# Patient Record
Sex: FEMALE | Race: WHITE | NOT HISPANIC OR LATINO | URBAN - METROPOLITAN AREA
[De-identification: names, ages, dates, MRNs, and addresses within clinical notes are randomized per-mention and may not be internally consistent; named-entity substitution may affect disease eponyms.]

---

## 2018-03-20 ENCOUNTER — OUTPATIENT (OUTPATIENT)
Dept: OUTPATIENT SERVICES | Facility: HOSPITAL | Age: 78
LOS: 1 days | End: 2018-03-20
Payer: MEDICARE

## 2018-03-20 VITALS
RESPIRATION RATE: 18 BRPM | OXYGEN SATURATION: 100 % | HEART RATE: 90 BPM | DIASTOLIC BLOOD PRESSURE: 81 MMHG | SYSTOLIC BLOOD PRESSURE: 143 MMHG | HEIGHT: 63 IN | WEIGHT: 160.94 LBS | TEMPERATURE: 98 F

## 2018-03-20 VITALS — HEIGHT: 63 IN | WEIGHT: 164.91 LBS

## 2018-03-20 DIAGNOSIS — Z01.810 ENCOUNTER FOR PREPROCEDURAL CARDIOVASCULAR EXAMINATION: ICD-10-CM

## 2018-03-20 DIAGNOSIS — R93.1 ABNORMAL FINDINGS ON DIAGNOSTIC IMAGING OF HEART AND CORONARY CIRCULATION: ICD-10-CM

## 2018-03-20 DIAGNOSIS — R06.02 SHORTNESS OF BREATH: ICD-10-CM

## 2018-03-20 DIAGNOSIS — I10 ESSENTIAL (PRIMARY) HYPERTENSION: ICD-10-CM

## 2018-03-20 DIAGNOSIS — Z90.89 ACQUIRED ABSENCE OF OTHER ORGANS: Chronic | ICD-10-CM

## 2018-03-20 LAB
ANION GAP SERPL CALC-SCNC: 13 MMOL/L — SIGNIFICANT CHANGE UP (ref 5–17)
APTT BLD: 33.2 SEC — SIGNIFICANT CHANGE UP (ref 27.5–37.4)
BASOPHILS # BLD AUTO: 0.1 K/UL — SIGNIFICANT CHANGE UP (ref 0–0.2)
BASOPHILS NFR BLD AUTO: 1.1 % — SIGNIFICANT CHANGE UP (ref 0–2)
BUN SERPL-MCNC: 16 MG/DL — SIGNIFICANT CHANGE UP (ref 8–20)
CALCIUM SERPL-MCNC: 9.1 MG/DL — SIGNIFICANT CHANGE UP (ref 8.6–10.2)
CHLORIDE SERPL-SCNC: 103 MMOL/L — SIGNIFICANT CHANGE UP (ref 98–107)
CHOLEST SERPL-MCNC: 196 MG/DL — SIGNIFICANT CHANGE UP (ref 110–199)
CO2 SERPL-SCNC: 25 MMOL/L — SIGNIFICANT CHANGE UP (ref 22–29)
CREAT SERPL-MCNC: 0.84 MG/DL — SIGNIFICANT CHANGE UP (ref 0.5–1.3)
EOSINOPHIL # BLD AUTO: 0.2 K/UL — SIGNIFICANT CHANGE UP (ref 0–0.5)
EOSINOPHIL NFR BLD AUTO: 2.3 % — SIGNIFICANT CHANGE UP (ref 0–6)
GLUCOSE SERPL-MCNC: 97 MG/DL — SIGNIFICANT CHANGE UP (ref 70–115)
HCT VFR BLD CALC: 41.1 % — SIGNIFICANT CHANGE UP (ref 37–47)
HDLC SERPL-MCNC: 62 MG/DL — LOW
HGB BLD-MCNC: 13.6 G/DL — SIGNIFICANT CHANGE UP (ref 12–16)
INR BLD: 1.09 RATIO — SIGNIFICANT CHANGE UP (ref 0.88–1.16)
LIPID PNL WITH DIRECT LDL SERPL: 116 MG/DL — SIGNIFICANT CHANGE UP
LYMPHOCYTES # BLD AUTO: 1.4 K/UL — SIGNIFICANT CHANGE UP (ref 1–4.8)
LYMPHOCYTES # BLD AUTO: 21.3 % — SIGNIFICANT CHANGE UP (ref 20–55)
MCHC RBC-ENTMCNC: 29.2 PG — SIGNIFICANT CHANGE UP (ref 27–31)
MCHC RBC-ENTMCNC: 33.1 G/DL — SIGNIFICANT CHANGE UP (ref 32–36)
MCV RBC AUTO: 88.2 FL — SIGNIFICANT CHANGE UP (ref 81–99)
MONOCYTES # BLD AUTO: 0.6 K/UL — SIGNIFICANT CHANGE UP (ref 0–0.8)
MONOCYTES NFR BLD AUTO: 9.7 % — SIGNIFICANT CHANGE UP (ref 3–10)
NEUTROPHILS # BLD AUTO: 4.3 K/UL — SIGNIFICANT CHANGE UP (ref 1.8–8)
NEUTROPHILS NFR BLD AUTO: 65.4 % — SIGNIFICANT CHANGE UP (ref 37–73)
PLATELET # BLD AUTO: 188 K/UL — SIGNIFICANT CHANGE UP (ref 150–400)
POTASSIUM SERPL-MCNC: 4.2 MMOL/L — SIGNIFICANT CHANGE UP (ref 3.5–5.3)
POTASSIUM SERPL-SCNC: 4.2 MMOL/L — SIGNIFICANT CHANGE UP (ref 3.5–5.3)
PROTHROM AB SERPL-ACNC: 12 SEC — SIGNIFICANT CHANGE UP (ref 9.8–12.7)
RBC # BLD: 4.66 M/UL — SIGNIFICANT CHANGE UP (ref 4.4–5.2)
RBC # FLD: 13.1 % — SIGNIFICANT CHANGE UP (ref 11–15.6)
SODIUM SERPL-SCNC: 141 MMOL/L — SIGNIFICANT CHANGE UP (ref 135–145)
TOTAL CHOLESTEROL/HDL RATIO MEASUREMENT: 3 RATIO — LOW (ref 3.3–7.1)
TRIGL SERPL-MCNC: 92 MG/DL — SIGNIFICANT CHANGE UP (ref 10–200)
WBC # BLD: 6.6 K/UL — SIGNIFICANT CHANGE UP (ref 4.8–10.8)
WBC # FLD AUTO: 6.6 K/UL — SIGNIFICANT CHANGE UP (ref 4.8–10.8)

## 2018-03-20 PROCEDURE — 80061 LIPID PANEL: CPT

## 2018-03-20 PROCEDURE — G0463: CPT

## 2018-03-20 PROCEDURE — 93005 ELECTROCARDIOGRAM TRACING: CPT

## 2018-03-20 PROCEDURE — 85730 THROMBOPLASTIN TIME PARTIAL: CPT

## 2018-03-20 PROCEDURE — 93010 ELECTROCARDIOGRAM REPORT: CPT

## 2018-03-20 PROCEDURE — 85027 COMPLETE CBC AUTOMATED: CPT

## 2018-03-20 PROCEDURE — 36415 COLL VENOUS BLD VENIPUNCTURE: CPT

## 2018-03-20 PROCEDURE — 85610 PROTHROMBIN TIME: CPT

## 2018-03-20 PROCEDURE — 80048 BASIC METABOLIC PNL TOTAL CA: CPT

## 2018-03-20 NOTE — H&P PST ADULT - HISTORY OF PRESENT ILLNESS
78 yo female with pmhx HTN, rheumatic fever who has c/o dyspnea on exertion and presents for PST for LHC. 76 yo female with pmhx HTN, rheumatic fever who has c/o dyspnea on extreme exertion and presents for PST for LHC. She states she has been feeling well, and does not have any chest pain or palpitations. She was referred for LHC because she had an abnormal echocardiogram.    Echo: EF 40-45%: Inferoapical, inferolateral, apical septal and apical lateral walls appear to be hypokinetic. No thrombus seen. EF by simpsons is 42%. Mild to moderate MR. Mild TR. Mild to moderate aortic regurgitation. Mild pulmonic valvular regurgitation.

## 2018-03-23 ENCOUNTER — INPATIENT (INPATIENT)
Facility: HOSPITAL | Age: 78
LOS: 0 days | Discharge: HOME | DRG: 247 | End: 2018-03-24
Attending: INTERNAL MEDICINE | Admitting: INTERNAL MEDICINE
Payer: MEDICARE

## 2018-03-23 ENCOUNTER — TRANSCRIPTION ENCOUNTER (OUTPATIENT)
Age: 78
End: 2018-03-23

## 2018-03-23 VITALS
HEART RATE: 102 BPM | DIASTOLIC BLOOD PRESSURE: 74 MMHG | TEMPERATURE: 98 F | RESPIRATION RATE: 17 BRPM | SYSTOLIC BLOOD PRESSURE: 175 MMHG | OXYGEN SATURATION: 97 %

## 2018-03-23 DIAGNOSIS — Z90.89 ACQUIRED ABSENCE OF OTHER ORGANS: Chronic | ICD-10-CM

## 2018-03-23 DIAGNOSIS — R06.02 SHORTNESS OF BREATH: ICD-10-CM

## 2018-03-23 DIAGNOSIS — Z01.810 ENCOUNTER FOR PREPROCEDURAL CARDIOVASCULAR EXAMINATION: ICD-10-CM

## 2018-03-23 DIAGNOSIS — R93.1 ABNORMAL FINDINGS ON DIAGNOSTIC IMAGING OF HEART AND CORONARY CIRCULATION: ICD-10-CM

## 2018-03-23 DIAGNOSIS — I10 ESSENTIAL (PRIMARY) HYPERTENSION: ICD-10-CM

## 2018-03-23 LAB
MAGNESIUM SERPL-MCNC: 1.9 MG/DL — SIGNIFICANT CHANGE UP (ref 1.6–2.6)
POTASSIUM SERPL-MCNC: 3.8 MMOL/L — SIGNIFICANT CHANGE UP (ref 3.5–5.3)
POTASSIUM SERPL-SCNC: 3.8 MMOL/L — SIGNIFICANT CHANGE UP (ref 3.5–5.3)

## 2018-03-23 PROCEDURE — 93010 ELECTROCARDIOGRAM REPORT: CPT

## 2018-03-23 RX ORDER — METOPROLOL TARTRATE 50 MG
25 TABLET ORAL
Qty: 0 | Refills: 0 | Status: DISCONTINUED | OUTPATIENT
Start: 2018-03-23 | End: 2018-03-24

## 2018-03-23 RX ORDER — LISINOPRIL 2.5 MG/1
20 TABLET ORAL DAILY
Qty: 0 | Refills: 0 | Status: DISCONTINUED | OUTPATIENT
Start: 2018-03-23 | End: 2018-03-24

## 2018-03-23 RX ORDER — ASPIRIN/CALCIUM CARB/MAGNESIUM 324 MG
81 TABLET ORAL DAILY
Qty: 0 | Refills: 0 | Status: DISCONTINUED | OUTPATIENT
Start: 2018-03-23 | End: 2018-03-24

## 2018-03-23 RX ORDER — MAGNESIUM SULFATE 500 MG/ML
1 VIAL (ML) INJECTION ONCE
Qty: 0 | Refills: 0 | Status: COMPLETED | OUTPATIENT
Start: 2018-03-23 | End: 2018-03-24

## 2018-03-23 RX ORDER — CLOPIDOGREL BISULFATE 75 MG/1
75 TABLET, FILM COATED ORAL DAILY
Qty: 0 | Refills: 0 | Status: DISCONTINUED | OUTPATIENT
Start: 2018-03-23 | End: 2018-03-24

## 2018-03-23 RX ORDER — ATORVASTATIN CALCIUM 80 MG/1
20 TABLET, FILM COATED ORAL AT BEDTIME
Qty: 0 | Refills: 0 | Status: DISCONTINUED | OUTPATIENT
Start: 2018-03-23 | End: 2018-03-24

## 2018-03-23 RX ORDER — POTASSIUM CHLORIDE 20 MEQ
40 PACKET (EA) ORAL ONCE
Qty: 0 | Refills: 0 | Status: COMPLETED | OUTPATIENT
Start: 2018-03-23 | End: 2018-03-24

## 2018-03-23 RX ADMIN — ATORVASTATIN CALCIUM 20 MILLIGRAM(S): 80 TABLET, FILM COATED ORAL at 23:01

## 2018-03-23 RX ADMIN — Medication 25 MILLIGRAM(S): at 23:14

## 2018-03-23 NOTE — DISCHARGE NOTE ADULT - HOSPITAL COURSE
76 yo female with pmhx HTN, rheumatic fever who has c/o dyspnea on extreme exertion and presents for PST for LHC. She states she has been feeling well, and does not have any chest pain or palpitations. She was referred for LHC because she had an abnormal echocardiogram.    Echo: EF 40-45%: Inferoapical, inferolateral, apical septal and apical lateral walls appear to be hypokinetic. No thrombus seen. EF by simpsons is 42%. Mild to moderate MR. Mild TR. Mild to moderate aortic regurgitation. Mild pulmonic valvular regurgitation.    ow s/p LHC with succesful PCI and MELY x2 to the proximal and mid LAD.

## 2018-03-23 NOTE — DISCHARGE NOTE ADULT - CARE PLAN
Principal Discharge DX:	CAD (coronary artery disease)  Goal:	maintain optimal cardiac health and quality of life  Assessment and plan of treatment:	Follow up with your Cardiologist as instructed.  Take all medications as instructed.  Speak to your doctor before stopping any medications.  Follow the specified diet.  Secondary Diagnosis:	S/P coronary angioplasty  Goal:	maintain optimal cardiac health and quality of life  Assessment and plan of treatment:	No heavy lifting, driving, sex, tub baths, swimming, or any activity that submerges the lower half of the body in water for 48 hours.  Limited walking and stairs for 48 hours.    Change the bandaid after 24 hours and every 24 hours after that.  Keep the puncture site dry and covered with a bandaid until a scab forms.    Observe the site frequently.  If bleeding or a large lump (the size of a golf ball or bigger) occurs lie flat, apply continuous direct pressure just above the puncture site for at least 10 minutes, and notify your physician immediately.  If the bleeding cannot be controlled, call 911 immediately for assistance.  Notify your physician of pain, swelling or any drainage.    Notify your physician immediately if coldness, numbness, discoloration or pain in your foot occurs.

## 2018-03-23 NOTE — DISCHARGE NOTE ADULT - PLAN OF CARE
maintain optimal cardiac health and quality of life Follow up with your Cardiologist as instructed.  Take all medications as instructed.  Speak to your doctor before stopping any medications.  Follow the specified diet. No heavy lifting, driving, sex, tub baths, swimming, or any activity that submerges the lower half of the body in water for 48 hours.  Limited walking and stairs for 48 hours.    Change the bandaid after 24 hours and every 24 hours after that.  Keep the puncture site dry and covered with a bandaid until a scab forms.    Observe the site frequently.  If bleeding or a large lump (the size of a golf ball or bigger) occurs lie flat, apply continuous direct pressure just above the puncture site for at least 10 minutes, and notify your physician immediately.  If the bleeding cannot be controlled, call 911 immediately for assistance.  Notify your physician of pain, swelling or any drainage.    Notify your physician immediately if coldness, numbness, discoloration or pain in your foot occurs.

## 2018-03-23 NOTE — DISCHARGE NOTE ADULT - MEDICATION SUMMARY - MEDICATIONS TO TAKE
I will START or STAY ON the medications listed below when I get home from the hospital:    aspirin 81 mg oral tablet  -- 1 tab(s) by mouth once a day  -- Indication: For CAD (coronary artery disease)    lisinopril 20 mg oral tablet  -- 1 tab(s) by mouth once a day  -- Indication: For CAD (coronary artery disease)    Crestor 5 mg oral tablet  -- 1 tab(s) by mouth once a day (at bedtime)  -- Indication: For Cholestero,    clopidogrel 75 mg oral tablet  -- 1 tab(s) by mouth once a day  -- Indication: For CAD (coronary artery disease)    metoprolol succinate 25 mg oral tablet, extended release  -- 1 tab(s) by mouth once a day (at bedtime)  -- Indication: For CAD (coronary artery disease)

## 2018-03-23 NOTE — DISCHARGE NOTE ADULT - NS AS ACTIVITY OBS
No Heavy lifting/straining/Do not make important decisions/Do not drive or operate machinery/Showering allowed/Walking-Indoors allowed

## 2018-03-23 NOTE — PROGRESS NOTE ADULT - SUBJECTIVE AND OBJECTIVE BOX
Nurse Practitioner Progress note:   s/p LHC with successful PCI and MELY x 2 to the proximal and mid LAD  Patient feels well.  Denies chest pain, shortness of breath, dizziness or palpitations at this time  All other ROS unremarkable    Pt A&O x 3 no neurological deficits  Lungs CTA  S1S2 no MRG  Right radial hemoband in place.  Procedure site CDI, no bleeding, no hematoma, able to move all digits with capillary refill < 3 seconds, fingers warm      T(C): 36.8 (03-23-18 @ 17:45), Max: 36.8 (03-23-18 @ 17:45)  HR: 101 (03-23-18 @ 18:15) (101 - 105)  BP: 155/72 (03-23-18 @ 18:15) (155/72 - 175/74)  RR: 16 (03-23-18 @ 18:15) (16 - 17)  SpO2: 99% (03-23-18 @ 18:15) (97% - 99%)    Post procedure EKG: Sinus Tachycardia with PVC    MEDICATIONS  (STANDING):  aspirin  chewable 81 milliGRAM(s) Oral daily  atorvastatin 20 milliGRAM(s) Oral at bedtime  clopidogrel Tablet 75 milliGRAM(s) Oral daily  lisinopril 20 milliGRAM(s) Oral daily      HPI:76 yo female with pmhx HTN, rheumatic fever who has c/o dyspnea on extreme exertion and presents for PST for C. She states she has been feeling well, and does not have any chest pain or palpitations. She was referred for C because she had an abnormal echocardiogram.    Echo: EF 40-45%: Inferoapical, inferolateral, apical septal and apical lateral walls appear to be hypokinetic. No thrombus seen. EF by simpsons is 42%. Mild to moderate MR. Mild TR. Mild to moderate aortic regurgitation. Mild pulmonic valvular regurgitation.        now s/p [facility] with succesful PCI and MELY x2 to the proximal and mid LAD    ASSESSMENT/PLAN:    Coronary artery disease  -Admit to telemetry                                                         Criteria for Hospital Admission Post PCI      -Age >75  -Proximal LAD lesion    -VS, labs, diet, activity as per PCI orders  -Encourage PO fluids  -Aspirin 81 mg PO daily  -Plavix 75mg PO daily  -Lisinopril  20 mg PO daily  -Crestor  5 mg PO QHS   -Plan of care discussed with patient, family and MD  -likely d/c in AM if patient remains stable overnight  -NP to see in AM to evaluate labs, EKG and procedure site check  -Follow-up with attending Dr Klarissa NGUYEN   within 1 week  -Discussed therapeutic lifestyle changes to reduce risk factors such as following a cardiac diet, weight loss, maintaining a healthy weight, exercise, smoking cessation, medication compliance, and regular follow-up  with MD to know your numbers (BP, cholesterol, weight, and glucose)
Cardiology NP note:     -Beta blocker added to med regimen secondary to tachycardia, Hypertensive, ectopy on tele,  and CAD.  -Tele with frequent bigeminy and trigeminy: will send serum potassium and magnesium levels to the lab  -Supplement electrolytes as needed  -Report to tele PA to follow up and check levels
I have seen and examined this patient. There is no change since the last H& P. Consent obtained. Agree with cardiac cath. Risks and benefits fully explained. All questions answered.    Antonino Gamboa MD
Patient is a 77y old  Female who presents with a chief complaint of     HPI: 78 yo female with pmhx HTN, rheumatic fever who has c/o dyspnea on extreme exertion and presents for PST for LHC. She states she has been feeling well, and does not have any chest pain or palpitations. She was referred for LHC because she had an abnormal echocardiogram.    Echo: EF 40-45%: Inferoapical, inferolateral, apical septal and apical lateral walls appear to be hypokinetic. No thrombus seen. EF by simpsons is 42%. Mild to moderate MR. Mild TR. Mild to moderate aortic regurgitation. Mild pulmonic valvular regurgitation.        PAST MEDICAL & SURGICAL HISTORY:  Rheumatic fever  HTN (hypertension)  Status post tonsillectomy      Allergies    Biaxin (Muscle Pain; Joint Pain)  codeine (Faint)    	      PHYSICAL EXAM:  Appearance: Normal, well nourished	  HEENT:   Normal oral mucosa, 	  Cardiovascular: Normal S1 S2, No JVD, No cardiac murmurs, No peripheral edema  Respiratory: Lungs clear to auscultation	  Psychiatry: A & O x 3, Mood & affect appropriate  Skin: No rashes, No ecchymoses, No cyanosis  Neurologic: Grossly non-focal with full strength in all four extremities  Extremities: Normal range of motion, No clubbing, cyanosis or edema  Vascular: Peripheral pulses palpable 2+ bilaterally      INTERPRETATION OF TELEMETRY:  Sinus Rhythm      Assessment and Plan    Abnormal Echo with reduced EF    Plan C

## 2018-03-24 VITALS — HEART RATE: 81 BPM | DIASTOLIC BLOOD PRESSURE: 66 MMHG | SYSTOLIC BLOOD PRESSURE: 119 MMHG | RESPIRATION RATE: 20 BRPM

## 2018-03-24 LAB
ANION GAP SERPL CALC-SCNC: 12 MMOL/L — SIGNIFICANT CHANGE UP (ref 5–17)
BASOPHILS # BLD AUTO: 0.1 K/UL — SIGNIFICANT CHANGE UP (ref 0–0.2)
BASOPHILS NFR BLD AUTO: 1 % — SIGNIFICANT CHANGE UP (ref 0–2)
BUN SERPL-MCNC: 18 MG/DL — SIGNIFICANT CHANGE UP (ref 8–20)
CALCIUM SERPL-MCNC: 8.8 MG/DL — SIGNIFICANT CHANGE UP (ref 8.6–10.2)
CHLORIDE SERPL-SCNC: 103 MMOL/L — SIGNIFICANT CHANGE UP (ref 98–107)
CO2 SERPL-SCNC: 24 MMOL/L — SIGNIFICANT CHANGE UP (ref 22–29)
CREAT SERPL-MCNC: 0.79 MG/DL — SIGNIFICANT CHANGE UP (ref 0.5–1.3)
EOSINOPHIL # BLD AUTO: 0.2 K/UL — SIGNIFICANT CHANGE UP (ref 0–0.5)
EOSINOPHIL NFR BLD AUTO: 2.4 % — SIGNIFICANT CHANGE UP (ref 0–6)
GLUCOSE SERPL-MCNC: 89 MG/DL — SIGNIFICANT CHANGE UP (ref 70–115)
HCT VFR BLD CALC: 42.4 % — SIGNIFICANT CHANGE UP (ref 37–47)
HGB BLD-MCNC: 14.3 G/DL — SIGNIFICANT CHANGE UP (ref 12–16)
LYMPHOCYTES # BLD AUTO: 1.9 K/UL — SIGNIFICANT CHANGE UP (ref 1–4.8)
LYMPHOCYTES # BLD AUTO: 20.7 % — SIGNIFICANT CHANGE UP (ref 20–55)
MAGNESIUM SERPL-MCNC: 2.3 MG/DL — SIGNIFICANT CHANGE UP (ref 1.6–2.6)
MCHC RBC-ENTMCNC: 29.5 PG — SIGNIFICANT CHANGE UP (ref 27–31)
MCHC RBC-ENTMCNC: 33.7 G/DL — SIGNIFICANT CHANGE UP (ref 32–36)
MCV RBC AUTO: 87.4 FL — SIGNIFICANT CHANGE UP (ref 81–99)
MONOCYTES # BLD AUTO: 0.8 K/UL — SIGNIFICANT CHANGE UP (ref 0–0.8)
MONOCYTES NFR BLD AUTO: 9.3 % — SIGNIFICANT CHANGE UP (ref 3–10)
NEUTROPHILS # BLD AUTO: 6.1 K/UL — SIGNIFICANT CHANGE UP (ref 1.8–8)
NEUTROPHILS NFR BLD AUTO: 66.4 % — SIGNIFICANT CHANGE UP (ref 37–73)
PLATELET # BLD AUTO: 216 K/UL — SIGNIFICANT CHANGE UP (ref 150–400)
POTASSIUM SERPL-MCNC: 4.5 MMOL/L — SIGNIFICANT CHANGE UP (ref 3.5–5.3)
POTASSIUM SERPL-SCNC: 4.5 MMOL/L — SIGNIFICANT CHANGE UP (ref 3.5–5.3)
RBC # BLD: 4.85 M/UL — SIGNIFICANT CHANGE UP (ref 4.4–5.2)
RBC # FLD: 13.1 % — SIGNIFICANT CHANGE UP (ref 11–15.6)
SODIUM SERPL-SCNC: 139 MMOL/L — SIGNIFICANT CHANGE UP (ref 135–145)
WBC # BLD: 9.1 K/UL — SIGNIFICANT CHANGE UP (ref 4.8–10.8)
WBC # FLD AUTO: 9.1 K/UL — SIGNIFICANT CHANGE UP (ref 4.8–10.8)

## 2018-03-24 PROCEDURE — C9600: CPT | Mod: LD

## 2018-03-24 PROCEDURE — C1887: CPT

## 2018-03-24 PROCEDURE — C1894: CPT

## 2018-03-24 PROCEDURE — C1769: CPT

## 2018-03-24 PROCEDURE — 36415 COLL VENOUS BLD VENIPUNCTURE: CPT

## 2018-03-24 PROCEDURE — 93571 IV DOP VEL&/PRESS C FLO 1ST: CPT | Mod: LD

## 2018-03-24 PROCEDURE — 93005 ELECTROCARDIOGRAM TRACING: CPT

## 2018-03-24 PROCEDURE — 99152 MOD SED SAME PHYS/QHP 5/>YRS: CPT

## 2018-03-24 PROCEDURE — 84132 ASSAY OF SERUM POTASSIUM: CPT

## 2018-03-24 PROCEDURE — 93458 L HRT ARTERY/VENTRICLE ANGIO: CPT | Mod: XU

## 2018-03-24 PROCEDURE — C1753: CPT

## 2018-03-24 PROCEDURE — 85027 COMPLETE CBC AUTOMATED: CPT

## 2018-03-24 PROCEDURE — 93010 ELECTROCARDIOGRAM REPORT: CPT

## 2018-03-24 PROCEDURE — C1725: CPT

## 2018-03-24 PROCEDURE — 92978 ENDOLUMINL IVUS OCT C 1ST: CPT | Mod: LD

## 2018-03-24 PROCEDURE — 80048 BASIC METABOLIC PNL TOTAL CA: CPT

## 2018-03-24 PROCEDURE — 99153 MOD SED SAME PHYS/QHP EA: CPT

## 2018-03-24 PROCEDURE — C1874: CPT

## 2018-03-24 PROCEDURE — 83735 ASSAY OF MAGNESIUM: CPT

## 2018-03-24 RX ORDER — ROSUVASTATIN CALCIUM 5 MG/1
1 TABLET ORAL
Qty: 30 | Refills: 0
Start: 2018-03-24 | End: 2018-04-22

## 2018-03-24 RX ORDER — METOPROLOL TARTRATE 50 MG
25 TABLET ORAL AT BEDTIME
Qty: 0 | Refills: 0 | Status: DISCONTINUED | OUTPATIENT
Start: 2018-03-24 | End: 2018-03-24

## 2018-03-24 RX ORDER — METOPROLOL TARTRATE 50 MG
1 TABLET ORAL
Qty: 30 | Refills: 0
Start: 2018-03-24 | End: 2018-04-22

## 2018-03-24 RX ORDER — CLOPIDOGREL BISULFATE 75 MG/1
1 TABLET, FILM COATED ORAL
Qty: 30 | Refills: 0
Start: 2018-03-24 | End: 2018-04-22

## 2018-03-24 RX ADMIN — Medication 81 MILLIGRAM(S): at 09:26

## 2018-03-24 RX ADMIN — Medication 40 MILLIEQUIVALENT(S): at 00:13

## 2018-03-24 RX ADMIN — Medication 25 MILLIGRAM(S): at 06:38

## 2018-03-24 RX ADMIN — CLOPIDOGREL BISULFATE 75 MILLIGRAM(S): 75 TABLET, FILM COATED ORAL at 09:26

## 2018-03-24 RX ADMIN — Medication 100 GRAM(S): at 00:13

## 2018-03-24 RX ADMIN — LISINOPRIL 20 MILLIGRAM(S): 2.5 TABLET ORAL at 09:26

## 2018-03-27 ENCOUNTER — APPOINTMENT (OUTPATIENT)
Dept: GENERAL RADIOLOGY | Facility: CLINIC | Age: 78
End: 2018-03-27
Attending: EMERGENCY MEDICINE
Payer: COMMERCIAL

## 2018-03-27 ENCOUNTER — HOSPITAL ENCOUNTER (EMERGENCY)
Facility: CLINIC | Age: 78
Discharge: HOME | End: 2018-03-27
Attending: EMERGENCY MEDICINE | Admitting: EMERGENCY MEDICINE
Payer: COMMERCIAL

## 2018-03-27 VITALS
DIASTOLIC BLOOD PRESSURE: 84 MMHG | OXYGEN SATURATION: 93 % | TEMPERATURE: 98.3 F | SYSTOLIC BLOOD PRESSURE: 108 MMHG | RESPIRATION RATE: 10 BRPM

## 2018-03-27 DIAGNOSIS — R00.2 PALPITATIONS: ICD-10-CM

## 2018-03-27 LAB
ANION GAP SERPL CALCULATED.3IONS-SCNC: 6 MMOL/L (ref 3–14)
BASOPHILS # BLD AUTO: 0 10E9/L (ref 0–0.2)
BASOPHILS NFR BLD AUTO: 0.3 %
BUN SERPL-MCNC: 20 MG/DL (ref 7–30)
CALCIUM SERPL-MCNC: 9.2 MG/DL (ref 8.5–10.1)
CHLORIDE SERPL-SCNC: 106 MMOL/L (ref 94–109)
CO2 SERPL-SCNC: 29 MMOL/L (ref 20–32)
CREAT SERPL-MCNC: 0.75 MG/DL (ref 0.52–1.04)
DIFFERENTIAL METHOD BLD: NORMAL
EOSINOPHIL # BLD AUTO: 0.2 10E9/L (ref 0–0.7)
EOSINOPHIL NFR BLD AUTO: 3 %
ERYTHROCYTE [DISTWIDTH] IN BLOOD BY AUTOMATED COUNT: 13.3 % (ref 10–15)
GFR SERPL CREATININE-BSD FRML MDRD: 75 ML/MIN/1.7M2
GLUCOSE SERPL-MCNC: 129 MG/DL (ref 70–99)
HCT VFR BLD AUTO: 43.1 % (ref 35–47)
HGB BLD-MCNC: 14.6 G/DL (ref 11.7–15.7)
IMM GRANULOCYTES # BLD: 0 10E9/L (ref 0–0.4)
IMM GRANULOCYTES NFR BLD: 0.3 %
INTERPRETATION ECG - MUSE: NORMAL
LYMPHOCYTES # BLD AUTO: 1.8 10E9/L (ref 0.8–5.3)
LYMPHOCYTES NFR BLD AUTO: 23.9 %
MCH RBC QN AUTO: 30.7 PG (ref 26.5–33)
MCHC RBC AUTO-ENTMCNC: 33.9 G/DL (ref 31.5–36.5)
MCV RBC AUTO: 91 FL (ref 78–100)
MONOCYTES # BLD AUTO: 0.7 10E9/L (ref 0–1.3)
MONOCYTES NFR BLD AUTO: 9.3 %
NEUTROPHILS # BLD AUTO: 4.9 10E9/L (ref 1.6–8.3)
NEUTROPHILS NFR BLD AUTO: 63.2 %
NRBC # BLD AUTO: 0 10*3/UL
NRBC BLD AUTO-RTO: 0 /100
PLATELET # BLD AUTO: 239 10E9/L (ref 150–450)
POTASSIUM SERPL-SCNC: 3.7 MMOL/L (ref 3.4–5.3)
RBC # BLD AUTO: 4.76 10E12/L (ref 3.8–5.2)
SODIUM SERPL-SCNC: 141 MMOL/L (ref 133–144)
TROPONIN I SERPL-MCNC: <0.015 UG/L (ref 0–0.04)
TSH SERPL DL<=0.005 MIU/L-ACNC: 1.79 MU/L (ref 0.4–4)
WBC # BLD AUTO: 7.7 10E9/L (ref 4–11)

## 2018-03-27 PROCEDURE — 84443 ASSAY THYROID STIM HORMONE: CPT | Performed by: EMERGENCY MEDICINE

## 2018-03-27 PROCEDURE — 80048 BASIC METABOLIC PNL TOTAL CA: CPT | Performed by: EMERGENCY MEDICINE

## 2018-03-27 PROCEDURE — 85025 COMPLETE CBC W/AUTO DIFF WBC: CPT | Performed by: EMERGENCY MEDICINE

## 2018-03-27 PROCEDURE — 99285 EMERGENCY DEPT VISIT HI MDM: CPT | Mod: 25

## 2018-03-27 PROCEDURE — 71046 X-RAY EXAM CHEST 2 VIEWS: CPT

## 2018-03-27 PROCEDURE — 84484 ASSAY OF TROPONIN QUANT: CPT | Performed by: EMERGENCY MEDICINE

## 2018-03-27 PROCEDURE — 93005 ELECTROCARDIOGRAM TRACING: CPT

## 2018-03-27 ASSESSMENT — ENCOUNTER SYMPTOMS
SHORTNESS OF BREATH: 1
PALPITATIONS: 1
COUGH: 0
APPETITE CHANGE: 0

## 2018-03-27 NOTE — ED AVS SNAPSHOT
"  Emergency Department    [address] [facility] [address]    Phone:  [phone]    Fax:  [phone]                                       Annie Archer   MRN: [id number]    Department:   Emergency Department   Date of Visit:  3/27/2018           Patient Information     Date Of Birth          1940        Your diagnoses for this visit were:     Palpitations        You were seen by Mercedes Mondragon MD.      Follow-up Information     Please follow up.    Why:  Follow up your cardiologist        Discharge Instructions         * Heart Palpitations    Palpitations refers to the feeling that your heart is beating hard, fast or irregular. Some people describe it as \"pounding\" or \"skipped beats\". Palpitations may occur in persons with heart disease, but can also occur in healthy persons. Your doctor does not believe that anything dangerous is causing your symptoms at this time.  Heart-Related Causes:    Arrhythmia (a change from the heart's normal rhythm)    Disease of the heart valves  Non-Heart-Related Causes:    Certain medicines (such as asthma inhalers and decongestants)    Some herbal supplements, energy drinks and pills, and weight loss pills    Illegal stimulant drugs (such as cocaine, crank, methamphetamine, PCP)    Caffeine, alcohol and tobacco    Medical conditions such as thyroid disease, anemia, anxiety and panic disorder  Sometimes the cause cannot be found.  Home Care:  1. Avoid excess caffeine, alcohol, tobacco and any stimulant drugs.  2. Tell your doctor about any prescription or over-the-counter or herbal medicines you take.  Follow Up  with your doctor or as advised by our staff.  Get Prompt Medical Attention  if any of the following occur together with palpitations:    Weakness, dizziness, light-headed or fainting    Chest pain or shortness of breath    Rapid heart rate (over 120 beats per minute, at rest)    Palpitations that lasts over 20 minutes    Weakness of an arm " or leg or one side of the face    Difficulty with speech or vision    6067-5083 [employer]. [address] [place] [address], [place] [address]. All rights reserved. This information is not intended as a substitute for professional medical care. Always follow your healthcare professional's instructions.  This information has been modified by your health care provider with permission from the publisher.          24 Hour Appointment Hotline       To make an appointment at any [facility], call [phone] ([phone]). If you don't have a family doctor or clinic, we will help you find one. [facility] are conveniently located to serve the needs of you and your family.             Review of your medicines      Notice     You have not been prescribed any medications.            Procedures and tests performed during your visit     Basic metabolic panel    CBC with platelets differential    Cardiac Continuous Monitoring    EKG 12 lead    EKG 12-lead, tracing only    Pulse oximetry nursing    TSH    Troponin I    Vital signs    XR Chest 2 Views      Orders Needing Specimen Collection     None      Pending Results     No orders found from 3/25/2018 to 3/28/2018.            Pending Culture Results     No orders found from 3/25/2018 to 3/28/2018.            Pending Results Instructions     If you had any lab results that were not finalized at the time of your Discharge, you can call the ED Lab Result RN at [phone]. You will be contacted by this team for any positive Lab results or changes in treatment. The nurses are available 7 days a week from 10A to 6:30P.  You can leave a message 24 hours per day and they will return your call.        Test Results From Your Hospital Stay        3/27/2018  8:14 PM      Component Results     Component Value Ref Range & Units Status    WBC 7.7 4.0 - 11.0 10e9/L Final    RBC Count 4.76 3.8 - 5.2 10e12/L Final    Hemoglobin 14.6 11.7 - 15.7 g/dL Final    Hematocrit  43.1 35.0 - 47.0 % Final    MCV 91 78 - 100 fl Final    MCH 30.7 26.5 - 33.0 pg Final    MCHC 33.9 31.5 - 36.5 g/dL Final    RDW 13.3 10.0 - 15.0 % Final    Platelet Count 239 150 - 450 10e9/L Final    Diff Method Automated Method  Final    % Neutrophils 63.2 % Final    % Lymphocytes 23.9 % Final    % Monocytes 9.3 % Final    % Eosinophils 3.0 % Final    % Basophils 0.3 % Final    % Immature Granulocytes 0.3 % Final    Nucleated RBCs 0 0 /100 Final    Absolute Neutrophil 4.9 1.6 - 8.3 10e9/L Final    Absolute Lymphocytes 1.8 0.8 - 5.3 10e9/L Final    Absolute Monocytes 0.7 0.0 - 1.3 10e9/L Final    Absolute Eosinophils 0.2 0.0 - 0.7 10e9/L Final    Absolute Basophils 0.0 0.0 - 0.2 10e9/L Final    Abs Immature Granulocytes 0.0 0 - 0.4 10e9/L Final    Absolute Nucleated RBC 0.0  Final         3/27/2018  8:28 PM      Component Results     Component Value Ref Range & Units Status    Sodium 141 133 - 144 mmol/L Final    Potassium 3.7 3.4 - 5.3 mmol/L Final    Chloride 106 94 - 109 mmol/L Final    Carbon Dioxide 29 20 - 32 mmol/L Final    Anion Gap 6 3 - 14 mmol/L Final    Glucose 129 (H) 70 - 99 mg/dL Final    Urea Nitrogen 20 7 - 30 mg/dL Final    Creatinine 0.75 0.52 - 1.04 mg/dL Final    GFR Estimate 75 >60 mL/min/1.7m2 Final    Non  GFR Calc    GFR Estimate If Black >90 >60 mL/min/1.7m2 Final    African American GFR Calc    Calcium 9.2 8.5 - 10.1 mg/dL Final         3/27/2018  8:33 PM      Component Results     Component Value Ref Range & Units Status    Troponin I ES <0.015 0.000 - 0.045 ug/L Final    The 99th percentile for upper reference range is 0.045 ug/L.  Troponin values   in the range of 0.045 - 0.120 ug/L may be associated with risks of adverse   clinical events.           3/27/2018  8:38 PM      Component Results     Component Value Ref Range & Units Status    TSH 1.79 0.40 - 4.00 mU/L Final         3/27/2018  8:42 PM      Narrative     XR CHEST 2 VW 3/27/2018 8:29 PM    HISTORY:  Palpitations.    COMPARISON: None.        Impression     IMPRESSION: The lungs are clear. No focal pulmonary opacities. Heart  and mediastinum are unremarkable. No acute cardiopulmonary  abnormalities.    TONI LUNA MD                Clinical Quality Measure: Blood Pressure Screening     Your blood pressure was checked while you were in the emergency department today. The last reading we obtained was  BP: 108/84 . Please read the guidelines below about what these numbers mean and what you should do about them.  If your systolic blood pressure (the top number) is less than 120 and your diastolic blood pressure (the bottom number) is less than 80, then your blood pressure is normal. There is nothing more that you need to do about it.  If your systolic blood pressure (the top number) is 120-139 or your diastolic blood pressure (the bottom number) is 80-89, your blood pressure may be higher than it should be. You should have your blood pressure rechecked within a year by a primary care provider.  If your systolic blood pressure (the top number) is 140 or greater or your diastolic blood pressure (the bottom number) is 90 or greater, you may have high blood pressure. High blood pressure is treatable, but if left untreated over time it can put you at risk for heart attack, stroke, or kidney failure. You should have your blood pressure rechecked by a primary care provider within the next 4 weeks.  If your provider in the emergency department today gave you specific instructions to follow-up with your doctor or provider even sooner than that, you should follow that instruction and not wait for up to 4 weeks for your follow-up visit.        Thank you for choosing [facility]       Thank you for choosing [facility] for your care. Our goal is always to provide you with excellent care. Hearing back from our patients is one way we can continue to improve our services. Please take a few minutes to complete the written survey that  "you may receive in the mail after you visit with us. Thank you!        [employer] Information     [employer] lets you send messages to your doctor, view your test results, renew your prescriptions, schedule appointments and more. To sign up, go to [email or web address] . Click on \"Log in\" on the left side of the screen, which will take you to the Welcome page. Then click on \"Sign up Now\" on the right side of the page.     You will be asked to enter the access code listed below, as well as some personal information. Please follow the directions to create your username and password.     Your access code is: [email or web address]  Expires: 2018  9:34 PM     Your access code will  in 90 days. If you need help or a new code, please call your [place] clinic or [phone].        Care EveryWhere ID     This is your Care EveryWhere ID. This could be used by other organizations to access your [place] medical records  [id number]        Equal Access to Services     SUNIL CARRASQUILLO : Hadnasima gonzaleso Sohernandez, waaxda luqadaha, qaybta kaalmasamir padilla, monse ballard . So [facility] [phone].    ATENCIÓN: Si habla español, tiene a valles disposición servicios gratuitos de asistencia lingüística. Llame al [phone].    We comply with applicable federal civil rights laws and [place] laws. We do not discriminate on the basis of race, color, national origin, age, disability, sex, sexual orientation, or gender identity.            After Visit Summary       This is your record. Keep this with you and show to your community pharmacist(s) and doctor(s) at your next visit.                  "

## 2018-03-27 NOTE — ED AVS SNAPSHOT
Emergency Department    [address] [place] [address]    Phone:  [phone]    Fax:  [phone]                                       Annie Archer   MRN: [id number]    Department:   Emergency Department   Date of Visit:  3/27/2018           After Visit Summary Signature Page     I have received my discharge instructions, and my questions have been answered. I have discussed any challenges I see with this plan with the nurse or doctor.    ..........................................................................................................................................  Patient/Patient Representative Signature      ..........................................................................................................................................  Patient Representative Print Name and Relationship to Patient    ..................................................               ................................................  Date                                            Time    ..........................................................................................................................................  Reviewed by Signature/Title    ...................................................              ..............................................  Date                                                            Time

## 2018-03-28 NOTE — ED PROVIDER NOTES
"  History     Chief Complaint:  Tachycardia     The history is provided by the patient.      Annie Archer is a 77 year old female with history of atrial fibrillation with RVR, on Xarelto and metoprolol, who presents with tachycardia. The patient started feeling palpitations as well as minor chest pain and shortness of breath less than 1 hour prior to her arrival in the ED. She was not doing anything exertional at the time, and symptoms resolved by the time she arrived to the ED. She states that her symptoms came on suddenly and it \"felt like her chest was vibrating.\" Of note, the patient had a colonoscopy yesterday and was told to stop her Xarelto prior to the procedure. She restarted her Xarelto last night and her last dose was this evening at 1800. She denies any cough, recent travel, change in appetite, or other medical concerns. Of note, she has been cardioverted in the past and sees cardiology at [facility].    Allergies:  No known drug allergies      Medications:    Xarelto  Metoprolol    Past Medical History:    Atrial fibrillation with RVR    Past Surgical History:    Colonoscopy     Family History:    History reviewed. No pertinent family history.      Social History:  Presents with son   Tobacco use: Not on file  Alcohol use: Not on file  PCP: [facility]    Marital Status:        Review of Systems   Constitutional: Negative for appetite change.   Respiratory: Positive for shortness of breath. Negative for cough.    Cardiovascular: Positive for chest pain and palpitations.   All other systems reviewed and are negative.    Physical Exam     Patient Vitals for the past 24 hrs:   BP Temp Temp src Heart Rate Resp SpO2   03/27/18 2131 - - - 59 10 -   03/27/18 2130 108/84 - - 56 - 93 %   03/27/18 2115 - - - 56 - 91 %   03/27/18 2100 124/57 - - 58 - 92 %   03/27/18 2034 131/60 - - 65 - -   03/27/18 2013 - - - 62 14 93 %   03/27/18 2001 138/64 - - 69 16 96 %   03/27/18 1937 " 145/78 98.3  F (36.8  C) Oral 80 - 94 %      Physical Exam    Physical Exam   Constitutional:  Patient is oriented to person, place, and time. They appear well-developed and well-nourished. Mild distress secondary to tachycardia   HENT:   Mouth/Throat:   Oropharynx is clear and moist.   Eyes:    Conjunctivae normal and EOM are normal. Pupils are equal, round, and reactive to light.   Neck:    Normal range of motion.   Cardiovascular: Normal rate, regular rhythm and normal heart sounds.  Exam reveals no gallop and no friction rub.  No murmur heard.  Pulmonary/Chest:  Effort normal and breath sounds normal. Patient has no wheezes. Patient has no rales.   Abdominal:   Soft. Bowel sounds are normal. Patient exhibits no mass. There is no tenderness. There is no rebound and no guarding.   Musculoskeletal:  Normal range of motion. Patient exhibits no edema.   Neurological:   Patient is alert and oriented to person, place, and time. Patient has normal strength. No cranial nerve deficit or sensory deficit.  Skin:   Skin is warm and dry. No rash noted. No erythema.   Psychiatric:   Patient has a normal mood and affect. Patient's behavior is normal. Judgment and thought content normal.        Emergency Department Course   ECG (19:54:22):  Rate 63 bpm. DC interval 168. QRS duration 80. QT/QTc 376/384. P-R-T axes 98 -19 17. Normal sinus rhythm. Minimal voltage criteria for LVH, may be normal variant. Inferior infarct, age undetermined. Anterior infarct, age undetermined. Abnormal ECG. Agree with computer interpretation. Interpreted at 1955 by Mercedes Mondragon MD.     Imaging:  Radiographic findings were communicated with the patient and family who voiced understanding of the findings.  XR Chest 2 Views  IMPRESSION: The lungs are clear. No focal pulmonary opacities. Heart and mediastinum are unremarkable. No acute cardiopulmonary abnormalities.     TONI LUNA MD    Imaging independently reviewed and agree with  radiologist interpretation.     Laboratory:  CBC: WNL (WBC 7.7, HGB 14.6, )    BMP: Glucose 129 (H), o/w WNL (Creatinine 0.75)   1959: Troponin: <0.015   TSH: 1.79    Emergency Department Course:  Past medical records, nursing notes, and vitals reviewed.  1956: I performed an exam of the patient and obtained history, as documented above.      2119: I rechecked the patient. Findings and plan explained to the Patient. Patient discharged home with instructions regarding supportive care, medications, and reasons to return. The importance of close follow-up was reviewed.      I personally reviewed the laboratory results with the patient and answered all related questions prior to discharge.   Impression & Plan    Medical Decision Making:  Annie Archer is a 77 year old female presenting with current concern for palpitations and being in a fib. She felt palpitations prior to arrival. On her pulse ox she was 140 BPM. However on arrival she had normal vital signs. EKG was done immediately which shows sinus rhythm.    She was on cardiac monitor throughout her stay here and has remained in a sinus rhythm. Diagnostic testing was performed. Cardiac marker is normal. She has no acute metabolic derangement. She is not anemic. She has no evidence of leukocytosis. Chest xray shows no acute findings such as CHF, pneumonia, masses, free air, mediastinum. She is on Xarelto making PE unlikely and at this time I see no evidence of arrhythmia.    Based on what she is telling me however, I do suspect she had an episode of atrial fibrillation. At this point however I do not see any acute medical need for hospitalization, she is in a sinus rhythm and is chronically anticoagulated. She has a cardiologist through the [facility] system and since this is the second time this happened I recommended she follow up with them. She is on a very lose dose metoprolol as she has a very slow resting heart rate at baseline, however I did  speak with her about that if this happens again she may try a pill in the pocket approach. If her rate does not come down after taking a 12.5 mg Metoprolol she should return to the ED. At this time the patient feels safe with this plan.    Diagnosis:    ICD-10-CM    1. Palpitations R00.2        Disposition:  Discharged to home with plan as outlined.      Yordy Medrano  3/27/2018    EMERGENCY DEPARTMENT  I, Yordy Medrano, am serving as a scribe at 7:56 PM on 3/27/2018 to document services personally performed by Mercedes Mondragon MD based on my observations and the provider's statements to me.       Mercedes Mondragon MD  03/28/18 2051

## 2018-03-28 NOTE — DISCHARGE INSTRUCTIONS
"  * Heart Palpitations    Palpitations refers to the feeling that your heart is beating hard, fast or irregular. Some people describe it as \"pounding\" or \"skipped beats\". Palpitations may occur in persons with heart disease, but can also occur in healthy persons. Your doctor does not believe that anything dangerous is causing your symptoms at this time.  Heart-Related Causes:    Arrhythmia (a change from the heart's normal rhythm)    Disease of the heart valves  Non-Heart-Related Causes:    Certain medicines (such as asthma inhalers and decongestants)    Some herbal supplements, energy drinks and pills, and weight loss pills    Illegal stimulant drugs (such as cocaine, crank, methamphetamine, PCP)    Caffeine, alcohol and tobacco    Medical conditions such as thyroid disease, anemia, anxiety and panic disorder  Sometimes the cause cannot be found.  Home Care:  1. Avoid excess caffeine, alcohol, tobacco and any stimulant drugs.  2. Tell your doctor about any prescription or over-the-counter or herbal medicines you take.  Follow Up  with your doctor or as advised by our staff.  Get Prompt Medical Attention  if any of the following occur together with palpitations:    Weakness, dizziness, light-headed or fainting    Chest pain or shortness of breath    Rapid heart rate (over 120 beats per minute, at rest)    Palpitations that lasts over 20 minutes    Weakness of an arm or leg or one side of the face    Difficulty with speech or vision    9058-0350 [employer]. [address] [place] [address]. All rights reserved. This information is not intended as a substitute for professional medical care. Always follow your healthcare professional's instructions.  This information has been modified by your health care provider with permission from the publisher.        "

## 2018-04-19 ENCOUNTER — EMERGENCY (EMERGENCY)
Facility: HOSPITAL | Age: 78
LOS: 1 days | Discharge: HOME | End: 2018-04-19
Attending: EMERGENCY MEDICINE
Payer: MEDICARE

## 2018-04-19 VITALS
HEIGHT: 63 IN | HEART RATE: 94 BPM | WEIGHT: 162.04 LBS | DIASTOLIC BLOOD PRESSURE: 77 MMHG | SYSTOLIC BLOOD PRESSURE: 143 MMHG | RESPIRATION RATE: 18 BRPM | TEMPERATURE: 98 F | OXYGEN SATURATION: 100 %

## 2018-04-19 DIAGNOSIS — Z90.89 ACQUIRED ABSENCE OF OTHER ORGANS: Chronic | ICD-10-CM

## 2018-04-19 LAB
ALBUMIN SERPL ELPH-MCNC: 3.9 G/DL — SIGNIFICANT CHANGE UP (ref 3.3–5.2)
ALP SERPL-CCNC: 57 U/L — SIGNIFICANT CHANGE UP (ref 40–120)
ALT FLD-CCNC: 10 U/L — SIGNIFICANT CHANGE UP
ANION GAP SERPL CALC-SCNC: 14 MMOL/L — SIGNIFICANT CHANGE UP (ref 5–17)
APPEARANCE UR: CLEAR — SIGNIFICANT CHANGE UP
AST SERPL-CCNC: 20 U/L — SIGNIFICANT CHANGE UP
BACTERIA # UR AUTO: ABNORMAL
BASOPHILS # BLD AUTO: 0.1 K/UL — SIGNIFICANT CHANGE UP (ref 0–0.2)
BASOPHILS NFR BLD AUTO: 0.8 % — SIGNIFICANT CHANGE UP (ref 0–2)
BILIRUB SERPL-MCNC: 1.1 MG/DL — SIGNIFICANT CHANGE UP (ref 0.4–2)
BILIRUB UR-MCNC: NEGATIVE — SIGNIFICANT CHANGE UP
BUN SERPL-MCNC: 16 MG/DL — SIGNIFICANT CHANGE UP (ref 8–20)
CALCIUM SERPL-MCNC: 9.4 MG/DL — SIGNIFICANT CHANGE UP (ref 8.6–10.2)
CHLORIDE SERPL-SCNC: 104 MMOL/L — SIGNIFICANT CHANGE UP (ref 98–107)
CO2 SERPL-SCNC: 23 MMOL/L — SIGNIFICANT CHANGE UP (ref 22–29)
COLOR SPEC: YELLOW — SIGNIFICANT CHANGE UP
CREAT SERPL-MCNC: 0.69 MG/DL — SIGNIFICANT CHANGE UP (ref 0.5–1.3)
DIFF PNL FLD: ABNORMAL
EOSINOPHIL # BLD AUTO: 0.1 K/UL — SIGNIFICANT CHANGE UP (ref 0–0.5)
EOSINOPHIL NFR BLD AUTO: 1.1 % — SIGNIFICANT CHANGE UP (ref 0–6)
EPI CELLS # UR: SIGNIFICANT CHANGE UP
GLUCOSE SERPL-MCNC: 104 MG/DL — SIGNIFICANT CHANGE UP (ref 70–115)
GLUCOSE UR QL: NEGATIVE MG/DL — SIGNIFICANT CHANGE UP
HCT VFR BLD CALC: 42.3 % — SIGNIFICANT CHANGE UP (ref 37–47)
HGB BLD-MCNC: 14 G/DL — SIGNIFICANT CHANGE UP (ref 12–16)
KETONES UR-MCNC: ABNORMAL
LEUKOCYTE ESTERASE UR-ACNC: NEGATIVE — SIGNIFICANT CHANGE UP
LYMPHOCYTES # BLD AUTO: 1 K/UL — SIGNIFICANT CHANGE UP (ref 1–4.8)
LYMPHOCYTES # BLD AUTO: 13 % — LOW (ref 20–55)
MAGNESIUM SERPL-MCNC: 2 MG/DL — SIGNIFICANT CHANGE UP (ref 1.6–2.6)
MCHC RBC-ENTMCNC: 29.1 PG — SIGNIFICANT CHANGE UP (ref 27–31)
MCHC RBC-ENTMCNC: 33.1 G/DL — SIGNIFICANT CHANGE UP (ref 32–36)
MCV RBC AUTO: 87.9 FL — SIGNIFICANT CHANGE UP (ref 81–99)
MONOCYTES # BLD AUTO: 0.5 K/UL — SIGNIFICANT CHANGE UP (ref 0–0.8)
MONOCYTES NFR BLD AUTO: 6.3 % — SIGNIFICANT CHANGE UP (ref 3–10)
NEUTROPHILS # BLD AUTO: 6.3 K/UL — SIGNIFICANT CHANGE UP (ref 1.8–8)
NEUTROPHILS NFR BLD AUTO: 78.5 % — HIGH (ref 37–73)
NITRITE UR-MCNC: NEGATIVE — SIGNIFICANT CHANGE UP
PH UR: 7 — SIGNIFICANT CHANGE UP (ref 5–8)
PLATELET # BLD AUTO: 200 K/UL — SIGNIFICANT CHANGE UP (ref 150–400)
POTASSIUM SERPL-MCNC: 4.8 MMOL/L — SIGNIFICANT CHANGE UP (ref 3.5–5.3)
POTASSIUM SERPL-SCNC: 4.8 MMOL/L — SIGNIFICANT CHANGE UP (ref 3.5–5.3)
PROT SERPL-MCNC: 6.8 G/DL — SIGNIFICANT CHANGE UP (ref 6.6–8.7)
PROT UR-MCNC: NEGATIVE MG/DL — SIGNIFICANT CHANGE UP
RBC # BLD: 4.81 M/UL — SIGNIFICANT CHANGE UP (ref 4.4–5.2)
RBC # FLD: 13.1 % — SIGNIFICANT CHANGE UP (ref 11–15.6)
RBC CASTS # UR COMP ASSIST: ABNORMAL /HPF (ref 0–4)
SODIUM SERPL-SCNC: 141 MMOL/L — SIGNIFICANT CHANGE UP (ref 135–145)
SP GR SPEC: 1.02 — SIGNIFICANT CHANGE UP (ref 1.01–1.02)
TROPONIN T SERPL-MCNC: <0.01 NG/ML — SIGNIFICANT CHANGE UP (ref 0–0.06)
UROBILINOGEN FLD QL: NEGATIVE MG/DL — SIGNIFICANT CHANGE UP
WBC # BLD: 8 K/UL — SIGNIFICANT CHANGE UP (ref 4.8–10.8)
WBC # FLD AUTO: 8 K/UL — SIGNIFICANT CHANGE UP (ref 4.8–10.8)
WBC UR QL: SIGNIFICANT CHANGE UP

## 2018-04-19 PROCEDURE — 70450 CT HEAD/BRAIN W/O DYE: CPT | Mod: 26

## 2018-04-19 PROCEDURE — 99218: CPT

## 2018-04-19 PROCEDURE — 93010 ELECTROCARDIOGRAM REPORT: CPT

## 2018-04-19 PROCEDURE — 71045 X-RAY EXAM CHEST 1 VIEW: CPT | Mod: 26

## 2018-04-19 RX ORDER — MECLIZINE HCL 12.5 MG
25 TABLET ORAL ONCE
Qty: 0 | Refills: 0 | Status: COMPLETED | OUTPATIENT
Start: 2018-04-19 | End: 2018-04-19

## 2018-04-19 RX ORDER — SODIUM CHLORIDE 9 MG/ML
1000 INJECTION INTRAMUSCULAR; INTRAVENOUS; SUBCUTANEOUS ONCE
Qty: 0 | Refills: 0 | Status: COMPLETED | OUTPATIENT
Start: 2018-04-19 | End: 2018-04-19

## 2018-04-19 RX ORDER — METOCLOPRAMIDE HCL 10 MG
10 TABLET ORAL ONCE
Qty: 0 | Refills: 0 | Status: COMPLETED | OUTPATIENT
Start: 2018-04-19 | End: 2018-04-19

## 2018-04-19 RX ADMIN — Medication 25 MILLIGRAM(S): at 15:54

## 2018-04-19 RX ADMIN — Medication 10 MILLIGRAM(S): at 17:47

## 2018-04-19 RX ADMIN — SODIUM CHLORIDE 2000 MILLILITER(S): 9 INJECTION INTRAMUSCULAR; INTRAVENOUS; SUBCUTANEOUS at 15:55

## 2018-04-19 RX ADMIN — Medication 25 MILLIGRAM(S): at 17:46

## 2018-04-19 NOTE — ED CDU PROVIDER INITIAL DAY NOTE - CHPI ED SYMPTOMS NEG
no fever/no numbness/no confusion/no weakness/no blurred vision/no loss of consciousness/no change in level of consciousness

## 2018-04-19 NOTE — ED PROVIDER NOTE - PHYSICAL EXAMINATION
Const: Awake, alert and oriented. In no acute distress. Well appearing.  HEENT: NC/AT. Moist mucous membranes.  Eyes: No scleral icterus. EOMI. PERRL.  Neck:. Soft and supple. Full ROM without pain.  Cardiac: Regular rate and rhythm. +S1/S2. No murmurs. Peripheral pulses 2+ and symmetric. No LE edema.  Resp: Speaking in full sentences. No evidence of respiratory distress. No wheezes, rales or rhonchi.  Abd: Soft, non-tender, non-distended. Normal bowel sounds in all 4 quadrants. No guarding or rebound.  Back: Spine midline and non-tender. No CVAT.  Skin: No rashes, abrasions or lacerations.  Lymph: No cervical lymphadenopathy.  Neuro: CN II-XII grossly in tact. Symmetrical smile. PERRL. EOMI. Bilateral and symmetric sensation of face. Tongue midline. Abnormal finger to nose on the right 2/2 tremor. Normal heel to shin. Normal rapid alternating movements. No pronator drift. Patient unable to ambulate due to vertigo. Sensation symmetrically intact bilateral upper and lower extremities. Reflexes 2+ bilaterally. Babinski negative bilaterally.

## 2018-04-19 NOTE — ED CDU PROVIDER INITIAL DAY NOTE - OBJECTIVE STATEMENT
Patient with PMH HTN, ARF presesnts complaining of sudden onset of vertigo in which she describes the entire world spinning around her which started upon trying to get out of bed this morning. She states her symptoms are worse when moving quickly and improved at rest and with her eyes closed. She notes associated nausea and vomiting when moving quickly, such as when she was being transported out of her house. She also notes new tremor of the right upper extremity with intention worse than the left. She denies any history of tremor. She has otherwise been in her usual state of good health, denies fevers, chills, HA, blurry vision, sore throat, CP, SOB, abdominal pain, diarrhea, constipation, urinary symptoms, rashes or LE edema. She likewise denies recent trauma. She is about 3 weeks s/p [facility] with 2 stents placed by Dr. Antnoino Gamboa (Cardiology) and since then has been feeling well. She denies smoking, EtOH or illicit drugs.    PMD: Dr. Redmond

## 2018-04-19 NOTE — ED PROVIDER NOTE - PROGRESS NOTE DETAILS
Patient reassessed and results shared. Patient states her nausea has improved, but still feels very vertiginous with movement. I discussed plan for MRI and MRA with neurology to see to rule out posterior  CVA.

## 2018-04-19 NOTE — ED ADULT TRIAGE NOTE - CHIEF COMPLAINT QUOTE
Patient arrived via EMS, awake ,alert, and oriented times 3, breathing unlabored.  Patient complaining of dizziness which started this morning.  patient states dizziness increases on movement.  No visual disturbances.  No recent falls or trauma.  No complaints of pain.  Dizziness increases on movement of head back and forth.

## 2018-04-19 NOTE — ED CDU PROVIDER INITIAL DAY NOTE - PROGRESS NOTE DETAILS
Per radiology, the patient had cardiac stent placed less than a month ago and therefore is not a candidate for MRI at this time. Per radiology, the patient had cardiac stent placed less than a month ago and therefore is not a candidate for MRI at this time. I discussed with Dr. Barnes who recommends CT angio of the head and neck and CT head with contrast and he will see the patient in the morning. Patient signed out to Dr. Higuera pending CT angio results and neurology to see patient in the morning.

## 2018-04-19 NOTE — ED PROVIDER NOTE - CHPI ED SYMPTOMS NEG
no blurred vision/no change in level of consciousness/no fever/no loss of consciousness/no confusion/no weakness/no numbness

## 2018-04-19 NOTE — ED PROVIDER NOTE - NS ED ROS FT
Const: Denies fever, chills  HEENT: Denies blurry vision, sore throat  Neck: Denies neck pain/stiffness  Resp: Denies coughing, SOB  Cardiovascular: Denies CP, palpitations, LE edema  GI: + nausea, + vomiting, Denies abdominal pain, diarrhea, constipation, blood in stool  : Denies urinary frequency/urgency/dysuria, hematuria  MSK: Denies back pain  Neuro: + Dizziness. Denies HA, numbness, weakness  Skin: Denies rashes.

## 2018-04-19 NOTE — ED PROVIDER NOTE - OBJECTIVE STATEMENT
Patient with PMH HTN, ARF presesnts complaining of sudden onset of vertigo in which she describes the entire world spinning around her which started upon trying to get out of bed this morning. She states her symptoms are worse when moving quickly and improved at rest and with her eyes closed. She notes associated nausea and vomiting when moving quickly, such as when she was being transported out of her house. She also notes new tremor of the right upper extremity with intention worse than the left. She denies any history of tremor. She has otherwise been in her usual state of good health, denies fevers, chills, HA, blurry vision, sore throat, CP, SOB, abdominal pain, diarrhea, constipation, urinary symptoms, rashes or LE edema. She likewise denies recent trauma. She is about 3 weeks s/p [facility] with 2 stents placed by Dr. Antonino Gamboa (Cardiology) and since then has been feeling well. She denies smoking, EtOH or illicit drugs.    PMD: Dr. Redmond

## 2018-04-19 NOTE — ED PROVIDER NOTE - MEDICAL DECISION MAKING DETAILS
Patient with recent cath presents with sudden onset vertigo since this morning, worse with sudden movements associated with nausea, vomiting. Abnormal finger to nose on the right, patient unable to ambulate. Will check EKG, cardiac labs, rule out infection, CT head and give meclizine and reassess.

## 2018-04-19 NOTE — ED ADULT NURSE NOTE - OBJECTIVE STATEMENT
received pt AOx3 c/o dizziness which started today, states worse with mvomeent and headturning, received pt AOx3 c/o dizziness which started today, states worse with movement and head turning. pt denies chest pain or sob. Respirations are even and unlabored, lungs cta, +bowel x4 quads, abdomen soft, nontender/nondistended, no guarding, rebound or rigidity noted, skin w/d/i. neuro intact, will cont to monitor.

## 2018-04-20 VITALS
TEMPERATURE: 98 F | SYSTOLIC BLOOD PRESSURE: 121 MMHG | RESPIRATION RATE: 18 BRPM | OXYGEN SATURATION: 97 % | HEART RATE: 92 BPM | DIASTOLIC BLOOD PRESSURE: 79 MMHG

## 2018-04-20 DIAGNOSIS — R42 DIZZINESS AND GIDDINESS: ICD-10-CM

## 2018-04-20 PROCEDURE — 70496 CT ANGIOGRAPHY HEAD: CPT | Mod: 26

## 2018-04-20 PROCEDURE — G0378: CPT

## 2018-04-20 PROCEDURE — 96374 THER/PROPH/DIAG INJ IV PUSH: CPT | Mod: XU

## 2018-04-20 PROCEDURE — 99217: CPT

## 2018-04-20 PROCEDURE — 99284 EMERGENCY DEPT VISIT MOD MDM: CPT | Mod: 25

## 2018-04-20 PROCEDURE — 84484 ASSAY OF TROPONIN QUANT: CPT

## 2018-04-20 PROCEDURE — 70498 CT ANGIOGRAPHY NECK: CPT

## 2018-04-20 PROCEDURE — 36415 COLL VENOUS BLD VENIPUNCTURE: CPT

## 2018-04-20 PROCEDURE — 70450 CT HEAD/BRAIN W/O DYE: CPT

## 2018-04-20 PROCEDURE — 85027 COMPLETE CBC AUTOMATED: CPT

## 2018-04-20 PROCEDURE — 70496 CT ANGIOGRAPHY HEAD: CPT

## 2018-04-20 PROCEDURE — 80053 COMPREHEN METABOLIC PANEL: CPT

## 2018-04-20 PROCEDURE — 70498 CT ANGIOGRAPHY NECK: CPT | Mod: 26

## 2018-04-20 PROCEDURE — 81001 URINALYSIS AUTO W/SCOPE: CPT

## 2018-04-20 PROCEDURE — 83735 ASSAY OF MAGNESIUM: CPT

## 2018-04-20 PROCEDURE — 93005 ELECTROCARDIOGRAM TRACING: CPT

## 2018-04-20 PROCEDURE — 71045 X-RAY EXAM CHEST 1 VIEW: CPT

## 2018-04-20 NOTE — ED CDU PROVIDER SUBSEQUENT DAY NOTE - MEDICAL DECISION MAKING DETAILS
patient with vertigo recent stent, r/o post stroke, cta, neuro consult
PT WITH IDZZY. TWO NEG CTS. IMPROVED CLINICALLY . OUTPT F/U APPROPRIATE

## 2018-04-20 NOTE — ED ADULT NURSE REASSESSMENT NOTE - NS ED NURSE REASSESS COMMENT FT1
Pt return to ed from mri states "they didn't do the mri b/c my stents havent been in long enough", denies any complaints @ this time, denies cp denies sob, resting comfortably on stretcher, vss per fs, safety maintained, in no apparent distress @ this time
Report received from offgoing RN, chart as noted, pt @ mri @ this time, pending return to ed
pt resting comfortably, updated regarding plan of care. pt pending MRI, in no distress, denies all complaints, resp even unlabored, neuro intact. will cont to monitor
Assumed pt. care from Leighann RAMIREZ. Pt. is a/o x3 resting in bed connected to cardiac monitor. Pt. denies any N/V at this time. Pt. verbalizes feeling mild dizziness although markedly improved since earlier. Pt. has negative nystagmus and CN 3 is unable to accommodate. Pt. denies any loss os sensation and is able to move all extremities with ease and normal proprioception. Will continue to monitor pt.

## 2018-04-20 NOTE — CONSULT NOTE ADULT - SUBJECTIVE AND OBJECTIVE BOX
HPI: 78yo RH female with PMH HTN, ARF presesnts complaining of sudden onset of vertigo in which she describes the entire world spinning around her which started upon trying to get out of bed this morning. She states her symptoms are worse when moving quickly and improved at rest and with her eyes closed. She notes associated nausea and vomiting when moving quickly, such as when she was being transported out of her house. She also notes new tremor of the right upper extremity with intention worse than the left. She denies any history of tremor. She has otherwise been in her usual state of good health, denies fevers, chills, HA, blurry vision, sore throat, CP, SOB, abdominal pain, diarrhea, constipation, urinary symptoms, rashes or LE edema. She likewise denies recent trauma. She is about 3 weeks s/p [facility] with 2 stents placed by Dr. Antonino Gamboa (Cardiology) and since then has been feeling well. She denies smoking, EtOH or illicit drugs.  Denies any similar in the past.  Denies any hearing loss/tinnitus/ear infections.  Reports symptoms have now improved drastically.      PAST MEDICAL & SURGICAL HISTORY:  Rheumatic fever  HTN (hypertension)  Status post tonsillectomy    MEDICATIONS  (STANDING):    MEDICATIONS  (PRN):    Allergies    Biaxin (Muscle Pain; Joint Pain)  codeine (Faint)    Intolerances        FAMILY HISTORY:          SOCIAL HISTORY:  Denies toxic habits;     REVIEW OF SYSTEMS:    As noted in the HPI.    VITAL SIGNS:  Vital Signs Last 24 Hrs  T(C): 36.8 (20 Apr 2018 08:02), Max: 36.8 (20 Apr 2018 08:02)  T(F): 98.2 (20 Apr 2018 08:02), Max: 98.2 (20 Apr 2018 08:02)  HR: 73 (20 Apr 2018 08:02) (73 - 96)  BP: 134/76 (20 Apr 2018 08:02) (112/68 - 143/77)  BP(mean): --  RR: 18 (20 Apr 2018 08:02) (16 - 18)  SpO2: 99% (20 Apr 2018 08:02) (96% - 100%)    PHYSICAL EXAMINATION:  General: Well-developed, well nourished, in no acute distress.  Eyes: Conjunctiva and sclera clear. Fundoscopic examination was deferred.  Neck: Supple.  Cardiac: +S1 & S2; Regular.  Chest: CTA b/l.    Musculoskeletal: No tenderness on palpation of spine.  No Brudzinski/Kernig's sign.    Neurologic:  - Mental Status:  Alert, awake, oriented to person, place, and time; Speech is fluent with intact naming, repetition, and comprehension  Cranial Nerves II-XII:    II:  Visual acuity is normal for age ; Visual fields are full to confrontation; Pupils are equal, round, and reactive to light.  III, IV, VI:  Extraocular movements are intact without nystagmus.  V:  Facial sensation is intact in the V1-V3 distribution bilaterally.  VII:  Face is symmetric with normal eye closure and smile  VIII:  Hearing is intact and symmetric for age  IX, X:  Uvula is midline and soft palate rises symmetrically  XI:  Head turning and shoulder shrug are intact.  XII:  Tongue protrudes in the midline.  - Motor:  Strength is 5/5 throughout.  There is no pronator drift.  Normal muscle bulk and tone throughout.  - Reflexes:  2+ and symmetric throughout.  - Sensory:  Intact and symmetric to light touch, and joint-position sense.  - Coordination:  No dysmetria/dysdiadochokinesis.   - Gait: Deferred.      LABS:                          14.0   8.0   )-----------( 200      ( 19 Apr 2018 16:18 )             42.3     19 Apr 2018 16:18    141    |  104    |  16.0   ----------------------------<  104    4.8     |  23.0   |  0.69     Ca    9.4        19 Apr 2018 16:18  Mg     2.0       19 Apr 2018 16:18    TPro  6.8    /  Alb  3.9    /  TBili  1.1    /  DBili  x      /  AST  20     /  ALT  10     /  AlkPhos  57     19 Apr 2018 16:18    LIVER FUNCTIONS - ( 19 Apr 2018 16:18 )  Alb: 3.9 g/dL / Pro: 6.8 g/dL / ALK PHOS: 57 U/L / ALT: 10 U/L / AST: 20 U/L / GGT: x                 RADIOLOGY & ADDITIONAL STUDIES:      CT Angio Neck w/ IV Cont (04.20.18 @ 04:58) >  Noncontrast head CT:   Stable exam. No acute intracranial hemorrhage, mass effect or evidence of   acute territorial infarct.    CTA head and neck:   No major vessel occlusion, hemodynamically significant stenosis,   dissection or aneurysm.    CT Head No Cont (04.19.18 @ 16:22) >  Mild chronic microvascular changes without evidence of an   acute transcortical infarction or hemorrhage. MR is a more sensitive   imaging modality for the evaluation of an acute infarction.     IMPRESSION:  Dizziness now improved. HPI: 78yo RH female with PMH HTN, ARF presesnts complaining of sudden onset of vertigo in which she describes the entire world spinning around her which started upon trying to get out of bed this morning. She states her symptoms are worse when moving quickly and improved at rest and with her eyes closed. She notes associated nausea and vomiting when moving quickly, such as when she was being transported out of her house. She also notes new tremor of the right upper extremity with intention worse than the left. She denies any history of tremor. She has otherwise been in her usual state of good health, denies fevers, chills, HA, blurry vision, sore throat, CP, SOB, abdominal pain, diarrhea, constipation, urinary symptoms, rashes or LE edema. She likewise denies recent trauma. She is about 3 weeks s/p [facility] with 2 stents placed by Dr. Antonino Gamboa (Cardiology) and since then has been feeling well. She denies smoking, EtOH or illicit drugs.  Denies any similar in the past.  Denies any hearing loss/tinnitus/ear infections.  Reports symptoms have now improved drastically and reports of being normal in and when OOB.      PAST MEDICAL & SURGICAL HISTORY:  Rheumatic fever  HTN (hypertension)  Status post tonsillectomy    MEDICATIONS  (STANDING):    MEDICATIONS  (PRN):    Allergies    Biaxin (Muscle Pain; Joint Pain)  codeine (Faint)    Intolerances        FAMILY HISTORY:          SOCIAL HISTORY:  Denies toxic habits;     REVIEW OF SYSTEMS:    As noted in the HPI.    VITAL SIGNS:  Vital Signs Last 24 Hrs  T(C): 36.8 (20 Apr 2018 08:02), Max: 36.8 (20 Apr 2018 08:02)  T(F): 98.2 (20 Apr 2018 08:02), Max: 98.2 (20 Apr 2018 08:02)  HR: 73 (20 Apr 2018 08:02) (73 - 96)  BP: 134/76 (20 Apr 2018 08:02) (112/68 - 143/77)  BP(mean): --  RR: 18 (20 Apr 2018 08:02) (16 - 18)  SpO2: 99% (20 Apr 2018 08:02) (96% - 100%)    PHYSICAL EXAMINATION:  General: Well-developed, well nourished, in no acute distress.  Eyes: Conjunctiva and sclera clear. Fundoscopic examination was deferred.  Neck: Supple.  Cardiac: +S1 & S2; Regular.  Chest: CTA b/l.    Musculoskeletal: No tenderness on palpation of spine.  No Brudzinski/Kernig's sign.    Neurologic:  - Mental Status:  Alert, awake, oriented to person, place, and time; Speech is fluent with intact naming, repetition, and comprehension  Cranial Nerves II-XII:    II:  Visual acuity is normal for age ; Visual fields are full to confrontation; Pupils are equal, round, and reactive to light.  III, IV, VI:  Extraocular movements are intact without nystagmus.  V:  Facial sensation is intact in the V1-V3 distribution bilaterally.  VII:  Face is symmetric with normal eye closure and smile  VIII:  Hearing is intact and symmetric for age  IX, X:  Uvula is midline and soft palate rises symmetrically  XI:  Head turning and shoulder shrug are intact.  XII:  Tongue protrudes in the midline.  - Motor:  Strength is 5/5 throughout.  There is no pronator drift.  Normal muscle bulk and tone throughout.  - Reflexes:  2+ and symmetric throughout.  - Sensory:  Intact and symmetric to light touch, and joint-position sense.  - Coordination:  No dysmetria/dysdiadochokinesis.   - Gait: Steady.      LABS:                          14.0   8.0   )-----------( 200      ( 19 Apr 2018 16:18 )             42.3     19 Apr 2018 16:18    141    |  104    |  16.0   ----------------------------<  104    4.8     |  23.0   |  0.69     Ca    9.4        19 Apr 2018 16:18  Mg     2.0       19 Apr 2018 16:18    TPro  6.8    /  Alb  3.9    /  TBili  1.1    /  DBili  x      /  AST  20     /  ALT  10     /  AlkPhos  57     19 Apr 2018 16:18    LIVER FUNCTIONS - ( 19 Apr 2018 16:18 )  Alb: 3.9 g/dL / Pro: 6.8 g/dL / ALK PHOS: 57 U/L / ALT: 10 U/L / AST: 20 U/L / GGT: x                 RADIOLOGY & ADDITIONAL STUDIES:      CT Angio Neck w/ IV Cont (04.20.18 @ 04:58) >  Noncontrast head CT:   Stable exam. No acute intracranial hemorrhage, mass effect or evidence of   acute territorial infarct.    CTA head and neck:   No major vessel occlusion, hemodynamically significant stenosis,   dissection or aneurysm.    CT Head No Cont (04.19.18 @ 16:22) >  Mild chronic microvascular changes without evidence of an   acute transcortical infarction or hemorrhage. MR is a more sensitive   imaging modality for the evaluation of an acute infarction.     IMPRESSION:  Dizziness now improved.

## 2018-04-20 NOTE — CONSULT NOTE ADULT - PROBLEM SELECTOR RECOMMENDATION 9
MRI Brain without gerry r/o cva is recommended if no medical contraindications.  Continue with current antiplatelet regimen as per cardiology.  Outpatient follow up at [facility] at [phone] or [phone] for further diagnostic and treatment regimen.  Will d/w Dr Owen in ER.  DVT prophylaxis recommended.  Will follow. S/p Cardiac stent 3 weeks ago.  CT head x 2 and CTA head and neck unremarkable and with normal neuro exam.  History suggestive of vertigo.  Continue with current antiplatelet regimen as per cardiology.  Outpatient follow up at [facility] at [phone] or [phone] for further diagnostic and treatment regimen including MRI's and vestibular testing and treatment.  Will d/w pt and Dr Owen in ER.  DVT prophylaxis recommended.  Reconsult PRN.

## 2018-04-20 NOTE — ED CDU PROVIDER SUBSEQUENT DAY NOTE - HISTORY
patient with possibe vertigo, treate admitted for observation, cta done, reviewed patient is comfortable, no complaints feels better, slept comfortable, await neuro consult in am, sign out to am ed attending

## 2018-11-24 NOTE — H&P PST ADULT - TEACHING/LEARNING LEARNING PREFERENCES
DATE:      REPORT TITLE:  Same-day Surgery History and Physical.      DATE OF SURGERY:  05/02/2017.      ADMISSION DIAGNOSIS:  Tympanic membrane, right, central.      HISTORY:  The patient is a 5-1/2-year-old with long-standing history of ear   disease.  He underwent tympanotomy and tubes in May 2012.  He did well with the   tubes in place.  His left tube extruded, the tympanic membrane healed, and he   has had no further problems.  The right tympanic membrane is left with a   residual central perforation for which she was brought to the operating room now   for right myringoplasty.      ALLERGIES:  None.      PAST MEDICAL HISTORY:  Bilateral tympanotomy and tubes in May 2012.      PHYSICAL EXAMINATION:  LUNGS   Clear.      HEART   Regular.      HEENT   The ear on the right is remarkable for clean dry central perforation   approximately 20%.      IMPRESSION:  Right tympanic membrane perforation, central.      PLAN:  Right myringoplasty.         _____________________               ____________________________________   DATE AND TIME                       Beronica Brennan M.D.         JENNIFER/EDIE-#[id number]   DD:  04/27/2017 10:48:08      DT:  04/27/2017 14:12:33            [facility]      HISTORY AND PHYSICAL        [facility] EUCEDAN#: [id number]   ROOM:   [facility]#: [id number] and Physical      written material/verbal instruction/skill demonstration

## 2019-10-16 ENCOUNTER — APPOINTMENT (OUTPATIENT)
Dept: GENERAL RADIOLOGY | Facility: CLINIC | Age: 79
End: 2019-10-16
Attending: EMERGENCY MEDICINE
Payer: COMMERCIAL

## 2019-10-16 ENCOUNTER — HOSPITAL ENCOUNTER (EMERGENCY)
Facility: CLINIC | Age: 79
Discharge: HOME | End: 2019-10-16
Attending: EMERGENCY MEDICINE | Admitting: EMERGENCY MEDICINE
Payer: COMMERCIAL

## 2019-10-16 VITALS
SYSTOLIC BLOOD PRESSURE: 124 MMHG | RESPIRATION RATE: 20 BRPM | HEIGHT: 66 IN | OXYGEN SATURATION: 95 % | DIASTOLIC BLOOD PRESSURE: 72 MMHG | HEART RATE: 79 BPM | WEIGHT: 250 LBS | BODY MASS INDEX: 40.18 KG/M2 | TEMPERATURE: 97.9 F

## 2019-10-16 DIAGNOSIS — R11.0 NAUSEA: ICD-10-CM

## 2019-10-16 DIAGNOSIS — T50.905A ADVERSE EFFECT OF DRUG, INITIAL ENCOUNTER: ICD-10-CM

## 2019-10-16 DIAGNOSIS — R05.9 COUGH: ICD-10-CM

## 2019-10-16 LAB
ALBUMIN SERPL-MCNC: 3.2 G/DL (ref 3.4–5)
ALP SERPL-CCNC: 86 U/L (ref 40–150)
ALT SERPL W P-5'-P-CCNC: 15 U/L (ref 0–50)
ANION GAP SERPL CALCULATED.3IONS-SCNC: 7 MMOL/L (ref 3–14)
AST SERPL W P-5'-P-CCNC: 12 U/L (ref 0–45)
BASOPHILS # BLD AUTO: 0 10E9/L (ref 0–0.2)
BASOPHILS NFR BLD AUTO: 0.2 %
BILIRUB SERPL-MCNC: 0.7 MG/DL (ref 0.2–1.3)
BUN SERPL-MCNC: 21 MG/DL (ref 7–30)
CALCIUM SERPL-MCNC: 9.1 MG/DL (ref 8.5–10.1)
CHLORIDE SERPL-SCNC: 103 MMOL/L (ref 94–109)
CO2 SERPL-SCNC: 28 MMOL/L (ref 20–32)
CREAT SERPL-MCNC: 0.68 MG/DL (ref 0.52–1.04)
D DIMER PPP FEU-MCNC: 0.5 UG/ML FEU (ref 0–0.5)
DIFFERENTIAL METHOD BLD: ABNORMAL
EOSINOPHIL # BLD AUTO: 0.1 10E9/L (ref 0–0.7)
EOSINOPHIL NFR BLD AUTO: 0.6 %
ERYTHROCYTE [DISTWIDTH] IN BLOOD BY AUTOMATED COUNT: 13.4 % (ref 10–15)
GFR SERPL CREATININE-BSD FRML MDRD: 83 ML/MIN/{1.73_M2}
GLUCOSE SERPL-MCNC: 143 MG/DL (ref 70–99)
HCT VFR BLD AUTO: 42.7 % (ref 35–47)
HGB BLD-MCNC: 14.3 G/DL (ref 11.7–15.7)
IMM GRANULOCYTES # BLD: 0 10E9/L (ref 0–0.4)
IMM GRANULOCYTES NFR BLD: 0.2 %
LIPASE SERPL-CCNC: 95 U/L (ref 73–393)
LYMPHOCYTES # BLD AUTO: 0.7 10E9/L (ref 0.8–5.3)
LYMPHOCYTES NFR BLD AUTO: 5.4 %
MCH RBC QN AUTO: 30.4 PG (ref 26.5–33)
MCHC RBC AUTO-ENTMCNC: 33.5 G/DL (ref 31.5–36.5)
MCV RBC AUTO: 91 FL (ref 78–100)
MONOCYTES # BLD AUTO: 0.7 10E9/L (ref 0–1.3)
MONOCYTES NFR BLD AUTO: 5.3 %
NEUTROPHILS # BLD AUTO: 11 10E9/L (ref 1.6–8.3)
NEUTROPHILS NFR BLD AUTO: 88.3 %
NRBC # BLD AUTO: 0 10*3/UL
NRBC BLD AUTO-RTO: 0 /100
NT-PROBNP SERPL-MCNC: 1283 PG/ML (ref 0–1800)
PLATELET # BLD AUTO: 271 10E9/L (ref 150–450)
POTASSIUM SERPL-SCNC: 3.6 MMOL/L (ref 3.4–5.3)
PROT SERPL-MCNC: 7.2 G/DL (ref 6.8–8.8)
RBC # BLD AUTO: 4.7 10E12/L (ref 3.8–5.2)
SODIUM SERPL-SCNC: 138 MMOL/L (ref 133–144)
TROPONIN I SERPL-MCNC: <0.015 UG/L (ref 0–0.04)
WBC # BLD AUTO: 12.5 10E9/L (ref 4–11)

## 2019-10-16 PROCEDURE — 83690 ASSAY OF LIPASE: CPT | Performed by: EMERGENCY MEDICINE

## 2019-10-16 PROCEDURE — 85025 COMPLETE CBC W/AUTO DIFF WBC: CPT | Performed by: EMERGENCY MEDICINE

## 2019-10-16 PROCEDURE — 83880 ASSAY OF NATRIURETIC PEPTIDE: CPT | Performed by: EMERGENCY MEDICINE

## 2019-10-16 PROCEDURE — 71046 X-RAY EXAM CHEST 2 VIEWS: CPT

## 2019-10-16 PROCEDURE — 94640 AIRWAY INHALATION TREATMENT: CPT

## 2019-10-16 PROCEDURE — 25000128 H RX IP 250 OP 636: Performed by: EMERGENCY MEDICINE

## 2019-10-16 PROCEDURE — 84484 ASSAY OF TROPONIN QUANT: CPT | Performed by: EMERGENCY MEDICINE

## 2019-10-16 PROCEDURE — 80053 COMPREHEN METABOLIC PANEL: CPT | Performed by: EMERGENCY MEDICINE

## 2019-10-16 PROCEDURE — 96374 THER/PROPH/DIAG INJ IV PUSH: CPT

## 2019-10-16 PROCEDURE — 25000125 ZZHC RX 250: Performed by: EMERGENCY MEDICINE

## 2019-10-16 PROCEDURE — 99284 EMERGENCY DEPT VISIT MOD MDM: CPT | Mod: 25

## 2019-10-16 PROCEDURE — 25000132 ZZH RX MED GY IP 250 OP 250 PS 637: Performed by: EMERGENCY MEDICINE

## 2019-10-16 PROCEDURE — 96361 HYDRATE IV INFUSION ADD-ON: CPT

## 2019-10-16 PROCEDURE — 85379 FIBRIN DEGRADATION QUANT: CPT | Performed by: EMERGENCY MEDICINE

## 2019-10-16 RX ORDER — ONDANSETRON 2 MG/ML
4 INJECTION INTRAMUSCULAR; INTRAVENOUS ONCE
Status: COMPLETED | OUTPATIENT
Start: 2019-10-16 | End: 2019-10-16

## 2019-10-16 RX ORDER — ALBUTEROL SULFATE 90 UG/1
2 AEROSOL, METERED RESPIRATORY (INHALATION) EVERY 4 HOURS PRN
Qty: 1 INHALER | Refills: 1 | Status: SHIPPED | OUTPATIENT
Start: 2019-10-16 | End: 2019-11-15

## 2019-10-16 RX ORDER — ALBUTEROL SULFATE 90 UG/1
2 AEROSOL, METERED RESPIRATORY (INHALATION) ONCE
Status: COMPLETED | OUTPATIENT
Start: 2019-10-16 | End: 2019-10-16

## 2019-10-16 RX ORDER — INHALER, ASSIST DEVICES
1 SPACER (EA) MISCELLANEOUS ONCE
Status: COMPLETED | OUTPATIENT
Start: 2019-10-16 | End: 2019-10-16

## 2019-10-16 RX ORDER — ONDANSETRON 4 MG/1
4 TABLET, ORALLY DISINTEGRATING ORAL EVERY 6 HOURS PRN
Qty: 20 TABLET | Refills: 0 | Status: SHIPPED | OUTPATIENT
Start: 2019-10-16 | End: 2023-04-14

## 2019-10-16 RX ORDER — IPRATROPIUM BROMIDE AND ALBUTEROL SULFATE 2.5; .5 MG/3ML; MG/3ML
3 SOLUTION RESPIRATORY (INHALATION) ONCE
Status: COMPLETED | OUTPATIENT
Start: 2019-10-16 | End: 2019-10-16

## 2019-10-16 RX ORDER — ONDANSETRON 2 MG/ML
4 INJECTION INTRAMUSCULAR; INTRAVENOUS EVERY 30 MIN PRN
Status: DISCONTINUED | OUTPATIENT
Start: 2019-10-16 | End: 2019-10-16 | Stop reason: HOSPADM

## 2019-10-16 RX ADMIN — Medication 1 EACH: at 14:33

## 2019-10-16 RX ADMIN — IPRATROPIUM BROMIDE AND ALBUTEROL SULFATE 3 ML: .5; 3 SOLUTION RESPIRATORY (INHALATION) at 12:29

## 2019-10-16 RX ADMIN — SODIUM CHLORIDE 1000 ML: 9 INJECTION, SOLUTION INTRAVENOUS at 12:12

## 2019-10-16 RX ADMIN — ALBUTEROL SULFATE 2 PUFF: 90 AEROSOL, METERED RESPIRATORY (INHALATION) at 14:33

## 2019-10-16 RX ADMIN — ONDANSETRON 4 MG: 2 INJECTION INTRAMUSCULAR; INTRAVENOUS at 12:12

## 2019-10-16 ASSESSMENT — ENCOUNTER SYMPTOMS
DYSURIA: 0
NAUSEA: 1
DIARRHEA: 0
HEMATURIA: 0
VOMITING: 0
COUGH: 1
ABDOMINAL PAIN: 1
CONSTIPATION: 0

## 2019-10-16 ASSESSMENT — MIFFLIN-ST. JEOR: SCORE: 1625.74

## 2019-10-16 NOTE — DISCHARGE INSTRUCTIONS
Discharge Instructions  Bronchitis, Pneumonia, Bronchospasm    You were seen today for a chest infection or inflammation. If your provider decided this was due to a bacterial infection, you may need an antibiotic. Sometimes these are caused by a virus, and then an antibiotic will not help.     Generally, every Emergency Department visit should have a follow-up clinic visit with either a primary or a specialty clinic/provider. Please follow-up as instructed by your emergency provider today.    Return to the Emergency Department if:  Your breathing gets much worse.  You are very weak, or feel much more ill.  You develop new symptoms, such as chest pain.  You cough up blood.  You are vomiting (throwing up) enough that you cannot keep fluids or your medicine down.    What can I do to help myself?  Fill any prescriptions the provider gave you and take them right away--especially antibiotics. Be sure to finish the whole antibiotic prescription.  You may be given a prescription for an inhaler, which can help loosen tight air passages.  Use this as needed, but not more often than directed. Inhalers work much better when used with a spacer.   You may be given a prescription for a steroid to reduce inflammation. Used long-term, these can have side effects, but for short-term use they are safe. You may notice restlessness or increased appetite.      You may use non-prescription cough or cold medicines. Cough medicines may help, but don t make the cough go away completely.   Avoid smoke, because this can make your symptoms worse. If you smoke, this may be a good time to quit! Consider using nicotine lozenges, gum, or patches to reduce cravings.   If you have a fever, Tylenol  (acetaminophen), Motrin  (ibuprofen), or Advil  (ibuprofen) may help bring fever down and may help you feel more comfortable. Be sure to read and follow the package directions, and ask your provider if you have questions.  Be sure to get your flu  shot each year.  For certain ages, the pneumonia shot can help prevent pneumonia.  If you were given a prescription for medicine here today, be sure to read all of the information (including the package insert) that comes with your prescription.  This will include important information about the medicine, its side effects, and any warnings that you need to know about.  The pharmacist who fills the prescription can provide more information and answer questions you may have about the medicine.  If you have questions or concerns that the pharmacist cannot address, please call or return to the Emergency Department.     Remember that you can always come back to the Emergency Department if you are not able to see your regular provider in the amount of time listed above, if you get any new symptoms, or if there is anything that worries you.      Discharge Instructions  Vomiting    You have been seen today for vomiting (throwing up). This is usually caused by a virus, but some bacteria, parasites, medicines or other medical conditions can cause similar symptoms. At this time your provider does not find that your vomiting is a sign of anything dangerous or life-threatening. However, sometimes the signs of serious illness do not show up right away. If you have new or worse symptoms, you may need to be seen again in the Emergency Department or by your primary provider. Remember that serious problems like appendicitis can start as vomiting.    Generally, every Emergency Department visit should have a follow-up clinic visit with either a primary or a specialty clinic/provider. Please follow-up as instructed by your emergency provider today.    Return to the Emergency Department if:  You keep vomiting and you are not able to keep liquids down.   You feel you are getting dehydrated, such as being very thirsty, not urinating (peeing) at least every 8-12 hours, or feeling faint or lightheaded.   You develop a new fever, or your  fever continues for more than 2 days.   You have abdominal (belly pain) that seems worse than cramps, is in one spot, or is getting worse over time. Appendicitis usually causes pain in the right lower abdomen (to the right and below your belly button) so watch for pain in this location.  You have blood in your vomit or stools.   You feel very weak.  You are not starting to improve within 24 hours of your visit here.     What can I do to help myself?  The most important thing to do is to drink clear liquids. If you have been vomiting a lot, it is best to have only small, frequent sips of liquids. Drinking too much at once may cause more vomiting. If you are vomiting often, you must replace minerals, sodium and potassium lost with your illness. Pedialyte  is the best available rehydration liquid but some find that it doesn t taste good so sports drinks are an alterative. You can also drink clear liquids such as water, weak tea, apple juice, and 7-Up . Avoid acid liquids (orange), caffeine (coffee) or alcohol. Do not drink milk until you no longer have diarrhea (loose stools).   After liquids are staying down, you may start eating mild foods. Soda crackers, toast, plain noodles, gelatin, applesauce and bananas are good first choices. Avoid foods that have acid, are spicy, fatty or have a lot of fiber (such as meats, coarse grains, vegetables). You may start eating these foods again in about 3 days when you are better.   Sometimes treatment includes prescription medicine to prevent nausea (sick to your stomach) and vomiting. If your provider prescribes these for you, take them as directed.   Do not take ibuprofen, naproxen, or other nonsteroidal anti-inflammatory (NSAID) medicines without checking with your healthcare provider.     If you were given a prescription for medicine here today, be sure to read all of the information (including the package insert) that comes with your prescription.  This will include important  information about the medicine, its side effects, and any warnings that you need to know about.  The pharmacist who fills the prescription can provide more information and answer questions you may have about the medicine.  If you have questions or concerns that the pharmacist cannot address, please call or return to the Emergency Department.     Remember that you can always come back to the Emergency Department if you are not able to see your regular provider in the amount of time listed above, if you get any new symptoms, or if there is anything that worries you.,

## 2019-10-16 NOTE — ED PROVIDER NOTES
History     Chief Complaint:  Nausea & Vomiting    HPI   Annie Archer is a 79 year old female with a history of atrial fibrillation on Xarelto who presents with nausea and vomiting. The patient states that she is having waves of nausea that started last night. The patient states she tried to vomit several times but was unable to do so as she was only able to spit up sputum. She notes associated abdominal pain when she tried to vomit. The patient also reports a sinus infection with associated cough for the past 10 to 11 days. She denies shortness of breath when laying flat.  She states that she was started on Augmentin yesterday and the last dose she took was around 1730. She reports she had pulmonary function test a number of years ago but she reports it was inconclusive. She denies use of steroid inhaler in the past at home though it did help when she was in clinic. The patient also reports that she has an eye infection and is taking antibiotic drops for this. The patient denies dysuria, hematuria, constipation, or diarrhea. The patient denies a history of heart attack or pulmonary embolism. The patient denies smoking or illegal drug use and has not had alcohol in a long time.     Allergies:  Amoxicillin-Pot Clavulanate   Ergotamine  Sulfa drugs  Cephalexin      Medications:    Augmentin  Metoprolol Succinate  Hydrochlorothiazide  Xarelto  Amlodipine  Lisinopril  Tobramycin Dexamethazone Eye drops     Past Medical History:    Dyslipidemia  Cyst ovary  Hyperlipidemia   Migraine  Thyroid disease  Varicella   Rheumatic fever   Atrial fibrillation     Past Surgical History:    Breast surgery   Tonsillectomy  Dilation and curettage   Operative hysteroscopy  Salpingo oophorectomy    Family History:    Father: cancer, hypertension  Mother: alzheimer's, eclampsia, hypertension     Social History:  The patient was accompanied to the ED by son.  Smoking Status: former smoker  Smokeless Tobacco: Never Used  Alcohol Use:  "Not currently   PCP: Clinic, [facility]  Marital Status:        Review of Systems   HENT: Positive for congestion.    Respiratory: Positive for cough.    Gastrointestinal: Positive for abdominal pain and nausea. Negative for constipation, diarrhea and vomiting.   Genitourinary: Negative for dysuria and hematuria.   All other systems reviewed and are negative.    Physical Exam     Patient Vitals for the past 24 hrs:   BP Temp Temp src Pulse Resp SpO2 Height Weight   10/16/19 1345 -- -- -- -- -- 97 % -- --   10/16/19 1330 130/68 -- -- 75 -- 95 % -- --   10/16/19 1303 -- -- -- -- -- 95 % -- --   10/16/19 1302 121/63 -- -- 75 -- 96 % -- --   10/16/19 1143 135/64 97.9  F (36.6  C) Oral 83 20 97 % 1.676 m (5' 6\") 113.4 kg (250 lb)     Physical Exam  Constitutional: Well developed, nontox appearance  Head: Atraumatic.   Mouth/Throat: Oropharynx is clear and moist.   Neck:  no stridor  Eyes: no scleral icterus  Cardiovascular: RRR, 2+ bilat radial pulses  Pulmonary/Chest: nml resp effort, Clear BS bilat  Abdominal: ND, +BS, soft, NT, no rebound or guarding   Ext: Warm, well perfused, no edema  Neurological: A&O, symmetric facies, moves ext x4  Skin: Skin is warm and dry.   Psychiatric: Behavior is normal. Thought content normal.   Nursing note and vitals reviewed.      Emergency Department Course   Imaging:  Radiology findings were communicated with the patient who voiced understanding of the findings.    XR Chest  PA and lateral views of the chest.   Lungs are clear. Heart is normal in size.   No effusions are evident. No pneumothorax.   Degenerative thoracic spine changes are noted.   LUNA CARBAJAL MD  Reading per radiology     Laboratory:  Laboratory findings were communicated with the patient who voiced understanding of the findings.    CBC: WBC 12.5(H), HGB 14.3,   CMP: glucose 143(H), albumin 3.2(L) o/w WNL (Creatinine 0.68)  Lipase: 95  Troponin (Collected 1202): <0.015  DDimer :0.5  NT " probnp inpatient: 1283    Interventions:  1212 NS 1000 mL IV  1212 Zofran 4 mg IV  1229 Duoneb 3 mL [facility]    Emergency Department Course:  Nursing notes and vitals reviewed.    1206 I performed an exam of the patient as documented above.     IV was inserted and blood was drawn for laboratory testing, results above.    The patient was sent for a XR Chest while in the emergency department, results above.     1400 I returned to update the patient.     Findings and plan explained to the Patient. Patient discharged home with instructions regarding supportive care, medications, and reasons to return. The importance of close follow-up was reviewed. The patient was prescribed Albuterol and Zofran.     Impression & Plan    Medical Decision Making:  Annie Archer is a 79 year old female who presents to the emergency department today for evaluation of nausea, cough    DDx includes medication adverse reaction, bronchitis, pneumonia, sinusitis, CHF exacerbation.  EKG interp as noted above.  CXR for acute cardiopul dx.  Labs unremarkable or at the pt's baseline.  Presentation seems consistent with bronchitis and nausea 2ndary to augmentin.  Upon chart review, pt's PCP office has already called in new prescription for Zpak.  Pt advised to discontinue augmentin.  Rx given for zofran.  Pt reports feeling significantly improved in ED and was able to tolerate PO w/o difficulty.  At this time I feel the patient is safe for discharge.  Recommendations given regarding follow up with PCP and return to the emergency department as needed for new or worsening symptoms.  Pt counseled on all results, diagnosis and disposition.  They are understanding and agreeable to plan. Patient discharged in stable condition.        Diagnosis:    ICD-10-CM    1. Cough R05    2. Adverse effect of drug, initial encounter T50.905A    3. Nausea R11.0      Disposition:   The patient is discharged to home.    Discharge Medications:  New Prescriptions    ALBUTEROL  (PROAIR HFA/PROVENTIL HFA/VENTOLIN HFA) 108 (90 BASE) MCG/ACT INHALER    Inhale 2 puffs into the lungs every 4 hours as needed for shortness of breath / dyspnea or wheezing (cough)    ONDANSETRON (ZOFRAN ODT) 4 MG ODT TAB    Take 1 tablet (4 mg) by mouth every 6 hours as needed       Scribe Disclosure:  I, Li Mcpherson, am serving as a scribe at 12:00 PM on 10/16/2019 to document services personally performed by Stiven Atwood MD based on my observations and the provider's statements to me.    EMERGENCY DEPARTMENT       Stiven Atwood MD  10/16/19 9826

## 2019-10-16 NOTE — ED AVS SNAPSHOT
Emergency Department  [address] [place] [address]  Phone:  [phone]  Fax:  [phone]                                    Annie Archer   MRN: [id number]    Department:   Emergency Department   Date of Visit:  10/16/2019           After Visit Summary Signature Page    I have received my discharge instructions, and my questions have been answered. I have discussed any challenges I see with this plan with the nurse or doctor.    ..........................................................................................................................................  Patient/Patient Representative Signature      ..........................................................................................................................................  Patient Representative Print Name and Relationship to Patient    ..................................................               ................................................  Date                                   Time    ..........................................................................................................................................  Reviewed by Signature/Title    ...................................................              ..............................................  Date                                               Time          22EPIC Rev 08/18

## 2019-10-27 ENCOUNTER — APPOINTMENT (OUTPATIENT)
Dept: GENERAL RADIOLOGY | Facility: CLINIC | Age: 79
End: 2019-10-27
Attending: EMERGENCY MEDICINE
Payer: COMMERCIAL

## 2019-10-27 ENCOUNTER — HOSPITAL ENCOUNTER (EMERGENCY)
Facility: CLINIC | Age: 79
Discharge: HOME | End: 2019-10-27
Attending: EMERGENCY MEDICINE | Admitting: EMERGENCY MEDICINE
Payer: COMMERCIAL

## 2019-10-27 ENCOUNTER — APPOINTMENT (OUTPATIENT)
Dept: CT IMAGING | Facility: CLINIC | Age: 79
End: 2019-10-27
Attending: EMERGENCY MEDICINE
Payer: COMMERCIAL

## 2019-10-27 VITALS
RESPIRATION RATE: 16 BRPM | OXYGEN SATURATION: 98 % | SYSTOLIC BLOOD PRESSURE: 148 MMHG | TEMPERATURE: 98.2 F | DIASTOLIC BLOOD PRESSURE: 106 MMHG
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
## 2019-10-27 DIAGNOSIS — S20.212A CHEST WALL CONTUSION, LEFT, INITIAL ENCOUNTER: ICD-10-CM

## 2019-10-27 DIAGNOSIS — S00.03XA CONTUSION OF SCALP, INITIAL ENCOUNTER: ICD-10-CM

## 2019-10-27 PROCEDURE — 99284 EMERGENCY DEPT VISIT MOD MDM: CPT | Mod: 25

## 2019-10-27 PROCEDURE — 71046 X-RAY EXAM CHEST 2 VIEWS: CPT

## 2019-10-27 PROCEDURE — 70450 CT HEAD/BRAIN W/O DYE: CPT

## 2019-10-27 ASSESSMENT — ENCOUNTER SYMPTOMS
HEADACHES: 0
NECK PAIN: 0
ABDOMINAL PAIN: 0
NAUSEA: 0
FEVER: 0

## 2019-10-27 NOTE — ED PROVIDER NOTES
History     Chief Complaint:  Fall    The history is provided by the patient and the EMS personnel.      Annie Archer is a 79 year old female on xarelto for atrial fibrillation who presents via EMS for a fall. The patient was leaving [employer] when she tripped over her shoes and fell, hitting her head on the ground. She has some swelling to the left side of her head and she has some left chest wall tenderness. She denies any neck pain, other chest pain, shortness of breath, abdominal pain, headache, nausea, and other injury.     Allergies:  Amoxicillin  Ergotamine  Cephalexin  Sulfa     Medications:    Albuterol  Zofran  Citrucel  Lisinopril  Norvasc  Xarelto  Oretic  Toprol    Past Medical History:    Dyslipidemia  Cyst ovary  Hyperlipidemia  Migraine  Thyroid disease  Varicella  Rheumatic fever  Atrial fibrillation  Atypical nevus    Past Surgical History:    Breast surgery  Tonsillectomy  Hysteroscopy  Salpingo oophorectomy  Ovary removal  Eye surgery    Family History:    Cancer  Hypertension  Alzheimer's  Osteoporosis    Social History:  Patient is   Tobacco Use: Former smoker  Alcohol Use: No  PCP: [facility]     Review of Systems   Constitutional: Negative for fever.   HENT:        Contusion left forehead   Cardiovascular: Negative for chest pain.   Gastrointestinal: Negative for abdominal pain and nausea.   Musculoskeletal: Negative for neck pain.        Left chest wall tenderness   Neurological: Negative for headaches.   All other systems reviewed and are negative.    Physical Exam   First Vitals:  Patient Vitals for the past 24 hrs:   BP Temp Temp src Heart Rate Resp SpO2   10/27/19 1308 (!) 148/106 98.2  F (36.8  C) Oral 77 16 98 %     Physical Exam  Nursing note and vitals reviewed.  Constitutional:  Oriented to person, place, and time. Cooperative.   HENT:    Contusion to left forehead region.  Nose:    Nose normal.   Mouth/Throat:   Mucous membranes are normal.   Eyes:     Conjunctivae normal and EOM are normal.      Pupils are equal, round, and reactive to light.   Neck:    Trachea normal. No cervical spine tenderness.   Cardiovascular:  Normal rate, irregularly irregular rhythm, normal heart sounds and normal pulses. No murmur heard.  Pulmonary/Chest:  Effort normal and breath sounds normal.   Abdominal:   Soft. Normal appearance and bowel sounds are normal.      There is no tenderness.      There is no rebound and no CVA tenderness.   Musculoskeletal:  Extremities atraumatic x 4. Reproducible tenderness to palpation over left breast region but no bony step off, crepitus, or subcutaneous emphysema.   Lymphadenopathy:  No cervical adenopathy.   Neurological:   Alert and oriented to person, place, and time. Normal strength.      No cranial nerve deficit or sensory deficit. GCS eye subscore is 4. GCS verbal subscore is 5. GCS motor subscore is 6.   Skin:    Skin is intact. No rash noted.   Psychiatric:   Normal mood and affect.    Emergency Department Course     Imaging:  Radiographic findings were communicated with the patient who voiced understanding of the findings.  XR chest 2 views:  No radiographic evidence of acute chest abnormality per radiology read.  CT head w/o contrast:  1. No evidence of acute intracranial hemorrhage, mass, or herniation.  2. Left frontal scalp soft tissue injury without underlying fracture.  3. Mild nonspecific white matter changes likely due to chronic  microvascular ischemic disease. Per radiology read.    Emergency Department Course:  1:07 PM Nursing notes and vitals reviewed.  I performed an exam of the patient as documented above.     1:57 PM Findings and plan explained to the patient. Patient discharged home with instructions regarding supportive care, medications, and reasons to return. The importance of close follow-up was reviewed.     Impression & Plan      Medical Decision Making:  This is a 79-year-old female came in by ambulance after she  tripped and fell while at [employer].  She has a small contusion to the left forehead region as well as some reproducible tenderness to the left chest wall region.  She otherwise has a normal exam.  Given the fact that she is on Xarelto though, I proceeded with a CT scan of her head as well as a chest x-ray to look for any intracranial hemorrhage, skull fracture, rib fracture, pneumothorax, or pulmonary contusion.  Her imaging studies are negative, and therefore I feel that she is safe for discharge and outpatient management.  She should return with any concerns or worsening symptoms though and follow-up with her physician as needed.    Diagnosis:    ICD-10-CM    1. Fall, initial encounter W19.XXXA    2. Contusion of scalp, initial encounter S00.03XA    3. Chest wall contusion, left, initial encounter S20.212A        Disposition:  discharged to home    I, Bradley Aasen, am serving as a scribe on 10/27/2019 at 1:06 PM to personally document services performed by Marcos Gustafson MD based on my observations and the provider's statements to me.        Marcos Gustafson MD  10/27/19 6928

## 2019-10-27 NOTE — ED AVS SNAPSHOT
Emergency Department  [address] [place] [address]  Phone:  [phone]  Fax:  [phone]                                    Annie Archer   MRN: [id number]    Department:   Emergency Department   Date of Visit:  10/27/2019           After Visit Summary Signature Page    I have received my discharge instructions, and my questions have been answered. I have discussed any challenges I see with this plan with the nurse or doctor.    ..........................................................................................................................................  Patient/Patient Representative Signature      ..........................................................................................................................................  Patient Representative Print Name and Relationship to Patient    ..................................................               ................................................  Date                                   Time    ..........................................................................................................................................  Reviewed by Signature/Title    ...................................................              ..............................................  Date                                               Time          22EPIC Rev 08/18

## 2019-10-27 NOTE — ED NOTES
Bed: ED12  Expected date:   Expected time:   Means of arrival:   Comments:  Nafisa 1 Fall hit head on blood thinners 79 f

## 2019-12-15 NOTE — ASU PATIENT PROFILE, ADULT - SPIRITUAL CULTURAL, CURRENT SITUATION, PROFILE
Breast anomaly  Biopsy of both breast benign lesions  Cataract  B/L cataracts  Cervix abnormality  Ablation of cervix  right eye torn retina    S/P bariatric surgery  lap band surgery two times due to erosion  S/P D&C (status post dilation and curettage)  several D&C's  S/P laminectomy    S/P TAVR (transcatheter aortic valve replacement)    S/P tonsillectomy and adenoidectomy NONE

## 2020-05-02 ENCOUNTER — INPATIENT (INPATIENT)
Facility: HOSPITAL | Age: 80
LOS: 1 days | Discharge: HOME | DRG: 62 | End: 2020-05-04
Attending: INTERNAL MEDICINE | Admitting: INTERNAL MEDICINE
Payer: MEDICARE

## 2020-05-02 VITALS
HEART RATE: 72 BPM | HEIGHT: 62 IN | DIASTOLIC BLOOD PRESSURE: 110 MMHG | TEMPERATURE: 98 F | WEIGHT: 148.59 LBS | RESPIRATION RATE: 18 BRPM | SYSTOLIC BLOOD PRESSURE: 183 MMHG | OXYGEN SATURATION: 96 %

## 2020-05-02 DIAGNOSIS — I63.9 CEREBRAL INFARCTION, UNSPECIFIED: ICD-10-CM

## 2020-05-02 DIAGNOSIS — Z90.89 ACQUIRED ABSENCE OF OTHER ORGANS: Chronic | ICD-10-CM

## 2020-05-02 PROBLEM — I10 ESSENTIAL (PRIMARY) HYPERTENSION: Chronic | Status: ACTIVE | Noted: 2018-03-20

## 2020-05-02 PROBLEM — I00 RHEUMATIC FEVER WITHOUT HEART INVOLVEMENT: Chronic | Status: ACTIVE | Noted: 2018-03-20

## 2020-05-02 LAB
ALBUMIN SERPL ELPH-MCNC: 4.1 G/DL — SIGNIFICANT CHANGE UP (ref 3.3–5.2)
ALP SERPL-CCNC: 60 U/L — SIGNIFICANT CHANGE UP (ref 40–120)
ALT FLD-CCNC: 11 U/L — SIGNIFICANT CHANGE UP
ANION GAP SERPL CALC-SCNC: 14 MMOL/L — SIGNIFICANT CHANGE UP (ref 5–17)
APTT BLD: 31.8 SEC — SIGNIFICANT CHANGE UP (ref 27.5–36.3)
AST SERPL-CCNC: 21 U/L — SIGNIFICANT CHANGE UP
BASOPHILS # BLD AUTO: 0.09 K/UL — SIGNIFICANT CHANGE UP (ref 0–0.2)
BASOPHILS NFR BLD AUTO: 1.5 % — SIGNIFICANT CHANGE UP (ref 0–2)
BILIRUB SERPL-MCNC: 1 MG/DL — SIGNIFICANT CHANGE UP (ref 0.4–2)
BUN SERPL-MCNC: 17 MG/DL — SIGNIFICANT CHANGE UP (ref 8–20)
CALCIUM SERPL-MCNC: 9.3 MG/DL — SIGNIFICANT CHANGE UP (ref 8.6–10.2)
CHLORIDE SERPL-SCNC: 92 MMOL/L — LOW (ref 98–107)
CK SERPL-CCNC: 50 U/L — SIGNIFICANT CHANGE UP (ref 25–170)
CO2 SERPL-SCNC: 23 MMOL/L — SIGNIFICANT CHANGE UP (ref 22–29)
CREAT SERPL-MCNC: 0.67 MG/DL — SIGNIFICANT CHANGE UP (ref 0.5–1.3)
EOSINOPHIL # BLD AUTO: 0.16 K/UL — SIGNIFICANT CHANGE UP (ref 0–0.5)
EOSINOPHIL NFR BLD AUTO: 2.6 % — SIGNIFICANT CHANGE UP (ref 0–6)
GLUCOSE SERPL-MCNC: 95 MG/DL — SIGNIFICANT CHANGE UP (ref 70–99)
HCT VFR BLD CALC: 39.1 % — SIGNIFICANT CHANGE UP (ref 34.5–45)
HGB BLD-MCNC: 13.2 G/DL — SIGNIFICANT CHANGE UP (ref 11.5–15.5)
IMM GRANULOCYTES NFR BLD AUTO: 0.2 % — SIGNIFICANT CHANGE UP (ref 0–1.5)
INR BLD: 1.09 RATIO — SIGNIFICANT CHANGE UP (ref 0.88–1.16)
LYMPHOCYTES # BLD AUTO: 1.57 K/UL — SIGNIFICANT CHANGE UP (ref 1–3.3)
LYMPHOCYTES # BLD AUTO: 25.8 % — SIGNIFICANT CHANGE UP (ref 13–44)
MCHC RBC-ENTMCNC: 29.9 PG — SIGNIFICANT CHANGE UP (ref 27–34)
MCHC RBC-ENTMCNC: 33.8 GM/DL — SIGNIFICANT CHANGE UP (ref 32–36)
MCV RBC AUTO: 88.5 FL — SIGNIFICANT CHANGE UP (ref 80–100)
MONOCYTES # BLD AUTO: 0.56 K/UL — SIGNIFICANT CHANGE UP (ref 0–0.9)
MONOCYTES NFR BLD AUTO: 9.2 % — SIGNIFICANT CHANGE UP (ref 2–14)
NEUTROPHILS # BLD AUTO: 3.69 K/UL — SIGNIFICANT CHANGE UP (ref 1.8–7.4)
NEUTROPHILS NFR BLD AUTO: 60.7 % — SIGNIFICANT CHANGE UP (ref 43–77)
PLATELET # BLD AUTO: 208 K/UL — SIGNIFICANT CHANGE UP (ref 150–400)
POTASSIUM SERPL-MCNC: 4.5 MMOL/L — SIGNIFICANT CHANGE UP (ref 3.5–5.3)
POTASSIUM SERPL-SCNC: 4.5 MMOL/L — SIGNIFICANT CHANGE UP (ref 3.5–5.3)
PROT SERPL-MCNC: 6.5 G/DL — LOW (ref 6.6–8.7)
PROTHROM AB SERPL-ACNC: 12.3 SEC — SIGNIFICANT CHANGE UP (ref 10–12.9)
RBC # BLD: 4.42 M/UL — SIGNIFICANT CHANGE UP (ref 3.8–5.2)
RBC # FLD: 11.9 % — SIGNIFICANT CHANGE UP (ref 10.3–14.5)
SARS-COV-2 RNA SPEC QL NAA+PROBE: SIGNIFICANT CHANGE UP
SODIUM SERPL-SCNC: 129 MMOL/L — LOW (ref 135–145)
TROPONIN T SERPL-MCNC: <0.01 NG/ML — SIGNIFICANT CHANGE UP (ref 0–0.06)
WBC # BLD: 6.08 K/UL — SIGNIFICANT CHANGE UP (ref 3.8–10.5)
WBC # FLD AUTO: 6.08 K/UL — SIGNIFICANT CHANGE UP (ref 3.8–10.5)

## 2020-05-02 PROCEDURE — 70496 CT ANGIOGRAPHY HEAD: CPT | Mod: 26

## 2020-05-02 PROCEDURE — 0042T: CPT

## 2020-05-02 PROCEDURE — 93010 ELECTROCARDIOGRAM REPORT: CPT

## 2020-05-02 PROCEDURE — 99291 CRITICAL CARE FIRST HOUR: CPT

## 2020-05-02 PROCEDURE — 70498 CT ANGIOGRAPHY NECK: CPT | Mod: 26

## 2020-05-02 RX ORDER — NICARDIPINE HYDROCHLORIDE 30 MG/1
5 CAPSULE, EXTENDED RELEASE ORAL
Qty: 40 | Refills: 0 | Status: DISCONTINUED | OUTPATIENT
Start: 2020-05-02 | End: 2020-05-02

## 2020-05-02 RX ORDER — ALTEPLASE 100 MG
6 KIT INTRAVENOUS ONCE
Refills: 0 | Status: COMPLETED | OUTPATIENT
Start: 2020-05-02 | End: 2020-05-02

## 2020-05-02 RX ORDER — METOPROLOL TARTRATE 50 MG
25 TABLET ORAL AT BEDTIME
Refills: 0 | Status: DISCONTINUED | OUTPATIENT
Start: 2020-05-02 | End: 2020-05-04

## 2020-05-02 RX ORDER — ALTEPLASE 100 MG
54 KIT INTRAVENOUS ONCE
Refills: 0 | Status: COMPLETED | OUTPATIENT
Start: 2020-05-02 | End: 2020-05-02

## 2020-05-02 RX ORDER — ATORVASTATIN CALCIUM 80 MG/1
80 TABLET, FILM COATED ORAL AT BEDTIME
Refills: 0 | Status: DISCONTINUED | OUTPATIENT
Start: 2020-05-02 | End: 2020-05-04

## 2020-05-02 RX ORDER — LISINOPRIL 2.5 MG/1
20 TABLET ORAL DAILY
Refills: 0 | Status: DISCONTINUED | OUTPATIENT
Start: 2020-05-02 | End: 2020-05-04

## 2020-05-02 RX ORDER — LABETALOL HCL 100 MG
20 TABLET ORAL ONCE
Refills: 0 | Status: COMPLETED | OUTPATIENT
Start: 2020-05-02 | End: 2020-05-02

## 2020-05-02 RX ADMIN — ALTEPLASE 54 MILLIGRAM(S): KIT at 12:19

## 2020-05-02 RX ADMIN — ALTEPLASE 360 MILLIGRAM(S): KIT at 11:15

## 2020-05-02 RX ADMIN — ALTEPLASE 6 MILLIGRAM(S): KIT at 11:16

## 2020-05-02 RX ADMIN — ALTEPLASE 54 MILLIGRAM(S): KIT at 11:19

## 2020-05-02 RX ADMIN — SODIUM CHLORIDE 75 MILLILITER(S): 9 INJECTION INTRAMUSCULAR; INTRAVENOUS; SUBCUTANEOUS at 22:00

## 2020-05-02 RX ADMIN — Medication 20 MILLIGRAM(S): at 11:10

## 2020-05-02 NOTE — ED ADULT TRIAGE NOTE - CHIEF COMPLAINT QUOTE
pt BIBA c/o dizziness, left sided weakness, unsteady gait, difficulty finding words and intermittent left sided facial tingling that started this AM. Pt last known well 0900. Pt reports that she was gardening this morning and found that she could not find her words when talking to friend and when she was walking became "wobbly." On exam, LUE  noted to be weaker than right side. Pt A&Ox 4 at this time. Dr Pruitt called to [facility]. code stroke activated.

## 2020-05-02 NOTE — ED PROVIDER NOTE - OBJECTIVE STATEMENT
This patient is a 79 year old woman with hx of HTN who presents to the ER c/o tingling in the arm, weakness and word finding difficulty.  Last known welll at 9 am just before going outside to do gardening.  Patient states that she stood up and while walking became short of breath.  She then noticed tingling in her left arm and face and weakness of the left leg.  She denies headache and visual changes.

## 2020-05-02 NOTE — H&P ADULT - ATTENDING COMMENTS
79F w/ PMHx HTN, CAD s/p PCI presented w/ acute onset L sided weakness and aphsia which started around 9AM today. CTH w/ no bleed, acute infarct and no LVO on CTA. s/p tPA @ 11:30AM. COVID neg. Will monitor in ICU for 2hrs and repeat CTH as per protocol

## 2020-05-02 NOTE — CONSULT NOTE ADULT - ASSESSMENT
Impression: Cerebral thrombosis with infarction probable microangiopathy can not rule out embolic source.  She was deemed candidate for tPA, decision was made at 11:10am. No endovascular treatment indicated.    Recommendations:  - Continue with  24 hours post IV tPA precautions including BP goal<180/105 mmHg for first 24 hours followed by gradual normotension as per protocol  - MRI brain without contrast  - Aspirin and pharmacological DVT prophylaxis to be considered at 24 hours after the IV tPA and after repeating brain imaging, SCDs for DVT prophylaxis in the interim  - Atorvastatin 80 mg at bedtime after establishing enteral access or passing swallow evaluation  - TTE with bubble study telemetry to screen for possible cardiac source of embolism  - Prolonged cardiac monitoring with 30 days events monitor / ICM to screen for occult cardiac arrythmia being the cardiac source of embolism, to be considered based on results of above mentioned tests   - HbA1C and LDL, continue with aggressive vascular risk factors modifications   - PT/OT/Speech and swallow evaluation   -Follow up Kidney lesion??

## 2020-05-02 NOTE — CONSULT NOTE ADULT - SUBJECTIVE AND OBJECTIVE BOX
THE PATIENT WAS SEEN AND EXAMINED BY ME WITH THE HOUSESTAFF AND STROKE TEAM DURING MORNING ROUNDS.   HPI:      SUBJECTIVE: No events overnight.  No new neurologic complaints.      MEDICATIONS:   Antibiotics:     Neuro:     CV:   niCARdipine Infusion 5 mG/Hr IV Continuous <Continuous>    Pulm:     GI/:    Heme:     Other:       PHYSICAL EXAM:   Vital Signs Last 24 Hrs  T(C): 36.9 (02 May 2020 10:32), Max: 36.9 (02 May 2020 10:32)  T(F): 98.5 (02 May 2020 10:32), Max: 98.5 (02 May 2020 10:32)  HR: 69 (02 May 2020 11:35) (69 - 77)  BP: 146/72 (02 May 2020 11:35) (145/75 - 190/80)  BP(mean): --  RR: 18 (02 May 2020 11:35) (18 - 18)  SpO2: 97% (02 May 2020 11:35) (96% - 97%)  General: No acute distress  HEENT: EOM intact, visual fields full  Abdomen: Soft, nontender, nondistended   Extremities: No edema    NEUROLOGICAL EXAM:  Mental status: Awake, alert, oriented x3, no aphasia, no neglect, normal memory   Cranial Nerves: No facial asymmetry, no nystagmus, no dysarthria, no dysphagia, tongue midline, shoulder shrug intact bilaterally.  Motor exam: Normal tone, no drift, normal fine finger movements.         [] Upper extremity                Delt       Bicep    Tricep                                                  R         5/5        5/5        5/5       5/5                                               L          5/5        5/5        5/5       5/5         [] Lower extremity               HF          KE          KF        DF         PF                                               R        5/5        5/5        5/5       5/5       5/5                                               L         5/5        5/5       5/5       5/5        5/5    Sensation: Intact to light touch   Coordination/ Gait: No dysmetria, JEREMIAH intact and symmetric bilaterally, on bedrest     LABS:                        13.2   6.08  )-----------( 208      ( 02 May 2020 10:50 )             39.1    05-02    129<L>  |  92<L>  |  17.0  ----------------------------<  95  4.5   |  23.0  |  0.67    Ca    9.3      02 May 2020 10:50    TPro  6.5<L>  /  Alb  4.1  /  TBili  1.0  /  DBili  x   /  AST  21  /  ALT  11  /  AlkPhos  60  05-02  PT/INR - ( 02 May 2020 10:50 )   PT: 12.3 sec;   INR: 1.09 ratio         PTT - ( 02 May 2020 10:50 )  PTT:31.8 sec      IMAGING: Reviewed by me. THE PATIENT WAS SEEN AND EXAMINED BY ME       HPI:  This is a 78y/o lady with past medical history of HTN, HLD who was gardening at 9:00am and a neighbor noticed she was off. She presented to ER with slurred speech, difficulty in thought process as being slow and left side weakness. Upon my evaluation her BP was systolic 183 then 190's 20mg of labetalol where given to 145 systolic. Head CT with no evidence of large hypodensity or hemorrhage. CT angiography with no evidence of large vessel occlusion. Perfusion with no evidence of large deficit or core infarct. NIHSS=3     SUBJECTIVE: No events overnight.  No new neurologic complaints.      MEDICATIONS:   Antibiotics:     Neuro:     CV:   niCARdipine Infusion 5 mG/Hr IV Continuous <Continuous>    Pulm:     GI/:    Heme:     Other:       PHYSICAL EXAM:   Vital Signs Last 24 Hrs  T(C): 36.9 (02 May 2020 10:32), Max: 36.9 (02 May 2020 10:32)  T(F): 98.5 (02 May 2020 10:32), Max: 98.5 (02 May 2020 10:32)  HR: 69 (02 May 2020 11:35) (69 - 77)  BP: 146/72 (02 May 2020 11:35) (145/75 - 190/80)  BP(mean): --  RR: 18 (02 May 2020 11:35) (18 - 18)  SpO2: 97% (02 May 2020 11:35) (96% - 97%)  General: No acute distress  HEENT: EOM intact, visual fields full  Abdomen: Soft, nontender, nondistended   Extremities: No edema    NEUROLOGICAL EXAM:  RESULT SUMMARY:  3 points  NIH Stroke Scale      INPUTS:  1A: Level of consciousness —> 0 = Alert; keenly responsive  1B: Ask month and age —> 0 = Both questions right  1C: 'Blink eyes' & 'squeeze hands' —> 0 = Performs both tasks  2: Horizontal extraocular movements —> 0 = Normal  3: Visual fields —> 0 = No visual loss  4: Facial palsy —> 0 = Normal symmetry  5A: Left arm motor drift —> 0 = No drift for 10 seconds  5B: Right arm motor drift —> 0 = No drift for 10 seconds  6A: Left leg motor drift —> 1 = Drift, but doesn't hit bed  6B: Right leg motor drift —> 0 = No drift for 5 seconds  7: Limb Ataxia —> 0 = No ataxia  8: Sensation —> 0 = Normal; no sensory loss  9: Language/aphasia —> 1 = Mild-moderate aphasia: some obvious changes, without significant limitation  10: Dysa< from: CT Angio Neck w/ IV Cont (05.02.20 @ 11:05) >  rthria —> 1 = Mild-moderate dysarthria: slurring but can be understood  11: Extinction/inattention —> 0 = No abnormality        LABS:                        13.2   6.08  )-----------( 208      ( 02 May 2020 10:50 )             39.1    05-02    129<L>  |  92<L>  |  17.0  ----------------------------<  95  4.5   |  23.0  |  0.67    Ca    9.3      02 May 2020 10:50    TPro  6.5<L>  /  Alb  4.1  /  TBili  1.0  /  DBili  x   /  AST  21  /  ALT  11  /  AlkPhos  60  05-02  PT/INR - ( 02 May 2020 10:50 )   PT: 12.3 sec;   INR: 1.09 ratio         PTT - ( 02 May 2020 10:50 )  PTT:31.8 sec      IMAGING: Reviewed by me.      CT Angio Neck w/ IV Cont (05.02.20 @ 11:05)   CT PERFUSION  no abnormality noted on perfusion maps.    No core infarct or evidence of delayed mean transit time is identified.     CBF<30% volume: 0  ml   Tmax>6.0 s volume: 0 ml   Mismatch volume: 0 ml   Mismatch ratio: none     IMPRESSION    1. No significant carotid or vertebral stenosis or dissection. Minimal plaque in both carotid bifurcations.  2. Widely patent vasculature noted intracranially.  3. Underlying tortuosity of the head and neck vessels.  4. The diffusion study fails to suggest core infarct or evidence of acute abnormality.        GERONIMO TRUJILLO M.D., ATTENDING RADIOLOGIST  This document has been electronically signed. May  2 2020 11:21AM

## 2020-05-02 NOTE — H&P ADULT - HISTORY OF PRESENT ILLNESS
80 y/o female with pmhx of HTN, HLD, CAD s/p PCI 3 years ago (on aspirin, plavix) presents to ER after patient developed weakness and altered gait. Symptoms began at about 9 am. She also had slurred speech with expressive aphasia and left sided weakness. CT head negative in ED. HTN in ED, given labetalol and started on cardene drip. Tpa given at 11:30. CTA without LVO. Perfusion scan without large infarct    Upon my evaluation tpa had just been completed. Patient states she feels much better. Denies headache, vision changes, nausea/vomiting. Symptoms have almost completely resolved, though mild left upper and lower extremity weakness persists.

## 2020-05-02 NOTE — ED PROVIDER NOTE - PROGRESS NOTE DETAILS
Attempts made to call ICU PA unable to take call at this time.  Will call back. ICU PA returned call informed of patient post tPA.  Waiting on covid results.

## 2020-05-02 NOTE — H&P ADULT - ASSESSMENT
80 y/o female with pmhx of HTN, HLD, CAD s/p PCI 3 years ago (on aspirin, plavix) now with acute CVA s/p tpa.    -q 1 hour neurochecks with tight BP control goal 150-180 with permissive HTN.  cardene now off and bp stable. restart home oral HTN.  -high dose statin. restart aspirin and plavix after repeat head scan tomorrow and after 24 hours post tpa.   -check hgb a1c, lipid profile, TTE with bubble study. tele monitoring.  -PT/OT, speech and swallow eval    admit to ICU, COVID-19 pending.

## 2020-05-02 NOTE — ED ADULT NURSE REASSESSMENT NOTE - NS ED NURSE REASSESS COMMENT FT1
Pt care assumed from off going RN, charting as noted. Pt in no apparent distress at this time. Airway patent, breathing spontaneous and nonlabored. Pt denies any complaints at this time. No signs of facial droop, weakness, slurred speech or dizziness. Pt admitted, awaiting bed.

## 2020-05-02 NOTE — H&P ADULT - NSHPPHYSICALEXAM_GEN_ALL_CORE
General: Normal appearing in no acute distress resting comfortably in bed. Well nourished and well groomed.  Head: Normocephalic, atraumatic.   EENT: Sclera white. PERRL, EOM intact. Secretions normal. no cervical lymphadenopathy  PULM: Breath sounds clear to auscultation symmetrically throughout all lung fields. No wheezing, rhonchi, or rales. No use of accessory muscles.  CVS: Regular rate and rhythm. No murmurs or rubs. Pulses 2+ on upper and lower extremities bilaterally.   GI: nondistended with bowel sounds normoactive in all four quadrants. No tenderness to light or deep palpation.   Neuro: Strength 5/5 in right upper and lower extremities. 4+/5 in left upper/lower extremities. A&O x 3.   Skin: No lesions, rashes, ulcers. Extremities equally warm bilaterally.

## 2020-05-03 LAB
ALBUMIN SERPL ELPH-MCNC: 3.6 G/DL — SIGNIFICANT CHANGE UP (ref 3.3–5.2)
ALP SERPL-CCNC: 48 U/L — SIGNIFICANT CHANGE UP (ref 40–120)
ALT FLD-CCNC: 9 U/L — SIGNIFICANT CHANGE UP
ANION GAP SERPL CALC-SCNC: 15 MMOL/L — SIGNIFICANT CHANGE UP (ref 5–17)
AST SERPL-CCNC: 15 U/L — SIGNIFICANT CHANGE UP
BASOPHILS # BLD AUTO: 0.08 K/UL — SIGNIFICANT CHANGE UP (ref 0–0.2)
BASOPHILS NFR BLD AUTO: 1 % — SIGNIFICANT CHANGE UP (ref 0–2)
BILIRUB SERPL-MCNC: 1 MG/DL — SIGNIFICANT CHANGE UP (ref 0.4–2)
BUN SERPL-MCNC: 15 MG/DL — SIGNIFICANT CHANGE UP (ref 8–20)
CALCIUM SERPL-MCNC: 8.4 MG/DL — LOW (ref 8.6–10.2)
CHLORIDE SERPL-SCNC: 103 MMOL/L — SIGNIFICANT CHANGE UP (ref 98–107)
CHOLEST SERPL-MCNC: 128 MG/DL — SIGNIFICANT CHANGE UP (ref 110–199)
CO2 SERPL-SCNC: 21 MMOL/L — LOW (ref 22–29)
CREAT SERPL-MCNC: 0.68 MG/DL — SIGNIFICANT CHANGE UP (ref 0.5–1.3)
EOSINOPHIL # BLD AUTO: 0.04 K/UL — SIGNIFICANT CHANGE UP (ref 0–0.5)
EOSINOPHIL NFR BLD AUTO: 0.5 % — SIGNIFICANT CHANGE UP (ref 0–6)
GLUCOSE BLDC GLUCOMTR-MCNC: 78 MG/DL — SIGNIFICANT CHANGE UP (ref 70–99)
GLUCOSE BLDC GLUCOMTR-MCNC: 94 MG/DL — SIGNIFICANT CHANGE UP (ref 70–99)
GLUCOSE SERPL-MCNC: 97 MG/DL — SIGNIFICANT CHANGE UP (ref 70–99)
HCT VFR BLD CALC: 40 % — SIGNIFICANT CHANGE UP (ref 34.5–45)
HDLC SERPL-MCNC: 58 MG/DL — SIGNIFICANT CHANGE UP
HGB BLD-MCNC: 13.4 G/DL — SIGNIFICANT CHANGE UP (ref 11.5–15.5)
IMM GRANULOCYTES NFR BLD AUTO: 0.3 % — SIGNIFICANT CHANGE UP (ref 0–1.5)
LIPID PNL WITH DIRECT LDL SERPL: 57 MG/DL — SIGNIFICANT CHANGE UP
LYMPHOCYTES # BLD AUTO: 0.9 K/UL — LOW (ref 1–3.3)
LYMPHOCYTES # BLD AUTO: 11.6 % — LOW (ref 13–44)
MAGNESIUM SERPL-MCNC: 1.9 MG/DL — SIGNIFICANT CHANGE UP (ref 1.6–2.6)
MCHC RBC-ENTMCNC: 30 PG — SIGNIFICANT CHANGE UP (ref 27–34)
MCHC RBC-ENTMCNC: 33.5 GM/DL — SIGNIFICANT CHANGE UP (ref 32–36)
MCV RBC AUTO: 89.7 FL — SIGNIFICANT CHANGE UP (ref 80–100)
MONOCYTES # BLD AUTO: 0.38 K/UL — SIGNIFICANT CHANGE UP (ref 0–0.9)
MONOCYTES NFR BLD AUTO: 4.9 % — SIGNIFICANT CHANGE UP (ref 2–14)
NEUTROPHILS # BLD AUTO: 6.35 K/UL — SIGNIFICANT CHANGE UP (ref 1.8–7.4)
NEUTROPHILS NFR BLD AUTO: 81.7 % — HIGH (ref 43–77)
PHOSPHATE SERPL-MCNC: 4.2 MG/DL — SIGNIFICANT CHANGE UP (ref 2.4–4.7)
PLATELET # BLD AUTO: 201 K/UL — SIGNIFICANT CHANGE UP (ref 150–400)
POTASSIUM SERPL-MCNC: 4.4 MMOL/L — SIGNIFICANT CHANGE UP (ref 3.5–5.3)
POTASSIUM SERPL-SCNC: 4.4 MMOL/L — SIGNIFICANT CHANGE UP (ref 3.5–5.3)
PROT SERPL-MCNC: 5.7 G/DL — LOW (ref 6.6–8.7)
RBC # BLD: 4.46 M/UL — SIGNIFICANT CHANGE UP (ref 3.8–5.2)
RBC # FLD: 12.1 % — SIGNIFICANT CHANGE UP (ref 10.3–14.5)
SODIUM SERPL-SCNC: 139 MMOL/L — SIGNIFICANT CHANGE UP (ref 135–145)
TOTAL CHOLESTEROL/HDL RATIO MEASUREMENT: 2 RATIO — LOW (ref 3.3–7.1)
TRIGL SERPL-MCNC: 65 MG/DL — SIGNIFICANT CHANGE UP (ref 10–200)
WBC # BLD: 7.77 K/UL — SIGNIFICANT CHANGE UP (ref 3.8–10.5)
WBC # FLD AUTO: 7.77 K/UL — SIGNIFICANT CHANGE UP (ref 3.8–10.5)

## 2020-05-03 PROCEDURE — 70551 MRI BRAIN STEM W/O DYE: CPT | Mod: 26

## 2020-05-03 PROCEDURE — 99233 SBSQ HOSP IP/OBS HIGH 50: CPT

## 2020-05-03 RX ORDER — SODIUM CHLORIDE 9 MG/ML
1000 INJECTION INTRAMUSCULAR; INTRAVENOUS; SUBCUTANEOUS
Refills: 0 | Status: DISCONTINUED | OUTPATIENT
Start: 2020-05-03 | End: 2020-05-03

## 2020-05-03 RX ORDER — ASPIRIN/CALCIUM CARB/MAGNESIUM 324 MG
81 TABLET ORAL DAILY
Refills: 0 | Status: DISCONTINUED | OUTPATIENT
Start: 2020-05-04 | End: 2020-05-04

## 2020-05-03 RX ORDER — ENOXAPARIN SODIUM 100 MG/ML
40 INJECTION SUBCUTANEOUS DAILY
Refills: 0 | Status: DISCONTINUED | OUTPATIENT
Start: 2020-05-03 | End: 2020-05-04

## 2020-05-03 RX ADMIN — LISINOPRIL 20 MILLIGRAM(S): 2.5 TABLET ORAL at 04:06

## 2020-05-03 RX ADMIN — ENOXAPARIN SODIUM 40 MILLIGRAM(S): 100 INJECTION SUBCUTANEOUS at 16:25

## 2020-05-03 RX ADMIN — SODIUM CHLORIDE 75 MILLILITER(S): 9 INJECTION INTRAMUSCULAR; INTRAVENOUS; SUBCUTANEOUS at 13:32

## 2020-05-03 RX ADMIN — ATORVASTATIN CALCIUM 80 MILLIGRAM(S): 80 TABLET, FILM COATED ORAL at 04:06

## 2020-05-03 RX ADMIN — Medication 25 MILLIGRAM(S): at 22:20

## 2020-05-03 RX ADMIN — ATORVASTATIN CALCIUM 80 MILLIGRAM(S): 80 TABLET, FILM COATED ORAL at 22:20

## 2020-05-03 NOTE — PROGRESS NOTE ADULT - SUBJECTIVE AND OBJECTIVE BOX
THE PATIENT WAS SEEN AND EXAMINED BY ME WITH THE HOUSESTAFF  DURING MORNING ROUNDS.   HPI:  This is a 78y/o lady with past medical history of HTN, HLD who was gardening at 9:00am and a neighbor noticed she was off. She presented to ER with slurred speech, difficulty in thought process as being slow and left side weakness. Upon my evaluation her BP was systolic 183 then 190's 20mg of labetalol where given to 145 systolic. Head CT with no evidence of large hypodensity or hemorrhage. CT angiography with no evidence of large vessel occlusion. Perfusion with no evidence of large deficit or core infarct. NIHSS=3     SUBJECTIVE: No events overnight.  No new neurologic complaints.      MEDICATIONS:   Antibiotics:     Neuro:     CV:   lisinopril 20 milliGRAM(s) Oral daily  metoprolol succinate ER 25 milliGRAM(s) Oral at bedtime    Pulm:     GI/:    Heme:     Other:   atorvastatin 80 milliGRAM(s) Oral at bedtime  sodium chloride 0.9%. 1000 milliLiter(s) IV Continuous <Continuous>      PHYSICAL EXAM:   Vital Signs Last 24 Hrs  T(C): 37.3 (03 May 2020 12:00), Max: 37.3 (03 May 2020 12:00)  T(F): 99.2 (03 May 2020 12:00), Max: 99.2 (03 May 2020 12:00)  HR: 92 (03 May 2020 12:00) (62 - 95)  BP: 116/59 (03 May 2020 12:00) (90/59 - 182/82)  BP(mean): 72 (03 May 2020 12:00) (64 - 85)  RR: 14 (03 May 2020 12:00) (13 - 30)  SpO2: 98% (03 May 2020 12:00) (95% - 100%)  General: No acute distress  HEENT: EOM intact, visual fields full      NEUROLOGICAL EXAM:  Mental status: Awake, alert, oriented x3, no aphasia, no apraxia  Cranial Nerves: PERRL, EOMI, V1-V3 intact sensation, no facial asymmetry, no dysarthria, no dysphagia, tongue midline, shoulder shrug intact bilaterally.  Motor exam: Normal tone, left lower extremity subtle drift          [] Upper extremity                Delt       Bicep    Tricep                                                  R         5/5        5/5        5/5       5/5                                               L          5/5        5/5        5/5       5/5         [] Lower extremity               HF          KE          KF        DF         PF                                               R        5/5        5/5        5/5       5/5       5/5                                               L         4/5 4/5 4/5 4/5 4/5    Sensation: Intact to light touch   Coordination/ Gait: No dysmetria    LABS:                        13.4   7.77  )-----------( 201      ( 03 May 2020 03:46 )             40.0    05-03    139  |  103  |  15.0  ----------------------------<  97  4.4   |  21.0<L>  |  0.68    Ca    8.4<L>      03 May 2020 03:46  Phos  4.2     05-03  Mg     1.9     05-03    TPro  5.7<L>  /  Alb  3.6  /  TBili  1.0  /  DBili  x   /  AST  15  /  ALT  9   /  AlkPhos  48  05-03  PT/INR - ( 02 May 2020 10:50 )   PT: 12.3 sec;   INR: 1.09 ratio         PTT - ( 02 May 2020 10:50 )  PTT:31.8 sec      IMAGING: Reviewed by me.   No new imaging

## 2020-05-03 NOTE — PHYSICAL THERAPY INITIAL EVALUATION ADULT - PERTINENT HX OF CURRENT PROBLEM, REHAB EVAL
80 y/o female with pmhx of HTN, HLD, CAD s/p PCI 3 years ago (on aspirin, plavix) presents to ER after patient developed weakness and altered gait. Symptoms began at about 9 am. She also had slurred speech with expressive aphasia and left sided weakness. CT head negative, no acute infact seen, TPA given

## 2020-05-03 NOTE — CHART NOTE - NSCHARTNOTEFT_GEN_A_CORE
CC: Slurred speech L sided weakness       Overnight/ AM events:  Patient seen and examined at bedside. No acute events overnight. Patient states she is feeling well, has no complaints. Reports improvement in all her sx, states she has no issues with swallowing, speech and ambulated with PT and with the RW overall did well. Wants to go home as soon as she can. Updated her in regards to the plan of care and that she needs to be monitored for additional 24 hours then can consider dispo planning. Patient denies chest pain, SOB, abd pain, N/V, fever, chills, dysuria or any other complaints. All remainder ROS negative.           Vital Signs Last 24 Hrs  T(C): 37.3 (03 May 2020 12:00), Max: 37.3 (03 May 2020 12:00)  T(F): 99.2 (03 May 2020 12:00), Max: 99.2 (03 May 2020 12:00)  HR: 93 (03 May 2020 14:00) (65 - 95)  BP: 127/97 (03 May 2020 14:00) (90/59 - 169/77)  BP(mean): 72 (03 May 2020 12:00) (64 - 85)  RR: 20 (03 May 2020 14:00) (13 - 30)  SpO2: 97% (03 May 2020 14:00) (95% - 100%)          CONSTITUTIONAL: Well appearing, well nourished, awake, alert and in no apparent distress  CARDIAC: Normal rate, regular rhythm.  Heart sounds S1, S2.  No murmurs, rubs or gallops   RESPIRATORY: Breath sounds clear and equal bilaterally. No wheezes, rhales or rhonchi  GASTROENTEROLOGY: Soft nt nd bs + normoactive   EXTREMITIES: No edema, cyanosis or deformity   NEUROLOGICAL: Alert and oriented, no focal deficits, no motor or sensory deficits.  SKIN: No rash, skin turgor wnl       A/p: 80y/o lady with past medical history of HTN, HLD who was gardening at 9:00am and a neighbor noticed she was off. She presented to ER with slurred speech, difficulty in thought process as being slow and left side weakness. Upon my evaluation her BP was systolic 183 then 190's 20mg of labetalol where given to 145 systolic. Head CT with no evidence of large hypodensity or hemorrhage. CT angiography with no evidence of large vessel occlusion. Perfusion with no evidence of large deficit or core infarct. NIHSS=3. Evaluated by neuro intervention and likely cerebral thrombosis with infarction probable microangiopathy can not rule out embolic source. She was deemed candidate for tPA, decision was made at 11:10am. No endovascular treatment indicated. Post TPA pt had MRI which was negative for any bleeding or any other findings. Pt fully back to normal with no residual deficits. CTA neck/head negative. Pending TTE, no noted arrythmias on cardiac tele monitoring. LDL: 57, HBA1C pending. Pt to c/w asa, atorvastatin and dvt ppx initiated. Advanced diet to dysphagia with thin liquids. HTN with stable bp, pt to c/w metoprolol and lisinopril. PT consulted and recommended home with RW. If pt remains stable and tte wnl will likely anticipate discharge planning to home in 24 hours. [place] and has ramp to go into home through the garage. CC: Slurred speech L sided weakness       Overnight/ AM events:  Patient seen and examined at bedside. No acute events overnight. Patient states she is feeling well, has no complaints. Reports improvement in all her sx, states she has no issues with swallowing, speech and ambulated with PT and with the RW overall did well. Wants to go home as soon as she can. Updated her in regards to the plan of care and that she needs to be monitored for additional 24 hours then can consider dispo planning. Patient denies chest pain, SOB, abd pain, N/V, fever, chills, dysuria or any other complaints. All remainder ROS negative.           Vital Signs Last 24 Hrs  T(C): 37.3 (03 May 2020 12:00), Max: 37.3 (03 May 2020 12:00)  T(F): 99.2 (03 May 2020 12:00), Max: 99.2 (03 May 2020 12:00)  HR: 93 (03 May 2020 14:00) (65 - 95)  BP: 127/97 (03 May 2020 14:00) (90/59 - 169/77)  BP(mean): 72 (03 May 2020 12:00) (64 - 85)  RR: 20 (03 May 2020 14:00) (13 - 30)  SpO2: 97% (03 May 2020 14:00) (95% - 100%)          CONSTITUTIONAL: Well appearing, well nourished, awake, alert and in no apparent distress  CARDIAC: Normal rate, regular rhythm.  Heart sounds S1, S2.  No murmurs, rubs or gallops   RESPIRATORY: Breath sounds clear and equal bilaterally. No wheezes, rhales or rhonchi  GASTROENTEROLOGY: Soft nt nd bs + normoactive   EXTREMITIES: No edema, cyanosis or deformity   NEUROLOGICAL: Alert and oriented, no focal deficits, no motor or sensory deficits.  SKIN: No rash, skin turgor wnl       A/p: 78y/o lady with past medical history of HTN, HLD, CAD who was gardening at 9:00am and a neighbor noticed she was off. She presented to ER with slurred speech, difficulty in thought process as being slow and left side weakness. Upon my evaluation her BP was systolic 183 then 190's 20mg of labetalol where given to 145 systolic. Head CT with no evidence of large hypodensity or hemorrhage. CT angiography with no evidence of large vessel occlusion. Perfusion with no evidence of large deficit or core infarct. NIHSS=3. Evaluated by neuro intervention and likely cerebral thrombosis with infarction probable microangiopathy can not rule out embolic source. She was deemed candidate for tPA, decision was made at 11:10am. No endovascular treatment indicated. Post TPA pt had MRI which was negative for any bleeding or any other findings. Pt fully back to normal with no residual deficits. CTA neck/head negative. Pending TTE, no noted arrythmias on cardiac tele monitoring. LDL: 57, HBA1C pending. Pt to c/w asa, atorvastatin and dvt ppx initiated. Advanced diet to dysphagia with thin liquids. CAD c/w asa, statin and metoprolol. HTN with stable bp, pt to c/w metoprolol and lisinopril. PT consulted and recommended home with RW. If pt remains stable and tte wnl will likely anticipate discharge planning to home in 24 hours. Pt [place] ranch and has ramp to go into home through the garage.

## 2020-05-03 NOTE — PROGRESS NOTE ADULT - ASSESSMENT
Impression: Cerebral thrombosis with infarction probable microangiopathy can not rule out embolic source.  She was deemed candidate for tPA, decision was made at 11:10am. No endovascular treatment indicated.    Recommendations:  - Continue with  24 hours post IV tPA precautions including BP goal<180/105 mmHg for first 24 hours followed by gradual normotension as per protocol  - MRI brain without contrast  - Aspirin and pharmacological DVT prophylaxis to be considered at 24 hours after the IV tPA and after repeating brain imaging, SCDs for DVT prophylaxis in the interim  - Atorvastatin 80 mg at bedtime after establishing enteral access or passing swallow evaluation  - TTE with bubble study telemetry to screen for possible cardiac source of embolism  - Prolonged cardiac monitoring with 30 days events monitor / ICM to screen for occult cardiac arrythmia being the cardiac source of embolism, to be considered based on results of above mentioned tests   - HbA1C   - LDL 57 continue home dose statin if any if not low dose statin given LDL  - PT/OT eval  -Dysphagia passed -start trial of mashed or puree diet and advance accordingly   -Follow up Kidney lesion??

## 2020-05-03 NOTE — PROGRESS NOTE ADULT - SUBJECTIVE AND OBJECTIVE BOX
Admit Date: 05-02-20  Length of Stay: 1d    79yFemale    Reason for admission to ICU:  patient is in icu after stroke symptoms and given tpa. pt is here for neurochecks. Admitted to MICU for further observation and management.    INTERVAL HPI/OVERNIGHT EVENTS:      MEDICATIONS  (STANDING):  atorvastatin 80 milliGRAM(s) Oral at bedtime  lisinopril 20 milliGRAM(s) Oral daily  metoprolol succinate ER 25 milliGRAM(s) Oral at bedtime  sodium chloride 0.9%. 1000 milliLiter(s) (75 mL/Hr) IV Continuous <Continuous>    MEDICATIONS  (PRN):      Vitals:  Vital Signs Last 24 Hrs  T(C): 37.1 (03 May 2020 08:01), Max: 37.1 (03 May 2020 08:01)  T(F): 98.8 (03 May 2020 08:01), Max: 98.8 (03 May 2020 08:01)  HR: 95 (03 May 2020 11:00) (62 - 95)  BP: 110/58 (03 May 2020 11:00) (90/59 - 182/82)  BP(mean): 71 (03 May 2020 11:00) (64 - 85)  RR: 20 (03 May 2020 11:00) (13 - 30)  SpO2: 100% (03 May 2020 11:00) (95% - 100%)    Vitals (Invasive):  ABP: --  CVP(mm Hg): --  CVP(cm H2O): --  CO: --  CI: --  PA: --  PA(mean): --  PCWP: --  LA: --  RA: --  SVR: --  SVRI: --  PVR: --  PVRI: --    I&O's Summary    02 May 2020 07:01  -  03 May 2020 07:00  --------------------------------------------------------  IN: 750 mL / OUT: 0 mL / NET: 750 mL        Physical Exam:  HEENT:     + NCAT  + EOMI  - Conjunctival edema   - Icterus   - Thrush   - ETT  - NGT/OGT  Neck:         + FROM    + JVD     - Nodes     - Masses    + Mid-line trachea   - Tracheostomy  Chest:         - Sternal click  - Sternal drainage  - Chest tubes  - SubQ emphysema  Lungs:          + CTA   - Rhonchi    - Rales    - Wheezing     - Decreased BS   - Dullness R L  Cardiac:       + S1 + S2    + RRR   - Irregular   - S3  - S4    - Murmurs   - Rub   - Hamman’s sign   Abdomen:    + BS     + Soft    + Non-tender     - Distended    - Organomegaly  - PEG  Extremities:   - Cyanosis U/L   - Clubbing  U/L  - LE/UE Edema   + Capillary refill    + Pulses   Neuro:        - Awake   -  Alert   - Confused   - Lethargic   - Sedated   - Paralyzed  - Generalized weakness  left sided weakness +    Skin:        - Rashes    - Erythema   + Normal incisions   + IV sites intact  - Sacral decubitus    Labs:  COVID:  COVID-19 PCR: NotDetec (05-02-20 @ 11:55)                          13.4   7.77  )-----------( 201      ( 03 May 2020 03:46 )             40.0     05-03    139  |  103  |  15.0  ----------------------------<  97  4.4   |  21.0<L>  |  0.68    Ca    8.4<L>      03 May 2020 03:46  Phos  4.2     05-03  Mg     1.9     05-03    TPro  5.7<L>  /  Alb  3.6  /  TBili  1.0  /  DBili  x   /  AST  15  /  ALT  9   /  AlkPhos  48  05-03    PT/INR - ( 02 May 2020 10:50 )   PT: 12.3 sec;   INR: 1.09 ratio         PTT - ( 02 May 2020 10:50 )  PTT:31.8 sec      COVID related labs:  02 May 2020 10:50  D-dimer:  x  Calcitonin:  x  CRP:  x  LDH:  x  Lactate,Blood:  x  CK:  50  Troponin I:  x  Troponin T:  <0.01  Troponin HS:  x  Ferritin, Serum: x  BNP:  x      Blood Gases:  (ARTERIAL):    (VENOUS):      Radiology:    Pulmonary:  pt is on room air no pulmonary issues    Continue to closely monitor patient    Cardiovascular:  pt has known cad sp pci in past  pt for tte to rule out embolic source for stroke.  Goal MAP >65    Neurology:  pt is gcs 15  with mild left lower extremity weakness  for repeat ct    Gastrointestinal:  Prophylaxis  start diet    Genitourinary:  cr 0.68 no issues  Follow Up: Urinalysis, Creatinine    ID:afebrile wbc 7.7    heme: hgb 13.4  scd for dvt proph  start lovenox in am if repeat ct ok    endo   bg 89-94  no issues  Diet: restarted  Electrolytes: Replete K<4, Mg<2    Code: FULL CODE    Disposition: Patient requires continued monitoring in MICU    will downgrade today if ct is unchanged.

## 2020-05-03 NOTE — OCCUPATIONAL THERAPY INITIAL EVALUATION ADULT - ADDITIONAL COMMENTS
pt reports lives alone, has walk in shower and chair, ramp to get inside and owns rw, cane and wc from when her  needed it,

## 2020-05-03 NOTE — OCCUPATIONAL THERAPY INITIAL EVALUATION ADULT - PERTINENT HX OF CURRENT PROBLEM, REHAB EVAL
78 y/o female with pmhx of HTN, HLD, CAD s/p PCI 3 years ago (on aspirin, plavix) presents to ER after patient developed weakness and altered gait. Symptoms began at about 9 am. She also had slurred speech with expressive aphasia and left sided weakness. CT head negative, no acute infact seen, TPA given

## 2020-05-03 NOTE — PHYSICAL THERAPY INITIAL EVALUATION ADULT - ADDITIONAL COMMENTS
Pt lives alone in a private 1-story home with 0 [address], with laundry in the basement one flight down. Pt denies DME use, although reports using a SAC on occasion with high cholesterol. Pt has wheelchair, SAC, RW from . Both sons live close by and are able to assist with all needs, but pt prefers to live home alone.

## 2020-05-04 ENCOUNTER — TRANSCRIPTION ENCOUNTER (OUTPATIENT)
Age: 80
End: 2020-05-04

## 2020-05-04 VITALS
SYSTOLIC BLOOD PRESSURE: 120 MMHG | TEMPERATURE: 98 F | DIASTOLIC BLOOD PRESSURE: 58 MMHG | HEART RATE: 85 BPM | OXYGEN SATURATION: 99 % | RESPIRATION RATE: 17 BRPM

## 2020-05-04 LAB
A1C WITH ESTIMATED AVERAGE GLUCOSE RESULT: 5 % — SIGNIFICANT CHANGE UP (ref 4–5.6)
ESTIMATED AVERAGE GLUCOSE: 97 MG/DL — SIGNIFICANT CHANGE UP (ref 68–114)

## 2020-05-04 PROCEDURE — 70450 CT HEAD/BRAIN W/O DYE: CPT

## 2020-05-04 PROCEDURE — 84484 ASSAY OF TROPONIN QUANT: CPT

## 2020-05-04 PROCEDURE — 99239 HOSP IP/OBS DSCHRG MGMT >30: CPT

## 2020-05-04 PROCEDURE — 93005 ELECTROCARDIOGRAM TRACING: CPT

## 2020-05-04 PROCEDURE — 70551 MRI BRAIN STEM W/O DYE: CPT

## 2020-05-04 PROCEDURE — 80053 COMPREHEN METABOLIC PANEL: CPT

## 2020-05-04 PROCEDURE — 93307 TTE W/O DOPPLER COMPLETE: CPT

## 2020-05-04 PROCEDURE — 0042T: CPT

## 2020-05-04 PROCEDURE — 93306 TTE W/DOPPLER COMPLETE: CPT | Mod: 26

## 2020-05-04 PROCEDURE — 85027 COMPLETE CBC AUTOMATED: CPT

## 2020-05-04 PROCEDURE — 70498 CT ANGIOGRAPHY NECK: CPT

## 2020-05-04 PROCEDURE — 84100 ASSAY OF PHOSPHORUS: CPT

## 2020-05-04 PROCEDURE — 99291 CRITICAL CARE FIRST HOUR: CPT | Mod: 25

## 2020-05-04 PROCEDURE — 96374 THER/PROPH/DIAG INJ IV PUSH: CPT | Mod: XU

## 2020-05-04 PROCEDURE — 97166 OT EVAL MOD COMPLEX 45 MIN: CPT

## 2020-05-04 PROCEDURE — 80061 LIPID PANEL: CPT

## 2020-05-04 PROCEDURE — 36415 COLL VENOUS BLD VENIPUNCTURE: CPT

## 2020-05-04 PROCEDURE — 82550 ASSAY OF CK (CPK): CPT

## 2020-05-04 PROCEDURE — 83036 HEMOGLOBIN GLYCOSYLATED A1C: CPT

## 2020-05-04 PROCEDURE — 97163 PT EVAL HIGH COMPLEX 45 MIN: CPT

## 2020-05-04 PROCEDURE — 87635 SARS-COV-2 COVID-19 AMP PRB: CPT

## 2020-05-04 PROCEDURE — 85730 THROMBOPLASTIN TIME PARTIAL: CPT

## 2020-05-04 PROCEDURE — 82962 GLUCOSE BLOOD TEST: CPT

## 2020-05-04 PROCEDURE — 83735 ASSAY OF MAGNESIUM: CPT

## 2020-05-04 PROCEDURE — 96375 TX/PRO/DX INJ NEW DRUG ADDON: CPT | Mod: XU

## 2020-05-04 PROCEDURE — 85610 PROTHROMBIN TIME: CPT

## 2020-05-04 PROCEDURE — 70496 CT ANGIOGRAPHY HEAD: CPT

## 2020-05-04 RX ORDER — ASPIRIN/CALCIUM CARB/MAGNESIUM 324 MG
1 TABLET ORAL
Qty: 0 | Refills: 0 | DISCHARGE
Start: 2020-05-04

## 2020-05-04 RX ORDER — CARVEDILOL PHOSPHATE 80 MG/1
1 CAPSULE, EXTENDED RELEASE ORAL
Qty: 60 | Refills: 0
Start: 2020-05-04 | End: 2020-06-02

## 2020-05-04 RX ORDER — ATORVASTATIN CALCIUM 80 MG/1
1 TABLET, FILM COATED ORAL
Qty: 30 | Refills: 0
Start: 2020-05-04 | End: 2020-06-02

## 2020-05-04 RX ORDER — LISINOPRIL 2.5 MG/1
1 TABLET ORAL
Qty: 0 | Refills: 0 | DISCHARGE
Start: 2020-05-04

## 2020-05-04 RX ORDER — ASPIRIN/CALCIUM CARB/MAGNESIUM 324 MG
1 TABLET ORAL
Qty: 0 | Refills: 0 | DISCHARGE

## 2020-05-04 RX ORDER — LISINOPRIL 2.5 MG/1
1 TABLET ORAL
Qty: 0 | Refills: 0 | DISCHARGE

## 2020-05-04 RX ADMIN — ENOXAPARIN SODIUM 40 MILLIGRAM(S): 100 INJECTION SUBCUTANEOUS at 10:55

## 2020-05-04 RX ADMIN — Medication 81 MILLIGRAM(S): at 10:55

## 2020-05-04 RX ADMIN — LISINOPRIL 20 MILLIGRAM(S): 2.5 TABLET ORAL at 05:04

## 2020-05-04 NOTE — DISCHARGE NOTE PROVIDER - NSDCCPCAREPLAN_GEN_ALL_CORE_FT
PRINCIPAL DISCHARGE DIAGNOSIS  Diagnosis: Stroke  Assessment and Plan of Treatment: Cerebral stroke, continue with aspirin, atorvastatin as ordered, Please continue with home physical therapy. Please follow up with your cardiologist in 1-2 weeks. Continue with tight blood pressure control.      SECONDARY DISCHARGE DIAGNOSES  Diagnosis: CAD (coronary artery disease)  Assessment and Plan of Treatment: Continue with home medications, coreg was increased.    Diagnosis: Essential hypertension  Assessment and Plan of Treatment: Continue with lisinopril and increased dose of coreg. Please follow up with your cardiologist to continue with management.    Diagnosis: Hyperlipidemia  Assessment and Plan of Treatment: Discontinue crestor and continue with atorvastatin.

## 2020-05-04 NOTE — DISCHARGE NOTE PROVIDER - NSDCFUADDAPPT_GEN_ALL_CORE_FT
Please follow up with your primary care physician in 3-5 days. Please follow up with your cardiologist Dr. Cyr in 1-2 weeks.

## 2020-05-04 NOTE — DISCHARGE NOTE NURSING/CASE MANAGEMENT/SOCIAL WORK - NSDCPEPTSTRK_GEN_ALL_CORE
Stroke warning signs and symptoms/Call 911 for stroke/Prescribed medications/Risk factors for stroke/Need for follow up after discharge/Stroke education booklet/Stroke support groups for patients, families, and friends/Signs and symptoms of stroke

## 2020-05-04 NOTE — DISCHARGE NOTE PROVIDER - HOSPITAL COURSE
78y/o lady with past medical history of HTN, HLD, CAD who was gardening at 9:00am and a neighbor noticed she was off. She presented to ER with slurred speech, difficulty in thought process as being slow and left side weakness. Upon my evaluation her BP was systolic 183 then 190's 20mg of labetalol where given to 145 systolic. Head CT with no evidence of large hypodensity or hemorrhage. CT angiography with no evidence of large vessel occlusion. Perfusion with no evidence of large deficit or core infarct. NIHSS=3. Evaluated by neuro intervention and likely cerebral thrombosis with infarction probable microangiopathy can not rule out embolic source. She was deemed a candidate for tPA, decision was made at 11:10am on admission day. No endovascular treatment indicated. Post TPA pt had MRI which was negative for any bleeding or any other findings. Pt fully back to normal with no residual deficits. CTA neck/head negative. TTE with preserved EF, no other findings noted.  No noted arrythmias on cardiac tele monitoring. LDL: 57, HBA1C:6. Pt to c/w asa, atorvastatin. Advanced diet to dysphagia with thin liquids without any complications. CAD c/w asa, statin and metoprolol. HTN with stable bp, pt to c/w metoprolol and lisinopril. PT consulted and recommended home PT. Pt medically stable to be discharged home.                 Vital Signs Last 24 Hrs    T(C): 36.7 (04 May 2020 05:09), Max: 37.3 (03 May 2020 12:00)    T(F): 98 (04 May 2020 05:09), Max: 99.2 (03 May 2020 12:00)    HR: 93 (04 May 2020 09:56) (90 - 104)    BP: 119/76 (04 May 2020 09:56) (113/58 - 147/71)    BP(mean): 72 (03 May 2020 12:00) (72 - 72)    RR: 16 (04 May 2020 09:56) (14 - 20)    SpO2: 99% (04 May 2020 09:56) (97% - 100%)                CONSTITUTIONAL: Well appearing, well nourished, awake, alert and in no apparent distress    CARDIAC: Normal rate, regular rhythm.  Heart sounds S1, S2.  No murmurs, rubs or gallops     RESPIRATORY: Breath sounds clear and equal bilaterally. No wheezes, rhales or rhonchi    GASTROENTEROLOGY: Soft nt nd bs + normoactive     EXTREMITIES: No edema, cyanosis or deformity     NEUROLOGICAL: Alert and oriented, no focal deficits, no motor or sensory deficits.    SKIN: No rash, skin turgor wnl             Discharge planning took 46 minutes

## 2020-05-04 NOTE — DISCHARGE NOTE PROVIDER - NSDCMRMEDTOKEN_GEN_ALL_CORE_FT
aspirin 81 mg oral tablet, chewable: 1 tab(s) orally once a day  atorvastatin 80 mg oral tablet: 1 tab(s) orally once a day (at bedtime)  Coreg 6.25 mg oral tablet: 1 tab(s) orally 2 times a day   lisinopril 20 mg oral tablet: 1 tab(s) orally once a day

## 2020-05-04 NOTE — DISCHARGE NOTE PROVIDER - CARE PROVIDER_API CALL
Diamond Garces)  Neurology; Vascular Neurology  [address] [place] [address]  Phone: [phone]  Fax: [phone]  Follow Up Time:     Mayco Cyr (DO)  Internal Medicine  [address] [place] [address]  Phone: [phone]  Fax: [phone]  Follow Up Time:

## 2020-05-04 NOTE — DISCHARGE NOTE NURSING/CASE MANAGEMENT/SOCIAL WORK - PATIENT PORTAL LINK FT
You can access the FollowMyHealth Patient Portal offered by [facility] by registering at the following website: [email or web address]. By joining [facility]’s FollowMyHealth portal, you will also be able to view your health information using other applications (apps) compatible with our system.

## 2020-05-06 PROBLEM — Z00.00 ENCOUNTER FOR PREVENTIVE HEALTH EXAMINATION: Status: ACTIVE | Noted: 2020-05-06

## 2020-05-12 ENCOUNTER — APPOINTMENT (OUTPATIENT)
Dept: NEUROSURGERY | Facility: CLINIC | Age: 80
End: 2020-05-12
Payer: MEDICARE

## 2020-05-12 PROCEDURE — 99442: CPT

## 2020-06-01 ENCOUNTER — INPATIENT (INPATIENT)
Facility: HOSPITAL | Age: 80
LOS: 1 days | Discharge: HOME | DRG: 41 | End: 2020-06-03
Attending: HOSPITALIST | Admitting: GENERAL ACUTE CARE HOSPITAL
Payer: MEDICARE

## 2020-06-01 VITALS
RESPIRATION RATE: 28 BRPM | HEIGHT: 62 IN | OXYGEN SATURATION: 98 % | DIASTOLIC BLOOD PRESSURE: 83 MMHG | WEIGHT: 143.96 LBS | TEMPERATURE: 98 F | HEART RATE: 76 BPM | SYSTOLIC BLOOD PRESSURE: 148 MMHG

## 2020-06-01 DIAGNOSIS — E78.5 HYPERLIPIDEMIA, UNSPECIFIED: ICD-10-CM

## 2020-06-01 DIAGNOSIS — I00 RHEUMATIC FEVER WITHOUT HEART INVOLVEMENT: ICD-10-CM

## 2020-06-01 DIAGNOSIS — Z90.89 ACQUIRED ABSENCE OF OTHER ORGANS: Chronic | ICD-10-CM

## 2020-06-01 DIAGNOSIS — I10 ESSENTIAL (PRIMARY) HYPERTENSION: ICD-10-CM

## 2020-06-01 DIAGNOSIS — I63.9 CEREBRAL INFARCTION, UNSPECIFIED: ICD-10-CM

## 2020-06-01 DIAGNOSIS — I48.0 PAROXYSMAL ATRIAL FIBRILLATION: ICD-10-CM

## 2020-06-01 LAB
ALBUMIN SERPL ELPH-MCNC: 3.6 G/DL — SIGNIFICANT CHANGE UP (ref 3.3–5.2)
ALP SERPL-CCNC: 62 U/L — SIGNIFICANT CHANGE UP (ref 40–120)
ALT FLD-CCNC: 11 U/L — SIGNIFICANT CHANGE UP
ANION GAP SERPL CALC-SCNC: 12 MMOL/L — SIGNIFICANT CHANGE UP (ref 5–17)
ANION GAP SERPL CALC-SCNC: 9 MMOL/L — SIGNIFICANT CHANGE UP (ref 5–17)
APPEARANCE UR: CLEAR — SIGNIFICANT CHANGE UP
APTT BLD: 33 SEC — SIGNIFICANT CHANGE UP (ref 27.5–36.3)
AST SERPL-CCNC: 16 U/L — SIGNIFICANT CHANGE UP
BASOPHILS # BLD AUTO: 0.08 K/UL — SIGNIFICANT CHANGE UP (ref 0–0.2)
BASOPHILS NFR BLD AUTO: 1.4 % — SIGNIFICANT CHANGE UP (ref 0–2)
BILIRUB SERPL-MCNC: 1 MG/DL — SIGNIFICANT CHANGE UP (ref 0.4–2)
BILIRUB UR-MCNC: NEGATIVE — SIGNIFICANT CHANGE UP
BUN SERPL-MCNC: 17 MG/DL — SIGNIFICANT CHANGE UP (ref 8–20)
BUN SERPL-MCNC: 17 MG/DL — SIGNIFICANT CHANGE UP (ref 8–20)
CALCIUM SERPL-MCNC: 8.4 MG/DL — LOW (ref 8.6–10.2)
CALCIUM SERPL-MCNC: 8.6 MG/DL — SIGNIFICANT CHANGE UP (ref 8.6–10.2)
CHLORIDE SERPL-SCNC: 100 MMOL/L — SIGNIFICANT CHANGE UP (ref 98–107)
CHLORIDE SERPL-SCNC: 94 MMOL/L — LOW (ref 98–107)
CO2 SERPL-SCNC: 23 MMOL/L — SIGNIFICANT CHANGE UP (ref 22–29)
CO2 SERPL-SCNC: 24 MMOL/L — SIGNIFICANT CHANGE UP (ref 22–29)
COLOR SPEC: YELLOW — SIGNIFICANT CHANGE UP
CREAT SERPL-MCNC: 0.68 MG/DL — SIGNIFICANT CHANGE UP (ref 0.5–1.3)
CREAT SERPL-MCNC: 0.9 MG/DL — SIGNIFICANT CHANGE UP (ref 0.5–1.3)
DIFF PNL FLD: NEGATIVE — SIGNIFICANT CHANGE UP
EOSINOPHIL # BLD AUTO: 0.18 K/UL — SIGNIFICANT CHANGE UP (ref 0–0.5)
EOSINOPHIL NFR BLD AUTO: 3.1 % — SIGNIFICANT CHANGE UP (ref 0–6)
EPI CELLS # UR: SIGNIFICANT CHANGE UP
GLUCOSE SERPL-MCNC: 135 MG/DL — HIGH (ref 70–99)
GLUCOSE SERPL-MCNC: 85 MG/DL — SIGNIFICANT CHANGE UP (ref 70–99)
GLUCOSE UR QL: NEGATIVE MG/DL — SIGNIFICANT CHANGE UP
HCT VFR BLD CALC: 39.4 % — SIGNIFICANT CHANGE UP (ref 34.5–45)
HGB BLD-MCNC: 13.2 G/DL — SIGNIFICANT CHANGE UP (ref 11.5–15.5)
IMM GRANULOCYTES NFR BLD AUTO: 0.3 % — SIGNIFICANT CHANGE UP (ref 0–1.5)
INR BLD: 1.13 RATIO — SIGNIFICANT CHANGE UP (ref 0.88–1.16)
KETONES UR-MCNC: NEGATIVE — SIGNIFICANT CHANGE UP
LEUKOCYTE ESTERASE UR-ACNC: NEGATIVE — SIGNIFICANT CHANGE UP
LYMPHOCYTES # BLD AUTO: 1.42 K/UL — SIGNIFICANT CHANGE UP (ref 1–3.3)
LYMPHOCYTES # BLD AUTO: 24.1 % — SIGNIFICANT CHANGE UP (ref 13–44)
MCHC RBC-ENTMCNC: 30.1 PG — SIGNIFICANT CHANGE UP (ref 27–34)
MCHC RBC-ENTMCNC: 33.5 GM/DL — SIGNIFICANT CHANGE UP (ref 32–36)
MCV RBC AUTO: 89.7 FL — SIGNIFICANT CHANGE UP (ref 80–100)
MONOCYTES # BLD AUTO: 0.61 K/UL — SIGNIFICANT CHANGE UP (ref 0–0.9)
MONOCYTES NFR BLD AUTO: 10.3 % — SIGNIFICANT CHANGE UP (ref 2–14)
NEUTROPHILS # BLD AUTO: 3.59 K/UL — SIGNIFICANT CHANGE UP (ref 1.8–7.4)
NEUTROPHILS NFR BLD AUTO: 60.8 % — SIGNIFICANT CHANGE UP (ref 43–77)
NITRITE UR-MCNC: NEGATIVE — SIGNIFICANT CHANGE UP
OSMOLALITY SERPL: 300 MOSMOL/KG — SIGNIFICANT CHANGE UP (ref 280–301)
OSMOLALITY UR: 290 MOSM/KG — LOW (ref 300–1000)
PH UR: 7 — SIGNIFICANT CHANGE UP (ref 5–8)
PLATELET # BLD AUTO: 170 K/UL — SIGNIFICANT CHANGE UP (ref 150–400)
POTASSIUM SERPL-MCNC: 4.2 MMOL/L — SIGNIFICANT CHANGE UP (ref 3.5–5.3)
POTASSIUM SERPL-MCNC: 4.6 MMOL/L — SIGNIFICANT CHANGE UP (ref 3.5–5.3)
POTASSIUM SERPL-SCNC: 4.2 MMOL/L — SIGNIFICANT CHANGE UP (ref 3.5–5.3)
POTASSIUM SERPL-SCNC: 4.6 MMOL/L — SIGNIFICANT CHANGE UP (ref 3.5–5.3)
POTASSIUM UR-SCNC: 14 MMOL/L — SIGNIFICANT CHANGE UP
PROT SERPL-MCNC: 6 G/DL — LOW (ref 6.6–8.7)
PROT UR-MCNC: NEGATIVE MG/DL — SIGNIFICANT CHANGE UP
PROTHROM AB SERPL-ACNC: 12.8 SEC — SIGNIFICANT CHANGE UP (ref 10–12.9)
RBC # BLD: 4.39 M/UL — SIGNIFICANT CHANGE UP (ref 3.8–5.2)
RBC # FLD: 12.1 % — SIGNIFICANT CHANGE UP (ref 10.3–14.5)
RBC CASTS # UR COMP ASSIST: SIGNIFICANT CHANGE UP /HPF (ref 0–4)
SARS-COV-2 RNA SPEC QL NAA+PROBE: SIGNIFICANT CHANGE UP
SODIUM SERPL-SCNC: 126 MMOL/L — LOW (ref 135–145)
SODIUM SERPL-SCNC: 136 MMOL/L — SIGNIFICANT CHANGE UP (ref 135–145)
SODIUM UR-SCNC: 38 MMOL/L — SIGNIFICANT CHANGE UP
SP GR SPEC: 1 — LOW (ref 1.01–1.02)
TROPONIN T SERPL-MCNC: <0.01 NG/ML — SIGNIFICANT CHANGE UP (ref 0–0.06)
TSH SERPL-MCNC: 1.84 UIU/ML — SIGNIFICANT CHANGE UP (ref 0.27–4.2)
UROBILINOGEN FLD QL: NEGATIVE MG/DL — SIGNIFICANT CHANGE UP
WBC # BLD: 5.9 K/UL — SIGNIFICANT CHANGE UP (ref 3.8–10.5)
WBC # FLD AUTO: 5.9 K/UL — SIGNIFICANT CHANGE UP (ref 3.8–10.5)
WBC UR QL: SIGNIFICANT CHANGE UP

## 2020-06-01 PROCEDURE — 70498 CT ANGIOGRAPHY NECK: CPT | Mod: 26

## 2020-06-01 PROCEDURE — 70496 CT ANGIOGRAPHY HEAD: CPT | Mod: 26

## 2020-06-01 PROCEDURE — 70450 CT HEAD/BRAIN W/O DYE: CPT | Mod: 26,59

## 2020-06-01 PROCEDURE — 99291 CRITICAL CARE FIRST HOUR: CPT

## 2020-06-01 PROCEDURE — 93010 ELECTROCARDIOGRAM REPORT: CPT

## 2020-06-01 PROCEDURE — 0042T: CPT

## 2020-06-01 PROCEDURE — 99223 1ST HOSP IP/OBS HIGH 75: CPT

## 2020-06-01 PROCEDURE — 71045 X-RAY EXAM CHEST 1 VIEW: CPT | Mod: 26

## 2020-06-01 RX ORDER — SODIUM CHLORIDE 9 MG/ML
1000 INJECTION INTRAMUSCULAR; INTRAVENOUS; SUBCUTANEOUS
Refills: 0 | Status: DISCONTINUED | OUTPATIENT
Start: 2020-06-01 | End: 2020-06-02

## 2020-06-01 RX ORDER — ALTEPLASE 100 MG
55.4 KIT INTRAVENOUS ONCE
Refills: 0 | Status: COMPLETED | OUTPATIENT
Start: 2020-06-01 | End: 2020-06-01

## 2020-06-01 RX ORDER — ALTEPLASE 100 MG
5.9 KIT INTRAVENOUS ONCE
Refills: 0 | Status: DISCONTINUED | OUTPATIENT
Start: 2020-06-01 | End: 2020-06-01

## 2020-06-01 RX ORDER — SENNA PLUS 8.6 MG/1
2 TABLET ORAL AT BEDTIME
Refills: 0 | Status: DISCONTINUED | OUTPATIENT
Start: 2020-06-01 | End: 2020-06-03

## 2020-06-01 RX ORDER — LISINOPRIL 2.5 MG/1
20 TABLET ORAL DAILY
Refills: 0 | Status: DISCONTINUED | OUTPATIENT
Start: 2020-06-01 | End: 2020-06-03

## 2020-06-01 RX ORDER — PANTOPRAZOLE SODIUM 20 MG/1
40 TABLET, DELAYED RELEASE ORAL
Refills: 0 | Status: DISCONTINUED | OUTPATIENT
Start: 2020-06-01 | End: 2020-06-03

## 2020-06-01 RX ORDER — FOLIC ACID 0.8 MG
1 TABLET ORAL DAILY
Refills: 0 | Status: DISCONTINUED | OUTPATIENT
Start: 2020-06-01 | End: 2020-06-03

## 2020-06-01 RX ORDER — ASPIRIN/CALCIUM CARB/MAGNESIUM 324 MG
300 TABLET ORAL DAILY
Refills: 0 | Status: DISCONTINUED | OUTPATIENT
Start: 2020-06-01 | End: 2020-06-01

## 2020-06-01 RX ORDER — ONDANSETRON 8 MG/1
4 TABLET, FILM COATED ORAL EVERY 6 HOURS
Refills: 0 | Status: DISCONTINUED | OUTPATIENT
Start: 2020-06-01 | End: 2020-06-03

## 2020-06-01 RX ORDER — LISINOPRIL 2.5 MG/1
20 TABLET ORAL DAILY
Refills: 0 | Status: DISCONTINUED | OUTPATIENT
Start: 2020-06-01 | End: 2020-06-01

## 2020-06-01 RX ORDER — ALTEPLASE 100 MG
52.9 KIT INTRAVENOUS ONCE
Refills: 0 | Status: DISCONTINUED | OUTPATIENT
Start: 2020-06-01 | End: 2020-06-01

## 2020-06-01 RX ORDER — CARVEDILOL PHOSPHATE 80 MG/1
6.25 CAPSULE, EXTENDED RELEASE ORAL EVERY 12 HOURS
Refills: 0 | Status: DISCONTINUED | OUTPATIENT
Start: 2020-06-01 | End: 2020-06-03

## 2020-06-01 RX ORDER — POLYETHYLENE GLYCOL 3350 17 G/17G
17 POWDER, FOR SOLUTION ORAL DAILY
Refills: 0 | Status: DISCONTINUED | OUTPATIENT
Start: 2020-06-01 | End: 2020-06-03

## 2020-06-01 RX ORDER — ALTEPLASE 100 MG
6.2 KIT INTRAVENOUS ONCE
Refills: 0 | Status: COMPLETED | OUTPATIENT
Start: 2020-06-01 | End: 2020-06-01

## 2020-06-01 RX ORDER — ATORVASTATIN CALCIUM 80 MG/1
40 TABLET, FILM COATED ORAL AT BEDTIME
Refills: 0 | Status: DISCONTINUED | OUTPATIENT
Start: 2020-06-01 | End: 2020-06-01

## 2020-06-01 RX ORDER — ATORVASTATIN CALCIUM 80 MG/1
20 TABLET, FILM COATED ORAL AT BEDTIME
Refills: 0 | Status: DISCONTINUED | OUTPATIENT
Start: 2020-06-01 | End: 2020-06-03

## 2020-06-01 RX ADMIN — ALTEPLASE 55.4 MILLIGRAM(S): KIT at 12:14

## 2020-06-01 RX ADMIN — ALTEPLASE 372 MILLIGRAM(S): KIT at 12:13

## 2020-06-01 RX ADMIN — Medication 1 MILLIGRAM(S): at 17:47

## 2020-06-01 RX ADMIN — PANTOPRAZOLE SODIUM 40 MILLIGRAM(S): 20 TABLET, DELAYED RELEASE ORAL at 17:47

## 2020-06-01 RX ADMIN — Medication 1 TABLET(S): at 17:47

## 2020-06-01 RX ADMIN — ALTEPLASE 55.4 MILLIGRAM(S): KIT at 13:24

## 2020-06-01 RX ADMIN — SODIUM CHLORIDE 75 MILLILITER(S): 9 INJECTION INTRAMUSCULAR; INTRAVENOUS; SUBCUTANEOUS at 17:47

## 2020-06-01 RX ADMIN — ATORVASTATIN CALCIUM 20 MILLIGRAM(S): 80 TABLET, FILM COATED ORAL at 22:07

## 2020-06-01 RX ADMIN — ALTEPLASE 6.2 MILLIGRAM(S): KIT at 12:14

## 2020-06-01 NOTE — ED ADULT NURSE REASSESSMENT NOTE - NS ED NURSE REASSESS COMMENT FT1
Neuro ICU PA at bedside for initial assessment. pt currently receiving tPa, in NAD, VS per flowsheet. No s/sx of bleeding, denies pain. speech improving in clarity and speed, verbalized improvement in speaking. pending covid swab results for bed assignment

## 2020-06-01 NOTE — H&P ADULT - NSHPPHYSICALEXAM_GEN_ALL_CORE
Vital Signs Last 24 Hrs  T(C): 36.5 (01 Jun 2020 13:50), Max: 36.7 (01 Jun 2020 11:28)  T(F): 97.7 (01 Jun 2020 13:50), Max: 98 (01 Jun 2020 11:28)  HR: 81 (01 Jun 2020 13:50) (76 - 97)  BP: 132/77 (01 Jun 2020 13:50) (132/70 - 191/100)  BP(mean): --  RR: 16 (01 Jun 2020 13:50) (15 - 28)  SpO2: 99% (01 Jun 2020 13:50) (98% - 100%)      Physical Exam:  Constitutional: Awake / alert  HEENT: PERRLA, EOMI  Neck: Supple  Respiratory: Breath sounds are clear bilaterally  Cardiovascular: S1 and S2, regular rhythm  Gastrointestinal: Soft, NT/ND  Extremities:  no edema  Vascular: No carotid Bruit  Musculoskeletal: no joint swelling/tenderness, no abnormal movements  Skin: No rashes    Neurological Exam: GCS [id number]  HF: A x O x 3, appropriately interactive, normal affect, speech dysarthric, no aphasia or paraphasic errors. Naming /repetition intact.  CN: PERRL, EOMI, VFF, facial sensation normal, no NLFD, tongue midline. + Challenges with puffing of cheeks.  Shows Teeth / demonstrates smile without difficulty.  Pt c/o Left field blurry vision, No gaze preference.  No Neglect noted on exam.   Motor: No pronator drift, left Arm 4+/5,  LLE 4+/5.  Right side 5/5.  Sens: Intact to light touch  Reflexes: Symmetric and normal, downgoing toes b/l.  Pt with challenges in rapid left hand movements, F to Nose track LUE.  Right side Arm/Leg wnl.  Coord:  No FNFA, No dysmetria.  Pt with challenges in rapid left hand movements, F to Nose track LUE.  Right side Arm/Leg wnl.  Gait/Balance: Cannot test

## 2020-06-01 NOTE — ED ADULT NURSE NOTE - OBJECTIVE STATEMENT
pt BIBA from home for reports of sudden onset of slurred speech, difficulty speaking and blurry vision. minimal left side weakness but reports of numbness to left arm noted. pt awake and alert, states had an episode similar 1 month ago and was told she had a stroke and received TPA. code stroke activated at triage. pt with no chest pain, no SOB noted, afebrile, BS 84 on arrival to ED.

## 2020-06-01 NOTE — H&P ADULT - PROBLEM SELECTOR PLAN 3
CARDIAC:   - Goal SBP,  Keep sbp < 180 as TPA given in ED  - labetalol / Hydralazine prn to keep sbp < 180 after TPA dosing.  - Minor permissive HTN is acceptable.  - Echo TTE with bubble study. May review prior Echo with last admission  - Outpt and Inpatient Meds to be continued  - Continue Home Atorvastatin, review LDL / Lipid profile on 6/2 am labs CARDIAC:   - Goal SBP,  Keep sbp < 180 as TPA given in ED  - Calling Dr. Cyr - Cardio - was planning for ILR to r/o pAfib.  - labetalol / Hydralazine prn to keep sbp < 180 after TPA dosing.  - Minor permissive HTN is acceptable.  - Echo TTE with bubble study. May review prior Echo with last admission  - Outpt and Inpatient Meds to be continued  - Continue Home Atorvastatin, review LDL / Lipid profile on 6/2 am labs CARDIAC:   - Goal SBP,  Keep sbp < 180 as TPA given in ED  - Called Dr. Cyr - Cardio - was planning for ILR to r/o pAfib. Will eval 6/2  - labetalol / Hydralazine prn to keep sbp < 180 after TPA dosing.  - Minor permissive HTN is acceptable.  - Holding outpt BP meds for now, Continue with statin  - Echo TTE with bubble study May 2nd no PFO. EF 65%. No wall abnormality or AFib noted.   - Continue Home Atorvastatin, review LDL / Lipid profile on 6/2 am labs CARDIAC:   - Goal SBP,  Keep sbp < 180 as TPA given in ED  - Called Dr. Cyr - Cardio - was planning for ILR to r/o pAfib.   - 6/2 MEGHANN planned for tomm and ILR.  NPO x meds after MN.  - labetalol / Hydralazine prn to keep sbp < 180 after TPA dosing.  - Minor permissive HTN is acceptable.  - Holding outpt BP meds for now, Continue with statin  - Echo TTE with bubble study May 2nd no PFO. EF 65%. No wall abnormality or AFib noted.   - Continue Home Atorvastatin, review LDL / Lipid profile on 6/2 am labs

## 2020-06-01 NOTE — CHART NOTE - NSCHARTNOTEFT_GEN_A_CORE
[facility]                                        [facility] [place]                                 [facility]                                  [address]                                        [place], [place], [address]                                             [phone]          Responded to code stroke call.    I discussed with Dr Small.   The patient had a stroke on 5/2/2020 and received t-PA.   She is therefore excluded from t-PA (prior stroke within 3 months).   She is going for CT-A and CT-P to assess for large vessel occlusion and determine if she is a candidate for neuro-intervention.     I will see her once she has completed the studies.

## 2020-06-01 NOTE — H&P ADULT - REASON FOR ADMISSION
code stroke, Left weakness, Dysarthric speech  Dr. Vann PMD: [phone]  Dr. Mayco Cyr CARDIO: [phone]

## 2020-06-01 NOTE — CONSULT NOTE ADULT - SUBJECTIVE AND OBJECTIVE BOX
[facility]                                   [address] [place] [address]                                                      PHONE: [phone]                                                         FAX: [phone]  [email or web address]  ---------------------------------------------------------------------------------------------------------------------------------    HPI:     As you know, Alexandrea Medina is a 79-year-old female with a past medical history of hypertension, hyperlipidemia, known coronary artery disease status post PCI of the LAD, rheumatic fever as a child and vasovagal syncope who presents for preoperative cardiovascular assessment. She recently had a syncopal episode at [facility]. Based off the description of the event it sounds like a vasovagal episode. She was evaluated at [facility]. She underwent scanning and was found to have a large renal mass. She is planned to undergo nephrectomy in the near future.       She was recently at [facility] in May 2020. She was seen there for dysarthria. She was diagnosed with a CVA and given TPA with resolution of her symptoms.   She now presents again to [facility] ED with  slurred speech and blurry vision.  Pt had resolution of symptoms in the ER.  Pt denies chest pain, palps, sob.      PAST MEDICAL & SURGICAL HISTORY:  Rheumatic fever  HTN (hypertension)  Status post tonsillectomy      Biaxin (Muscle Pain; Joint Pain)  carvedilol (Unknown)  codeine (Faint)  Metoprolol Succinate ER (Unknown)      MEDICATIONS  (STANDING):  atorvastatin 40 milliGRAM(s) Oral at bedtime  folic acid 1 milliGRAM(s) Oral daily  lisinopril 20 milliGRAM(s) Oral daily  multivitamin 1 Tablet(s) Oral daily  pantoprazole    Tablet 40 milliGRAM(s) Oral before breakfast  polyethylene glycol 3350 17 Gram(s) Oral daily  sodium chloride 0.9%. 1000 milliLiter(s) (75 mL/Hr) IV Continuous <Continuous>    MEDICATIONS  (PRN):  ondansetron Injectable 4 milliGRAM(s) IV Push every 6 hours PRN Nausea and/or Vomiting  senna 2 Tablet(s) Oral at bedtime PRN Constipation      Family History: Pt denies hx of early cad, SCD, or congenital heart disease.      Social History:  Cigarettes:   former                 Alchohol:   no              Illicit Drug Abuse:  no    ROS:    Extensively Reviewed with pertinents as per HPI the remainder were negative.      Vital Signs Last 24 Hrs  T(C): 36.4 (01 Jun 2020 14:15), Max: 36.7 (01 Jun 2020 11:28)  T(F): 97.5 (01 Jun 2020 14:15), Max: 98 (01 Jun 2020 11:28)  HR: 78 (01 Jun 2020 14:15) (76 - 97)  BP: 126/79 (01 Jun 2020 14:15) (126/79 - 191/100)  BP(mean): --  RR: 16 (01 Jun 2020 14:15) (15 - 28)  SpO2: 100% (01 Jun 2020 14:15) (98% - 100%)  ICU Vital Signs Last 24 Hrs  ALEXANDREA MEDINA  I&O's Detail    I&O's Summary    Drug Dosing Weight  ALEXANDREA MEDINA      PHYSICAL EXAM:  General: Appears well developed, well nourished alert and cooperative.  HEENT: Head; normocephalic, atraumatic.  Eyes: Pupils reactive, cornea wnl.  Neck: Supple, no nodes adenopathy, no NVD or carotid bruit or thyromegaly.  CARDIOVASCULAR: Normal S1 and S2, No murmur, rub, gallop or lift.   LUNGS: No rales, rhonchi or wheeze. Normal breath sounds bilaterally.  ABDOMEN: Soft, nontender without mass or organomegaly. bowel sounds normoactive.  EXTREMITIES: No clubbing, cyanosis or edema. Distal pulses wnl.   SKIN: warm and dry with normal turgor.  NEURO: Alert/oriented x 3/normal motor exam. No pathologic reflexes.    PSYCH: normal affect.        LABS:                        13.2   5.90  )-----------( 170      ( 01 Jun 2020 12:12 )             39.4     06-01    126<L>  |  94<L>  |  17.0  ----------------------------<  85  4.6   |  23.0  |  0.68    Ca    8.6      01 Jun 2020 12:12    TPro  6.0<L>  /  Alb  3.6  /  TBili  1.0  /  DBili  x   /  AST  16  /  ALT  11  /  AlkPhos  62  06-01    ALEXANDREA MEDINA  CARDIAC MARKERS ( 01 Jun 2020 12:12 )  x     / <0.01 ng/mL / x     / x     / x          PT/INR - ( 01 Jun 2020 12:12 )   PT: 12.8 sec;   INR: 1.13 ratio         PTT - ( 01 Jun 2020 12:12 )  PTT:33.0 sec       Nuclear Stress Test 10/9/19:  Impressions:  EF 43%, a small severe mostly fixed defect involving the base to mid inferolateral and apical inferoseptum suggestive of previous MI, no clear evidence of ischemia, basal inferolateral, mid inferolateral and apical septal segments severely hypokinetic.     Echocardiogram 10/9/19:  Interpretations  The left ventricle is normal in size.  Mild anterolateral wall hypokinesis  Apical lateral akinesis.  Ejection Fraction = 50-55%.  Normal right ventricular dimensions and contractility.  Borderline left atrial enlargement.  There is mild to moderate mitral regurgitation.  There is mild tricuspid regurgitation.  RVSP is borderline elevated at 32 mmHg  Mild to moderate aortic regurgitation.  Trace to mild pulmonic valvular regurgitation.  Mild pericardial effusion is seen anteriorly in the parasternal windows.     Holter Monitor 1/14/2019:  Impressions:  NSR with sinus bradycardia (8 mins) and sinus tachycardia (2 mins)  Minimum 55 bpm (6:24am), maximum 130 bpm (11:34am), average 78 bpm  Rare APCs and couplets   Two episodes of 3 - 10 beat SVT at 113-121 bpm  Rare episodes of ventricular triplets at max rate of 141 bpm  No reports of symptoms     Echocardiogram 1/11/2019:  Interpretation:  The apical, apical septal, apical lateral, inferoapical and inferolateral walls appear to be markedly hypokinetic. No apical thrombus noted. LVEF is approx 40%. LVEF by 's is 42 mmHg. There is mild TR and RVSP is approx 40 mmHg.     Cardiac Cath Report 3/23/2018:  Impressions:  Severe LAD disease (positive IFR) and non obstructive LCX disease. The LAD was treated with PTCA/STENT (MELY-ROBBIE) with IVUS.      Carotid Duplex 3/8/2018:  Interpretation:  Bilaterally, minimal calcified plaque noted at the bifurcation - no significant flow disturbances demonstrated in the ICA; ECA is patent; vertebral artery flow is antegrade.     Last Labs (7/25/19): Chol 135, HDL 57, LDL 65, TG 58, BUN/Cr 20/0.86.       Assessment and Plan:  In summary, Alexandrea Medina is a 79-year-old female with a past medical history of hypertension, hyperlipidemia, known coronary artery disease status post PCI of the LAD, rheumatic fever as a child and vasovagal syncope who presents for preoperative cardiovascular assessment. She recently had a syncopal episode at [facility]. Based off the description of the event it sounds like a vasovagal episode. She was evaluated at [facility]. She underwent scanning and was found to have a large renal mass. She is planned to undergo nephrectomy in the near future.       She was recently at [facility] in May 2020. She was seen there for dysarthria. She was diagnosed with a CVA and given TPA with resolution of her symptoms.   She now presents again to [facility] ED with  slurred speech and blurry vision.  Pt had resolution of symptoms in the ER.  Pt denies chest pain, palps, sob.      TIA/CVA:  -tele  -anticoagulation/antiplatelet as per stroke team  -plan for MEGHANN and ILR implant tomorrow  -NPO after midnight  -continue remainder of home meds/dosages     Thank you for allowing [facility] to participate in the care of this patient.  Please feel free to call with any questions or concerns.

## 2020-06-01 NOTE — ED PROVIDER NOTE - PHYSICAL EXAMINATION
Constitutional - well-developed; well nourished. Head - NCAT. Airway patent. Eyes - PERRL. CV - RRR. no murmur. no edema. Pulm - CTAB. Abd - soft, nt. no rebound. no guarding. Neuro - A&Ox3. strength 4+/5 LLE. strength 5/5 other 3 extremities.  4. sensation intact x4. mild dysarthria. CN II-XII otherwise intact. Skin - No rash. MSK - normal ROM.

## 2020-06-01 NOTE — ED ADULT NURSE REASSESSMENT NOTE - NS ED NURSE REASSESS COMMENT FT1
tPA infusion complete. Pt in NAD, no bleeding noted, denies headache or dizziness. VS per flowsheet. pt verbalizes improved strength in left lower extremity and improvement in speech. safety maintained: stretcher locked in lowest position, side rails up, call bell in reach. awaiting ICU bed placement.

## 2020-06-01 NOTE — CONSULT NOTE ADULT - ASSESSMENT
The patient is a 79y Female with Speech difficulty and left sided weakness.     Stroke.   She had similar presentation 5/2/2020.  A reviewed the chart and MRI images.   There was no acute stroke seen on diffusion.   After review with stroke experts, she would technically be eligible for t-PA.  I spoke again with the ER attending and t-PA was ordered and is being given. (Delay in t-PA administration due to confusion regarding definition of "prior stroke within 3 months).     Admit ICU.   PT/OT  Speech eval  Hold antiplatelet and anticoagulation for 24 hrs.   Cardiology eval for MEGHANN and loop.     Hypertension  Permissive Hypertension for first 24 hrs.   Control blood pressure.     Case discussed with Dr Small (ER attending).

## 2020-06-01 NOTE — ED PROVIDER NOTE - NS ED ROS FT
No fever/chills, No photophobia/eye pain, No ear pain/sore throat/dysphagia, No chest pain/palpitations, no SOB/cough/wheeze/stridor, No abdominal pain, No N/V/D, no dysuria/frequency/discharge, No neck/back pain, no rash, +blurry vision. +slurred speech. +L leg heaviness

## 2020-06-01 NOTE — ED PROVIDER NOTE - CLINICAL SUMMARY MEDICAL DECISION MAKING FREE TEXT BOX
Pt with CVA symptoms. code stroke called. Pt not a candidate for tPA as patient meets absolute contraindication of having CVA within the past month. labs and imaging reviewed.  Pt with stroke symptoms. tPA given.  tPA delayed 2/2 initially being hypertensive on return from CT, difficulty IV access as first line for CT was not good for tPA and initially patient stated that she had a CVA 1 mo ago for which she received tPA; however, on review of visit patient received tPA but MRI was negative for CVA so case d/w Dr. Reyes who agreed with tPA as patient actually had no CVA on previous MRI.  no other contraindications to tPA at time of administration.

## 2020-06-01 NOTE — H&P ADULT - ASSESSMENT
Neuro ICU admit:    A/P: 80yo Right Handed F w/ pmHx HTN, HLD, Suspected pAfib? Suspected ([facility] - Cardio) , Cardiac Stents x 2, Recent suspected CVA vs TIA 5/2/2020 with speech difficulty and left sided weakness, MRI demonstrated no CVA.  Pt returns similar symptoms, TPA given.  Txr'd to ICU for monitoring.    GCS: E4V5M6  NIHSS: 3 points    Total Critical Care Time spent > 62 MINUTES IN EVALUATING PATIENT, MEDICAL RECORD, IMAGING STUDIES AND IMPLEMENTING CARE PLAN INITIATIVES TO PRESERVE OPTIMAL BRAIN FUNCTION WHILE LIMITING FURTHER NEUROLOGICAL INJURY AND OR NEURO-AXIS COLLAPSE

## 2020-06-01 NOTE — ED ADULT TRIAGE NOTE - CHIEF COMPLAINT QUOTE
c/o blurred vision, slurred speech , c/o weakness, left leg tingling, last known well 40 minutes (3365)

## 2020-06-01 NOTE — ED PROVIDER NOTE - PROGRESS NOTE DETAILS
Geovanny: Pt with medical necessity to receive ct with iv contrast prior to labs given emergent nature of possible CVA.

## 2020-06-01 NOTE — CONSULT NOTE ADULT - REASON FOR ADMISSION
code stroke, Left weakness, Dysarthric speech  Dr. Vann PMD: [phone]  Dr. Mayco Cyr CARDIO: [phone]

## 2020-06-01 NOTE — ED ADULT NURSE NOTE - CHIEF COMPLAINT QUOTE
c/o blurred vision, slurred speech , c/o weakness, left leg tingling, last known well 40 minutes (3775)

## 2020-06-01 NOTE — H&P ADULT - PROBLEM SELECTOR PLAN 4
RENAL:   - I/O: Strict, keep Euvolemic  - Aim for Na+ goals above 140  - Electrolyte supplement as needed  - IVF's  : NS @ 75cc/hr  GI/:  - Diet : clears ok, NPO for now  - Bowel Regiment with Senna / Miralax  HEME:  - H/H :  13/39  - Platelets 170  - continue to monitor will transfuse if H/H falls below 7 or there is evidence of active bleeding  ID:  - WBC  5.9  - Tmax  98  - Urinalysis (Pending) RENAL:   - I/O: Strict, keep Euvolemic  - Aim for Na+ goals above 140  - Electrolyte supplement as needed  - IVF's  : NS @ 75cc/hr    GI/:  - Diet : clears ok, NPO for now  - Bowel Regiment with Senna / Miralax    HEME:  - H/H :  13/39  - Platelets 170  - continue to monitor will transfuse if H/H falls below 7 or there is evidence of active bleeding    ID:  - WBC  5.9  - Tmax  98  - Urinalysis (Pending)

## 2020-06-01 NOTE — ED PROVIDER NOTE - OBJECTIVE STATEMENT
Pt is a 80 yo F co slurred speech and blurry vision.  PMHx significant for htn, cva.  Pt was admitted here 1 mo ago for CVA and was given tPA. Pt states that she went home and was doing well and her speech which was affected had returned to normal.  This morning around 9 AM she noticed that she was having blurry vision and slurred speech. Pt called her daughter who called EMS. Pt also states that her L leg feels heavy. no cp. no sob. no headache. Pt denies any other complaints. Pt is a 78 yo F co slurred speech and blurry vision.  PMHx significant for htn, cva.  Pt was admitted here 1 mo ago for CVA and was given tPA. Pt states that she went home and was doing well and her speech which was affected had returned to normal.  This morning at 910 AM she noticed that she was having blurry vision and slurred speech. Pt called her daughter who called EMS. Pt also states that her L leg feels heavy. no cp. no sob. no headache. Pt denies any other complaints.

## 2020-06-01 NOTE — H&P ADULT - HISTORY OF PRESENT ILLNESS
Neuro ICU admit:    80yo Right Handed F w/ pmHx HTN, HLD, Suspected pAfib? Suspected ([facility] - Cardio) , Cardiac Stents x 2, Recent suspected CVA vs TIA 5/2/2020 with speech difficulty and left sided weakness, at that time pt was given t-PA and her symptoms had improved.   Follow up MRI showed no infarct.   Pt returns to ED code stroke.  Pt with similar symptoms.  + Dysarthric speech / speech challenge execution.  Her last known well time was about 9:10 am.   Pt received TPA in ED after CTH, no LVO on CTA study.  Pt to be admitted to neuro ICU for post TPA management and care.        GCS: E4V5M6  NIHSS: 3 points    1A: Level of consciousness —> 0 = Alert; keenly responsive  1B: Ask month and age —> 0 = Both questions right  1C: 'Blink eyes' & 'squeeze hands' —> 0 = Performs both tasks  2: Horizontal extraocular movements —> 0 = Normal  3: Visual fields —> 0 = No visual loss  4: Facial palsy —> 0 = Normal symmetry  5A: Left arm motor drift —> 0 = No drift for 10 seconds  5B: Right arm motor drift —> 0 = No drift for 10 seconds  6A: Left leg motor drift —> 1 = Drift, but doesn't hit bed  6B: Right leg motor drift —> 0 = No drift for 5 seconds  7: Limb Ataxia —> 0 = No ataxia  8: Sensation —> 0 = Normal; no sensory loss  9: Language/aphasia —> 1 = Mild-moderate aphasia: some obvious changes, without significant limitation  10: Dysarthria —> 1 = Mild-moderate dysarthria: slurring but can be understood  11: Extinction/inattention —> 0 = No abnormality    PAST MEDICAL & SURGICAL HISTORY:  Rheumatic fever  Cardiac Stents x 2  HTN (hypertension)  Status post tonsillectomy    FAMILY HISTORY:  No pertinent family history in first degree relatives    Social Hx:  Tob Hx  -  EtOH Hx    -  Employment - Retired   Lives alone, Cared for  past 1 year.   passed July 2019.  Son / Daughter in Law are next of Kin :   Ladarius Son : [phone] (M)  |||  [phone]  Breonna D.In-Law: [phone] (M) |||    Evelina: Daughter [phone] (M)    Allergies  Biaxin (Muscle Pain; Joint Pain)  carvedilol (Unknown)  codeine (Faint / dizzy)  Metoprolol Succinate ER (Unknown)    MEDICATIONS  (Outpatient):  -Baby ASA 81mg  -Lisinopril 20mg Daily  -Atorvastatin 20mg Daily  -Carvedilol 6.25mg 2x Daily  -Co Enz Q10

## 2020-06-01 NOTE — ED ADULT NURSE REASSESSMENT NOTE - NS ED NURSE REASSESS COMMENT FT1
Patient with difficult IV access, requiring 2x Sono guided IV attempts for placement. MD Small bedside to administer tpA with MD Reyes

## 2020-06-01 NOTE — H&P ADULT - PROBLEM SELECTOR PLAN 1
Plan:  (Care plan below d/w Attending Intensivist)  NEURO:  - Neuro checks and vitals q1 hr  - HOB 30 degrees  - Immediate CTH if any change in neuro status  - MRI/A brain-carotids 6/2  - ASA / Plavix dosing p Post TPA CTH study to ensure no heme conversion  PULMONARY:  - RA  - MEENAKSHI  - Pt encouraged to cough / take deep breaths, use incentive spirometry  RENAL:   - I/O: Strict, keep Euvolemic  - Aim for Na+ goals above 140  - Electrolyte supplement as needed  - IVF's NS 75cc/hr

## 2020-06-01 NOTE — H&P ADULT - PROBLEM SELECTOR PLAN 5
ENDOCRINE:  - MEENAKSHI  - BG goals 140-180  - Electrolyte supplement as needed  - ISS if elevated BS  DVT/GI PPX:  - SCD'S bilaterally  - Serial Duplex  - Hold Lovenox / Heparin SQ dosing at this time, until Post TPA 24 hours.  - GI prophylaxis  - Caprini evaluated daily overall risk greater than 5.    Will determine daily chemical DVT prophylaxis goals ENDOCRINE:  - MEENAKSHI  - BG goals 140-180  - Electrolyte supplement as needed  - ISS if elevated BS    DVT/GI PPX:  - SCD'S bilaterally  - Serial Duplex as needed, No chemical ppx  - Hold Lovenox / Heparin SQ dosing at this time, until Post TPA 24 hours.  - GI prophylaxis w/ Protonix   - Caprini evaluated daily overall risk greater than 5.       Will determine daily chemical DVT prophylaxis goals

## 2020-06-01 NOTE — CONSULT NOTE ADULT - SUBJECTIVE AND OBJECTIVE BOX
[facility]                                        Neurology at [place]                                  Leigh Prather, & Anjum                                      [address]                                           [place], [place], [address]                                                [phone]        CC: Stroke.     HISTORY:  The patient is a 79y Female who presented on 5/2/2020 with speech difficulty and left sided weakness. She received t-PA and her symptoms had improved.   Follow up MRI showed no infarct.     She now presents with similar symptoms.   She reports that the speech had been normal until this morning when she noted marked difficulty again.   She also noted left sided weakness.   Her last known well time was about 9:10 am.     PAST MEDICAL & SURGICAL HISTORY:  Rheumatic fever  HTN (hypertension)  Status post tonsillectomy    MEDICATION PRIOR TO ADMISSION:  Coreg  Aspirin  Atorvastatin  Lisinopril.     Allergies  Biaxin (Muscle Pain; Joint Pain)  carvedilol (Unknown)  codeine (Faint)  Metoprolol Succinate ER (Unknown)    SOCIAL HISTORY:  Non smoker.     FAMILY HISTORY:  No pertinent family history in first degree relatives  Mother: No known history of stroke.   Father: No known history of stroke.   Siblings: No known history of stroke.     ROS:  Constitutional: The patient denies fevers or weight changes.  Neuro: As per HPI.  Eyes: Denies blurry vision.  Ears/nose/throat: Denies Tinnitus.   Cardiac: Denies chest pain. Denies palpitations.  Respiratory: Denies shortness of breath.  GI: Denies abdominal pain, nausea, or vomiting.  : Denies change in urinary pattern.  Integumentary: Denies rash.  Psych: Denies recent mood changes.  Heme: denies easy bleeding/bruising.    Exam:  Vital Signs Last 24 Hrs  T(C): 36.7 (01 Jun 2020 11:28), Max: 36.7 (01 Jun 2020 11:28)  T(F): 98 (01 Jun 2020 11:28), Max: 98 (01 Jun 2020 11:28)  HR: 97 (01 Jun 2020 11:40) (76 - 97)  BP: 191/100 (01 Jun 2020 11:40) (148/83 - 191/100)  RR: 18 (01 Jun 2020 11:40) (18 - 28)  SpO2: 100% (01 Jun 2020 11:40) (98% - 100%)  General: NAD.   Carotid bruits absent.     Mental status: The patient is awake, alert, and fully oriented. Speech is somewhat stuttering and dysarthric. Attention span is normal. Patient is aware of current events.     Cranial nerves: There is no papilledema (direct ophthalmoscope). Pupils react symmetrically to light. There is no visual field deficit to confrontation. Extraocular motion is full with no nystagmus. There is no ptosis. Facial sensation is intact. Facial musculature is symmetric. Palate elevates symmetrically. Tongue is midline.    Motor: There is normal bulk and tone.  Strength is 5/5 in the right arm and leg.   Strength is 5-/5 in the left arm and leg.    Sensation: Intact to light touch and pin.    Reflexes: 2+ throughout and plantar responses are flexor.    Cerebellar: There is no dysmetria on finger to nose testing.    [facility] SS:   Date: 6/1/2020  Time: 11:40 a  1a) Level of consciousness (0-3): 0  1b) Questions (0-2): 0  1c) Commands (0-2): 0  2  ) Gaze (0-2): 0  3  ) Visual field (0-3): 0  4  ) Facial palsy (0-3): 0  Motor  5a) Left arm (0-4): 1  5b) Right arm (0-4): 0  6a) Left leg (0-4): 1  6b) Right leg (0-4): 0  7  ) Ataxia (0-2): 0  Sensory  8  ) Sensory (0-2): 0  Speech  9  ) Language (0-3): 0  10) Dysarthria (0-2): 1  Extinction  11) Extinction/inattention (0-2): 0    Total score: 3    LABS:       Finger stick glucose: 84      RADIOLOGY   CT head images reviewed (and concur with report): There is no acute pathology.

## 2020-06-01 NOTE — H&P ADULT - NSHPLABSRESULTS_GEN_ALL_CORE
Labs:                        13.2   5.90  )-----------( 170      ( 01 Jun 2020 12:12 )             39.4     06-01    126<L>  |  94<L>  |  17.0  ----------------------------<  85  4.6   |  23.0  |  0.68    Ca    8.6      01 Jun 2020 12:12  TPro  6.0<L>  /  Alb  3.6  /  TBili  1.0  /  DBili  x   /  AST  16  /  ALT  11  /  AlkPhos  62  06-01      05-03 Chol 128 LDL 57 HDL 58 Trig 65  PT/INR - ( 01 Jun 2020 12:12 )   PT: 12.8 sec;   INR: 1.13 ratio    PTT - ( 01 Jun 2020 12:12 )  PTT:33.0 sec    Radiology report:  - Xray Chest  (P)  - Echo ordered for 6/2/20  - CT with contrast / CTA: IMPRESSION:   CT angiography neck: No hemodynamically significant stenosis of the bilateral cervical ICAs using NASCET criteria.  Patent vertebral arteries.  No evidence of vascular dissection.  CT angiography brain: No large vessel occlusion. No evidence of aneurysm.  CT perfusion:  No evidence of core infarct.

## 2020-06-01 NOTE — CHART NOTE - NSCHARTNOTEFT_GEN_A_CORE
Patient has an estimated Caprini Score of greater than 5.  However, the patient's unique clinical situation will be addressed in an individual manner to determine appropriate anticoagulation treatment, if any.  At this time no chemical DVT prophylaxis. Mechancial DVT prophylaxis is permitted.

## 2020-06-01 NOTE — H&P ADULT - ATTENDING COMMENTS
I spent 45 minutes of critical care time examining patient, reviewing vitals, labs, medications, imaging and discussing with the team goals of care, management plan, which is outlined above.  Patient is at high risk for deterioration or death due to cerebral bleed, brain compression, seizure, cardioresp failure. Pt is noted to be hyponatremic, unclear etiology, meds reviewed, no signs of dehydration.  Repeat BMP, + hypoNa w/up ordered; if confirmed, switch to 2% @50, with goal of correction by 8-10 for the first 24 hours.  Also, please, restart home meds, incl. BB/ACEi.  LDL <70, on Lipitor 80 at home, decreased to 20 daily.  Rest as above.    I spent 45 minutes of critical care time examining patient, reviewing vitals, labs, medications, imaging and discussing with the team goals of care, management plan, which is outlined above.  Patient is at high risk for deterioration or death due to cerebral bleed, brain compression, seizure, cardioresp failure.

## 2020-06-01 NOTE — H&P ADULT - NSHPLANGLIMITEDENGLISH_GEN_A_CORE
No Appropriations Act, this Virtual Visit was conducted with patient's (and/or legal guardian's) consent, to reduce the patient's risk of exposure to COVID-19 and provide necessary medical care. The patient (and/or legal guardian) has also been advised to contact this office for worsening conditions or problems, and seek emergency medical treatment and/or call 911 if deemed necessary. Patient identification was verified at the start of the visit: Yes    Total time spent on this encounter: Not billed by time    Services were provided through a video synchronous discussion virtually to substitute for in-person clinic visit. Patient was located at her home, provider located in office. [facility]. Yoel Ralph MD    . [place] [address] INTERNAL MEDICINE  [address]  Dept: [phone]  Dept Fax: 63 : [phone]

## 2020-06-02 LAB
ANION GAP SERPL CALC-SCNC: 12 MMOL/L — SIGNIFICANT CHANGE UP (ref 5–17)
BUN SERPL-MCNC: 17 MG/DL — SIGNIFICANT CHANGE UP (ref 8–20)
CALCIUM SERPL-MCNC: 8.6 MG/DL — SIGNIFICANT CHANGE UP (ref 8.6–10.2)
CHLORIDE SERPL-SCNC: 104 MMOL/L — SIGNIFICANT CHANGE UP (ref 98–107)
CHOLEST SERPL-MCNC: 117 MG/DL — SIGNIFICANT CHANGE UP (ref 110–199)
CO2 SERPL-SCNC: 22 MMOL/L — SIGNIFICANT CHANGE UP (ref 22–29)
CREAT SERPL-MCNC: 0.83 MG/DL — SIGNIFICANT CHANGE UP (ref 0.5–1.3)
GLUCOSE SERPL-MCNC: 101 MG/DL — HIGH (ref 70–99)
HCT VFR BLD CALC: 39.4 % — SIGNIFICANT CHANGE UP (ref 34.5–45)
HDLC SERPL-MCNC: 58 MG/DL — SIGNIFICANT CHANGE UP
HGB BLD-MCNC: 13.3 G/DL — SIGNIFICANT CHANGE UP (ref 11.5–15.5)
LIPID PNL WITH DIRECT LDL SERPL: 47 MG/DL — SIGNIFICANT CHANGE UP
MAGNESIUM SERPL-MCNC: 2 MG/DL — SIGNIFICANT CHANGE UP (ref 1.6–2.6)
MCHC RBC-ENTMCNC: 29.6 PG — SIGNIFICANT CHANGE UP (ref 27–34)
MCHC RBC-ENTMCNC: 33.8 GM/DL — SIGNIFICANT CHANGE UP (ref 32–36)
MCV RBC AUTO: 87.8 FL — SIGNIFICANT CHANGE UP (ref 80–100)
PHOSPHATE SERPL-MCNC: 4.3 MG/DL — SIGNIFICANT CHANGE UP (ref 2.4–4.7)
PLATELET # BLD AUTO: 183 K/UL — SIGNIFICANT CHANGE UP (ref 150–400)
POTASSIUM SERPL-MCNC: 4.2 MMOL/L — SIGNIFICANT CHANGE UP (ref 3.5–5.3)
POTASSIUM SERPL-SCNC: 4.2 MMOL/L — SIGNIFICANT CHANGE UP (ref 3.5–5.3)
RBC # BLD: 4.49 M/UL — SIGNIFICANT CHANGE UP (ref 3.8–5.2)
RBC # FLD: 12.2 % — SIGNIFICANT CHANGE UP (ref 10.3–14.5)
SODIUM SERPL-SCNC: 138 MMOL/L — SIGNIFICANT CHANGE UP (ref 135–145)
TOTAL CHOLESTEROL/HDL RATIO MEASUREMENT: 2 RATIO — LOW (ref 3.3–7.1)
TRIGL SERPL-MCNC: 58 MG/DL — SIGNIFICANT CHANGE UP (ref 10–200)
UUN UR-MCNC: 311 MG/DL — SIGNIFICANT CHANGE UP
WBC # BLD: 6.09 K/UL — SIGNIFICANT CHANGE UP (ref 3.8–10.5)
WBC # FLD AUTO: 6.09 K/UL — SIGNIFICANT CHANGE UP (ref 3.8–10.5)

## 2020-06-02 PROCEDURE — 99291 CRITICAL CARE FIRST HOUR: CPT

## 2020-06-02 PROCEDURE — 93010 ELECTROCARDIOGRAM REPORT: CPT

## 2020-06-02 PROCEDURE — 70450 CT HEAD/BRAIN W/O DYE: CPT | Mod: 26

## 2020-06-02 PROCEDURE — 99233 SBSQ HOSP IP/OBS HIGH 50: CPT

## 2020-06-02 PROCEDURE — 70551 MRI BRAIN STEM W/O DYE: CPT | Mod: 26

## 2020-06-02 RX ORDER — ASPIRIN/CALCIUM CARB/MAGNESIUM 324 MG
81 TABLET ORAL DAILY
Refills: 0 | Status: DISCONTINUED | OUTPATIENT
Start: 2020-06-02 | End: 2020-06-03

## 2020-06-02 RX ORDER — CEFAZOLIN SODIUM 1 G
2000 VIAL (EA) INJECTION ONCE
Refills: 0 | Status: COMPLETED | OUTPATIENT
Start: 2020-06-02 | End: 2020-06-02

## 2020-06-02 RX ADMIN — CARVEDILOL PHOSPHATE 6.25 MILLIGRAM(S): 80 CAPSULE, EXTENDED RELEASE ORAL at 06:04

## 2020-06-02 RX ADMIN — ONDANSETRON 4 MILLIGRAM(S): 8 TABLET, FILM COATED ORAL at 04:13

## 2020-06-02 RX ADMIN — Medication 1 MILLIGRAM(S): at 23:03

## 2020-06-02 RX ADMIN — Medication 81 MILLIGRAM(S): at 23:03

## 2020-06-02 RX ADMIN — SODIUM CHLORIDE 75 MILLILITER(S): 9 INJECTION INTRAMUSCULAR; INTRAVENOUS; SUBCUTANEOUS at 06:03

## 2020-06-02 RX ADMIN — ATORVASTATIN CALCIUM 20 MILLIGRAM(S): 80 TABLET, FILM COATED ORAL at 23:03

## 2020-06-02 RX ADMIN — Medication 1 TABLET(S): at 23:03

## 2020-06-02 RX ADMIN — Medication 100 MILLIGRAM(S): at 19:01

## 2020-06-02 RX ADMIN — LISINOPRIL 20 MILLIGRAM(S): 2.5 TABLET ORAL at 06:03

## 2020-06-02 RX ADMIN — CARVEDILOL PHOSPHATE 6.25 MILLIGRAM(S): 80 CAPSULE, EXTENDED RELEASE ORAL at 19:03

## 2020-06-02 RX ADMIN — PANTOPRAZOLE SODIUM 40 MILLIGRAM(S): 20 TABLET, DELAYED RELEASE ORAL at 06:03

## 2020-06-02 NOTE — PROGRESS NOTE ADULT - ASSESSMENT
79y Female with Speech difficulty and left sided weakness.     Stroke.   She had similar presentation 5/2/2020.  Now status post t-PA with no complications.   Mobilize with physical therapy.   Resume antiplatelet after 24 hrs from t-PA.  LDL: 47  Goal: < 70  Statin.   MEGHANN/Loop.     Hypertension.   Control blood pressure.     Case discussed with Neuro-ICU team. (Dr Troncoso attending).

## 2020-06-02 NOTE — PROGRESS NOTE ADULT - SUBJECTIVE AND OBJECTIVE BOX
Transfer Note/ Hospitalist acceptance Note    Brief Hospital Course:    80yo F w/ PMHx HTN, HLD, Suspected pAfib? ([facility] - Cardio) , Rheumatic Fever as a child, CAD with Cardiac Stents x 2, Recent suspected CVA vs TIA 5/2/2020 with speech difficulty and left sided weakness, at that time pt was given t-PA and her symptoms resolved with follow up MRI showing no infarct.   Patient now returning with similar symptoms this admission predominately Dysarthric speech/Aphasia. Pt was a code stroke in the ED and received TPA in ED after no acute infarct or hemorrhage was found in the CTH. Furthermore no hemodynamically significant Large vessel Occlusion on CTA study of the head and neck. Patient with resolution of speech symptoms in the ED s/p  tPA and was then transferred to the Neuro ICU for post tPA monitoring. Patient also evaluated by Cardiology ([place]) and was taken for MEGHANN with no obvious cardiac or thoracic source of emboli. MEGHANN also notable for preserved EF of 55-60% and mild to moderate aortic and tricuspid regurgitation. Repeat CT head 24 hours post t-PA without evidence of acute hemorrhage. patient no longer requiring ICU level of care and to be downgraded to medical floors. Plan if for ILR later today given concern for cardiac arrhythmia in view of recurrent CVA.      Patient seen and examined at bedside, No acute overnight events except as mentioned above. Patient states her speech ahs improved completely as well as her strength in her LUE. She still feels there is slight weakness in her LLE. Offers no other complaints   Patient not ambulating, tolerating oral diet, voiding and admits to moving her bowels without issue.    ROS: Denies fever, chest pain, SOB, abdominal pain, diarrhea, constipation, calf pain.    VS:   Vital Signs Last 24 Hrs  T(C): 36.6 (02 Jun 2020 17:00), Max: 37.2 (02 Jun 2020 12:27)  T(F): 97.8 (02 Jun 2020 17:00), Max: 98.9 (02 Jun 2020 12:27)  HR: 80 (02 Jun 2020 17:00) (67 - 100)  BP: 155/82 (02 Jun 2020 17:00) (92/50 - 168/77)  BP(mean): 81 (02 Jun 2020 12:00) (59 - 108)  RR: 20 (02 Jun 2020 17:00) (12 - 23)  SpO2: 97% (02 Jun 2020 17:00) (95% - 100%)      Physical Exam:   Gen: NAD  HEENT: NCAT, EOMI, PERRLA,   CVS: RRR, +S1/S2, no murmurs, rubs or gallops appreciated  Lungs: CTAB, no wheeze, rales, rhonchi  Abdomen: +BS, soft, ND, NT. no palpable flank tenderness, no CVA tenderness  Ext: No cyanosis, edema or calf tenderness  Neuro: AAOx3, CN gorssly intact, no notable dysarthria or aphasia able to converse in fulls sentences without issues. 5/5 strength in right side and LUE, 4/5 in LLE. Sensation grossly intact.     Labs:                        13.3   6.09  )-----------( 183      ( 02 Jun 2020 05:14 )             39.4   06-02    138  |  104  |  17.0  ----------------------------<  101<H>  4.2   |  22.0  |  0.83    Ca    8.6      02 Jun 2020 05:14  Phos  4.3     06-02  Mg     2.0     06-02    TPro  6.0<L>  /  Alb  3.6  /  TBili  1.0  /  DBili  x   /  AST  16  /  ALT  11  /  AlkPhos  62  06-01 06-01 @ 07:01  -  06-02 @ 07:00  --------------------------------------------------------  IN: 900 mL / OUT: 800 mL / NET: 100 mL    06-02 @ 07:01  -  06-02 @ 17:27  --------------------------------------------------------  IN: 375 mL / OUT: 1000 mL / NET: -625 mL      Radiology:  < from: CT Head No Cont (06.02.20 @ 13:38) >  INTERPRETATION:      CT head without IV contrast        CLINICAL INFORMATION: Status post TPA    TECHNIQUE: Contiguous axial 5 mm sections were obtained through the head. Sagittal and coronal 2-D reformatted images were also obtained.   This scan was performed using automatic exposure control (radiation dose reduction software) to obtain a diagnostic image quality scan with patient dose as low as reasonably achievable.     FINDINGS:   CT dated 06/01/2020 available for review.    The brain demonstrates mild periventricular white matter ischemia.   No acute cerebral cortical infarct is seen.  No intracranial hemorrhage is found.  No mass effect is found in the brain.      The ventricles, sulci and basal cisterns appear unremarkable.    The orbits are unremarkable.  The paranasal sinuses are clear.  The nasal cavity appears intact.  The nasopharynx is symmetric.  The central skull base, petrous temporal bones and calvarium remain intact.      IMPRESSION:   Mild periventricular white matter ischemia.      Medications:  MEDICATIONS  (STANDING):  aspirin  chewable 81 milliGRAM(s) Oral daily  atorvastatin 20 milliGRAM(s) Oral at bedtime  carvedilol 6.25 milliGRAM(s) Oral every 12 hours  folic acid 1 milliGRAM(s) Oral daily  lisinopril 20 milliGRAM(s) Oral daily  multivitamin 1 Tablet(s) Oral daily  pantoprazole    Tablet 40 milliGRAM(s) Oral before breakfast  polyethylene glycol 3350 17 Gram(s) Oral daily  sodium chloride 0.9%. 1000 milliLiter(s) (75 mL/Hr) IV Continuous <Continuous>    MEDICATIONS  (PRN):  ondansetron Injectable 4 milliGRAM(s) IV Push every 6 hours PRN Nausea and/or Vomiting  senna 2 Tablet(s) Oral at bedtime PRN Constipation

## 2020-06-02 NOTE — PROGRESS NOTE ADULT - ASSESSMENT
ASSESSMENT/PLAN:  78 yo F with recurrent TIA, s/p tPA administration 6/2/2020 @ noon. Clinically improving; no LVO on CTA, MRI with no acute findings.  H/O rheumatic fever.  CAD/stents x 2.  HTN.      NEURO:  cont neurocheck q1hr for now  stability CTh in PM  cont statin  stroke w/up done a month ago  Activity: [x] mobilize as tolerated [] Bedrest [x] PT     PULM: no issues, incentive spirometry    CV:  SBP goal 100-180  Cardiology is planning ILP, going today (concern for cardiac arrhythmia in view of recurrent TIA)    RENAL:  Fluids: IVF @75 as NPO    GI:  Diet: NPO for a cardiac procedure  Bowel regimen [] colace [] senna    ENDO:   Goal euglycemia (-180), ISS    HEME/ONC:  VTE prophylaxis: [x] SCDs [x] hold chemoprophylaxis due to: within 24hr window post- tPA    ID: afebrile    CODE STATUS:  [x] Full Code     DISPOSITION:  ICU for now, possible transfer in PM if radiographically and clinically stable.    [x] Patient is at high risk of neurologic deterioration/death due to: cerebral bleeding/swelling, brain compression, cardioresp failure.

## 2020-06-02 NOTE — PROGRESS NOTE ADULT - SUBJECTIVE AND OBJECTIVE BOX
Patient seen today in hold area. Underwent successful loop recorder insertion without complication  Loop Recorder Incision Care:     - Remove the plastic and gauze dressing after 24 hours.   - Do not touch the incision until it is completely healed.   - There is Dermabond (skin glue) on your incision, which will start to flake off on its own over the next 2-3 weeks. Do not pick at or peal off the Dermabond.   - Do not apply soaps, creams, lotions, ointments or powders to the incision until it is completely healed.  - You should call the doctor if you notice redness, drainage, swelling, increased tenderness, hot sensation around the  incision, bleeding or incision edges pulling apart.  - Follow up with Dr. Madden in one weeks time.

## 2020-06-02 NOTE — PROGRESS NOTE ADULT - SUBJECTIVE AND OBJECTIVE BOX
80yo Right Handed F w/ pmHx HTN, HLD, Suspected pAfib? Suspected ([facility] - Cardio) , Cardiac Stents x 2, Recent suspected CVA vs TIA 5/2/2020 with speech difficulty and left sided weakness, at that time pt was given t-PA and her symptoms had improved.   Follow up MRI showed no infarct.   Pt returns to ED code stroke.  Pt with similar symptoms.  + Dysarthric speech / speech challenge execution.  Her last known well time was about 9:10 am.   Pt received TPA in ED after CTH, no LVO on CTA study.    Scores on admission: NIH 3, GCS 15.    24 hr events: none reported. Clinically improved.    ROS: c/o mild LLE weakness as she "has to concentrate to keep it strong". Other systems negative.    EXAM:  NAD, cooperative, calm.  Neuro AAOx4, FC, EO, EOMI, PERRLA, visual fields intact, face symmetric, tongue midline, motor - no drift noted, NAIR 5/5 but LLE seems slightly weaker. NIH 0.  Chest CTAB.  S1S2 present.  Abd soft.  No peripheral swelling.

## 2020-06-02 NOTE — PROGRESS NOTE ADULT - ASSESSMENT
78yo F w/ PMHx HTN, HLD, Suspected pAfib? ([facility] - Cardio) , Rheumatic Fever as a child, CAD with Cardiac Stents x 2, Recent suspected CVA vs TIA 5/2/2020 with speech difficulty and left sided weakness, at that time pt was given t-PA and her symptoms resolved with follow up MRI showing no infarct.   Patient now returning with similar symptoms this admission predominately Dysarthric speech/Aphasia. Pt was a code stroke in the ED and received TPA in ED after no acute infarct or hemorrhage was found in the CTH. Furthermore no hemodynamically significant Large vessel Occlusion on CTA study of the head and neck. Patient with resolution of speech symptoms in the ED s/p  tPA and was then transferred to the Neuro ICU for post tPA monitoring. Patient also evaluated by Cardiology ([place]) and was taken for MEGHANN with no obvious cardiac or thoracic source of emboli. MEGHANN also notable for preserved EF of 55-60% and mild to moderate aortic and tricuspid regurgitation. Repeat CT head 24 hours post t-PA without evidence of acute hemorrhage. patient no longer requiring ICU level of care and to be downgraded to medical floors. Plan if for ILR later today given concern for cardiac arrhythmia in view of recurrent CVA.    Recurrent CVA/TIA  Downgrade to Telemetry   Patient's symptoms largely resolved, only remaining deficit is mild LLE weakness  LDL at goal (47). HbA1c 5 1 month ago   CTH 24 hours post-tPA  without evidence of acute hemorrhage  MEGHANN with no obvious cardiac or thoracic source of emboli. MEGHANN also notable for preserved EF of 55-60% and mild to moderate aortic and tricuspid regurgitation  Plan if for ILR later today given concern for cardiac arrhythmia in view of recurrent CVA.  patient tolerating DASH/TLC diet  c/w ASA and Atorvastatin as ordered  PPI as well for GI protection  PT/OT eval pending for dispo    CAD s/p Cardiac Stents x 2  c/w ASA stain as above  ?Allergic to Metoprolol, HR controlled 60-80s  c/w carvedilol 6.125 mg q12 hours  [facility] following as above    HTN  BP moderately controlled  most recent 155/82  c/w home Lisinopril 20mg for now  monitor BP    HLD  c/w statin as above    DVT ppx: SCD boots for, given plan for proecdure today    Dispo: Likely DC tomorrow pending PT/OT eval and patient remaining stable post-procedure 78yo F w/ PMHx HTN, HLD, Suspected pAfib? ([facility] - Cardio) , Rheumatic Fever as a child, CAD with Cardiac Stents x 2, Recent suspected CVA vs TIA 5/2/2020 with speech difficulty and left sided weakness, at that time pt was given t-PA and her symptoms resolved with follow up MRI showing no infarct.   Patient now returning with similar symptoms this admission predominately Dysarthric speech/Aphasia. Pt was a code stroke in the ED and received TPA in ED after no acute infarct or hemorrhage was found in the CTH. Furthermore no hemodynamically significant Large vessel Occlusion on CTA study of the head and neck. Patient with resolution of speech symptoms in the ED s/p  tPA and was then transferred to the Neuro ICU for post tPA monitoring. Patient also evaluated by Cardiology ([place]) and was taken for MEGHANN with no obvious cardiac or thoracic source of emboli. MEGHANN also notable for preserved EF of 55-60% and mild to moderate aortic and tricuspid regurgitation. Repeat CT head 24 hours post t-PA without evidence of acute hemorrhage. patient no longer requiring ICU level of care and to be downgraded to medical floors. Plan if for ILR later today given concern for cardiac arrhythmia in view of recurrent CVA.    Recurrent CVA/TIA  Downgrade to Telemetry   Patient's symptoms largely resolved, only remaining deficit is mild LLE weakness  LDL at goal (47). HbA1c 5 1 month ago   CTH 24 hours post-tPA  without evidence of acute hemorrhage  MEGHANN with no obvious cardiac or thoracic source of emboli. MEGHANN also notable for preserved EF of 55-60% and mild to moderate aortic and tricuspid regurgitation  Plan if for ILR later today given concern for cardiac arrhythmia in view of recurrent CVA.  patient tolerating DASH/TLC diet  neurochecks q4 hours for now  c/w ASA and Atorvastatin as ordered  PPI as well for GI protection  PT/OT eval pending for dispo    CAD s/p Cardiac Stents x 2  c/w ASA stain as above  ?Allergic to Metoprolol, HR controlled 60-80s  c/w carvedilol 6.125 mg q12 hours  [facility] following as above    HTN  BP moderately controlled  most recent 155/82  c/w home Lisinopril 20mg for now  monitor BP    HLD  c/w statin as above    DVT ppx: SCD boots for, given plan for proecdure today    Dispo: Likely DC tomorrow pending PT/OT eval and patient remaining stable post-procedure

## 2020-06-02 NOTE — PROGRESS NOTE ADULT - ATTENDING COMMENTS
I have personally seen and examined patient.  Above note reviewed and discussed with PA, modified where appropriate. Pt is s/ TPA > 24 hrs ago. MRI brain -ve. Pt complaining of tingling down the throat. s/p MEGHANN now awaiting ILR. ASA resumed. c/w statin. PT eval ordered. D/c in am I have personally seen and examined patient.  Above note reviewed and discussed with PA, modified where appropriate. Pt is s/ TPA > 24 hrs ago. MRI brain -ve. NIHSS 0 currently. Pt complaining of tingling down the throat. s/p MEGHANN now awaiting ILR. ASA resumed. c/w statin. PT eval ordered. D/c in am

## 2020-06-02 NOTE — PROGRESS NOTE ADULT - SUBJECTIVE AND OBJECTIVE BOX
Pt presents for MEGHANN   No acute distress     T(C): 36.6 (06-02-20 @ 04:00), Max: 36.9 (06-02-20 @ 00:00)  HR: 82 (06-02-20 @ 13:52) (67 - 100)  BP: 168/77 (06-02-20 @ 13:52) (92/50 - 168/77)  RR: 16 (06-02-20 @ 13:52) (12 - 23)  SpO2: 100% (06-02-20 @ 13:52) (95% - 100%)  atorvastatin 20 milliGRAM(s) Oral at bedtime  carvedilol 6.25 milliGRAM(s) Oral every 12 hours  folic acid 1 milliGRAM(s) Oral daily  lisinopril 20 milliGRAM(s) Oral daily  multivitamin 1 Tablet(s) Oral daily  ondansetron Injectable 4 milliGRAM(s) IV Push every 6 hours PRN  pantoprazole    Tablet 40 milliGRAM(s) Oral before breakfast  polyethylene glycol 3350 17 Gram(s) Oral daily  senna 2 Tablet(s) Oral at bedtime PRN  sodium chloride 0.9%. 1000 milliLiter(s) IV Continuous <Continuous>    EKG   ASA/ Mallampati accessed by anesthesia         Awake/ alert no deficits    Resp clear CV s1s2  - M/R/G     - Npo except medications  - Medications reviewed   -- Labs reviewed   - Pt verbalized understanding of procedure

## 2020-06-02 NOTE — PROGRESS NOTE ADULT - SUBJECTIVE AND OBJECTIVE BOX
[facility]                                   [address] [place] [address]                                                      PHONE: [phone]                                                         FAX: [phone]  --------------------------------------------------------------------------------------------------------    MEGHANN performed     1. Left ventricular ejection fraction, by visual estimation, is 55 to 60%.   2. Normal global left ventricular systolic function.   3. Normal right ventricular size and function.   4. Mild-moderate tricuspid regurgitation.   5. Mild to moderate aortic regurgitation.   6. No obvious cardiac or thoracic source of emboli.    Full report to follow    Rec: ILR today

## 2020-06-02 NOTE — PROGRESS NOTE ADULT - SUBJECTIVE AND OBJECTIVE BOX
[facility]                                        Neurology at [place]                                 Leigh Prather, & Anjum                                  [address]                                        [place], [place], [address]                                             [phone]        CC:     HPI:   The patient is a 79y Female who presented on 5/2/2020 with speech difficulty and left sided weakness. She received t-PA and her symptoms had improved.   Follow up MRI showed no infarct.     She now presents with similar symptoms.   She reports that the speech had been normal until the morning of arrival when she noted marked difficulty again.   She also noted left sided weakness.   Her last known well time was about 9:10 am.   She received t-PA and was admitted to the neuro-ICU.     Interim history:  Now in Neuro-ICU (currently on 4 CSD).   Feels much improved.     ROS:   Denies headache or dizziness.  Denies chest pain.  Denies shortness of breath.    MEDICATIONS  (STANDING):  atorvastatin 20 milliGRAM(s) Oral at bedtime  carvedilol 6.25 milliGRAM(s) Oral every 12 hours  folic acid 1 milliGRAM(s) Oral daily  lisinopril 20 milliGRAM(s) Oral daily  multivitamin 1 Tablet(s) Oral daily  pantoprazole    Tablet 40 milliGRAM(s) Oral before breakfast  polyethylene glycol 3350 17 Gram(s) Oral daily  sodium chloride 0.9%. 1000 milliLiter(s) (75 mL/Hr) IV Continuous <Continuous>      Vital Signs Last 24 Hrs  T(C): 36.6 (02 Jun 2020 04:00), Max: 36.9 (02 Jun 2020 00:00)  T(F): 97.9 (02 Jun 2020 04:00), Max: 98.5 (02 Jun 2020 00:00)  HR: 67 (02 Jun 2020 10:00) (67 - 98)  BP: 129/58 (02 Jun 2020 10:00) (92/50 - 167/83)  BP(mean): 76 (02 Jun 2020 10:00) (59 - 108)  RR: 20 (02 Jun 2020 10:00) (12 - 23)  SpO2: 96% (02 Jun 2020 10:00) (95% - 100%)    Detailed Neurologic Exam:    Mental status: The patient is awake and alert. There is no aphasia. There is no dysarthria.     Cranial nerves: Pupils equal and react symmetrically to light. There is no visual field deficit to threat. Extraocular motion is full with no nystagmus. There is no ptosis. Facial sensation is intact. Facial musculature is symmetric. Palate elevates symmetrically. Tongue is midline.    Motor: There is normal bulk and tone.  There is no tremor.  Strength grossly 5/5 bilaterally. There is mild downward drift of left leg after a few seconds.     Sensation: Grossly intact to light touch and pin. There is no extinction to double simultaneous stimulation.     Reflexes: 2+ throughout and plantar responses are flexor.    Cerebellar: No dysmetria on finger nose testing.    NIH SS score now: 1 (for left leg).     Labs:     06-02    138  |  104  |  17.0  ----------------------------<  101<H>  4.2   |  22.0  |  0.83    Ca    8.6      02 Jun 2020 05:14  Phos  4.3     06-02  Mg     2.0     06-02    TPro  6.0<L>  /  Alb  3.6  /  TBili  1.0  /  DBili  x   /  AST  16  /  ALT  11  /  AlkPhos  62  06-01                            13.3   6.09  )-----------( 183      ( 02 Jun 2020 05:14 )             39.4       Rad:   MRI brain images reviewed (and concur with report): There is no acute pathology.

## 2020-06-03 ENCOUNTER — TRANSCRIPTION ENCOUNTER (OUTPATIENT)
Age: 80
End: 2020-06-03

## 2020-06-03 VITALS
OXYGEN SATURATION: 96 % | TEMPERATURE: 98 F | SYSTOLIC BLOOD PRESSURE: 115 MMHG | DIASTOLIC BLOOD PRESSURE: 80 MMHG | HEART RATE: 76 BPM | RESPIRATION RATE: 18 BRPM

## 2020-06-03 PROCEDURE — 84133 ASSAY OF URINE POTASSIUM: CPT

## 2020-06-03 PROCEDURE — 84484 ASSAY OF TROPONIN QUANT: CPT

## 2020-06-03 PROCEDURE — 0042T: CPT

## 2020-06-03 PROCEDURE — 85610 PROTHROMBIN TIME: CPT

## 2020-06-03 PROCEDURE — 84300 ASSAY OF URINE SODIUM: CPT

## 2020-06-03 PROCEDURE — 93320 DOPPLER ECHO COMPLETE: CPT

## 2020-06-03 PROCEDURE — 81001 URINALYSIS AUTO W/SCOPE: CPT

## 2020-06-03 PROCEDURE — 84100 ASSAY OF PHOSPHORUS: CPT

## 2020-06-03 PROCEDURE — 92523 SPEECH SOUND LANG COMPREHEN: CPT

## 2020-06-03 PROCEDURE — 36415 COLL VENOUS BLD VENIPUNCTURE: CPT

## 2020-06-03 PROCEDURE — 70551 MRI BRAIN STEM W/O DYE: CPT

## 2020-06-03 PROCEDURE — 84443 ASSAY THYROID STIM HORMONE: CPT

## 2020-06-03 PROCEDURE — 96374 THER/PROPH/DIAG INJ IV PUSH: CPT | Mod: XU

## 2020-06-03 PROCEDURE — 70450 CT HEAD/BRAIN W/O DYE: CPT

## 2020-06-03 PROCEDURE — 83935 ASSAY OF URINE OSMOLALITY: CPT

## 2020-06-03 PROCEDURE — 83930 ASSAY OF BLOOD OSMOLALITY: CPT

## 2020-06-03 PROCEDURE — 87635 SARS-COV-2 COVID-19 AMP PRB: CPT

## 2020-06-03 PROCEDURE — 86901 BLOOD TYPING SEROLOGIC RH(D): CPT

## 2020-06-03 PROCEDURE — 93005 ELECTROCARDIOGRAM TRACING: CPT

## 2020-06-03 PROCEDURE — 83735 ASSAY OF MAGNESIUM: CPT

## 2020-06-03 PROCEDURE — 70496 CT ANGIOGRAPHY HEAD: CPT

## 2020-06-03 PROCEDURE — 80053 COMPREHEN METABOLIC PANEL: CPT

## 2020-06-03 PROCEDURE — 97163 PT EVAL HIGH COMPLEX 45 MIN: CPT

## 2020-06-03 PROCEDURE — 84540 ASSAY OF URINE/UREA-N: CPT

## 2020-06-03 PROCEDURE — 99291 CRITICAL CARE FIRST HOUR: CPT | Mod: 25

## 2020-06-03 PROCEDURE — 80061 LIPID PANEL: CPT

## 2020-06-03 PROCEDURE — C1764: CPT

## 2020-06-03 PROCEDURE — 71045 X-RAY EXAM CHEST 1 VIEW: CPT

## 2020-06-03 PROCEDURE — 97167 OT EVAL HIGH COMPLEX 60 MIN: CPT

## 2020-06-03 PROCEDURE — 33285 INSJ SUBQ CAR RHYTHM MNTR: CPT

## 2020-06-03 PROCEDURE — 85027 COMPLETE CBC AUTOMATED: CPT

## 2020-06-03 PROCEDURE — 99239 HOSP IP/OBS DSCHRG MGMT >30: CPT

## 2020-06-03 PROCEDURE — 85730 THROMBOPLASTIN TIME PARTIAL: CPT

## 2020-06-03 PROCEDURE — 80048 BASIC METABOLIC PNL TOTAL CA: CPT

## 2020-06-03 PROCEDURE — 86850 RBC ANTIBODY SCREEN: CPT

## 2020-06-03 PROCEDURE — 99232 SBSQ HOSP IP/OBS MODERATE 35: CPT

## 2020-06-03 PROCEDURE — 93325 DOPPLER ECHO COLOR FLOW MAPG: CPT

## 2020-06-03 PROCEDURE — 93312 ECHO TRANSESOPHAGEAL: CPT

## 2020-06-03 PROCEDURE — 86900 BLOOD TYPING SEROLOGIC ABO: CPT

## 2020-06-03 PROCEDURE — 70498 CT ANGIOGRAPHY NECK: CPT

## 2020-06-03 RX ORDER — ATORVASTATIN CALCIUM 80 MG/1
1 TABLET, FILM COATED ORAL
Qty: 0 | Refills: 0 | DISCHARGE

## 2020-06-03 RX ORDER — LISINOPRIL 2.5 MG/1
1 TABLET ORAL
Qty: 0 | Refills: 0 | DISCHARGE
Start: 2020-06-03

## 2020-06-03 RX ORDER — FOLIC ACID 0.8 MG
1 TABLET ORAL
Qty: 0 | Refills: 0 | DISCHARGE
Start: 2020-06-03

## 2020-06-03 RX ORDER — LISINOPRIL 2.5 MG/1
1 TABLET ORAL
Qty: 30 | Refills: 0
Start: 2020-06-03 | End: 2020-07-02

## 2020-06-03 RX ORDER — ASPIRIN/CALCIUM CARB/MAGNESIUM 324 MG
1 TABLET ORAL
Qty: 0 | Refills: 0 | DISCHARGE

## 2020-06-03 RX ORDER — ASPIRIN/CALCIUM CARB/MAGNESIUM 324 MG
1 TABLET ORAL
Qty: 0 | Refills: 0 | DISCHARGE
Start: 2020-06-03

## 2020-06-03 RX ORDER — CARVEDILOL PHOSPHATE 80 MG/1
1 CAPSULE, EXTENDED RELEASE ORAL
Qty: 60 | Refills: 0
Start: 2020-06-03 | End: 2020-07-02

## 2020-06-03 RX ORDER — ATORVASTATIN CALCIUM 80 MG/1
1 TABLET, FILM COATED ORAL
Qty: 30 | Refills: 0
Start: 2020-06-03 | End: 2020-07-02

## 2020-06-03 RX ORDER — ASPIRIN/CALCIUM CARB/MAGNESIUM 324 MG
1 TABLET ORAL
Qty: 30 | Refills: 0
Start: 2020-06-03 | End: 2020-07-02

## 2020-06-03 RX ADMIN — Medication 81 MILLIGRAM(S): at 09:26

## 2020-06-03 RX ADMIN — Medication 1 TABLET(S): at 09:25

## 2020-06-03 RX ADMIN — CARVEDILOL PHOSPHATE 6.25 MILLIGRAM(S): 80 CAPSULE, EXTENDED RELEASE ORAL at 09:26

## 2020-06-03 RX ADMIN — Medication 1 MILLIGRAM(S): at 09:26

## 2020-06-03 NOTE — SPEECH LANGUAGE PATHOLOGY EVALUATION - SLP PERTINENT HISTORY OF CURRENT PROBLEM
As per MD note, "80yo F w/ PMHx HTN, HLD, Suspected pAfib? ([facility] - Cardio) , Rheumatic Fever as a child, CAD with Cardiac Stents x 2, Recent suspected CVA vs TIA 5/2/2020 with speech difficulty and left sided weakness, at that time pt was given t-PA and her symptoms resolved with follow up MRI showing no infarct".

## 2020-06-03 NOTE — PROGRESS NOTE ADULT - SUBJECTIVE AND OBJECTIVE BOX
[facility]                                        Neurology at [place]                                 Leigh Prather & Anjum                                  [address]                                        [place], [place], [address]                                             [phone]        CC: dysarthria    HPI:   The patient is a 79y Female who presented on 5/2/2020 with speech difficulty and left sided weakness. She received t-PA and her symptoms had improved.   Follow up MRI showed no infarct.     She now presents with similar symptoms.   She reports that the speech had been normal until the morning of arrival when she noted marked difficulty again.   She also noted left sided weakness.   Her last known well time was about 9:10 am.   She received t-PA and was admitted to the neuro-ICU. (JW)    Interim history: doing well, s/p ILR    ROS neurology: Denies headache or dizziness. Denies weakness/numbness.  Denies speech/language deficits. Denies diplopia/blurred vision.  Denies confusion    MEDICATIONS  (STANDING):  aspirin  chewable 81 milliGRAM(s) Oral daily  atorvastatin 20 milliGRAM(s) Oral at bedtime  carvedilol 6.25 milliGRAM(s) Oral every 12 hours  folic acid 1 milliGRAM(s) Oral daily  lisinopril 20 milliGRAM(s) Oral daily  multivitamin 1 Tablet(s) Oral daily  pantoprazole    Tablet 40 milliGRAM(s) Oral before breakfast  polyethylene glycol 3350 17 Gram(s) Oral daily    MEDICATIONS  (PRN):  ondansetron Injectable 4 milliGRAM(s) IV Push every 6 hours PRN Nausea and/or Vomiting  senna 2 Tablet(s) Oral at bedtime PRN Constipation      Vital Signs Last 24 Hrs  T(C): 36.7 (03 Jun 2020 08:00), Max: 36.8 (02 Jun 2020 23:01)  T(F): 98 (03 Jun 2020 08:00), Max: 98.3 (02 Jun 2020 23:01)  HR: 73 (03 Jun 2020 08:00) (73 - 86)  BP: 117/71 (03 Jun 2020 08:00) (91/57 - 168/77)  BP(mean): 86 (03 Jun 2020 08:00) (86 - 86)  RR: 18 (03 Jun 2020 08:00) (16 - 20)  SpO2: 96% (03 Jun 2020 08:00) (96% - 100%)    Detailed Neurologic Exam:    Mental status: The patient is awake and alert. There is no aphasia. There is no dysarthria.     Cranial nerves: Pupils equal and react symmetrically to light. There is no visual field deficit to threat. Extraocular motion is full with no nystagmus. There is no ptosis. Facial sensation is intact. Facial musculature is symmetric. Palate elevates symmetrically. Tongue is midline.    Motor: There is normal bulk and tone.  There is no tremor.  Strength grossly 5/5 bilaterally.     Sensation: Grossly intact to light touch and pin.     Reflexes: 2+ throughout and plantar responses are flexor.    Cerebellar: No dysmetria on finger nose testing.        Labs:                           13.3   6.09  )-----------( 183      ( 02 Jun 2020 05:14 )             39.4     06-02    138  |  104  |  17.0  ----------------------------<  101<H>  4.2   |  22.0  |  0.83    Ca    8.6      02 Jun 2020 05:14  Phos  4.3     06-02  Mg     2.0     06-02    < from: MEGHANN Echo Doppler (06.02.20 @ 14:19) >  Summary:   1. Left ventricular ejection fraction, by visual estimation, is 55 to 60%.   2. Normal global left ventricular systolic function.   3. Normal right ventricular size and function.   4. Mild-moderate tricuspid regurgitation.   5. Mild to moderate aortic regurgitation.   6. No obvious cardiac or thoracic source of emboli.      Rad:   MRI brain did not show acute stroke, mass or blood

## 2020-06-03 NOTE — DISCHARGE NOTE PROVIDER - NSDCFUADDAPPT_GEN_ALL_CORE_FT
Loop Recorder Incision Care:     - Remove the plastic and gauze dressing after 24 hours.   - Do not touch the incision until it is completely healed.   - There is Dermabond (skin glue) on your incision, which will start to flake off on its own over the next 2-3 weeks. Do not pick at or peal off the Dermabond.   - Do not apply soaps, creams, lotions, ointments or powders to the incision until it is completely healed.  - You should call the doctor if you notice redness, drainage, swelling, increased tenderness, hot sensation around the  incision, bleeding or incision edges pulling apart.  - Follow up with Dr. Madden in one weeks time.

## 2020-06-03 NOTE — PROGRESS NOTE ADULT - REASON FOR ADMISSION
code stroke, Left weakness, Dysarthric speech  Dr. Vann PMD: [phone]  Dr. Mayco Cyr CARDIO: [phone]
code stroke, Left weakness, Dysarthric speech  Dr. Vann PMD: [phone]  Dr. Mayco Cyr CARDIO: [phone]
code stroke, Left weakness, Dysarthric speech  Dr. Vann PMD: [phone]  Dr. Mayco Cyr CARDIO: [phone]
code stroke, Left weakness, Dysarthric speech  Dr. Vann PMD: [phone]  Dr. Mayco Cyr CARDIO: [phone]
code stroke, Left weakness, Dysarthric speech  Dr. Vann PMD: [phone]  Dr. Mayco Cyr CARDIO: [phone]
code stroke, Left weakness, Dysarthric speech  Dr. Vann PMD: [phone]  Dr. Mayco Cyr CARDIO: [phone]
code stroke, Left weakness, Dysarthric speech  Dr. Vann PMD: [phone]  Dr. Mayco Cyr CARDIO: [phone]
stroke

## 2020-06-03 NOTE — PROGRESS NOTE ADULT - ASSESSMENT
79y Female with Speech difficulty and left sided weakness.     possible TIA/stroke.   She had similar presentation 5/2/2020.  Now status post t-PA with no complications.    physical therapy.   MEGHANN as above   s/p ILR      Lipids  LDL: 47  Goal: < 70  continue lipitor 20 mg daily      Hypertension.   Control blood pressure.     Neurologically stable for discharge when medically stable    Thank you for allowing me to participate in the care of your patient    Cem Abraham MD, PhD   [id number]

## 2020-06-03 NOTE — OCCUPATIONAL THERAPY INITIAL EVALUATION ADULT - PLANNED THERAPY INTERVENTIONS, OT EVAL
bed mobility training/fine motor coordination training/strengthening/transfer training/ADL retraining/neuromuscular re-education/balance training/motor coordination training

## 2020-06-03 NOTE — PROGRESS NOTE ADULT - SUBJECTIVE AND OBJECTIVE BOX
[place] [facility]                                   [address] [place] [address]                                                      PHONE: [phone]                                                         FAX: [phone]  [email or web address]  ---------------------------------------------------------------------------------------------------------------------------------    Overnight events/patient complaints:     No CP or SOB overnight  MEGHANN no cardiac sources of emboli  ILR placed    PAST MEDICAL & SURGICAL HISTORY:  Rheumatic fever  HTN (hypertension)  Status post tonsillectomy    Biaxin (Muscle Pain; Joint Pain)  codeine (Faint)  Isosorbide Mononitrate Extended Release (Faint)  Metoprolol Succinate ER (Unknown)    MEDICATIONS  (STANDING):  aspirin  chewable 81 milliGRAM(s) Oral daily  atorvastatin 20 milliGRAM(s) Oral at bedtime  carvedilol 6.25 milliGRAM(s) Oral every 12 hours  folic acid 1 milliGRAM(s) Oral daily  lisinopril 20 milliGRAM(s) Oral daily  multivitamin 1 Tablet(s) Oral daily  pantoprazole    Tablet 40 milliGRAM(s) Oral before breakfast  polyethylene glycol 3350 17 Gram(s) Oral daily    MEDICATIONS  (PRN):  ondansetron Injectable 4 milliGRAM(s) IV Push every 6 hours PRN Nausea and/or Vomiting  senna 2 Tablet(s) Oral at bedtime PRN Constipation      Vital Signs Last 24 Hrs  T(C): 36.7 (2020 08:00), Max: 37.2 (2020 12:27)  T(F): 98 (2020 08:00), Max: 98.9 (2020 12:27)  HR: 73 (2020 08:00) (73 - 100)  BP: 117/71 (2020 08:00) (91/57 - 168/77)  BP(mean): 86 (2020 08:00) (81 - 86)  RR: 18 (2020 08:00) (16 - 20)  SpO2: 96% (2020 08:00) (96% - 100%)  ICU Vital Signs Last 24 Hrs  BREANA HELM  I&O's Detail    2020 07:01  -  2020 07:00  --------------------------------------------------------  IN:    sodium chloride 0.9%: 375 mL  Total IN: 375 mL    OUT:    Voided: 2000 mL  Total OUT: 2000 mL    Total NET: -1625 mL        I&O's Summary    2020 07:01  -  2020 07:00  --------------------------------------------------------  IN: 375 mL / OUT: 2000 mL / NET: -1625 mL      Drug Dosing Weight  BREANA HELM    REVIEW OF SYSTEMS:    Constitutional: No fever, weight loss or fatigue  Eyes: No eye pain, visual disturbances, or discharge  ENMT:  No difficulty hearing, tinnitus, vertigo; No sinus or throat pain  Neck: No pain or stiffness  Respiratory: No cough, wheezing, chills or hemoptysis  Cardiovascular: No chest pain, palpitations, shortness of breath, dizziness or leg swelling  Gastrointestinal: No abdominal or epigastric pain. No nausea, vomiting or hematemesis; No diarrhea or constipation. No melena or hematochezia.  Genitourinary: No dysuria, frequency, hematuria or incontinence  Rectal: No pain, hemorrhoids or incontinence  Neurological: No headaches, memory loss, loss of strength, numbness or tremors  Skin: No itching, burning, rashes or lesions   Lymph Nodes: No enlarged glands  Endocrine: No heat or cold intolerance; No hair loss  Musculoskeletal: No joint pain or swelling; No muscle, back or extremity pain  Psychiatric: No depression, anxiety, mood swings or difficulty sleeping  Heme/Lymph: No easy bruising or bleeding gums  Allergy and Immunologic: No hives or eczema    PHYSICAL EXAM:  General: Appears well developed, well nourished alert and cooperative.  HEENT: Head; normocephalic, atraumatic.  Eyes: Pupils reactive, cornea wnl.  Neck: Supple, no nodes adenopathy, no NVD or carotid bruit or thyromegaly.  CARDIOVASCULAR: Normal S1 and S2, No murmur, rub, gallop or lift.   LUNGS: No rales, rhonchi or wheeze. Normal breath sounds bilaterally.  ABDOMEN: Soft, nontender without mass or organomegaly. bowel sounds normoactive.  EXTREMITIES: No clubbing, cyanosis or edema. Distal pulses wnl.   SKIN: warm and dry with normal turgor.  NEURO: Alert/oriented x 3/normal motor exam. No pathologic reflexes.    PSYCH: normal affect.        LABS:                        13.3   6.09  )-----------( 183      ( 2020 05:14 )             39.4     06-02    138  |  104  |  17.0  ----------------------------<  101<H>  4.2   |  22.0  |  0.83    Ca    8.6      2020 05:14  Phos  4.3     06-02  Mg     2.0     06-02    TPro  6.0<L>  /  Alb  3.6  /  TBili  1.0  /  DBili  x   /  AST  16  /  ALT  11  /  AlkPhos  62  06-01    BREANA HELM  CARDIAC MARKERS ( 2020 12:12 )  x     / <0.01 ng/mL / x     / x     / x          PT/INR - ( 2020 12:12 )   PT: 12.8 sec;   INR: 1.13 ratio         PTT - ( 2020 12:12 )  PTT:33.0 sec  Urinalysis Basic - ( 2020 16:22 )    Color: Yellow / Appearance: Clear / S.005 / pH: x  Gluc: x / Ketone: Negative  / Bili: Negative / Urobili: Negative mg/dL   Blood: x / Protein: Negative mg/dL / Nitrite: Negative   Leuk Esterase: Negative / RBC: 0-2 /HPF / WBC 0-2   Sq Epi: x / Non Sq Epi: Occasional / Bacteria: x        RADIOLOGY & ADDITIONAL STUDIES:    INTERPRETATION OF TELEMETRY (personally reviewed):    ECG:    ECHO:    STRESS TEST:    CARDIAC CATHETERIZATION:    ACTIVE PROBLEMS:  HEALTH ISSUES - PROBLEM Dx:  Paroxysmal A-fib: Paroxysmal A-fib  Hyperlipidemia, unspecified hyperlipidemia type: Hyperlipidemia, unspecified hyperlipidemia type  Hypertension, unspecified type: Hypertension, unspecified type  Rheumatic fever: Rheumatic fever  Cerebrovascular accident (CVA), unspecified mechanism: Cerebrovascular accident (CVA), unspecified mechanism

## 2020-06-03 NOTE — DISCHARGE NOTE PROVIDER - NSDCMRMEDTOKEN_GEN_ALL_CORE_FT
aspirin 81 mg oral tablet, chewable: 1 tab(s) orally once a day  atorvastatin 20 mg oral tablet: 1 tab(s) orally once a day  Coenzyme Q10 200 mg oral capsule: 1 cap(s) orally once a day  Coreg 6.25 mg oral tablet: 1 tab(s) orally 2 times a day   folic acid 1 mg oral tablet: 1 tab(s) orally once a day  lisinopril 20 mg oral tablet: 1 tab(s) orally once a day  Multiple Vitamins oral tablet: 1 tab(s) orally once a day

## 2020-06-03 NOTE — SPEECH LANGUAGE PATHOLOGY EVALUATION - SLP GENERAL OBSERVATIONS
Pt recd awake and upright in bed, A&A OX4, no dysarthria good ability for command following, 0/10 pain, w/breakfast tray

## 2020-06-03 NOTE — SPEECH LANGUAGE PATHOLOGY EVALUATION - SLP DIAGNOSIS
Pt presents w/functional receptive & expressive language skills. Receptively, Pt is able to respond to complex yes/no questions & execute multi-step commands w/success, no increased processing time noted.  Expressively, Pt with no word retrieval difficulty, appropriate and independent thought formulation & organization observed; no motor speech or vocal quality/volume difficulty. Mild cognitive-linguistic dysfunction marked by mild-moderately reduced immediate & short-term recall, w/benefit from min semantic cues (difficulty w/recall vs retention of stimuli)

## 2020-06-03 NOTE — DISCHARGE NOTE PROVIDER - CARE PROVIDER_API CALL
Cem Abraham  NEUROLOGY  [address] [place] [id number]  Phone: [phone]  Fax: [phone]  Follow Up Time:     Jarvis Cobian  CARDIOVASCULAR DISEASE  [address] [place] [address].  [place] [address]  Phone: [phone]  Fax: [phone]  Follow Up Time:

## 2020-06-03 NOTE — DISCHARGE NOTE PROVIDER - REASON FOR ADMISSION
code stroke, Left weakness, Dysarthric speech  Dr. Vann PMD: [phone]  Dr. Mayco Cyr CARDIO: [phone]

## 2020-06-03 NOTE — DISCHARGE NOTE PROVIDER - NSDCCPCAREPLAN_GEN_ALL_CORE_FT
PRINCIPAL DISCHARGE DIAGNOSIS  Diagnosis: Cerebrovascular accident (CVA), unspecified mechanism  Assessment and Plan of Treatment: Follow up with your primary doctor within 1 week of discharge & cardiology & neurology in 2 weeks PRINCIPAL DISCHARGE DIAGNOSIS  Diagnosis: Cerebrovascular accident (CVA), unspecified mechanism  Assessment and Plan of Treatment: Follow up with your primary doctor within 1 week of discharge & cardiology & neurology in 1 week

## 2020-06-03 NOTE — PHYSICAL THERAPY INITIAL EVALUATION ADULT - ADDITIONAL COMMENTS
pt lives alone in a house with 2-3 steps with bilat rails to enter, but enters through ramp in garage. pt owns SAC, RW, wheelchair, shower chair. pt has supportive sons who live close by and neighbours are able to assist as needed as well. pt's granddaughter comes over everyday.

## 2020-06-03 NOTE — PHYSICAL THERAPY INITIAL EVALUATION ADULT - GENERAL OBSERVATIONS, REHAB EVAL
Pt received in recliner chair + tele. pt in NAD and agreeable to mobilize with PT. ASHLEY Mendenhall cleared pt for PT.

## 2020-06-03 NOTE — DISCHARGE NOTE PROVIDER - HOSPITAL COURSE
78yo F w/ PMHx HTN, HLD, Suspected pAfib? ([facility] - Cardio) , Rheumatic Fever as a child, CAD with Cardiac Stents x 2, Recent suspected CVA vs TIA 5/2/2020 with speech difficulty and left sided weakness, at that time pt was given t-PA and her symptoms resolved with follow up MRI showing no infarct.   Patient now returning with similar symptoms this admission predominately Dysarthric speech/Aphasia. Pt was a code stroke in the ED and received TPA in ED after no acute infarct or hemorrhage was found in the CTH. Furthermore no hemodynamically significant Large vessel Occlusion on CTA study of the head and neck. Patient with resolution of speech symptoms in the ED s/p  tPA and was then transferred to the Neuro ICU for post tPA monitoring. Patient also evaluated by Cardiology (Gamaliel) and was taken for MEGHANN with no obvious cardiac or thoracic source of emboli. MEGHANN also notable for preserved EF of 55-60% and mild to moderate aortic and tricuspid regurgitation. Repeat CT head 24 hours post t-PA without evidence of acute hemorrhage. patient no longer requiring ICU level of care and to be downgraded to medical floors. Plan if for ILR later today given concern for cardiac arrhythmia in view of recurrent CVA.        Recurrent CVA/TIA    Downgrade to Telemetry     Patient's symptoms largely resolved, only remaining deficit is mild LLE weakness    LDL at goal (47). HbA1c 5 1 month ago     CTH 24 hours post-tPA  without evidence of acute hemorrhage    MEGHANN with no obvious cardiac or thoracic source of emboli. MEGHANN also notable for preserved EF of 55-60% and mild to moderate aortic and tricuspid regurgitation    Plan if for ILR later today given concern for cardiac arrhythmia in view of recurrent CVA.    patient tolerating DASH/TLC diet    neurochecks q4 hours for now    c/w ASA and Atorvastatin as ordered    PPI as well for GI protection    PT/OT eval pending for dispo    Pt is s/ TPA > 24 hrs ago.     ASA resumed. c/w statin.     NIHSS 0 currently. PT eval ordered recs home    MEGHANN Echo Doppler (06.02.20 @ 14:19) >    Summary:     1. Left ventricular ejection fraction, by visual estimation, is 55 to 60%.     2. Normal global left ventricular systolic function.     3. Normal right ventricular size and function.     4. Mild-moderate tricuspid regurgitation.     5. Mild to moderate aortic regurgitation.     6. No obvious cardiac or thoracic source of emboli.    Neurologically & cardiovascularly stable for discharge when medically stable                CAD s/p Cardiac Stents x 2    c/w ASA stain as above    ?Allergic to Metoprolol, HR controlled 60-80s    c/w carvedilol 6.125 mg q12 hours    Gamaliel Cardio following as above            HTN    BP moderately controlled    most recent 155/82    c/w home Lisinopril 20mg for now    monitor BP            HLD    c/w statin as above        PHYSICAL EXAM:    General: Appears well developed, well nourished alert and cooperative.    HEENT: Head; normocephalic, atraumatic.    Eyes: Pupils reactive, cornea wnl.    Neck: Supple, no nodes adenopathy, no NVD or carotid bruit or thyromegaly.    CARDIOVASCULAR: Normal S1 and S2, No murmur, rub, gallop or lift.     LUNGS: No rales, rhonchi or wheeze. Normal breath sounds bilaterally.    ABDOMEN: Soft, nontender without mass or organomegaly. bowel sounds normoactive.    EXTREMITIES: No clubbing, cyanosis or edema. Distal pulses wnl.     SKIN: warm and dry with normal turgor.    NEURO: Alert/oriented x 3/normal motor exam. No pathologic reflexes.      PSYCH: normal affect.

## 2020-06-03 NOTE — DISCHARGE NOTE PROVIDER - CARE PROVIDERS DIRECT ADDRESSES
[email or web address]

## 2020-06-03 NOTE — DISCHARGE NOTE NURSING/CASE MANAGEMENT/SOCIAL WORK - NSDCPEPTSTRK_GEN_ALL_CORE
Call 911 for stroke/Risk factors for stroke/Stroke education booklet/Prescribed medications/Stroke support groups for patients, families, and friends/Stroke warning signs and symptoms/Signs and symptoms of stroke/Need for follow up after discharge Prescribed medications/Risk factors for stroke/Stroke support groups for patients, families, and friends/Stroke warning signs and symptoms/Signs and symptoms of stroke/Call 911 for stroke/Need for follow up after discharge

## 2020-06-03 NOTE — DISCHARGE NOTE NURSING/CASE MANAGEMENT/SOCIAL WORK - PATIENT PORTAL LINK FT
You can access the FollowMyHealth Patient Portal offered by [facility] by registering at the following website: [email or web address]. By joining [email or web address]’s FollowMyHealth portal, you will also be able to view your health information using other applications (apps) compatible with our system.

## 2020-06-11 ENCOUNTER — APPOINTMENT (OUTPATIENT)
Dept: NEUROLOGY | Facility: CLINIC | Age: 80
End: 2020-06-11
Payer: MEDICARE

## 2020-06-11 VITALS
BODY MASS INDEX: 26.87 KG/M2 | SYSTOLIC BLOOD PRESSURE: 130 MMHG | HEIGHT: 62 IN | TEMPERATURE: 97.3 F | DIASTOLIC BLOOD PRESSURE: 70 MMHG | WEIGHT: 146 LBS

## 2020-06-11 DIAGNOSIS — Z86.79 PERSONAL HISTORY OF OTHER DISEASES OF THE CIRCULATORY SYSTEM: ICD-10-CM

## 2020-06-11 PROCEDURE — 99214 OFFICE O/P EST MOD 30 MIN: CPT

## 2020-06-11 RX ORDER — ATORVASTATIN CALCIUM 80 MG/1
TABLET, FILM COATED ORAL
Refills: 0 | Status: ACTIVE | COMMUNITY

## 2020-06-11 RX ORDER — CARVEDILOL 25 MG/1
TABLET, FILM COATED ORAL
Refills: 0 | Status: ACTIVE | COMMUNITY

## 2020-06-11 RX ORDER — ASPIRIN 81 MG
81 TABLET, DELAYED RELEASE (ENTERIC COATED) ORAL
Refills: 0 | Status: ACTIVE | COMMUNITY

## 2020-06-11 NOTE — CONSULT LETTER
[Dear  ___] : Dear  [unfilled], [Courtesy Letter:] : I had the pleasure of seeing your patient, [unfilled], in my office today. [Please see my note below.] : Please see my note below. [Consult Closing:] : Thank you very much for allowing me to participate in the care of this patient.  If you have any questions, please do not hesitate to contact me. [Sincerely,] : Sincerely, [FreeTextEntry3] : Elias Arellano MD.

## 2020-06-11 NOTE — HISTORY OF PRESENT ILLNESS
[FreeTextEntry1] : I saw this patient in the office today.\par \par She had originally presented to [facility] in May of 2020.\par She had speech difficulty and left-sided weakness.\par She received thrombolysis.\par She ultimately improved back to baseline.\par \par She then returned to the emergency department on 6/1/2020.\par She had similar symptoms.\par There was no acute infarct on her previous brain MRI and she was treated with t-PA again.\par \par She was ultimately discharged with a loop recorder in place.

## 2020-06-11 NOTE — ASSESSMENT
[FreeTextEntry1] : This is a 79-year-old woman who had episodes suspicious for stroke.\par t-PA with resolution of symptoms on both occasions.\par \par I would recommend she continue antiplatelet medication.\par \par Her blood pressure is relatively well controlled with her current regimen.\par \par I will see her back in 6 months.

## 2020-06-11 NOTE — DATA REVIEWED
[de-identified] : Brain MRI was performed on 6/2/2020.\par There was no acute infarct.\par There was moderate small vessel ischemic disease of a chronic nature.\par

## 2020-06-11 NOTE — PHYSICAL EXAM
[General Appearance - Alert] : alert [General Appearance - In No Acute Distress] : in no acute distress [General Appearance - Well-Appearing] : healthy appearing [Oriented To Time, Place, And Person] : oriented to person, place, and time [Affect] : the affect was normal [Memory Recent] : recent memory was not impaired [Memory Remote] : remote memory was not impaired [Cranial Nerves Optic (II)] : visual acuity intact bilaterally,  visual fields full to confrontation, pupils equal round and reactive to light [Cranial Nerves Oculomotor (III)] : extraocular motion intact [Cranial Nerves Trigeminal (V)] : facial sensation intact symmetrically [Cranial Nerves Facial (VII)] : face symmetrical [Cranial Nerves Vestibulocochlear (VIII)] : hearing was intact bilaterally [Cranial Nerves Glossopharyngeal (IX)] : tongue and palate midline [Cranial Nerves Accessory (XI - Cranial And Spinal)] : head turning and shoulder shrug symmetric [Cranial Nerves Hypoglossal (XII)] : there was no tongue deviation with protrusion [Motor Tone] : muscle tone was normal in all four extremities [Sensation Tactile Decrease] : light touch was intact [Sensation Vibration Decrease] : vibration was intact [Sensation Pain / Temperature Decrease] : pain and temperature was intact [Limited Balance] : the patient's balance was impaired [2+] : Patella left 2+ [Optic Disc Abnormality] : the optic disc were normal in size and color [Edema] : there was no peripheral edema [Involuntary Movements] : no involuntary movements were seen [Dysarthria] : no dysarthria [Aphasia] : no dysphasia/aphasia [Romberg's Sign] : Romberg's sign was negtive [Coordination - Dysmetria Impaired Finger-to-Nose Bilateral] : not present [Plantar Reflex Right Only] : normal on the right [Plantar Reflex Left Only] : normal on the left [FreeTextEntry6] : There is some mild give way weakness in the left leg. Strength is otherwise 5/5 throughout. [FreeTextEntry8] : The patient is ambulating with a cane for support.

## 2020-09-25 ENCOUNTER — APPOINTMENT (OUTPATIENT)
Dept: NEUROLOGY | Facility: CLINIC | Age: 80
End: 2020-09-25
Payer: MEDICARE

## 2020-09-25 VITALS
HEIGHT: 62 IN | BODY MASS INDEX: 26.5 KG/M2 | WEIGHT: 144 LBS | SYSTOLIC BLOOD PRESSURE: 122 MMHG | DIASTOLIC BLOOD PRESSURE: 72 MMHG

## 2020-09-25 DIAGNOSIS — I10 ESSENTIAL (PRIMARY) HYPERTENSION: ICD-10-CM

## 2020-09-25 PROCEDURE — 99214 OFFICE O/P EST MOD 30 MIN: CPT

## 2020-09-25 RX ORDER — BLOOD SUGAR DIAGNOSTIC
100 STRIP MISCELLANEOUS
Refills: 0 | Status: ACTIVE | COMMUNITY

## 2020-09-25 RX ORDER — LISINOPRIL 30 MG/1
TABLET ORAL
Refills: 0 | Status: DISCONTINUED | COMMUNITY
End: 2020-09-25

## 2020-09-25 RX ORDER — CLOPIDOGREL BISULFATE 75 MG/1
75 TABLET, FILM COATED ORAL
Refills: 0 | Status: ACTIVE | COMMUNITY

## 2020-09-25 NOTE — ASSESSMENT
[FreeTextEntry1] : This is a 80 year-old woman who had episodes suspicious for stroke.\par t-PA with resolution of symptoms on both occasions.\par \par I would recommend she continue antiplatelet and statin. \par \par Her blood pressure is relatively well controlled with her current regimen.\par \par She is being scheduled for a nephrectomy.\par She is therefore cleared from a neurologic standpoint. She will likely need to stop the Plavix prior to the procedure. She should be restarted as soon as possible after surgery.\par

## 2020-09-25 NOTE — PHYSICAL EXAM
[General Appearance - Alert] : alert [General Appearance - In No Acute Distress] : in no acute distress [General Appearance - Well-Appearing] : healthy appearing [Oriented To Time, Place, And Person] : oriented to person, place, and time [Affect] : the affect was normal [Memory Recent] : recent memory was not impaired [Memory Remote] : remote memory was not impaired [Cranial Nerves Optic (II)] : visual acuity intact bilaterally,  visual fields full to confrontation, pupils equal round and reactive to light [Cranial Nerves Oculomotor (III)] : extraocular motion intact [Cranial Nerves Trigeminal (V)] : facial sensation intact symmetrically [Cranial Nerves Facial (VII)] : face symmetrical [Cranial Nerves Vestibulocochlear (VIII)] : hearing was intact bilaterally [Cranial Nerves Glossopharyngeal (IX)] : tongue and palate midline [Cranial Nerves Accessory (XI - Cranial And Spinal)] : head turning and shoulder shrug symmetric [Cranial Nerves Hypoglossal (XII)] : there was no tongue deviation with protrusion [Motor Tone] : muscle tone was normal in all four extremities [Sensation Tactile Decrease] : light touch was intact [Sensation Pain / Temperature Decrease] : pain and temperature was intact [Sensation Vibration Decrease] : vibration was intact [Limited Balance] : the patient's balance was impaired [2+] : Patella left 2+ [Optic Disc Abnormality] : the optic disc were normal in size and color [Edema] : there was no peripheral edema [Involuntary Movements] : no involuntary movements were seen [Dysarthria] : no dysarthria [Aphasia] : no dysphasia/aphasia [Romberg's Sign] : Romberg's sign was negtive [Coordination - Dysmetria Impaired Finger-to-Nose Bilateral] : not present [Plantar Reflex Right Only] : normal on the right [Plantar Reflex Left Only] : normal on the left [FreeTextEntry6] : There is some mild give way weakness in the left leg. Strength is otherwise 5/5 throughout. [FreeTextEntry8] : The patient is ambulating with a cane for support.

## 2020-09-25 NOTE — HISTORY OF PRESENT ILLNESS
[FreeTextEntry1] : I saw this patient in the office today.\par \par She had originally presented to [facility] in May of 2020.\par She had speech difficulty and left-sided weakness.\par She received thrombolysis.\par She ultimately improved back to baseline.\par \par She then returned to the emergency department on 6/1/2020.\par She had similar symptoms.\par There was no acute infarct on her previous brain MRI and she was treated with t-PA again.\par \par She was ultimately discharged with a loop recorder in place.\par \par She now reports that she requires nephrectomy due to a renal mass.\par This is being scheduled at [facility].\par

## 2020-11-28 ENCOUNTER — INPATIENT (INPATIENT)
Facility: HOSPITAL | Age: 80
LOS: 1 days | Discharge: HOME | DRG: 287 | End: 2020-11-30
Attending: STUDENT IN AN ORGANIZED HEALTH CARE EDUCATION/TRAINING PROGRAM | Admitting: STUDENT IN AN ORGANIZED HEALTH CARE EDUCATION/TRAINING PROGRAM
Payer: MEDICARE

## 2020-11-28 VITALS
SYSTOLIC BLOOD PRESSURE: 173 MMHG | HEART RATE: 78 BPM | HEIGHT: 62 IN | OXYGEN SATURATION: 93 % | DIASTOLIC BLOOD PRESSURE: 102 MMHG | RESPIRATION RATE: 20 BRPM

## 2020-11-28 DIAGNOSIS — Z90.89 ACQUIRED ABSENCE OF OTHER ORGANS: Chronic | ICD-10-CM

## 2020-11-28 LAB
ALBUMIN SERPL ELPH-MCNC: 4.2 G/DL — SIGNIFICANT CHANGE UP (ref 3.3–5.2)
ALP SERPL-CCNC: 68 U/L — SIGNIFICANT CHANGE UP (ref 40–120)
ALT FLD-CCNC: 15 U/L — SIGNIFICANT CHANGE UP
ANION GAP SERPL CALC-SCNC: 13 MMOL/L — SIGNIFICANT CHANGE UP (ref 5–17)
ANISOCYTOSIS BLD QL: SLIGHT — SIGNIFICANT CHANGE UP
AST SERPL-CCNC: 33 U/L — HIGH
BASOPHILS # BLD AUTO: 0.06 K/UL — SIGNIFICANT CHANGE UP (ref 0–0.2)
BASOPHILS NFR BLD AUTO: 0.9 % — SIGNIFICANT CHANGE UP (ref 0–2)
BILIRUB SERPL-MCNC: 0.8 MG/DL — SIGNIFICANT CHANGE UP (ref 0.4–2)
BUN SERPL-MCNC: 22 MG/DL — HIGH (ref 8–20)
BURR CELLS BLD QL SMEAR: PRESENT — SIGNIFICANT CHANGE UP
CALCIUM SERPL-MCNC: 9.5 MG/DL — SIGNIFICANT CHANGE UP (ref 8.6–10.2)
CHLORIDE SERPL-SCNC: 102 MMOL/L — SIGNIFICANT CHANGE UP (ref 98–107)
CO2 SERPL-SCNC: 21 MMOL/L — LOW (ref 22–29)
CREAT SERPL-MCNC: 0.87 MG/DL — SIGNIFICANT CHANGE UP (ref 0.5–1.3)
EOSINOPHIL # BLD AUTO: 0.11 K/UL — SIGNIFICANT CHANGE UP (ref 0–0.5)
EOSINOPHIL NFR BLD AUTO: 1.7 % — SIGNIFICANT CHANGE UP (ref 0–6)
GLUCOSE SERPL-MCNC: 90 MG/DL — SIGNIFICANT CHANGE UP (ref 70–99)
HCT VFR BLD CALC: 41.6 % — SIGNIFICANT CHANGE UP (ref 34.5–45)
HGB BLD-MCNC: 13.6 G/DL — SIGNIFICANT CHANGE UP (ref 11.5–15.5)
LYMPHOCYTES # BLD AUTO: 1.64 K/UL — SIGNIFICANT CHANGE UP (ref 1–3.3)
LYMPHOCYTES # BLD AUTO: 24.4 % — SIGNIFICANT CHANGE UP (ref 13–44)
MANUAL SMEAR VERIFICATION: SIGNIFICANT CHANGE UP
MCHC RBC-ENTMCNC: 28.9 PG — SIGNIFICANT CHANGE UP (ref 27–34)
MCHC RBC-ENTMCNC: 32.7 GM/DL — SIGNIFICANT CHANGE UP (ref 32–36)
MCV RBC AUTO: 88.5 FL — SIGNIFICANT CHANGE UP (ref 80–100)
MONOCYTES # BLD AUTO: 0.52 K/UL — SIGNIFICANT CHANGE UP (ref 0–0.9)
MONOCYTES NFR BLD AUTO: 7.8 % — SIGNIFICANT CHANGE UP (ref 2–14)
NEUTROPHILS # BLD AUTO: 4.03 K/UL — SIGNIFICANT CHANGE UP (ref 1.8–7.4)
NEUTROPHILS NFR BLD AUTO: 60 % — SIGNIFICANT CHANGE UP (ref 43–77)
OVALOCYTES BLD QL SMEAR: SLIGHT — SIGNIFICANT CHANGE UP
PLAT MORPH BLD: NORMAL — SIGNIFICANT CHANGE UP
PLATELET # BLD AUTO: 217 K/UL — SIGNIFICANT CHANGE UP (ref 150–400)
POIKILOCYTOSIS BLD QL AUTO: SLIGHT — SIGNIFICANT CHANGE UP
POTASSIUM SERPL-MCNC: 4.2 MMOL/L — SIGNIFICANT CHANGE UP (ref 3.5–5.3)
POTASSIUM SERPL-MCNC: 5.5 MMOL/L — HIGH (ref 3.5–5.3)
POTASSIUM SERPL-SCNC: 4.2 MMOL/L — SIGNIFICANT CHANGE UP (ref 3.5–5.3)
POTASSIUM SERPL-SCNC: 5.5 MMOL/L — HIGH (ref 3.5–5.3)
PROT SERPL-MCNC: 7.3 G/DL — SIGNIFICANT CHANGE UP (ref 6.6–8.7)
RBC # BLD: 4.7 M/UL — SIGNIFICANT CHANGE UP (ref 3.8–5.2)
RBC # FLD: 12.1 % — SIGNIFICANT CHANGE UP (ref 10.3–14.5)
RBC BLD AUTO: SIGNIFICANT CHANGE UP
ROULEAUX BLD QL SMEAR: PRESENT
SARS-COV-2 RNA SPEC QL NAA+PROBE: SIGNIFICANT CHANGE UP
SCHISTOCYTES BLD QL AUTO: SLIGHT — SIGNIFICANT CHANGE UP
SODIUM SERPL-SCNC: 136 MMOL/L — SIGNIFICANT CHANGE UP (ref 135–145)
TROPONIN T SERPL-MCNC: <0.01 NG/ML — SIGNIFICANT CHANGE UP (ref 0–0.06)
VARIANT LYMPHS # BLD: 5.2 % — SIGNIFICANT CHANGE UP (ref 0–6)
WBC # BLD: 6.71 K/UL — SIGNIFICANT CHANGE UP (ref 3.8–10.5)
WBC # FLD AUTO: 6.71 K/UL — SIGNIFICANT CHANGE UP (ref 3.8–10.5)

## 2020-11-28 PROCEDURE — 93010 ELECTROCARDIOGRAM REPORT: CPT

## 2020-11-28 PROCEDURE — 99220: CPT

## 2020-11-28 PROCEDURE — 70498 CT ANGIOGRAPHY NECK: CPT | Mod: 26

## 2020-11-28 PROCEDURE — 0042T: CPT

## 2020-11-28 PROCEDURE — 70551 MRI BRAIN STEM W/O DYE: CPT | Mod: 26

## 2020-11-28 PROCEDURE — 70496 CT ANGIOGRAPHY HEAD: CPT | Mod: 26

## 2020-11-28 RX ORDER — ATORVASTATIN CALCIUM 80 MG/1
20 TABLET, FILM COATED ORAL AT BEDTIME
Refills: 0 | Status: DISCONTINUED | OUTPATIENT
Start: 2020-11-28 | End: 2020-11-30

## 2020-11-28 RX ORDER — ASPIRIN/CALCIUM CARB/MAGNESIUM 324 MG
81 TABLET ORAL DAILY
Refills: 0 | Status: DISCONTINUED | OUTPATIENT
Start: 2020-11-28 | End: 2020-11-30

## 2020-11-28 RX ORDER — CLOPIDOGREL BISULFATE 75 MG/1
75 TABLET, FILM COATED ORAL DAILY
Refills: 0 | Status: DISCONTINUED | OUTPATIENT
Start: 2020-11-28 | End: 2020-11-30

## 2020-11-28 RX ORDER — CARVEDILOL PHOSPHATE 80 MG/1
6.25 CAPSULE, EXTENDED RELEASE ORAL EVERY 12 HOURS
Refills: 0 | Status: DISCONTINUED | OUTPATIENT
Start: 2020-11-28 | End: 2020-11-30

## 2020-11-28 RX ADMIN — CLOPIDOGREL BISULFATE 75 MILLIGRAM(S): 75 TABLET, FILM COATED ORAL at 17:03

## 2020-11-28 RX ADMIN — CARVEDILOL PHOSPHATE 6.25 MILLIGRAM(S): 80 CAPSULE, EXTENDED RELEASE ORAL at 17:57

## 2020-11-28 RX ADMIN — ATORVASTATIN CALCIUM 20 MILLIGRAM(S): 80 TABLET, FILM COATED ORAL at 22:47

## 2020-11-28 NOTE — ED CDU PROVIDER INITIAL DAY NOTE - NS ED ROS FT
General: no fever/chills  HEENT: no difficulty swallowing  Respiratory: + SOB, no cough  Cardiac: chest pain  Abdomen: no abdominal pain or vomiting  Musculoskeletal: no neck pain or back pain  Neuro: no LOC, no seizures, no weaknesses  Skin: no lesions or rashes

## 2020-11-28 NOTE — ED PROVIDER NOTE - PROGRESS NOTE DETAILS
AJM: pt presented to telestroke who wants to elauate pt prior to tpa. cart in room waiting for pt to return from ct AJM: workup neg. symptoms resolved. seen by telestroke who reccs cdu fro mri. no tpa at this time

## 2020-11-28 NOTE — ED ADULT NURSE NOTE - OBJECTIVE STATEMENT
pt A&OX4, pt was at going to the store and started having chest heaviness, sob, left sided weakness and difficulty speaking, resp even and unlabored no distress noted, abd soft NTND, pt has hx TIA and had TPA in past,

## 2020-11-28 NOTE — ED CDU PROVIDER INITIAL DAY NOTE - ATTENDING CONTRIBUTION TO CARE
I agree with the PA's note and was available for any issues/concerns. I was directly involved in patient care. My brief overall assessment is as follows:    80 year old female PMHX TIA, HTN c/o chest discomfort with dyspnea since today; patient also noted to have dysarthria and hence code stroke called; initial work up negative; patient awaiting MRI and cardiology consult

## 2020-11-28 NOTE — ED PROVIDER NOTE - NEUROLOGICAL, MLM
CN 2-12 intact. +  tremors b/l. No fasciculations. No nystagmus. Normal finger to nose and heel to shin b/l. No dysmetria.  4/5 strength on LUE and RUE. stuttering speech

## 2020-11-28 NOTE — ED CDU PROVIDER INITIAL DAY NOTE - OBJECTIVE STATEMENT
79 y/o F with hx of TIA, 2 cardiac stents, loop recorder c/o left sided chest pressure associated with SOB which started today and lasted approx 1 hour.  Symptoms occurred after going shopping.  She states that she's had similar symptoms in the past, but this episode was longer lasting.  Denies fever, or any focal weaknesses at this time.

## 2020-11-28 NOTE — PROGRESS NOTE ADULT - SUBJECTIVE AND OBJECTIVE BOX
HISTORY OF PRESENT ILLNESS:  Ms. Medina  (MRN [id number]) 80y right handed female with stroke risk factors HTN as well as hx of TIA x2 in last 7 months presented to [facility] with difficulty with speech and LUE and LLE weakness. Last known well at 10 am.         PAST MEDICAL HISTORY:  S/P Stent - CAD  No pertinent family history in first degree relatives  Rheumatic fever  HTN (hypertension)  Status post tonsillectomy      PAST SURGICAL HISTORY:  S/P Nephrectomy- 10/20    HOME MEDICATIONS:  Home Medications:  Coenzyme Q10 200 mg oral capsule: 1 cap(s) orally once a day (01 Jun 2020 16:43)  folic acid 1 mg oral tablet: 1 tab(s) orally once a day (03 Jun 2020 14:24)  Multiple Vitamins oral tablet: 1 tab(s) orally once a day (03 Jun 2020 14:24)      FAMILY HISTORY:    SOCIAL HISTORY:    ALLERGIES:  Biaxin (Muscle Pain; Joint Pain)  codeine (Faint)  Isosorbide Mononitrate Extended Release (Faint)  Metoprolol Succinate ER (Unknown)      VITALS/DATA/ORDERS: [x] Reviewed  Vital Signs Last 24 Hrs  T(C): 36.6 (28 Nov 2020 12:45), Max: 36.6 (28 Nov 2020 12:45)  T(F): 97.8 (28 Nov 2020 12:45), Max: 97.8 (28 Nov 2020 12:45)  HR: 82 (28 Nov 2020 12:45) (78 - 82)  BP: 195/91 (28 Nov 2020 12:45) (173/102 - 195/91)  RR: 20 (28 Nov 2020 12:45) (20 - 20)  SpO2: 100% (28 Nov 2020 12:45) (93% - 100%)                        13.6   6.71  )-----------( 217      ( 28 Nov 2020 12:28 )             41.6     11-28    136  |  102  |  22.0<H>  ----------------------------<  90  5.5<H>   |  21.0<L>  |  0.87    Ca    9.5      28 Nov 2020 12:28    TPro  7.3  /  Alb  4.2  /  TBili  0.8  /  DBili  x   /  AST  33<H>  /  ALT  15  /  AlkPhos  68  11-28      CAPILLARY BLOOD GLUCOSE      POCT Blood Glucose.: 85 mg/dL (28 Nov 2020 12:39)    CT Head:      CT Angio Head w/ IV Cont (11.28.20 @ 12:37) >  INTERPRETATION:  CLINICAL INDICATIONS:Stroke Code    TECHNIQUE: CTA brain and neck. 120 cc Omnipaque 350 Intravenous contrast was administered. 2-D MIP and 3-D volume rendering images. CT perfusion: After the administration of  Omnipaque 300serial thin sections were obtained through the brain the purposes of evaluating CT perfusion. Raw data was sent to the ischemia rapid view software for postprocessing. RAPID artificial intelligence was utilized for the preliminary evaluation of intracranial hemorrhage and large vessel intracranial occlusion.      COMPARISON: MRI brain dated 6/2/2020    FINDINGS:    NONCONTRAST CT HEAD:  There is periventricular and subcortical white matter hypodensity without mass effect, nonspecific, likely representing mild to moderate chronic microvascular ischemic changes. There is no compelling evidence for an acute transcortical infarction. There is no evidence of mass, mass effect, midline shift or extra-axial fluid collection. The lateral ventricles and cortical sulci are age-appropriate in size and configuration. The orbits, mastoid air cells and visualized paranasal sinuses are normal. The calvarium is intact. MR is a more sensitive imaging modality for the evaluation of an acute infarction.      CTA BRAIN:  The [place] and vertebrobasilar system are unremarkable without evidence of stenosis, occlusion or saccular aneurysm dilation. No evidence for arterial venous malformation. The vertebral arteries are codominant.      CTA NECK: Tiny right carotid bulb calcification.  A left-sided aortic arch is demonstrated. There is normal relationship to the great vessels. The common carotid arteries, internal carotid arteries and vertebral arteries are normal in caliber. No evidence of stenosis, occlusion or saccular aneurysm dilation. The vertebral arteries are codominant.    CT PERFUSION:  Patient has only had less than 2 hours of symptoms may influence the CT perfusion.    No core infarct or evidence of delayed mean transit time is identified.    CBF<30% volume: 0 ml  Tmax>6.0 s volume: 0 ml  Mismatch volume: 0 ml  Mismatch ratio: none          IMPRESSION:    No acute infarction or hemorrhage. No large vessel occlusion. Negative CTP. Consider MRI of the brain as clinically warranted.          EXAMINATION: Assisted by ER staff  NIHSS: 1    1A: Level of consciousness       0= Alert; keenly responsive    1B: Ask month and age       0= Both questions right    1C: "Blink eyes" and "Squeeze Hands"       0= Performs both    2: Horizontal EOMs       0= Normal    3: Visual fields       0= No visual loss    4: Facial palsy (use grimace if obtunded)       0= Normal symmetry    5A: Left arm motor drift - improved       0= No drift x 10 seconds    5B: Right arm motor drift       0= No drift x 10 seconds    6A: Left leg motor drift       0= No drift x 10 seconds    6B: Right leg motor drift       0= No drift x 10 seconds    7: Limb ataxia        0= No ataxia    8: Sensation   +1= mild-moderate loss: less sharp/more dull     9: Language/aphasia- describe the scene (on paul); name the items; read the sentences (on paul)       0= Normal, no aphasia    10: Dysarthria- improved        0= Normal    11: Extinction/inattention       0= No abnormality      Patient is a pleasant 81 yo R handed female hx of TIA x2  ( last 7 mo's), HTN, CAD S/P stent, S/P nephrectomy 10/20, who presents to [facility] ER  stating LNW= at 10 am today and developed speech difficulty along with  L arm and L leg weakness.  Patient's exam improved while in ER. I reviewed CT /CTA of Brain and neck and no evidence of infract /hemorrhage noted on CT of brain and the  CTA did NOT show a large vessel occlusion.  I spoke with staff and patient and I  would NOT recommend rTPA at this time  Assessment  - TIA/  Urgent Hypertension    Plan  Obtain MRI of brain   Maintain normotension ( last recorded was >190 systolic)  Check HGBA1C and lipid profile  Neurology follow up.    Time spent 50 min            Plan discussed with

## 2020-11-28 NOTE — ED ADULT NURSE NOTE - CHIEF COMPLAINT QUOTE
pt A&OX 4 BIBA from home c/o left side weakness, aphasia, chest pressure & difficulty breathing. LKW 10am. Code stroke called upon ED arrival. Dr Dey at bedside

## 2020-11-28 NOTE — ED ADULT NURSE REASSESSMENT NOTE - NS ED NURSE REASSESS COMMENT FT1
Assumed care of the Pt at 1930. Pt is AOx4 in no acute distress. Verbal report received from ASHLEY Childs. Pt states she is having intermittent chest pain PA aware and at bedside. Pt denies dizziness and SOB at this time. Pt with an NIH of 0.No neurological deficits noted. Pt breathing is even and unlabored. Pt pending repeat Trops and cardiac consult in the Am.Wait time explained, plan of care explained, will continue to monitor,.

## 2020-11-28 NOTE — ED CDU PROVIDER INITIAL DAY NOTE - PROGRESS NOTE DETAILS
Pt received from JAMISON Woods. Pt evaluated at bedside. Pt denies any acute complaints at this time  PE: non toxic, RRR, lungs CTA, abd soft NTND no guarding, strength 5/5, sensation intact  MRI no acute infarct. Awaiting cardiology and neurology consults, rpt labs Reviewed EKG with Dr. Galeana

## 2020-11-28 NOTE — ED PROVIDER NOTE - OBJECTIVE STATEMENT
pt is an 80 year old female with history of TIA and left sided weakness presenting with left sided weakness and trouble speaking. Last known well was around 10 am this morning per EMS. Pt notes occasional chest heaviness but no chest pain ro back pain. No confusion. no trauma. No fevers or cough. No recent illness. Pt has been seen here for similar symptoms int he past with neg MRI but given tpa with improvement.

## 2020-11-29 DIAGNOSIS — R07.9 CHEST PAIN, UNSPECIFIED: ICD-10-CM

## 2020-11-29 LAB
APPEARANCE UR: CLEAR — SIGNIFICANT CHANGE UP
BACTERIA # UR AUTO: NEGATIVE — SIGNIFICANT CHANGE UP
BILIRUB UR-MCNC: NEGATIVE — SIGNIFICANT CHANGE UP
COLOR SPEC: YELLOW — SIGNIFICANT CHANGE UP
D DIMER BLD IA.RAPID-MCNC: <150 NG/ML DDU — SIGNIFICANT CHANGE UP
DIFF PNL FLD: ABNORMAL
EPI CELLS # UR: SIGNIFICANT CHANGE UP
GLUCOSE UR QL: NEGATIVE MG/DL — SIGNIFICANT CHANGE UP
KETONES UR-MCNC: NEGATIVE — SIGNIFICANT CHANGE UP
LEUKOCYTE ESTERASE UR-ACNC: ABNORMAL
NITRITE UR-MCNC: NEGATIVE — SIGNIFICANT CHANGE UP
PH UR: 5 — SIGNIFICANT CHANGE UP (ref 5–8)
PROT UR-MCNC: NEGATIVE MG/DL — SIGNIFICANT CHANGE UP
RBC CASTS # UR COMP ASSIST: SIGNIFICANT CHANGE UP /HPF (ref 0–4)
SP GR SPEC: 1.01 — SIGNIFICANT CHANGE UP (ref 1.01–1.02)
UROBILINOGEN FLD QL: NEGATIVE MG/DL — SIGNIFICANT CHANGE UP
WBC UR QL: SIGNIFICANT CHANGE UP

## 2020-11-29 PROCEDURE — 99217: CPT

## 2020-11-29 PROCEDURE — 99223 1ST HOSP IP/OBS HIGH 75: CPT

## 2020-11-29 PROCEDURE — 99233 SBSQ HOSP IP/OBS HIGH 50: CPT

## 2020-11-29 RX ADMIN — CARVEDILOL PHOSPHATE 6.25 MILLIGRAM(S): 80 CAPSULE, EXTENDED RELEASE ORAL at 17:14

## 2020-11-29 RX ADMIN — Medication 81 MILLIGRAM(S): at 12:06

## 2020-11-29 RX ADMIN — CARVEDILOL PHOSPHATE 6.25 MILLIGRAM(S): 80 CAPSULE, EXTENDED RELEASE ORAL at 06:29

## 2020-11-29 RX ADMIN — CLOPIDOGREL BISULFATE 75 MILLIGRAM(S): 75 TABLET, FILM COATED ORAL at 12:06

## 2020-11-29 RX ADMIN — ATORVASTATIN CALCIUM 20 MILLIGRAM(S): 80 TABLET, FILM COATED ORAL at 23:07

## 2020-11-29 NOTE — ED CDU PROVIDER SUBSEQUENT DAY NOTE - ATTENDING CONTRIBUTION TO CARE
seen on rounds   in observation for l arm weak and cp  hx tpa for stroke loop recorder stents  serial trops and cardio consult neuro consult  agree with plan

## 2020-11-29 NOTE — ED ADULT NURSE REASSESSMENT NOTE - NIH STROKE SCALE: 1C. LOC COMMANDS, QM
(0) Performs both tasks correctly

## 2020-11-29 NOTE — ED ADULT NURSE REASSESSMENT NOTE - NIH STROKE SCALE: 6A. MOTOR LEG, LEFT, QM
(0) No drift; leg holds 30 degree position for full 5 secs

## 2020-11-29 NOTE — H&P ADULT - NSHPPHYSICALEXAM_GEN_ALL_CORE
Vital Signs Last 24 Hrs  T(F): 98.5 (29 Nov 2020 11:39), Max: 98.5 (29 Nov 2020 11:39)  HR: 75 (29 Nov 2020 11:39) (75 - 94)  BP: 139/82 (29 Nov 2020 11:39) (96/69 - 195/91)  RR: 18 (29 Nov 2020 11:39) (16 - 20)  SpO2: 97% (29 Nov 2020 11:39) (93% - 100%)    Physical Exam:  Constitutional: alert and oriented, in no acute distress   Neck: Soft and supple, No LAD, No JVD  Respiratory: Clear to auscultation bilaterally, no wheezes or crackles  Cardiovascular: Regular rate and rhythm, no murmurs, gallops, rubs  Gastrointestinal: Soft, non-tender to palpation, +bs  Vascular: 2+ peripheral pulses  Neurological: A/O x 3, no focal neurological deficits  Musculoskeletal: 5/5 strength b/l upper and lower extremities, no lower extremity edema bilaterally  Skin: no rashes  Psych: appropriate affect

## 2020-11-29 NOTE — H&P ADULT - NSHPREVIEWOFSYSTEMS_GEN_ALL_CORE
Review of Systems:  CONSTITUTIONAL: No weakness, fevers or chills  EYES/ENT: No visual changes;  No vertigo or throat pain   NECK: No pain or stiffness  RESPIRATORY: No cough, wheezing, hemoptysis; No shortness of breath  CARDIOVASCULAR: No chest pain or palpitations  GASTROINTESTINAL: No abdominal or epigastric pain. No nausea, vomiting, or hematemesis; No diarrhea or constipation.   GENITOURINARY: No dysuria, frequency or hematuria  NEUROLOGICAL: No numbness or weakness  SKIN: No itching, burning, rashes, or lesions   All other review of systems is negative unless indicated above

## 2020-11-29 NOTE — CONSULT NOTE ADULT - SUBJECTIVE AND OBJECTIVE BOX
[facility], THE HEART CENTER                                   [address] [place] [address]                                                      PHONE: [phone]                                                         FAX: [phone]  [email or web address]  ---------------------------------------------------------------------------------------------------------------------------------    HPI:  BREANA HELM is an 80y Female PMHX HTN, HLD, CAD s/p prior PCI to LAD, Rheumatic fever as a child, hx of vasovagal syncope, hx of TIA s/p; ILR   who presents to [facility] ED with CP.  Pt had sudden onset cp - left sided 6/10, sharp, non radiating, associated with sob.      PAST MEDICAL & SURGICAL HISTORY:  TIA (transient ischemic attack)    Rheumatic fever    HTN (hypertension)    Status post tonsillectomy        Biaxin (Muscle Pain; Joint Pain)  codeine (Faint)  Isosorbide Mononitrate Extended Release (Faint)  Metoprolol Succinate ER (Unknown)      MEDICATIONS  (STANDING):  aspirin  chewable 81 milliGRAM(s) Oral daily  atorvastatin 20 milliGRAM(s) Oral at bedtime  carvedilol 6.25 milliGRAM(s) Oral every 12 hours  clopidogrel Tablet 75 milliGRAM(s) Oral daily    MEDICATIONS  (PRN):      Family History: Pt denies hx of early cad, SCD, or congenital heart disease.      Social History:  Cigarettes:   former                 Alchohol:   no              Illicit Drug Abuse:  no    ROS:    Extensively Reviewed with pertinents as per HPI the remainder were negative.      Vital Signs Last 24 Hrs  T(C): 36.7 (2020 07:34), Max: 36.8 (2020 19:02)  T(F): 98 (2020 07:34), Max: 98.2 (2020 19:02)  HR: 90 (2020 07:34) (75 - 94)  BP: 96/69 (2020 07:34) (96/69 - 195/91)  BP(mean): --  RR: 16 (2020 07:34) (16 - 20)  SpO2: 96% (2020 07:34) (93% - 100%)  ICU Vital Signs Last 24 Hrs  BREANA HELM  I&O's Detail    I&O's Summary    Drug Dosing Weight  BREANA HELM      PHYSICAL EXAM:  General: Appears well developed, well nourished alert and cooperative.  HEENT: Head; normocephalic, atraumatic.  Eyes: Pupils reactive, cornea wnl.  Neck: Supple, no nodes adenopathy, no NVD or carotid bruit or thyromegaly.  CARDIOVASCULAR: Normal S1 and S2, No murmur, rub, gallop or lift.   LUNGS: No rales, rhonchi or wheeze. Normal breath sounds bilaterally.  ABDOMEN: Soft, nontender without mass or organomegaly. bowel sounds normoactive.  EXTREMITIES: No clubbing, cyanosis or edema. Distal pulses wnl.   SKIN: warm and dry with normal turgor.  NEURO: Alert/oriented x 3/normal motor exam. No pathologic reflexes.    PSYCH: normal affect.        LABS:                        13.6   6.71  )-----------( 217      ( 2020 12:28 )             41.6     11-28    x   |  x   |  x   ----------------------------<  x   4.2   |  x   |  x     Ca    9.5      2020 12:28    TPro  7.3  /  Alb  4.2  /  TBili  0.8  /  DBili  x   /  AST  33<H>  /  ALT  15  /  AlkPhos  68      BREANA HELM  CARDIAC MARKERS ( 2020 20:11 )  x     / <0.01 ng/mL / x     / x     / x      CARDIAC MARKERS ( 2020 17:08 )  x     / <0.01 ng/mL / x     / x     / x      CARDIAC MARKERS ( 2020 12:28 )  x     / <0.01 ng/mL / x     / x     / x            Urinalysis Basic - ( 2020 05:45 )    Color: Yellow / Appearance: Clear / S.010 / pH: x  Gluc: x / Ketone: Negative  / Bili: Negative / Urobili: Negative mg/dL   Blood: x / Protein: Negative mg/dL / Nitrite: Negative   Leuk Esterase: Trace / RBC: 0-2 /HPF / WBC 0-2   Sq Epi: x / Non Sq Epi: Occasional / Bacteria: Negative        RADIOLOGY & ADDITIONAL STUDIES:    INTERPRETATION OF TELEMETRY (personally reviewed):    ECG: nsr, nml axis, abnormal r wave progression    ECHO:  2020  Nml Ef, mild-mod TR, Mild-mod AI,        Assessment and Plan:  In summary, BREANA HELM is an 80y Female with past medical history significant for     Chest pain::  -pt r/o for ACS             Thank you for allowing [facility] to participate in the care of this patient.  Please feel free to call with any questions or concerns. [facility], THE HEART CENTER                                   [address] [place] [address]                                                      PHONE: [phone]                                                         FAX: [phone]  [email or web address]  ---------------------------------------------------------------------------------------------------------------------------------    HPI:  BREANA HELM is an 80y Female PMHX HTN, HLD, CAD s/p prior PCI to LAD, Rheumatic fever as a child, hx of vasovagal syncope, hx of TIA s/p; ILR   who presents to [facility] ED with CP.  Pt had sudden onset cp - left sided 6/10, sharp, non radiating, associated with sob.      PAST MEDICAL & SURGICAL HISTORY:  TIA (transient ischemic attack)    Rheumatic fever    HTN (hypertension)    Status post tonsillectomy        Biaxin (Muscle Pain; Joint Pain)  codeine (Faint)  Isosorbide Mononitrate Extended Release (Faint)  Metoprolol Succinate ER (Unknown)      MEDICATIONS  (STANDING):  aspirin  chewable 81 milliGRAM(s) Oral daily  atorvastatin 20 milliGRAM(s) Oral at bedtime  carvedilol 6.25 milliGRAM(s) Oral every 12 hours  clopidogrel Tablet 75 milliGRAM(s) Oral daily    MEDICATIONS  (PRN):      Family History: Pt denies hx of early cad, SCD, or congenital heart disease.      Social History:  Cigarettes:   former                 Alchohol:   no              Illicit Drug Abuse:  no    ROS:    Extensively Reviewed with pertinents as per HPI the remainder were negative.      Vital Signs Last 24 Hrs  T(C): 36.7 (2020 07:34), Max: 36.8 (2020 19:02)  T(F): 98 (2020 07:34), Max: 98.2 (2020 19:02)  HR: 90 (2020 07:34) (75 - 94)  BP: 96/69 (2020 07:34) (96/69 - 195/91)  BP(mean): --  RR: 16 (2020 07:34) (16 - 20)  SpO2: 96% (2020 07:34) (93% - 100%)  ICU Vital Signs Last 24 Hrs  BREANA HELM  I&O's Detail    I&O's Summary    Drug Dosing Weight  BREANA HELM      PHYSICAL EXAM:  General: Appears well developed, well nourished alert and cooperative.  HEENT: Head; normocephalic, atraumatic.  Eyes: Pupils reactive, cornea wnl.  Neck: Supple, no nodes adenopathy, no NVD or carotid bruit or thyromegaly.  CARDIOVASCULAR: Normal S1 and S2, No murmur, rub, gallop or lift.   LUNGS: No rales, rhonchi or wheeze. Normal breath sounds bilaterally.  ABDOMEN: Soft, nontender without mass or organomegaly. bowel sounds normoactive.  EXTREMITIES: No clubbing, cyanosis or edema. Distal pulses wnl.   SKIN: warm and dry with normal turgor.  NEURO: Alert/oriented x 3/normal motor exam. No pathologic reflexes.    PSYCH: normal affect.        LABS:                        13.6   6.71  )-----------( 217      ( 2020 12:28 )             41.6     11-28    x   |  x   |  x   ----------------------------<  x   4.2   |  x   |  x     Ca    9.5      2020 12:28    TPro  7.3  /  Alb  4.2  /  TBili  0.8  /  DBili  x   /  AST  33<H>  /  ALT  15  /  AlkPhos  68      BREANA HELM  CARDIAC MARKERS ( 2020 20:11 )  x     / <0.01 ng/mL / x     / x     / x      CARDIAC MARKERS ( 2020 17:08 )  x     / <0.01 ng/mL / x     / x     / x      CARDIAC MARKERS ( 2020 12:28 )  x     / <0.01 ng/mL / x     / x     / x            Urinalysis Basic - ( 2020 05:45 )    Color: Yellow / Appearance: Clear / S.010 / pH: x  Gluc: x / Ketone: Negative  / Bili: Negative / Urobili: Negative mg/dL   Blood: x / Protein: Negative mg/dL / Nitrite: Negative   Leuk Esterase: Trace / RBC: 0-2 /HPF / WBC 0-2   Sq Epi: x / Non Sq Epi: Occasional / Bacteria: Negative        RADIOLOGY & ADDITIONAL STUDIES:    INTERPRETATION OF TELEMETRY (personally reviewed):    ECG: nsr, nml axis, abnormal r wave progression    ECHO:  2020  Nml Ef, mild-mod TR, Mild-mod AI,        Assessment and Plan:  In summary,  BREANA HELM is an 80y Female PMHX HTN, HLD, CAD s/p prior PCI to LAD, Rheumatic fever as a child, hx of vasovagal syncope, hx of TIA s/p; ILR   who presents to [facility] ED with CP.  Pt had sudden onset cp - left sided 6/10, sharp, non radiating, associated with sob.      Chest pain:  -pt r/o for ACS   -echo  -plan for cardiac cath tomorrow am given on going symptoms  -continue home cardiac meds/dosages       Thank you for allowing [facility] to participate in the care of this patient.  Please feel free to call with any questions or concerns. [facility], [facility]ART CENTER                                   [address] [place] [address]                                                      PHONE: [phone]                                                         FAX: [phone]  [email or web address]  ---------------------------------------------------------------------------------------------------------------------------------    HPI:  BREANA HELM is an 80y Female PMHX HTN, HLD, CAD s/p prior PCI to LAD, Rheumatic fever as a child, hx of vasovagal syncope, hx of TIA s/p; ILR   who presents to [facility] ED with CP.  Pt had sudden onset cp - left sided 6/10, sharp, non radiating, associated with sob.      PAST MEDICAL & SURGICAL HISTORY:  TIA (transient ischemic attack)    Rheumatic fever    HTN (hypertension)    Status post tonsillectomy        Biaxin (Muscle Pain; Joint Pain)  codeine (Faint)  Isosorbide Mononitrate Extended Release (Faint)  Metoprolol Succinate ER (Unknown)      MEDICATIONS  (STANDING):  aspirin  chewable 81 milliGRAM(s) Oral daily  atorvastatin 20 milliGRAM(s) Oral at bedtime  carvedilol 6.25 milliGRAM(s) Oral every 12 hours  clopidogrel Tablet 75 milliGRAM(s) Oral daily    MEDICATIONS  (PRN):      Family History: Pt denies hx of early cad, SCD, or congenital heart disease.      Social History:  Cigarettes:   former                 Alchohol:   no              Illicit Drug Abuse:  no    ROS:    Extensively Reviewed with pertinents as per HPI the remainder were negative.      Vital Signs Last 24 Hrs  T(C): 36.7 (2020 07:34), Max: 36.8 (2020 19:02)  T(F): 98 (2020 07:34), Max: 98.2 (2020 19:02)  HR: 90 (2020 07:34) (75 - 94)  BP: 96/69 (2020 07:34) (96/69 - 195/91)  BP(mean): --  RR: 16 (2020 07:34) (16 - 20)  SpO2: 96% (2020 07:34) (93% - 100%)  ICU Vital Signs Last 24 Hrs  BREANA HELM  I&O's Detail    I&O's Summary    Drug Dosing Weight  BREANA HELM      PHYSICAL EXAM:  General: Appears well developed, well nourished alert and cooperative.  HEENT: Head; normocephalic, atraumatic.  Eyes: Pupils reactive, cornea wnl.  Neck: Supple, no nodes adenopathy, no NVD or carotid bruit or thyromegaly.  CARDIOVASCULAR: Normal S1 and S2, No murmur, rub, gallop or lift.   LUNGS: No rales, rhonchi or wheeze. Normal breath sounds bilaterally.  ABDOMEN: Soft, nontender without mass or organomegaly. bowel sounds normoactive.  EXTREMITIES: No clubbing, cyanosis or edema. Distal pulses wnl.   SKIN: warm and dry with normal turgor.  NEURO: Alert/oriented x 3/normal motor exam. No pathologic reflexes.    PSYCH: normal affect.        LABS:                        13.6   6.71  )-----------( 217      ( 2020 12:28 )             41.6     11-28    x   |  x   |  x   ----------------------------<  x   4.2   |  x   |  x     Ca    9.5      2020 12:28    TPro  7.3  /  Alb  4.2  /  TBili  0.8  /  DBili  x   /  AST  33<H>  /  ALT  15  /  AlkPhos  68      BREANA HELM  CARDIAC MARKERS ( 2020 20:11 )  x     / <0.01 ng/mL / x     / x     / x      CARDIAC MARKERS ( 2020 17:08 )  x     / <0.01 ng/mL / x     / x     / x      CARDIAC MARKERS ( 2020 12:28 )  x     / <0.01 ng/mL / x     / x     / x            Urinalysis Basic - ( 2020 05:45 )    Color: Yellow / Appearance: Clear / S.010 / pH: x  Gluc: x / Ketone: Negative  / Bili: Negative / Urobili: Negative mg/dL   Blood: x / Protein: Negative mg/dL / Nitrite: Negative   Leuk Esterase: Trace / RBC: 0-2 /HPF / WBC 0-2   Sq Epi: x / Non Sq Epi: Occasional / Bacteria: Negative        RADIOLOGY & ADDITIONAL STUDIES:    INTERPRETATION OF TELEMETRY (personally reviewed):    ECG: nsr, nml axis, abnormal r wave progression    ECHO:  2020  Nml Ef, mild-mod TR, Mild-mod AI,        Assessment and Plan:  In summary,  BREANA HELM is an 80y Female PMHX HTN, HLD, CAD s/p prior PCI to LAD, Rheumatic fever as a child, hx of vasovagal syncope, hx of TIA s/p; ILR   who presents to [facility] ED with CP.  Pt had sudden onset cp - left sided 6/10, sharp, non radiating, associated with sob.      Chest pain:  -pt r/o for ACS   -echo  -plan for cardiac cath tomorrow am given on going symptoms  -Npo after midnight   -continue home cardiac meds/dosages       Thank you for allowing [facility] to participate in the care of this patient.  Please feel free to call with any questions or concerns.

## 2020-11-29 NOTE — ED CDU PROVIDER SUBSEQUENT DAY NOTE - MEDICAL DECISION MAKING DETAILS
79 y/o F with weakness and chest pressure. Placed in observation for MRI head, Neuro consult, cardiology consult

## 2020-11-29 NOTE — ED ADULT NURSE REASSESSMENT NOTE - NIH STROKE SCALE: 3. VISUAL, QM
128
(0) No visual loss

## 2020-11-29 NOTE — ED ADULT NURSE REASSESSMENT NOTE - NS ED NURSE REASSESS COMMENT FT1
Pt resting comfortably at this time, A&OX3, denies any pain or discomfort at this time.  CM in place, NSR.  Pt is admitted at this time.  Plan is for pt to have a cardiac catheter tomorrow morning.  Will continue to monitor.

## 2020-11-29 NOTE — ED ADULT NURSE REASSESSMENT NOTE - GENERAL PATIENT STATE
smiling/interactive/comfortable appearance/cooperative
comfortable appearance/resting/sleeping/cooperative/smiling/interactive
resting/sleeping/cooperative/comfortable appearance
resting/sleeping/improvement verbalized/cooperative/comfortable appearance

## 2020-11-29 NOTE — ED ADULT NURSE REASSESSMENT NOTE - NIH STROKE SCALE: 4. FACIAL PALSY, QM
(0) Normal symmetrical movements

## 2020-11-29 NOTE — ED ADULT NURSE REASSESSMENT NOTE - NIH STROKE SCALE: 9. BEST LANGUAGE, QM
(0) No aphasia; normal

## 2020-11-29 NOTE — CONSULT NOTE ADULT - SUBJECTIVE AND OBJECTIVE BOX
[facility]                                        Neurology at [place]                                  Leigh Prather, & Anjum                                      [address]                                           [place], [place], [address]                                                [phone]        CC: TIA.     HISTORY:  The patient is a 80y Female who presented with left sided chest pain and shortness of breath. This was followed by slurred speech and left sided numbness and weakness. The symptoms resolved and initial NIH SS score was reported as 0.  She has a prior history of TIA.    She has a loop recorder implanted.       PAST MEDICAL & SURGICAL HISTORY:  TIA (transient ischemic attack)  Rheumatic fever  HTN (hypertension)  Status post tonsillectomy    MEDICATIONS  (STANDING):  aspirin  chewable 81 milliGRAM(s) Oral daily  atorvastatin 20 milliGRAM(s) Oral at bedtime  carvedilol 6.25 milliGRAM(s) Oral every 12 hours  clopidogrel Tablet 75 milliGRAM(s) Oral daily    MEDICATIONS  (PRN):      Allergies  Biaxin (Muscle Pain; Joint Pain)  codeine (Faint)  Metoprolol Succinate ER (Unknown)  Intolerances  Isosorbide Mononitrate Extended Release (Faint)    SOCIAL HISTORY:  Non smoker.     FAMILY HISTORY:  No pertinent family history in first degree relatives    ROS:  Constitutional: The patient denies fevers or weight changes.  Neuro: As per HPI.  Eyes: Denies blurry vision.  Ears/nose/throat: Denies Tinnitus.   Cardiac: Denies chest pain. Denies palpitations.  Respiratory: Denies shortness of breath.  GI: Denies abdominal pain, nausea, or vomiting.  : Denies change in urinary pattern.  Integumentary: Denies rash.  Psych: Denies recent mood changes.  Heme: denies easy bleeding/bruising.    Exam:  Vital Signs Last 24 Hrs  T(C): 36.7 (29 Nov 2020 07:34), Max: 36.8 (28 Nov 2020 19:02)  T(F): 98 (29 Nov 2020 07:34), Max: 98.2 (28 Nov 2020 19:02)  HR: 90 (29 Nov 2020 07:34) (75 - 94)  BP: 96/69 (29 Nov 2020 07:34) (96/69 - 195/91)  RR: 16 (29 Nov 2020 07:34) (16 - 20)  SpO2: 96% (29 Nov 2020 07:34) (93% - 100%)  General: NAD.   Carotid bruits absent.     Mental status: The patient is awake, alert, and fully oriented. There is no aphasia. Attention span is normal. Patient is aware of current events.     Cranial nerves: There is no papilledema (direct ophthalmoscope). Pupils react symmetrically to light. There is no visual field deficit to confrontation. Extraocular motion is full with no nystagmus. There is no ptosis. Facial sensation is intact. Facial musculature is symmetric. Palate elevates symmetrically. Tongue is midline.    Motor: There is normal bulk and tone.  Strength is 5/5 in the right arm and leg.   Strength is 5/5 in the left arm and leg.    Sensation: Intact to light touch and pin. There is no extinction to double simultaneous stimulation.     Reflexes: 2+ throughout and plantar responses are flexor.    Cerebellar: There is no dysmetria on finger to nose testing.    LABS:                         13.6   6.71  )-----------( 217      ( 28 Nov 2020 12:28 )             41.6       11-28    x   |  x   |  x   ----------------------------<  x   4.2   |  x   |  x     Ca    9.5      28 Nov 2020 12:28    TPro  7.3  /  Alb  4.2  /  TBili  0.8  /  DBili  x   /  AST  33<H>  /  ALT  15  /  AlkPhos  68  11-28        RADIOLOGY   MRI brain images reviewed (and concur with report): There is no acute pathology.     MEGHANN in June 2020 was negative for embolic sources.

## 2020-11-29 NOTE — H&P ADULT - NSICDXPASTMEDICALHX_GEN_ALL_CORE_FT
PAST MEDICAL HISTORY:  CAD S/P percutaneous coronary angioplasty     HTN (hypertension)     Polio     Rheumatic fever     TIA (transient ischemic attack)

## 2020-11-29 NOTE — ED ADULT NURSE REASSESSMENT NOTE - COMFORT CARE
meal provided
wait time explained/ambulated to bathroom/plan of care explained/po fluids offered
plan of care explained/assisted to bathroom/meal provided/po fluids offered/wait time explained/ambulated to bathroom
plan of care explained/wait time explained
wait time explained/plan of care explained

## 2020-11-29 NOTE — H&P ADULT - HISTORY OF PRESENT ILLNESS
79 y/o F with PMH of HTN, HLD, CAD s/p PCI x 2, TIA x 2, rheumatic fever and polio as a child presents with complaints of chest pain. The patient states yesterday morning she began feeing that she "had no air" with chest pressure. Patient states the chest pressure was midsternal and left sided in location, non-radiating with associated lightheadedness. The feeling lasted for a few hours and eventually eased up as time went along. Denies any past occurrences. Patient states she pressed her medical alert and when the ambulance came she also was experiencing slurred speech and left sided weakness. Patient states she was having trouble "getting words out." These symptoms lasted for several hours and was almost completely resolved by the evening.

## 2020-11-29 NOTE — ED CDU PROVIDER DISPOSITION NOTE - CLINICAL COURSE
79 y/o F with hx of TIA, 2 cardiac stents, loop recorder c/o left sided chest pressure associated with SOB which started today and lasted approx 1 hour.  Symptoms occurred after going shopping.  She states that she's had similar symptoms in the past, initially was c.o left side of the left weakness. CTA and MR w.o any acute finding  . pending Neurology consult . pt is seen by [facility] admit for cath tomorrow will admit the pt

## 2020-11-29 NOTE — CONSULT NOTE ADULT - ASSESSMENT
The patient is a 80y Female with multiple medical issues now with recurrent TIA.     TIA.   MRI negative for any acute infarct.   She has had prior visits in May and June of 2020. In May she received t-PA.  Her work up is completed.   She has had MEGHANN which was negative and a loop is implanted.   She is on dual antiplatelet agent and statin.   Would continue current regimen.     Chest pain.  Seen by cardiology.     Case discussed with ER team (JAMISON Martinez).

## 2020-11-29 NOTE — H&P ADULT - NSHPLABSRESULTS_GEN_ALL_CORE
13.6   6.71  )-----------( 217      ( 28 Nov 2020 12:28 )             41.6   11-28    x   |  x   |  x   ----------------------------<  x   4.2   |  x   |  x     Ca    9.5      28 Nov 2020 12:28    TPro  7.3  /  Alb  4.2  /  TBili  0.8  /  DBili  x   /  AST  33<H>  /  ALT  15  /  AlkPhos  68  11-28

## 2020-11-29 NOTE — ED ADULT NURSE REASSESSMENT NOTE - NIH STROKE SCALE: 1A. LEVEL OF CONSCIOUSNESS, QM
(0) Alert; keenly responsive

## 2020-11-29 NOTE — H&P ADULT - ASSESSMENT
79 y/o F with PMH of HTN, HLD, CAD s/p PCI x 2, TIA x 2, rheumatic fever and polio admitted for chest pain and TIA.    Chest pain  - Cardiology consult appreciated  - telemetry monitoring  - echocardiogram   - c/w Coreg BID  - c/w ASA/Plavix  - NPO after midnight for cardiac cath    TIA  - CT head: No acute infarction or hemorrhage. No large vessel occlusion. Negative CTP. Consider MRI of the brain as clinically warranted.   - MRI Brain: no acute infarction  - Neurology consult appreciated  - c/w ASA 81mg daily  - c/w Lipitor 20mg nightly    HTN  - c/w Coreg 6.25mg BID  - c/w ASA 81mg daily    HLD/CAD s/p PCI  - c/w Lipitor  - c/w ASA/Plavix    DVT ppx  - SCDs

## 2020-11-29 NOTE — ED ADULT NURSE REASSESSMENT NOTE - NS ED NURSE REASSESS COMMENT FT1
Pt received @ 0730, A&OX3, denies any pain or discomfort, CM in place,NSR.  VSS.  Clear bsb, abd soft nondistended, nontender, moving all ext well. Pt received @ 0730, A&OX3, denies any pain or discomfort, CM in place,NSR.  VSS.  Pt states slurred speech has resolved.  Clear bsb, abd soft nondistended, nontender, moving all ext well.

## 2020-11-29 NOTE — ED CDU PROVIDER SUBSEQUENT DAY NOTE - PHYSICAL EXAMINATION
Vital signs noted, see flowsheet.  General: NAD, well appearing and non-toxic.  HEENT: NC/AT. MMM.  Conjunctiva and sclera clear b/l.  EOMI. PERRL.  Neck: Soft and supple, full ROM without pain.  Cardiac: RRR. +S1/S2. Peripheral pulses 2+ and symmetric b/l.  Respiratory: Speaking in full sentences, no evidence of respiratory distress. Lungs CTA b/l, no wheezes/rhonchi/rales/stridor.   Abdomen: Normoactive bowel sounds x 4 quadrants. Soft, NTND. No guarding or rebound tenderness. No suprapubic tenderness.  Back: Spine midline and non-tender. No CVAT.  MSK: No muscle or joint tenderness to palpation.  Skin: warm, dry  Neuro: Awake, alert and oriented to person/place/time/situation. Moves all extremities spontaneously and symmetrically.  No focal deficits  Psych: Normal affect

## 2020-11-29 NOTE — ED ADULT NURSE REASSESSMENT NOTE - NIH STROKE SCALE: 5B. MOTOR ARM, RIGHT, QM
(0) No drift; limb holds 90 (or 45) degrees for full 10 secs

## 2020-11-29 NOTE — ED ADULT NURSE REASSESSMENT NOTE - NIH STROKE SCALE: 1B. LOC QUESTIONS, QM
(0) Answers both questions correctly

## 2020-11-30 ENCOUNTER — TRANSCRIPTION ENCOUNTER (OUTPATIENT)
Age: 80
End: 2020-11-30

## 2020-11-30 ENCOUNTER — EMERGENCY (EMERGENCY)
Facility: HOSPITAL | Age: 80
LOS: 1 days | Discharge: HOME | End: 2020-11-30
Attending: EMERGENCY MEDICINE
Payer: MEDICARE

## 2020-11-30 VITALS
TEMPERATURE: 98 F | HEART RATE: 78 BPM | DIASTOLIC BLOOD PRESSURE: 89 MMHG | RESPIRATION RATE: 20 BRPM | WEIGHT: 169.98 LBS | SYSTOLIC BLOOD PRESSURE: 167 MMHG | HEIGHT: 62 IN | OXYGEN SATURATION: 97 %

## 2020-11-30 VITALS — DIASTOLIC BLOOD PRESSURE: 74 MMHG | SYSTOLIC BLOOD PRESSURE: 152 MMHG | HEART RATE: 76 BPM

## 2020-11-30 DIAGNOSIS — Z90.89 ACQUIRED ABSENCE OF OTHER ORGANS: Chronic | ICD-10-CM

## 2020-11-30 LAB
ALBUMIN SERPL ELPH-MCNC: 3.8 G/DL — SIGNIFICANT CHANGE UP (ref 3.3–5.2)
ALP SERPL-CCNC: 62 U/L — SIGNIFICANT CHANGE UP (ref 40–120)
ALT FLD-CCNC: 11 U/L — SIGNIFICANT CHANGE UP
ANION GAP SERPL CALC-SCNC: 11 MMOL/L — SIGNIFICANT CHANGE UP (ref 5–17)
ANION GAP SERPL CALC-SCNC: 15 MMOL/L — SIGNIFICANT CHANGE UP (ref 5–17)
APTT BLD: 29.2 SEC — SIGNIFICANT CHANGE UP (ref 27.5–35.5)
AST SERPL-CCNC: 18 U/L — SIGNIFICANT CHANGE UP
BASOPHILS # BLD AUTO: 0.09 K/UL — SIGNIFICANT CHANGE UP (ref 0–0.2)
BASOPHILS NFR BLD AUTO: 1 % — SIGNIFICANT CHANGE UP (ref 0–2)
BILIRUB SERPL-MCNC: 1 MG/DL — SIGNIFICANT CHANGE UP (ref 0.4–2)
BUN SERPL-MCNC: 22 MG/DL — HIGH (ref 8–20)
BUN SERPL-MCNC: 22 MG/DL — HIGH (ref 8–20)
CALCIUM SERPL-MCNC: 8.8 MG/DL — SIGNIFICANT CHANGE UP (ref 8.6–10.2)
CALCIUM SERPL-MCNC: 9.2 MG/DL — SIGNIFICANT CHANGE UP (ref 8.6–10.2)
CHLORIDE SERPL-SCNC: 102 MMOL/L — SIGNIFICANT CHANGE UP (ref 98–107)
CHLORIDE SERPL-SCNC: 105 MMOL/L — SIGNIFICANT CHANGE UP (ref 98–107)
CHOLEST SERPL-MCNC: 136 MG/DL — SIGNIFICANT CHANGE UP
CK SERPL-CCNC: 44 U/L — SIGNIFICANT CHANGE UP (ref 25–170)
CO2 SERPL-SCNC: 23 MMOL/L — SIGNIFICANT CHANGE UP (ref 22–29)
CO2 SERPL-SCNC: 25 MMOL/L — SIGNIFICANT CHANGE UP (ref 22–29)
CREAT SERPL-MCNC: 0.98 MG/DL — SIGNIFICANT CHANGE UP (ref 0.5–1.3)
CREAT SERPL-MCNC: 1.04 MG/DL — SIGNIFICANT CHANGE UP (ref 0.5–1.3)
CULTURE RESULTS: SIGNIFICANT CHANGE UP
EOSINOPHIL # BLD AUTO: 0.2 K/UL — SIGNIFICANT CHANGE UP (ref 0–0.5)
EOSINOPHIL NFR BLD AUTO: 2.3 % — SIGNIFICANT CHANGE UP (ref 0–6)
GLUCOSE SERPL-MCNC: 95 MG/DL — SIGNIFICANT CHANGE UP (ref 70–99)
GLUCOSE SERPL-MCNC: 98 MG/DL — SIGNIFICANT CHANGE UP (ref 70–99)
HCT VFR BLD CALC: 39.5 % — SIGNIFICANT CHANGE UP (ref 34.5–45)
HCT VFR BLD CALC: 39.6 % — SIGNIFICANT CHANGE UP (ref 34.5–45)
HDLC SERPL-MCNC: 61 MG/DL — SIGNIFICANT CHANGE UP
HGB BLD-MCNC: 13 G/DL — SIGNIFICANT CHANGE UP (ref 11.5–15.5)
HGB BLD-MCNC: 13 G/DL — SIGNIFICANT CHANGE UP (ref 11.5–15.5)
IMM GRANULOCYTES NFR BLD AUTO: 0.2 % — SIGNIFICANT CHANGE UP (ref 0–1.5)
INR BLD: 1.15 RATIO — SIGNIFICANT CHANGE UP (ref 0.88–1.16)
LIPID PNL WITH DIRECT LDL SERPL: 60 MG/DL — SIGNIFICANT CHANGE UP
LYMPHOCYTES # BLD AUTO: 1.03 K/UL — SIGNIFICANT CHANGE UP (ref 1–3.3)
LYMPHOCYTES # BLD AUTO: 11.8 % — LOW (ref 13–44)
MCHC RBC-ENTMCNC: 29.2 PG — SIGNIFICANT CHANGE UP (ref 27–34)
MCHC RBC-ENTMCNC: 29.3 PG — SIGNIFICANT CHANGE UP (ref 27–34)
MCHC RBC-ENTMCNC: 32.8 GM/DL — SIGNIFICANT CHANGE UP (ref 32–36)
MCHC RBC-ENTMCNC: 32.9 GM/DL — SIGNIFICANT CHANGE UP (ref 32–36)
MCV RBC AUTO: 89 FL — SIGNIFICANT CHANGE UP (ref 80–100)
MCV RBC AUTO: 89.2 FL — SIGNIFICANT CHANGE UP (ref 80–100)
MONOCYTES # BLD AUTO: 0.6 K/UL — SIGNIFICANT CHANGE UP (ref 0–0.9)
MONOCYTES NFR BLD AUTO: 6.9 % — SIGNIFICANT CHANGE UP (ref 2–14)
NEUTROPHILS # BLD AUTO: 6.76 K/UL — SIGNIFICANT CHANGE UP (ref 1.8–7.4)
NEUTROPHILS NFR BLD AUTO: 77.8 % — HIGH (ref 43–77)
NON HDL CHOLESTEROL: 75 MG/DL — SIGNIFICANT CHANGE UP
PLATELET # BLD AUTO: 187 K/UL — SIGNIFICANT CHANGE UP (ref 150–400)
PLATELET # BLD AUTO: 194 K/UL — SIGNIFICANT CHANGE UP (ref 150–400)
POTASSIUM SERPL-MCNC: 4.9 MMOL/L — SIGNIFICANT CHANGE UP (ref 3.5–5.3)
POTASSIUM SERPL-MCNC: 5.1 MMOL/L — SIGNIFICANT CHANGE UP (ref 3.5–5.3)
POTASSIUM SERPL-SCNC: 4.9 MMOL/L — SIGNIFICANT CHANGE UP (ref 3.5–5.3)
POTASSIUM SERPL-SCNC: 5.1 MMOL/L — SIGNIFICANT CHANGE UP (ref 3.5–5.3)
PROT SERPL-MCNC: 6.3 G/DL — LOW (ref 6.6–8.7)
PROTHROM AB SERPL-ACNC: 13.2 SEC — SIGNIFICANT CHANGE UP (ref 10.6–13.6)
RBC # BLD: 4.43 M/UL — SIGNIFICANT CHANGE UP (ref 3.8–5.2)
RBC # BLD: 4.45 M/UL — SIGNIFICANT CHANGE UP (ref 3.8–5.2)
RBC # FLD: 12.2 % — SIGNIFICANT CHANGE UP (ref 10.3–14.5)
RBC # FLD: 12.3 % — SIGNIFICANT CHANGE UP (ref 10.3–14.5)
SARS-COV-2 IGG SERPL QL IA: NEGATIVE — SIGNIFICANT CHANGE UP
SARS-COV-2 IGM SERPL IA-ACNC: 0.09 INDEX — SIGNIFICANT CHANGE UP
SODIUM SERPL-SCNC: 138 MMOL/L — SIGNIFICANT CHANGE UP (ref 135–145)
SODIUM SERPL-SCNC: 142 MMOL/L — SIGNIFICANT CHANGE UP (ref 135–145)
SPECIMEN SOURCE: SIGNIFICANT CHANGE UP
TRIGL SERPL-MCNC: 75 MG/DL — SIGNIFICANT CHANGE UP
TROPONIN T SERPL-MCNC: <0.01 NG/ML — SIGNIFICANT CHANGE UP (ref 0–0.06)
WBC # BLD: 5.05 K/UL — SIGNIFICANT CHANGE UP (ref 3.8–10.5)
WBC # BLD: 8.7 K/UL — SIGNIFICANT CHANGE UP (ref 3.8–10.5)
WBC # FLD AUTO: 5.05 K/UL — SIGNIFICANT CHANGE UP (ref 3.8–10.5)
WBC # FLD AUTO: 8.7 K/UL — SIGNIFICANT CHANGE UP (ref 3.8–10.5)

## 2020-11-30 PROCEDURE — 85025 COMPLETE CBC W/AUTO DIFF WBC: CPT

## 2020-11-30 PROCEDURE — C1769: CPT

## 2020-11-30 PROCEDURE — 93458 L HRT ARTERY/VENTRICLE ANGIO: CPT

## 2020-11-30 PROCEDURE — 86769 SARS-COV-2 COVID-19 ANTIBODY: CPT

## 2020-11-30 PROCEDURE — 80061 LIPID PANEL: CPT

## 2020-11-30 PROCEDURE — 84484 ASSAY OF TROPONIN QUANT: CPT

## 2020-11-30 PROCEDURE — C1894: CPT

## 2020-11-30 PROCEDURE — 70551 MRI BRAIN STEM W/O DYE: CPT

## 2020-11-30 PROCEDURE — 80053 COMPREHEN METABOLIC PANEL: CPT

## 2020-11-30 PROCEDURE — 93005 ELECTROCARDIOGRAM TRACING: CPT

## 2020-11-30 PROCEDURE — 70496 CT ANGIOGRAPHY HEAD: CPT

## 2020-11-30 PROCEDURE — 93010 ELECTROCARDIOGRAM REPORT: CPT | Mod: 76

## 2020-11-30 PROCEDURE — 71045 X-RAY EXAM CHEST 1 VIEW: CPT | Mod: 26

## 2020-11-30 PROCEDURE — 84132 ASSAY OF SERUM POTASSIUM: CPT

## 2020-11-30 PROCEDURE — 70450 CT HEAD/BRAIN W/O DYE: CPT

## 2020-11-30 PROCEDURE — C1887: CPT

## 2020-11-30 PROCEDURE — 82962 GLUCOSE BLOOD TEST: CPT

## 2020-11-30 PROCEDURE — 99152 MOD SED SAME PHYS/QHP 5/>YRS: CPT

## 2020-11-30 PROCEDURE — 80048 BASIC METABOLIC PNL TOTAL CA: CPT

## 2020-11-30 PROCEDURE — 81001 URINALYSIS AUTO W/SCOPE: CPT

## 2020-11-30 PROCEDURE — 85027 COMPLETE CBC AUTOMATED: CPT

## 2020-11-30 PROCEDURE — 99218: CPT

## 2020-11-30 PROCEDURE — 93010 ELECTROCARDIOGRAM REPORT: CPT

## 2020-11-30 PROCEDURE — 93306 TTE W/DOPPLER COMPLETE: CPT

## 2020-11-30 PROCEDURE — 85379 FIBRIN DEGRADATION QUANT: CPT

## 2020-11-30 PROCEDURE — 0042T: CPT

## 2020-11-30 PROCEDURE — 70498 CT ANGIOGRAPHY NECK: CPT

## 2020-11-30 PROCEDURE — 99285 EMERGENCY DEPT VISIT HI MDM: CPT

## 2020-11-30 PROCEDURE — 99239 HOSP IP/OBS DSCHRG MGMT >30: CPT

## 2020-11-30 PROCEDURE — U0003: CPT

## 2020-11-30 PROCEDURE — G0378: CPT

## 2020-11-30 PROCEDURE — 36415 COLL VENOUS BLD VENIPUNCTURE: CPT

## 2020-11-30 PROCEDURE — 87086 URINE CULTURE/COLONY COUNT: CPT

## 2020-11-30 RX ORDER — CARVEDILOL PHOSPHATE 80 MG/1
6.25 CAPSULE, EXTENDED RELEASE ORAL ONCE
Refills: 0 | Status: COMPLETED | OUTPATIENT
Start: 2020-11-30 | End: 2020-11-30

## 2020-11-30 RX ORDER — SODIUM CHLORIDE 9 MG/ML
3 INJECTION INTRAMUSCULAR; INTRAVENOUS; SUBCUTANEOUS ONCE
Refills: 0 | Status: COMPLETED | OUTPATIENT
Start: 2020-11-30 | End: 2020-11-30

## 2020-11-30 RX ORDER — ATORVASTATIN CALCIUM 80 MG/1
1 TABLET, FILM COATED ORAL
Qty: 30 | Refills: 0
Start: 2020-11-30 | End: 2020-12-29

## 2020-11-30 RX ORDER — ATORVASTATIN CALCIUM 80 MG/1
40 TABLET, FILM COATED ORAL ONCE
Refills: 0 | Status: COMPLETED | OUTPATIENT
Start: 2020-11-30 | End: 2020-11-30

## 2020-11-30 RX ORDER — ASPIRIN/CALCIUM CARB/MAGNESIUM 324 MG
1 TABLET ORAL
Qty: 0 | Refills: 0 | DISCHARGE
Start: 2020-11-30

## 2020-11-30 RX ORDER — ASPIRIN/CALCIUM CARB/MAGNESIUM 324 MG
81 TABLET ORAL DAILY
Refills: 0 | Status: DISCONTINUED | OUTPATIENT
Start: 2020-11-30 | End: 2020-12-05

## 2020-11-30 RX ORDER — CLOPIDOGREL BISULFATE 75 MG/1
75 TABLET, FILM COATED ORAL ONCE
Refills: 0 | Status: COMPLETED | OUTPATIENT
Start: 2020-11-30 | End: 2020-11-30

## 2020-11-30 RX ORDER — ATORVASTATIN CALCIUM 80 MG/1
40 TABLET, FILM COATED ORAL AT BEDTIME
Refills: 0 | Status: DISCONTINUED | OUTPATIENT
Start: 2020-11-30 | End: 2020-11-30

## 2020-11-30 RX ADMIN — Medication 81 MILLIGRAM(S): at 11:06

## 2020-11-30 RX ADMIN — SODIUM CHLORIDE 3 MILLILITER(S): 9 INJECTION INTRAMUSCULAR; INTRAVENOUS; SUBCUTANEOUS at 19:57

## 2020-11-30 RX ADMIN — CARVEDILOL PHOSPHATE 6.25 MILLIGRAM(S): 80 CAPSULE, EXTENDED RELEASE ORAL at 17:00

## 2020-11-30 RX ADMIN — CARVEDILOL PHOSPHATE 6.25 MILLIGRAM(S): 80 CAPSULE, EXTENDED RELEASE ORAL at 06:50

## 2020-11-30 RX ADMIN — CLOPIDOGREL BISULFATE 75 MILLIGRAM(S): 75 TABLET, FILM COATED ORAL at 11:06

## 2020-11-30 NOTE — DISCHARGE NOTE NURSING/CASE MANAGEMENT/SOCIAL WORK - PATIENT PORTAL LINK FT
You can access the FollowMyHealth Patient Portal offered by [facility] by registering at the following website: [email or web address]. By joining [facility]’s FollowMyHealth portal, you will also be able to view your health information using other applications (apps) compatible with our system.

## 2020-11-30 NOTE — PROGRESS NOTE ADULT - SUBJECTIVE AND OBJECTIVE BOX
Chief complaint: Chest pain    Patient seen and examined at bedside. No acute overnight events reported. At the time of my evaluation the patient stated that she was having substernal chest pain at rest, denies dizziness, nausea or vomiting. Denies headache, fever, chills, cough, shortness of breath, nausea, vomiting, abdominal pain, diarrhea or constipation.     Vital Signs Last 24 Hrs  T(F): 97.9 (30 Nov 2020 07:38), Max: 98.5 (29 Nov 2020 11:39)  HR: 80 (30 Nov 2020 07:38) (69 - 82)  BP: 123/75 (30 Nov 2020 07:38) (111/73 - 155/90)  RR: 18 (30 Nov 2020 07:38) (18 - 20)  SpO2: 96% (30 Nov 2020 07:38) (96% - 98%)    Physical Exam:  Constitutional: alert and oriented, in no acute distress   Neck: Soft and supple, No LAD, No JVD  Respiratory: Clear to auscultation bilaterally, no wheezes or crackles  Cardiovascular: Regular rate and rhythm no murmurs, gallops, rubs  Gastrointestinal: Soft, non-tender to palpation, +bs  Vascular: 2+ peripheral pulses  Neurological: A/O x 3, no focal neurological deficits  Musculoskeletal: 5/5 strength b/l upper and lower extremities, no lower extremity edema bilaterally    Labs:                        13.0   5.05  )-----------( 194      ( 30 Nov 2020 08:56 )             39.6   11-30    142  |  105  |  22.0<H>  ----------------------------<  95  4.9   |  23.0  |  1.04    Ca    9.2      30 Nov 2020 08:56    TPro  7.3  /  Alb  4.2  /  TBili  0.8  /  DBili  x   /  AST  33<H>  /  ALT  15  /  AlkPhos  68  11-28

## 2020-11-30 NOTE — DISCHARGE NOTE NURSING/CASE MANAGEMENT/SOCIAL WORK - NSDCPEPTSTRK_GEN_ALL_CORE
Prescribed medications/Stroke warning signs and symptoms/Signs and symptoms of stroke/Call 911 for stroke/Stroke support groups for patients, families, and friends/Need for follow up after discharge/Risk factors for stroke/Stroke education booklet

## 2020-11-30 NOTE — ED PROVIDER NOTE - OBJECTIVE STATEMENT
81y/o F with PMHx of CAD, HTN, TIA; 2 stents (Plavix, Loop recorder) presents to the ED s/p syncopal episode which occurred today at 6pm. Pt states that she was recently admitted to ED for cardiac cath by dr. Gamboa; stayed overnight and went home today after eating yogurt and drinking isha. Pt reports that she arrived home, was in the kitchen standing at the stove cooking eggs while on the phone with her neighbor when she felt sudden sx of lightheadedness dizziness, went to sit on a chair at the kitchen table when she fell to the ground, taking the chair with her, landing on the floor, hitting the side of her head and back. Denies tongue biting, urinary incontinence, CP or abdominal pain.

## 2020-11-30 NOTE — PROGRESS NOTE ADULT - SUBJECTIVE AND OBJECTIVE BOX
s/p LHC revealing non obstructive CAD patent stents done VIa RFA toelrated well     Patient feels well.  Denies chest pain, shortness of breath, dizziness or palpitations at this time    Right groin procedure site CDI.  no bleeding, no hematoma, site soft, non tender, positive pedal pulses bilaterally  Medication during cardiac cath:   Versed 1mg   Fentanyl 50mcg       HPI:  81 y/o F with PMH of HTN, HLD, CAD s/p PCI x 2, TIA x 2, rheumatic fever and polio as a child presents with complaints of chest pain. The patient states yesterday morning she began feeing that she "had no air" with chest pressure. Patient states the chest pressure was midsternal and left sided in location, non-radiating with associated lightheadedness. The feeling lasted for a few hours and eventually eased up as time went along. Denies any past occurrences. Patient states she pressed her medical alert and when the ambulance came she also was experiencing slurred speech and left sided weakness. Patient states she was having trouble "getting words out." These symptoms lasted for several hours and was almost completely resolved by the evening.  (29 Nov 2020 11:39)    now s/p C revealing non obstructive CAD  11/30/20   VENTRICLES: LVEF 55%  CORONARY VESSELS: The coronary circulation is right dominant.  LM:   --  LM: Normal.  LAD:   --  Proximal LAD: There was a 20 % stenosis.  --  Mid LAD: There was a 0 % stenosis at the site of a prior stent.  CX:   --  Circumflex: Angiography showed minor luminal irregularities with  no flow limiting lesions.  RCA:   --  RCA: Angiography showed minor luminal irregularities with no  flow limiting lesions.  --  Ostial RCA: There was a 20 % stenosis.    DIAGNOSTIC IMPRESSIONS: Prior stent in LAD is patent with non obstructive CAD      -continue tele  -continue hospitalist service  -vital signs, labs, diet and activity per post procedure orders  ambulate ad vinnie post bedrest  -iv hydration  -encourage PO fluids  -plan of care discussed with patient, family  -post procedure and d/c instructions reviewed  -follow up with MD in 1-2 weeks  -Discussed therapeutic lifestyle changes to reduce risk factors such as following a cardiac diet, weight loss, maintaining a healthy weight, exercise, smoking cessation, medication compliance, and regular follow-up  with MD to know your numbers (BP, cholesterol, weight, and glucose)

## 2020-11-30 NOTE — DISCHARGE NOTE PROVIDER - CARE PROVIDER_API CALL
Elias Arellano  NEUROLOGY  [address], [address]  [place] [address]  Phone: [phone]  Fax: [phone]  Follow Up Time: 2 weeks    Mayco Cyr  INTERNAL MEDICINE  [address] [place] [address]  Phone: [phone]  Fax: [phone]  Follow Up Time: 1 week

## 2020-11-30 NOTE — DISCHARGE NOTE PROVIDER - PROVIDER TOKENS
PROVIDER:[TOKEN:[[id number]],FOLLOWUP:[2 weeks]],PROVIDER:[TOKEN:[[id number]],FOLLOWUP:[1 week]]

## 2020-11-30 NOTE — DISCHARGE NOTE PROVIDER - NSDCMRMEDTOKEN_GEN_ALL_CORE_FT
aspirin 81 mg oral tablet, chewable: 1 tab(s) orally once a day  atorvastatin 20 mg oral tablet: 1 tab(s) orally once a day  clopidogrel 75 mg oral tablet: 1 tab(s) orally once a day  Coenzyme Q10 200 mg oral capsule: 1 cap(s) orally once a day  Coreg 6.25 mg oral tablet: 1 tab(s) orally 2 times a day   Multiple Vitamins oral tablet: 1 tab(s) orally once a day   aspirin 81 mg oral tablet, chewable: 1 tab(s) orally once a day  clopidogrel 75 mg oral tablet: 1 tab(s) orally once a day  Coenzyme Q10 200 mg oral capsule: 1 cap(s) orally once a day  Coreg 6.25 mg oral tablet: 1 tab(s) orally 2 times a day   Lipitor 40 mg oral tablet: 1 tab(s) orally once a day   Multiple Vitamins oral tablet: 1 tab(s) orally once a day

## 2020-11-30 NOTE — PROGRESS NOTE ADULT - ASSESSMENT
81 y/o F with PMH of HTN, HLD, CAD s/p PCI x 2, TIA x 2, rheumatic fever and polio admitted for chest pain and TIA.    Chest pain  - Cardiology consult appreciated  - telemetry monitoring  - Echo: EF 55 to 60%  - c/w Coreg BID  - c/w ASA/Plavix  - cardiac cath today    TIA  - CT head: No acute infarction or hemorrhage. No large vessel occlusion. Negative CTP. Consider MRI of the brain as clinically warranted.   - MRI Brain: no acute infarction  - Neurology consult appreciated  - c/w ASA 81mg daily  - c/w Lipitor 20mg nightly    HTN  - c/w Coreg 6.25mg BID  - c/w ASA 81mg daily    HLD/CAD s/p PCI  - c/w Lipitor  - c/w ASA/Plavix    DVT ppx  - SCDs    Dispo: pending cardiac cath results

## 2020-11-30 NOTE — ED PROVIDER NOTE - CLINICAL SUMMARY MEDICAL DECISION MAKING FREE TEXT BOX
Will check labs, cardiac enzymes, head CT, discuss with cardiology. Possible obs for serial enzymes and cardio evaul.

## 2020-11-30 NOTE — PROGRESS NOTE ADULT - SUBJECTIVE AND OBJECTIVE BOX
[facility]                                   [address] [address]                                                      PHONE: [phone]                                                         FAX: [phone]  [email or web address]  ---------------------------------------------------------------------------------------------------------------------------------    Please see cath report.    Prior LAD stent is patent with no other CAD.  Med tx.  Will sign off, follow as out pt      Antonino Gamboa MD    Office [phone]  Cell     [phone]

## 2020-11-30 NOTE — ED CDU PROVIDER INITIAL DAY NOTE - OBJECTIVE STATEMENT
79y/o F with PMHx of CAD, HTN, TIA; 2 stents (Plavix, Loop recorder) presents to the ED s/p syncopal episode which occurred today at 6pm. Pt states that she was recently admitted to ED for cardiac cath by dr. Gamboa; stayed overnight and went home today after eating yogurt and drinking isha. Pt reports that she arrived home, was in the kitchen standing at the stove cooking eggs while on the phone with her neighbor when she felt sudden sx of lightheadedness dizziness, went to sit on a chair at the kitchen table when she fell to the ground, taking the chair with her, landing on the floor, hitting the side of her head and back. Denies tongue biting, urinary incontinence, CP or abdominal pain.

## 2020-11-30 NOTE — DISCHARGE NOTE PROVIDER - NSDCCPCAREPLAN_GEN_ALL_CORE_FT
PRINCIPAL DISCHARGE DIAGNOSIS  Diagnosis: Chest pain, unspecified type  Assessment and Plan of Treatment: - Please take all medications as prescribed  - Follow up with Cardiology in 1 week      SECONDARY DISCHARGE DIAGNOSES  Diagnosis: TIA (transient ischemic attack)  Assessment and Plan of Treatment: - Please take all medications as prescribed  - Follow up with Neurology

## 2020-11-30 NOTE — DISCHARGE NOTE PROVIDER - CARE PROVIDERS DIRECT ADDRESSES
,[email or web address]

## 2020-11-30 NOTE — ED CDU PROVIDER INITIAL DAY NOTE - PRO INTERPRETER NEED 2
1930  Bedside and Verbal shift change report given to Andres Leung RN (oncoming nurse) by Roc Navarro (offgoing nurse). Report included the following information SBAR, Kardex, Procedure Summary, Intake/Output, MAR, Accordion and Recent Results. Patient on bed with family on the bedside. Requested to clean the room and change all the linens and tubes. 0100  Patient complaining of chest tightness. PRN nitroglycerin (1 tab) was given. Visit Vitals    /82    Pulse 70    Temp 98.4 °F (36.9 °C)    Resp 18    Ht 5' 10\" (1.778 m)    Wt 141.1 kg (311 lb 1.6 oz)    SpO2 97%    BMI 44.64 kg/m2     0715  Bedside and Verbal shift change report given to Cathy EVANS (oncoming nurse) by Andres Leung RN (offgoing nurse). Report included the following information SBAR, Kardex, Procedure Summary, Intake/Output, MAR, Accordion and Recent Results. English

## 2020-11-30 NOTE — PROGRESS NOTE ADULT - SUBJECTIVE AND OBJECTIVE BOX
Interventional Cardiology NP Precath Note    Pt presents for a Cardiac cath due Angina     Mallampati 2  ASA 2  BRA 2.2  GFR 51    ALL: NKDA, NKFA. Denies shellfish/Contrast dye allergy.  PAST MEDICAL & SURGICAL HISTORY:  Polio    CAD S/P percutaneous coronary angioplasty    TIA (transient ischemic attack)    Rheumatic fever    HTN (hypertension)    Status post tonsillectomy      SocHX: Denies EtoH/TOB/IVDU  FHx:     MEDS:   aspirin  chewable 81 milliGRAM(s) Oral daily  atorvastatin 20 milliGRAM(s) Oral at bedtime  carvedilol 6.25 milliGRAM(s) Oral every 12 hours  clopidogrel Tablet 75 milliGRAM(s) Oral daily    Vitals  T(C): 37 (11-30-20 @ 11:10), Max: 37 (11-30-20 @ 11:10)  HR: 77 (11-30-20 @ 11:10) (69 - 82)  BP: 148/85 (11-30-20 @ 11:10) (111/73 - 155/90)  RR: 20 (11-30-20 @ 11:10) (18 - 20)  SpO2: 98% (11-30-20 @ 11:10) (96% - 98%)    NEURO: A & O x 3, no focal neurologic deficits pt reports left leg weaker  when walking   HEENT: NCAT, EOMI, PERRLA  NECK: No JVD, No carotid bruits B/L, +2 Carotid pulses B/L  PULM:  CTA B/L No W/R/R  CARD: RRR, +S1, +S2,  2/6 FRANCOISE at LSB   ABD: ND, +BS, NT, no masses  EXT: Warm, +pp no edema     Summary: 11/29/20    1. Left ventricular ejection fraction, by visual estimation, is 55 to 60%.   2. Technically difficult study.   3. Normal global left ventricular systolic function.   4. Basal inferolateral segment and basal inferior segment are abnormal as described above.   5. Spectral Doppler shows impaired relaxation pattern of left ventricular myocardial filling (Grade I diastolic dysfunction).   6. Mildly enlarged left atrium.   7. Normal right atrial size.   8. Mild mitral annular calcification.   9. Mild thickening of the anterior and posterior mitral valve leaflets.  10. Mild to moderate mitral valve regurgitation.  11. Mild tricuspid regurgitation.  12. Mild to moderate aortic regurgitation.  13. Sclerotic aortic valve with normal opening.    CORONARY VESSELS: The coronary circulation is right dominant.  LM:   --  LM: Normal.  LAD:   --  LAD: iFR index 0.79  --  Proximal LAD: There was a 70 % stenosis.  --  Mid LAD: There was a 70 % stenosis.  CX:   --  Ostial circumflex: There was a 50 % stenosis.  RCA:   --  RCA: Angiography showed minor luminal irregularities with noflow limiting lesions.  COMPLICATIONS: There were no complications. No complications occurred  during the cath lab visit.  DIAGNOSTIC IMPRESSIONS: Severe LAD disease (positive iFR) and non  obstructive LCX disease. The LAD was treated with PTCA/STENT (MELY-ROBBIE)  with IVUS.  DIAGNOSTIC RECOMMENDATIONS: ASA and Plavix  INTERVENTIONAL IMPRESSIONS: Severe LAD disease (positive iFR) and non  obstructive LCX disease. The LADwas treated with PTCA/STENT (MELY-ROBBIE)  with IVUS.  INTERVENTIONAL RECOMMENDATIONS: ASA and Plavix                            13.0   5.05  )-----------( 194      ( 30 Nov 2020 08:56 )             39.6     11-30    142  |  105  |  22.0<H>  ----------------------------<  95  4.9   |  23.0  |  1.04    Ca    9.2      30 Nov 2020 08:56    TPro  7.3  /  Alb  4.2  /  TBili  0.8  /  DBili  x   /  AST  33<H>  /  ALT  15  /  AlkPhos  68  11-28    CARDIAC MARKERS ( 28 Nov 2020 20:11 )  x     / <0.01 ng/mL / x     / x     / x      CARDIAC MARKERS ( 28 Nov 2020 17:08 )  x     / <0.01 ng/mL / x     / x     / x      CARDIAC MARKERS ( 28 Nov 2020 12:28 )  x     / <0.01 ng/mL / x     / x     / x

## 2020-11-30 NOTE — PROGRESS NOTE ADULT - ASSESSMENT
80y Female PMH  HTN, HLD, CAD s/p prior PCI to LAD, Rheumatic fever as a child, hx of vasovagal syncope, hx of TIA s/p; ILR   who presents to [facility] ED with CP.  Pt had sudden onset cp - left sided 6/10, sharp, non radiating, associated with sob with some slurred speech . She was evaluated by neurology with a negative CT of head and resolution of symptoms . Her troponin were negative x three but had a episode of chest pain 8/10 left chest pain this am  resolved with out treatment  and no EKG changes .  She presents for Cardiac cath   currently feels well with no chest pain or SOB   PRE-PROCEDURE ASSESSMENT  C -Patient seen and examined  -Labs and EKG reviewed   -Pre-procedure teaching completed with patient and family  -  Informed consent obtained   -Questions answered  - Pt received Asprin/ Plavix prior to cardiac cath   Risks & benefits of procedure and sedation and risks and benefits for the alternative therapy have been explained to the patient in layman’s terms including but not limited to: allergic reaction, bleeding, infection, arrhythmia, respiratory compromise, renal and vascular compromise, limb damage, MI, CVA, emergent CABG/Vascular Surgery and death. Informed consent obtained and in chart.       80y Female PMH  HTN, HLD, CAD s/p prior PCI to LAD, Rheumatic fever as a child, hx of vasovagal syncope, hx of TIA s/p; ILR   who presents to [facility] ED with CP.  Pt had sudden onset cp - left sided 6/10, sharp, non radiating, associated with sob with some slurred speech . She was evaluated by neurology with a negative CT of head and resolution of symptoms . Her troponin were negative x three but had a episode of chest pain 8/10 left chest pain this am  resolved with out treatment  and no EKG changes .  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 Informed consent obtained and in chart.

## 2020-11-30 NOTE — ED PROVIDER NOTE - PROGRESS NOTE DETAILS
Will place on OBS for cardiac monitoring, serial Trop, interrogation of loop recorder and eval by Cardio

## 2020-11-30 NOTE — ED PROVIDER NOTE - PMH
CAD S/P percutaneous coronary angioplasty    HTN (hypertension)    Polio    Rheumatic fever    TIA (transient ischemic attack)

## 2020-11-30 NOTE — DISCHARGE NOTE PROVIDER - HOSPITAL COURSE
81 y/o F with PMH of HTN, HLD, CAD s/p PCI x 2, TIA x 2, rheumatic fever and polio as a child was admitted for chest pain and TIA. The patient was placed on telemetry monitoring. CT head was performed showing no acute infarction or hemorrhage, no large vessel occlusion. MRI brain was performed showing no acute infarction. Neurology was consulted and patient continued on Aspirin and Lipitor. Cardiology was consulted for chest pain. Echocardiogram was performed and EF 55 to 60%. Patient was continued on Aspirin and Plavix. Cardiac catheterization was performed today and showed prior patent LAD stent with no CAD. The patient is hemodynamically stable and medically optimized for discharge with appropriate follow up.     Discharge Physical Examination:  Vital Signs Last 24 Hrs  T(F): 98.6 (30 Nov 2020 11:10), Max: 98.6 (30 Nov 2020 11:10)  HR: 70 (30 Nov 2020 13:15) (69 - 82)  BP: 135/78 (30 Nov 2020 13:15) (111/73 - 155/90)  RR: 18 (30 Nov 2020 13:15) (18 - 20)  SpO2: 97% (30 Nov 2020 13:15) (96% - 98%)    Physical Exam:  Constitutional: alert and oriented, in no acute distress   Neck: Soft and supple, No LAD, No JVD  Respiratory: Clear to auscultation bilaterally, no wheezes or crackles  Cardiovascular: Regular rate and rhythm, no murmurs, gallops, rubs  Gastrointestinal: Soft, non-tender to palpation, +bs  Vascular: 2+ peripheral pulses  Neurological: A/O x 3, no focal neurological deficits  Musculoskeletal: 5/5 strength b/l upper and lower extremities, no lower extremity edema bilaterally    Time spent on discharge: 32 minutes

## 2020-11-30 NOTE — ED ADULT TRIAGE NOTE - CHIEF COMPLAINT QUOTE
pt c/o syncopal episode, denies hitting head reports catching self onto couch. pt denies chest pain,sob,dizziness.  states she was DC here today after cardiac cath. zofran4 mg given by EMS

## 2020-12-01 VITALS
RESPIRATION RATE: 16 BRPM | HEART RATE: 98 BPM | TEMPERATURE: 98 F | SYSTOLIC BLOOD PRESSURE: 130 MMHG | DIASTOLIC BLOOD PRESSURE: 82 MMHG | OXYGEN SATURATION: 97 %

## 2020-12-01 PROBLEM — G45.9 TRANSIENT CEREBRAL ISCHEMIC ATTACK, UNSPECIFIED: Chronic | Status: ACTIVE | Noted: 2020-11-28

## 2020-12-01 PROBLEM — A80.9 ACUTE POLIOMYELITIS, UNSPECIFIED: Chronic | Status: ACTIVE | Noted: 2020-11-29

## 2020-12-01 PROBLEM — I25.10 ATHEROSCLEROTIC HEART DISEASE OF NATIVE CORONARY ARTERY WITHOUT ANGINA PECTORIS: Chronic | Status: ACTIVE | Noted: 2020-11-29

## 2020-12-01 LAB
TROPONIN T SERPL-MCNC: <0.01 NG/ML — SIGNIFICANT CHANGE UP (ref 0–0.06)
TROPONIN T SERPL-MCNC: <0.01 NG/ML — SIGNIFICANT CHANGE UP (ref 0–0.06)

## 2020-12-01 PROCEDURE — 99217: CPT

## 2020-12-01 PROCEDURE — 80053 COMPREHEN METABOLIC PANEL: CPT

## 2020-12-01 PROCEDURE — 36415 COLL VENOUS BLD VENIPUNCTURE: CPT

## 2020-12-01 PROCEDURE — 82550 ASSAY OF CK (CPK): CPT

## 2020-12-01 PROCEDURE — 84484 ASSAY OF TROPONIN QUANT: CPT

## 2020-12-01 PROCEDURE — 85730 THROMBOPLASTIN TIME PARTIAL: CPT

## 2020-12-01 PROCEDURE — 72125 CT NECK SPINE W/O DYE: CPT | Mod: 26

## 2020-12-01 PROCEDURE — 70450 CT HEAD/BRAIN W/O DYE: CPT | Mod: 26

## 2020-12-01 PROCEDURE — 72125 CT NECK SPINE W/O DYE: CPT

## 2020-12-01 PROCEDURE — 93005 ELECTROCARDIOGRAM TRACING: CPT

## 2020-12-01 PROCEDURE — 99284 EMERGENCY DEPT VISIT MOD MDM: CPT | Mod: 25

## 2020-12-01 PROCEDURE — 85025 COMPLETE CBC W/AUTO DIFF WBC: CPT

## 2020-12-01 PROCEDURE — 71045 X-RAY EXAM CHEST 1 VIEW: CPT

## 2020-12-01 PROCEDURE — 85610 PROTHROMBIN TIME: CPT

## 2020-12-01 PROCEDURE — 70450 CT HEAD/BRAIN W/O DYE: CPT

## 2020-12-01 PROCEDURE — 93010 ELECTROCARDIOGRAM REPORT: CPT

## 2020-12-01 PROCEDURE — G0378: CPT

## 2020-12-01 RX ORDER — SODIUM CHLORIDE 9 MG/ML
500 INJECTION, SOLUTION INTRAVENOUS ONCE
Refills: 0 | Status: COMPLETED | OUTPATIENT
Start: 2020-12-01 | End: 2020-12-01

## 2020-12-01 RX ADMIN — Medication 81 MILLIGRAM(S): at 12:16

## 2020-12-01 RX ADMIN — SODIUM CHLORIDE 500 MILLILITER(S): 9 INJECTION, SOLUTION INTRAVENOUS at 12:16

## 2020-12-01 RX ADMIN — ATORVASTATIN CALCIUM 40 MILLIGRAM(S): 80 TABLET, FILM COATED ORAL at 00:20

## 2020-12-01 NOTE — ED ADULT NURSE NOTE - NSIMPLEMENTINTERV_GEN_ALL_ED
Implemented All Fall with Harm Risk Interventions:  [place] to call system. Call bell, personal items and telephone within reach. Instruct patient to call for assistance. Room bathroom lighting operational. Non-slip footwear when patient is off stretcher. Physically safe environment: no spills, clutter or unnecessary equipment. Stretcher in lowest position, wheels locked, appropriate side rails in place. Provide visual cue, wrist band, yellow gown, etc. Monitor gait and stability. Monitor for mental status changes and reorient to person, place, and time. Review medications for side effects contributing to fall risk. Reinforce activity limits and safety measures with patient and family. Provide visual clues: red socks.

## 2020-12-01 NOTE — ED CDU PROVIDER DISPOSITION NOTE - PATIENT PORTAL LINK FT
You can access the FollowMyHealth Patient Portal offered by [facility] by registering at the following website: [email or web address]. By joining [facility]’s FollowMyHealth portal, you will also be able to view your health information using other applications (apps) compatible with our system.

## 2020-12-01 NOTE — ED ADULT NURSE REASSESSMENT NOTE - COMFORT CARE
ambulated to bathroom
side rails up/wait time explained/repositioned/plan of care explained/po fluids offered

## 2020-12-01 NOTE — ED ADULT NURSE NOTE - OBJECTIVE STATEMENT
Pt presents to ED s/p fall at home, +head trauma, -LOC. Pt reports taking plavix daily. Pt c/o dizziness upon standing. Pt AAOx4, calm and in NAD. MD at the bedside for evaluation.

## 2020-12-01 NOTE — CONSULT NOTE ADULT - SUBJECTIVE AND OBJECTIVE BOX
[facility], THE HEART CENTER                [address] [place] [address]                                     PHONE: [phone]                                       FAX: [phone]  [email or web address]    Reason for Consult: syncope     HPI:  Ms Medina is a 79 y/o woman with HTN, HLD, CAD s/p prior PCI to LAD, vasovagal syncope, rheumatic fever, TIA s/p ILR who had recent evaluation for chest pain and shortness of breath s/p C 11/30/20 which revealed patent LAD stent without interval progression of CAD who presents with dizziness while standing with subsequent fall resulting in hitting her head and back.     PAST MEDICAL & SURGICAL HISTORY:  Polio  CAD S/P percutaneous coronary angioplasty  TIA (transient ischemic attack)  Rheumatic fever  HTN (hypertension)  Status post tonsillectomy    PREVIOUS DIAGNOSTIC TESTING:      EKG: < from: 12 Lead ECG (11.30.20 @ 19:10) >  Normal sinus rhythm. Normal ECG    ECHO FINDINGS:  < from: TTE Echo Complete w/o Contrast w/ Doppler (11.29.20 @ 09:54) >   1. Left ventricular ejection fraction, by visual estimation, is 55 to 60%.   2. Technically difficult study.   3. Normal global left ventricular systolic function.   4. Basal inferolateral segment and basal inferior segment are abnormal as described above.   5. Spectral Doppler shows impaired relaxation pattern of left ventricular myocardial filling (Grade I diastolic dysfunction).   6. Mildly enlarged left atrium.   7. Normal right atrial size.   8. Mild mitral annular calcification.   9. Mild thickening of the anterior and posterior mitral valve leaflets.  10. Mild to moderate mitral valve regurgitation.  11. Mild tricuspid regurgitation.  12. Mild to moderate aortic regurgitation.  13. Sclerotic aortic valve with normal opening.    CATHETERIZATION FINDINGS:  < from: Cardiac Cath Lab - Adult (11.30.20 @ 12:19) >  VENTRICLES: LVEF 55%. CORONARY VESSELS: The coronary circulation is right dominant.  LM:   --  LM: Normal.  LAD:   --  Proximal LAD: There was a 20 % stenosis. Mid LAD: There was a 0 % stenosis at the site of a prior stent.  CX:   --  Circumflex: Angiography showed minor luminal irregularities with no flow limiting lesions.  RCA:   --  RCA: Angiography showed minor luminal irregularities with no flow limiting lesions. Ostial RCA: There was a 20 % stenosis.    MEDICATIONS  (STANDING):  aspirin enteric coated 81 milliGRAM(s) Oral daily    MEDICATIONS  (PRN):    FAMILY HISTORY:  No pertinent family history in first degree relatives    SOCIAL HISTORY:  CIGARETTES: denies   ALCOHOL: denies     ROS: Negative other than as mentioned in HPI.    Vital Signs Last 24 Hrs  T(C): 36.8 (30 Nov 2020 23:26), Max: 37 (30 Nov 2020 11:10)  T(F): 98.2 (30 Nov 2020 23:26), Max: 98.6 (30 Nov 2020 11:10)  HR: 98 (01 Dec 2020 05:27) (62 - 100)  BP: 113/70 (01 Dec 2020 05:27) (113/70 - 167/89)  BP(mean): --  RR: 18 (01 Dec 2020 05:27) (17 - 20)  SpO2: 97% (01 Dec 2020 05:27) (93% - 98%)    PHYSICAL EXAM:  General: Appears well developed, well nourished alert and cooperative.  HEENT: Head; normocephalic, atraumatic. sclera anicteric, MMM, no JVD, neck is supple   CARDIOVASCULAR; 2/6 FRANCOISE, no rub, gallop or lift. Normal S1 and S2.  LUNGS; No rales, rhonchi or wheeze. Normal breath sounds bilaterally.  ABDOMEN ; Soft, nontender without mass or organomegaly. bowel sounds normoactive.  EXTREMITIES; No clubbing, cyanosis or edema. Distal pulses wnl. ROM normal.  SKIN; warm and dry with normal turgor.  NEURO; Alert/oriented x 3/normal motor exam.     PSYCH; normal affect.     LABS:                        13.0   8.70  )-----------( 187      ( 30 Nov 2020 19:58 )             39.5     11-30    138  |  102  |  22.0<H>  ----------------------------<  98  5.1   |  25.0  |  0.98    Ca    8.8      30 Nov 2020 19:58    TPro  6.3<L>  /  Alb  3.8  /  TBili  1.0  /  DBili  x   /  AST  18  /  ALT  11  /  AlkPhos  62  11-30    CARDIAC MARKERS ( 01 Dec 2020 02:44 )  x     / <0.01 ng/mL / x     / x     / x      CARDIAC MARKERS ( 30 Nov 2020 23:46 )  x     / <0.01 ng/mL / x     / x     / x      CARDIAC MARKERS ( 30 Nov 2020 19:58 )  x     / <0.01 ng/mL / 44 U/L / x     / x          PT/INR - ( 30 Nov 2020 19:58 )   PT: 13.2 sec;   INR: 1.15 ratio     PTT - ( 30 Nov 2020 19:58 )  PTT:29.2 sec   [facility], THE HEART CENTER                [address] [place] [address]                                     PHONE: [phone]                                       FAX: [phone]  [email or web address]    Reason for Consult: syncope     HPI:  Ms Medina is a 79 y/o woman with HTN, HLD, CAD s/p prior PCI to LAD, vasovagal syncope, rheumatic fever, TIA s/p ILR who had recent evaluation for chest pain and shortness of breath s/p C 11/30/20 which revealed patent LAD stent without interval progression of CAD who presents with dizziness with antecedent flushing and heat sensation while standing with subsequent fall resulting in hitting her head and back. She admits to brief loss of consciousness. No seizure activity reported.      PAST MEDICAL & SURGICAL HISTORY:  Polio  CAD S/P percutaneous coronary angioplasty  TIA (transient ischemic attack)  Rheumatic fever  HTN (hypertension)  Status post tonsillectomy    PREVIOUS DIAGNOSTIC TESTING:      EKG: < from: 12 Lead ECG (11.30.20 @ 19:10) >  Normal sinus rhythm. Normal ECG    ECHO FINDINGS:  < from: TTE Echo Complete w/o Contrast w/ Doppler (11.29.20 @ 09:54) >   1. Left ventricular ejection fraction, by visual estimation, is 55 to 60%.   2. Technically difficult study.   3. Normal global left ventricular systolic function.   4. Basal inferolateral segment and basal inferior segment are abnormal as described above.   5. Spectral Doppler shows impaired relaxation pattern of left ventricular myocardial filling (Grade I diastolic dysfunction).   6. Mildly enlarged left atrium.   7. Normal right atrial size.   8. Mild mitral annular calcification.   9. Mild thickening of the anterior and posterior mitral valve leaflets.  10. Mild to moderate mitral valve regurgitation.  11. Mild tricuspid regurgitation.  12. Mild to moderate aortic regurgitation.  13. Sclerotic aortic valve with normal opening.    CATHETERIZATION FINDINGS:  < from: Cardiac Cath Lab - Adult (11.30.20 @ 12:19) >  VENTRICLES: LVEF 55%. CORONARY VESSELS: The coronary circulation is right dominant.  LM:   --  LM: Normal.  LAD:   --  Proximal LAD: There was a 20 % stenosis. Mid LAD: There was a 0 % stenosis at the site of a prior stent.  CX:   --  Circumflex: Angiography showed minor luminal irregularities with no flow limiting lesions.  RCA:   --  RCA: Angiography showed minor luminal irregularities with no flow limiting lesions. Ostial RCA: There was a 20 % stenosis.    MEDICATIONS  (STANDING):  aspirin enteric coated 81 milliGRAM(s) Oral daily    MEDICATIONS  (PRN):    FAMILY HISTORY:  No pertinent family history in first degree relatives    SOCIAL HISTORY:  CIGARETTES: denies   ALCOHOL: denies     ROS: Negative other than as mentioned in HPI.    Vital Signs Last 24 Hrs  T(C): 36.8 (30 Nov 2020 23:26), Max: 37 (30 Nov 2020 11:10)  T(F): 98.2 (30 Nov 2020 23:26), Max: 98.6 (30 Nov 2020 11:10)  HR: 98 (01 Dec 2020 05:27) (62 - 100)  BP: 113/70 (01 Dec 2020 05:27) (113/70 - 167/89)  BP(mean): --  RR: 18 (01 Dec 2020 05:27) (17 - 20)  SpO2: 97% (01 Dec 2020 05:27) (93% - 98%)    PHYSICAL EXAM:  General: Appears well developed, well nourished alert and cooperative.  HEENT: Head; normocephalic, atraumatic. sclera anicteric, MMM, no JVD, neck is supple   CARDIOVASCULAR; 2/6 FRANCOISE, no rub, gallop or lift. Normal S1 and S2.  LUNGS; No rales, rhonchi or wheeze. Normal breath sounds bilaterally.  ABDOMEN ; Soft, nontender without mass or organomegaly. bowel sounds normoactive.  EXTREMITIES; No clubbing, cyanosis or edema. Distal pulses wnl. ROM normal.  SKIN; warm and dry with normal turgor.  NEURO; Alert/oriented x 3/normal motor exam.     PSYCH; normal affect.     LABS:                        13.0   8.70  )-----------( 187      ( 30 Nov 2020 19:58 )             39.5     11-30    138  |  102  |  22.0<H>  ----------------------------<  98  5.1   |  25.0  |  0.98    Ca    8.8      30 Nov 2020 19:58    TPro  6.3<L>  /  Alb  3.8  /  TBili  1.0  /  DBili  x   /  AST  18  /  ALT  11  /  AlkPhos  62  11-30    CARDIAC MARKERS ( 01 Dec 2020 02:44 )  x     / <0.01 ng/mL / x     / x     / x      CARDIAC MARKERS ( 30 Nov 2020 23:46 )  x     / <0.01 ng/mL / x     / x     / x      CARDIAC MARKERS ( 30 Nov 2020 19:58 )  x     / <0.01 ng/mL / 44 U/L / x     / x          PT/INR - ( 30 Nov 2020 19:58 )   PT: 13.2 sec;   INR: 1.15 ratio     PTT - ( 30 Nov 2020 19:58 )  PTT:29.2 sec

## 2020-12-01 NOTE — ED CDU PROVIDER DISPOSITION NOTE - CLINICAL COURSE
This is a 79 y/o woman with HTN, HLD, CAD s/p prior PCI to LAD, vasovagal syncope, rheumatic fever, TIA s/p ILR who had recent evaluation for chest pain and shortness of breath s/p [facility] 11/30/20 which revealed patent LAD stent without interval progression of CAD who presents with dizziness with antecedent flushing and heat sensation while standing with subsequent fall resulting in hitting her head and back. She admits to brief loss of consciousness. No seizure activity reported.  Seen by cardiology, likely vasovagal secondary to no oral intake x 2 days.  Orthostatics positive, received 500cc of LR, eating, feeling much better.  ILR investigated, spoke to rep from [employer], negative for any events, cleared to be discharged home.  Given copies of all results, understands and agrees to proceed.

## 2020-12-01 NOTE — ED CDU PROVIDER SUBSEQUENT DAY NOTE - CROS ED NEURO POS
H&P Pre-Procedure Local Anesthetic Only     Procedure date: 10/21/2020    Indication for procedure/Pre-diagnosis/chief complaint:Preoperative diagnosis: Lumbar spondylosis [M47.816]     Planned Procedure:  Procedure: Radiofrequency Ablation Medial Branch/Dorsal Ramus, Left, Lumbar L3, L4,  L5     PAST MEDICAL/SURGICAL/SOCIAL HISTORY AND ACTIVE PROBLEM LIST:  Has been reviewed    MEDICATION AND ALLERGIES: Has been reviewed    REVIEW OF SYSTEMS:  Constitutional: Denies fever or chills  Respiratory: Denies cough or new onset shortness of breath  Cardiovascular: Denies Chest pain or Palpitations  Gastrointestinal: Denies vomiting or diarrhea  Skin: Denies wounds or rashes around the procedure site  MSK: Low back pain    Patient taking any blood thinning medications: No   History of any bleeding disorders: No     PHYSICAL EXAM  Visit Vitals  /66   Pulse 81   Temp 98.8 °F (37.1 °C) (Temporal)   Resp 20   Ht 5' 7\" (1.702 m)   Wt 91.6 kg   SpO2 97%   BMI 31.64 kg/m²     Constitutional: No acute distress   Respiratory: Normal respiratory excursion  Cardiovascular: Palpable peripheral pulses  ?Neuro/Psych: Alert, orientated, appropriate thought content, conversation.    OTHER FINDINGS  Reviewed pertinent lab/diagnostic tests:     Lab Results   Component Value Date    WBC 5.7 06/16/2020    HCT 36.2 06/16/2020    HGB 11.9 (L) 06/16/2020     06/16/2020   ,   Lab Results   Component Value Date    SODIUM 138 06/16/2020    POTASSIUM 3.7 06/16/2020    CHLORIDE 106 06/16/2020    CO2 23 06/16/2020    BUN 20 06/16/2020    CREATININE 0.61 06/16/2020    GLUCOSE 96 06/16/2020   , No results found for: PT, INR     PLAN FOR SEDATION/ANALGESIA: Local Analgesia    EKG Monitoring: NO    Informed Consent was obtained, patient or responsible party denies additional questions regarding procedure.    Assessing Provider: KELTON Sandhu                         Time: 12:17 PM      
dizziness, syncope

## 2020-12-01 NOTE — ED ADULT NURSE REASSESSMENT NOTE - NS ED NURSE REASSESS COMMENT FT1
Assumed pt. care at 0100 from off going RN. Pt. A&Ox3. Pt. on cardiac monitor.  Pt. in NSR in lead II. Pt. respirations even and unlabored. Pt. in no apparent distress. Pt. denies any pain at this time. Pt. has bruising to her neck form falling. Pt. denies any LOC during fall. Pt. denies chest pain or dizziness at this time.  Pt. informed of plan of care that troponin and EKG will be repeated and cardiology to see her in the morning. Pt. provided pillow.
Pt. sleeping comfortably in stretcher. Pt. respirations even and unlabored. Pt. in no apparent distress.
Pt A&O x4, VSS afebrile. IV fluids completed. Pt discharged to home. SL removed. Discharge instructions reviewed with patient. Questions answered. Pt with no further concerns or questions.
Assumed care of the patient @[id number]. Pt A&Ox4. No respiratory distress. Pt on cardiac monitor sinus rhythm 96 denies chest pain. Patient in understanding of plan of care. Patient with no further questions for the RN. Resting in comfort. Call bell within reach and encouraged to use when assistance needed. Will continue to monitor.

## 2020-12-01 NOTE — ED CDU PROVIDER SUBSEQUENT DAY NOTE - ATTENDING CONTRIBUTION TO CARE
Seda NGUYEN- 79 Y/O F with h/o cardiac  cath yesterday  by Dr. Gamboa and went home and was fixing her dinner and had syncopal episode and hit her neck and head to table. Pt placed in cdu fro serial trop, loop recorder interrogation and cardio eval. Pt has bruising on neck and no pain and had neg ct had and neck. Pt had no food whole day    pt is alert, well appearing female, s1s2 normal reg, b/l clear breath sounds, abd soft, nt, nd, neuro exam aox3, cn 2-12 intact, neck soft , supple, ant bruising black and ble noted, normal bite and swallowing, skin warm, dry, good turgor    plan to f/u loop recorder and cardio work up

## 2020-12-01 NOTE — CONSULT NOTE ADULT - ASSESSMENT
Ms Medina is a 81 y/o woman with HTN, HLD, CAD s/p prior PCI to LAD, vasovagal syncope, rheumatic fever, TIA s/p ILR who had recent evaluation for chest pain and shortness of breath s/p [facility] 11/30/20 which revealed patent LAD stent without interval progression of CAD who presents with dizziness with antecedent flushing and heat sensation while standing with subsequent fall resulting in hitting her head and back. She admits to brief loss of consciousness    Loss of consciousness  - presentation consistent with vasovagal event, suspect 2/2 hypovolemia from prior pre-procedure NPO status   - will check ILR for malignant arrhythmia   - encourage oral hydration   - recent LHC without progression of CAD  - check orthostatic vitals and if abnormal plan for IV hydration     CAD, Hx PCI  - continue antiplatelet and statin therapy  - continue aggressive risk factor management

## 2020-12-01 NOTE — ED CDU PROVIDER DISPOSITION NOTE - ATTENDING CONTRIBUTION TO CARE
Seda NGUYEN- 81 Y/O F with h/o cardiac  cath yesterday  by Dr. Gamboa and went home and was fixing her dinner and had syncopal episode and hit her neck and head to table. Pt placed in cdu fro serial trop, loop recorder interrogation and cardio eval. Pt has bruising on neck and no pain and had neg ct had and neck. Pt had no food whole day    pt is alert, well appearing female, s1s2 normal reg, b/l clear breath sounds, abd soft, nt, nd, neuro exam aox3, cn 2-12 intact, neck soft , supple, ant bruising black and blue noted, normal bite and swallowing, skin warm, dry, good turgor

## 2020-12-14 ENCOUNTER — APPOINTMENT (OUTPATIENT)
Dept: NEUROLOGY | Facility: CLINIC | Age: 80
End: 2020-12-14

## 2020-12-31 ENCOUNTER — APPOINTMENT (OUTPATIENT)
Dept: NEUROLOGY | Facility: CLINIC | Age: 80
End: 2020-12-31
Payer: MEDICARE

## 2020-12-31 VITALS
BODY MASS INDEX: 25.4 KG/M2 | TEMPERATURE: 97.6 F | WEIGHT: 138 LBS | SYSTOLIC BLOOD PRESSURE: 120 MMHG | HEIGHT: 62 IN | DIASTOLIC BLOOD PRESSURE: 70 MMHG

## 2020-12-31 PROCEDURE — 99072 ADDL SUPL MATRL&STAF TM PHE: CPT

## 2020-12-31 PROCEDURE — 99213 OFFICE O/P EST LOW 20 MIN: CPT

## 2020-12-31 NOTE — ASSESSMENT
[FreeTextEntry1] : This is a 80 year-old woman who had episodes suspicious for stroke.\par t-PA with resolution of symptoms on both occasions.\par \par I would recommend she continue antiplatelet and statin. \par \par I will see the patient back in 4 months. \par \par \par

## 2020-12-31 NOTE — DATA REVIEWED
[de-identified] : Brain MRI was performed on 6/2/2020.\par There was no acute infarct.\par There was moderate small vessel ischemic disease of a chronic nature.\par

## 2021-03-27 ENCOUNTER — INPATIENT (INPATIENT)
Facility: HOSPITAL | Age: 81
LOS: 2 days | Discharge: HOME | DRG: 62 | End: 2021-03-30
Attending: HOSPITALIST | Admitting: GENERAL ACUTE CARE HOSPITAL
Payer: MEDICARE

## 2021-03-27 VITALS — HEIGHT: 62 IN

## 2021-03-27 DIAGNOSIS — Z90.5 ACQUIRED ABSENCE OF KIDNEY: Chronic | ICD-10-CM

## 2021-03-27 DIAGNOSIS — Z90.89 ACQUIRED ABSENCE OF OTHER ORGANS: Chronic | ICD-10-CM

## 2021-03-27 DIAGNOSIS — C64.9 MALIGNANT NEOPLASM OF UNSPECIFIED KIDNEY, EXCEPT RENAL PELVIS: Chronic | ICD-10-CM

## 2021-03-27 DIAGNOSIS — I63.9 CEREBRAL INFARCTION, UNSPECIFIED: ICD-10-CM

## 2021-03-27 LAB
ALBUMIN SERPL ELPH-MCNC: 4.1 G/DL — SIGNIFICANT CHANGE UP (ref 3.3–5.2)
ALP SERPL-CCNC: 77 U/L — SIGNIFICANT CHANGE UP (ref 40–120)
ALT FLD-CCNC: 15 U/L — SIGNIFICANT CHANGE UP
ANION GAP SERPL CALC-SCNC: 12 MMOL/L — SIGNIFICANT CHANGE UP (ref 5–17)
APTT BLD: 27.1 SEC — LOW (ref 27.5–35.5)
AST SERPL-CCNC: 23 U/L — SIGNIFICANT CHANGE UP
BASOPHILS # BLD AUTO: 0.1 K/UL — SIGNIFICANT CHANGE UP (ref 0–0.2)
BASOPHILS NFR BLD AUTO: 1.1 % — SIGNIFICANT CHANGE UP (ref 0–2)
BILIRUB SERPL-MCNC: 1 MG/DL — SIGNIFICANT CHANGE UP (ref 0.4–2)
BUN SERPL-MCNC: 22 MG/DL — HIGH (ref 8–20)
CALCIUM SERPL-MCNC: 9.1 MG/DL — SIGNIFICANT CHANGE UP (ref 8.6–10.2)
CHLORIDE SERPL-SCNC: 98 MMOL/L — SIGNIFICANT CHANGE UP (ref 98–107)
CO2 SERPL-SCNC: 25 MMOL/L — SIGNIFICANT CHANGE UP (ref 22–29)
CREAT SERPL-MCNC: 0.85 MG/DL — SIGNIFICANT CHANGE UP (ref 0.5–1.3)
EOSINOPHIL # BLD AUTO: 0.16 K/UL — SIGNIFICANT CHANGE UP (ref 0–0.5)
EOSINOPHIL NFR BLD AUTO: 1.8 % — SIGNIFICANT CHANGE UP (ref 0–6)
GLUCOSE SERPL-MCNC: 99 MG/DL — SIGNIFICANT CHANGE UP (ref 70–99)
HCT VFR BLD CALC: 40.3 % — SIGNIFICANT CHANGE UP (ref 34.5–45)
HGB BLD-MCNC: 13.5 G/DL — SIGNIFICANT CHANGE UP (ref 11.5–15.5)
IMM GRANULOCYTES NFR BLD AUTO: 0.2 % — SIGNIFICANT CHANGE UP (ref 0–1.5)
INR BLD: 1.07 RATIO — SIGNIFICANT CHANGE UP (ref 0.88–1.16)
LYMPHOCYTES # BLD AUTO: 1.99 K/UL — SIGNIFICANT CHANGE UP (ref 1–3.3)
LYMPHOCYTES # BLD AUTO: 21.8 % — SIGNIFICANT CHANGE UP (ref 13–44)
MCHC RBC-ENTMCNC: 29.6 PG — SIGNIFICANT CHANGE UP (ref 27–34)
MCHC RBC-ENTMCNC: 33.5 GM/DL — SIGNIFICANT CHANGE UP (ref 32–36)
MCV RBC AUTO: 88.4 FL — SIGNIFICANT CHANGE UP (ref 80–100)
MONOCYTES # BLD AUTO: 1.04 K/UL — HIGH (ref 0–0.9)
MONOCYTES NFR BLD AUTO: 11.4 % — SIGNIFICANT CHANGE UP (ref 2–14)
NEUTROPHILS # BLD AUTO: 5.81 K/UL — SIGNIFICANT CHANGE UP (ref 1.8–7.4)
NEUTROPHILS NFR BLD AUTO: 63.7 % — SIGNIFICANT CHANGE UP (ref 43–77)
PLATELET # BLD AUTO: 208 K/UL — SIGNIFICANT CHANGE UP (ref 150–400)
POTASSIUM SERPL-MCNC: 4 MMOL/L — SIGNIFICANT CHANGE UP (ref 3.5–5.3)
POTASSIUM SERPL-SCNC: 4 MMOL/L — SIGNIFICANT CHANGE UP (ref 3.5–5.3)
PROT SERPL-MCNC: 6.8 G/DL — SIGNIFICANT CHANGE UP (ref 6.6–8.7)
PROTHROM AB SERPL-ACNC: 12.4 SEC — SIGNIFICANT CHANGE UP (ref 10.6–13.6)
RBC # BLD: 4.56 M/UL — SIGNIFICANT CHANGE UP (ref 3.8–5.2)
RBC # FLD: 12.1 % — SIGNIFICANT CHANGE UP (ref 10.3–14.5)
SARS-COV-2 RNA SPEC QL NAA+PROBE: SIGNIFICANT CHANGE UP
SODIUM SERPL-SCNC: 135 MMOL/L — SIGNIFICANT CHANGE UP (ref 135–145)
TROPONIN T SERPL-MCNC: <0.01 NG/ML — SIGNIFICANT CHANGE UP (ref 0–0.06)
WBC # BLD: 9.12 K/UL — SIGNIFICANT CHANGE UP (ref 3.8–10.5)
WBC # FLD AUTO: 9.12 K/UL — SIGNIFICANT CHANGE UP (ref 3.8–10.5)

## 2021-03-27 PROCEDURE — 99291 CRITICAL CARE FIRST HOUR: CPT

## 2021-03-27 PROCEDURE — 71045 X-RAY EXAM CHEST 1 VIEW: CPT | Mod: 26

## 2021-03-27 PROCEDURE — 93010 ELECTROCARDIOGRAM REPORT: CPT | Mod: 76

## 2021-03-27 PROCEDURE — 70496 CT ANGIOGRAPHY HEAD: CPT | Mod: 26,MA

## 2021-03-27 PROCEDURE — 0042T: CPT

## 2021-03-27 PROCEDURE — 70450 CT HEAD/BRAIN W/O DYE: CPT | Mod: 26,59,MA

## 2021-03-27 PROCEDURE — 70498 CT ANGIOGRAPHY NECK: CPT | Mod: 26,MA

## 2021-03-27 RX ORDER — ACETAMINOPHEN 500 MG
1000 TABLET ORAL ONCE
Refills: 0 | Status: COMPLETED | OUTPATIENT
Start: 2021-03-27 | End: 2021-03-27

## 2021-03-27 RX ORDER — LABETALOL HCL 100 MG
10 TABLET ORAL
Refills: 0 | Status: DISCONTINUED | OUTPATIENT
Start: 2021-03-27 | End: 2021-03-30

## 2021-03-27 RX ORDER — SODIUM CHLORIDE 9 MG/ML
1000 INJECTION INTRAMUSCULAR; INTRAVENOUS; SUBCUTANEOUS
Refills: 0 | Status: DISCONTINUED | OUTPATIENT
Start: 2021-03-27 | End: 2021-03-28

## 2021-03-27 RX ORDER — ALTEPLASE 100 MG
56.1 KIT INTRAVENOUS ONCE
Refills: 0 | Status: COMPLETED | OUTPATIENT
Start: 2021-03-27 | End: 2021-03-27

## 2021-03-27 RX ORDER — ATORVASTATIN CALCIUM 80 MG/1
40 TABLET, FILM COATED ORAL AT BEDTIME
Refills: 0 | Status: DISCONTINUED | OUTPATIENT
Start: 2021-03-27 | End: 2021-03-28

## 2021-03-27 RX ORDER — HYDRALAZINE HCL 50 MG
10 TABLET ORAL
Refills: 0 | Status: DISCONTINUED | OUTPATIENT
Start: 2021-03-27 | End: 2021-03-30

## 2021-03-27 RX ORDER — ALTEPLASE 100 MG
6.2 KIT INTRAVENOUS ONCE
Refills: 0 | Status: COMPLETED | OUTPATIENT
Start: 2021-03-27 | End: 2021-03-27

## 2021-03-27 RX ADMIN — ALTEPLASE 56.1 MILLIGRAM(S): KIT at 18:08

## 2021-03-27 RX ADMIN — ALTEPLASE 6.2 MILLIGRAM(S): KIT at 17:07

## 2021-03-27 RX ADMIN — SODIUM CHLORIDE 60 MILLILITER(S): 9 INJECTION INTRAMUSCULAR; INTRAVENOUS; SUBCUTANEOUS at 19:11

## 2021-03-27 RX ADMIN — ALTEPLASE 372 MILLIGRAM(S): KIT at 17:07

## 2021-03-27 RX ADMIN — ATORVASTATIN CALCIUM 40 MILLIGRAM(S): 80 TABLET, FILM COATED ORAL at 23:15

## 2021-03-27 RX ADMIN — Medication 10 MILLIGRAM(S): at 17:45

## 2021-03-27 RX ADMIN — Medication 400 MILLIGRAM(S): at 23:48

## 2021-03-27 RX ADMIN — ALTEPLASE 56.1 MILLIGRAM(S): KIT at 17:08

## 2021-03-27 NOTE — H&P ADULT - NSHPPHYSICALEXAM_GEN_ALL_CORE
GEN: NAD  HEAD: Atraumatic, normocephalic  MENTAL STATUS: Awake, alert, oriented to self, [facility], March 2021. Some paucity of speech at beginning of assessment which notably improved by the end of the examination. No aphasia. Following commands  CRANIAL NERVES: Visual fields intact. PERRL. EOMI. Facial sensation intact. Mild L eyelid ptosis, otherwise face grossly symmetric with normal eye closure and smile. Hearing intact. Speech clear  MOTOR: RUE/RLE 5/5 no drift. LUE mild drift without hitting bed, 4+/5 (per patient, this is baseline); LLE HF 3/5, KE 2/5, PF/DF 4-/5  SENSATION: Grossly intact to light touch throughout  C/V: Regular rate  PULM: Nonlabored breathing, no respiratory distress  ABDOMEN: Soft, nontender, nondistended  EXTREMITIES: nontender b/l

## 2021-03-27 NOTE — ED ADULT TRIAGE NOTE - HEIGHT IN CM
[facility]  [place] Intensive Care Unit Progress Note     Angélica Odom MRN# [id number]    YOB: 2019     YOB: 10/11.  Better since transfusion. Transfused 10/6   - THAI ELIAS  Address: [address] [place] [address], [place] [address] [place] [address] [place]  Home Phone: [phone]  Relation: Mother    PCPs:   Infant PCP: No primary care provider on file.  Maternal OB PCP: Dr. Ish Ludwig  Delivering Provider:   Dr. Ish Ludwig  Admission note  routed    Health Care Team:  Patient discussed with the care team.      A/P, imaging studies, laboratory data, medications and family situation   Dewayne Quinones MD       157.48

## 2021-03-27 NOTE — H&P ADULT - HISTORY OF PRESENT ILLNESS
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 EMS called and brought to [facility]. CTH/CTA/CTP with small chronic lacunar infarct anterior limb left internal capsule without acute hemorrhage or midline shift, poorly visualized small distal RMCA branches, patent CTA neck without significant stenosis, no core infarct or ischemia. S/p IV Alteplase 17:07. Patient reports improved LHP and speech since arrival to ED, and speech notably improved from beginning to end of my assessment. Reports some dyspnea but states she waxes and wanes at baseline. Denies headache, dizziness, nausea, vomiting, paresthesias, visual disturbance/change, chest pain, palpitations, abdominal pain.    NIH on admission: 9  NIH on my assessment: 5  Admission mRS: 0

## 2021-03-27 NOTE — H&P ADULT - ASSESSMENT
80F PMH CAD s/p PCI x 2 on ASA/Plavix, CVA x 3 (11/28/20, June 2020, May 2020), loop recorder, HTN (not on medications), last known well 15:00, presented to ED with acute onset aphasia, dysarthria, worsening left hemiparesis, s/p IV Alteplase 17:07  - NIH on arrival: 9  - NIH on my assessment: 5  - Admission mRS: 0  - Speech actively improved on my assessment    PLAN:  - D/w Dr. Troncoso    NEURO:  - Admit to NSICU  - Post IV Alteplase neuro checks/vital signs- Q15 min x 2 hours, Q30 min x 6 hours, Q1 hour x 16 hours  - 24 hour CT head vs MR head 17:00 3/28/21  - Eventual neurology consult  - Activity: [] mobilize as tolerated [x] Bedrest x 12 hours [x] PT after 12 hours [x] OT after 12 hours [] PMNR    PULM:  - Incentive spirometry  - CXR  - SpO2 goal > 92%    CV:  - SBP goal 100-180  - Labetalol/Hydralazine PRN for SBP > 180  - TTE  - EKG  - Lipid profile in AM  - Consult cardiology to review loop recorder    RENAL:  - Fluids: NS @ 60    GI:  - Diet: NPO x 12 hours post IV Alteplase, then dysphagia screen. DASH if passes  - GI prophylaxis [x] not indicated [] PPI [] other:  - Bowel regimen [] colace [x] senna once passes dysphagia [] other:    ENDO:   - Goal euglycemia (-180)  - Q6 FS/ISS while NPO  - A1C in AM  - TSH in AM    HEME/ONC:  - VTE prophylaxis: [x] SCDs [] chemoprophylaxis [x] hold chemoprophylaxis due to: IV Alteplase administration [] high risk of DVT/PE on admission due to:    ID:  - Monitor for leukocytosis/fever    SOCIAL/FAMILY:  [] awaiting [] updated at bedside [] family meeting [x] will update via telephone    CODE STATUS:  [x] Full Code [] DNR [] DNI [] Palliative/Comfort Care    DISPOSITION:  [x] ICU [] Stroke Unit [] Floor [] EMU [] RCU [] PCU    [x] Patient is at high risk of neurologic deterioration/death due to: hemorrhage, stroke    Time spent: 30 critical care minutes

## 2021-03-27 NOTE — ED PROVIDER NOTE - PHYSICAL EXAMINATION
Constitutional - well-developed; well nourished. Head - NCAT. Airway patent. Eyes - PERRL. CV - RRR. no murmur. no edema. Pulm - CTAB. Abd - soft, nt. no rebound. no guarding. Neuro - A&Ox3 strength 5/5 RUE and RLE.  Strength 3/5 LLE and 4/5 LUE. sensation intact x4. CN II-XII intact. non fluid broken speech. no dysarthria . Skin - No rash. MSK - normal ROM.

## 2021-03-27 NOTE — H&P ADULT - ATTENDING COMMENTS
80F with acute CVA - aphasia + L-sided weakness. NIH 9 on arrival. S/p tPA. Improving clinically.  PMH of CAD s/p PCI x 2 on ASA/Plavix, CVA x 3 (11/28/20, June 2020, May 2020), loop recorder, HTN (not on medications).  Admit to NCCU, post tPA care.  Stroke w/up.  Maintain BP < 180/105 as got tPA.  Dysphagia screen - if passes, restart home PO meds except ASA/Plavix  rest as above    Pt is critically ill and at high risk of death due to brain bleed, swelling, cardioresp failure.

## 2021-03-27 NOTE — H&P ADULT - NSHPLABSRESULTS_GEN_ALL_CORE
13.5   9.12  )-----------( 208      ( 27 Mar 2021 16:53 )             40.3       03-27    135  |  98  |  22.0<H>  ----------------------------<  99  4.0   |  25.0  |  0.85    Ca    9.1      27 Mar 2021 16:53    TPro  6.8  /  Alb  4.1  /  TBili  1.0  /  DBili  x   /  AST  23  /  ALT  15  /  AlkPhos  77  03-27      COVID-19 PCR . (03.27.21 @ 17:27)    COVID-19 PCR: NotDetec: You can help in the fight against COVID-19. [email or web address] [facility] may contact  you to see if you are interested in voluntarily participating in one of  our clinical trials.  Testing is performed using polymerase chain reaction (PCR) or  transcription mediated amplification (TMA). This COVID-19 (SARS-CoV-2)  nucleic acid amplification test was validated by [email or web address] [facility] and is  in use under the FDA Emergency Use Authorization (EUA) for clinical labs  CLIA-certified to perform high complexity testing. Test results should be  correlated with clinical presentation, patient history, and epidemiology.    Prothrombin Time and INR, Plasma (03.27.21 @ 16:53)    Prothrombin Time, Plasma: 12.4 sec    INR: 1.07: Recommended ranges for therapeutic INR:    2.0-3.0 for most medical and surgical thromboembolic states    2.0-3.0 for atrial fibrillation    2.0-3.0 for bileaflet mechanical valve in aortic position    2.5-3.5 for mechanical heart valves    Chest 2004;[id number]  The presence of direct thrombin inhibitors (argatroban, refludan)  may falsely increase results. ratio    Activated Partial Thromboplastin Time (03.27.21 @ 16:53)    Activated Partial Thromboplastin Time: 27.1 sec    Troponin T, Serum (03.27.21 @ 16:53)    Troponin T, Serum: <0.01 ng/mL    POCT  Blood Glucose (03.27.21 @ 16:46)    POCT Blood Glucose.: 111 mg/dL

## 2021-03-27 NOTE — PROGRESS NOTE ADULT - ASSESSMENT
Pt with current NIHSS of 9. CT brain negative for hemorrhage. CTA shows no LVO. Tpa bolus given at 5pm. Last normal 3;20pm. Will be admitted to NeuroICU. I contacted neuroICU attending Dr. Troncoso. Plan discussed with both ED attending and patient.

## 2021-03-27 NOTE — H&P ADULT - NSHPSOCIALHISTORY_GEN_ALL_CORE
Denies history of tobacco use  Occasionally drinks a small glass of felicitas "when she's cold"  Denies drug use

## 2021-03-27 NOTE — ED PROVIDER NOTE - NSSTROKETPAINCL_DEFICIT
Ischemic Stroke with a measurable deficit on NIH Stroke Scale or a non-measurable deficit that is deemed disabling by the Attending Stroke Neurologist

## 2021-03-27 NOTE — ED ADULT NURSE NOTE - PMH
CAD S/P percutaneous coronary angioplasty    Cerebrovascular accident (CVA)  November 2020, June 2020, May 2020  HTN (hypertension)    Polio    Rheumatic fever    TIA (transient ischemic attack)

## 2021-03-27 NOTE — ED ADULT NURSE NOTE - NSIMPLEMENTINTERV_GEN_ALL_ED
Implemented All Universal Safety Interventions:  [place] to call system. Call bell, personal items and telephone within reach. Instruct patient to call for assistance. Room bathroom lighting operational. Non-slip footwear when patient is off stretcher. Physically safe environment: no spills, clutter or unnecessary equipment. Stretcher in lowest position, wheels locked, appropriate side rails in place.

## 2021-03-27 NOTE — ED ADULT TRIAGE NOTE - CHIEF COMPLAINT QUOTE
Patient BIBA to ED today with c/o difficulty speaking which started about an hour before arrival to ED.

## 2021-03-27 NOTE — ED PROVIDER NOTE - CLINICAL SUMMARY MEDICAL DECISION MAKING FREE TEXT BOX
labs and imaging reviewed. Pt with stroke symptoms, no contraindications in the window for tPA as symptoms onset at 1520 according to patient and witness.  Case d/w Dr. Hayes who agreed with plan for tPA.  Case d/w neuroicu who will admit patient for further monitoring.  Pt's son and daughter-in-law updated regarding patient's treatment plan.

## 2021-03-27 NOTE — ED PROVIDER NOTE - OBJECTIVE STATEMENT
Pt is an 81 yo F BIBEMS for speech difficulty and L-sided weakness.  PMHx significant for htn, cva. Pt was with friend when suddenly at 320 PM at onset of speech difficulty and L-sided weakness.  Prior to this she was feeling normal and had her second dose of her covid vaccination this morning. Pt states that she has had a CVA in the past with similar symptoms and has some residual L-sided weakness but this weakness is much worse and her speech is normally fluid.  Pt denies any other symptoms at this time. on plavix. no AC usage. no recent surgery. no hx of ICH.

## 2021-03-27 NOTE — H&P ADULT - NSICDXPASTMEDICALHX_GEN_ALL_CORE_FT
PAST MEDICAL HISTORY:  CAD S/P percutaneous coronary angioplasty     Cerebrovascular accident (CVA) November 2020, June 2020, May 2020    HTN (hypertension)     Polio     Rheumatic fever     TIA (transient ischemic attack)

## 2021-03-27 NOTE — PROGRESS NOTE ADULT - SUBJECTIVE AND OBJECTIVE BOX
HISTORY OF PRESENT ILLNESS:   (MRN [id number]) 80y right/left-handed Female with stroke risk factors of  HTN and prior stroke in 2020 presented to [facility] with difficulty speaking and left sided weakness on 3/27.. Last known normal time was 320pm.     OUTSIDE HOSPITAL COURSE:    PAST MEDICAL HISTORY:  No pertinent family history in first degree relatives    MEWS Score    Polio    CAD S/P percutaneous coronary angioplasty    TIA (transient ischemic attack)    Rheumatic fever    HTN (hypertension)    Status post tonsillectomy    EVAL    90+    SysAdmin_VisitLink        PAST SURGICAL HISTORY:    HOME MEDICATIONS:  Home Medications:  aspirin 81 mg oral tablet, chewable: 1 tab(s) orally once a day (30 Nov 2020 13:29)  clopidogrel 75 mg oral tablet: 1 tab(s) orally once a day (30 Nov 2020 10:56)  Coenzyme Q10 200 mg oral capsule: 1 cap(s) orally once a day (30 Nov 2020 10:56)  Multiple Vitamins oral tablet: 1 tab(s) orally once a day (30 Nov 2020 10:56)      FAMILY HISTORY:    SOCIAL HISTORY:    ALLERGIES:  Biaxin (Muscle Pain; Joint Pain)  codeine (Faint)  Isosorbide Mononitrate Extended Release (Faint)  Metoprolol Succinate ER (Unknown)  monoctanoin (Unknown)      VITALS/DATA/ORDERS: [x] Reviewed  Vital Signs Last 24 Hrs  T(C): 36.9 (27 Mar 2021 17:05), Max: 36.9 (27 Mar 2021 16:55)  T(F): 98.5 (27 Mar 2021 17:05), Max: 98.5 (27 Mar 2021 16:55)  HR: 96 (27 Mar 2021 17:05) (93 - 96)  BP: 166/100 (27 Mar 2021 17:05) (166/100 - 177/100)  BP(mean): --  RR: 16 (27 Mar 2021 17:05) (16 - 16)  SpO2: 94% (27 Mar 2021 17:05) (94% - 96%)                        13.5   9.12  )-----------( 208      ( 27 Mar 2021 16:53 )             40.3     03-27    135  |  98  |  22.0<H>  ----------------------------<  99  4.0   |  25.0  |  0.85    Ca    9.1      27 Mar 2021 16:53    TPro  6.8  /  Alb  4.1  /  TBili  1.0  /  DBili  x   /  AST  23  /  ALT  15  /  AlkPhos  77  03-27    PT/INR - ( 27 Mar 2021 16:53 )   PT: 12.4 sec;   INR: 1.07 ratio         PTT - ( 27 Mar 2021 16:53 )  PTT:27.1 sec  CAPILLARY BLOOD GLUCOSE      POCT Blood Glucose.: 111 mg/dL (27 Mar 2021 16:46)    CT Head:     EXAMINATION: Assisted by (OSH staff)  NIHSS: 9    1A: Level of consciousness       0= Alert; keenly responsive       +1= Arouses to minor stimulation       +2= Requires repeated stimulation to arouse       +2= Movements to pain       +3= Postures or unresponsive  1B: Ask month and age       0= Both questions right       +1= 1 question right       +2= 0 questions right       +1= Dysarthric/intubated/trauma/language barrier       +2= Aphasic  1C: "Blink eyes" and "Squeeze Hands"       0= Performs both       +1= Performs 1 task       +2= Performs 0 tasks    2: Horizontal EOMs       0= Normal       +1= Partial gaze palsy: can be overcome       +1= Partial gaze palsy: corrects w/ oculocephalic reflex        +2= Forzed gaze palsy: cannot be overcome    3: Visual fields       0= No visual loss       +1= Partial hemianopia       +2= Complete hemianopia       +3= Patient is b/l blind       +3= B/l hemianopia    4: Facial palsy (use grimace if obtunded)       0= Normal symmetry       +1= Minor paralysis (flat NLF, smile asymmetry)       +2= Partial paralysis ( lower face)       +3= Unilateral complete paralysis (upper/lower face)       +3= B/l complete paralysis (upper/lower face)    5A: Left arm motor drift (count out loud and use fingers to show count)       0= No drift x 10 seconds       +1= Drift but doesn't hit bed       +2= Drift, hits bed       +2= Some effort against gravity       +3= No effort against gravity       +4= No movement       0= Amputation/joint fusion  5B: Right arm motor drift       0= No drift x 10 seconds       +1= Drift but doesn't hit bed       +2= Drift, hits bed       +2= Some effort against gravity       +3= No effort against gravity       +4= No movement       0= Amputation/joint fusion    6A: Left leg motor drift       0= No drift x 10 seconds       +1= Drift but doesn't hit bed       +2= Drift, hits bed       +2= Some effort against gravity       +3= No effort against gravity       +4= No movement       0= Amputation/joint fusion    6B: Right leg motor drift       0= No drift x 10 seconds       +1= Drift but doesn't hit bed       +2= Drift, hits bed       +2= Some effort against gravity       +3= No effort against gravity       +4= No movement       0= Amputation/joint fusion    7: Limb ataxia (FNF/heel-shin)       0= No ataxia       +1= Ataxia in 1 limb       +2= Ataxia in 2 limbs       0= Does not understand       0= Paralyzed       0= Amputation/joint fusion    8: Sensation       0= Normal, no sensory loss       +1= mild-moderate loss: less sharp/more dull       +1= mild-moderate loss: can sense being touched       +2= Complete loss: cannot sense being touched at all       +2= No response and quadriplegic       +2= Coma/unresponsive    9: Language/aphasia- describe the scene (on paul); name the items; read the sentences (on paul)       0= Normal, no aphasia       +1= mild-moderate aphaisa: some obvious changes without significant limitation       +2= Severe aphasia: fragmentary expression, inference needed, cannot identify materials       +3= Mute/global aphasia: no usable speech/auditory comprehension       +3= coma/unresponsive    10: Dysarthria- read the words       0= Normal       +1= mild-moderate dysarthria: slurring but can be understood       +2= Severe dysarthria: unintelligible slurring or out of proportion to dysphasia       +2= Mute/anarthric        0= Intubated/unable to test    11: Extinction/inattention       0= No abnormality       +1= visual/tactile/auditory/spatial/personal inattention       +1= Extinction to b/l simultaneous stimulation       +2= Profound neha-inattention (e.g. does not recognize own hand)       +2= extinction to > 1 modality    IV rTPA INCLUSION CRITERIA  [x] Symptoms suggestive of ischemic stroke that are deemed to be disabling, regardless of improvement   [x] Able to initiate treatment within [x] 3 hours [] 4.5 hours of time last known well    IV rTPA CONTRAINDICATIONS  [x None    EXCLUSION CRITERIA:  Absolute Contraindications  [] Uncontrolled hypertension at the start of treatment with tPA (SBP>185 mm Hg and/or DBP>110 mm Hg)  [] Evidence of intracranial hemorrhage on pretreatment evaluation  [] Suspicion of subarachnoid hemorrhage  [] Recent intracranial surgery or serious head trauma within 3 months  [] Major surgeries and/or major trauma within 15 days  [] Active internal bleeding within 21 days  [] Intracranial neoplasm, AVM or aneurysm  [] Known bleeding diathesis (including INR>1.7, heparin within last 12 hours and a PTT > 1.5 x normal, platelet count < 100,000)  [] Acute pericarditis or infective endocarditis  [] Patient or family declines and understands risks and benefits of treatment.  Additional exclusion criteria between 3 and 4.5 hours:  [] Age >80 years  [] Severe stroke (NIHSS > 25)  [] History of diabetes and prior stroke  [] Taking an oral anticoagulant regardless of INR  Relative Contraindications/ Warnings:  [] Glucose < 50 or > 400 mg/dL at time of bolus  [] Arterial puncture in non-compressible site  [] Minor or rapidly improving stroke within previous 7 days  [] Recent MI within 3 months  [] Previous stroke within the past 3 months  [] History of intracranial hemorrhage  [] Seizure at onset of stroke  [] Life expectancy < 1 year or severe co-morbid illness  [] Pregnancy    RISKS/BENEFITS DISCUSSION:  The risks and benefits of IV rTPA administration was discussed with patient/family in presence of OSH staff.  Current treatment recommendations for eligible patients with acute ischemic stroke have proven highly beneficial with acceptable risk. Complications related to intravenous r-tPA include symptomatic intracranial hemorrhage, major systemic hemorrhage and angioedema in approximately 6%, 2%, and 5% of patients, respectively. There is a 2.8% risk of intracerebral bleeding in patients treated in the 3-4.5 hour window (compared to 0.2% not treated with r-tPA) according to the ECASS III Study with no increase in mortality compared to placebo groups. Early Alteplase IV r-tPA is the standard of care for acute stroke patients. Rapid intervention is critical to successful treatment.    RECOMMENDATIONS:  [x] Administer IV rTPA; bolus: ; infusion:  [] Transfer to [facility] for further stroke care  [] Transfer to [facility] for evaluation for mechanical thrombectomy     Plan discussed with __ED attending and patient_.

## 2021-03-28 LAB
A1C WITH ESTIMATED AVERAGE GLUCOSE RESULT: 5.1 % — SIGNIFICANT CHANGE UP (ref 4–5.6)
ANION GAP SERPL CALC-SCNC: 10 MMOL/L — SIGNIFICANT CHANGE UP (ref 5–17)
BUN SERPL-MCNC: 20 MG/DL — SIGNIFICANT CHANGE UP (ref 8–20)
CALCIUM SERPL-MCNC: 8.7 MG/DL — SIGNIFICANT CHANGE UP (ref 8.6–10.2)
CHLORIDE SERPL-SCNC: 103 MMOL/L — SIGNIFICANT CHANGE UP (ref 98–107)
CHOLEST SERPL-MCNC: 115 MG/DL — SIGNIFICANT CHANGE UP
CO2 SERPL-SCNC: 28 MMOL/L — SIGNIFICANT CHANGE UP (ref 22–29)
COVID-19 SPIKE DOMAIN AB INTERP: POSITIVE
COVID-19 SPIKE DOMAIN ANTIBODY RESULT: 168 U/ML — HIGH
CREAT SERPL-MCNC: 0.96 MG/DL — SIGNIFICANT CHANGE UP (ref 0.5–1.3)
ESTIMATED AVERAGE GLUCOSE: 100 MG/DL — SIGNIFICANT CHANGE UP (ref 68–114)
GLUCOSE SERPL-MCNC: 107 MG/DL — HIGH (ref 70–99)
HCT VFR BLD CALC: 38.1 % — SIGNIFICANT CHANGE UP (ref 34.5–45)
HDLC SERPL-MCNC: 60 MG/DL — SIGNIFICANT CHANGE UP
HGB BLD-MCNC: 12.7 G/DL — SIGNIFICANT CHANGE UP (ref 11.5–15.5)
LIPID PNL WITH DIRECT LDL SERPL: 49 MG/DL — SIGNIFICANT CHANGE UP
MAGNESIUM SERPL-MCNC: 2 MG/DL — SIGNIFICANT CHANGE UP (ref 1.8–2.6)
MCHC RBC-ENTMCNC: 29.5 PG — SIGNIFICANT CHANGE UP (ref 27–34)
MCHC RBC-ENTMCNC: 33.3 GM/DL — SIGNIFICANT CHANGE UP (ref 32–36)
MCV RBC AUTO: 88.6 FL — SIGNIFICANT CHANGE UP (ref 80–100)
NON HDL CHOLESTEROL: 55 MG/DL — SIGNIFICANT CHANGE UP
PA ADP PRP-ACNC: 80 PRU — LOW (ref 180–376)
PHOSPHATE SERPL-MCNC: 4 MG/DL — SIGNIFICANT CHANGE UP (ref 2.4–4.7)
PLATELET # BLD AUTO: 172 K/UL — SIGNIFICANT CHANGE UP (ref 150–400)
POTASSIUM SERPL-MCNC: 4.3 MMOL/L — SIGNIFICANT CHANGE UP (ref 3.5–5.3)
POTASSIUM SERPL-SCNC: 4.3 MMOL/L — SIGNIFICANT CHANGE UP (ref 3.5–5.3)
RBC # BLD: 4.3 M/UL — SIGNIFICANT CHANGE UP (ref 3.8–5.2)
RBC # FLD: 12.4 % — SIGNIFICANT CHANGE UP (ref 10.3–14.5)
SARS-COV-2 IGG+IGM SERPL QL IA: 168 U/ML — HIGH
SARS-COV-2 IGG+IGM SERPL QL IA: POSITIVE
SODIUM SERPL-SCNC: 141 MMOL/L — SIGNIFICANT CHANGE UP (ref 135–145)
TRIGL SERPL-MCNC: 32 MG/DL — SIGNIFICANT CHANGE UP
TSH SERPL-MCNC: 2.58 UIU/ML — SIGNIFICANT CHANGE UP (ref 0.27–4.2)
WBC # BLD: 7.06 K/UL — SIGNIFICANT CHANGE UP (ref 3.8–10.5)
WBC # FLD AUTO: 7.06 K/UL — SIGNIFICANT CHANGE UP (ref 3.8–10.5)

## 2021-03-28 PROCEDURE — 99233 SBSQ HOSP IP/OBS HIGH 50: CPT

## 2021-03-28 PROCEDURE — 70450 CT HEAD/BRAIN W/O DYE: CPT | Mod: 26

## 2021-03-28 PROCEDURE — 93970 EXTREMITY STUDY: CPT | Mod: 26

## 2021-03-28 PROCEDURE — 99291 CRITICAL CARE FIRST HOUR: CPT

## 2021-03-28 RX ORDER — ASPIRIN/CALCIUM CARB/MAGNESIUM 324 MG
81 TABLET ORAL DAILY
Refills: 0 | Status: DISCONTINUED | OUTPATIENT
Start: 2021-03-28 | End: 2021-03-30

## 2021-03-28 RX ORDER — CLOPIDOGREL BISULFATE 75 MG/1
75 TABLET, FILM COATED ORAL DAILY
Refills: 0 | Status: DISCONTINUED | OUTPATIENT
Start: 2021-03-28 | End: 2021-03-30

## 2021-03-28 RX ORDER — CARVEDILOL PHOSPHATE 80 MG/1
3.12 CAPSULE, EXTENDED RELEASE ORAL EVERY 12 HOURS
Refills: 0 | Status: DISCONTINUED | OUTPATIENT
Start: 2021-03-28 | End: 2021-03-30

## 2021-03-28 RX ORDER — ATORVASTATIN CALCIUM 80 MG/1
80 TABLET, FILM COATED ORAL AT BEDTIME
Refills: 0 | Status: DISCONTINUED | OUTPATIENT
Start: 2021-03-28 | End: 2021-03-30

## 2021-03-28 RX ORDER — ENOXAPARIN SODIUM 100 MG/ML
40 INJECTION SUBCUTANEOUS AT BEDTIME
Refills: 0 | Status: DISCONTINUED | OUTPATIENT
Start: 2021-03-28 | End: 2021-03-30

## 2021-03-28 RX ADMIN — ATORVASTATIN CALCIUM 80 MILLIGRAM(S): 80 TABLET, FILM COATED ORAL at 21:56

## 2021-03-28 RX ADMIN — CARVEDILOL PHOSPHATE 3.12 MILLIGRAM(S): 80 CAPSULE, EXTENDED RELEASE ORAL at 18:18

## 2021-03-28 RX ADMIN — CLOPIDOGREL BISULFATE 75 MILLIGRAM(S): 75 TABLET, FILM COATED ORAL at 18:18

## 2021-03-28 RX ADMIN — Medication 81 MILLIGRAM(S): at 18:18

## 2021-03-28 RX ADMIN — ENOXAPARIN SODIUM 40 MILLIGRAM(S): 100 INJECTION SUBCUTANEOUS at 21:55

## 2021-03-28 RX ADMIN — Medication 1000 MILLIGRAM(S): at 00:00

## 2021-03-28 NOTE — PHYSICAL THERAPY INITIAL EVALUATION ADULT - GAIT DEVIATIONS NOTED, PT EVAL
decreased tomasa/increased time in double stance/decreased step length/decreased stride length/decreased weight-shifting ability

## 2021-03-28 NOTE — PHYSICAL THERAPY INITIAL EVALUATION ADULT - ADDITIONAL COMMENTS
Pt. reports that she is able to get some help at home from family if needed. Pt. has RW, cane and W/C at home.

## 2021-03-28 NOTE — PROGRESS NOTE ADULT - NUTRITIONAL ASSESSMENT
80F with acute CVA - aphasia + L-sided weakness. NIH 9 on arrival. S/p tPA. Improving clinically.  PMH of CAD s/p PCI x 2 on ASA/Plavix, CVA x 3 (11/28/20, June 2020, May 2020), loop recorder, HTN (not on medications).      Plan:  cont post tPA care.  Stroke w/up to be completed.  Pending MRI  Was on DAPT, P2y12 therapeutic; will need to restart per CArdiology   Maintain BP < 180/105 as got tPA.  Diet as tolerated  SQL, hold chemoppx for now  Neurology f/up appreciated   Possible downgrade after  CTH    Pt is critically ill and at high risk of death due to brain bleed, swelling, cardioresp failure.

## 2021-03-28 NOTE — PROGRESS NOTE ADULT - ASSESSMENT
80F PMH CAD s/p PCI x 2 on ASA/Plavix, CVA x 3 (11/28/20, June 2020, May 2020), loop recorder, HTN (not on medications) presents with stroke like symptoms on 3/27 8 hours after second COVID vaccination. NIH on admission =9, however improved. CTH/CTA/CTP with small chronic lacunar infarct anterior limb left internal capsule without acute hemorrhage or midline shift, poorly visualized small distal RMCA branches, patent CTA neck without significant stenosis, no core infarct or ischemia. S/p IV Alteplase 17:07 3/27. Patient with improvement of stroke like symptoms, feels like speech back to baseline and with some mild left UE weakness and LLE weakness.  Neurology consult called- Dr. Mayers. [facility] cardiology group called, echo performed however not results, isha pending for AM. Loop recorder interrogated. 24 hour CT performed with no heme, patient pending MRI.  DAPT resumed. Of note per patient with multiple past cva/tia (this is number four). patient now stable for downgrade from NSICU to medical service (Tele).    #acute cva  - cardio consult appreciated- for isha in am   - hx of multiple neuro episodes in past (tia and cva)  - aspirin   - plavix  - s/p neuroicu  - neuro consult for am  - mr pending  - echo results pending   - statin  - s/p tpa    #HTN- essential  - coreg  - monitor blood pressure    #HLD  - statin    #CAD s/p PCI  - aspirin, plavix  - coreg  - cardio consult appreciated    #DVT prophylaxis  - lovenox S  80F PMH CAD s/p PCI x 2 on ASA/Plavix, CVA x 3 (11/28/20, June 2020, May 2020), loop recorder, HTN (not on medications) presents with stroke like symptoms on 3/27 8 hours after second COVID vaccination. NIH on admission =9, however improved. CTH/CTA/CTP with small chronic lacunar infarct anterior limb left internal capsule without acute hemorrhage or midline shift, poorly visualized small distal RMCA branches, patent CTA neck without significant stenosis, no core infarct or ischemia. S/p IV Alteplase 17:07 3/27. Patient with improvement of stroke like symptoms, feels like speech back to baseline and with some mild left UE weakness and LLE weakness.  Neurology consult called- Dr. Mayers. [facility] cardiology group called, echo performed however not results, isha pending for AM. Loop recorder interrogated. 24 hour CT performed with no heme, patient pending MRI.  DAPT resumed. Of note per patient with multiple past cva/tia (this is number four). patient now stable for downgrade from NSICU to medical service (Tele).    #acute cva  - cardio consult appreciated- for isha in am   - hx of multiple neuro episodes in past (tia and cva)  - aspirin   - plavix  - s/p neuroicu  - neuro consult for am  - mr pending  - echo results pending   - statin  - s/p tpa    #HTN- essential  - coreg  - monitor blood pressure    #HLD  - statin    #CAD s/p PCI  - aspirin, plavix  - coreg  - cardio consult appreciated    #DVT prophylaxis  - lovenox SC    given 4th event will check ct a/p chest and head to r/o malignancy source of neuro events. isha in am w/ cardio d/w Dr Cervantes- npo after mn. 80F PMH CAD s/p PCI x 2 on ASA/Plavix, CVA x 3 (11/28/20, June 2020, May 2020), loop recorder, HTN (not on medications) presents with stroke like symptoms on 3/27 8 hours after second COVID vaccination. NIH on admission =9, however improved. CTH/CTA/CTP with small chronic lacunar infarct anterior limb left internal capsule without acute hemorrhage or midline shift, poorly visualized small distal RMCA branches, patent CTA neck without significant stenosis, no core infarct or ischemia. S/p IV Alteplase 17:07 3/27. Patient with improvement of stroke like symptoms, feels like speech back to baseline and with some mild left UE weakness and LLE weakness.  Neurology consult called- Dr. Mayers. [facility] cardiology group called, echo performed however not results, isha pending for AM. Loop recorder interrogated.  patient pending MRI.  DAPT resumed. Of note per patient with multiple past cva/tia (this is number four). patient now stable for downgrade from NSICU to medical service (Tele).    #acute cva  - cardio consult appreciated- for isha in am   - hx of multiple neuro episodes in past (tia and cva)  - aspirin   - plavix  - s/p neuroicu  - neuro consult for am  - mr pending  - echo results pending   - statin  - s/p tpa    #HTN- essential  - coreg  - monitor blood pressure    #HLD  - statin    #CAD s/p PCI  - aspirin, plavix  - coreg  - cardio consult appreciated    #DVT prophylaxis  - lovenox SC    given 4th event will check ct a/p chest and head to r/o malignancy source of neuro events. isha in am w/ cardio d/w Dr Cervantes- npo after mn.

## 2021-03-28 NOTE — PROGRESS NOTE ADULT - SUBJECTIVE AND OBJECTIVE BOX
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 EMS called and brought to [facility]. CTH/CTA/CTP with small chronic lacunar infarct anterior limb left internal capsule without acute hemorrhage or midline shift, poorly visualized small distal RMCA branches, patent CTA neck without significant stenosis, no core infarct or ischemia. S/p IV Alteplase 17:07. Patient reports improved LHP and speech since arrival to ED, and speech notably improved from beginning to end of my assessment. Reports some dyspnea but states she waxes and wanes at baseline. Denies headache, dizziness, nausea, vomiting, paresthesias, visual disturbance/change, chest pain, palpitations, abdominal pain.    NIH on admission: 9  NIH on my assessment: 5  Admission mRS: 0 (27 Mar 2021 18:00)    Observed in NCCU, almost back to the baseline.  VS, imaging, labs reviewed.  Exam:  AAOX3, face symmetric, EOMI, PERRLA, R side 5/5; L UE 4/5, L LLE 4-/5.  Chest CTAB  S1S2 present  Abd soft

## 2021-03-28 NOTE — PROGRESS NOTE ADULT - SUBJECTIVE AND OBJECTIVE BOX
Patient is a 80y old  Female who presents with a chief complaint of Acute onset speech difficulty, worsening left hemiparesis (28 Mar 2021 16:43)    Patient seen and examined at bedside.     ALLERGIES:  Biaxin (Muscle Pain; Joint Pain)  codeine (Faint)  Isosorbide Mononitrate Extended Release (Faint)  Metoprolol Succinate ER (Unknown)  monoctanoin (Unknown)    MEDICATIONS  (STANDING):  aspirin  chewable 81 milliGRAM(s) Oral daily  atorvastatin 40 milliGRAM(s) Oral at bedtime  carvedilol 3.125 milliGRAM(s) Oral every 12 hours  clopidogrel Tablet 75 milliGRAM(s) Oral daily  enoxaparin Injectable 40 milliGRAM(s) SubCutaneous at bedtime    MEDICATIONS  (PRN):  hydrALAZINE Injectable 10 milliGRAM(s) IV Push every 2 hours PRN SBP >180  labetalol Injectable 10 milliGRAM(s) IV Push every 2 hours PRN SBP >180    Vital Signs Last 24 Hrs  T(F): 97.3 (28 Mar 2021 12:00), Max: 98.8 (28 Mar 2021 00:15)  HR: 79 (28 Mar 2021 18:07) (58 - 91)  BP: 121/81 (28 Mar 2021 18:07) (100/61 - 163/72)  RR: 18 (28 Mar 2021 18:07) (11 - 78)  SpO2: 99% (28 Mar 2021 09:45) (96% - 100%)  I&O's Summary    27 Mar 2021 07:01  -  28 Mar 2021 07:00  --------------------------------------------------------  IN: 720 mL / OUT: 1050 mL / NET: -330 mL    28 Mar 2021 07:01  -  28 Mar 2021 19:00  --------------------------------------------------------  IN: 0 mL / OUT: 650 mL / NET: -650 mL      PHYSICAL EXAM:  General: NAD, A/O x 3  ENT: MMM, no thrush  Neck: Supple, No JVD  Lungs:  good air entry, non-labored breathing  Cardio: +s1/s2  Abdomen: Soft, Nontender, Nondistended; Bowel sounds present  Extremities: No calf tenderness  Neuro +left ue and le weakness 3-4/5 strength however able to move all extremities    LABS:                        12.7   7.06  )-----------( 172      ( 28 Mar 2021 05:02 )             38.1     03-28    141  |  103  |  20.0  ----------------------------<  107  4.3   |  28.0  |  0.96    Ca    8.7      28 Mar 2021 05:02  Phos  4.0     03-28  Mg     2.0     03-28    TPro  6.8  /  Alb  4.1  /  TBili  1.0  /  DBili  x   /  AST  23  /  ALT  15  /  AlkPhos  77  03-27    eGFR if Non African American: 56 mL/min/1.73M2 (03-28-21 @ 05:02)  eGFR if African American: 65 mL/min/1.73M2 (03-28-21 @ 05:02)    PT/INR - ( 27 Mar 2021 16:53 )   PT: 12.4 sec;   INR: 1.07 ratio    PTT - ( 27 Mar 2021 16:53 )  PTT:27.1 sec    CARDIAC MARKERS ( 27 Mar 2021 16:53 )  x     / <0.01 ng/mL / x     / x     / x        03-28 Chol 115 mg/dL LDL -- HDL 60 mg/dL Trig 32 mg/dL  TSH 2.58   TSH with FT4 reflex --  Total T3 --    RADIOLOGY & ADDITIONAL TESTS:  < from: CT Head No Cont (03.28.21 @ 16:54) >  IMPRESSION:  Mild chronic microvascular changes without evidence of an acute transcortical infarction or hemorrhage.  < end of copied text >    < from: Xray Chest 1 View- PORTABLE-Urgent (03.27.21 @ 17:57) >  IMPRESSION: Prominent interstitial markings bilaterally may indicate interstitial edema. Follow-up recommended  < end of copied text >    < from: CT Angio Head w/ IV Cont (03.27.21 @ 17:01) >  IMPRESSION:  HEAD CT AND RAPID PERFUSION:  HEAD CT: Mild volume loss, microvascular disease, no acute hemorrhage or midline shift.  CT PERFUSION demonstrated: No core infarct. No active ischemia.  If symptoms persist consider follow up head CT or MRI, MRA  if no contraindication.  CTA COW: Small distal right MCA branches not well visualized.  CTA NECK: Patent, ECAs, ICAs, no  hemodynamically significant stenosis at  ICA origins by NASCET criteria.  Bilateral vertebral arteries are patent without flow limiting stenosis.  < end of copied text >    < from: CT Brain Stroke Protocol (03.27.21 @ 16:54) >  IMPRESSION:  Tiny old lacunar infarct anterior limb of the left internal capsule.  < end of copied text >    Care Discussed with Consultants/Other Providers:   NeuroICU  Cardiology Patient is a 80y old  Female who presents with a chief complaint of Acute onset speech difficulty, worsening left hemiparesis (28 Mar 2021 16:43)    Patient seen and examined at bedside.     ALLERGIES:  Biaxin (Muscle Pain; Joint Pain)  codeine (Faint)  Isosorbide Mononitrate Extended Release (Faint)  Metoprolol Succinate ER (Unknown)  monoctanoin (Unknown)    MEDICATIONS  (STANDING):  aspirin  chewable 81 milliGRAM(s) Oral daily  atorvastatin 80 milliGRAM(s) Oral at bedtime  carvedilol 3.125 milliGRAM(s) Oral every 12 hours  clopidogrel Tablet 75 milliGRAM(s) Oral daily  enoxaparin Injectable 40 milliGRAM(s) SubCutaneous at bedtime    MEDICATIONS  (PRN):  hydrALAZINE Injectable 10 milliGRAM(s) IV Push every 2 hours PRN SBP >180  labetalol Injectable 10 milliGRAM(s) IV Push every 2 hours PRN SBP >180    Vital Signs Last 24 Hrs  T(F): 97.3 (28 Mar 2021 12:00), Max: 98.8 (28 Mar 2021 00:15)  HR: 79 (28 Mar 2021 18:07) (58 - 91)  BP: 121/81 (28 Mar 2021 18:07) (100/61 - 163/72)  RR: 18 (28 Mar 2021 18:07) (11 - 78)  SpO2: 99% (28 Mar 2021 09:45) (96% - 100%)  I&O's Summary    27 Mar 2021 07:01  -  28 Mar 2021 07:00  --------------------------------------------------------  IN: 720 mL / OUT: 1050 mL / NET: -330 mL    28 Mar 2021 07:01  -  28 Mar 2021 19:00  --------------------------------------------------------  IN: 0 mL / OUT: 650 mL / NET: -650 mL      PHYSICAL EXAM:  General: NAD, A/O x 3  ENT: MMM, no thrush  Neck: Supple, No JVD  Lungs:  good air entry, non-labored breathing  Cardio: +s1/s2  Abdomen: Soft, Nontender, Nondistended; Bowel sounds present  Extremities: No calf tenderness  Neuro +left ue and le weakness 3-4/5 strength however able to move all extremities    LABS:                        12.7   7.06  )-----------( 172      ( 28 Mar 2021 05:02 )             38.1     03-28    141  |  103  |  20.0  ----------------------------<  107  4.3   |  28.0  |  0.96    Ca    8.7      28 Mar 2021 05:02  Phos  4.0     03-28  Mg     2.0     03-28    TPro  6.8  /  Alb  4.1  /  TBili  1.0  /  DBili  x   /  AST  23  /  ALT  15  /  AlkPhos  77  03-27    eGFR if Non African American: 56 mL/min/1.73M2 (03-28-21 @ 05:02)  eGFR if African American: 65 mL/min/1.73M2 (03-28-21 @ 05:02)    PT/INR - ( 27 Mar 2021 16:53 )   PT: 12.4 sec;   INR: 1.07 ratio    PTT - ( 27 Mar 2021 16:53 )  PTT:27.1 sec    CARDIAC MARKERS ( 27 Mar 2021 16:53 )  x     / <0.01 ng/mL / x     / x     / x        03-28 Chol 115 mg/dL LDL -- HDL 60 mg/dL Trig 32 mg/dL  TSH 2.58   TSH with FT4 reflex --  Total T3 --    RADIOLOGY & ADDITIONAL TESTS:  < from: CT Head No Cont (03.28.21 @ 16:54) >  IMPRESSION:  Mild chronic microvascular changes without evidence of an acute transcortical infarction or hemorrhage.  < end of copied text >    < from: Xray Chest 1 View- PORTABLE-Urgent (03.27.21 @ 17:57) >  IMPRESSION: Prominent interstitial markings bilaterally may indicate interstitial edema. Follow-up recommended  < end of copied text >    < from: CT Angio Head w/ IV Cont (03.27.21 @ 17:01) >  IMPRESSION:  HEAD CT AND RAPID PERFUSION:  HEAD CT: Mild volume loss, microvascular disease, no acute hemorrhage or midline shift.  CT PERFUSION demonstrated: No core infarct. No active ischemia.  If symptoms persist consider follow up head CT or MRI, MRA  if no contraindication.  CTA COW: Small distal right MCA branches not well visualized.  CTA NECK: Patent, ECAs, ICAs, no  hemodynamically significant stenosis at  ICA origins by NASCET criteria.  Bilateral vertebral arteries are patent without flow limiting stenosis.  < end of copied text >    < from: CT Brain Stroke Protocol (03.27.21 @ 16:54) >  IMPRESSION:  Tiny old lacunar infarct anterior limb of the left internal capsule.  < end of copied text >    Care Discussed with Consultants/Other Providers:   NeuroICU  Cardiology

## 2021-03-28 NOTE — PHYSICAL THERAPY INITIAL EVALUATION ADULT - MANUAL MUSCLE TESTING RESULTS, REHAB EVAL
in the right UE and LE; left UE grossly 3+/5, and left LE grossly 3/5/no strength deficits were identified

## 2021-03-29 ENCOUNTER — TRANSCRIPTION ENCOUNTER (OUTPATIENT)
Age: 81
End: 2021-03-29

## 2021-03-29 LAB
ANION GAP SERPL CALC-SCNC: 11 MMOL/L — SIGNIFICANT CHANGE UP (ref 5–17)
BUN SERPL-MCNC: 20 MG/DL — SIGNIFICANT CHANGE UP (ref 8–20)
CALCIUM SERPL-MCNC: 8.6 MG/DL — SIGNIFICANT CHANGE UP (ref 8.6–10.2)
CHLORIDE SERPL-SCNC: 104 MMOL/L — SIGNIFICANT CHANGE UP (ref 98–107)
CO2 SERPL-SCNC: 25 MMOL/L — SIGNIFICANT CHANGE UP (ref 22–29)
CREAT SERPL-MCNC: 0.93 MG/DL — SIGNIFICANT CHANGE UP (ref 0.5–1.3)
GLUCOSE SERPL-MCNC: 89 MG/DL — SIGNIFICANT CHANGE UP (ref 70–99)
HCT VFR BLD CALC: 38.5 % — SIGNIFICANT CHANGE UP (ref 34.5–45)
HGB BLD-MCNC: 12.6 G/DL — SIGNIFICANT CHANGE UP (ref 11.5–15.5)
MAGNESIUM SERPL-MCNC: 2 MG/DL — SIGNIFICANT CHANGE UP (ref 1.6–2.6)
MCHC RBC-ENTMCNC: 29.3 PG — SIGNIFICANT CHANGE UP (ref 27–34)
MCHC RBC-ENTMCNC: 32.7 GM/DL — SIGNIFICANT CHANGE UP (ref 32–36)
MCV RBC AUTO: 89.5 FL — SIGNIFICANT CHANGE UP (ref 80–100)
PHOSPHATE SERPL-MCNC: 3.7 MG/DL — SIGNIFICANT CHANGE UP (ref 2.4–4.7)
PLATELET # BLD AUTO: 158 K/UL — SIGNIFICANT CHANGE UP (ref 150–400)
POTASSIUM SERPL-MCNC: 4.2 MMOL/L — SIGNIFICANT CHANGE UP (ref 3.5–5.3)
POTASSIUM SERPL-SCNC: 4.2 MMOL/L — SIGNIFICANT CHANGE UP (ref 3.5–5.3)
RBC # BLD: 4.3 M/UL — SIGNIFICANT CHANGE UP (ref 3.8–5.2)
RBC # FLD: 12.4 % — SIGNIFICANT CHANGE UP (ref 10.3–14.5)
SODIUM SERPL-SCNC: 140 MMOL/L — SIGNIFICANT CHANGE UP (ref 135–145)
WBC # BLD: 5.75 K/UL — SIGNIFICANT CHANGE UP (ref 3.8–10.5)
WBC # FLD AUTO: 5.75 K/UL — SIGNIFICANT CHANGE UP (ref 3.8–10.5)

## 2021-03-29 PROCEDURE — 76705 ECHO EXAM OF ABDOMEN: CPT | Mod: 26,RT

## 2021-03-29 PROCEDURE — 74176 CT ABD & PELVIS W/O CONTRAST: CPT | Mod: 26

## 2021-03-29 PROCEDURE — 99233 SBSQ HOSP IP/OBS HIGH 50: CPT

## 2021-03-29 PROCEDURE — 99223 1ST HOSP IP/OBS HIGH 75: CPT

## 2021-03-29 PROCEDURE — 71250 CT THORAX DX C-: CPT | Mod: 26

## 2021-03-29 PROCEDURE — 70450 CT HEAD/BRAIN W/O DYE: CPT | Mod: 26

## 2021-03-29 RX ORDER — SODIUM CHLORIDE 9 MG/ML
1000 INJECTION INTRAMUSCULAR; INTRAVENOUS; SUBCUTANEOUS
Refills: 0 | Status: DISCONTINUED | OUTPATIENT
Start: 2021-03-29 | End: 2021-03-30

## 2021-03-29 RX ADMIN — ATORVASTATIN CALCIUM 80 MILLIGRAM(S): 80 TABLET, FILM COATED ORAL at 21:24

## 2021-03-29 RX ADMIN — CLOPIDOGREL BISULFATE 75 MILLIGRAM(S): 75 TABLET, FILM COATED ORAL at 18:24

## 2021-03-29 RX ADMIN — CARVEDILOL PHOSPHATE 3.12 MILLIGRAM(S): 80 CAPSULE, EXTENDED RELEASE ORAL at 05:45

## 2021-03-29 RX ADMIN — ENOXAPARIN SODIUM 40 MILLIGRAM(S): 100 INJECTION SUBCUTANEOUS at 21:24

## 2021-03-29 RX ADMIN — Medication 81 MILLIGRAM(S): at 18:24

## 2021-03-29 RX ADMIN — CARVEDILOL PHOSPHATE 3.12 MILLIGRAM(S): 80 CAPSULE, EXTENDED RELEASE ORAL at 18:24

## 2021-03-29 RX ADMIN — SODIUM CHLORIDE 100 MILLILITER(S): 9 INJECTION INTRAMUSCULAR; INTRAVENOUS; SUBCUTANEOUS at 18:28

## 2021-03-29 NOTE — PROGRESS NOTE ADULT - SUBJECTIVE AND OBJECTIVE BOX
Cardiology NP post procedure note:    -s/p MEGHANN: no cardioembolic source for CVA    TELE: NSR 70s    MEDICATIONS  (STANDING):  aspirin  chewable 81 milliGRAM(s) Oral daily  atorvastatin 80 milliGRAM(s) Oral at bedtime  carvedilol 3.125 milliGRAM(s) Oral every 12 hours  clopidogrel Tablet 75 milliGRAM(s) Oral daily  enoxaparin Injectable 40 milliGRAM(s) SubCutaneous at bedtime    MEDICATIONS  (PRN):  hydrALAZINE Injectable 10 milliGRAM(s) IV Push every 2 hours PRN SBP >180  labetalol Injectable 10 milliGRAM(s) IV Push every 2 hours PRN SBP >180      Allergies:  Biaxin (Muscle Pain; Joint Pain)  codeine (Faint)  Metoprolol Succinate ER (Unknown)  monoctanoin (Unknown)    Intolerances:  Isosorbide Mononitrate Extended Release (Faint)    PAST MEDICAL & SURGICAL HISTORY:  Cerebrovascular accident (CVA)  November 2020, June 2020, May 2020    Polio    CAD S/P percutaneous coronary angioplasty    TIA (transient ischemic attack)    Rheumatic fever    HTN (hypertension)    Renal cancer    H/O left nephrectomy    Status post tonsillectomy        Vital Signs Last 24 Hrs  T(C): 36.7 (29 Mar 2021 08:17), Max: 36.8 (28 Mar 2021 20:00)  T(F): 98 (29 Mar 2021 08:17), Max: 98.3 (28 Mar 2021 20:00)  HR: 71 (29 Mar 2021 13:17) (61 - 85)  BP: 174/80 (29 Mar 2021 13:17) (110/59 - 174/80)  BP(mean): 90 (28 Mar 2021 18:07) (73 - 97)  RR: 18 (29 Mar 2021 13:17) (18 - 24)  SpO2: 100% (29 Mar 2021 13:17) (96% - 100%)    Physical Exam:  Constitutional: NAD, AAOx3  Cardiovascular: +S1S2 RRR  Pulmonary: CTA b/l, unlabored  GI: soft NTND +BS  Extremities: no pedal edema, +distal pulses b/l  Neuro: non focal, NAIR x4    LABS:                        12.6   5.75  )-----------( 158      ( 29 Mar 2021 06:46 )             38.5     03-29    140  |  104  |  20.0  ----------------------------<  89  4.2   |  25.0  |  0.93    Ca    8.6      29 Mar 2021 06:46  Phos  3.7     03-29  Mg     2.0     03-29    TPro  6.8  /  Alb  4.1  /  TBili  1.0  /  DBili  x   /  AST  23  /  ALT  15  /  AlkPhos  77  03-27    PT/INR - ( 27 Mar 2021 16:53 )   PT: 12.4 sec;   INR: 1.07 ratio         PTT - ( 27 Mar 2021 16:53 )  PTT:27.1 sec      RADIOLOGY & ADDITIONAL TESTS:  < from: MEGHANN Echo Doppler (03.29.21 @ 13:44) >  EXAM:  ECHO TRANSESOPHAGEAL    EXAM:  DOPPLER ECHO COMP W SPECTRAL    EXAM:  DOPPLER ECHOCARD COLOR FLOW      PROCEDURE DATE:  Mar 29 2021   .      INTERPRETATION:  REPORT:  TRANSESOPHAGEAL ECHOCARDIOGRAM REPORT        Patient Name:   BREANA HELM Patient Location:  [facility] Rec #:  [id number]       Accession #:      [id number]  Account #:                     Height:           62.0 in 157.5 cm  YOB: 1940      Weight:           152.0 lb 68.95 kg  Patient Age:    80 years       BSA:            1.70 m²  Patient Gender: F              BP:               170/90 mmHg      Date of Exam:        3/29/2021 1:44:05 PM  Sonographer:         Darin Cornejo  Referring Physician: GERALDINE    Procedure:   Transesophageal Echocardiogram and 3-D Rendering with image post               Processing, same workstation.  Indications: Cerebral infarction due to thrombosis of other cerebral artery -               I63.39  Diagnosis:   Nonrheumatic mitral (valve) insufficiency - I34.0    SPECTRAL DOPPLER ANALYSIS (where applicable):      PROCEDURE: After discussion of the risks and benefits of the MEGHANN, an informed consent was obtained by the cardiologist. Intravenous sedation was performed by anesthesia. The MEGHANN probe was passed by the cardiologistwithout difficulty. Images were obtained with the patient in a left lateral decubitus position. The patient's vital signs; including heart rate, blood pressure, and oxygen saturation; remained stable throughout the procedure. The patient tolerated the procedure well and without complications.    PHYSICIAN INTERPRETATION:  Left Ventricle: Left ventricular wall thickness is increased. Left ventricular ejection fraction, by visual estimation, is 60 to 65%. Normal segmental left ventricular systolic function.  Right Ventricle: The right ventricular size is small. RV wall thickness is normal. RV systolic function is normal.  Left Atrium: No left atrial appendage thrombus is seen and normal left atrial appendage velocities. Intact intra-atrial septum without shunt. Color flow doppler and intravenous injection of agitated saline demonstrates the presence of an intact intra atrial septum.  Right Atrium: Small right atrium. Likely prominent charlie terminalis.    Mitral Valve: There is mild mitral annular calcification. Moderate mitral valve regurgitation is seen. There is mild prolapse of P2 segment of the posterior mitral valve without obvious flail.  Tricuspid Valve: Mild tricuspid regurgitation is visualized. The tricuspid valve was not well visualized.  Aortic Valve: Sclerotic aortic valve with normal opening. Mild aortic valve regurgitation is seen.  Pulmonic Valve: The pulmonic valve was not well visualized.  Aorta: The aortic root is normal in size and structure. Simple atheroma seen in the descending aorta. There is mild aortic root calcification.  Pulmonary Artery: The pulmonary artery is not well seen.  Venous: The inferior vena cava is normal.  Shunts: Agitated saline contrast was given intravenously to evaluate for intracardiac shunting.  Pericardium: A small pericardial effusion is present. The pericardial effusion is anterior to the right ventricle.      Summary:   1. Left ventricular ejection fraction, by visual estimation, is 60 to 65%. Increased LV wall thickness.   2. Small right ventricle. Small right atrium.   3. There is mild prolapse of P2 segment of the posterior mitral valve without obvious flail segment. Moderate mitral valve regurgitation.   4. Mild tricuspid regurgitation.   5. Mild aortic regurgitation.   6. Color flow doppler and intravenous injection of agitated saline demonstrates the presence of an intact intra atrial septum.   7. No obvious cardioembolism source noted.    MD Chris Electronically signed on 3/29/2021 at 2:51:31 PM    < end of copied text >

## 2021-03-29 NOTE — PROGRESS NOTE ADULT - ASSESSMENT
A/P:  BREANA HELM is an 80y Female with a hx of CAD s/p PCI to LAD 03/2018 on Dapt, CVA 05/2020, 06/2020 and 11/2020, ILR, HTN presents with aphasia.  Now s/p MEGHANN for further evaluation which revealed no cardioembolic source for CVA.  -post procedure as per post MEGHANN orders  -Additional plan as per Attending MD

## 2021-03-29 NOTE — CONSULT NOTE ADULT - SUBJECTIVE AND OBJECTIVE BOX
[facility], THE HEART [facility]ENTER                                   [address] [place] [address]                                                      PHONE: [phone]                                                         FAX: [phone]  [email or web address]  ---------------------------------------------------------------------------------------------------------------------------------    HPI:  BREANA HELM is an 80y Female with a hx of CAD s/p PCI to LAD 03/2018 on Dapt, CVA 05/2020, 06/2020 and 11/2020, ILR, HTN presents with aphasia.  She received alteplase at 17:07 with improvement in speech.  Patient has been followed in our office and ILR check monthly which has not revealed any significant event events and no afib.     she denies, fevers, chills, nausea, vomiting, diarrhea    PAST MEDICAL & SURGICAL HISTORY:  Cerebrovascular accident (CVA)  November 2020, June 2020, May 2020    Polio    CAD S/P percutaneous coronary angioplasty    TIA (transient ischemic attack)    Rheumatic fever    HTN (hypertension)    Renal cancer    H/O left nephrectomy    Status post tonsillectomy        Biaxin (Muscle Pain; Joint Pain)  codeine (Faint)  Isosorbide Mononitrate Extended Release (Faint)  Metoprolol Succinate ER (Unknown)  monoctanoin (Unknown)      MEDICATIONS  (STANDING):  atorvastatin 40 milliGRAM(s) Oral at bedtime  carvedilol 3.125 milliGRAM(s) Oral every 12 hours    MEDICATIONS  (PRN):  hydrALAZINE Injectable 10 milliGRAM(s) IV Push every 2 hours PRN SBP >180  labetalol Injectable 10 milliGRAM(s) IV Push every 2 hours PRN SBP >180      Family History: Pt denies hx of early cad, SCD, or congenital heart disease.      Social History:  Cigarettes:   former                 Alchohol:   no              Illicit Drug Abuse:  no    ROS:    Extensively Reviewed with pertinents as per HPI the remainder were negative.      Vital Signs Last 24 Hrs  T(C): 36.4 (28 Mar 2021 07:45), Max: 37.1 (28 Mar 2021 00:15)  T(F): 97.6 (28 Mar 2021 07:45), Max: 98.8 (28 Mar 2021 00:15)  HR: 82 (28 Mar 2021 10:45) (58 - 96)  BP: 133/76 (28 Mar 2021 09:45) (100/61 - 177/100)  BP(mean): 94 (28 Mar 2021 09:45) (74 - 123)  RR: 25 (28 Mar 2021 10:45) (11 - 78)  SpO2: 99% (28 Mar 2021 09:45) (92% - 100%)  ICU Vital Signs Last 24 Hrs  BREANA HELM  I&O's Detail    27 Mar 2021 07:01  -  28 Mar 2021 07:00  --------------------------------------------------------  IN:    sodium chloride 0.9%: 720 mL  Total IN: 720 mL    OUT:    Voided (mL): 1050 mL  Total OUT: 1050 mL    Total NET: -330 mL        I&O's Summary    27 Mar 2021 07:01  -  28 Mar 2021 07:00  --------------------------------------------------------  IN: 720 mL / OUT: 1050 mL / NET: -330 mL      Drug Dosing Weight  BREANA HELM      PHYSICAL EXAM:  General: Appears well developed, well nourished alert and cooperative.  HEENT: Head; normocephalic, atraumatic.  Eyes: Pupils reactive, cornea wnl.  Neck: Supple, no nodes adenopathy, no NVD or carotid bruit or thyromegaly.  CARDIOVASCULAR: Normal S1 and S2, No murmur, rub, gallop or lift.   LUNGS: No rales, rhonchi or wheeze. Normal breath sounds bilaterally.  ABDOMEN: Soft, nontender without mass or organomegaly. bowel sounds normoactive.  EXTREMITIES: No clubbing, cyanosis or edema. Distal pulses wnl.   SKIN: warm and dry with normal turgor.  NEURO: Alert/oriented x 3/normal motor exam. No pathologic reflexes.    PSYCH: normal affect.        LABS:                        12.7   7.06  )-----------( 172      ( 28 Mar 2021 05:02 )             38.1     03-28    141  |  103  |  20.0  ----------------------------<  107<H>  4.3   |  28.0  |  0.96    Ca    8.7      28 Mar 2021 05:02  Phos  4.0     03-28  Mg     2.0     03-28    TPro  6.8  /  Alb  4.1  /  TBili  1.0  /  DBili  x   /  AST  23  /  ALT  15  /  AlkPhos  77  03-27    BREANA HELM  CARDIAC MARKERS ( 27 Mar 2021 16:53 )  x     / <0.01 ng/mL / x     / x     / x          PT/INR - ( 27 Mar 2021 16:53 )   PT: 12.4 sec;   INR: 1.07 ratio         PTT - ( 27 Mar 2021 16:53 )  PTT:27.1 sec      RADIOLOGY & ADDITIONAL STUDIES: CXR- loop recorder, no pulmonary edema    INTERPRETATION OF TELEMETRY (personally reviewed): no events    ECG: NSR, Normal axis, no st or t wave changes, normal ekg    ECHO:    STRESS TEST:    CARDIAC CATHETERIZATION:    Assessment and Plan:  In summary, BREANA HELM is an 80y Female with a hx of CAD s/p PCI to LAD 03/2018 on Dapt, CVA 05/2020, 06/2020 and 11/2020, ILR, HTN presents with aphasia.     Stroke- will have loop recorder checked for arrhythmia.  Echo pending    CAD- would restart asa 81mg daily and plavix when okay per neurology    HLD- continue atorvastatin      Thank you for allowing [facility] to participate in the care of this patient.  Please feel free to call with any questions or concerns. 
                                 [facility]                                        Neu[facility] [place]                                  [facility] [address] [facility]                                      [address]                                           [place], [place], [address]                                                [phone]        CC: Stroke    HISTORY:  The patient is a 80y Female known from multiple prior admissions.   Originally presented in May of 2020 and was seen by Dr Kendall.   I had seen her initially on her next admission in June of 2020 and again in November of 2020   All 3 occasions were marked by speech difficulty and left sided difficulty. She received t-PA in the June admission. MRI was negative for acute infarct on all 3 occasions.   She now reports that she received her second covid-19 vaccination on 3/27.   Shortly afterward she was noted to have increased slurred speech and left sided difficulty.   She was brought to the ER and the stroke code was activated.   She received t-PA and was admitted to neuro-ICU.   She is now downgraded and neurology was called to be on board.     PAST MEDICAL & SURGICAL HISTORY:  Cerebrovascular accident (CVA)  November 2020, June 2020, May 2020  Polio  CAD S/P percutaneous coronary angioplasty  TIA (transient ischemic attack)  Rheumatic fever  HTN (hypertension)  Renal cancer  H/O left nephrectomy  Status post tonsillectomy    MEDICATIONS  (STANDING):  aspirin  chewable 81 milliGRAM(s) Oral daily  atorvastatin 80 milliGRAM(s) Oral at bedtime  carvedilol 3.125 milliGRAM(s) Oral every 12 hours  clopidogrel Tablet 75 milliGRAM(s) Oral daily  enoxaparin Injectable 40 milliGRAM(s) SubCutaneous at bedtime    MEDICATIONS  (PRN):  hydrALAZINE Injectable 10 milliGRAM(s) IV Push every 2 hours PRN SBP >180  labetalol Injectable 10 milliGRAM(s) IV Push every 2 hours PRN SBP >180      Allergies  Biaxin (Muscle Pain; Joint Pain)  codeine (Faint)  Metoprolol Succinate ER (Unknown)  monoctanoin (Unknown)    Intolerances  Isosorbide Mononitrate Extended Release (Faint)      SOCIAL HISTORY:  Non smoker.     FAMILY HISTORY:  No pertinent family history in first degree relatives    ROS:  Constitutional: The patient denies fevers or weight changes.  Neuro: As per HPI.  Eyes: Denies blurry vision.  Ears/nose/throat: Denies Tinnitus.   Cardiac: Denies chest pain. Denies palpitations.  Respiratory: Denies shortness of breath.  GI: Denies abdominal pain, nausea, or vomiting.  : Denies change in urinary pattern.  Integumentary: Denies rash.  Psych: Denies recent mood changes.  Heme: denies easy bleeding/bruising.    Exam:  Vital Signs Last 24 Hrs  T(C): 36.7 (29 Mar 2021 08:17), Max: 36.8 (28 Mar 2021 20:00)  T(F): 98 (29 Mar 2021 08:17), Max: 98.3 (28 Mar 2021 20:00)  HR: 71 (29 Mar 2021 13:17) (61 - 85)  BP: 174/80 (29 Mar 2021 13:17) (107/81 - 174/80)  BP(mean): 90 (28 Mar 2021 18:07) (73 - 97)  RR: 18 (29 Mar 2021 13:17) (12 - 24)  SpO2: 100% (29 Mar 2021 13:17) (96% - 100%)  General: NAD.   Carotid bruits absent.     Mental status: The patient is awake, alert, and fully oriented. There is no aphasia. Attention span is normal. Patient is aware of current events.     Cranial nerves: There is no papilledema. Pupils react symmetrically to light. There is no visual field deficit to confrontation. Extraocular motion is full with no nystagmus.  Facial sensation is intact. Facial musculature is symmetric. Palate elevates symmetrically. Tongue is midline.    Motor: There is normal bulk and tone.  Strength is 5/5 in the right arm and leg.   Strength is 5/5 in the left arm and leg.    Sensation: Intact to light touch and pin. There is no extinction to double simultaneous stimulation.    Reflexes: 2+ throughout and plantar responses are flexor.    Cerebellar: There is no dysmetria on finger to nose testing.    [facility] SS:   Date: 3/29/21  Time:   1a) Level of consciousness (0-3): 0  1b) Questions (0-2): 0  1c) Commands (0-2): 0  2  ) Gaze (0-2): 0  3  ) Visual field (0-3): 0  4  ) Facial palsy (0-3): 0  Motor  5a) Left arm (0-4): 0  5b) Right arm (0-4): 0  6a) Left leg (0-4): 0  6b) Right leg (0-4): 0  7  ) Ataxia (0-2): 0  Sensory  8  ) Sensory (0-2): 0  Speech  9  ) Language (0-3): 0  10) Dysarthria (0-2): 0  Extinction  11) Extinction/inattention (0-2): 0    Total score: 0    Prestroke Modified Alva: 0    (0: No symptoms and no disability.  1: No significant disability despite symptoms; able to carry out all usual duties and activities.  2: Slight disability; unable to carry out all previous activity but able to look after own affairs without assistance.  3: Moderate disability; requiring some help but able to walk without assistance.   4: Moderately severe disability; unable to walk without assistance and unable to attend to own bodily needs without assistance.  5: Severe disability; bedridden, incontinent and requiring constant nursing care and attention.   6: Dead. )         LABS:                         12.6   5.75  )-----------( 158      ( 29 Mar 2021 06:46 )             38.5       03-29    140  |  104  |  20.0  ----------------------------<  89  4.2   |  25.0  |  0.93    Ca    8.6      29 Mar 2021 06:46  Phos  3.7     03-29  Mg     2.0     03-29    TPro  6.8  /  Alb  4.1  /  TBili  1.0  /  DBili  x   /  AST  23  /  ALT  15  /  AlkPhos  77  03-27      PT/INR - ( 27 Mar 2021 16:53 )   PT: 12.4 sec;   INR: 1.07 ratio         PTT - ( 27 Mar 2021 16:53 )  PTT:27.1 sec    RADIOLOGY   CT head images reviewed (and concur with report): There is no acute pathology.         
80yF was admitted on 03-27 with dysarthria and left leg weakness. Received tPA with improved symptoms.     Imaging performed:  HEAD CT 3/27 - Tiny old lacunar infarct anterior limb of the left internal capsule. No acute hemorrhage or midline shift. If symptoms persist consider follow up head CT or MRI if no contraindications.    HEAD CT 3/27 - Mild volume loss, microvascular disease, no acute hemorrhage or midline shift.    CT PERFUSION 3/27 - No core infarct. No active ischemia. If symptoms persist consider follow up head CT or MRI, MRA  if no contraindication.    CTA COW 3/27 - Small distal right MCA branches not well visualized.    CTA NECK 3/27 - Patent, ECAs, ICAs, no  hemodynamically significant stenosis at  ICA origins by NASCET criteria.  Bilateral vertebral arteries are patent without flow limiting stenosis.    HEAD CT 3/28 - Mild chronic microvascular changes without evidence of an acute transcortical infarction or hemorrhage.    CT CAP 3/29 - Gallstones within a distended gallbladder, correlate clinically for acute cholecystitis and ultrasound as indicated. Prior left nephrectomy. Indeterminate calcifications and hypodensities in the medial right hepatic dome. Indeterminate left retropectoral/axillary lymph node.    HEAD CT 3/29 - Mild chronic microvascular changes without evidence of an acute transcortical infarction or hemorrhage. Postcontrast images are recommended to evaluate for malignancy.    Patient reports she feels well.  Denies pain.  Notes weakness of the left leg.  Ambulated a long distance with therapy.    REVIEW OF SYSTEMS  Constitutional - No fever, No weight loss, +fatigue  HEENT - No eye pain, No visual disturbances, No difficulty hearing, No tinnitus, No vertigo, No neck pain  Respiratory - No cough, No wheezing, No shortness of breath  Cardiovascular - No chest pain, No palpitations  Gastrointestinal - No abdominal pain, No nausea, No vomiting, No diarrhea, No constipation  Genitourinary - No dysuria, No frequency, No hematuria, No incontinence  Neurological - No headaches, No memory loss, +loss of strength, No numbness, No tremors  Skin - No itching, No rashes, No lesions   Endocrine - No temperature intolerance  Musculoskeletal - No joint pain, No joint swelling, No muscle pain  Psychiatric - No depression, +anxiety    VITALS  T(C): 36.7 (03-29-21 @ 08:17), Max: 36.8 (03-28-21 @ 20:00)  HR: 61 (03-29-21 @ 08:17) (61 - 85)  BP: 141/79 (03-29-21 @ 08:17) (107/81 - 141/79)  RR: 18 (03-29-21 @ 08:17) (12 - 24)  SpO2: 98% (03-29-21 @ 08:17) (96% - 100%)  Wt(kg): --    PAST MEDICAL & SURGICAL HISTORY  Cerebrovascular accident (CVA)    Polio    CAD S/P percutaneous coronary angioplasty    TIA (transient ischemic attack)    Rheumatic fever    HTN (hypertension)    Renal cancer    H/O left nephrectomy    Status post tonsillectomy        SOCIAL HISTORY - as per documentation/history  Smoking - Quit  EtOH - None  Drugs - None    FUNCTIONAL HISTORY  Lives alone, 0STE/Ramp  Independent with SAC mainly for outdoors    CURRENT FUNCTIONAL STATUS  3/29  Bed Mobility  Bed Mobility Training Supine-to-Sit: independent    Sit-Stand Transfer Training  Transfer Training Sit-to-Stand Transfer: supervision;  full weight-bearing   rolling walker  Transfer Training Stand-to-Sit Transfer: full weight-bearing   rolling walker  Sit-to-Stand Transfer Training Transfer Safety Analysis: decreased balance    Gait Training  Gait Training: supervision;  full weight-bearing   rolling walker;  250 feet  Gait Analysis: impaired balance    Therapeutic Exercise  Therapeutic Exercise Detail: Patient educated on bilateral lower extremity therex to perform after rest period (ankle pumps, LAQ, and seated marching 3x10). Patient provided return demonstration.     Bathing Training:     · Level of [place]	supervision; sponge in sitting  · Physical Assist/Nonphysical Assist	verbal cues; nonverbal cues (demo/gestures); set-up required    Upper Body Dressing Training:     · Level of [place]	supervision; gowns in sitting  · Physical Assist/Nonphysical Assist	verbal cues; nonverbal cues (demo/gestures)    Lower Body Dressing Training:     · Level of [place]	supervision; socks in sitting via LE crossing  · Physical Assist/Nonphysical Assist	verbal cues; nonverbal cues (demo/gestures)    Toilet Hygiene Training:     · Level of [place]	supervision  · Physical Assist/Nonphysical Assist	verbal cues; nonverbal cues (demo/gestures)    Grooming Training:     · Level of [place]	supervision  · Physical Assist/Nonphysical Assist	verbal cues; nonverbal cues (demo/gestures); set-up required    Eating/Self-Feeding Training:     · Level of [place]	not observed, pt NPO for testing      FAMILY HISTORY   No pertinent family history in first degree relatives        RECENT LABS - Reviewed  CBC Full  -  ( 29 Mar 2021 06:46 )  WBC Count : 5.75 K/uL  RBC Count : 4.30 M/uL  Hemoglobin : 12.6 g/dL  Hematocrit : 38.5 %  Platelet Count - Automated : 158 K/uL  Mean Cell Volume : 89.5 fl  Mean Cell Hemoglobin : 29.3 pg  Mean Cell Hemoglobin Concentration : 32.7 gm/dL  Auto Neutrophil # : x  Auto Lymphocyte # : x  Auto Monocyte # : x  Auto Eosinophil # : x  Auto Basophil # : x  Auto Neutrophil % : x  Auto Lymphocyte % : x  Auto Monocyte % : x  Auto Eosinophil % : x  Auto Basophil % : x    03-29    140  |  104  |  20.0  ----------------------------<  89  4.2   |  25.0  |  0.93    Ca    8.6      29 Mar 2021 06:46  Phos  3.7     03-29  Mg     2.0     03-29    TPro  6.8  /  Alb  4.1  /  TBili  1.0  /  DBili  x   /  AST  23  /  ALT  15  /  AlkPhos  77  03-27        ALLERGIES  Biaxin (Muscle Pain; Joint Pain)  codeine (Faint)  Isosorbide Mononitrate Extended Release (Faint)  Metoprolol Succinate ER (Unknown)  monoctanoin (Unknown)      MEDICATIONS   aspirin  chewable 81 milliGRAM(s) Oral daily  atorvastatin 80 milliGRAM(s) Oral at bedtime  carvedilol 3.125 milliGRAM(s) Oral every 12 hours  clopidogrel Tablet 75 milliGRAM(s) Oral daily  enoxaparin Injectable 40 milliGRAM(s) SubCutaneous at bedtime  hydrALAZINE Injectable 10 milliGRAM(s) IV Push every 2 hours PRN  labetalol Injectable 10 milliGRAM(s) IV Push every 2 hours PRN      ----------------------------------------------------------------------------------------  PHYSICAL EXAM  Constitutional - NAD, Comfortable  HEENT - NCAT, EOMI  Neck - Supple, No limited ROM  Chest - Breathing comfortably, No wheezing  Cardiovascular - S1S2   Abdomen - Soft   Extremities - No C/C/E, No calf tenderness   Neurologic Exam -                    Cognitive - AAO to self, place, date, year, situation     Communication - Fluent, No dysarthria     Cranial Nerves - Mild left facial lip drop     Motor -                      LEFT    UE - ShAB 5/5, EF 5/5, EE 5/5, WE 5/5,  5/5                    RIGHT UE - ShAB 5/5, EF 5/5, EE 5/5, WE 5/5,  5/5                    LEFT    LE - HF 4/5, KE 4/5, DF 5/5, PF 5/5                    RIGHT LE - HF 5/5, KE 5/5, DF 5/5, PF 5/5        Sensory - Intact to LT     Coordination - FTN intact  Psychiatric - Mood anxious, Affect WNL  ----------------------------------------------------------------------------------------  ASSESSMENT/PLAN  80yFemale with functional deficits after a clinical CVA  Clinical CVA s/p tPA - ASA, Plavix, Lipitor, MRI & MEGHANN pending   HTN - Coreg, Hydralazine, Labetalol  Pain - Tylenol  DVT PPX - SCDs  Rehab - Patient at supervision level. Recommend  DC HOME with HOME CARE.    Will continue to follow. Functional progress will determine ongoing rehab dispo recommendations, which may change.    Continue bedside therapy as well as OOB throughout the day with mobilization by staff to maintain cardiopulmonary function and prevention of secondary complications related to debility.     Discussed with rehab team.

## 2021-03-29 NOTE — OCCUPATIONAL THERAPY INITIAL EVALUATION ADULT - DIAGNOSIS, OT EVAL
Pt presents to ED with c/o speech difficulty and left sided weakness. Pt received second dose of her COVID vaccination that same morning. Head CT with mild volume loss, microvascular disease, no acute hemorrhage or midline shift. CT perfusion demonstrated no core infarct; no active ischemia. CTA COW with small distal right MCA branches not well visualized. CTA neck patent, ECAs, ICAs, no  hemodynamically significant stenosis at  ICA origins by NASCET criteria; bilateral vertebral arteries are patent without flow limiting stenosis.

## 2021-03-29 NOTE — CONSULT NOTE ADULT - ASSESSMENT
The patient is a 80y Female with multiple presentations for stroke/TIA.     TIA/Stroke.   Received t-PA on this admission.   Several admissions since May of 2020.  Continue antiplatelet and statin.   Currently on dual antiplatelet agent.   For MEGHANN today.   If embolic source seen, then would need anticoagulation.    Seen by rehab. Recommending home with home care.     Case discussed with Dr BINH Vann.

## 2021-03-29 NOTE — DISCHARGE NOTE NURSING/CASE MANAGEMENT/SOCIAL WORK - PATIENT PORTAL LINK FT
You can access the FollowMyHealth Patient Portal offered by [facility] by registering at the following website: [email or web address]. By joining [employer]’s FollowMyHealth portal, you will also be able to view your health information using other applications (apps) compatible with our system.

## 2021-03-29 NOTE — OCCUPATIONAL THERAPY INITIAL EVALUATION ADULT - PRECAUTIONS/LIMITATIONS, REHAB EVAL
head of bed 30 degrees; supervision with meals/aspiration precautions/fall precautions/seizure precautions

## 2021-03-29 NOTE — CONSULT NOTE ADULT - REASON FOR ADMISSION
Acute onset speech difficulty, worsening left hemiparesis

## 2021-03-29 NOTE — PROGRESS NOTE ADULT - ASSESSMENT
A/P:  BREANA HELM is an 80y Female with a hx of CAD s/p PCI to LAD 03/2018 on Dapt, CVA 05/2020, 06/2020 and 11/2020, ILR, HTN presents with aphasia.  For MEGHANN today to r/o cardioembolic source of CVA  -NPO for procedure  -Consent obtained by MD

## 2021-03-29 NOTE — OCCUPATIONAL THERAPY INITIAL EVALUATION ADULT - ADDITIONAL COMMENTS
Pt lives in house with ramp to enter and no steps inside; bedroom and bathroom are on main level. Bathroom has shower stall with doors and grab bars. Pt owns shower chair, RW, cane, wheelchair. Pt is right handed. Pt drives. Pt reports sons lives local and are supportive. Pt reports neighbors are also very supportive.

## 2021-03-29 NOTE — PROGRESS NOTE ADULT - ASSESSMENT
80F PMH CAD s/p PCI x 2 on ASA/Plavix, CVA x 3 (11/28/20, June 2020, May 2020), loop recorder, HTN (not on medications) presents with stroke like symptoms on 3/27 8 hours after second COVID vaccination. NIH on admission =9, however improved. CTH/CTA/CTP with small chronic lacunar infarct anterior limb left internal capsule without acute hemorrhage or midline shift, poorly visualized small distal RMCA branches, patent CTA neck without significant stenosis, no core infarct or ischemia. S/p IV Alteplase 17:07 3/27. Patient with improvement of stroke like symptoms, feels like speech back to baseline and with some mild left UE weakness and LLE weakness.  Neurology consult called- Dr. Mayers. [facility] cardiology group called, echo performed however not results, isha pending for AM. Loop recorder interrogated. 24 hour CT performed with no heme, patient pending MRI.  DAPT resumed. Of note per patient with multiple past cva/tia (this is number four). patient now stable for downgrade from NSICU to medical service (Tele).    #acute cva  - cardio consult appreciated- pending isha  - hx of multiple neuro episodes in past (tia and cva)  - aspirin, plavix  - s/p neuroicu  - neuro consult pending  - mr pending  - statin  - s/p tpa  - pmr eval pending- pt recommending acute rehab    #HTN- essential  - coreg  - monitor blood pressure    #HLD  - statin    #CAD s/p PCI  - aspirin, plavix  - coreg  - cardio consult appreciated    #DVT prophylaxis  - lovenox SC    pending ct to r/o malignancy; pending mr brain and isha 80F PMH CAD s/p PCI x 2 on ASA/Plavix, CVA x 3 (11/28/20, June 2020, May 2020), loop recorder, HTN (not on medications) presents with stroke like symptoms on 3/27 8 hours after second COVID vaccination. NIH on admission =9, however improved. CTH/CTA/CTP with small chronic lacunar infarct anterior limb left internal capsule without acute hemorrhage or midline shift, poorly visualized small distal RMCA branches, patent CTA neck without significant stenosis, no core infarct or ischemia. S/p IV Alteplase 17:07 3/27. Patient with improvement of stroke like symptoms, feels like speech back to baseline and with some mild left UE weakness and LLE weakness.  Neurology consult called- Dr. Mayers. [facility] cardiology group called, echo performed however not results, isha pending for AM. Loop recorder interrogated.  patient pending MRI.  DAPT resumed. Of note per patient with multiple past cva/tia (this is number four). patient now stable for downgrade from NSICU to medical service (Tele).    #acute cva  - cardio consult appreciated- pending isha  - hx of multiple neuro episodes in past (tia and cva)  - aspirin, plavix  - s/p neuroicu  - neuro consult pending  - mr pending  - statin  - s/p tpa  - pmr eval pending- pt recommending acute rehab    #HTN- essential  - coreg  - monitor blood pressure    #HLD  - statin    #CAD s/p PCI  - aspirin, plavix  - coreg  - cardio consult appreciated    #DVT prophylaxis  - lovenox SC    pending ct to r/o malignancy; pending mr brain and isha

## 2021-03-29 NOTE — OCCUPATIONAL THERAPY INITIAL EVALUATION ADULT - SPECIAL TRAINING, OT EVAL
Functional mobility with supervision and RW due to decreased strength and decreased endurance; occasional cues for positioning with relation to RW and RW management with environment/obstacle negotiation. Pt without any losses of balance and with overall good safety awareness.

## 2021-03-29 NOTE — CHART NOTE - NSCHARTNOTEFT_GEN_A_CORE
TRANSFER NOTE:  80F PMH CAD s/p PCI x 2 on ASA/Plavix, CVA x 3 (11/28/20, June 2020, May 2020), loop recorder, HTN (not on medications) presents with stroke like symptoms yesterday on 3/27 8 hours after second COVID vaccination. NIH on admission =9, now 2 (mild LUE/ LLE weakness). CTH/CTA/CTP with small chronic lacunar infarct anterior limb left internal capsule without acute hemorrhage or midline shift, poorly visualized small distal RMCA branches, patent CTA neck without significant stenosis, no core infarct or ischemia. S/p IV Alteplase 17:07. Patient with improvement of stroke like symptoms, feels like speech back to baseline and with some mild left UE weakness and LLE weakness.  Neurology consult called- Dr. Mayers. [facility] cardiology group called, echo performed. As per note, patient will have loop interrogated. 24 hour CT performed with no heme, patient pending MRI.  DAPT resumed. Pt with stable neurologic exam today and remains hemodynamically stable pending rest of stroke work up.
patient d/w Dr Husain Cardio    MEGHANN grossly normal however liver lesion noted- will check ruq sono  patient ivc also flat will start ivf x 12 hours

## 2021-03-29 NOTE — PROGRESS NOTE ADULT - SUBJECTIVE AND OBJECTIVE BOX
Patient is a 80y old  Female who presents with a chief complaint of Acute onset speech difficulty, worsening left hemiparesis (28 Mar 2021 18:52)      Patient seen and examined at bedside.     ALLERGIES:  Biaxin (Muscle Pain; Joint Pain)  codeine (Faint)  Isosorbide Mononitrate Extended Release (Faint)  Metoprolol Succinate ER (Unknown)  monoctanoin (Unknown)    MEDICATIONS  (STANDING):  aspirin  chewable 81 milliGRAM(s) Oral daily  atorvastatin 80 milliGRAM(s) Oral at bedtime  carvedilol 3.125 milliGRAM(s) Oral every 12 hours  clopidogrel Tablet 75 milliGRAM(s) Oral daily  enoxaparin Injectable 40 milliGRAM(s) SubCutaneous at bedtime    MEDICATIONS  (PRN):  hydrALAZINE Injectable 10 milliGRAM(s) IV Push every 2 hours PRN SBP >180  labetalol Injectable 10 milliGRAM(s) IV Push every 2 hours PRN SBP >180    Vital Signs Last 24 Hrs  T(F): 98 (29 Mar 2021 08:17), Max: 98.3 (28 Mar 2021 20:00)  HR: 61 (29 Mar 2021 08:17) (61 - 88)  BP: 141/79 (29 Mar 2021 08:17) (107/81 - 141/79)  RR: 18 (29 Mar 2021 08:17) (12 - 26)  SpO2: 98% (29 Mar 2021 08:17) (96% - 100%)  I&O's Summary    28 Mar 2021 07:01  -  29 Mar 2021 07:00  --------------------------------------------------------  IN: 0 mL / OUT: 650 mL / NET: -650 mL      PHYSICAL EXAM:  General: NAD, A/O x 3  ENT: MMM, no thrush  Neck: Supple, No JVD  Lungs:  good air entry, non-labored breathing  Cardio: +s1/s2  Abdomen: Soft, Nontender, Nondistended; Bowel sounds present  Extremities: No calf tenderness  Neuro +left ue and le weakness 3-4/5 strength however able to move all extremities    LABS:                        12.6   5.75  )-----------( 158      ( 29 Mar 2021 06:46 )             38.5     03-29    140  |  104  |  20.0  ----------------------------<  89  4.2   |  25.0  |  0.93    Ca    8.6      29 Mar 2021 06:46  Phos  3.7     03-29  Mg     2.0     03-29    TPro  6.8  /  Alb  4.1  /  TBili  1.0  /  DBili  x   /  AST  23  /  ALT  15  /  AlkPhos  77  03-27    eGFR if Non African American: 58 mL/min/1.73M2 (03-29-21 @ 06:46)  eGFR if : 67 mL/min/1.73M2 (03-29-21 @ 06:46)    PT/INR - ( 27 Mar 2021 16:53 )   PT: 12.4 sec;   INR: 1.07 ratio    PTT - ( 27 Mar 2021 16:53 )  PTT:27.1 sec    CARDIAC MARKERS ( 27 Mar 2021 16:53 )  x     / <0.01 ng/mL / x     / x     / x        03-28 Chol 115 mg/dL LDL -- HDL 60 mg/dL Trig 32 mg/dL  TSH 2.58   TSH with FT4 reflex --  Total T3 --    RADIOLOGY & ADDITIONAL TESTS:  < from: US Duplex Venous Lower Ext Complete, Bilateral (03.28.21 @ 20:09) >  IMPRESSION:  No deep vein thrombosis in either lower extremity.  < end of copied text >    < from: CT Head No Cont (03.28.21 @ 16:54) >  IMPRESSION:  Mild chronic microvascular changes without evidence of an acute transcortical infarction or hemorrhage.  < end of copied text >    < from: Xray Chest 1 View- PORTABLE-Urgent (03.27.21 @ 17:57) >  IMPRESSION: Prominent interstitial markings bilaterally may indicate interstitial edema. Follow-up recommended  < end of copied text >    < from: CT Angio Head w/ IV Cont (03.27.21 @ 17:01) >  IMPRESSION:  HEAD CT AND RAPID PERFUSION:  HEAD CT: Mild volume loss, microvascular disease, no acute hemorrhage or midline shift.  CT PERFUSION demonstrated: No core infarct. No active ischemia.  If symptoms persist consider follow up head CT or MRI, MRA  if no contraindication.  CTA COW: Small distal right MCA branches not well visualized.  CTA NECK: Patent, ECAs, ICAs, no  hemodynamically significant stenosis at  ICA origins by NASCET criteria.  Bilateral vertebral arteries are patent without flow limiting stenosis.  < end of copied text >    < from: CT Brain Stroke Protocol (03.27.21 @ 16:54) >  IMPRESSION:  Tiny old lacunar infarct anterior limb of the left internal capsule.  < end of copied text >    < from: TTE Echo Complete w/ Contrast w/ Doppler (03.28.21 @ 11:49) >  Summary:   1. Left ventricular ejection fraction, by visual estimation, is 60 to 65%.   2. Basal inferolateral segment and basal inferior segment are abnormal as described above.   3. Spectral Doppler shows impaired relaxation pattern of left ventricular myocardial filling (Grade I diastolic dysfunction).   4. Mild mitral valve regurgitation.   5. Mild tricuspid regurgitation.   6. Moderate aortic regurgitation.   7. Sclerotic aortic valve with normal opening.   8. Estimated pulmonary artery systolic pressure is 35.5 mmHg assuming a right atrial pressure of 3 mmHg, which is consistent with borderline pulmonary hypertension.   9. Endocardial visualization was enhanced with intravenous echo contrast.  10. Peak transaortic gradient equals 7.5 mmHg, mean transaortic gradient equals 3.8 mmHg, the calculated aortic valve area equals 1.91 cm² by the continuity equation consistent with mild aortic stenosis.  11. There is mild aortic root calcification.  < end of copied text >

## 2021-03-29 NOTE — PROGRESS NOTE ADULT - SUBJECTIVE AND OBJECTIVE BOX
Cardiology NP preprocedure note:    -For MEGHANN     TELE: NSR 80s PVCs    MEDICATIONS  (STANDING):  aspirin  chewable 81 milliGRAM(s) Oral daily  atorvastatin 80 milliGRAM(s) Oral at bedtime  carvedilol 3.125 milliGRAM(s) Oral every 12 hours  clopidogrel Tablet 75 milliGRAM(s) Oral daily  enoxaparin Injectable 40 milliGRAM(s) SubCutaneous at bedtime    MEDICATIONS  (PRN):  hydrALAZINE Injectable 10 milliGRAM(s) IV Push every 2 hours PRN SBP >180  labetalol Injectable 10 milliGRAM(s) IV Push every 2 hours PRN SBP >180      Allergies:  Biaxin (Muscle Pain; Joint Pain)  codeine (Faint)  Metoprolol Succinate ER (Unknown)  monoctanoin (Unknown)    Intolerances:  Isosorbide Mononitrate Extended Release (Faint)    PAST MEDICAL & SURGICAL HISTORY:  Cerebrovascular accident (CVA)  November 2020, June 2020, May 2020    Polio    CAD S/P percutaneous coronary angioplasty    TIA (transient ischemic attack)    Rheumatic fever    HTN (hypertension)    Renal cancer    H/O left nephrectomy    Status post tonsillectomy        Vital Signs Last 24 Hrs  T(C): 36.7 (29 Mar 2021 08:17), Max: 36.8 (28 Mar 2021 20:00)  T(F): 98 (29 Mar 2021 08:17), Max: 98.3 (28 Mar 2021 20:00)  HR: 71 (29 Mar 2021 13:17) (61 - 85)  BP: 174/80 (29 Mar 2021 13:17) (107/81 - 174/80)  BP(mean): 90 (28 Mar 2021 18:07) (73 - 97)  RR: 18 (29 Mar 2021 13:17) (12 - 24)  SpO2: 100% (29 Mar 2021 13:17) (96% - 100%)    Physical Exam:  Constitutional: NAD, AAOx3  Cardiovascular: +S1S2 RRR  Pulmonary: CTA b/l, unlabored  GI: soft NTND +BS  Extremities: no pedal edema, +distal pulses b/l  Neuro: non focal, NAIR x4  ASA and Mallampati as per anesthesiologist    LABS:                        12.6   5.75  )-----------( 158      ( 29 Mar 2021 06:46 )             38.5     03-29    140  |  104  |  20.0  ----------------------------<  89  4.2   |  25.0  |  0.93    Ca    8.6      29 Mar 2021 06:46  Phos  3.7     03-29  Mg     2.0     03-29    TPro  6.8  /  Alb  4.1  /  TBili  1.0  /  DBili  x   /  AST  23  /  ALT  15  /  AlkPhos  77  03-27    PT/INR - ( 27 Mar 2021 16:53 )   PT: 12.4 sec;   INR: 1.07 ratio         PTT - ( 27 Mar 2021 16:53 )  PTT:27.1 sec      RADIOLOGY & ADDITIONAL TESTS:

## 2021-03-30 ENCOUNTER — TRANSCRIPTION ENCOUNTER (OUTPATIENT)
Age: 81
End: 2021-03-30

## 2021-03-30 VITALS
HEART RATE: 85 BPM | DIASTOLIC BLOOD PRESSURE: 96 MMHG | RESPIRATION RATE: 18 BRPM | SYSTOLIC BLOOD PRESSURE: 150 MMHG | OXYGEN SATURATION: 97 % | TEMPERATURE: 98 F

## 2021-03-30 PROCEDURE — 85027 COMPLETE CBC AUTOMATED: CPT

## 2021-03-30 PROCEDURE — 70450 CT HEAD/BRAIN W/O DYE: CPT

## 2021-03-30 PROCEDURE — U0005: CPT

## 2021-03-30 PROCEDURE — 93312 ECHO TRANSESOPHAGEAL: CPT

## 2021-03-30 PROCEDURE — 93325 DOPPLER ECHO COLOR FLOW MAPG: CPT

## 2021-03-30 PROCEDURE — 80053 COMPREHEN METABOLIC PANEL: CPT

## 2021-03-30 PROCEDURE — 70496 CT ANGIOGRAPHY HEAD: CPT

## 2021-03-30 PROCEDURE — 97116 GAIT TRAINING THERAPY: CPT

## 2021-03-30 PROCEDURE — 86900 BLOOD TYPING SEROLOGIC ABO: CPT

## 2021-03-30 PROCEDURE — C8929: CPT

## 2021-03-30 PROCEDURE — 86850 RBC ANTIBODY SCREEN: CPT

## 2021-03-30 PROCEDURE — 86901 BLOOD TYPING SEROLOGIC RH(D): CPT

## 2021-03-30 PROCEDURE — 71045 X-RAY EXAM CHEST 1 VIEW: CPT

## 2021-03-30 PROCEDURE — 93320 DOPPLER ECHO COMPLETE: CPT

## 2021-03-30 PROCEDURE — 37195 THROMBOLYTIC THERAPY STROKE: CPT

## 2021-03-30 PROCEDURE — 85610 PROTHROMBIN TIME: CPT

## 2021-03-30 PROCEDURE — 97167 OT EVAL HIGH COMPLEX 60 MIN: CPT

## 2021-03-30 PROCEDURE — 99233 SBSQ HOSP IP/OBS HIGH 50: CPT

## 2021-03-30 PROCEDURE — 93970 EXTREMITY STUDY: CPT

## 2021-03-30 PROCEDURE — 71250 CT THORAX DX C-: CPT

## 2021-03-30 PROCEDURE — 0042T: CPT

## 2021-03-30 PROCEDURE — 85576 BLOOD PLATELET AGGREGATION: CPT

## 2021-03-30 PROCEDURE — 36415 COLL VENOUS BLD VENIPUNCTURE: CPT

## 2021-03-30 PROCEDURE — 76705 ECHO EXAM OF ABDOMEN: CPT

## 2021-03-30 PROCEDURE — 80048 BASIC METABOLIC PNL TOTAL CA: CPT

## 2021-03-30 PROCEDURE — 97163 PT EVAL HIGH COMPLEX 45 MIN: CPT

## 2021-03-30 PROCEDURE — 83036 HEMOGLOBIN GLYCOSYLATED A1C: CPT

## 2021-03-30 PROCEDURE — 74176 CT ABD & PELVIS W/O CONTRAST: CPT

## 2021-03-30 PROCEDURE — 70551 MRI BRAIN STEM W/O DYE: CPT

## 2021-03-30 PROCEDURE — 85730 THROMBOPLASTIN TIME PARTIAL: CPT

## 2021-03-30 PROCEDURE — 99291 CRITICAL CARE FIRST HOUR: CPT | Mod: 25

## 2021-03-30 PROCEDURE — U0003: CPT

## 2021-03-30 PROCEDURE — 93005 ELECTROCARDIOGRAM TRACING: CPT

## 2021-03-30 PROCEDURE — 85025 COMPLETE CBC W/AUTO DIFF WBC: CPT

## 2021-03-30 PROCEDURE — 82962 GLUCOSE BLOOD TEST: CPT

## 2021-03-30 PROCEDURE — 97530 THERAPEUTIC ACTIVITIES: CPT

## 2021-03-30 PROCEDURE — 84443 ASSAY THYROID STIM HORMONE: CPT

## 2021-03-30 PROCEDURE — 70551 MRI BRAIN STEM W/O DYE: CPT | Mod: 26

## 2021-03-30 PROCEDURE — 83735 ASSAY OF MAGNESIUM: CPT

## 2021-03-30 PROCEDURE — 86769 SARS-COV-2 COVID-19 ANTIBODY: CPT

## 2021-03-30 PROCEDURE — 84484 ASSAY OF TROPONIN QUANT: CPT

## 2021-03-30 PROCEDURE — 99239 HOSP IP/OBS DSCHRG MGMT >30: CPT

## 2021-03-30 PROCEDURE — 70498 CT ANGIOGRAPHY NECK: CPT

## 2021-03-30 PROCEDURE — 84100 ASSAY OF PHOSPHORUS: CPT

## 2021-03-30 PROCEDURE — 97110 THERAPEUTIC EXERCISES: CPT

## 2021-03-30 PROCEDURE — 80061 LIPID PANEL: CPT

## 2021-03-30 RX ORDER — CARVEDILOL PHOSPHATE 80 MG/1
1 CAPSULE, EXTENDED RELEASE ORAL
Qty: 60 | Refills: 0
Start: 2021-03-30 | End: 2021-04-28

## 2021-03-30 RX ORDER — UBIDECARENONE 100 MG
1 CAPSULE ORAL
Qty: 0 | Refills: 0 | DISCHARGE

## 2021-03-30 RX ORDER — UBIDECARENONE 100 MG
1 CAPSULE ORAL
Qty: 30 | Refills: 0
Start: 2021-03-30 | End: 2021-04-28

## 2021-03-30 RX ORDER — ATORVASTATIN CALCIUM 80 MG/1
1 TABLET, FILM COATED ORAL
Qty: 30 | Refills: 0
Start: 2021-03-30 | End: 2021-04-28

## 2021-03-30 RX ADMIN — CARVEDILOL PHOSPHATE 3.12 MILLIGRAM(S): 80 CAPSULE, EXTENDED RELEASE ORAL at 17:27

## 2021-03-30 RX ADMIN — CARVEDILOL PHOSPHATE 3.12 MILLIGRAM(S): 80 CAPSULE, EXTENDED RELEASE ORAL at 05:14

## 2021-03-30 RX ADMIN — Medication 81 MILLIGRAM(S): at 08:28

## 2021-03-30 RX ADMIN — CLOPIDOGREL BISULFATE 75 MILLIGRAM(S): 75 TABLET, FILM COATED ORAL at 08:28

## 2021-03-30 NOTE — DISCHARGE NOTE PROVIDER - NSDCFUSCHEDAPPT_GEN_ALL_CORE_FT
BREANA HELM ; 04/15/2021 ; NPP Neurology [address] [facility]

## 2021-03-30 NOTE — DISCHARGE NOTE PROVIDER - CARE PROVIDERS DIRECT ADDRESSES
,DirectAddress_Unknown,[email or web address],DirectAddress_Unknown,DirectAddress_Unknown,DirectAddress_Unknown

## 2021-03-30 NOTE — DISCHARGE NOTE PROVIDER - HOSPITAL COURSE
80F PMH CAD s/p PCI x 2 on ASA/Plavix, CVA x 3 (11/28/20, June 2020, May 2020), loop recorder, HTN (not on medications) presents with stroke like symptoms on 3/27 8 hours after second COVID vaccination. NIH on admission =9, however improved. CTH/CTA/CTP with small chronic lacunar infarct anterior limb left internal capsule without acute hemorrhage or midline shift, poorly visualized small distal RMCA branches, patent CTA neck without significant stenosis, no core infarct or ischemia. S/p IV Alteplase 17:07 3/27. Patient with improvement of stroke like symptoms, feels like speech back to baseline and with some mild left UE weakness and LLE weakness.  Patient seen by  neurology and cardiology while admitted. s/p isha with no cardio embolic source noted. MR Brain positive for stroke Loop recorder interrogated. DAPT resumed. Of note per patient with multiple past cva/tia (this is number four). patient now stable for discharged home.     `Time spent on patients discharge 32 minutes

## 2021-03-30 NOTE — PROGRESS NOTE ADULT - SUBJECTIVE AND OBJECTIVE BOX
Patient feels well.  Wants to go home. Has been walking to the bathroom on her own without difficulty in the left leg.     REVIEW OF SYSTEMS  Constitutional - No fever,  No fatigue  HEENT - No vertigo, No neck pain  Neurological - No headaches, No memory loss, +loss of strength, No numbness, No tremors  Musculoskeletal - No joint pain, No joint swelling, No muscle pain  Psychiatric - No depression, +anxiety    FUNCTIONAL PROGRESS  3/29  Bed Mobility  Bed Mobility Training Supine-to-Sit: independent    Sit-Stand Transfer Training  Transfer Training Sit-to-Stand Transfer: supervision;  full weight-bearing   rolling walker  Transfer Training Stand-to-Sit Transfer: full weight-bearing   rolling walker  Sit-to-Stand Transfer Training Transfer Safety Analysis: decreased balance    Gait Training  Gait Training: supervision;  full weight-bearing   rolling walker;  250 feet  Gait Analysis: impaired balance    Therapeutic Exercise  Therapeutic Exercise Detail: Patient educated on bilateral lower extremity therex to perform after rest period (ankle pumps, LAQ, and seated marching 3x10). Patient provided return demonstration.       VITALS  T(C): 36.7 (03-30-21 @ 05:08), Max: 36.8 (03-29-21 @ 15:45)  HR: 89 (03-30-21 @ 05:08) (71 - 89)  BP: 127/78 (03-30-21 @ 05:08) (124/84 - 174/99)  RR: 16 (03-30-21 @ 05:08) (16 - 18)  SpO2: 97% (03-30-21 @ 05:08) (97% - 100%)  Wt(kg): --    MEDICATIONS   aspirin  chewable 81 milliGRAM(s) daily  atorvastatin 80 milliGRAM(s) at bedtime  carvedilol 3.125 milliGRAM(s) every 12 hours  clopidogrel Tablet 75 milliGRAM(s) daily  enoxaparin Injectable 40 milliGRAM(s) at bedtime  hydrALAZINE Injectable 10 milliGRAM(s) every 2 hours PRN  labetalol Injectable 10 milliGRAM(s) every 2 hours PRN  sodium chloride 0.9%. 1000 milliLiter(s) <Continuous>      RECENT LABS/IMAGING                          12.6   5.75  )-----------( 158      ( 29 Mar 2021 06:46 )             38.5     03-29    140  |  104  |  20.0  ----------------------------<  89  4.2   |  25.0  |  0.93    Ca    8.6      29 Mar 2021 06:46  Phos  3.7     03-29  Mg     2.0     03-29        HEAD CT 3/27 - Tiny old lacunar infarct anterior limb of the left internal capsule. No acute hemorrhage or midline shift. If symptoms persist consider follow up head CT or MRI if no contraindications.    HEAD CT 3/27 - Mild volume loss, microvascular disease, no acute hemorrhage or midline shift.    CT PERFUSION 3/27 - No core infarct. No active ischemia. If symptoms persist consider follow up head CT or MRI, MRA  if no contraindication.    CTA COW 3/27 - Small distal right MCA branches not well visualized.    CTA NECK 3/27 - Patent, ECAs, ICAs, no  hemodynamically significant stenosis at  ICA origins by NASCET criteria.  Bilateral vertebral arteries are patent without flow limiting stenosis.    HEAD CT 3/28 - Mild chronic microvascular changes without evidence of an acute transcortical infarction or hemorrhage.    CT CAP 3/29 - Gallstones within a distended gallbladder, correlate clinically for acute cholecystitis and ultrasound as indicated. Prior left nephrectomy. Indeterminate calcifications and hypodensities in the medial right hepatic dome. Indeterminate left retropectoral/axillary lymph node.    HEAD CT 3/29 - Mild chronic microvascular changes without evidence of an acute transcortical infarction or hemorrhage. Postcontrast images are recommended to evaluate for malignancy.    RIGHT UQ US 3/29 - Distended gallbladder containing gallstones. No gallbladder wall thickening or pericholecystic fluid. Sonographic Yung's sign is negative. Correlate with HIDA scan if clinically warranted.    ----------------------------------------------------------------------------------------  PHYSICAL EXAM  Constitutional - NAD, Comfortable  Extremities - No C/C/E, No calf tenderness   Neurologic Exam -                    Cognitive - AAO to self, place, date, year, situation     Cranial Nerves - Mild left facial lip drop     Motor -                      LEFT    LE - HF 4/5, KE 4/5, DF 5/5, PF 5/5                    RIGHT LE - HF 5/5, KE 5/5, DF 5/5, PF 5/5        Sensory - Intact to LT     Coordination - FTN intact  Psychiatric - Mood anxious, Affect WNL  ----------------------------------------------------------------------------------------  ASSESSMENT/PLAN  80yFemale with functional deficits after a clinical CVA  Clinical CVA s/p tPA - ASA, Plavix, Lipitor, MRI & MEGHANN pending   HTN - Coreg, Hydralazine, Labetalol  Pain - Tylenol  DVT PPX - SCDs  Rehab - Patient at independent/supervision level. Continue to recommend  DC HOME with HOME CARE. Continue bedside therapy as well as OOB throughout the day with mobilization by staff to maintain cardiopulmonary function and prevention of secondary complications related to debility.     Discussed with rehab team.

## 2021-03-30 NOTE — PROGRESS NOTE ADULT - ASSESSMENT
80y Female with multiple presentations for stroke/TIA.     TIA.  Received t-PA on this admission.   Several admissions since May of 2020.  Continue antiplatelet and statin.   Currently on dual antiplatelet agent.   For MEGHANN today.   If embolic source seen, then would need anticoagulation.    Discharge planning.   Seen by rehab. Recommending home with home care.   Can be discharged if official MRI report concurs that no acute infarct.     Case discussed with Dr BINH Vann.

## 2021-03-30 NOTE — DISCHARGE NOTE PROVIDER - PROVIDER TOKENS
PROVIDER:[TOKEN:[[id number]]],PROVIDER:[TOKEN:[[id number]]],PROVIDER:[TOKEN:[[id number]]],FREE:[LAST:[Primary Care Doctor],PHONE:[(   )    -],FAX:[(   )    -]],PROVIDER:[TOKEN:[[id number]]]

## 2021-03-30 NOTE — PROGRESS NOTE ADULT - REASON FOR ADMISSION
Acute onset speech difficulty, worsening left hemiparesis

## 2021-03-30 NOTE — PROGRESS NOTE ADULT - SUBJECTIVE AND OBJECTIVE BOX
[facility]                                        Neurology at [place]                                 Leigh Prather, & Anjum                                  [address]                                        [place], [place], [address]                                             [phone]        CC: TIA    HPI:   The patient is a 80y Female known from multiple prior admissions.   Originally presented in May of 2020 and was seen by Dr Kendall.   I had seen her initially on her next admission in June of 2020 and again in November of 2020   All 3 occasions were marked by speech difficulty and left sided difficulty. She received t-PA in the June admission. MRI was negative for acute infarct on all 3 occasions.   She now reports that she received her second covid-19 vaccination on 3/27.   Shortly afterward she was noted to have increased slurred speech and left sided difficulty.   She was brought to the ER and the stroke code was activated.   She received t-PA and was admitted to neuro-ICU.   She is now downgraded and neurology was called to be on board.     Interim history:  On 4 Tower.     ROS:   Denies headache or dizziness.  Denies chest pain.  Denies shortness of breath.    MEDICATIONS  (STANDING):  aspirin  chewable 81 milliGRAM(s) Oral daily  atorvastatin 80 milliGRAM(s) Oral at bedtime  carvedilol 3.125 milliGRAM(s) Oral every 12 hours  clopidogrel Tablet 75 milliGRAM(s) Oral daily  enoxaparin Injectable 40 milliGRAM(s) SubCutaneous at bedtime  sodium chloride 0.9%. 1000 milliLiter(s) (100 mL/Hr) IV Continuous <Continuous>      Vital Signs Last 24 Hrs  T(C): 36.7 (30 Mar 2021 05:08), Max: 36.8 (29 Mar 2021 15:45)  T(F): 98 (30 Mar 2021 05:08), Max: 98.3 (29 Mar 2021 15:45)  HR: 89 (30 Mar 2021 05:08) (85 - 89)  BP: 127/78 (30 Mar 2021 05:08) (124/84 - 174/99)  RR: 16 (30 Mar 2021 05:08) (16 - 16)  SpO2: 97% (30 Mar 2021 05:08) (97% - 98%)    Detailed Neurologic Exam:    Mental status: The patient is awake and alert. There is no aphasia. There is no dysarthria.     Cranial nerves: Pupils equal and react symmetrically to light. There is no visual field deficit to threat. Extraocular motion is full with no nystagmus. Facial sensation is intact. Facial musculature is symmetric. Palate elevates symmetrically. Tongue is midline.    Motor: There is normal bulk and tone.  There is no tremor.  Strength grossly 5/5 bilaterally.    Sensation: Grossly intact to light touch and pin.    Reflexes: 2+ throughout and plantar responses are flexor.    Cerebellar: No dysmetria on finger nose testing.    Labs:     03-29    140  |  104  |  20.0  ----------------------------<  89  4.2   |  25.0  |  0.93    Ca    8.6      29 Mar 2021 06:46  Phos  3.7     03-29  Mg     2.0     03-29                              12.6   5.75  )-----------( 158      ( 29 Mar 2021 06:46 )             38.5       Rad:   MRI brain images reviewed: There is no acute infarct.    Awaiting official read.

## 2021-03-30 NOTE — DISCHARGE NOTE PROVIDER - NSDCMRMEDTOKEN_GEN_ALL_CORE_FT
aspirin 81 mg oral tablet, chewable: 1 tab(s) orally once a day  atorvastatin 80 mg oral tablet: 1 tab(s) orally once a day (at bedtime)  carvedilol 3.125 mg oral tablet: 1 tab(s) orally every 12 hours  clopidogrel 75 mg oral tablet: 1 tab(s) orally once a day  Coenzyme Q10 200 mg oral capsule: 1 cap(s) orally once a day  Multiple Vitamins oral tablet: 1 tab(s) orally once a day

## 2021-03-30 NOTE — DISCHARGE NOTE PROVIDER - NSDCCPCAREPLAN_GEN_ALL_CORE_FT
PRINCIPAL DISCHARGE DIAGNOSIS  Diagnosis: Cerebrovascular accident (CVA), unspecified mechanism  Assessment and Plan of Treatment: - take medications as rx  - follow up with neurology and cardiology      SECONDARY DISCHARGE DIAGNOSES  Diagnosis: Gallstone  Assessment and Plan of Treatment: - follow up with surgery

## 2021-03-30 NOTE — PROGRESS NOTE ADULT - ASSESSMENT
80F PMH CAD s/p PCI x 2 on ASA/Plavix, CVA x 3 (11/28/20, June 2020, May 2020), loop recorder, HTN (not on medications) presents with stroke like symptoms on 3/27 8 hours after second COVID vaccination. NIH on admission =9, however improved. CTH/CTA/CTP with small chronic lacunar infarct anterior limb left internal capsule without acute hemorrhage or midline shift, poorly visualized small distal RMCA branches, patent CTA neck without significant stenosis, no core infarct or ischemia. S/p IV Alteplase 17:07 3/27. Patient with improvement of stroke like symptoms, feels like speech back to baseline and with some mild left UE weakness and LLE weakness.  Neurology consult called- Dr. Mayers. [facility] cardiology group called, echo performed however not results, isha wnl.  Loop recorder interrogated. 24 hour CT performed with no heme, patient pending MRI.  DAPT resumed. Of note per patient with multiple past cva/tia (this is number four). patient now stable for downgrade from NSICU to medical service (Tele).    #acute cva  - cardio consult appreciated  - hx of multiple neuro episodes in past (tia and cva)  - aspirin, plavix  - s/p neuroicu  - neuro consult appreciated   - mr pending  - statin  - s/p tpa  - pmr eval appreciated     #HTN- essential  - coreg  - monitor blood pressure    #HLD  - statin    #CAD s/p PCI  - aspirin, plavix  - coreg  - cardio consult appreciated    #DVT prophylaxis  - lovenox SC    pending mr brain 80F PMH CAD s/p PCI x 2 on ASA/Plavix, CVA x 3 (11/28/20, June 2020, May 2020), loop recorder, HTN (not on medications) presents with stroke like symptoms on 3/27 8 hours after second COVID vaccination. NIH on admission =9, however improved. CTH/CTA/CTP with small chronic lacunar infarct anterior limb left internal capsule without acute hemorrhage or midline shift, poorly visualized small distal RMCA branches, patent CTA neck without significant stenosis, no core infarct or ischemia. S/p IV Alteplase 17:07 3/27. Patient with improvement of stroke like symptoms, feels like speech back to baseline and with some mild left UE weakness and LLE weakness.  Neurology consult called- Dr. Mayers. [facility] cardiology group called, echo performed however not results, isha pending for AM. Loop recorder interrogated.  patient pending MRI.  DAPT resumed. Of note per patient with multiple past cva/tia (this is number four). patient now stable for downgrade from NSICU to medical service (Tele).    #acute cva  - cardio consult appreciated  - hx of multiple neuro episodes in past (tia and cva)  - aspirin, plavix  - s/p neuroicu  - neuro consult appreciated   - mr pending  - statin  - s/p tpa  - pmr eval appreciated     #HTN- essential  - coreg  - monitor blood pressure    #HLD  - statin    #CAD s/p PCI  - aspirin, plavix  - coreg  - cardio consult appreciated    #DVT prophylaxis  - lovenox SC    pending mr brain

## 2021-03-30 NOTE — PROGRESS NOTE ADULT - SUBJECTIVE AND OBJECTIVE BOX
Patient is a 80y old  Female who presents with a chief complaint of Acute onset speech difficulty, worsening left hemiparesis (29 Mar 2021 14:54)    Patient seen and examined at bedside.      ALLERGIES:  Biaxin (Muscle Pain; Joint Pain)  codeine (Faint)  Isosorbide Mononitrate Extended Release (Faint)  Metoprolol Succinate ER (Unknown)  monoctanoin (Unknown)    MEDICATIONS  (STANDING):  aspirin  chewable 81 milliGRAM(s) Oral daily  atorvastatin 80 milliGRAM(s) Oral at bedtime  carvedilol 3.125 milliGRAM(s) Oral every 12 hours  clopidogrel Tablet 75 milliGRAM(s) Oral daily  enoxaparin Injectable 40 milliGRAM(s) SubCutaneous at bedtime  sodium chloride 0.9%. 1000 milliLiter(s) (100 mL/Hr) IV Continuous <Continuous>    MEDICATIONS  (PRN):  hydrALAZINE Injectable 10 milliGRAM(s) IV Push every 2 hours PRN SBP >180  labetalol Injectable 10 milliGRAM(s) IV Push every 2 hours PRN SBP >180    Vital Signs Last 24 Hrs  T(F): 98 (30 Mar 2021 05:08), Max: 98.3 (29 Mar 2021 15:45)  HR: 89 (30 Mar 2021 05:08) (71 - 89)  BP: 127/78 (30 Mar 2021 05:08) (124/84 - 174/99)  RR: 16 (30 Mar 2021 05:08) (16 - 18)  SpO2: 97% (30 Mar 2021 05:08) (97% - 100%)  I&O's Summary    29 Mar 2021 07:01  -  30 Mar 2021 07:00  --------------------------------------------------------  IN: 820 mL / OUT: 0 mL / NET: 820 mL    PHYSICAL EXAM:  General: NAD, A/O x 3  ENT: MMM, no thrush  Neck: Supple, No JVD  Lungs:  good air entry, non-labored breathing  Cardio: +s1/s2  Abdomen: Soft, Nontender, Nondistended; Bowel sounds present  Extremities: No calf tenderness  Neuro +left ue and le weakness 3-4/5 strength however able to move all extremities    LABS:                        12.6   5.75  )-----------( 158      ( 29 Mar 2021 06:46 )             38.5     03-29    140  |  104  |  20.0  ----------------------------<  89  4.2   |  25.0  |  0.93    Ca    8.6      29 Mar 2021 06:46  Phos  3.7     03-29  Mg     2.0     03-29    TPro  6.8  /  Alb  4.1  /  TBili  1.0  /  DBili  x   /  AST  23  /  ALT  15  /  AlkPhos  77  03-27    eGFR if Non African American: 58 mL/min/1.73M2 (03-29-21 @ 06:46)  eGFR if : 67 mL/min/1.73M2 (03-29-21 @ 06:46)    PT/INR - ( 27 Mar 2021 16:53 )   PT: 12.4 sec;   INR: 1.07 ratio    PTT - ( 27 Mar 2021 16:53 )  PTT:27.1 sec    CARDIAC MARKERS ( 27 Mar 2021 16:53 )  x     / <0.01 ng/mL / x     / x     / x        03-28 Chol 115 mg/dL LDL -- HDL 60 mg/dL Trig 32 mg/dL  TSH 2.58   TSH with FT4 reflex --  Total T3 --    RADIOLOGY & ADDITIONAL TESTS:  < from: US Abdomen Upper Quadrant Right (03.29.21 @ 17:37) >  IMPRESSION:  Distended gallbladder containing gallstones. No gallbladder wall thickening or pericholecystic fluid. Sonographic Yung's sign is negative. Correlate with HIDA scan if clinically warranted.  < end of copied text >    < from: CT A/P Chest No Cont (03.29.21 @ 11:26) >  IMPRESSION:  Gallstones within a distended gallbladder, correlate clinically for acute cholecystitis and ultrasound as indicated.  Prior left nephrectomy.  Indeterminate calcifications and hypodensities in the medial right hepatic dome.  Indeterminate left retropectoral/axillary lymph node.  < end of copied text >    < from: CT Head No Cont (03.29.21 @ 11:26) >  IMPRESSION:  Mild chronic microvascular changes without evidence of an acute transcortical infarction or hemorrhage.  < end of copied text >    < from: US Duplex Venous Lower Ext Complete, Bilateral (03.28.21 @ 20:09) >  IMPRESSION:  No deep vein thrombosis in either lower extremity.  < end of copied text >    < from: CT Head No Cont (03.28.21 @ 16:54) >  IMPRESSION:  Mild chronic microvascular changes without evidence of an acute transcortical infarction or hemorrhage.  < end of copied text >    < from: Xray Chest 1 View- PORTABLE-Urgent (03.27.21 @ 17:57) >  IMPRESSION: Prominent interstitial markings bilaterally may indicate interstitial edema. Follow-up recommended  < end of copied text >    < from: CT Angio Head w/ IV Cont (03.27.21 @ 17:01) >  IMPRESSION:  HEAD CT AND RAPID PERFUSION:  HEAD CT: Mild volume loss, microvascular disease, no acute hemorrhage or midline shift.  CT PERFUSION demonstrated: No core infarct. No active ischemia.  If symptoms persist consider follow up head CT or MRI, MRA  if no contraindication.  CTA COW: Small distal right MCA branches not well visualized.  CTA NECK: Patent, ECAs, ICAs, no  hemodynamically significant stenosis at  ICA origins by NASCET criteria.  Bilateral vertebral arteries are patent without flow limiting stenosis.  < end of copied text >    < from: CT Brain Stroke Protocol (03.27.21 @ 16:54) >  IMPRESSION:  Tiny old lacunar infarct anterior limb of the left internal capsule.  < end of copied text >    < from: TTE Echo Complete w/ Contrast w/ Doppler (03.28.21 @ 11:49) >  Summary:   1. Left ventricular ejection fraction, by visual estimation, is 60 to 65%.   2. Basal inferolateral segment and basal inferior segment are abnormal as described above.   3. Spectral Doppler shows impaired relaxation pattern of left ventricular myocardial filling (Grade I diastolic dysfunction).   4. Mild mitral valve regurgitation.   5. Mild tricuspid regurgitation.   6. Moderate aortic regurgitation.   7. Sclerotic aortic valve with normal opening.   8. Estimated pulmonary artery systolic pressure is 35.5 mmHg assuming a right atrial pressure of 3 mmHg, which is consistent with borderline pulmonary hypertension.   9. Endocardial visualization was enhanced with intravenous echo contrast.  10. Peak transaortic gradient equals 7.5 mmHg, mean transaortic gradient equals 3.8 mmHg, the calculated aortic valve area equals 1.91 cm² by the continuity equation consistent with mild aortic stenosis.  11. There is mild aortic root calcification.  < end of copied text >    < from: MEGHANN Echo Doppler (03.29.21 @ 13:44) >  Summary:   1. Left ventricular ejection fraction, by visual estimation, is 60 to 65%. Increased LV wall thickness.   2. Small right ventricle. Small right atrium.   3. There is mild prolapse of P2 segment of the posterior mitral valve without obvious flail segment. Moderate mitral valve regurgitation.   4. Mild tricuspid regurgitation.   5. Mild aortic regurgitation.   6. Color flow doppler and intravenous injection of agitated saline demonstrates the presence of an intact intra atrial septum.   7. No obvious cardioembolism source noted.  < end of copied text >

## 2021-03-31 PROBLEM — I63.9 CEREBRAL INFARCTION, UNSPECIFIED: Chronic | Status: ACTIVE | Noted: 2021-03-27

## 2021-04-08 ENCOUNTER — HOSPITAL ENCOUNTER (EMERGENCY)
Facility: CLINIC | Age: 81
Discharge: HOME | End: 2021-04-08
Attending: NURSE PRACTITIONER | Admitting: NURSE PRACTITIONER
Payer: COMMERCIAL

## 2021-04-08 ENCOUNTER — APPOINTMENT (OUTPATIENT)
Dept: GENERAL RADIOLOGY | Facility: CLINIC | Age: 81
End: 2021-04-08
Attending: EMERGENCY MEDICINE
Payer: COMMERCIAL

## 2021-04-08 VITALS
HEART RATE: 65 BPM | OXYGEN SATURATION: 97 % | SYSTOLIC BLOOD PRESSURE: 140 MMHG | DIASTOLIC BLOOD PRESSURE: 70 MMHG | RESPIRATION RATE: 12 BRPM | TEMPERATURE: 97.2 F

## 2021-04-08 DIAGNOSIS — M79.89 RIGHT LEG SWELLING: ICD-10-CM

## 2021-04-08 DIAGNOSIS — S93.509A SPRAIN OF TOE, INITIAL ENCOUNTER: ICD-10-CM

## 2021-04-08 LAB
ALBUMIN SERPL-MCNC: 3.3 G/DL (ref 3.4–5)
ALP SERPL-CCNC: 63 U/L (ref 40–150)
ALT SERPL W P-5'-P-CCNC: 19 U/L (ref 0–50)
ANION GAP SERPL CALCULATED.3IONS-SCNC: 4 MMOL/L (ref 3–14)
AST SERPL W P-5'-P-CCNC: 20 U/L (ref 0–45)
BASOPHILS # BLD AUTO: 0 10E9/L (ref 0–0.2)
BASOPHILS NFR BLD AUTO: 0.7 %
BILIRUB SERPL-MCNC: 0.5 MG/DL (ref 0.2–1.3)
BUN SERPL-MCNC: 18 MG/DL (ref 7–30)
CALCIUM SERPL-MCNC: 9.2 MG/DL (ref 8.5–10.1)
CHLORIDE SERPL-SCNC: 106 MMOL/L (ref 94–109)
CO2 SERPL-SCNC: 29 MMOL/L (ref 20–32)
CREAT SERPL-MCNC: 0.67 MG/DL (ref 0.52–1.04)
DIFFERENTIAL METHOD BLD: NORMAL
EOSINOPHIL # BLD AUTO: 0.2 10E9/L (ref 0–0.7)
EOSINOPHIL NFR BLD AUTO: 4.1 %
ERYTHROCYTE [DISTWIDTH] IN BLOOD BY AUTOMATED COUNT: 13 % (ref 10–15)
GFR SERPL CREATININE-BSD FRML MDRD: 83 ML/MIN/{1.73_M2}
GLUCOSE SERPL-MCNC: 84 MG/DL (ref 70–99)
HCT VFR BLD AUTO: 43.4 % (ref 35–47)
HGB BLD-MCNC: 14.2 G/DL (ref 11.7–15.7)
IMM GRANULOCYTES # BLD: 0 10E9/L (ref 0–0.4)
IMM GRANULOCYTES NFR BLD: 0.2 %
LYMPHOCYTES # BLD AUTO: 1.3 10E9/L (ref 0.8–5.3)
LYMPHOCYTES NFR BLD AUTO: 23.7 %
MCH RBC QN AUTO: 30.2 PG (ref 26.5–33)
MCHC RBC AUTO-ENTMCNC: 32.7 G/DL (ref 31.5–36.5)
MCV RBC AUTO: 92 FL (ref 78–100)
MONOCYTES # BLD AUTO: 0.6 10E9/L (ref 0–1.3)
MONOCYTES NFR BLD AUTO: 11.2 %
NEUTROPHILS # BLD AUTO: 3.3 10E9/L (ref 1.6–8.3)
NEUTROPHILS NFR BLD AUTO: 60.1 %
NRBC # BLD AUTO: 0 10*3/UL
NRBC BLD AUTO-RTO: 0 /100
PLATELET # BLD AUTO: 228 10E9/L (ref 150–450)
POTASSIUM SERPL-SCNC: 4.3 MMOL/L (ref 3.4–5.3)
PROT SERPL-MCNC: 6.9 G/DL (ref 6.8–8.8)
RBC # BLD AUTO: 4.7 10E12/L (ref 3.8–5.2)
SODIUM SERPL-SCNC: 139 MMOL/L (ref 133–144)
WBC # BLD AUTO: 5.6 10E9/L (ref 4–11)

## 2021-04-08 PROCEDURE — 73630 X-RAY EXAM OF FOOT: CPT | Mod: RT

## 2021-04-08 PROCEDURE — 80053 COMPREHEN METABOLIC PANEL: CPT | Performed by: EMERGENCY MEDICINE

## 2021-04-08 PROCEDURE — 99284 EMERGENCY DEPT VISIT MOD MDM: CPT

## 2021-04-08 PROCEDURE — 85025 COMPLETE CBC W/AUTO DIFF WBC: CPT | Performed by: EMERGENCY MEDICINE

## 2021-04-08 ASSESSMENT — ENCOUNTER SYMPTOMS
BRUISES/BLEEDS EASILY: 1
FEVER: 0
ARTHRALGIAS: 1

## 2021-04-08 NOTE — ED PROVIDER NOTES
"  History   Chief Complaint:  Foot Pain     HPI   Annie Archer is a 80 year old female on Xarelto for atrial fibrillation with history of hypertension, osteoarthritis, and cellulitis of right leg who presents with foot pain. Patient fell three days ago on her right side and has visible bruising on her right arm and hurt her right knee. She went to  the following day for her right shoulder pain from her fall but did not acklowledge the knee pain. She was discharged with Cefdinir but has not filled it yet. She has been feeling similar symptoms on her right foot and feels a \"pressured\" pain when standing along with bleeding on her right great toe. Her foot feels improved when elevating. She denies calf pain or tightness. She denies fevers. She continues to take her Xarelto and began taking Tylenol two days ago. She denies joint replacements to her right leg. She has a history of cellulitis of her right foot last year and completed antibiotics which resolved her symptoms.    Review of Systems   Constitutional: Negative for fever.   Musculoskeletal: Positive for arthralgias (right foot).   Hematological: Bruises/bleeds easily (right arm).   All other systems reviewed and are negative.        Allergies:  Amoxicillin-Pot Clavulanate  Ergotamine  Sulfa Drugs  Cephalexin    Medications:  Albuterol  Zofran   Xarelto   Amlodipine   Lisinopril  Metoprolol    Past Medical History:    Atrial Fibrillation     Sensorineural Hearing Loss - Bilateral  ERIC on CPAP  Thyroid Nodules  Obesity   Hypertension   Osteoarthritis   Cellulitis of Right Leg     Past Surgical History:    Breast Surgery   Tonsillectomy   D&C  Hysterectomy     Social History:  Arrives unaccompanied.    Physical Exam     Patient Vitals for the past 24 hrs:   BP Temp Temp src Pulse Resp SpO2   04/08/21 1200 (!) 140/70 97.2  F (36.2  C) Temporal 65 12 97 %       Physical Exam  Physical Exam   Constitutional:  Sitting up in bed comfortably.   Head: Head moves " freely with normal range of motion.   ENT: Oropharynx is clear and moist.   Eyes: Conjunctivae pink. EOMs intact.   Neck: Normal range of motion.   Cardiovascular: Regular rate and rhythm. Normal heart sounds. No concerning murmur. Intact distal pulses: pedal pulses 2+ on the right, 2+ on the left.   Pulmonary/Chest: No respiratory distress. No decreased breath sounds. No wheezes. No rhonchi. No rales. Lungs clear throughout.   Abdominal: Soft. Non-tender. No rebound, no guarding.   Musculoskeletal: Large area of bruising to right upper arm. Soft, minimally tender. Right lower leg is more swollen than left, she also has enlarged right knee and bruising and swellin to the left great toe with swelling extending to the midfoot. No well demarcated erythema or heat to touch. Distal capillary refill and sensation intact.   Neurological: Oriented to person, place, and time. No focal deficits.   Skin: see msk. .          Emergency Department Course     Imaging:  Foot  XR, G/E 3 views, Right  No fracture identified.  Reading per radiology    Laboratory:  CBC: WBC 5.6, HGB 14.2,   CMP: Albumin 3.3 (L), o/w WNL (Creatinine: 0.67)    Emergency Department Course:    Reviewed:  I reviewed nursing notes, vitals and past medical history    Assessments:  1408 I obtained history and examined the patient as noted above.   1520 I rechecked the patient and explained findings.     Disposition:  The patient was discharged to home.     Impression & Plan     Medical Decision Making:    Annie Archer is a 80 year old female who presents for evaluation of leg swelling. A broad differential was considered including Baker's cyst rupture, Baker's cyst, hematoma, rupture, compartment syndrome, muscle rupture, DVT, superficial thrombophlebitis, compression of the venous structures higher up in the abdomen and or leg cellulitis/ abscess, etc. There are no signs of compartment syndrome or other worrisome etiologies at this point. She had a  recent mechanical fall with injury to her right arm, right knee and right great toe and I suspect her increased edema is soft tissue swelling and accumulation of fluid from these injuries. She has no fever, normal WBC and she notes the erythema resolves when leg is elevated. We discussed that these are all reassuring and would not suggest cellulitis.We discussed reasons to return here and need for follow up. I did offer U/S of the leg, she is on Xarelto and does not feel U/S is needed. I think this is a reasonable approach given her recent injuries. She is amenable to plan.        Diagnosis:    ICD-10-CM    1. Right leg swelling  M79.89      Scribe Disclosure:  I, Mateus Rogers, am serving as a scribe at 1:53 PM on 4/8/2021 to document services personally performed by Lynda Mclaughlin APRN, ASHOK based on my observations and the provider's statements to me.              Lynda Mcluaghlin APRN CNP  04/08/21 [id number]

## 2021-04-08 NOTE — ED TRIAGE NOTES
"Patient had cellulitis in the right foot in the last year. Patient reports she fell Monday and the same foot now feels painful and has \"pressure.\"  "

## 2021-04-13 ENCOUNTER — APPOINTMENT (OUTPATIENT)
Dept: TRAUMA SURGERY | Facility: CLINIC | Age: 81
End: 2021-04-13

## 2021-04-13 VITALS
WEIGHT: 141 LBS | OXYGEN SATURATION: 97 % | HEIGHT: 62 IN | RESPIRATION RATE: 16 BRPM | TEMPERATURE: 97.3 F | BODY MASS INDEX: 25.95 KG/M2 | SYSTOLIC BLOOD PRESSURE: 160 MMHG | HEART RATE: 59 BPM | DIASTOLIC BLOOD PRESSURE: 83 MMHG

## 2021-05-05 ENCOUNTER — APPOINTMENT (OUTPATIENT)
Dept: NEUROLOGY | Facility: CLINIC | Age: 81
End: 2021-05-05
Payer: MEDICARE

## 2021-05-05 VITALS
WEIGHT: 140 LBS | TEMPERATURE: 98 F | DIASTOLIC BLOOD PRESSURE: 78 MMHG | SYSTOLIC BLOOD PRESSURE: 130 MMHG | HEIGHT: 62 IN | BODY MASS INDEX: 25.76 KG/M2

## 2021-05-05 PROCEDURE — 99072 ADDL SUPL MATRL&STAF TM PHE: CPT

## 2021-05-05 PROCEDURE — 99213 OFFICE O/P EST LOW 20 MIN: CPT

## 2021-05-05 NOTE — HISTORY OF PRESENT ILLNESS
[FreeTextEntry1] : I saw this patient in the office today.\par \par As you recall, she had originally presented to [facility] in May of 2020.\par She had speech difficulty and left-sided weakness.\par She received thrombolysis.\par She ultimately improved back to baseline.\par She then returned to the emergency department on 6/1/2020.\par She had similar symptoms.\par There was no acute infarct on her previous brain MRI and she was treated with t-PA again.\par She was ultimately discharged with a loop recorder in place.\par I had seen her again in [facility] ER on 11/29/2020.\par She had presented with left sided chest pain and dyspnea followed by slurred speech. \par MRI was again negative for acute infarct. \par \par She had another episode in March of 2021.

## 2021-05-05 NOTE — ASSESSMENT
[FreeTextEntry1] : This is an 80 year-old woman who had episodes suspicious for stroke.\par She received t-PA with resolution of symptoms on both occasions.\par \par I would recommend she continue antiplatelet and statin. \par \par I will see the patient back in 4 months. \par \par \par

## 2021-05-05 NOTE — DATA REVIEWED
[de-identified] : Brain MRI was performed on 6/2/2020.\par There was no acute infarct.\par There was moderate small vessel ischemic disease of a chronic nature.\par

## 2021-07-25 ENCOUNTER — INPATIENT (INPATIENT)
Facility: HOSPITAL | Age: 81
LOS: 5 days | Discharge: HOME | DRG: 69 | End: 2021-07-31
Attending: HOSPITALIST | Admitting: HOSPITALIST
Payer: MEDICARE

## 2021-07-25 ENCOUNTER — TRANSCRIPTION ENCOUNTER (OUTPATIENT)
Age: 81
End: 2021-07-25

## 2021-07-25 VITALS
OXYGEN SATURATION: 100 % | HEIGHT: 62 IN | SYSTOLIC BLOOD PRESSURE: 187 MMHG | DIASTOLIC BLOOD PRESSURE: 114 MMHG | HEART RATE: 68 BPM

## 2021-07-25 DIAGNOSIS — Z90.5 ACQUIRED ABSENCE OF KIDNEY: Chronic | ICD-10-CM

## 2021-07-25 DIAGNOSIS — Z90.89 ACQUIRED ABSENCE OF OTHER ORGANS: Chronic | ICD-10-CM

## 2021-07-25 DIAGNOSIS — C64.9 MALIGNANT NEOPLASM OF UNSPECIFIED KIDNEY, EXCEPT RENAL PELVIS: Chronic | ICD-10-CM

## 2021-07-25 DIAGNOSIS — R53.1 WEAKNESS: ICD-10-CM

## 2021-07-25 LAB
A1C WITH ESTIMATED AVERAGE GLUCOSE RESULT: 5.2 % — SIGNIFICANT CHANGE UP (ref 4–5.6)
ALBUMIN SERPL ELPH-MCNC: 3.4 G/DL — SIGNIFICANT CHANGE UP (ref 3.3–5.2)
ALP SERPL-CCNC: 71 U/L — SIGNIFICANT CHANGE UP (ref 40–120)
ALT FLD-CCNC: 18 U/L — SIGNIFICANT CHANGE UP
ANION GAP SERPL CALC-SCNC: 9 MMOL/L — SIGNIFICANT CHANGE UP (ref 5–17)
APTT BLD: 30.9 SEC — SIGNIFICANT CHANGE UP (ref 27.5–35.5)
AST SERPL-CCNC: 31 U/L — SIGNIFICANT CHANGE UP
BASE EXCESS BLDV CALC-SCNC: -1 MMOL/L — SIGNIFICANT CHANGE UP (ref -2–3)
BASOPHILS # BLD AUTO: 0.07 K/UL — SIGNIFICANT CHANGE UP (ref 0–0.2)
BASOPHILS NFR BLD AUTO: 1.1 % — SIGNIFICANT CHANGE UP (ref 0–2)
BILIRUB SERPL-MCNC: 1.1 MG/DL — SIGNIFICANT CHANGE UP (ref 0.4–2)
BLD GP AB SCN SERPL QL: SIGNIFICANT CHANGE UP
BUN SERPL-MCNC: 21.7 MG/DL — HIGH (ref 8–20)
CA-I SERPL-SCNC: 1.06 MMOL/L — LOW (ref 1.15–1.33)
CALCIUM SERPL-MCNC: 8.5 MG/DL — LOW (ref 8.6–10.2)
CHLORIDE BLDV-SCNC: 105 MMOL/L — SIGNIFICANT CHANGE UP (ref 98–107)
CHLORIDE SERPL-SCNC: 104 MMOL/L — SIGNIFICANT CHANGE UP (ref 98–107)
CO2 SERPL-SCNC: 21 MMOL/L — LOW (ref 22–29)
CREAT SERPL-MCNC: 0.85 MG/DL — SIGNIFICANT CHANGE UP (ref 0.5–1.3)
EOSINOPHIL # BLD AUTO: 0.2 K/UL — SIGNIFICANT CHANGE UP (ref 0–0.5)
EOSINOPHIL NFR BLD AUTO: 3.2 % — SIGNIFICANT CHANGE UP (ref 0–6)
ESTIMATED AVERAGE GLUCOSE: 103 MG/DL — SIGNIFICANT CHANGE UP (ref 68–114)
GAS PNL BLDV: 132 MMOL/L — LOW (ref 136–145)
GAS PNL BLDV: SIGNIFICANT CHANGE UP
GLUCOSE BLDV-MCNC: 72 MG/DL — SIGNIFICANT CHANGE UP (ref 70–99)
GLUCOSE SERPL-MCNC: 79 MG/DL — SIGNIFICANT CHANGE UP (ref 70–99)
HCO3 BLDV-SCNC: 23 MMOL/L — SIGNIFICANT CHANGE UP (ref 22–29)
HCT VFR BLD CALC: 37.1 % — SIGNIFICANT CHANGE UP (ref 34.5–45)
HGB BLD-MCNC: 12 G/DL — SIGNIFICANT CHANGE UP (ref 11.5–15.5)
IMM GRANULOCYTES NFR BLD AUTO: 0.2 % — SIGNIFICANT CHANGE UP (ref 0–1.5)
INR BLD: 1.11 RATIO — SIGNIFICANT CHANGE UP (ref 0.88–1.16)
LACTATE BLDV-MCNC: 1.5 MMOL/L — SIGNIFICANT CHANGE UP (ref 0.5–2)
LYMPHOCYTES # BLD AUTO: 0.88 K/UL — LOW (ref 1–3.3)
LYMPHOCYTES # BLD AUTO: 14.1 % — SIGNIFICANT CHANGE UP (ref 13–44)
MCHC RBC-ENTMCNC: 29 PG — SIGNIFICANT CHANGE UP (ref 27–34)
MCHC RBC-ENTMCNC: 32.3 GM/DL — SIGNIFICANT CHANGE UP (ref 32–36)
MCV RBC AUTO: 89.6 FL — SIGNIFICANT CHANGE UP (ref 80–100)
MONOCYTES # BLD AUTO: 0.71 K/UL — SIGNIFICANT CHANGE UP (ref 0–0.9)
MONOCYTES NFR BLD AUTO: 11.4 % — SIGNIFICANT CHANGE UP (ref 2–14)
NEUTROPHILS # BLD AUTO: 4.37 K/UL — SIGNIFICANT CHANGE UP (ref 1.8–7.4)
NEUTROPHILS NFR BLD AUTO: 70 % — SIGNIFICANT CHANGE UP (ref 43–77)
PCO2 BLDV: 32 MMHG — LOW (ref 39–42)
PH BLDV: 7.46 — HIGH (ref 7.32–7.43)
PLATELET # BLD AUTO: 159 K/UL — SIGNIFICANT CHANGE UP (ref 150–400)
PO2 BLDV: 140 MMHG — HIGH (ref 25–45)
POTASSIUM BLDV-SCNC: 4.3 MMOL/L — SIGNIFICANT CHANGE UP (ref 3.5–5.1)
POTASSIUM SERPL-MCNC: 5.4 MMOL/L — HIGH (ref 3.5–5.3)
POTASSIUM SERPL-SCNC: 5.4 MMOL/L — HIGH (ref 3.5–5.3)
PROT SERPL-MCNC: 5.7 G/DL — LOW (ref 6.6–8.7)
PROTHROM AB SERPL-ACNC: 12.8 SEC — SIGNIFICANT CHANGE UP (ref 10.6–13.6)
RAPID RVP RESULT: SIGNIFICANT CHANGE UP
RBC # BLD: 4.14 M/UL — SIGNIFICANT CHANGE UP (ref 3.8–5.2)
RBC # FLD: 12.8 % — SIGNIFICANT CHANGE UP (ref 10.3–14.5)
SAO2 % BLDV: 100 % — SIGNIFICANT CHANGE UP
SARS-COV-2 RNA SPEC QL NAA+PROBE: SIGNIFICANT CHANGE UP
SODIUM SERPL-SCNC: 134 MMOL/L — LOW (ref 135–145)
TROPONIN T SERPL-MCNC: <0.01 NG/ML — SIGNIFICANT CHANGE UP (ref 0–0.06)
WBC # BLD: 6.24 K/UL — SIGNIFICANT CHANGE UP (ref 3.8–10.5)
WBC # FLD AUTO: 6.24 K/UL — SIGNIFICANT CHANGE UP (ref 3.8–10.5)

## 2021-07-25 PROCEDURE — 99291 CRITICAL CARE FIRST HOUR: CPT

## 2021-07-25 PROCEDURE — 99223 1ST HOSP IP/OBS HIGH 75: CPT

## 2021-07-25 PROCEDURE — 70450 CT HEAD/BRAIN W/O DYE: CPT | Mod: 26,59,MA

## 2021-07-25 PROCEDURE — 70498 CT ANGIOGRAPHY NECK: CPT | Mod: 26,MA

## 2021-07-25 PROCEDURE — 99222 1ST HOSP IP/OBS MODERATE 55: CPT

## 2021-07-25 PROCEDURE — 93010 ELECTROCARDIOGRAM REPORT: CPT

## 2021-07-25 PROCEDURE — 0042T: CPT

## 2021-07-25 PROCEDURE — 70496 CT ANGIOGRAPHY HEAD: CPT | Mod: 26,MA

## 2021-07-25 RX ORDER — ENOXAPARIN SODIUM 100 MG/ML
40 INJECTION SUBCUTANEOUS DAILY
Refills: 0 | Status: DISCONTINUED | OUTPATIENT
Start: 2021-07-25 | End: 2021-07-31

## 2021-07-25 RX ORDER — CLOPIDOGREL BISULFATE 75 MG/1
75 TABLET, FILM COATED ORAL DAILY
Refills: 0 | Status: DISCONTINUED | OUTPATIENT
Start: 2021-07-25 | End: 2021-07-31

## 2021-07-25 RX ORDER — ASPIRIN/CALCIUM CARB/MAGNESIUM 324 MG
81 TABLET ORAL DAILY
Refills: 0 | Status: DISCONTINUED | OUTPATIENT
Start: 2021-07-25 | End: 2021-07-26

## 2021-07-25 RX ORDER — ATORVASTATIN CALCIUM 80 MG/1
80 TABLET, FILM COATED ORAL AT BEDTIME
Refills: 0 | Status: DISCONTINUED | OUTPATIENT
Start: 2021-07-25 | End: 2021-07-31

## 2021-07-25 RX ADMIN — ENOXAPARIN SODIUM 40 MILLIGRAM(S): 100 INJECTION SUBCUTANEOUS at 17:48

## 2021-07-25 RX ADMIN — ATORVASTATIN CALCIUM 80 MILLIGRAM(S): 80 TABLET, FILM COATED ORAL at 21:07

## 2021-07-25 NOTE — ED ADULT TRIAGE NOTE - CHIEF COMPLAINT QUOTE
pt awake and alert, BIBA c/o worsening AMS and left sided weakness since 0930. code stroke activated.

## 2021-07-25 NOTE — CONSULT NOTE ADULT - SUBJECTIVE AND OBJECTIVE BOX
Neurology Consult Note  [facility] [place] [address] [place] [address]  [phone]    HPI:  81 yo woman with medical conditions as outlined below presents with left sided weakness and dysarthria. She states this morning, she was standing and felt lightheaded. She sat down and felt some shortness of breath. She got ready and went to [employer], but states she was too short or breath to stay there more than 5 minutes. She then drove home and noticed she could not lift her left leg. She states she then had slurred speech. She feels it is improving now, but she still does not feel back to baseline. She denies facial droop or change in vision. This is her 5th episode since May 2020. She has had negative MRI brain in the past. She denies altered awareness during episode. She denies a personal or family history of seizures. She states she had polio and rheumatic fever as a child. She denies learning difficulties growing up or prior head trauma.    PMHx:  CAD s/p percutaneous coronary angioplasty  CVA  HTN  Polio  rheumatic fever  TIA    PSHx:  hx of left nephrectomy  renal cancer  s/p tonsillectomy    Meds:  MEDICATIONS  (STANDING):  aspirin  chewable 81 milliGRAM(s) Oral daily  atorvastatin 80 milliGRAM(s) Oral at bedtime  clopidogrel Tablet 75 milliGRAM(s) Oral daily  enoxaparin Injectable 40 milliGRAM(s) SubCutaneous daily    MEDICATIONS  (PRN):    Allergies:  biaxin, codeine, metoprolol, monoctanoin    FamHx:  noncontribuotry    SocHx: denies toxic habits    ROS: A 12 system review of system was negative aside from pertinent negatives and positives outlined in HPI      Physical Exam  Vital Signs Last 24 Hrs  T(C): 36.8 (25 Jul 2021 11:29), Max: 36.8 (25 Jul 2021 11:29)  T(F): 98.2 (25 Jul 2021 11:29), Max: 98.2 (25 Jul 2021 11:29)  HR: 71 (25 Jul 2021 14:00) (68 - 84)  BP: 154/57 (25 Jul 2021 14:00) (154/57 - 187/114)  BP(mean): --  RR: 17 (25 Jul 2021 14:00) (16 - 17)  SpO2: 100% (25 Jul 2021 14:00) (100% - 100%)  General: no acute distress  HEENT:NC/AT  Lungs: no respiratory distress  Skin: no rash or ecchymoses  Lower extremities: no edema    Mental Status: AAO x 3, no dysarthria, no aphasia  CN: PERRL, EOMI, VFF, V1-V3 sensation intact, no facial asymmetry  Motor:  LLE 4+/5  RLE 5/5  RUE 5/5  LUE 5/5  Sensory: intact to light touch throughout  Coordination: no dysmetria on FTN  Gait: deferred      LABS:  cret                        12.0   6.24  )-----------( 159      ( 25 Jul 2021 11:55 )             37.1     07-25    134<L>  |  104  |  21.7<H>  ----------------------------<  79  5.4<H>   |  21.0<L>  |  0.85    Ca    8.5<L>      25 Jul 2021 11:55    TPro  5.7<L>  /  Alb  3.4  /  TBili  1.1  /  DBili  x   /  AST  31  /  ALT  18  /  AlkPhos  71  07-25    PT/INR - ( 25 Jul 2021 11:55 )   PT: 12.8 sec;   INR: 1.11 ratio         PTT - ( 25 Jul 2021 11:55 )  PTT:30.9 sec    CT head, CTA H/N - unremarkable

## 2021-07-25 NOTE — ED ADULT NURSE NOTE - NSSEPSISSUSPECTED_ED_A_ED
AMBULATORY ENCOUNTER  OBSTETRIC PRENATAL VISIT  Group visit    CHIEF COMPLAINT:   Chief Complaint   Patient presents with   • Prenatal Care       SUBJECTIVE:  Amarilys Wisdom is a 27 year old  woman who presents for her OB prenatal visit at 17w3d by FTUS (5wks 3days) with an estimated date of delivery of 3/31/2020.     She reports no concerns today. She denies cramping, vaginal bleeding, or leakage of fluids.    Complete review of systems done and is negative except as stated above.    PHYSICAL EXAM:  Visit Vitals  /64 (BP Location: RUE - Right upper extremity, Patient Position: Sitting, Cuff Size: Large Adult)   Wt 87.5 kg (192 lb 14.4 oz)   LMP  (LMP Unknown)   BMI 33.11 kg/m²     General: Alert, normal affect, in no acute distress.  Skin: Normal color, texture, turgor. No rashes, bruises or active lesions.  Cardiovascular: Regular rate and rhythm. No murmurs, rubs or gallops. Peripheral pulses intact. No edema bilaterally.  Respiratory: Lungs clear to auscultation bilaterally. No rales, rhonchi or wheezes. Normal effort.  Abdomen: gravid. Soft, nontender. Normal active bowel sounds.       Fetal Heart Tones: 150  Psychiatric: Mood stable. Normal affect.    LABORATORY DATA:  Urine dip reviewed  Pap performed    ASSESSMENT AND PLAN:  Amarilys Wisdom is a 27 year old  woman at 17w3d by FTUS with an estimated date of delivery of 3/31/2020 who presents today for her OB prenatal visit.    Prenatal care:  PNL reviewed  Counseled  Offered QUADS-pending results  Ordered anatomic US  Refused influenza    Disposition: Follow up in 4 weeks    Instructions provided as documented in the After Visit Summary.    The patient indicated understanding of the diagnosis and agreed with the plan of care.    Aiyana Davenport MD   No

## 2021-07-25 NOTE — ED ADULT NURSE NOTE - INTERVENTIONS DEFINITIONS
[place] to call system/Call bell, personal items and telephone within reach/Instruct patient to call for assistance/Monitor gait and stability

## 2021-07-25 NOTE — ED PROVIDER NOTE - OBJECTIVE STATEMENT
81 y/o F hx of CAD s/p PCI x2 on ASA/brillinta, CVAx3, HTN, loop recorder presents for stroke like symptoms. last known well 7:00 AM today per pt and EMS. Patient endorsing feeling dizziness at 7AM and then developed L sided weakness (LUE/LLE) and difficulty getting words out aprox 2 hours later. Denies falls/loc. Denies trauma. Denies chest pain/sob. Denies abdominal pain.

## 2021-07-25 NOTE — H&P ADULT - HISTORY OF PRESENT ILLNESS
80F presented with dysarthria and left sided weakness. The patient reported that she had felt dizziness and dyspnea in the morning and proceeded to go to [employer]. She had worsening symptoms and returned home. The patient had no improvement in her symptoms and called EMS. In the emergency department, the patient was noted to have dysarthria and left sided weakness. She reported similar episodes in the past due to stroke and TIA. The symptoms are always on the left side. She is followed by Neurology and Cardiology on an outpatient basis but is unable to identify a source for her repeat symptoms. The patient denied any chest pain or palpitations. She reports that she has been compliant with her medications and has had no significant change in her care. The weakness has improved while the patient had been in the hospital but she continued to have noticeable weakness. Her speech had also improved but she still felt that the words were forced when speaking.

## 2021-07-25 NOTE — H&P ADULT - ASSESSMENT
80F with a history of coronary artery disease, stroke/TIA, and hypertension who presented with left sided weakness and dysarthria similar to her prior episodes of stroke/TIA.    Stroke/TIA - CT and CT angiogram was without acute findings. On aspirin and clopidogrel. For resumption of high intensity statin (the patient noted being on atorvastatin 20mg at home). The patient had persistent left sided weakness. For MRI of the brain. Continue with serial neurological examinations. The patient reported that she has followed up with her cardiologist and loop recorder interrogation has been without significant findings. Neurology consultation requested by the emergency department.    Coronary artery disease - On aspirin, clopidogrel, and atorvastatin.    Hypertension - Close blood pressure monitoring. To allow for permissive hypertension in the setting of acute stroke.

## 2021-07-25 NOTE — PATIENT PROFILE ADULT - DO YOU LACK THE NECESSARY SUPPORT TO HELP YOU COPE WITH LIFE CHALLENGES?
Creatinine likely at baseline   Pt no longer on Lisinopril.  Monitor BMP.  Avoid nephrotoxic agents. no

## 2021-07-25 NOTE — DISCHARGE NOTE NURSING/CASE MANAGEMENT/SOCIAL WORK - PATIENT PORTAL LINK FT
You can access the FollowMyHealth Patient Portal offered by [facility] by registering at the following website: [email or web address]. By joining [facility] [employer]’s FollowMyHealth portal, you will also be able to view your health information using other applications (apps) compatible with our system.

## 2021-07-25 NOTE — H&P ADULT - NSHPPHYSICALEXAM_GEN_ALL_CORE
Vital Signs Last 24 Hrs  T(C): 36.8 (25 Jul 2021 11:29), Max: 36.8 (25 Jul 2021 11:29)  T(F): 98.2 (25 Jul 2021 11:29), Max: 98.2 (25 Jul 2021 11:29)  HR: 71 (25 Jul 2021 14:00) (68 - 84)  BP: 154/57 (25 Jul 2021 14:00) (154/57 - 187/114)  BP(mean): --  RR: 17 (25 Jul 2021 14:00) (16 - 17)  SpO2: 100% (25 Jul 2021 14:00) (100% - 100%)    General appearance: No acute distress, Awake, Alert  HEENT: Normocephalic, Atraumatic, Conjunctiva clear, EOMI  Neck: Supple, No JVD, No tenderness  Lungs: Breath sound equal bilaterally, No wheezes, No rales  Cardiovascular: S1S2, Regular rhythm  Abdomen: Soft, Nontender, Nondistended, No guarding/rebound, Positive bowel sounds  Extremities: No clubbing, No cyanosis, No edema, No calf tenderness  Neuro: No tremors, Left sided weakness  Psychiatric: Appropriate mood, Normal affect

## 2021-07-25 NOTE — CONSULT NOTE ADULT - ASSESSMENT
79 yo woman with episode of L sided weakness and dysarthria. L sided weakness improving, but still with some LLE weakness. Symptoms 2/2 TIA vs seizure? This is patient's fifth episode since May 2020    Episodes of dysarthria and L sided weakness  MRI brain w/o contrast  cEEG to evaluate for possible seizure?  Neurochecks q 4 h  Telemetry  C/w aspirin, plavix, statin  Permissive HTN x 24 hours, can control BP to normotensive tomorrow  SS/PT/OT    Will continue to follow

## 2021-07-25 NOTE — ED PROVIDER NOTE - CLINICAL SUMMARY MEDICAL DECISION MAKING FREE TEXT BOX
81 y/o F hx of CAD s/p PCI x2 on ASA/brillinta, CVAx3, HTN, loop recorder presents for stroke like symptoms. NIHHS 5. unable to lift LLE off bed, moderate dysarthria. last known well 7AM. will call telestroke, code stroke called.   ct/cta/perfusion  labs   fsg on arrival- not hypoglycemic

## 2021-07-25 NOTE — ED PROVIDER NOTE - PROGRESS NOTE DETAILS
Sadi: spoke to dr. woods (telestroke), who is aware of patient. no tPA or thrombectomy for patient per recommendations based on imaging performed. Sadi: spoke to Dr. Sheriff (neurology) who recommended EEG / MRI for further evaluation, she will come see the patient. improved left lower leg movement and improved dysarthria but patient endorsing not back to baseline.

## 2021-07-25 NOTE — ED PROVIDER NOTE - PHYSICAL EXAMINATION
General: Well appearing female in no acute distress, dysarthric   HEENT: Normocephalic, atraumatic. Moist mucous membranes. Oropharynx clear. No lymphadenopathy.   Eyes: No scleral icterus. EOMI. DIDIER.  Neck:. Soft and supple. Full ROM without pain. No midline tenderness  Cardiac: Regular rate and regular rhythm. No murmurs, rubs, gallops. Peripheral pulses 2+ and symmetric. No LE edema.  Resp: Lungs CTAB. Speaking in full sentences. No wheezes, rales or rhonchi.   Abd: Soft, non-tender, non-distended. No guarding or rebound. No scars, masses, or lesions.  Back: Spine midline and non-tender. No CVA tenderness.    Skin: No rashes, abrasions, or lacerations.  Neuro: AO x 3.  Motor strength and sensation grossly intact. LLE unable to lift off bed.

## 2021-07-25 NOTE — ED ADULT NURSE NOTE - OBJECTIVE STATEMENT
pt awake, alert and oriented x3 states she woke up at 0600 this morning, began having dizziness at 0730 with shortness of breath.  pt notes that at 0930 she noticed generalized weakness which evolved into left leg/left arm weakness with diminished sensation to left side of face with difficulty speaking.  denies n/v.  denies change of vision.  resp even and unlabored.

## 2021-07-25 NOTE — ED PROVIDER NOTE - NS ED ROS FT
General: Denies fever, chills  HEENT: Denies sensory changes, sore throat  Neck: Denies neck pain, neck stiffness  Resp: Denies coughing, SOB  Cardiovascular: Denies CP, palpitations, LE edema  GI: Denies nausea, vomiting, abdominal pain, diarrhea, constipation, blood in stool  : Denies dysuria, hematuria, frequency, incontinence  MSK: Denies back pain  Neuro: Denies HA, dizziness, numbness, +weakness  Skin: Denies rashes.

## 2021-07-26 LAB
ANION GAP SERPL CALC-SCNC: 12 MMOL/L — SIGNIFICANT CHANGE UP (ref 5–17)
BUN SERPL-MCNC: 18.2 MG/DL — SIGNIFICANT CHANGE UP (ref 8–20)
CALCIUM SERPL-MCNC: 9.1 MG/DL — SIGNIFICANT CHANGE UP (ref 8.6–10.2)
CHLORIDE SERPL-SCNC: 105 MMOL/L — SIGNIFICANT CHANGE UP (ref 98–107)
CHOLEST SERPL-MCNC: 107 MG/DL — SIGNIFICANT CHANGE UP
CO2 SERPL-SCNC: 22 MMOL/L — SIGNIFICANT CHANGE UP (ref 22–29)
COVID-19 SPIKE DOMAIN AB INTERP: POSITIVE
COVID-19 SPIKE DOMAIN ANTIBODY RESULT: >250 U/ML — HIGH
CREAT SERPL-MCNC: 0.88 MG/DL — SIGNIFICANT CHANGE UP (ref 0.5–1.3)
GLUCOSE SERPL-MCNC: 82 MG/DL — SIGNIFICANT CHANGE UP (ref 70–99)
HCT VFR BLD CALC: 40.1 % — SIGNIFICANT CHANGE UP (ref 34.5–45)
HDLC SERPL-MCNC: 56 MG/DL — SIGNIFICANT CHANGE UP
HGB BLD-MCNC: 13.7 G/DL — SIGNIFICANT CHANGE UP (ref 11.5–15.5)
LIPID PNL WITH DIRECT LDL SERPL: 39 MG/DL — SIGNIFICANT CHANGE UP
MCHC RBC-ENTMCNC: 30 PG — SIGNIFICANT CHANGE UP (ref 27–34)
MCHC RBC-ENTMCNC: 34.2 GM/DL — SIGNIFICANT CHANGE UP (ref 32–36)
MCV RBC AUTO: 87.7 FL — SIGNIFICANT CHANGE UP (ref 80–100)
NON HDL CHOLESTEROL: 51 MG/DL — SIGNIFICANT CHANGE UP
PLATELET # BLD AUTO: 181 K/UL — SIGNIFICANT CHANGE UP (ref 150–400)
POTASSIUM SERPL-MCNC: 4.1 MMOL/L — SIGNIFICANT CHANGE UP (ref 3.5–5.3)
POTASSIUM SERPL-SCNC: 4.1 MMOL/L — SIGNIFICANT CHANGE UP (ref 3.5–5.3)
RBC # BLD: 4.57 M/UL — SIGNIFICANT CHANGE UP (ref 3.8–5.2)
RBC # FLD: 13 % — SIGNIFICANT CHANGE UP (ref 10.3–14.5)
SARS-COV-2 IGG+IGM SERPL QL IA: >250 U/ML — HIGH
SARS-COV-2 IGG+IGM SERPL QL IA: POSITIVE
SODIUM SERPL-SCNC: 139 MMOL/L — SIGNIFICANT CHANGE UP (ref 135–145)
TRIGL SERPL-MCNC: 61 MG/DL — SIGNIFICANT CHANGE UP
WBC # BLD: 6.99 K/UL — SIGNIFICANT CHANGE UP (ref 3.8–10.5)
WBC # FLD AUTO: 6.99 K/UL — SIGNIFICANT CHANGE UP (ref 3.8–10.5)

## 2021-07-26 PROCEDURE — 99232 SBSQ HOSP IP/OBS MODERATE 35: CPT

## 2021-07-26 PROCEDURE — 99233 SBSQ HOSP IP/OBS HIGH 50: CPT

## 2021-07-26 PROCEDURE — 99223 1ST HOSP IP/OBS HIGH 75: CPT

## 2021-07-26 RX ORDER — ASPIRIN/CALCIUM CARB/MAGNESIUM 324 MG
81 TABLET ORAL DAILY
Refills: 0 | Status: DISCONTINUED | OUTPATIENT
Start: 2021-07-26 | End: 2021-07-31

## 2021-07-26 RX ADMIN — CLOPIDOGREL BISULFATE 75 MILLIGRAM(S): 75 TABLET, FILM COATED ORAL at 10:55

## 2021-07-26 RX ADMIN — Medication 81 MILLIGRAM(S): at 11:09

## 2021-07-26 RX ADMIN — ATORVASTATIN CALCIUM 80 MILLIGRAM(S): 80 TABLET, FILM COATED ORAL at 21:31

## 2021-07-26 RX ADMIN — ENOXAPARIN SODIUM 40 MILLIGRAM(S): 100 INJECTION SUBCUTANEOUS at 10:55

## 2021-07-26 NOTE — PROGRESS NOTE ADULT - ASSESSMENT
79 yo woman with episode of L sided weakness and dysarthria. L sided weakness improving, but still with some LLE weakness. Symptoms 2/2 TIA vs seizure? This is patient's fifth episode since May 2020    Episodes of dysarthria and L sided weakness  MRI brain w/o contrast  c-EEG to evaluate for possible seizure?  Neuro-checks q 4 h  Telemetry  Continue antiplatelet and statin.

## 2021-07-26 NOTE — PROGRESS NOTE ADULT - SUBJECTIVE AND OBJECTIVE BOX
[facility] Division of Hospital Medicine    Chief Complaint:    Left Sided Weakness / Abnormal Speech    SUBJECTIVE / OVERNIGHT EVENTS:  Patient was seen and evaluated this morning. Endorses improvement in her ability to speak and communicate but still with some residual left sided LE and UE weakness.     Patient denies chest pain, SOB, abd pain, N/V, fever, chills, dysuria or any other complaints. All remainder ROS negative.     MEDICATIONS  (STANDING):  aspirin enteric coated 81 milliGRAM(s) Oral daily  atorvastatin 80 milliGRAM(s) Oral at bedtime  clopidogrel Tablet 75 milliGRAM(s) Oral daily  enoxaparin Injectable 40 milliGRAM(s) SubCutaneous daily    MEDICATIONS  (PRN):        I&O's Summary    25 Jul 2021 07:01  -  26 Jul 2021 07:00  --------------------------------------------------------  IN: 0 mL / OUT: 700 mL / NET: -700 mL    26 Jul 2021 07:01  -  26 Jul 2021 15:31  --------------------------------------------------------  IN: 600 mL / OUT: 0 mL / NET: 600 mL        PHYSICAL EXAM:  Vital Signs Last 24 Hrs  T(C): 36.6 (26 Jul 2021 07:45), Max: 37.2 (25 Jul 2021 18:45)  T(F): 97.8 (26 Jul 2021 07:45), Max: 98.9 (25 Jul 2021 18:45)  HR: 83 (26 Jul 2021 12:00) (64 - 86)  BP: 107/66 (26 Jul 2021 12:00) (105/59 - 149/82)  BP(mean): 75 (26 Jul 2021 07:45) (75 - 105)  RR: 15 (26 Jul 2021 12:00) (15 - 18)  SpO2: 98% (26 Jul 2021 12:00) (96% - 98%)        CONSTITUTIONAL: NAD, well-developed, well-groomed  ENMT: Moist oral mucosa, no pharyngeal injection or exudates; normal dentition  RESPIRATORY: Normal respiratory effort; lungs are clear to auscultation bilaterally  CARDIOVASCULAR: Regular rate and rhythm, normal S1 and S2, no murmur/rub/gallop; No lower extremity edema; Peripheral pulses are 2+ bilaterally  ABDOMEN: Nontender to palpation, normoactive bowel sounds, no rebound/guarding; No hepatosplenomegaly  MUSCLOSKELETAL:  Normal gait; no clubbing or cyanosis of digits; no joint swelling or tenderness to palpation  PSYCH: A+O to person, place, and time; affect appropriate  NEUROLOGY: CN 2-12 are intact and symmetric; Strength: Weakness of LLE/UE.    SKIN: No rashes; no palpable lesions    LABS:                        13.7   6.99  )-----------( 181      ( 26 Jul 2021 05:29 )             40.1     07-26    139  |  105  |  18.2  ----------------------------<  82  4.1   |  22.0  |  0.88    Ca    9.1      26 Jul 2021 05:29    TPro  5.7<L>  /  Alb  3.4  /  TBili  1.1  /  DBili  x   /  AST  31  /  ALT  18  /  AlkPhos  71  07-25    PT/INR - ( 25 Jul 2021 11:55 )   PT: 12.8 sec;   INR: 1.11 ratio         PTT - ( 25 Jul 2021 11:55 )  PTT:30.9 sec  CARDIAC MARKERS ( 25 Jul 2021 11:55 )  x     / <0.01 ng/mL / x     / x     / x              CAPILLARY BLOOD GLUCOSE            RADIOLOGY & ADDITIONAL TESTS:  Results Reviewed:   Imaging Personally Reviewed:  Electrocardiogram Personally Reviewed:

## 2021-07-26 NOTE — OCCUPATIONAL THERAPY INITIAL EVALUATION ADULT - ADDITIONAL COMMENTS
Pt has shower stall with door and grab bars  Pt owns an ejector seat, cane, walker, wheelchair, and shower chair  Pt is right handed

## 2021-07-26 NOTE — CONSULT NOTE ADULT - SUBJECTIVE AND OBJECTIVE BOX
80yF was admitted on 07-25 with left leg weakness, shaking of the left arm and difficulty speaking.       Imaging performed:  CT PERFUSION/CTA HEAD/NECK & CT BRAIN 7/25 - No acute infarction or hemorrhage. Negative CTP. No large vessel occlusion. Consider MRI as clinically warranted.     Patient feels her weakness has imp[roved, as her shakes.   Reports that her speaking difficulty is resolved.  Was able to walk to the bathroom with assist this AM.    REVIEW OF SYSTEMS  Constitutional - No fever, No weight loss, No fatigue  HEENT - No eye pain, No visual disturbances, No difficulty hearing, No tinnitus, No vertigo, No neck pain  Respiratory - No cough, No wheezing, No shortness of breath  Cardiovascular - No chest pain, No palpitations  Gastrointestinal - No abdominal pain, No nausea, No vomiting, No diarrhea, No constipation  Genitourinary - No dysuria, No frequency, No hematuria, No incontinence  Neurological - No headaches, No memory loss, +loss of strength, No numbness, No tremors  Skin - No itching, No rashes, No lesions   Endocrine - No temperature intolerance  Musculoskeletal - No joint pain, No joint swelling, No muscle pain  Psychiatric - No depression, No anxiety    VITALS  T(C): 36.6 (07-26-21 @ 07:45), Max: 37.2 (07-25-21 @ 18:45)  HR: 84 (07-26-21 @ 07:45) (64 - 86)  BP: 105/59 (07-26-21 @ 07:45) (105/59 - 187/114)  RR: 16 (07-26-21 @ 07:45) (15 - 18)  SpO2: 96% (07-26-21 @ 07:45) (96% - 100%)  Wt(kg): --    PAST MEDICAL & SURGICAL HISTORY  HTN (hypertension)    Rheumatic fever    TIA (transient ischemic attack)    CAD S/P percutaneous coronary angioplasty    [place]    Cerebrovascular accident (CVA)    Status post tonsillectomy    H/O left nephrectomy    Renal cancer        SOCIAL HISTORY - as per documentation/history  Smoking - None  EtOH - None  Drugs - None    FUNCTIONAL HISTORY  Lives alone, Ramp to enter from back  Independent    CURRENT FUNCTIONAL STATUS  Pending eval    FAMILY HISTORY   No pertinent family history in first degree relatives        RECENT LABS - Reviewed  CBC Full  -  ( 26 Jul 2021 05:29 )  WBC Count : 6.99 K/uL  RBC Count : 4.57 M/uL  Hemoglobin : 13.7 g/dL  Hematocrit : 40.1 %  Platelet Count - Automated : 181 K/uL  Mean Cell Volume : 87.7 fl  Mean Cell Hemoglobin : 30.0 pg  Mean Cell Hemoglobin Concentration : 34.2 gm/dL  Auto Neutrophil # : x  Auto Lymphocyte # : x  Auto Monocyte # : x  Auto Eosinophil # : x  Auto Basophil # : x  Auto Neutrophil % : x  Auto Lymphocyte % : x  Auto Monocyte % : x  Auto Eosinophil % : x  Auto Basophil % : x    07-26    139  |  105  |  18.2  ----------------------------<  82  4.1   |  22.0  |  0.88    Ca    9.1      26 Jul 2021 05:29    TPro  5.7<L>  /  Alb  3.4  /  TBili  1.1  /  DBili  x   /  AST  31  /  ALT  18  /  AlkPhos  71  07-25        ALLERGIES  Biaxin (Muscle Pain; Joint Pain)  codeine (Faint)  Isosorbide Mononitrate Extended Release (Faint)  Metoprolol Succinate ER (Unknown)  monoctanoin (Unknown)      MEDICATIONS   aspirin enteric coated 81 milliGRAM(s) Oral daily  atorvastatin 80 milliGRAM(s) Oral at bedtime  clopidogrel Tablet 75 milliGRAM(s) Oral daily  enoxaparin Injectable 40 milliGRAM(s) SubCutaneous daily      ----------------------------------------------------------------------------------------  PHYSICAL EXAM  Constitutional - NAD, Comfortable  HEENT - NCAT, EOMI  Neck - Supple, No limited ROM  Chest - Breathing comfortably, No wheezing  Cardiovascular - S1S2   Abdomen - Soft   Extremities - No C/C/E, No calf tenderness   Neurologic Exam -                    Cognitive - AAO to self, place, date, year, situation     Communication - Fluent, No dysarthria     Cranial Nerves - CN 2-12 intact     Motor - No focal deficits                    LEFT    UE - ShAB 4/5, EF 5/5, EE 5/5, WE 5/5,  5/5                    RIGHT UE - ShAB 5/5, EF 5/5, EE 5/5, WE 5/5,  5/5                    LEFT    LE - HF 5/5, KE 5/5, DF 5/5, PF 5/5                    RIGHT LE - HF 5/5, KE 5/5, DF 5/5, PF 5/5        Sensory - Intact to LT     Coordination - FTN intact  Psychiatric - Mood stable, Affect WNL  ----------------------------------------------------------------------------------------  ASSESSMENT/PLAN  80yFemale with functional deficits after having weakness of the left side  R/O CVA - ASA, Plavix, Lipitor  DVT PPX - SCDs, Lovenox  Rehab - Workup pending - EEG and MRI. Therapy evals pending. Given exam, expect patient to achieve functional goals for DC HOME. Will continue to follow and current recommendations may change if functional progress changes.    Recommend ongoing mobilization by staff to maintain cardiopulmonary function and prevention of secondary complications related to debility. Discussed with rehab team.

## 2021-07-26 NOTE — PROGRESS NOTE ADULT - ASSESSMENT
80F with a history of CAD, HTN, HLD, Stroke/TIA who presented with left sided weakness and dysarthria similar to her prior episodes of stroke/TIA.    Stroke/TIA - CT/CTA without ischemic findings.  On aspirin and clopidogrel.   Neuro Following  Pending MRI/EEG to r/o Stroke  Neuro Checks   C/w DAPT/Lipitor    CAD  DAPT  Lipitor    DVT ppx with Lovenox  Dispo - Pending status remains acute and requires close monitoring, neuro checks and further investigation with EEG and MRI

## 2021-07-26 NOTE — PROGRESS NOTE ADULT - SUBJECTIVE AND OBJECTIVE BOX
[facility]                                        Neurology at [place]                                 Leigh Prather & Anjum                                  [address]                                        [place], [place], [address]                                             [phone]        CC: TIA.    HPI:   79 yo woman with medical conditions as outlined below presents with left sided weakness and dysarthria. She states this morning, she was standing and felt lightheaded. She sat down and felt some shortness of breath. She got ready and went to [employer], but states she was too short or breath to stay there more than 5 minutes. She then drove home and noticed she could not lift her left leg. She states she then had slurred speech.    Interim history:  Now on 4 [place].   Feels back to normal.     ROS:   Denies headache or dizziness.  Denies chest pain.  Denies shortness of breath.    MEDICATIONS  (STANDING):  aspirin enteric coated 81 milliGRAM(s) Oral daily  atorvastatin 80 milliGRAM(s) Oral at bedtime  clopidogrel Tablet 75 milliGRAM(s) Oral daily  enoxaparin Injectable 40 milliGRAM(s) SubCutaneous daily      Vital Signs Last 24 Hrs  T(C): 36.6 (26 Jul 2021 07:45), Max: 37.2 (25 Jul 2021 18:45)  T(F): 97.8 (26 Jul 2021 07:45), Max: 98.9 (25 Jul 2021 18:45)  HR: 84 (26 Jul 2021 07:45) (64 - 86)  BP: 105/59 (26 Jul 2021 07:45) (105/59 - 154/57)  BP(mean): 75 (26 Jul 2021 07:45) (75 - 105)  RR: 16 (26 Jul 2021 07:45) (15 - 18)  SpO2: 96% (26 Jul 2021 07:45) (96% - 100%)    Detailed Neurologic Exam:    Mental status: The patient is awake and alert. There is no aphasia. There is no dysarthria.     Cranial nerves: Pupils equal and react symmetrically to light. There is no visual field deficit to threat. Extraocular motion is full with no nystagmus. Facial sensation is intact. Facial musculature is symmetric. Palate elevates symmetrically. Tongue is midline.    Motor: There is normal bulk and tone.  There is no tremor.  Strength grossly 5/5 bilaterally.    Sensation: Grossly intact to light touch and pin.    Reflexes: 2+ throughout and plantar responses are flexor.    Cerebellar: No dysmetria on finger nose testing.    Labs:     07-26    139  |  105  |  18.2  ----------------------------<  82  4.1   |  22.0  |  0.88    Ca    9.1      26 Jul 2021 05:29    TPro  5.7<L>  /  Alb  3.4  /  TBili  1.1  /  DBili  x   /  AST  31  /  ALT  18  /  AlkPhos  71  07-25                            13.7   6.99  )-----------( 181      ( 26 Jul 2021 05:29 )             40.1       Rad:   ***

## 2021-07-26 NOTE — OCCUPATIONAL THERAPY INITIAL EVALUATION ADULT - LIVES WITH, PROFILE
[place] private [place] a ramp to enter; pt reports that her sons and daughter in law as well as neighbors are able to assist upon discharge/alone

## 2021-07-27 LAB
ANION GAP SERPL CALC-SCNC: 9 MMOL/L — SIGNIFICANT CHANGE UP (ref 5–17)
BASOPHILS # BLD AUTO: 0.08 K/UL — SIGNIFICANT CHANGE UP (ref 0–0.2)
BASOPHILS NFR BLD AUTO: 1 % — SIGNIFICANT CHANGE UP (ref 0–2)
BUN SERPL-MCNC: 21.3 MG/DL — HIGH (ref 8–20)
CALCIUM SERPL-MCNC: 8.8 MG/DL — SIGNIFICANT CHANGE UP (ref 8.6–10.2)
CHLORIDE SERPL-SCNC: 104 MMOL/L — SIGNIFICANT CHANGE UP (ref 98–107)
CO2 SERPL-SCNC: 26 MMOL/L — SIGNIFICANT CHANGE UP (ref 22–29)
CREAT SERPL-MCNC: 1.01 MG/DL — SIGNIFICANT CHANGE UP (ref 0.5–1.3)
EOSINOPHIL # BLD AUTO: 0.32 K/UL — SIGNIFICANT CHANGE UP (ref 0–0.5)
EOSINOPHIL NFR BLD AUTO: 4.2 % — SIGNIFICANT CHANGE UP (ref 0–6)
GLUCOSE SERPL-MCNC: 94 MG/DL — SIGNIFICANT CHANGE UP (ref 70–99)
HCT VFR BLD CALC: 38.4 % — SIGNIFICANT CHANGE UP (ref 34.5–45)
HGB BLD-MCNC: 13.1 G/DL — SIGNIFICANT CHANGE UP (ref 11.5–15.5)
IMM GRANULOCYTES NFR BLD AUTO: 0.3 % — SIGNIFICANT CHANGE UP (ref 0–1.5)
LYMPHOCYTES # BLD AUTO: 1.37 K/UL — SIGNIFICANT CHANGE UP (ref 1–3.3)
LYMPHOCYTES # BLD AUTO: 17.8 % — SIGNIFICANT CHANGE UP (ref 13–44)
MAGNESIUM SERPL-MCNC: 2 MG/DL — SIGNIFICANT CHANGE UP (ref 1.6–2.6)
MCHC RBC-ENTMCNC: 29.9 PG — SIGNIFICANT CHANGE UP (ref 27–34)
MCHC RBC-ENTMCNC: 34.1 GM/DL — SIGNIFICANT CHANGE UP (ref 32–36)
MCV RBC AUTO: 87.7 FL — SIGNIFICANT CHANGE UP (ref 80–100)
MONOCYTES # BLD AUTO: 0.66 K/UL — SIGNIFICANT CHANGE UP (ref 0–0.9)
MONOCYTES NFR BLD AUTO: 8.6 % — SIGNIFICANT CHANGE UP (ref 2–14)
NEUTROPHILS # BLD AUTO: 5.24 K/UL — SIGNIFICANT CHANGE UP (ref 1.8–7.4)
NEUTROPHILS NFR BLD AUTO: 68.1 % — SIGNIFICANT CHANGE UP (ref 43–77)
PHOSPHATE SERPL-MCNC: 3.7 MG/DL — SIGNIFICANT CHANGE UP (ref 2.4–4.7)
PLATELET # BLD AUTO: 175 K/UL — SIGNIFICANT CHANGE UP (ref 150–400)
POTASSIUM SERPL-MCNC: 4.5 MMOL/L — SIGNIFICANT CHANGE UP (ref 3.5–5.3)
POTASSIUM SERPL-SCNC: 4.5 MMOL/L — SIGNIFICANT CHANGE UP (ref 3.5–5.3)
RBC # BLD: 4.38 M/UL — SIGNIFICANT CHANGE UP (ref 3.8–5.2)
RBC # FLD: 13 % — SIGNIFICANT CHANGE UP (ref 10.3–14.5)
SODIUM SERPL-SCNC: 139 MMOL/L — SIGNIFICANT CHANGE UP (ref 135–145)
WBC # BLD: 7.69 K/UL — SIGNIFICANT CHANGE UP (ref 3.8–10.5)
WBC # FLD AUTO: 7.69 K/UL — SIGNIFICANT CHANGE UP (ref 3.8–10.5)

## 2021-07-27 PROCEDURE — 99232 SBSQ HOSP IP/OBS MODERATE 35: CPT

## 2021-07-27 PROCEDURE — 99233 SBSQ HOSP IP/OBS HIGH 50: CPT

## 2021-07-27 PROCEDURE — 70551 MRI BRAIN STEM W/O DYE: CPT | Mod: 26

## 2021-07-27 RX ADMIN — Medication 81 MILLIGRAM(S): at 08:54

## 2021-07-27 RX ADMIN — ATORVASTATIN CALCIUM 80 MILLIGRAM(S): 80 TABLET, FILM COATED ORAL at 21:33

## 2021-07-27 RX ADMIN — CLOPIDOGREL BISULFATE 75 MILLIGRAM(S): 75 TABLET, FILM COATED ORAL at 08:54

## 2021-07-27 RX ADMIN — ENOXAPARIN SODIUM 40 MILLIGRAM(S): 100 INJECTION SUBCUTANEOUS at 08:54

## 2021-07-27 NOTE — PROGRESS NOTE ADULT - SUBJECTIVE AND OBJECTIVE BOX
[facility] Division of Hospital Medicine    Chief Complaint:    Left sided weakness, abnormal speach  SUBJECTIVE / OVERNIGHT EVENTS:  Patient endorses improvement in speech however still feels weak on left side. No HA or changes in vision.     Patient denies chest pain, SOB, abd pain, N/V, fever, chills, dysuria or any other complaints. All remainder ROS negative.     MEDICATIONS  (STANDING):  aspirin enteric coated 81 milliGRAM(s) Oral daily  atorvastatin 80 milliGRAM(s) Oral at bedtime  clopidogrel Tablet 75 milliGRAM(s) Oral daily  enoxaparin Injectable 40 milliGRAM(s) SubCutaneous daily    MEDICATIONS  (PRN):        I&O's Summary    26 Jul 2021 07:01  -  27 Jul 2021 07:00  --------------------------------------------------------  IN: 600 mL / OUT: 0 mL / NET: 600 mL        PHYSICAL EXAM:  Vital Signs Last 24 Hrs  T(C): 36.8 (27 Jul 2021 10:43), Max: 36.9 (26 Jul 2021 20:07)  T(F): 98.3 (27 Jul 2021 10:43), Max: 98.5 (26 Jul 2021 20:07)  HR: 79 (27 Jul 2021 10:43) (76 - 79)  BP: 126/67 (27 Jul 2021 10:43) (108/76 - 137/84)  BP(mean): --  RR: 18 (27 Jul 2021 10:43) (17 - 18)  SpO2: 97% (27 Jul 2021 10:43) (96% - 100%)        CONSTITUTIONAL: NAD, well-developed, well-groomed  ENMT: Moist oral mucosa, no pharyngeal injection or exudates; normal dentition  RESPIRATORY: Normal respiratory effort; lungs are clear to auscultation bilaterally  CARDIOVASCULAR: Regular rate and rhythm, normal S1 and S2, no murmur/rub/gallop; No lower extremity edema; Peripheral pulses are 2+ bilaterally  ABDOMEN: Nontender to palpation, normoactive bowel sounds, no rebound/guarding; No hepatosplenomegaly  MUSCLOSKELETAL:  Normal gait; no clubbing or cyanosis of digits; no joint swelling or tenderness to palpation  PSYCH: A+O to person, place, and time; affect appropriate  NEUROLOGY: CN 2-12 are intact and symmetric; Raises LLE to gravity but unable against resistance   SKIN: No rashes; no palpable lesions    LABS:                        13.1   7.69  )-----------( 175      ( 27 Jul 2021 07:42 )             38.4     07-27    139  |  104  |  21.3<H>  ----------------------------<  94  4.5   |  26.0  |  1.01    Ca    8.8      27 Jul 2021 07:42  Phos  3.7     07-27  Mg     2.0     07-27                CAPILLARY BLOOD GLUCOSE            RADIOLOGY & ADDITIONAL TESTS:  Results Reviewed:   Imaging Personally Reviewed:  Electrocardiogram Personally Reviewed:

## 2021-07-27 NOTE — PROVIDER CONTACT NOTE (OTHER) - ASSESSMENT
pt reports she has a loop recorder and is followed by outpatient cardiologist. states she has had episodes of Vtach in past. /76. HR 70s on monitor currently. denies palpitations/chest pain.

## 2021-07-27 NOTE — PROGRESS NOTE ADULT - ASSESSMENT
79 yo woman with episode of L sided weakness and dysarthria. L sided weakness improving, but still with some LLE weakness. Symptoms 2/2 TIA vs seizure? This is patient's fifth episode since May 2020    Episodes of dysarthria and L sided weakness  MRI brain w/o contrast  c-EEG to evaluate for possible seizure. To be hooked up once MRI done.   Neuro-checks q 4 h  Telemetry  Continue antiplatelet and statin.

## 2021-07-27 NOTE — PROGRESS NOTE ADULT - ASSESSMENT
80F with a history of CAD, HTN, HLD, Stroke/TIA who presented with left sided weakness and dysarthria similar to her prior episodes of stroke/TIA.    #Concern for Stroke/TIA - CT/CTA without ischemic findings.  Neuro Following  Pending MRI/EEG to r/o Stroke/Seizure  Still with persitent LLE Weakness. Dysarthria improved  Neuro Checks   C/w DAPT/Lipitor    CAD  DAPT  Lipitor    DVT ppx with Lovenox  Dispo - Pending status remains acute and requires close monitoring, neuro checks and further investigation with EEG and MRI

## 2021-07-27 NOTE — PROGRESS NOTE ADULT - SUBJECTIVE AND OBJECTIVE BOX
Patient feels well.  Wants to go home.  She is pending an MRI.   Feels she is back to her baseline.     REVIEW OF SYSTEMS  Constitutional - No fever,  +fatigue  HEENT - No vertigo, No neck pain  Neurological - No headaches, No memory loss, No loss of strength, No numbness, No tremors  Musculoskeletal - No joint pain, No joint swelling, No muscle pain  Psychiatric - No depression, No anxiety    FUNCTIONAL PROGRESS  7/27  Bed Mobility  Bed Mobility Training Rehab Potential: good, to achieve stated therapy goals  Bed Mobility Training Sit-to-Supine: independent  Bed Mobility Training Supine-to-Sit: independent  Bed Mobility Training Limitations: impaired balance;  decreased strength    Sit-Stand Transfer Training  Sit-to-Stand Transfer Training Rehab Potential: good, to achieve stated therapy goals  Transfer Training Sit-to-Stand Transfer: independent;  rolling walker  Transfer Training Stand-to-Sit Transfer: independent;  rolling walker  Sit-to-Stand Transfer Training Transfer Safety Analysis: decreased weight-shifting ability;  impaired balance;  decreased strength    Gait Training  Gait Training Rehab Potential: good, to achieve stated therapy goals  Gait Training: independent;  rolling walker;  75 feet  Gait Analysis: decreased step length;  decreased stride length;  impaired balance;  decreased strength    Stair Training  Physical Assist/Nonphysical Assist: supervision, step to, single hand rail  Number of Stairs: 6     7/26  Bathing Training:     · Level of [place]	independent; seated    Upper Body Dressing Training:     · Level of [place]	independent; to don gown; incidental minimal assist, overall independent    Lower Body Dressing Training:     · Level of [place]	independent; to don socks seated at the edge of the bed    Toilet Hygiene Training:     · Level of [place]	not observed    Grooming Training:     · Level of [place]	independent; standing at sink    Eating/Self-Feeding Training:     · Level of [place]	independent    VITALS  T(C): 36.8 (07-27-21 @ 10:43), Max: 36.9 (07-26-21 @ 20:07)  HR: 79 (07-27-21 @ 10:43) (76 - 83)  BP: 126/67 (07-27-21 @ 10:43) (107/66 - 137/84)  RR: 18 (07-27-21 @ 10:43) (15 - 18)  SpO2: 97% (07-27-21 @ 10:43) (96% - 100%)  Wt(kg): --    MEDICATIONS   aspirin enteric coated 81 milliGRAM(s) daily  atorvastatin 80 milliGRAM(s) at bedtime  clopidogrel Tablet 75 milliGRAM(s) daily  enoxaparin Injectable 40 milliGRAM(s) daily      RECENT LABS/IMAGING                          13.1   7.69  )-----------( 175      ( 27 Jul 2021 07:42 )             38.4     07-27    139  |  104  |  21.3<H>  ----------------------------<  94  4.5   |  26.0  |  1.01    Ca    8.8      27 Jul 2021 07:42  Phos  3.7     07-27  Mg     2.0     07-27    TPro  5.7<L>  /  Alb  3.4  /  TBili  1.1  /  DBili  x   /  AST  31  /  ALT  18  /  AlkPhos  71  07-25    PT/INR - ( 25 Jul 2021 11:55 )   PT: 12.8 sec;   INR: 1.11 ratio         PTT - ( 25 Jul 2021 11:55 )  PTT:30.9 sec            CT PERFUSION/CTA HEAD/NECK & CT BRAIN 7/25 - No acute infarction or hemorrhage. Negative CTP. No large vessel occlusion. Consider MRI as clinically warranted.     ----------------------------------------------------------------------------------------  PHYSICAL EXAM  Constitutional - NAD, Comfortable  Extremities - No C/C/E, No calf tenderness   Neurologic Exam -                    Cognitive - AAO to self, place, date, year, situation     Communication - Fluent, No dysarthria     Cranial Nerves - CN 2-12 intact     Motor - No focal deficits     Sensory - Intact to LT     Coordination - FTN intact  Psychiatric - Mood stable, Affect WNL  ----------------------------------------------------------------------------------------  ASSESSMENT/PLAN  80yFemale with functional deficits after having weakness of the left side  R/O CVA - ASA, Plavix, Lipitor  DVT PPX - SCDs, Lovenox  Rehab - Workup pending - EEG and MRI. PT and OT evals complete and demonstrate independent/supervision. Continue to recommend DC HOME. Recommend ongoing mobilization by staff to maintain cardiopulmonary function and prevention of secondary complications related to debility. Discussed with rehab team.

## 2021-07-27 NOTE — PROGRESS NOTE ADULT - SUBJECTIVE AND OBJECTIVE BOX
[facility]                                        Neurology at [place]                                 Leigh Prather & Anjum                                  [address]                                        [place], [place], [address]                                             [phone]        CC: TIA.    HPI:   79 yo woman with medical conditions as outlined below presents with left sided weakness and dysarthria. She states this morning, she was standing and felt lightheaded. She sat down and felt some shortness of breath. She got ready and went to [employer], but states she was too short or breath to stay there more than 5 minutes. She then drove home and noticed she could not lift her left leg. She states she then had slurred speech.    Interim history:  Now on [address] [place].     ROS:   Denies headache or dizziness.  Denies chest pain.  Denies shortness of breath.    MEDICATIONS  (STANDING):  aspirin enteric coated 81 milliGRAM(s) Oral daily  atorvastatin 80 milliGRAM(s) Oral at bedtime  clopidogrel Tablet 75 milliGRAM(s) Oral daily  enoxaparin Injectable 40 milliGRAM(s) SubCutaneous daily      Vital Signs Last 24 Hrs  T(C): 36.8 (27 Jul 2021 10:43), Max: 36.9 (26 Jul 2021 20:07)  T(F): 98.3 (27 Jul 2021 10:43), Max: 98.5 (26 Jul 2021 20:07)  HR: 79 (27 Jul 2021 10:43) (76 - 83)  BP: 126/67 (27 Jul 2021 10:43) (107/66 - 137/84)  RR: 18 (27 Jul 2021 10:43) (15 - 18)  SpO2: 97% (27 Jul 2021 10:43) (96% - 100%)    Detailed Neurologic Exam:    Mental status: The patient is awake and alert. There is no aphasia. There is no dysarthria.     Cranial nerves: Pupils equal and react symmetrically to light. There is no visual field deficit to threat. Extraocular motion is full with no nystagmus. Facial sensation is intact. Facial musculature is symmetric. Palate elevates symmetrically. Tongue is midline.    Motor: There is normal bulk and tone.  There is no tremor.  Strength grossly 5/5 bilaterally.    Sensation: Grossly intact to light touch and pin.    Reflexes: 2+ throughout and plantar responses are flexor.    Cerebellar: No dysmetria on finger nose testing.    Labs:     07-27    139  |  104  |  21.3<H>  ----------------------------<  94  4.5   |  26.0  |  1.01    Ca    8.8      27 Jul 2021 07:42  Phos  3.7     07-27  Mg     2.0     07-27    TPro  5.7<L>  /  Alb  3.4  /  TBili  1.1  /  DBili  x   /  AST  31  /  ALT  18  /  AlkPhos  71  07-25                            13.1   7.69  )-----------( 175      ( 27 Jul 2021 07:42 )             38.4

## 2021-07-28 PROCEDURE — 99233 SBSQ HOSP IP/OBS HIGH 50: CPT

## 2021-07-28 PROCEDURE — 99232 SBSQ HOSP IP/OBS MODERATE 35: CPT

## 2021-07-28 PROCEDURE — 95720 EEG PHY/QHP EA INCR W/VEEG: CPT

## 2021-07-28 PROCEDURE — 93306 TTE W/DOPPLER COMPLETE: CPT | Mod: 26

## 2021-07-28 RX ADMIN — CLOPIDOGREL BISULFATE 75 MILLIGRAM(S): 75 TABLET, FILM COATED ORAL at 12:23

## 2021-07-28 RX ADMIN — ATORVASTATIN CALCIUM 80 MILLIGRAM(S): 80 TABLET, FILM COATED ORAL at 20:58

## 2021-07-28 RX ADMIN — Medication 81 MILLIGRAM(S): at 12:23

## 2021-07-28 RX ADMIN — ENOXAPARIN SODIUM 40 MILLIGRAM(S): 100 INJECTION SUBCUTANEOUS at 12:23

## 2021-07-28 NOTE — PROGRESS NOTE ADULT - SUBJECTIVE AND OBJECTIVE BOX
Patient feels well.  Discussed MRI results.  Hoping she cn leave today.     REVIEW OF SYSTEMS  Constitutional - No fever,  No fatigue  HEENT - No vertigo, No neck pain  Neurological - No headaches, No memory loss, No loss of strength, No numbness, No tremors  Musculoskeletal - No joint pain, No joint swelling, No muscle pain  Psychiatric - No depression, No anxiety    FUNCTIONAL PROGRESS  7/28  Bed Mobility  Bed Mobility Training Rehab Potential: good, to achieve stated therapy goals  Bed Mobility Training Sit-to-Supine: independent  Bed Mobility Training Supine-to-Sit: independent    Sit-Stand Transfer Training  Sit-to-Stand Transfer Training Rehab Potential: good, to achieve stated therapy goals  Transfer Training Sit-to-Stand Transfer: independent;  no AD  Transfer Training Stand-to-Sit Transfer: independent;  no AD  Sit-to-Stand Transfer Training Transfer Safety Analysis: decreased weight-shifting ability;  decreased strength;  impaired balance    Gait Training  Gait Training Rehab Potential: good, to achieve stated therapy goals  Gait Training: independent;  rolling walker;  60ft  Gait Analysis: decreased step length;  decreased stride length    Stair Training  Physical Assist/Nonphysical Assist: modified steps, step to, Jose HR  Number of Stairs: 4       VITALS  T(C): 36.9 (07-28-21 @ 04:29), Max: 36.9 (07-28-21 @ 04:29)  HR: 72 (07-28-21 @ 07:23) (69 - 72)  BP: 140/77 (07-28-21 @ 04:29) (117/75 - 140/77)  RR: 17 (07-28-21 @ 04:29) (17 - 18)  SpO2: 98% (07-28-21 @ 07:23) (96% - 100%)  Wt(kg): --    MEDICATIONS   aspirin enteric coated 81 milliGRAM(s) daily  atorvastatin 80 milliGRAM(s) at bedtime  clopidogrel Tablet 75 milliGRAM(s) daily  enoxaparin Injectable 40 milliGRAM(s) daily      RECENT LABS/IMAGING                          13.1   7.69  )-----------( 175      ( 27 Jul 2021 07:42 )             38.4     07-27    139  |  104  |  21.3<H>  ----------------------------<  94  4.5   |  26.0  |  1.01    Ca    8.8      27 Jul 2021 07:42  Phos  3.7     07-27  Mg     2.0     07-27                    CT PERFUSION/CTA HEAD/NECK & CT BRAIN 7/25 - No acute infarction or hemorrhage. Negative CTP. No large vessel occlusion. Consider MRI as clinically warranted.     MRI HEAD 7/27 - No acute infarction.  ----------------------------------------------------------------------------------------  PHYSICAL EXAM  Constitutional - NAD, Comfortable  Extremities - No C/C/E, No calf tenderness   Neurologic Exam -                    Cognitive - AAO to self, place, date, year, situation     Communication - Fluent, No dysarthria     Cranial Nerves - CN 2-12 intact     Motor - No focal deficits     Sensory - Intact to LT     Coordination - FTN intact  Psychiatric - Mood stable, Affect WNL  ----------------------------------------------------------------------------------------  ASSESSMENT/PLAN  80yFemale with functional deficits after having weakness of the left side found to have no stroke  CAD - ASA, Plavix, Lipitor  DVT PPX - SCDs, Lovenox  Rehab - Workup pending - EEG. PT signed off as she is [place] with mobility. Will sign off at this time. Thank you for allowing me to be part of your patient's care. Please reconsult PMR for additional rehab recommendations or dispo needs if functional status changes.     Discussed with rehab clinical care team.

## 2021-07-28 NOTE — PROGRESS NOTE ADULT - ASSESSMENT
80F with a history of CAD, HTN, HLD, Stroke/TIA who presented with left sided weakness and dysarthria similar to her prior episodes of stroke/TIA.    #Concern for Stroke/TIA - CT/CTA without ischemic findings.  Neuro Following  MRI Brain completed  without acute findings.   Pending EEG to eval for seizure   Neuro Checks   C/w DAPT/Lipitor    CAD  DAPT  Lipitor    DVT ppx with Lovenox  Dispo - Patient is pending EEG.

## 2021-07-28 NOTE — PROGRESS NOTE ADULT - ASSESSMENT
79 yo woman with episode of L sided weakness and dysarthria. L sided weakness improving, but still with some LLE weakness. Symptoms 2/2 TIA vs seizure? This is patient's fifth episode since May 2020    Episodes of dysarthria and L sided weakness  MRI brain w/o contrast did not show stroke- likely TIA  (+) peripheral vertigo  c-EEG is underway to evaluate for possible seizure.   Continue Aspirin, plavix and lipitor   no anti-epileptics at this time    will follow with you    Cem Abraham MD PhD   [id number] 79 yo woman with episode of L sided weakness and dysarthria. L sided weakness improving, but still with some LLE weakness. Symptoms 2/2 TIA vs seizure? This is patient's fifth episode since May 2020    Episodes of dysarthria and L sided weakness  MRI brain w/o contrast did not show stroke- likely TIA  c-EEG is underway to evaluate for possible seizure.   Continue Aspirin, plavix and lipitor   no anti-epileptics at this time    will follow with you    Cem Abraham MD PhD   [id number]

## 2021-07-28 NOTE — PROGRESS NOTE ADULT - SUBJECTIVE AND OBJECTIVE BOX
[facility]                                        Neurology at [place]                                 Le[facility] Anjum                                  [address]                                        [place], [place], [address]                                             [phone]        CC: TIA.    HPI:   81 yo woman with medical conditions as outlined below presents with left sided weakness and dysarthria. She states this morning, she was standing and felt lightheaded. She sat down and felt some shortness of breath. She got ready and went to [employer], but states she was too short or breath to stay there more than 5 minutes. She then drove home and noticed she could not lift her left leg. She states she then had slurred speech.    Interim history:  Dizziness improving, no nausea. no weakness or dysarthria    ROS: mild dizziness    MEDICATIONS  (STANDING):  aspirin enteric coated 81 milliGRAM(s) Oral daily  atorvastatin 80 milliGRAM(s) Oral at bedtime  clopidogrel Tablet 75 milliGRAM(s) Oral daily  enoxaparin Injectable 40 milliGRAM(s) SubCutaneous daily      Vital Signs Last 24 Hrs  T(C): 36.8 (27 Jul 2021 10:43), Max: 36.9 (26 Jul 2021 20:07)  T(F): 98.3 (27 Jul 2021 10:43), Max: 98.5 (26 Jul 2021 20:07)  HR: 79 (27 Jul 2021 10:43) (76 - 83)  BP: 126/67 (27 Jul 2021 10:43) (107/66 - 137/84)  RR: 18 (27 Jul 2021 10:43) (15 - 18)  SpO2: 97% (27 Jul 2021 10:43) (96% - 100%)    Detailed Neurologic Exam:    Mental status: The patient is awake and alert. There is no aphasia. There is no dysarthria.     Cranial nerves: Pupils equal and react symmetrically to light. There is no visual field deficit to threat. Extraocular motion is full with a few beats of nystagmus on left lateral gaze. Facial sensation is intact. Facial musculature is symmetric. Palate elevates symmetrically. Tongue is midline.    Motor: There is normal bulk and tone.  There is no tremor.  Strength grossly 5/5 bilaterally.    Sensation: Grossly intact to light touch and pin.    Reflexes: 2+ throughout and plantar responses are flexor.    Cerebellar: No dysmetria on finger nose testing.    Labs:     07-27    139  |  104  |  21.3<H>  ----------------------------<  94  4.5   |  26.0  |  1.01    Ca    8.8      27 Jul 2021 07:42  Phos  3.7     07-27  Mg     2.0     07-27    TPro  5.7<L>  /  Alb  3.4  /  TBili  1.1  /  DBili  x   /  AST  31  /  ALT  18  /  AlkPhos  71  07-25                            13.1   7.69  )-----------( 175      ( 27 Jul 2021 07:42 )             38.4     Lipid Profile (07.26.21 @ 05:29)    Cholesterol, Serum: 107 mg/dL    Triglycerides, Serum: 61 mg/dL    HDL Cholesterol, Serum: 56 mg/dL    Non HDL Cholesterol: 51: Patients Atherosclerotic Cardiovascular Disease (ASCVD) Risk  Optimal Level (mg/dL)  LDL Cholesterol (LDL-C)  All Patients                                < 100  ASCVD at Very High Risk1    < 70  Non-HDL Cholesterol (Non-HDL-C)  All Patients                       < 130  ASCVD at Very High Risk1   < 100  Non-HDL-Cholesterol (Non-HDL-C) is also a key target for cardiovascular  risk reduction.  Consider Familial Hypercholesterolemia when: LDL-C > 190 mg/dL or  Non-HDL-C > 220 mg/dL.  LDL-C calculation using the Friedewald equation is not provided when  triglycerides > 400 mg/dL, in which case we recommend repeating the test  after fasting, if it was not done before.  When triglycerides >150 mg/dL, calculated LDL-C is provided but maystill  be inaccurate (particularly when LDL-C < 70 mg/dL). It can be  recalculated off-line using other equations (e.g. Joaquin SS, Mahnaz MJ,  Garrett MB, et al.TION 2013;310:2061 - 8).  1 Olivier Trotter,et al. "2019 AHA/ACC. . . guideline on the  management of blood cholesterol: a report of the American College of  Cardiology/American Heart Association Task Force on Clinical Practice  Guidelines." Circulation;139:e1082 - e1143.  These values apply only to persons 20 years and older.  Lipid Panel updated with new test, reference ranges and interpretive  comments effective 10-. mg/dL    LDL Cholesterol Calculated: 39 mg/dL        Recent neurological studies (images reviewed independently):  MRI brain did not show acute CVA, mass or blood, (+) SVID                                 [facility]                                        Neurology at [place]                                 [facility] Anj[facility]                                  [address]                                        [place], [address]                                             [phone]        CC: TIA.    HPI:   81 yo woman with medical conditions as outlined below presents with left sided weakness and dysarthria. She states this morning, she was standing and felt lightheaded. She sat down and felt some shortness of breath. She got ready and went to [employer], but states she was too short or breath to stay there more than 5 minutes. She then drove home and noticed she could not lift her left leg. She states she then had slurred speech.    Interim history:   no weakness or dysarthria    ROS: no new complaints    MEDICATIONS  (STANDING):  aspirin enteric coated 81 milliGRAM(s) Oral daily  atorvastatin 80 milliGRAM(s) Oral at bedtime  clopidogrel Tablet 75 milliGRAM(s) Oral daily  enoxaparin Injectable 40 milliGRAM(s) SubCutaneous daily      Vital Signs Last 24 Hrs  T(C): 36.8 (27 Jul 2021 10:43), Max: 36.9 (26 Jul 2021 20:07)  T(F): 98.3 (27 Jul 2021 10:43), Max: 98.5 (26 Jul 2021 20:07)  HR: 79 (27 Jul 2021 10:43) (76 - 83)  BP: 126/67 (27 Jul 2021 10:43) (107/66 - 137/84)  RR: 18 (27 Jul 2021 10:43) (15 - 18)  SpO2: 97% (27 Jul 2021 10:43) (96% - 100%)    Detailed Neurologic Exam:    Mental status: The patient is awake and alert. There is no aphasia. There is no dysarthria.     Cranial nerves: Pupils equal and react symmetrically to light. There is no visual field deficit to threat. Extraocular motion is full with no nystagmus. Facial sensation is intact. Facial musculature is symmetric. Palate elevates symmetrically. Tongue is midline.    Motor: There is normal bulk and tone.  There is no tremor.  Strength grossly 5/5 bilaterally.    Sensation: Grossly intact to light touch and pin.    Reflexes: 2+ throughout and plantar responses are flexor.    Cerebellar: No dysmetria on finger nose testing.    Labs:     07-27    139  |  104  |  21.3<H>  ----------------------------<  94  4.5   |  26.0  |  1.01    Ca    8.8      27 Jul 2021 07:42  Phos  3.7     07-27  Mg     2.0     07-27    TPro  5.7<L>  /  Alb  3.4  /  TBili  1.1  /  DBili  x   /  AST  31  /  ALT  18  /  AlkPhos  71  07-25                            13.1   7.69  )-----------( 175      ( 27 Jul 2021 07:42 )             38.4     Lipid Profile (07.26.21 @ 05:29)    Cholesterol, Serum: 107 mg/dL    Triglycerides, Serum: 61 mg/dL    HDL Cholesterol, Serum: 56 mg/dL    Non HDL Cholesterol: 51: Patients Atherosclerotic Cardiovascular Disease (ASCVD) Risk  Optimal Level (mg/dL)  LDL Cholesterol (LDL-C)  All Patients                                < 100  ASCVD at Very High Risk1    < 70  Non-HDL Cholesterol (Non-HDL-C)  All Patients                       < 130  ASCVD at Very High Risk1   < 100  Non-HDL-Cholesterol (Non-HDL-C) is also a key target for cardiovascular  risk reduction.  Consider Familial Hypercholesterolemia when: LDL-C > 190 mg/dL or  Non-HDL-C > 220 mg/dL.  LDL-C calculation using the Friedewald equation is not provided when  triglycerides > 400 mg/dL, in which case we recommend repeating the test  after fasting, if it was not done before.  When triglycerides >150 mg/dL, calculated LDL-C is provided but maystill  be inaccurate (particularly when LDL-C < 70 mg/dL). It can be  recalculated off-line using other equations (e.g. Joaquin SS, Mahnaz LEMON,  Garrett LITTLE, et al.TINO 2013;310:2061 - 8).  1 Rose MarieOlivier.,et al. "2019 AHA/ACC. . . guideline on the  management of blood cholesterol: a report of the American College of  Cardiology/American Heart Association Task Force on Clinical Practice  Guidelines." Circulation;139:e1082 - e1143.  These values apply only to persons 20 years and older.  Lipid Panel updated with new test, reference ranges and interpretive  comments effective 10-. mg/dL    LDL Cholesterol Calculated: 39 mg/dL        Recent neurological studies (images reviewed independently):  MRI brain did not show acute CVA, mass or blood, (+) SVID

## 2021-07-28 NOTE — PROGRESS NOTE ADULT - SUBJECTIVE AND OBJECTIVE BOX
[facility] Division of Hospital Medicine    Chief Complaint:    LE weakness    SUBJECTIVE / OVERNIGHT EVENTS:  Patient was seen and evaluated this morning. No acute overnight events. Feeling well. LLE weakness improving but not near baseline. PT evaluated and rec home pt. Awaiting EEG    Patient denies chest pain, SOB, abd pain, N/V, fever, chills, dysuria or any other complaints. All remainder ROS negative.     MEDICATIONS  (STANDING):  aspirin enteric coated 81 milliGRAM(s) Oral daily  atorvastatin 80 milliGRAM(s) Oral at bedtime  clopidogrel Tablet 75 milliGRAM(s) Oral daily  enoxaparin Injectable 40 milliGRAM(s) SubCutaneous daily    MEDICATIONS  (PRN):        I&O's Summary      PHYSICAL EXAM:  Vital Signs Last 24 Hrs  T(C): 36.9 (28 Jul 2021 04:29), Max: 36.9 (28 Jul 2021 04:29)  T(F): 98.4 (28 Jul 2021 04:29), Max: 98.4 (28 Jul 2021 04:29)  HR: 72 (28 Jul 2021 07:23) (69 - 72)  BP: 140/77 (28 Jul 2021 04:29) (117/75 - 140/77)  BP(mean): --  RR: 17 (28 Jul 2021 04:29) (17 - 18)  SpO2: 98% (28 Jul 2021 07:23) (96% - 100%)        CONSTITUTIONAL: NAD, well-developed, well-groomed  ENMT: Moist oral mucosa, no pharyngeal injection or exudates; normal dentition  RESPIRATORY: Normal respiratory effort; lungs are clear to auscultation bilaterally  CARDIOVASCULAR: Regular rate and rhythm, normal S1 and S2, no murmur/rub/gallop; No lower extremity edema; Peripheral pulses are 2+ bilaterally  ABDOMEN: Nontender to palpation, normoactive bowel sounds, no rebound/guarding; No hepatosplenomegaly  MUSCLOSKELETAL:  Normal gait; no clubbing or cyanosis of digits; no joint swelling or tenderness to palpation  PSYCH: A+O to person, place, and time; affect appropriate  NEUROLOGY: CN 2-12 are intact and symmetric; LLE Weakness. Improved strength against resistance.   SKIN: No rashes; no palpable lesions    LABS:                        13.1   7.69  )-----------( 175      ( 27 Jul 2021 07:42 )             38.4     07-27    139  |  104  |  21.3<H>  ----------------------------<  94  4.5   |  26.0  |  1.01    Ca    8.8      27 Jul 2021 07:42  Phos  3.7     07-27  Mg     2.0     07-27                CAPILLARY BLOOD GLUCOSE            RADIOLOGY & ADDITIONAL TESTS:  Results Reviewed:   Imaging Personally Reviewed:  Electrocardiogram Personally Reviewed:

## 2021-07-29 ENCOUNTER — TRANSCRIPTION ENCOUNTER (OUTPATIENT)
Age: 81
End: 2021-07-29

## 2021-07-29 LAB
ANION GAP SERPL CALC-SCNC: 10 MMOL/L — SIGNIFICANT CHANGE UP (ref 5–17)
BUN SERPL-MCNC: 21 MG/DL — HIGH (ref 8–20)
CALCIUM SERPL-MCNC: 8.9 MG/DL — SIGNIFICANT CHANGE UP (ref 8.6–10.2)
CHLORIDE SERPL-SCNC: 105 MMOL/L — SIGNIFICANT CHANGE UP (ref 98–107)
CO2 SERPL-SCNC: 25 MMOL/L — SIGNIFICANT CHANGE UP (ref 22–29)
CREAT SERPL-MCNC: 0.86 MG/DL — SIGNIFICANT CHANGE UP (ref 0.5–1.3)
GLUCOSE SERPL-MCNC: 93 MG/DL — SIGNIFICANT CHANGE UP (ref 70–99)
HCT VFR BLD CALC: 40.9 % — SIGNIFICANT CHANGE UP (ref 34.5–45)
HGB BLD-MCNC: 13.6 G/DL — SIGNIFICANT CHANGE UP (ref 11.5–15.5)
MCHC RBC-ENTMCNC: 29.5 PG — SIGNIFICANT CHANGE UP (ref 27–34)
MCHC RBC-ENTMCNC: 33.3 GM/DL — SIGNIFICANT CHANGE UP (ref 32–36)
MCV RBC AUTO: 88.7 FL — SIGNIFICANT CHANGE UP (ref 80–100)
PLATELET # BLD AUTO: 172 K/UL — SIGNIFICANT CHANGE UP (ref 150–400)
POTASSIUM SERPL-MCNC: 4.5 MMOL/L — SIGNIFICANT CHANGE UP (ref 3.5–5.3)
POTASSIUM SERPL-SCNC: 4.5 MMOL/L — SIGNIFICANT CHANGE UP (ref 3.5–5.3)
RBC # BLD: 4.61 M/UL — SIGNIFICANT CHANGE UP (ref 3.8–5.2)
RBC # FLD: 13 % — SIGNIFICANT CHANGE UP (ref 10.3–14.5)
SODIUM SERPL-SCNC: 140 MMOL/L — SIGNIFICANT CHANGE UP (ref 135–145)
WBC # BLD: 6.92 K/UL — SIGNIFICANT CHANGE UP (ref 3.8–10.5)
WBC # FLD AUTO: 6.92 K/UL — SIGNIFICANT CHANGE UP (ref 3.8–10.5)

## 2021-07-29 PROCEDURE — 99233 SBSQ HOSP IP/OBS HIGH 50: CPT

## 2021-07-29 PROCEDURE — 99232 SBSQ HOSP IP/OBS MODERATE 35: CPT

## 2021-07-29 RX ORDER — METOPROLOL TARTRATE 50 MG
12.5 TABLET ORAL DAILY
Refills: 0 | Status: DISCONTINUED | OUTPATIENT
Start: 2021-07-29 | End: 2021-07-29

## 2021-07-29 RX ORDER — DILTIAZEM HCL 120 MG
30 CAPSULE, EXT RELEASE 24 HR ORAL THREE TIMES A DAY
Refills: 0 | Status: DISCONTINUED | OUTPATIENT
Start: 2021-07-29 | End: 2021-07-31

## 2021-07-29 RX ADMIN — Medication 30 MILLIGRAM(S): at 21:09

## 2021-07-29 RX ADMIN — Medication 30 MILLIGRAM(S): at 17:50

## 2021-07-29 RX ADMIN — Medication 81 MILLIGRAM(S): at 11:25

## 2021-07-29 RX ADMIN — CLOPIDOGREL BISULFATE 75 MILLIGRAM(S): 75 TABLET, FILM COATED ORAL at 11:25

## 2021-07-29 RX ADMIN — ATORVASTATIN CALCIUM 80 MILLIGRAM(S): 80 TABLET, FILM COATED ORAL at 21:09

## 2021-07-29 RX ADMIN — ENOXAPARIN SODIUM 40 MILLIGRAM(S): 100 INJECTION SUBCUTANEOUS at 11:25

## 2021-07-29 NOTE — EEG REPORT - NS EEG TEXT BOX
[facility]   COMPREHENSIVE EPILEPSY CENTER   REPORT OF LONG-TERM VIDEO EEG     [facility]: [address], [place] [address], Ph#: [phone]  [facility]: [address], [place] [address], Ph#: [phone]  [facility]: [address] [place] [address], Ph#: [phone]    Patient Name: BREANA HELM  Age and : 80y (40)  MRN #: [id number]  Location: [facility] 5TWR [id number]  Referring Physician: Yandy Hinson    Start Time/Date: 12:44 on 21  End Time/Date: 08:00 on 21  Duration: 19hr 14m    _____________________________________________________________  STUDY INFORMATION    EEG Recording Technique:  The patient underwent continuous Video-EEG monitoring, using Telemetry System hardware on the XLTek Digital System. EEG and video data were stored on a computer hard drive with important events saved in digital archive files. The material was reviewed by a physician (electroencephalographer / epileptologist) on a daily basis. Manjit and seizure detection algorithms were utilized and reviewed. An EEG Technician attended to the patient, and was available throughout daytime work hours.  The epilepsy center neurologist was available in person or on call 24-hours per day.    EEG Placement and Labeling of Electrodes:  The EEG was performed utilizing 20 channel referential EEG connections (coronal over temporal over parasagittal montage) using all standard 10-20 electrode placements with EKG, with additional electrodes placed in the inferior temporal region using the modified 10-10 montage electrode placements for elective admissions, or if deemed necessary. Recording was at a sampling rate of 256 samples per second per channel. Time synchronized digital video recording was done simultaneously with EEG recording. A low light infrared camera was used for low light recording.     _____________________________________________________________  HISTORY    Patient is a 80y old  Female who presents with a chief complaint of Left sided weakness (2021 12:10)      PERTINENT MEDICATION:  none    _____________________________________________________________  STUDY INTERPRETATION    Findings: The background was continuous and reactive. During wakefulness, the posterior dominant rhythm consisted of symmetric, well-modulated 9 Hz activity, with amplitude to 30 uV, that attenuated to eye opening.      Background Slowing:  No generalized background slowing was present.    Focal Slowing:   None were present.    Sleep Background:  Drowsiness was characterized by fragmentation, attenuation, and slowing of the background activity.    Sleep was characterized by the presence of vertex waves, symmetric sleep spindles and K-complexes.    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    Interictal Epileptiform Activity:   None were present.    Events:  Multiple push-button events for unclear reason. No obvious behavioral change seen on video. No abnormal EEG correlate. When asked the next day, patient said she was not aware she had an event-button.    Activation Procedures:   Hyperventilation was not performed.    Photic stimulation was not performed.     Artifacts:  Intermittent myogenic and movement artifacts were noted.    ECG:  The heart rate on single channel ECG was predominantly between 70-90 BPM.    _____________________________________________________________  EEG SUMMARY/CLASSIFICATION    Normal EEG in the awake, drowsy and asleep states.  - Multiple push-button events for unclear reason. No obvious behavioral change seen on video. No abnormal EEG correlate. When asked the next day, patient said she was not aware she had an event-button.  - Presence of subclinical rhythmic electrographic discharge of adults (SREDA), which is a benign variant.    _____________________________________________________________  EEG IMPRESSION/CLINICAL CORRELATE    Normal EEG study.  1. Multiple push-button events for unclear reason were without ictal correlate on EEG.  2. No epileptiform pattern or seizure seen.    _____________________________________________________________    Jon Clinton MD  Director, Epilepsy/EMU - [facility]

## 2021-07-29 NOTE — DISCHARGE NOTE PROVIDER - PROVIDER TOKENS
PROVIDER:[TOKEN:[[id number]],FOLLOWUP:[2 weeks]],FREE:[LAST:[F/U w/your PCP OP],PHONE:[(   )    -],FAX:[(   )    -],FOLLOWUP:[2 weeks]] PROVIDER:[TOKEN:[[id number]],FOLLOWUP:[2 weeks]],PROVIDER:[TOKEN:[[id number]]]

## 2021-07-29 NOTE — DISCHARGE NOTE PROVIDER - CARE PROVIDERS DIRECT ADDRESSES
,DirectAddress_Unknown,DirectAddress_Unknown ,DirectAddress_Unknown,[email or web address]

## 2021-07-29 NOTE — PROGRESS NOTE ADULT - ASSESSMENT
79 yo woman with episode of L sided weakness and dysarthria. L sided weakness improving, but still with some LLE weakness. Symptoms 2/2 TIA vs seizure? This is patient's fifth episode since May 2020    Episodes of dysarthria and L sided weakness  MRI brain w/o contrast did not show stroke- likely TIA  c-EEG did not show seizur activity   Continue Aspirin, plavix and lipitor   no anti-epileptics at this time      no further inpatient work up at this time    Thank you for allowing me to participate in the care of your patient    Cem Abraham MD, PhD   [id number]

## 2021-07-29 NOTE — DISCHARGE NOTE PROVIDER - HOSPITAL COURSE
79yo female w/PMHx of CAD, HTN, HLD, Stroke/TIA who presented with Lt sided weakness and dysarthria. Pt admitted to [facility] for Lt side weakness and to r/o stroke/TIA. Pt had CT head and CTA brain performed both w/o ischemic findings and no acute hemorrhage noted. MRI brain performed also w/o acute finding. Pt's Sx's improving, likely 2/2 TIA.    Neuro consulted, Pt then underwent EEG which demonstrated no epileptic activity, no neuro interventions at this time, Pt to c/w ASA, Plavix and Lipitor. Will need to F/U w/Neuro OP for further management. Pt also w/Hx of CAD and is on DAPT and Lipitor. Pt placed on telemetry and after review, it was noted Pt had several episodes of ASx polymorphic NSVT. Cardiology consulted, Pt started on Cardizem 30mg TID, and will undergo ILR interrogation. Pt also              81yo female w/PMHx of CAD, HTN, HLD, Stroke/TIA who presented with Lt sided weakness and dysarthria. Pt admitted to [facility] for Lt side weakness and to r/o stroke/TIA. Pt had CT head and CTA brain performed both w/o ischemic findings and no acute hemorrhage noted. MRI brain performed also w/o acute finding. Pt's Sx's improving, likely 2/2 TIA.    Neuro consulted, Pt then underwent EEG which demonstrated no epileptic activity, no neuro interventions at this time, Pt to c/w ASA, Plavix and Lipitor. Will need to F/U w/Neuro OP for further management. Pt also w/Hx of CAD and is on DAPT and Lipitor. Pt placed on telemetry and after review, it was noted Pt had several episodes of ASx polymorphic NSVT. Cardiology consulted, Pt started on Cardizem 30mg TID. The patient had no further symptoms and telemetry monitoring was without events. The patient was discharged home with   instructions to follow up with her primary care physician, cardiologist, and Neurology for further management.        36 minutes total time

## 2021-07-29 NOTE — CONSULT NOTE ADULT - SUBJECTIVE AND OBJECTIVE BOX
Formerly [facility], [facility]                                   [address] [place] [address]                                                      PHONE: [phone]                                                         FAX: [phone]  [email or web address]  ---------------------------------------------------------------------------------------------------------------------------------    HPI:  BREANA HELM is an 80y Female PMHX HTN, HLD, CAD, s/p pci to LAD, TIA's, polio, who presents to ED with Left sided weakness and slurred speech.  She was admitted for possible TIA.  pt with no reported cp, palps, sob.     PAST MEDICAL & SURGICAL HISTORY:  HTN (hypertension)    Rheumatic fever    TIA (transient ischemic attack)    CAD S/P percutaneous coronary angioplasty    Polio    Cerebrovascular accident (CVA)  November 2020, June 2020, May 2020    Status post tonsillectomy    H/O left nephrectomy    Renal cancer        Biaxin (Muscle Pain; Joint Pain)  codeine (Faint)  Isosorbide Mononitrate Extended Release (Faint)  Metoprolol Succinate ER (Unknown)  monoctanoin (Unknown)      MEDICATIONS  (STANDING):  aspirin enteric coated 81 milliGRAM(s) Oral daily  atorvastatin 80 milliGRAM(s) Oral at bedtime  clopidogrel Tablet 75 milliGRAM(s) Oral daily  enoxaparin Injectable 40 milliGRAM(s) SubCutaneous daily    MEDICATIONS  (PRN):      Family History: Pt denies hx of early cad, SCD, or congenital heart disease.      Social History:  Cigarettes:   former                 Alchohol:   no              Illicit Drug Abuse:  no    ROS:    Extensively Reviewed with pertinents as per HPI the remainder were negative.      Vital Signs Last 24 Hrs  T(C): 36.7 (29 Jul 2021 04:01), Max: 36.8 (28 Jul 2021 20:43)  T(F): 98.1 (29 Jul 2021 04:01), Max: 98.3 (28 Jul 2021 20:43)  HR: 76 (29 Jul 2021 07:55) (76 - 91)  BP: 111/72 (29 Jul 2021 04:01) (111/72 - 130/76)  BP(mean): --  RR: 18 (29 Jul 2021 04:01) (16 - 18)  SpO2: 99% (29 Jul 2021 07:55) (97% - 100%)  ICU Vital Signs Last 24 Hrs  BREANA HELM  I&O's Detail    I&O's Summary    Drug Dosing Weight  BREANA HELM      PHYSICAL EXAM:  General: Appears well developed, well nourished alert and cooperative.  HEENT: Head; normocephalic, atraumatic.  Eyes: Pupils reactive, cornea wnl.  Neck: Supple, no nodes adenopathy, no NVD or carotid bruit or thyromegaly.  CARDIOVASCULAR: Normal S1 and S2, No murmur, rub, gallop or lift.   LUNGS: No rales, rhonchi or wheeze. Normal breath sounds bilaterally.  ABDOMEN: Soft, nontender without mass or organomegaly. bowel sounds normoactive.  EXTREMITIES: No clubbing, cyanosis or edema. Distal pulses wnl.   SKIN: warm and dry with normal turgor.  NEURO: Alert/oriented x 3/normal motor exam. No pathologic reflexes.    PSYCH: normal affect.        LABS:                        13.6   6.92  )-----------( 172      ( 29 Jul 2021 07:08 )             40.9     07-29    140  |  105  |  21.0<H>  ----------------------------<  93  4.5   |  25.0  |  0.86    Ca    8.9      29 Jul 2021 07:08      BREANA HELM            RADIOLOGY & ADDITIONAL STUDIES:    INTERPRETATION OF TELEMETRY (personally reviewed):    ECG: nsr, nml axis no st elevations/depressions      isha  3/2021- nml ef, no valvulopathy  tte 11/202 - nml ef, mild AI,     tele- nsvt episdoes     Assessment and Plan:  In summary,BREANA HELM is an 80y Female PMHX HTN, HLD, CAD, s/p pci to LAD, TIA's, polio, who presents to ED with Left sided weakness and slurred speech.  She was admitted for possible TIA.  pt with no reported cp, palps, sob.     Recurrent TIA   -pt been on asa, asa/plavix, and asa//brilinta all with susbequesnt tia symptoms.  -will stop brilinta and start eliuis   -repeat echo   -will start toprol 12.5 mg daily       Thank you for allowing [facility] to participate in the care of this patient.  Please feel free to call with any questions or concerns.                  Formerly [facility], [facility]                                   [address] [place] [address]                                                      PHONE: [phone]                                                         FAX: [phone]  [email or web address]  ---------------------------------------------------------------------------------------------------------------------------------    HPI:  BREANA HELM is an 80y Female PMHX HTN, HLD, CAD, s/p pci to LAD, TIA's, polio, who presents to ED with Left sided weakness and slurred speech.  She was admitted for possible TIA.  pt with no reported cp, palps, sob.     PAST MEDICAL & SURGICAL HISTORY:  HTN (hypertension)    Rheumatic fever    TIA (transient ischemic attack)    CAD S/P percutaneous coronary angioplasty    Polio    Cerebrovascular accident (CVA)  November 2020, June 2020, May 2020    Status post tonsillectomy    H/O left nephrectomy    Renal cancer        Biaxin (Muscle Pain; Joint Pain)  codeine (Faint)  Isosorbide Mononitrate Extended Release (Faint)  Metoprolol Succinate ER (Unknown)  monoctanoin (Unknown)      MEDICATIONS  (STANDING):  aspirin enteric coated 81 milliGRAM(s) Oral daily  atorvastatin 80 milliGRAM(s) Oral at bedtime  clopidogrel Tablet 75 milliGRAM(s) Oral daily  enoxaparin Injectable 40 milliGRAM(s) SubCutaneous daily    MEDICATIONS  (PRN):      Family History: Pt denies hx of early cad, SCD, or congenital heart disease.      Social History:  Cigarettes:   former                 Alchohol:   no              Illicit Drug Abuse:  no    ROS:    Extensively Reviewed with pertinents as per HPI the remainder were negative.      Vital Signs Last 24 Hrs  T(C): 36.7 (29 Jul 2021 04:01), Max: 36.8 (28 Jul 2021 20:43)  T(F): 98.1 (29 Jul 2021 04:01), Max: 98.3 (28 Jul 2021 20:43)  HR: 76 (29 Jul 2021 07:55) (76 - 91)  BP: 111/72 (29 Jul 2021 04:01) (111/72 - 130/76)  BP(mean): --  RR: 18 (29 Jul 2021 04:01) (16 - 18)  SpO2: 99% (29 Jul 2021 07:55) (97% - 100%)  ICU Vital Signs Last 24 Hrs  BREANA HELM  I&O's Detail    I&O's Summary    Drug Dosing Weight  BREANA HELM      PHYSICAL EXAM:  General: Appears well developed, well nourished alert and cooperative.  HEENT: Head; normocephalic, atraumatic.  Eyes: Pupils reactive, cornea wnl.  Neck: Supple, no nodes adenopathy, no NVD or carotid bruit or thyromegaly.  CARDIOVASCULAR: Normal S1 and S2, No murmur, rub, gallop or lift.   LUNGS: No rales, rhonchi or wheeze. Normal breath sounds bilaterally.  ABDOMEN: Soft, nontender without mass or organomegaly. bowel sounds normoactive.  EXTREMITIES: No clubbing, cyanosis or edema. Distal pulses wnl.   SKIN: warm and dry with normal turgor.  NEURO: Alert/oriented x 3/normal motor exam. No pathologic reflexes.    PSYCH: normal affect.        LABS:                        13.6   6.92  )-----------( 172      ( 29 Jul 2021 07:08 )             40.9     07-29    140  |  105  |  21.0<H>  ----------------------------<  93  4.5   |  25.0  |  0.86    Ca    8.9      29 Jul 2021 07:08      BREANA HELM            RADIOLOGY & ADDITIONAL STUDIES:    INTERPRETATION OF TELEMETRY (personally reviewed):    ECG: nsr, nml axis no st elevations/depressions      isha  3/2021- nml ef, no valvulopathy  tte 11/202 - nml ef, mild AI,     tele- nsvt episdoes     Assessment and Plan:  In summary,BREANA HELM is an 80y Female PMHX HTN, HLD, CAD, s/p pci to LAD, TIA's, polio, who presents to ED with Left sided weakness and slurred speech.  She was admitted for possible TIA.  pt with no reported cp, palps, sob.     Recurrent TIA   -  asa//brilinta    -repeat echo   -will startcardizem 30 mg tid for nsvt- -pt wiht metoprolol allergy   ilr interrogation  hopeful d/c home tomorrow      Thank you for allowing [facility] to participate in the care of this patient.  Please feel free to call with any questions or concerns.

## 2021-07-29 NOTE — PROGRESS NOTE ADULT - ASSESSMENT
80F with a history of CAD, HTN, HLD, Stroke/TIA who presented with left sided weakness and dysarthria similar to her prior episodes of stroke/TIA.    #TIA with left sided weakness and dysarthria. Symptoms improved. CT/CTA without ischemic findings.  Neuro Following  MRI Brain completed without acute findings.   EEG without epileptic activity   Neuro Checks  C/w DAPT/Lipitor    #Hx of CAD on DAPT and Lipitor  Upon tele review, noted to have several episodes of polymorphic NSVT typically ~3 beats.  Patient is asymptomatic and hemodynamically stable.  Will request formal cardiology consult for further recommendations regarding BB or Loop recorder or ICD.    #DVT ppx with Lovenox  Dispo - Patient's status remains acute given findings of arrhythmia on tele and is pending cardiology consult   80F with a history of CAD, HTN, HLD, Stroke/TIA who presented with left sided weakness and dysarthria similar to her prior episodes of stroke/TIA.    #TIA with left sided weakness and dysarthria. Symptoms improved. CT/CTA without ischemic findings.  Neuro Following  MRI Brain completed without acute findings.   EEG without epileptic activity   Neuro Checks  C/w DAPT/Lipitor    #Hx of CAD on DAPT and Lipitor  Upon tele review, noted to have several episodes of polymorphic NSVT typically ~3 beats.  Patient is asymptomatic and hemodynamically stable.  Will request formal cardiology consult for further recommendations regarding BB or Loop recorder or ICD.  mag>2  k>4    #DVT ppx with Lovenox  Dispo - Patient's status remains acute given findings of arrhythmia on tele and is pending cardiology consult

## 2021-07-29 NOTE — DISCHARGE NOTE PROVIDER - CARE PROVIDER_API CALL
Mayco Cyr (DO)  Internal Medicine  [address] [place] [address]  Phone: [phone]  Fax: [phone]  Follow Up Time: 2 weeks    F/U w/your PCP OP,   Phone: (   )    -  Fax: (   )    -  Follow Up Time: 2 weeks   Mayco Cyr (DO)  Internal Medicine  [address] [place] [address]  Phone: [phone]  Fax: [phone]  Follow Up Time: 2 weeks    Cem Abraham; PhD)  Neurology; Vascular Neurology  [address] [place] [address], [address]  [place] [address]  Phone: [phone]  Fax: [phone]  Follow Up Time:

## 2021-07-29 NOTE — PROGRESS NOTE ADULT - SUBJECTIVE AND OBJECTIVE BOX
[facility] Division of Hospital Medicine    Chief Complaint:    Left sided weakness    SUBJECTIVE / OVERNIGHT EVENTS:  patient was seen and evaluated this morning. Feeling well. Improvement in weakness. Noted to have multiple episodes of polymorphic NSVT on tele monitor overnight. no correlating symptoms or hemodynamic instability.     Patient denies chest pain, SOB, abd pain, N/V, fever, chills, dysuria or any other complaints. All remainder ROS negative.     MEDICATIONS  (STANDING):  aspirin enteric coated 81 milliGRAM(s) Oral daily  atorvastatin 80 milliGRAM(s) Oral at bedtime  clopidogrel Tablet 75 milliGRAM(s) Oral daily  enoxaparin Injectable 40 milliGRAM(s) SubCutaneous daily    MEDICATIONS  (PRN):        I&O's Summary      PHYSICAL EXAM:  Vital Signs Last 24 Hrs  T(C): 36.7 (29 Jul 2021 04:01), Max: 36.8 (28 Jul 2021 20:43)  T(F): 98.1 (29 Jul 2021 04:01), Max: 98.3 (28 Jul 2021 20:43)  HR: 76 (29 Jul 2021 07:55) (76 - 91)  BP: 111/72 (29 Jul 2021 04:01) (111/72 - 130/76)  BP(mean): --  RR: 18 (29 Jul 2021 04:01) (16 - 18)  SpO2: 99% (29 Jul 2021 07:55) (97% - 100%)        CONSTITUTIONAL: NAD, well-developed, well-groomed  ENMT: Moist oral mucosa, no pharyngeal injection or exudates; normal dentition  RESPIRATORY: Normal respiratory effort; lungs are clear to auscultation bilaterally  CARDIOVASCULAR: Regular rate and rhythm, normal S1 and S2, no murmur/rub/gallop; No lower extremity edema; Peripheral pulses are 2+ bilaterally  ABDOMEN: Nontender to palpation, normoactive bowel sounds, no rebound/guarding; No hepatosplenomegaly  MUSCLOSKELETAL:  Normal gait; no clubbing or cyanosis of digits; no joint swelling or tenderness to palpation  PSYCH: A+O to person, place, and time; affect appropriate  NEUROLOGY: CN 2-12 are intact and symmetric; LE Strength R>L  SKIN: No rashes; no palpable lesions    LABS:                        13.6   6.92  )-----------( 172      ( 29 Jul 2021 07:08 )             40.9     07-29    140  |  105  |  21.0<H>  ----------------------------<  93  4.5   |  25.0  |  0.86    Ca    8.9      29 Jul 2021 07:08                CAPILLARY BLOOD GLUCOSE            RADIOLOGY & ADDITIONAL TESTS:  Results Reviewed:   Imaging Personally Reviewed:  Electrocardiogram Personally Reviewed:

## 2021-07-29 NOTE — DISCHARGE NOTE PROVIDER - NSDCMRMEDTOKEN_GEN_ALL_CORE_FT
aspirin 81 mg oral tablet, chewable: 1 tab(s) orally once a day  atorvastatin 20 mg oral tablet: 1 tab(s) orally once a day  carvedilol 6.25 mg oral tablet: 1 tab(s) orally 2 times a day  clopidogrel 75 mg oral tablet: 1 tab(s) orally once a day   aspirin 81 mg oral tablet, chewable: 1 tab(s) orally once a day  atorvastatin 80 mg oral tablet: 1 tab(s) orally once a day (at bedtime)  clopidogrel 75 mg oral tablet: 1 tab(s) orally once a day  dilTIAZem 30 mg oral tablet: 1 tab(s) orally 3 times a day

## 2021-07-29 NOTE — PROGRESS NOTE ADULT - SUBJECTIVE AND OBJECTIVE BOX
[facility]                                        Neu[facility] [place]                                 [facility]                                  [address]                                        [place], [place], [address]                                             [phone]        CC: TIA.    HPI:   81 yo woman with medical conditions as outlined below presents with left sided weakness and dysarthria. She states this morning, she was standing and felt lightheaded. She sat down and felt some shortness of breath. She got ready and went to [facility], but states she was too short or breath to stay there more than 5 minutes. She then drove home and noticed she could not lift her left leg. She states she then had slurred speech. (JW)    Interim history:   no weakness or dysarthria    ROS: no new complaints    MEDICATIONS  (STANDING):  aspirin enteric coated 81 milliGRAM(s) Oral daily  atorvastatin 80 milliGRAM(s) Oral at bedtime  clopidogrel Tablet 75 milliGRAM(s) Oral daily  enoxaparin Injectable 40 milliGRAM(s) SubCutaneous daily    MEDICATIONS  (PRN):      Vital Signs Last 24 Hrs  T(C): 36.7 (29 Jul 2021 04:01), Max: 36.8 (28 Jul 2021 20:43)  T(F): 98.1 (29 Jul 2021 04:01), Max: 98.3 (28 Jul 2021 20:43)  HR: 76 (29 Jul 2021 07:55) (76 - 91)  BP: 111/72 (29 Jul 2021 04:01) (111/72 - 130/76)  BP(mean): --  RR: 18 (29 Jul 2021 04:01) (16 - 18)  SpO2: 99% (29 Jul 2021 07:55) (97% - 100%)    Detailed Neurologic Exam:    Mental status: The patient is awake and alert. There is no aphasia. There is no dysarthria.     Cranial nerves: Pupils equal and react symmetrically to light. There is no visual field deficit to threat. Extraocular motion is full with no nystagmus. Facial sensation is intact. Facial musculature is symmetric. Palate elevates symmetrically. Tongue is midline.    Motor: There is normal bulk and tone.  There is no tremor.  Strength 5/5 bilaterally.    Sensation: Grossly intact to light touch and pin.    Reflexes: 2+ throughout and plantar responses are flexor.    Cerebellar: No dysmetria on finger nose testing.    Labs:                           13.6   6.92  )-----------( 172      ( 29 Jul 2021 07:08 )             40.9     07-29    140  |  105  |  21.0<H>  ----------------------------<  93  4.5   |  25.0  |  0.86    Ca    8.9      29 Jul 2021 07:08    Lipid Profile (07.26.21 @ 05:29)    Cholesterol, Serum: 107 mg/dL    Triglycerides, Serum: 61 mg/dL    HDL Cholesterol, Serum: 56 mg/dL    Non HDL Cholesterol: 51:     LDL Cholesterol Calculated: 39 mg/dL      _____________________________________________________________  EEG SUMMARY/CLASSIFICATION    Normal EEG in the awake, drowsy and asleep states.  - Multiple push-button events for unclear reason. No obvious behavioral change seen on video. No abnormal EEG correlate. When asked the next day, patient said she was not aware she had an event-button.  - Presence of subclinical rhythmic electrographic discharge of adults (SREDA), which is a benign variant.    _____________________________________________________________  EEG IMPRESSION/CLINICAL CORRELATE    Normal EEG study.  1. Multiple push-button events for unclear reason were without ictal correlate on EEG.  2. No epileptiform pattern or seizure seen.    _____________________________________________________________    Recent neurological studies (images reviewed independently):  MRI brain did not show acute CVA, mass or blood, (+) SVID

## 2021-07-29 NOTE — DISCHARGE NOTE PROVIDER - NSDCCPCAREPLAN_GEN_ALL_CORE_FT
PRINCIPAL DISCHARGE DIAGNOSIS  Diagnosis: Weakness of left side of body  Assessment and Plan of Treatment:       SECONDARY DISCHARGE DIAGNOSES  Diagnosis: Dysarthria  Assessment and Plan of Treatment:      PRINCIPAL DISCHARGE DIAGNOSIS  Diagnosis: Weakness of left side of body  Assessment and Plan of Treatment: Continue on aspirin and atorvastatin. Follow up with Neurology.      SECONDARY DISCHARGE DIAGNOSES  Diagnosis: Hypertension  Assessment and Plan of Treatment: Continue on diltiazem. Follow up with your cardiologist.

## 2021-07-30 LAB
ANION GAP SERPL CALC-SCNC: 10 MMOL/L — SIGNIFICANT CHANGE UP (ref 5–17)
BUN SERPL-MCNC: 16.2 MG/DL — SIGNIFICANT CHANGE UP (ref 8–20)
CALCIUM SERPL-MCNC: 8.6 MG/DL — SIGNIFICANT CHANGE UP (ref 8.6–10.2)
CHLORIDE SERPL-SCNC: 103 MMOL/L — SIGNIFICANT CHANGE UP (ref 98–107)
CO2 SERPL-SCNC: 23 MMOL/L — SIGNIFICANT CHANGE UP (ref 22–29)
CREAT SERPL-MCNC: 0.95 MG/DL — SIGNIFICANT CHANGE UP (ref 0.5–1.3)
GLUCOSE BLDC GLUCOMTR-MCNC: 157 MG/DL — HIGH (ref 70–99)
GLUCOSE SERPL-MCNC: 95 MG/DL — SIGNIFICANT CHANGE UP (ref 70–99)
HCT VFR BLD CALC: 38.5 % — SIGNIFICANT CHANGE UP (ref 34.5–45)
HGB BLD-MCNC: 12.7 G/DL — SIGNIFICANT CHANGE UP (ref 11.5–15.5)
MAGNESIUM SERPL-MCNC: 2 MG/DL — SIGNIFICANT CHANGE UP (ref 1.6–2.6)
MCHC RBC-ENTMCNC: 29.3 PG — SIGNIFICANT CHANGE UP (ref 27–34)
MCHC RBC-ENTMCNC: 33 GM/DL — SIGNIFICANT CHANGE UP (ref 32–36)
MCV RBC AUTO: 88.9 FL — SIGNIFICANT CHANGE UP (ref 80–100)
PLATELET # BLD AUTO: 160 K/UL — SIGNIFICANT CHANGE UP (ref 150–400)
POTASSIUM SERPL-MCNC: 4.4 MMOL/L — SIGNIFICANT CHANGE UP (ref 3.5–5.3)
POTASSIUM SERPL-SCNC: 4.4 MMOL/L — SIGNIFICANT CHANGE UP (ref 3.5–5.3)
RBC # BLD: 4.33 M/UL — SIGNIFICANT CHANGE UP (ref 3.8–5.2)
RBC # FLD: 12.9 % — SIGNIFICANT CHANGE UP (ref 10.3–14.5)
SODIUM SERPL-SCNC: 136 MMOL/L — SIGNIFICANT CHANGE UP (ref 135–145)
WBC # BLD: 6.77 K/UL — SIGNIFICANT CHANGE UP (ref 3.8–10.5)
WBC # FLD AUTO: 6.77 K/UL — SIGNIFICANT CHANGE UP (ref 3.8–10.5)

## 2021-07-30 PROCEDURE — 70496 CT ANGIOGRAPHY HEAD: CPT | Mod: 26

## 2021-07-30 PROCEDURE — 70498 CT ANGIOGRAPHY NECK: CPT | Mod: 26

## 2021-07-30 PROCEDURE — 99233 SBSQ HOSP IP/OBS HIGH 50: CPT

## 2021-07-30 PROCEDURE — 70450 CT HEAD/BRAIN W/O DYE: CPT | Mod: 26,59

## 2021-07-30 RX ADMIN — Medication 81 MILLIGRAM(S): at 14:51

## 2021-07-30 RX ADMIN — Medication 30 MILLIGRAM(S): at 22:41

## 2021-07-30 RX ADMIN — ATORVASTATIN CALCIUM 80 MILLIGRAM(S): 80 TABLET, FILM COATED ORAL at 22:41

## 2021-07-30 RX ADMIN — CLOPIDOGREL BISULFATE 75 MILLIGRAM(S): 75 TABLET, FILM COATED ORAL at 14:51

## 2021-07-30 RX ADMIN — Medication 30 MILLIGRAM(S): at 14:51

## 2021-07-30 RX ADMIN — ENOXAPARIN SODIUM 40 MILLIGRAM(S): 100 INJECTION SUBCUTANEOUS at 14:52

## 2021-07-30 RX ADMIN — Medication 30 MILLIGRAM(S): at 05:17

## 2021-07-30 NOTE — PROGRESS NOTE ADULT - SUBJECTIVE AND OBJECTIVE BOX
[facility] Division of Hospital Medicine    Chief Complaint:    Left sided weakness    SUBJECTIVE / OVERNIGHT EVENTS:  Patient with new episode of stuttering and difficulty   Patient denies chest pain, SOB, abd pain, N/V, fever, chills, dysuria or any other complaints. All remainder ROS negative.     MEDICATIONS  (STANDING):  aspirin enteric coated 81 milliGRAM(s) Oral daily  atorvastatin 80 milliGRAM(s) Oral at bedtime  clopidogrel Tablet 75 milliGRAM(s) Oral daily  diltiazem    Tablet 30 milliGRAM(s) Oral three times a day  enoxaparin Injectable 40 milliGRAM(s) SubCutaneous daily    MEDICATIONS  (PRN):        I&O's Summary      PHYSICAL EXAM:  Vital Signs Last 24 Hrs  T(C): 36.9 (30 Jul 2021 16:03), Max: 37.1 (30 Jul 2021 09:45)  T(F): 98.4 (30 Jul 2021 16:03), Max: 98.7 (30 Jul 2021 09:45)  HR: 76 (30 Jul 2021 16:03) (70 - 78)  BP: 117/78 (30 Jul 2021 16:03) (115/78 - 129/86)  BP(mean): --  RR: 18 (30 Jul 2021 16:03) (18 - 21)  SpO2: 99% (30 Jul 2021 16:03) (96% - 99%)        CONSTITUTIONAL: NAD, well-developed, well-groomed  ENMT: Moist oral mucosa, no pharyngeal injection or exudates; normal dentition  RESPIRATORY: Normal respiratory effort; lungs are clear to auscultation bilaterally  CARDIOVASCULAR: Regular rate and rhythm, normal S1 and S2, no murmur/rub/gallop; No lower extremity edema; Peripheral pulses are 2+ bilaterally  ABDOMEN: Nontender to palpation, normoactive bowel sounds, no rebound/guarding; No hepatosplenomegaly  MUSCLOSKELETAL:  Normal gait; no clubbing or cyanosis of digits; no joint swelling or tenderness to palpation  PSYCH: A+O to person, place, and time; affect appropriate  NEUROLOGY: CN 2-12 are intact and symmetric; no gross sensory deficits;   SKIN: No rashes; no palpable lesions    LABS:                        12.7   6.77  )-----------( 160      ( 30 Jul 2021 06:29 )             38.5     07-30    136  |  103  |  16.2  ----------------------------<  95  4.4   |  23.0  |  0.95    Ca    8.6      30 Jul 2021 06:29  Mg     2.0     07-30                CAPILLARY BLOOD GLUCOSE  157 (30 Jul 2021 10:35)      POCT Blood Glucose.: 157 mg/dL (30 Jul 2021 09:53)        RADIOLOGY & ADDITIONAL TESTS:  Results Reviewed:   Imaging Personally Reviewed:  Electrocardiogram Personally Reviewed:                                           [facility] Division of Hospital Medicine    Chief Complaint:    Left sided weakness    SUBJECTIVE / OVERNIGHT EVENTS:  Patient with new episode of dysarthria/ stuttering as well as generalized weakness suddenly this morning. Stroke code called, Urgently taken to CT without new evidence of ischemia.     Patient denies chest pain, SOB, abd pain, N/V, fever, chills, dysuria or any other complaints. All remainder ROS negative.     MEDICATIONS  (STANDING):  aspirin enteric coated 81 milliGRAM(s) Oral daily  atorvastatin 80 milliGRAM(s) Oral at bedtime  clopidogrel Tablet 75 milliGRAM(s) Oral daily  diltiazem    Tablet 30 milliGRAM(s) Oral three times a day  enoxaparin Injectable 40 milliGRAM(s) SubCutaneous daily    MEDICATIONS  (PRN):        I&O's Summary      PHYSICAL EXAM:  Vital Signs Last 24 Hrs  T(C): 36.9 (30 Jul 2021 16:03), Max: 37.1 (30 Jul 2021 09:45)  T(F): 98.4 (30 Jul 2021 16:03), Max: 98.7 (30 Jul 2021 09:45)  HR: 76 (30 Jul 2021 16:03) (70 - 78)  BP: 117/78 (30 Jul 2021 16:03) (115/78 - 129/86)  BP(mean): --  RR: 18 (30 Jul 2021 16:03) (18 - 21)  SpO2: 99% (30 Jul 2021 16:03) (96% - 99%)        CONSTITUTIONAL: NAD, well-developed, well-groomed  ENMT: Moist oral mucosa, no pharyngeal injection or exudates; normal dentition  RESPIRATORY: Normal respiratory effort; lungs are clear to auscultation bilaterally  CARDIOVASCULAR: Regular rate and rhythm, normal S1 and S2, no murmur/rub/gallop; No lower extremity edema; Peripheral pulses are 2+ bilaterally  ABDOMEN: Nontender to palpation, normoactive bowel sounds, no rebound/guarding; No hepatosplenomegaly  MUSCLOSKELETAL:  Normal gait; no clubbing or cyanosis of digits; no joint swelling or tenderness to palpation  PSYCH: A+O to person, place, and time; affect appropriate  NEUROLOGY: CN 2-12 are intact and symmetric; no gross sensory deficits; LLE Weakness at baseline. Dys  SKIN: No rashes; no palpable lesions    LABS:                        12.7   6.77  )-----------( 160      ( 30 Jul 2021 06:29 )             38.5     07-30    136  |  103  |  16.2  ----------------------------<  95  4.4   |  23.0  |  0.95    Ca    8.6      30 Jul 2021 06:29  Mg     2.0     07-30                CAPILLARY BLOOD GLUCOSE  157 (30 Jul 2021 10:35)      POCT Blood Glucose.: 157 mg/dL (30 Jul 2021 09:53)        RADIOLOGY & ADDITIONAL TESTS:  Results Reviewed:   Imaging Personally Reviewed:  Electrocardiogram Personally Reviewed:                                           [facility] Division of Hospital Medicine    Chief Complaint:    Left sided weakness    SUBJECTIVE / OVERNIGHT EVENTS:  Patient with new episode of dysarthria/ stuttering as well as generalized weakness suddenly this morning. Stroke code called, Urgently taken to CT without new evidence of ischemia.     Patient denies chest pain, SOB, abd pain, N/V, fever, chills, dysuria or any other complaints. All remainder ROS negative.     MEDICATIONS  (STANDING):  aspirin enteric coated 81 milliGRAM(s) Oral daily  atorvastatin 80 milliGRAM(s) Oral at bedtime  clopidogrel Tablet 75 milliGRAM(s) Oral daily  diltiazem    Tablet 30 milliGRAM(s) Oral three times a day  enoxaparin Injectable 40 milliGRAM(s) SubCutaneous daily    MEDICATIONS  (PRN):        I&O's Summary      PHYSICAL EXAM:  Vital Signs Last 24 Hrs  T(C): 36.9 (30 Jul 2021 16:03), Max: 37.1 (30 Jul 2021 09:45)  T(F): 98.4 (30 Jul 2021 16:03), Max: 98.7 (30 Jul 2021 09:45)  HR: 76 (30 Jul 2021 16:03) (70 - 78)  BP: 117/78 (30 Jul 2021 16:03) (115/78 - 129/86)  BP(mean): --  RR: 18 (30 Jul 2021 16:03) (18 - 21)  SpO2: 99% (30 Jul 2021 16:03) (96% - 99%)        CONSTITUTIONAL: NAD, well-developed, well-groomed  ENMT: Moist oral mucosa, no pharyngeal injection or exudates; normal dentition  RESPIRATORY: Normal respiratory effort; lungs are clear to auscultation bilaterally  CARDIOVASCULAR: Regular rate and rhythm, normal S1 and S2, no murmur/rub/gallop; No lower extremity edema; Peripheral pulses are 2+ bilaterally  ABDOMEN: Nontender to palpation, normoactive bowel sounds, no rebound/guarding; No hepatosplenomegaly  MUSCLOSKELETAL:  Normal gait; no clubbing or cyanosis of digits; no joint swelling or tenderness to palpation  PSYCH: A+O to person, place, and time; affect appropriate  NEUROLOGY: CN 2-12 are intact and symmetric; no gross sensory deficits; LLE Weakness at baseline. Dysarthria with difficulty finding speech.  SKIN: No rashes; no palpable lesions    LABS:                        12.7   6.77  )-----------( 160      ( 30 Jul 2021 06:29 )             38.5     07-30    136  |  103  |  16.2  ----------------------------<  95  4.4   |  23.0  |  0.95    Ca    8.6      30 Jul 2021 06:29  Mg     2.0     07-30                CAPILLARY BLOOD GLUCOSE  157 (30 Jul 2021 10:35)      POCT Blood Glucose.: 157 mg/dL (30 Jul 2021 09:53)        RADIOLOGY & ADDITIONAL TESTS:  Results Reviewed:   Imaging Personally Reviewed:  Electrocardiogram Personally Reviewed:

## 2021-07-30 NOTE — CHART NOTE - NSCHARTNOTEFT_GEN_A_CORE
Called by RN bc Pt having acute onset aphasia w/increased LUE weakness. Pt assessed at bedside by PA w/RN.  VS: /86, HR 78, RR 22, Temp 98.7, Spo2 99%, . Code stroke called at 09:46. Stroke team at bedside along w/Dr. Hinson.  CTA head performed STAT, Dr. Young read final reuslts via phone to JAMISON Rabago. Pt w/prior Hx of TIA/Stroke, admitted to [facility] to r/o new onset TIA/Stroke. On this admission Pt underwent EEG and had CTA & MR head performed, no acute findings. Pt had episodes of VT on monitor and Cardiology consulted. Pt started on Cardizem. Neuro also following, awaiting further recs. Called by RN bc Pt having acute onset aphasia w/increased LUE weakness. Pt assessed by JAMISON levi/RN.  VS: /86, HR 78, RR 22, Temp 98.7, Spo2 99%, . NIHSS 8. Code stroke called at 09:46. Stroke team at bedside along w/Dr. Hinson.  CTA head performed STAT, Dr. Young read final reuslts via phone to JAMISON Rabago. Pt w/prior Hx of TIA/Stroke, admitted to [facility] to r/o new onset TIA/Stroke. On this admission Pt underwent EEG and had CTA & MR head performed, no acute findings. Pt had episodes of VT on monitor and Cardiology consulted. Pt started on Cardizem. Neuro also following, awaiting further recs.    Vital Signs Last 24 Hrs  T(C): 36.4 (30 Jul 2021 10:52), Max: 37.1 (30 Jul 2021 09:45)  T(F): 97.6 (30 Jul 2021 10:52), Max: 98.7 (30 Jul 2021 09:45)  HR: 76 (30 Jul 2021 10:52) (70 - 78)  BP: 123/70 (30 Jul 2021 10:52) (115/78 - 133/75)  BP(mean): --  ABP: --  ABP(mean): --  RR: 20 (30 Jul 2021 10:52) (18 - 21)  SpO2: 96% (30 Jul 2021 10:52) (96% - 99%)    PHYSICAL EXAM:  GENERAL: Pt sitting upright in chair, w/new onset aphasia  HEAD:  Atraumatic, Normocephalic  EYES: EOMI, PERRL, conjunctiva and sclera clear  ENT: Moist mucous membranes  NECK: Supple, No JVD  CHEST/LUNG: Clear to auscultation bilaterally; No rales, rhonchi, wheezing, or rubs. Unlabored respirations  HEART: Regular rate and rhythm; No murmurs, rubs, or gallops  ABDOMEN: Bowel sounds present; Soft, Nontender, Nondistended   EXTREMITIES:  2+ Peripheral Pulses, brisk capillary refill. No clubbing, cyanosis, or edema  NERVOUS SYSTEM:  Alert & Oriented X3, speech clear. No facial droop, tongue protrusion midline. Answers questions appropriately. Full and equal 5/5 strength B/L upper and lower extremities. +reflexes B/L LE. Sensation intact. No deficits   MSK: FROM x 4 extremities   SKIN: No rashes or lesions Called by RN bc Pt having acute onset aphasia w/increased LUE weakness. Pt assessed by PA w/RN.  VS: /86, HR 78, RR 22, Temp 98.7, Spo2 99%, . NIHSS 8. Code stroke called at 09:46. Stroke team at bedside along w/Dr. Hinson.  CTA head performed STAT, Dr. Young read final reuslts via phone to JAMISON Rabago. Pt w/prior Hx of TIA/Stroke, admitted to [facility] to r/o new onset TIA/Stroke. On this admission Pt underwent EEG and had CTA & MR head performed, no acute findings. Pt had episodes of VT on monitor and Cardiology consulted. Pt started on Cardizem. Neuro also following, awaiting further recs.    Vital Signs Last 24 Hrs  T(C): 36.4 (30 Jul 2021 10:52), Max: 37.1 (30 Jul 2021 09:45)  T(F): 97.6 (30 Jul 2021 10:52), Max: 98.7 (30 Jul 2021 09:45)  HR: 76 (30 Jul 2021 10:52) (70 - 78)  BP: 123/70 (30 Jul 2021 10:52) (115/78 - 133/75)  BP(mean): --  ABP: --  ABP(mean): --  RR: 20 (30 Jul 2021 10:52) (18 - 21)  SpO2: 96% (30 Jul 2021 10:52) (96% - 99%)    PHYSICAL EXAM:  GENERAL: Pt sitting upright in chair, w/new onset aphasia, appears anxious  HEAD:  Atraumatic, Normocephalic  EENT: conjunctiva and sclera clear, MMM, trachea midline, nares patent  NECK: Supple, No JVD  CHEST/LUNG: CTABL; No rales, rhonchi, wheezing, or rubs. Unlabored respirations  HEART: RRR; No murmurs, rubs, or gallops  ABDOMEN: Bowel sounds present; Soft, NTTP, ND  EXTREMITIES:  2+ Peripheral Pulses, brisk capillary refill. No clubbing, cyanosis, or edema. LUE weakness  NERVOUS SYSTEM:  A&OX3, suttered speech w/aphasia. No facial droop, tongue protrusion midline. Answers questions appropriately. Decreased strength LUE & LLE. +reflexes B/L LE. Sensation intact.    SKIN: No rashes or lesions

## 2021-07-30 NOTE — PROGRESS NOTE ADULT - SUBJECTIVE AND OBJECTIVE BOX
[facility], [facility]                              [address] [address]                                                 PHONE: [phone]                                                 FAX: [phone]  -----------------------------------------------------------------------------------------------  Pt seen and examined. FU for  shortness of breath     Overnight events/Complaints: Pt reports feeling well.    Vital Signs Last 24 Hrs  T(C): 36.7 (30 Jul 2021 08:00), Max: 36.9 (30 Jul 2021 04:22)  T(F): 98 (30 Jul 2021 08:00), Max: 98.4 (30 Jul 2021 04:22)  HR: 75 (30 Jul 2021 08:00) (70 - 76)  BP: 120/73 (30 Jul 2021 08:00) (115/78 - 133/75)  BP(mean): --  RR: 18 (30 Jul 2021 08:00) (18 - 18)  SpO2: 96% (30 Jul 2021 08:00) (96% - 99%)  I&O's Summary      PHYSICAL EXAM:  Constitutional: Appears well; alert and co-operative  HEENT:     Head: Normocephalic and atraumatic  Neck: supple. No JVD  Cardiovascular: regular S1 S2  Respiratory: Lungs clear to auscultation; no crepitations, no wheeze  Gastrointestinal:  Soft, Non-tender, + BS	  Musculoskeletal: Normal range of motion. No edema  Skin: Warm and dry. No cyanosis . No diaphoresis.  Neurologic: Alert oriented to time place and person. Normal strength and no tremor.        LABS:                        12.7   6.77  )-----------( 160      ( 30 Jul 2021 06:29 )             38.5     07-30    136  |  103  |  16.2  ----------------------------<  95  4.4   |  23.0  |  0.95    Ca    8.6      30 Jul 2021 06:29  Mg     2.0     07-30    CARDIOLOGY TESTING:(reviewed)     12 lead EKG independently visualized/reviewed  and demonstrated NSR    ECHOCARDIOGRAM independently visualized/reviewed and demonstrated :   Summary:   1. Normal left atrial size.   2. Segmental wall motion abnormalities in RCA territory,.   3. Left ventricular ejection fraction, by visual estimation, is 50%. Grade I diastolic dysfunction.   4. Normal right atrial size.   5. Normal right ventricular size and function.   6. Mild to moderate mitral valve regurgitation.   7. Mild to moderate aortic regurgitation.   8. Mild tricuspid regurgitation.   9. Trivial pericardial effusion.    TELEMETRY independently visualized/reviewed and demonstrated : NSR    ILR without any significant events    MEDICATIONS:(reviewed)  MEDICATIONS  (STANDING):  aspirin enteric coated 81 milliGRAM(s) Oral daily  atorvastatin 80 milliGRAM(s) Oral at bedtime  clopidogrel Tablet 75 milliGRAM(s) Oral daily  diltiazem    Tablet 30 milliGRAM(s) Oral three times a day  enoxaparin Injectable 40 milliGRAM(s) SubCutaneous daily    ASSESSMENT AND PLAN:    80y Female PMHX HTN, HLD, CAD, s/p pci to LAD, TIA's, polio, who presenteds to ED with Left sided weakness and slurred speech.  She was admitted for possible TIA.  pt with no reported cp, palps, sob.     Recurrent TIA   -  asa//brilinta   - Repeat Echo with wall motion abnormalities documented on prior SPECT  - Continue low dose cardizem  - ILR without any significant events    No further inpt cardiac work-up needed. Pls recall if any qns/concerns. Will arrange outpt FU post discharge.

## 2021-07-30 NOTE — PROVIDER CONTACT NOTE (OTHER) - SITUATION
Pt was walking to the bathroom and as per aide pt began to feel dizzy and was sat down in the chair. VSS at this time. Pt having difficulty finding words.
Pt with 10 beats of vtach on monitor

## 2021-07-30 NOTE — PROGRESS NOTE ADULT - ASSESSMENT
81 yo woman with episode of L sided weakness and dysarthria. L sided weakness improving, but still with some LLE weakness. Symptoms 2/2 TIA vs seizure? This is patient's fifth episode since May 2020.    Episodes of dysarthria and L sided weakness.  MRI brain w/o contrast did not show stroke- likely TIA.  c-EEG did not show seizure activity   Continue Aspirin, Plavix and Lipitor.   No anti-epileptics at this time.    Episodes may be related to anxiety as work up has  been negative on multiple occasions.   Would not suggest t-PA at this time.     Will need PT reevaluation and possible rehab.     Discussed with Dr Hinson.

## 2021-07-30 NOTE — CHART NOTE - NSCHARTNOTEFT_GEN_A_CORE
STROKE CODE PA FOLLOW UP NOTE    Pt is a 2hr s/p Code Stroke for acute onset aphasia and LUE weakness. Pt seen and examined at bedside around 12:00pm. Pt states she feels much better. Denies fever, chills, visual changes, HA, dizziness, CP, difficulty breathing, abdominal pain, NVD, easy bleeding or any other complaints.     Vital Signs Last 24 Hrs  T(C): 36.4 (30 Jul 2021 10:52), Max: 37.1 (30 Jul 2021 09:45)  T(F): 97.6 (30 Jul 2021 10:52), Max: 98.7 (30 Jul 2021 09:45)  HR: 76 (30 Jul 2021 10:52) (70 - 78)  BP: 123/70 (30 Jul 2021 10:52) (115/78 - 133/75)  BP(mean): --  RR: 20 (30 Jul 2021 10:52) (18 - 21)  SpO2: 96% (30 Jul 2021 10:52) (96% - 99%)    PHYSICAL EXAM:  GENERAL: Pt laying comfortably in bed, in NAD  ENT: No carotid bruits, supple, moist mucus membranes  CARDIAC: S1, S1, RRR, no murmurs or gallops  RESPIRATORY: CTABL, no wheezes, rhonchi or rales. Respirations unlabored  ABDOMEN: + bowel sounds, soft, NTTP, ND, no guarding or rigidity  SKIN: Dry and intact. No clubbing, cyanosis or edema  NEURO: Awake, A&Ox3 to person, place, and time. Speech is fluent, however, occasionally speech is broken and appears to have a forced intentional stutter. Intact naming, repetition, and comprehension. Recent and remote memory intact. Attention/concentration intact. No dysarthria, no aphasia. Answers questions appropriately.    -Cranial nerves:  II: Visual fields full to confrontation. Pupils PERRL  III, IV, VI: extraocular movements are intact without nystagmus  V: Facial sensation intact V1 through V3 intact bilaterally  VII: Face is symmetric with normal eye closure and smile, no facial droop.  VIII: hearing is intact to finger rub  IX, X: uvula is midline and soft palate rises symmetrically  XI: Head turning and shoulder shrug intact  XII: Tongue protrudes in the midline    -Motor:  Normal bulk and tone, strength 5/5 in BL UE & LE.   strength 5/5.  Rapid alternating movements intact and symmetric. No pronator drift.   -Sensation: Intact to light touch, proprioception, and pinprick.  Intact pain and temperature sense. No neglect. Romberg negative.  -Coordination: No dysmetria on finger-to-nose and heel-to-shin.  No clumsiness.  -Reflexes: 2+ in upper and lower extremities, downgoing toes bilaterally  -Gait: unperformed    NIH SS: 0  DATE: 07/30/2021  TIME: 12:00pm  1A: Level of consciousness (0-3): 0  1B: Questions (0-2): 0    1C: Commands (0-2): 0  2: Gaze (0-2): 0  3: Visual fields (0-3): 0  4: Facial palsy (0-3): 0  MOTOR:  5A: Left arm motor drift (0-4): 0  5B: Right arm motor drift (0-4): 0  6A: Left leg motor drift (0-4): 0  6B: Right leg motor drift (0-4): 0  7: Limb ataxia (0-2): 0  SENSORY:  8: Sensation (0-2): 0  SPEECH:  9: Language (0-3): 0  10: Dysarthria (0-2): 0  EXTINCTION:  11: Extinction/inattention (0-2): 0    TOTAL SCORE: 0    Fingerstick Blood Glucose: CAPILLARY BLOOD GLUCOSE  157 (30 Jul 2021 10:35)    POCT Blood Glucose.: 157 mg/dL (30 Jul 2021 09:53)     LABS:                       12.7   6.77  )-----------( 160      ( 30 Jul 2021 06:29 )             38.5     07-30    136  |  103  |  16.2  ----------------------------<  95  4.4   |  23.0  |  0.95    Ca    8.6      30 Jul 2021 06:29  Mg     2.0     07-30      RADIOLOGY & ADDITIONAL STUDIES:    < from: CT Angio Head w/ IV Cont (07.30.21 @ 10:25) >       EXAM:  CT ANGIO BRAIN (W)AW IC                         EXAM:  CT ANGIO NECK (W)AW IC                         EXAM:  CT BRAIN                          PROCEDURE DATE:  07/30/2021      INTERPRETATION:  CT OF THE HEAD WITHOUT CONTRAST, CT ANGIOGRAPHY OF HEAD AND NECK    CLINICAL INDICATION: Code stroke.    TECHNIQUE: Initially volumetric CT acquisition was performed through the brain and reviewed using brain and bone window technique. Sagittal and coronal reconstructed images were obtained. Dose optimization techniques were utilized including kVp/mA modulation along with iterative reconstructions.  Subsequently volumetric acquisition was performed from the thoracic inlet to the vertex.    RAPID artificial intelligence was used for preliminary evaluation of intracranial hemorrhage.    A total of 50 cc of Omnipaque 350 was injected intravenously without complications.  Dose optimization techniques were utilized including kVp/mA modulation along with iterative reconstructions.  MIP imageanalysis was performed on a separate workstation.    COMPARISON: MRI brain, 7/27/2021 and CT stroke series, 7/25/2021.    FINDINGS:    Head CT:    The ventricular and sulcal size and configuration is age appropriate.   There are scattered white matter hypodensities which are nonspecific but most commonly represent chronic microvascular ischemic changes.  There is no CT evidence for established territorial infarction. There is no acute loss of gray-white differentiation.    There is no evidence ofmass effect, midline shift, acute intracranial hemorrhage, or extra-axial collections.    The visualized globes are symmetric bilaterally. The paranasal sinuses and tympanomastoid air cells are clear.      CT angiography:  < from: CT Angio Head w/ IV Cont (07.30.21 @ 10:25) >    NECK:  The aortic arch isconventional in anatomy. Origin of supraaortic arteries are patent. The common carotid arteries are normal in course and caliber. There are atherosclerotic plaques at common carotid bifurcations and carotid bulbs without evidence of significant segmental stenosis.    The right internal carotid artery is normal in caliber. There is 0 % stenosis using NASCET criteria (normal right ICA caliber is 4.4 mm).    The left internal carotid artery is normal in caliber. There is 0 % stenosis using NASCET criteria (normal left ICA caliber is 4.4 mm).    Bilateral vertebral arteries are normal in course, caliber and contour, without evidence of dissection or occlusion.    Degenerative changes of the bony cervical spine are noted.    BRAIN:    The petrocavernous and supraclinoid ICA segments are normal in caliber.  The visualized MCA and ARISTIDES branches are normal without evidence of stenosis or aneurysm. The ACOM is present. Bilateral posterior communicating arteries are present.  The vertebral arteries,visualized cerebellar arteries, basilar and bilateral posterior cerebral arteries are patent without evidence of stenoses or aneurysm.    Other findings: None.      IMPRESSION:    CT brain: There is no acute lobar infarction, intracranial hemorrhage,mass lesion, mass effect or herniation. No interval change since prior examinations.    CTA brain: There is no large vessel occlusion or aneurysm involving major proximal intracranial arteries.    CTA NECK: There is no stenosis involving major neck arteries.    < from: CT Angio Head w/ IV Cont (07.30.21 @ 10:25) >      NASCET CAROTID STENOSIS CRITERIA (distal normal appearing ICA as denominator for measurement): 0%-none, 1-49%-mild, 50-70%-moderate, 70-89%-severe, 90-99%-critical.    Notification to clinician of alert:    Provider   JAMISON Calero  was notified about the final results at 7/30/2021 1032 AM via telephone by neuroradiologist VIVI Young. Readback confirmation was obtain    < end of copied text >          ASSESSMENT/PLAN: Pt is a 2hr s/p Code Stroke for acute onset aphasia and LUE weakness .   - CT head results noted above, no acute findings  - Prior MRI head also w/o acute findings  - Prior EEG completed, negative  - IV tPA not given   - C/w ASA & Plavix  - C/w Atorvastatin 80   - Neuro following, Episodes may be related to anxiety as work up has  been negative on multiple occasions.   Would not suggest t-PA at this time. No anti-epileptics at this time.  - VS stable,  Monitor VS closely  - DVT PPx- Lovenox SQ  - Advised RN to contact provider w/further clinical changes  - Discussed POC w/Dr. Hinson

## 2021-07-30 NOTE — PROGRESS NOTE ADULT - ASSESSMENT
80F with a history of CAD, HTN, HLD, Stroke/TIA who presented with left sided weakness and dysarthria similar to her prior episodes of stroke/TIA.    #TIA? with left sided weakness and dysarthria on admission. Symptoms improved. CT/CTA without ischemic findings. MRI performed during admission without cute findings EEG without epileptic activity.  C/w DAPT/Lipitor  Patient with new episode of symptoms acutely this morning including dysarthria/stuttering. Stroke code called. CTH CTA H/N repeated without new findings. Dysarthria improved but not resolved after returning to Unit.   Consider TIA vs Underlying anxiety / panic attack?  Patient was evaluated and states that she does not have any stressors.  Cont to monitor overnight and consider discharge 8/1.     #Hx of CAD on DAPT and Lipitor  #NSVT  Cardiology consulted and started on CCB  TTE unchanged from prior findings  To follow up as outaptient    #DVT ppx with Lovenox       80F with a history of CAD, HTN, HLD, Stroke/TIA who presented with left sided weakness and dysarthria similar to her prior episodes of stroke/TIA.    #TIA? with left sided weakness and dysarthria on admission. Symptoms improved. CT/CTA without ischemic findings. MRI performed during admission without cute findings EEG without epileptic activity.  C/w DAPT/Lipitor  Patient with new episode of symptoms acutely this morning including dysarthria/stuttering. Stroke code called. CTH CTA H/N repeated without new findings. Dysarthria improved but not resolved after returning to Unit.   Consider TIA vs Underlying anxiety / panic attack?  Patient was evaluated and states that she does not have any stressors.  Cont to monitor overnight and consider discharge 7/31.     #Hx of CAD on DAPT and Lipitor  #NSVT  Cardiology consulted and started on CCB  TTE unchanged from prior findings  To follow up as outaptient    #DVT ppx with Lovenox

## 2021-07-30 NOTE — PROGRESS NOTE ADULT - SUBJECTIVE AND OBJECTIVE BOX
[facility]                                        Neurology at [place]                                 Leigh Prather & Anjum                                  [address]                                        [place], [place], [address]                                             [phone]        CC: TIA.     HPI:   81 yo woman with medical conditions as outlined below presents with left sided weakness and dysarthria. She states this morning, she was standing and felt lightheaded. She sat down and felt some shortness of breath. She got ready and went to [employer], but states she was too short or breath to stay there more than 5 minutes. She then drove home and noticed she could not lift her left leg. She states she then had slurred speech.     Interim history:  On 5 Tower.   The patient has increased weakness of left leg compared to baseline.  Had initially been admitted with slurred speech and left leg weakness (~4/5).   The weakness had resolved.   Now back to ~4/5.   In addition she has stuttering speech and tremors.     ROS:   Denies headache or dizziness.  Denies chest pain.  Denies shortness of breath.    MEDICATIONS  (STANDING):  aspirin enteric coated 81 milliGRAM(s) Oral daily  atorvastatin 80 milliGRAM(s) Oral at bedtime  clopidogrel Tablet 75 milliGRAM(s) Oral daily  diltiazem    Tablet 30 milliGRAM(s) Oral three times a day  enoxaparin Injectable 40 milliGRAM(s) SubCutaneous daily      Vital Signs Last 24 Hrs  T(C): 36.4 (30 Jul 2021 10:52), Max: 37.1 (30 Jul 2021 09:45)  T(F): 97.6 (30 Jul 2021 10:52), Max: 98.7 (30 Jul 2021 09:45)  HR: 76 (30 Jul 2021 10:52) (70 - 78)  BP: 123/70 (30 Jul 2021 10:52) (115/78 - 133/75)  RR: 20 (30 Jul 2021 10:52) (18 - 21)  SpO2: 96% (30 Jul 2021 10:52) (96% - 99%)    Appears markedly anxious.     Detailed Neurologic Exam:    Mental status: The patient is awake and alert. There is no aphasia. There is no dysarthria. She is stuttering her words.     Cranial nerves: Pupils equal and react symmetrically to light. There is no visual field deficit to threat. Extraocular motion is full with no nystagmus. Facial sensation is intact. Facial musculature is symmetric. Palate elevates symmetrically. Tongue is midline.    Motor: There is normal bulk and tone.  There is tremor of the upper extremities on holding them outstretched. There is no cogwheeling or masking of expression. .  Strength grossly 5/5 in the upper extremities bilaterally.  Right LE is 5/5.  Left LE is 4/5.    Sensation: Grossly intact to light touch and pin.    Reflexes: 2+ throughout and plantar responses are flexor.    Cerebellar: No dysmetria on finger nose testing.    Labs:     07-30    136  |  103  |  16.2  ----------------------------<  95  4.4   |  23.0  |  0.95    Ca    8.6      30 Jul 2021 06:29  Mg     2.0     07-30                          12.7   6.77  )-----------( 160      ( 30 Jul 2021 06:29 )             38.5       Rad:   CT head images reviewed (and concur with report): There is no acute pathology.     CT-A negative as well.

## 2021-07-30 NOTE — CHART NOTE - NSCHARTNOTEFT_GEN_A_CORE
[facility]                                        Neurology at [place]                                 Leigh Prather & Anjum                                  [address]                                        [place], [place], [address]                                             [phone]          Responded to code stroke call.   Case discussed with Dr Hinson.  The patient has increased weakness of left leg compared to baseline.  Had initially been admitted with slurred speech and left leg weakness (~4/5).   The weakness had resolved.   Now back to ~4/5.     To go for CT head.  Full evaluation to follow.

## 2021-07-31 VITALS
OXYGEN SATURATION: 97 % | DIASTOLIC BLOOD PRESSURE: 92 MMHG | TEMPERATURE: 98 F | HEART RATE: 87 BPM | RESPIRATION RATE: 18 BRPM | SYSTOLIC BLOOD PRESSURE: 126 MMHG

## 2021-07-31 PROCEDURE — 86900 BLOOD TYPING SEROLOGIC ABO: CPT

## 2021-07-31 PROCEDURE — 70551 MRI BRAIN STEM W/O DYE: CPT

## 2021-07-31 PROCEDURE — 82947 ASSAY GLUCOSE BLOOD QUANT: CPT

## 2021-07-31 PROCEDURE — 85610 PROTHROMBIN TIME: CPT

## 2021-07-31 PROCEDURE — 83036 HEMOGLOBIN GLYCOSYLATED A1C: CPT

## 2021-07-31 PROCEDURE — 93005 ELECTROCARDIOGRAM TRACING: CPT

## 2021-07-31 PROCEDURE — 70450 CT HEAD/BRAIN W/O DYE: CPT

## 2021-07-31 PROCEDURE — 97167 OT EVAL HIGH COMPLEX 60 MIN: CPT

## 2021-07-31 PROCEDURE — 82803 BLOOD GASES ANY COMBINATION: CPT

## 2021-07-31 PROCEDURE — 82330 ASSAY OF CALCIUM: CPT

## 2021-07-31 PROCEDURE — 85025 COMPLETE CBC W/AUTO DIFF WBC: CPT

## 2021-07-31 PROCEDURE — 85018 HEMOGLOBIN: CPT

## 2021-07-31 PROCEDURE — 97163 PT EVAL HIGH COMPLEX 45 MIN: CPT

## 2021-07-31 PROCEDURE — 84132 ASSAY OF SERUM POTASSIUM: CPT

## 2021-07-31 PROCEDURE — 85730 THROMBOPLASTIN TIME PARTIAL: CPT

## 2021-07-31 PROCEDURE — 83735 ASSAY OF MAGNESIUM: CPT

## 2021-07-31 PROCEDURE — 86769 SARS-COV-2 COVID-19 ANTIBODY: CPT

## 2021-07-31 PROCEDURE — 85014 HEMATOCRIT: CPT

## 2021-07-31 PROCEDURE — 70498 CT ANGIOGRAPHY NECK: CPT | Mod: MA

## 2021-07-31 PROCEDURE — 93306 TTE W/DOPPLER COMPLETE: CPT

## 2021-07-31 PROCEDURE — 82435 ASSAY OF BLOOD CHLORIDE: CPT

## 2021-07-31 PROCEDURE — 86850 RBC ANTIBODY SCREEN: CPT

## 2021-07-31 PROCEDURE — 84484 ASSAY OF TROPONIN QUANT: CPT

## 2021-07-31 PROCEDURE — 99232 SBSQ HOSP IP/OBS MODERATE 35: CPT

## 2021-07-31 PROCEDURE — 95700 EEG CONT REC W/VID EEG TECH: CPT

## 2021-07-31 PROCEDURE — 84100 ASSAY OF PHOSPHORUS: CPT

## 2021-07-31 PROCEDURE — 36415 COLL VENOUS BLD VENIPUNCTURE: CPT

## 2021-07-31 PROCEDURE — 84295 ASSAY OF SERUM SODIUM: CPT

## 2021-07-31 PROCEDURE — 86901 BLOOD TYPING SEROLOGIC RH(D): CPT

## 2021-07-31 PROCEDURE — 0042T: CPT

## 2021-07-31 PROCEDURE — 80061 LIPID PANEL: CPT

## 2021-07-31 PROCEDURE — 80048 BASIC METABOLIC PNL TOTAL CA: CPT

## 2021-07-31 PROCEDURE — 82962 GLUCOSE BLOOD TEST: CPT

## 2021-07-31 PROCEDURE — 83605 ASSAY OF LACTIC ACID: CPT

## 2021-07-31 PROCEDURE — 70496 CT ANGIOGRAPHY HEAD: CPT

## 2021-07-31 PROCEDURE — 95714 VEEG EA 12-26 HR UNMNTR: CPT

## 2021-07-31 PROCEDURE — 0225U NFCT DS DNA&RNA 21 SARSCOV2: CPT

## 2021-07-31 PROCEDURE — 99239 HOSP IP/OBS DSCHRG MGMT >30: CPT

## 2021-07-31 PROCEDURE — 99285 EMERGENCY DEPT VISIT HI MDM: CPT

## 2021-07-31 PROCEDURE — 85027 COMPLETE CBC AUTOMATED: CPT

## 2021-07-31 PROCEDURE — 80053 COMPREHEN METABOLIC PANEL: CPT

## 2021-07-31 RX ORDER — DILTIAZEM HCL 120 MG
1 CAPSULE, EXT RELEASE 24 HR ORAL
Qty: 90 | Refills: 0
Start: 2021-07-31 | End: 2021-08-29

## 2021-07-31 RX ORDER — ATORVASTATIN CALCIUM 80 MG/1
1 TABLET, FILM COATED ORAL
Qty: 30 | Refills: 0
Start: 2021-07-31 | End: 2021-08-29

## 2021-07-31 RX ORDER — ATORVASTATIN CALCIUM 80 MG/1
1 TABLET, FILM COATED ORAL
Qty: 0 | Refills: 0 | DISCHARGE

## 2021-07-31 RX ORDER — CARVEDILOL PHOSPHATE 80 MG/1
1 CAPSULE, EXTENDED RELEASE ORAL
Qty: 0 | Refills: 0 | DISCHARGE

## 2021-07-31 RX ADMIN — Medication 30 MILLIGRAM(S): at 05:40

## 2021-07-31 RX ADMIN — Medication 81 MILLIGRAM(S): at 11:23

## 2021-07-31 RX ADMIN — ENOXAPARIN SODIUM 40 MILLIGRAM(S): 100 INJECTION SUBCUTANEOUS at 11:23

## 2021-07-31 RX ADMIN — CLOPIDOGREL BISULFATE 75 MILLIGRAM(S): 75 TABLET, FILM COATED ORAL at 11:23

## 2021-07-31 NOTE — PHYSICAL THERAPY INITIAL EVALUATION ADULT - DISCHARGE DISPOSITION, PT EVAL
home, no skilled PT. Pt reporting all symptoms have resolved and that she does not want PT in the home.
pending progress./home w/ home PT

## 2021-07-31 NOTE — PROGRESS NOTE ADULT - PROVIDER SPECIALTY LIST ADULT
Cardiology
Rehab Medicine
Hospitalist
Neurology
Hospitalist
Hospitalist
Neurology
Neurology
Rehab Medicine
Hospitalist

## 2021-07-31 NOTE — PHYSICAL THERAPY INITIAL EVALUATION ADULT - GENERAL OBSERVATIONS, REHAB EVAL
Pt rec'd inroom upright in the chair at bedside. Pt breathing RA in NAD.
awake in bed on room air, +telemonitor with

## 2021-07-31 NOTE — PROGRESS NOTE ADULT - SUBJECTIVE AND OBJECTIVE BOX
[facility]                                        Neurology at [place]                                 Leigh Prather & Anjum                                  [address]                                        [place], [address]                                             [phone]        CC: TIA.     HPI:   81 yo woman with medical conditions as outlined below presents with left sided weakness and dysarthria. She states this morning, she was standing and felt lightheaded. She sat down and felt some shortness of breath. She got ready and went to [employer], but states she was too short or breath to stay there more than 5 minutes. She then drove home and noticed she could not lift her left leg. She states she then had slurred speech.   The patient has increased weakness of left leg compared to baseline. In addition she has stuttering speech and tremors.     Interim history:  Now feels back to normal.    ROS:   Denies headache or dizziness.  Denies chest pain.  Denies shortness of breath.    MEDICATIONS  (STANDING):  aspirin enteric coated 81 milliGRAM(s) Oral daily  atorvastatin 80 milliGRAM(s) Oral at bedtime  clopidogrel Tablet 75 milliGRAM(s) Oral daily  diltiazem    Tablet 30 milliGRAM(s) Oral three times a day  enoxaparin Injectable 40 milliGRAM(s) SubCutaneous daily      Vital Signs Last 24 Hrs  T(C): 36.4 (31 Jul 2021 04:45), Max: 36.9 (30 Jul 2021 16:03)  T(F): 97.5 (31 Jul 2021 04:45), Max: 98.4 (30 Jul 2021 16:03)  HR: 70 (31 Jul 2021 04:45) (70 - 88)  BP: 111/63 (31 Jul 2021 04:45) (111/63 - 117/78)  RR: 19 (31 Jul 2021 04:45) (18 - 19)  SpO2: 98% (31 Jul 2021 04:45) (96% - 99%)    Detailed Neurologic Exam:    Mental status: The patient is awake and alert. There is no aphasia. There is no dysarthria.     Cranial nerves: Pupils equal and react symmetrically to light. There is no visual field deficit to threat. Extraocular motion is full with no nystagmus. Facial sensation is intact. Facial musculature is symmetric. Palate elevates symmetrically. Tongue is midline.    Motor: There is normal bulk and tone.  There is no tremor.  Strength grossly 5/5 bilaterally.    Sensation: Grossly intact to light touch and pin.    Reflexes: 2+ throughout and plantar responses are flexor.    Cerebellar: No dysmetria on finger nose testing.  Ambulating with walker.     Labs:     07-30    136  |  103  |  16.2  ----------------------------<  95  4.4   |  23.0  |  0.95    Ca    8.6      30 Jul 2021 06:29  Mg     2.0     07-30                              12.7   6.77  )-----------( 160      ( 30 Jul 2021 06:29 )             38.5

## 2021-07-31 NOTE — PROGRESS NOTE ADULT - NSICDXPILOT_GEN_ALL_CORE
[place]

## 2021-07-31 NOTE — PROGRESS NOTE ADULT - ASSESSMENT
81 yo woman with episode of L sided weakness and dysarthria.   This is patient's fifth episode since May 2020.    Episodes of dysarthria and L sided weakness.  MRI brain w/o contrast did not show stroke- likely TIA.  c-EEG did not show seizure activity   Continue Aspirin, Plavix and Lipitor.   No anti-epileptics at this time.    Episodes may be related to anxiety as work up has  been negative on multiple occasions.     Can follow up with neurology as outpatient on discharge.

## 2021-07-31 NOTE — PROGRESS NOTE ADULT - SUBJECTIVE AND OBJECTIVE BOX
BREANA HELM  ----------------------------------------  The patient was seen at bedside. Patient with weakness and dysarthria which resolved.    Vital Signs Last 24 Hrs  T(C): 36.4 (31 Jul 2021 04:45), Max: 37.1 (30 Jul 2021 09:45)  T(F): 97.5 (31 Jul 2021 04:45), Max: 98.7 (30 Jul 2021 09:45)  HR: 70 (31 Jul 2021 04:45) (70 - 88)  BP: 111/63 (31 Jul 2021 04:45) (111/63 - 129/86)  BP(mean): --  RR: 19 (31 Jul 2021 04:45) (18 - 21)  SpO2: 98% (31 Jul 2021 04:45) (96% - 99%)    CAPILLARY BLOOD GLUCOSE  POCT Blood Glucose.: 157 mg/dL (30 Jul 2021 09:53)    PHYSICAL EXAMINATION:  ----------------------------------------  General appearance: No acute distress, Awake, Alert  HEENT: Normocephalic, Atraumatic, Conjunctiva clear, EOMI  Neck: Supple, No JVD, No tenderness  Lungs: Breath sound equal bilaterally, No wheezes, No rales  Cardiovascular: S1S2, Regular rhythm  Abdomen: Soft, Nontender, Nondistended, No guarding/rebound, Positive bowel sounds  Extremities: No clubbing, No cyanosis, No edema, No calf tenderness  Neuro: No tremors, Left lower extremity weakness  Psychiatric: Appropriate mood, Normal affect    LABORATORY STUDIES:  ----------------------------------------             12.7   6.77  )-----------( 160      ( 30 Jul 2021 06:29 )             38.5     07-30    136  |  103  |  16.2  ----------------------------<  95  4.4   |  23.0  |  0.95    Ca    8.6      30 Jul 2021 06:29  Mg     2.0     07-30    MEDICATIONS  (STANDING):  aspirin enteric coated 81 milliGRAM(s) Oral daily  atorvastatin 80 milliGRAM(s) Oral at bedtime  clopidogrel Tablet 75 milliGRAM(s) Oral daily  diltiazem    Tablet 30 milliGRAM(s) Oral three times a day  enoxaparin Injectable 40 milliGRAM(s) SubCutaneous daily      ASSESSMENT / PLAN:  ----------------------------------------  80F with a history of CAD, HTN, HLD, Stroke/TIA who presented with left sided weakness and dysarthria similar to her prior episodes of stroke/TIA.    TIA - Repeat imaging was without acute intracranial pathology. Neurology consultation noted. Symptoms resolved. On aspirin, clopidogrel, and atorvastatin.    CAD - Cardiology consultation noted. Diltiazem initiated for NSVT.    Hypertension - Close blood pressure monitoring. BREANA HELM  ----------------------------------------  The patient was seen at bedside. Patient with weakness and dysarthria which resolved.    Vital Signs Last 24 Hrs  T(C): 36.4 (31 Jul 2021 04:45), Max: 37.1 (30 Jul 2021 09:45)  T(F): 97.5 (31 Jul 2021 04:45), Max: 98.7 (30 Jul 2021 09:45)  HR: 70 (31 Jul 2021 04:45) (70 - 88)  BP: 111/63 (31 Jul 2021 04:45) (111/63 - 129/86)  BP(mean): --  RR: 19 (31 Jul 2021 04:45) (18 - 21)  SpO2: 98% (31 Jul 2021 04:45) (96% - 99%)    CAPILLARY BLOOD GLUCOSE  POCT Blood Glucose.: 157 mg/dL (30 Jul 2021 09:53)    PHYSICAL EXAMINATION:  ----------------------------------------  General appearance: No acute distress, Awake, Alert  HEENT: Normocephalic, Atraumatic, Conjunctiva clear, EOMI  Neck: Supple, No JVD, No tenderness  Lungs: Breath sound equal bilaterally, No wheezes, No rales  Cardiovascular: S1S2, Regular rhythm  Abdomen: Soft, Nontender, Nondistended, No guarding/rebound, Positive bowel sounds  Extremities: No clubbing, No cyanosis, No edema, No calf tenderness  Neuro: No tremors, Left lower extremity weakness  Psychiatric: Appropriate mood, Normal affect    LABORATORY STUDIES:  ----------------------------------------             12.7   6.77  )-----------( 160      ( 30 Jul 2021 06:29 )             38.5     07-30    136  |  103  |  16.2  ----------------------------<  95  4.4   |  23.0  |  0.95    Ca    8.6      30 Jul 2021 06:29  Mg     2.0     07-30    MEDICATIONS  (STANDING):  aspirin enteric coated 81 milliGRAM(s) Oral daily  atorvastatin 80 milliGRAM(s) Oral at bedtime  clopidogrel Tablet 75 milliGRAM(s) Oral daily  diltiazem    Tablet 30 milliGRAM(s) Oral three times a day  enoxaparin Injectable 40 milliGRAM(s) SubCutaneous daily      ASSESSMENT / PLAN:  ----------------------------------------  80F with a history of CAD, HTN, HLD, Stroke/TIA who presented with left sided weakness and dysarthria similar to her prior episodes of stroke/TIA.    TIA - Repeat imaging was without acute intracranial pathology. Neurology consultation noted. Symptoms resolved. On aspirin, clopidogrel, and atorvastatin.    CAD - Cardiology consultation noted. Diltiazem initiated for NSVT. Telemetry was reviewed and was without significant events overnight.    Hypertension - Close blood pressure monitoring.

## 2021-07-31 NOTE — PHYSICAL THERAPY INITIAL EVALUATION ADULT - ADDITIONAL COMMENTS
pt states she lives alone in a 1-story house with 2 steps to enter, but has alternate ramped entrance in garage she primarily uses. no steps inside. has cane, RW, wc, shower chair, seat lift recliner. independent without devices in the house. uses cane in the community. sons and daughter in law close by.
per patient she is without steps to enter, has a ramp. She lives alone and owns a RW. She has a life alert and reports that she has people that can visit.

## 2021-07-31 NOTE — PHYSICAL THERAPY INITIAL EVALUATION ADULT - LEVEL OF INDEPENDENCE: GAIT, REHAB EVAL
pt amb contact guard 20' no device. noted to be furniturewalking and stating she feels off balance. pt amb supervision 40' with RW with improved gait and reported confidence.
independent

## 2021-07-31 NOTE — PHYSICAL THERAPY INITIAL EVALUATION ADULT - MANUAL MUSCLE TESTING RESULTS, REHAB EVAL
pt reports weakness has improved/no strength deficits were identified
UEs-see OT eval; right hip flex, knee ext, ankle df WFL; left hip flex 3+/5, knee ext 4-/5, ankle df 4/5

## 2021-09-08 ENCOUNTER — INPATIENT (INPATIENT)
Facility: HOSPITAL | Age: 81
LOS: 2 days | Discharge: HOME | DRG: 62 | End: 2021-09-11
Attending: FAMILY MEDICINE | Admitting: GENERAL ACUTE CARE HOSPITAL
Payer: MEDICARE

## 2021-09-08 VITALS
OXYGEN SATURATION: 99 % | HEIGHT: 62 IN | HEART RATE: 86 BPM | DIASTOLIC BLOOD PRESSURE: 78 MMHG | RESPIRATION RATE: 22 BRPM | SYSTOLIC BLOOD PRESSURE: 171 MMHG

## 2021-09-08 DIAGNOSIS — Z90.5 ACQUIRED ABSENCE OF KIDNEY: Chronic | ICD-10-CM

## 2021-09-08 DIAGNOSIS — Z90.89 ACQUIRED ABSENCE OF OTHER ORGANS: Chronic | ICD-10-CM

## 2021-09-08 DIAGNOSIS — R53.1 WEAKNESS: ICD-10-CM

## 2021-09-08 DIAGNOSIS — C64.9 MALIGNANT NEOPLASM OF UNSPECIFIED KIDNEY, EXCEPT RENAL PELVIS: Chronic | ICD-10-CM

## 2021-09-08 LAB
ALBUMIN SERPL ELPH-MCNC: 4.1 G/DL — SIGNIFICANT CHANGE UP (ref 3.3–5.2)
ALP SERPL-CCNC: 93 U/L — SIGNIFICANT CHANGE UP (ref 40–120)
ALT FLD-CCNC: 19 U/L — SIGNIFICANT CHANGE UP
ANION GAP SERPL CALC-SCNC: 14 MMOL/L — SIGNIFICANT CHANGE UP (ref 5–17)
APPEARANCE UR: CLEAR — SIGNIFICANT CHANGE UP
APTT BLD: 28.9 SEC — SIGNIFICANT CHANGE UP (ref 27.5–35.5)
AST SERPL-CCNC: 31 U/L — SIGNIFICANT CHANGE UP
BASOPHILS # BLD AUTO: 0.09 K/UL — SIGNIFICANT CHANGE UP (ref 0–0.2)
BASOPHILS NFR BLD AUTO: 1.2 % — SIGNIFICANT CHANGE UP (ref 0–2)
BILIRUB SERPL-MCNC: 1 MG/DL — SIGNIFICANT CHANGE UP (ref 0.4–2)
BILIRUB UR-MCNC: NEGATIVE — SIGNIFICANT CHANGE UP
BUN SERPL-MCNC: 21.7 MG/DL — HIGH (ref 8–20)
CALCIUM SERPL-MCNC: 9.6 MG/DL — SIGNIFICANT CHANGE UP (ref 8.6–10.2)
CHLORIDE SERPL-SCNC: 97 MMOL/L — LOW (ref 98–107)
CO2 SERPL-SCNC: 21 MMOL/L — LOW (ref 22–29)
COLOR SPEC: YELLOW — SIGNIFICANT CHANGE UP
CREAT SERPL-MCNC: 0.86 MG/DL — SIGNIFICANT CHANGE UP (ref 0.5–1.3)
DIFF PNL FLD: ABNORMAL
EOSINOPHIL # BLD AUTO: 0.2 K/UL — SIGNIFICANT CHANGE UP (ref 0–0.5)
EOSINOPHIL NFR BLD AUTO: 2.6 % — SIGNIFICANT CHANGE UP (ref 0–6)
EPI CELLS # UR: SIGNIFICANT CHANGE UP
GLUCOSE SERPL-MCNC: 91 MG/DL — SIGNIFICANT CHANGE UP (ref 70–99)
GLUCOSE UR QL: NEGATIVE MG/DL — SIGNIFICANT CHANGE UP
HCT VFR BLD CALC: 41 % — SIGNIFICANT CHANGE UP (ref 34.5–45)
HGB BLD-MCNC: 13.5 G/DL — SIGNIFICANT CHANGE UP (ref 11.5–15.5)
IMM GRANULOCYTES NFR BLD AUTO: 0.3 % — SIGNIFICANT CHANGE UP (ref 0–1.5)
INR BLD: 1.06 RATIO — SIGNIFICANT CHANGE UP (ref 0.88–1.16)
KETONES UR-MCNC: NEGATIVE — SIGNIFICANT CHANGE UP
LEUKOCYTE ESTERASE UR-ACNC: NEGATIVE — SIGNIFICANT CHANGE UP
LYMPHOCYTES # BLD AUTO: 1.61 K/UL — SIGNIFICANT CHANGE UP (ref 1–3.3)
LYMPHOCYTES # BLD AUTO: 20.8 % — SIGNIFICANT CHANGE UP (ref 13–44)
MCHC RBC-ENTMCNC: 29.1 PG — SIGNIFICANT CHANGE UP (ref 27–34)
MCHC RBC-ENTMCNC: 32.9 GM/DL — SIGNIFICANT CHANGE UP (ref 32–36)
MCV RBC AUTO: 88.4 FL — SIGNIFICANT CHANGE UP (ref 80–100)
MONOCYTES # BLD AUTO: 0.89 K/UL — SIGNIFICANT CHANGE UP (ref 0–0.9)
MONOCYTES NFR BLD AUTO: 11.5 % — SIGNIFICANT CHANGE UP (ref 2–14)
NEUTROPHILS # BLD AUTO: 4.92 K/UL — SIGNIFICANT CHANGE UP (ref 1.8–7.4)
NEUTROPHILS NFR BLD AUTO: 63.6 % — SIGNIFICANT CHANGE UP (ref 43–77)
NITRITE UR-MCNC: NEGATIVE — SIGNIFICANT CHANGE UP
PH UR: 8 — SIGNIFICANT CHANGE UP (ref 5–8)
PLATELET # BLD AUTO: 209 K/UL — SIGNIFICANT CHANGE UP (ref 150–400)
POTASSIUM SERPL-MCNC: 4.6 MMOL/L — SIGNIFICANT CHANGE UP (ref 3.5–5.3)
POTASSIUM SERPL-SCNC: 4.6 MMOL/L — SIGNIFICANT CHANGE UP (ref 3.5–5.3)
PROT SERPL-MCNC: 6.8 G/DL — SIGNIFICANT CHANGE UP (ref 6.6–8.7)
PROT UR-MCNC: NEGATIVE MG/DL — SIGNIFICANT CHANGE UP
PROTHROM AB SERPL-ACNC: 12.3 SEC — SIGNIFICANT CHANGE UP (ref 10.6–13.6)
RBC # BLD: 4.64 M/UL — SIGNIFICANT CHANGE UP (ref 3.8–5.2)
RBC # FLD: 13.2 % — SIGNIFICANT CHANGE UP (ref 10.3–14.5)
RBC CASTS # UR COMP ASSIST: ABNORMAL /HPF (ref 0–4)
SARS-COV-2 RNA SPEC QL NAA+PROBE: SIGNIFICANT CHANGE UP
SODIUM SERPL-SCNC: 132 MMOL/L — LOW (ref 135–145)
SP GR SPEC: 1.01 — SIGNIFICANT CHANGE UP (ref 1.01–1.02)
TROPONIN T SERPL-MCNC: <0.01 NG/ML — SIGNIFICANT CHANGE UP (ref 0–0.06)
UROBILINOGEN FLD QL: NEGATIVE MG/DL — SIGNIFICANT CHANGE UP
WBC # BLD: 7.73 K/UL — SIGNIFICANT CHANGE UP (ref 3.8–10.5)
WBC # FLD AUTO: 7.73 K/UL — SIGNIFICANT CHANGE UP (ref 3.8–10.5)
WBC UR QL: SIGNIFICANT CHANGE UP

## 2021-09-08 PROCEDURE — 93010 ELECTROCARDIOGRAM REPORT: CPT

## 2021-09-08 PROCEDURE — 70496 CT ANGIOGRAPHY HEAD: CPT | Mod: 26,MA

## 2021-09-08 PROCEDURE — 70498 CT ANGIOGRAPHY NECK: CPT | Mod: 26,MA

## 2021-09-08 PROCEDURE — 99291 CRITICAL CARE FIRST HOUR: CPT

## 2021-09-08 PROCEDURE — 0042T: CPT

## 2021-09-08 PROCEDURE — 71045 X-RAY EXAM CHEST 1 VIEW: CPT | Mod: 26

## 2021-09-08 RX ORDER — TICAGRELOR 90 MG/1
0 TABLET ORAL
Qty: 0 | Refills: 0 | DISCHARGE

## 2021-09-08 RX ORDER — ALTEPLASE 100 MG
6.6 KIT INTRAVENOUS ONCE
Refills: 0 | Status: COMPLETED | OUTPATIENT
Start: 2021-09-08 | End: 2021-09-08

## 2021-09-08 RX ORDER — INFLUENZA VIRUS VACCINE 15; 15; 15; 15 UG/.5ML; UG/.5ML; UG/.5ML; UG/.5ML
0.5 SUSPENSION INTRAMUSCULAR ONCE
Refills: 0 | Status: DISCONTINUED | OUTPATIENT
Start: 2021-09-08 | End: 2021-09-11

## 2021-09-08 RX ORDER — SODIUM CHLORIDE 9 MG/ML
1000 INJECTION INTRAMUSCULAR; INTRAVENOUS; SUBCUTANEOUS
Refills: 0 | Status: DISCONTINUED | OUTPATIENT
Start: 2021-09-08 | End: 2021-09-09

## 2021-09-08 RX ORDER — CLOPIDOGREL BISULFATE 75 MG/1
1 TABLET, FILM COATED ORAL
Qty: 0 | Refills: 0 | DISCHARGE

## 2021-09-08 RX ORDER — ALTEPLASE 100 MG
59.5 KIT INTRAVENOUS ONCE
Refills: 0 | Status: COMPLETED | OUTPATIENT
Start: 2021-09-08 | End: 2021-09-08

## 2021-09-08 RX ADMIN — ALTEPLASE 396 MILLIGRAM(S): KIT at 17:39

## 2021-09-08 RX ADMIN — ALTEPLASE 59.5 MILLIGRAM(S): KIT at 17:45

## 2021-09-08 RX ADMIN — ALTEPLASE 59.5 MILLIGRAM(S): KIT at 18:45

## 2021-09-08 RX ADMIN — ALTEPLASE 6.6 MILLIGRAM(S): KIT at 17:40

## 2021-09-08 RX ADMIN — SODIUM CHLORIDE 60 MILLILITER(S): 9 INJECTION INTRAMUSCULAR; INTRAVENOUS; SUBCUTANEOUS at 19:09

## 2021-09-08 NOTE — ED PROVIDER NOTE - ATTENDING CONTRIBUTION TO CARE
patient critically ill requiring medications with intensive monitoring, discussion with consultants, discussion with patient and family, interpretation of diagnostic studies.     I, Delmy Altamirano, have personally seen and examined this patient. I have fully participated in the care of this patient. I have reviewed all pertinent clinical information, including history, physical exam, plan and the Resident's note and agree except as noted below.     Acute onset new left sided weakness, and expressive aphasia/dysarthria ~ 30mins PTA. h/o recurrent strokes/TIA has had TPA in past a few years ago. no trauma. no ICH. not on A/C.     Gen: NAD, AOx3, +mild expressive aphasia/dysarthria   Head: NCAT  HEENT: EOMI, oral mucosa moist, normal conjunctiva, neck supple  Lung: CTAB, no respiratory distress  CV: rrr, no murmur, Normal perfusion  Abd: soft, NTND  MSK: No edema, no visible deformities  Neuro: no facial asymmetry, +drift to bed Lt arm, Lt leg +muscle contraction but unable to lift up, no weakness rt side, no vision field cut  Skin: No rash   Psych: normal affect     NIH 7 within window for TPA. CT head. CTA/P plan for TPA tba

## 2021-09-08 NOTE — ED PROVIDER NOTE - CLINICAL SUMMARY MEDICAL DECISION MAKING FREE TEXT BOX
Pt is an 80 y.o. F hx multiple CVA/TIA, CAD, HTN, HLD, presenting with weakness. LKW 16:45, NIH 6, LUE/LLE weakness, dysarthria, expressive aphasia.

## 2021-09-08 NOTE — H&P ADULT - ATTENDING COMMENTS
81 year old female with recurrent stroke-like Sx - L sided weakness and dysarthria. S/p tPA, no signs of LVO.   Had multiple similar episodes before, symptoms resolved; neuroimaging WNL, no signs of significant cerebral vascular disease on prior admissions; came twice in July 2021. Had 19-hr vEEG as a w/up for szs with no contributory findings.  PMH of CAD, stroke/TIA, HLD, HTN, renal Ca s/p L nephrectomy 10/2020, 2 right cardiac stents, on ASA/Brilinta.  Hyponatremia; likely nutritional.    Plan: admit to NCCU for close observation post tPA  neurochecks, VS  24 hr CTh post tPA scan   MRI when possible  TTE acceptable with no LV clots 07/29/2021  SCDs, no chemoppx as post tPA <24 hrs  rest as above      Pt is critically ill and at high risk of rapid deterioration/death due to abovementioned conditions.   Still requires critical care interventions - ongoing frequent evaluations, interventions and management adjustment by the Attending and ICU team, - and included review of relevant history, clinical examination, review of data and images, discussion of treatment with the multidisciplinary team and any consultants involved in this patient’s care as well as family discussion.

## 2021-09-08 NOTE — ED ADULT NURSE REASSESSMENT NOTE - NS ED NURSE REASSESS COMMENT FT1
Received report from ASHLEY Cárdenas and assumed pt care at 1915.  Pt is alert and oriented X 4, comfortable.  s/p TPA administration.  Cardiac monitor showing normal sinus rhythm  80 and /89. Respirations are even and unlabored. Side rails up. Bed in lowest, locked position.  Awaiting covid result and Neuro ICU bed.
Pt calm and cooperative.  External urinary drainage system in place. 1200ml clear, yellow urine output since arrival.  IV fluids infusing well through patent IV cath in Left AC. Able to self reposition without difficulty.  Pt verbalized understanding of plan of care and will be transported to neuro ICU momentarily.  Side Rails up.

## 2021-09-08 NOTE — ED PROVIDER NOTE - PHYSICAL EXAMINATION
General: well appearing, NAD  Head:  NC, AT  Eyes: EOMI, PERRLA, no scleral icterus  Ears: no erythema/drainage  Nose: midline, no bleeding/drainage  Throat: MMM  Cardiac: RRR, no m/r/g, no lower extremity edema  Respiratory: CTABL, no wheezes/rales/rhonchi, equal chest wall expansions  Abdomen: soft, ND, NT, no rebound tenderness, no guarding, nonperitonitic  MSK/Vascular: full ROM, distal pulses intact, soft compartments, warm extremities  Neuro: AAOx3, +drift in LUE and unable to hold it against gravity, unable to move LLE, mild dysarthria, mild dysphagia, sensation to light touch intact, finger to nose coordination intact, cranial nerves 2-12 intact  Psych: calm, cooperative, normal affect

## 2021-09-08 NOTE — H&P ADULT - NSHPLABSRESULTS_GEN_ALL_CORE
13.5   7.73  )-----------( 209      ( 08 Sep 2021 17:36 )             41.0   PT/INR - ( 08 Sep 2021 17:36 )   PT: 12.3 sec;   INR: 1.06 ratio         PTT - ( 08 Sep 2021 17:36 )  PTT:28.9 sec        RADIOLOGY    CT Angio Neck w/ IV Cont (09.08.21 @ 17:32)   FINDINGS:  AORTIC ARCH no dissection or aneurysm dilatation is noted. Major vessel origins are patent.  COMMON CAROTID ARTERIES: Bilaterally patent with tortuosity  RIGHT BIFURCATION:There is minimal plaque without significant narrowing.  LEFT BIFURCATION: There is minimal plaque without significant narrowing  INTERNAL CAROTID ARTERIES: Bilaterally patent with tortuosity  VERTEBRALS bilaterally patent with left-sided dominance.  MISCELLANEOUS:  Degenerative changes noted in the cervical spine  BRAIN   brain CT report was submitted separately.  The cavernous and supraclinoid carotid arteries are bilaterally tortuous but patent.  Both anterior cerebral arteries are patent. Both A1 segments are present larger on the right  Both middle cerebral arteries are patent. No M1 stenosis or occlusion is noted.  Vertebrobasilar system is patent. There is moderate tortuosity, with left vertebral dominance. Posterior cerebral arteries are patent.  The venous sinuses are patent.  CT PERFUSION  rapid imaging was conducted.  No core infarct or significant delay in mean transit time is noted.  Blood flow maps are symmetric.  CBF<30% volume: 0 mL  Tmax>6.0 s volume:0 mL  Mismatch volume: 0 mL  Mismatch ratio: None  IMPRESSION:  1. No perfusion abnormality identified.  2. Minimal plaque in both carotid bifurcations in the neck, without significant stenosis. No dissection or occlusion noted.  3. Patent vertebrals with left-sided dominance.  4. Widely patent vasculature noted intracranially.  --- End of Report ---

## 2021-09-08 NOTE — H&P ADULT - HISTORY OF PRESENT ILLNESS
81 year old female w/ PMHX of CAD, stroke/TIA, HLD, HTN, renal Ca s/p L nephrectomy 10/2020, 2 right cardiac stents, on ASA/Plavix/Brilinta, presents w/ L sided weakness and difficulty speaking. LKW 16:45. Received TPA 17:39. Has hx of multiple visits to [facility] for multiple code strokes, last visit 7/25/2021. Currently in NAD, denies HA, nausea, vomiting. Follows with cardiology, neurology Dr. Abraham regarding stroke workup   NIHSS= 6  mRs= 0      1A: Level of consciousness       0= Alert; keenly responsive       +1= Arouses to minor stimulation       +2= Requires repeated stimulation to arouse       +2= Movements to pain       +3= Postures or unresponsive  1B: Ask month and age       0= Both questions right       +1= 1 question right       +2= 0 questions right       +1= Dysarthric/intubated/trauma/language barrier       +2= Aphasic  1C: "Blink eyes" and "Squeeze Hands"       0= Performs both       +1= Performs 1 task       +2= Performs 0 tasks    2: Horizontal EOMs       0= Normal       +1= Partial gaze palsy: can be overcome       +1= Partial gaze palsy: corrects w/ oculocephalic reflex        +2= Forzed gaze palsy: cannot be overcome    3: Visual fields       0= No visual loss       +1= Partial hemianopia       +2= Complete hemianopia       +3= Patient is b/l blind       +3= B/l hemianopia    4: Facial palsy (use grimace if obtunded)       0= Normal symmetry       +1= Minor paralysis (flat NLF, smile asymmetry)       +2= Partial paralysis ( lower face)       +3= Unilateral complete paralysis (upper/lower face)       +3= B/l complete paralysis (upper/lower face)    5A: Left arm motor drift (count out loud and use fingers to show count)       0= No drift x 10 seconds       +1= Drift but doesn't hit bed       +2= Drift, hits bed       +2= Some effort against gravity       +3= No effort against gravity       +4= No movement       0= Amputation/joint fusion  5B: Right arm motor drift       0= No drift x 10 seconds       +1= Drift but doesn't hit bed       +2= Drift, hits bed       +2= Some effort against gravity       +3= No effort against gravity       +4= No movement       0= Amputation/joint fusion    6A: Left leg motor drift       0= No drift x 10 seconds       +1= Drift but doesn't hit bed       +2= Drift, hits bed       +2= Some effort against gravity       +3= No effort against gravity       +4= No movement       0= Amputation/joint fusion    6B: Right leg motor drift       0= No drift x 10 seconds       +1= Drift but doesn't hit bed       +2= Drift, hits bed       +2= Some effort against gravity       +3= No effort against gravity       +4= No movement       0= Amputation/joint fusion    7: Limb ataxia (FNF/heel-shin)       0= No ataxia       +1= Ataxia in 1 limb       +2= Ataxia in 2 limbs       0= Does not understand       0= Paralyzed       0= Amputation/joint fusion    8: Sensation       0= Normal, no sensory loss       +1= mild-moderate loss: less sharp/more dull       +1= mild-moderate loss: can sense being touched       +2= Complete loss: cannot sense being touched at all       +2= No response and quadriplegic       +2= Coma/unresponsive    9: Language/aphasia- describe the scene (on paul); name the items; read the sentences (on paul)       0= Normal, no aphasia       +1= mild-moderate aphaisa: some obvious changes without significant limitation       +2= Severe aphasia: fragmentary expression, inference needed, cannot identify materials       +3= Mute/global aphasia: no usable speech/auditory comprehension       +3= coma/unresponsive    10: Dysarthria- read the words       0= Normal       +1= mild-moderate dysarthria: slurring but can be understood       +2= Severe dysarthria: unintelligible slurring or out of proportion to dysphasia       +2= Mute/anarthric        0= Intubated/unable to test    11: Extinction/inattention       0= No abnormality       +1= visual/tactile/auditory/spatial/personal inattention       +1= Extinction to b/l simultaneous stimulation       +2= Profound neha-inattention (e.g. does not recognize own hand)       +2= extinction to > 1 modality

## 2021-09-08 NOTE — ED ADULT NURSE NOTE - OBJECTIVE STATEMENT
Pt arrives as CODE STROKE with LKW at 16:45 today states sudden onset LLE immobile and LUE with increasing weakness as well as stuttering speech and some trouble with word finding. tPA initiated at 17:39 after eligibility verified. See Stroke flow sheet.

## 2021-09-08 NOTE — ED ADULT TRIAGE NOTE - CHIEF COMPLAINT QUOTE
last seen normal 4:45pm. now having Left sided weakness with slurred speech. MD britt called to bedside 1710, code stroke called. GCS 15. hx 5 previous strokes. on ASA

## 2021-09-08 NOTE — ED PROVIDER NOTE - CARE PLAN
1 Principal Discharge DX:	Weakness  Secondary Diagnosis:	Dysarthria  Secondary Diagnosis:	Expressive aphasia

## 2021-09-08 NOTE — ED ADULT NURSE NOTE - NSIMPLEMENTINTERV_GEN_ALL_ED
Implemented All Fall with Harm Risk Interventions:  [place] to call system. Call bell, personal items and telephone within reach. Instruct patient to call for assistance. Room bathroom lighting operational. Non-slip footwear when patient is off stretcher. Physically safe environment: no spills, clutter or unnecessary equipment. Stretcher in lowest position, wheels locked, appropriate side rails in place. Provide visual cue, wrist band, yellow gown, etc. Monitor gait and stability. Monitor for mental status changes and reorient to person, place, and time. Review medications for side effects contributing to fall risk. Reinforce activity limits and safety measures with patient and family. Provide visual clues: red socks.

## 2021-09-08 NOTE — H&P ADULT - ASSESSMENT
81 year old female w/ PMHX of CAD, stroke/TIA, HLD, HTN, renal Ca s/p L nephrectomy 10/2020, 2 right cardiac stents, on ASA/Plavix/Brilinta, presents w/ L sided weakness and difficulty speaking. LKW 16:45. Received TPA 17:39.    NIHSS= 6  mRs= 0        Plan: D/w Dr. Troncoso   - Neuro:  	Q1 hour Neuro checks, Q1 hour Vitals  	HOB 30 degrees, Neck midline position  	Maintain normothermia, PO acetaminophen for temp>38 C or pain  	CTH TM NIGHT AT 1800  	MRI HEAD W/O CONTRAST FOR STROKE W/U  	Turn and Position Q2  /  Activity ad vinnie, with assistance  	  CV:  	SBP [id number]  	BP regimen: HYDRALAZINE PRN  	Lipid panel in AM               TTE in AM    Pulm:  	Supplemental O2 PRN to maintain Spo2>92%  	Chest PT, OOB, Pulmonary Toilet    GI:  	Nutrition: NPO x 12 hours after TPA  	Dysphagia screen prior to intake  Gu:  	Voiding  	I&O Q1 hour  	Monitor Electrolytes & Renal Function             NS@60  Heme:  	Monitor H&H  	Chemical DVT prophylaxis:   		* Chemical DVT prophylaxis is contraindicated due to risk of bleeding  	Mechanical DVT Prophylaxis: Maintain B/L LE sequential compression devices  	  ID:  	Monitor WBC and Temperature    Endo  	TSH and HgA1C in AM              Monitor BGL, maintain <180

## 2021-09-08 NOTE — ED PROVIDER NOTE - OBJECTIVE STATEMENT
LKW 16:45, unable to walk secondary to weakness in left leg, increased weakness left arm, having dysarthria and some expressive aphasia Pt is an 80 y.o. F hx multiple CVA/TIA, CAD, HTN, HLD, presenting with weakness. LKW 16:45, unable to walk secondary to weakness in left leg, increased weakness left arm, having dysarthria and some expressive aphasia. On ASA, plavix, and brillinta.

## 2021-09-08 NOTE — ED PROVIDER NOTE - NS ED ROS FT
Constitutional: no fever, no sweats, and no chills.  Eyes: no pain, no redness, and no visual changes.  ENMT: no ear pain and no hearing problems, no nasal congestion/drainage, no dysphagia, and no throat pain, no neck pain, no stiffness  CV: no chest pain, no edema.  Resp: no cough, no dyspnea  GI: no abdominal pain, no bloating, no constipation, no diarrhea, no nausea and no vomiting.  : no dysuria, no hematuria  MSK: no msk pain, no weakness  Skin: no lesions, and no rashes.  Neuro: no LOC, no headache, no sensory deficits, and +weakness.

## 2021-09-09 LAB
A1C WITH ESTIMATED AVERAGE GLUCOSE RESULT: 5.1 % — SIGNIFICANT CHANGE UP (ref 4–5.6)
ANION GAP SERPL CALC-SCNC: 14 MMOL/L — SIGNIFICANT CHANGE UP (ref 5–17)
BUN SERPL-MCNC: 19.1 MG/DL — SIGNIFICANT CHANGE UP (ref 8–20)
CALCIUM SERPL-MCNC: 8.7 MG/DL — SIGNIFICANT CHANGE UP (ref 8.6–10.2)
CHLORIDE SERPL-SCNC: 104 MMOL/L — SIGNIFICANT CHANGE UP (ref 98–107)
CHOLEST SERPL-MCNC: 132 MG/DL — SIGNIFICANT CHANGE UP
CO2 SERPL-SCNC: 22 MMOL/L — SIGNIFICANT CHANGE UP (ref 22–29)
COVID-19 SPIKE DOMAIN AB INTERP: POSITIVE
COVID-19 SPIKE DOMAIN ANTIBODY RESULT: >250 U/ML — HIGH
CREAT SERPL-MCNC: 0.81 MG/DL — SIGNIFICANT CHANGE UP (ref 0.5–1.3)
ESTIMATED AVERAGE GLUCOSE: 100 MG/DL — SIGNIFICANT CHANGE UP (ref 68–114)
GLUCOSE SERPL-MCNC: 87 MG/DL — SIGNIFICANT CHANGE UP (ref 70–99)
HCT VFR BLD CALC: 40.6 % — SIGNIFICANT CHANGE UP (ref 34.5–45)
HDLC SERPL-MCNC: 63 MG/DL — SIGNIFICANT CHANGE UP
HGB BLD-MCNC: 13.8 G/DL — SIGNIFICANT CHANGE UP (ref 11.5–15.5)
LIPID PNL WITH DIRECT LDL SERPL: 56 MG/DL — SIGNIFICANT CHANGE UP
MAGNESIUM SERPL-MCNC: 2.1 MG/DL — SIGNIFICANT CHANGE UP (ref 1.6–2.6)
MCHC RBC-ENTMCNC: 30 PG — SIGNIFICANT CHANGE UP (ref 27–34)
MCHC RBC-ENTMCNC: 34 GM/DL — SIGNIFICANT CHANGE UP (ref 32–36)
MCV RBC AUTO: 88.3 FL — SIGNIFICANT CHANGE UP (ref 80–100)
NON HDL CHOLESTEROL: 69 MG/DL — SIGNIFICANT CHANGE UP
PHOSPHATE SERPL-MCNC: 4.1 MG/DL — SIGNIFICANT CHANGE UP (ref 2.4–4.7)
PLATELET # BLD AUTO: 196 K/UL — SIGNIFICANT CHANGE UP (ref 150–400)
POTASSIUM SERPL-MCNC: 4.2 MMOL/L — SIGNIFICANT CHANGE UP (ref 3.5–5.3)
POTASSIUM SERPL-SCNC: 4.2 MMOL/L — SIGNIFICANT CHANGE UP (ref 3.5–5.3)
RBC # BLD: 4.6 M/UL — SIGNIFICANT CHANGE UP (ref 3.8–5.2)
RBC # FLD: 13.2 % — SIGNIFICANT CHANGE UP (ref 10.3–14.5)
SARS-COV-2 IGG+IGM SERPL QL IA: >250 U/ML — HIGH
SARS-COV-2 IGG+IGM SERPL QL IA: POSITIVE
SODIUM SERPL-SCNC: 139 MMOL/L — SIGNIFICANT CHANGE UP (ref 135–145)
TRIGL SERPL-MCNC: 63 MG/DL — SIGNIFICANT CHANGE UP
TSH SERPL-MCNC: 2.1 UIU/ML — SIGNIFICANT CHANGE UP (ref 0.27–4.2)
WBC # BLD: 7.31 K/UL — SIGNIFICANT CHANGE UP (ref 3.8–10.5)
WBC # FLD AUTO: 7.31 K/UL — SIGNIFICANT CHANGE UP (ref 3.8–10.5)

## 2021-09-09 PROCEDURE — 99223 1ST HOSP IP/OBS HIGH 75: CPT

## 2021-09-09 PROCEDURE — 99233 SBSQ HOSP IP/OBS HIGH 50: CPT

## 2021-09-09 PROCEDURE — 70552 MRI BRAIN STEM W/DYE: CPT | Mod: 26

## 2021-09-09 RX ORDER — ENOXAPARIN SODIUM 100 MG/ML
40 INJECTION SUBCUTANEOUS DAILY
Refills: 0 | Status: DISCONTINUED | OUTPATIENT
Start: 2021-09-09 | End: 2021-09-11

## 2021-09-09 RX ORDER — ATORVASTATIN CALCIUM 80 MG/1
40 TABLET, FILM COATED ORAL AT BEDTIME
Refills: 0 | Status: DISCONTINUED | OUTPATIENT
Start: 2021-09-09 | End: 2021-09-11

## 2021-09-09 RX ORDER — ATORVASTATIN CALCIUM 80 MG/1
80 TABLET, FILM COATED ORAL AT BEDTIME
Refills: 0 | Status: DISCONTINUED | OUTPATIENT
Start: 2021-09-09 | End: 2021-09-09

## 2021-09-09 RX ORDER — ASPIRIN/CALCIUM CARB/MAGNESIUM 324 MG
81 TABLET ORAL DAILY
Refills: 0 | Status: DISCONTINUED | OUTPATIENT
Start: 2021-09-09 | End: 2021-09-11

## 2021-09-09 RX ORDER — TICAGRELOR 90 MG/1
90 TABLET ORAL EVERY 12 HOURS
Refills: 0 | Status: DISCONTINUED | OUTPATIENT
Start: 2021-09-09 | End: 2021-09-11

## 2021-09-09 RX ADMIN — ATORVASTATIN CALCIUM 40 MILLIGRAM(S): 80 TABLET, FILM COATED ORAL at 22:59

## 2021-09-09 RX ADMIN — TICAGRELOR 90 MILLIGRAM(S): 90 TABLET ORAL at 20:10

## 2021-09-09 NOTE — OCCUPATIONAL THERAPY INITIAL EVALUATION ADULT - ADDITIONAL COMMENTS
Pt lives in private home with a ramp to enter through garage.  Pt stays on main level except for laundry; full flight with bilateral HRs.  Pt drives.  Pt already has RW, SAC, shower chair, grab bar in shower and a wheelchair (from her late ).  Pt also has a MedAlert necklace.

## 2021-09-09 NOTE — PROGRESS NOTE ADULT - SUBJECTIVE AND OBJECTIVE BOX
HPI:  81 year old female w/ PMHX of CAD, stroke/TIA, HLD, HTN, renal Ca s/p L nephrectomy 10/2020, 2 right cardiac stents, on ASA/Plavix/Brilinta, presents w/ L sided weakness and difficulty speaking. LKW 16:45. Received TPA 17:39. Has hx of multiple visits to [facility] for multiple code strokes, last visit 7/25/2021. Currently in NAD, denies HA, nausea, vomiting. Follows with cardiology, neurology Dr. Abraham regarding stroke workup   NIHSS= 6 for dysarthria and L sided weakness  mRs= 0, lives alone  (08 Sep 2021 18:39)    Admitted to [facility]U for observation.  No new 24 hr events.  VS, labs, imaging reviewed.    Exam:  NAD, cooperative.  AOx4, speech fluent today, comprehension intact, PERRLa, EOMI, no visual deficit, motor - 5/5 x4 excapt for LLE 4/5, sensation intact to LT.  CTAB  S1S2 present  Abd soft, NT  No peripheral swelling

## 2021-09-09 NOTE — OCCUPATIONAL THERAPY INITIAL EVALUATION ADULT - PERTINENT HX OF CURRENT PROBLEM, REHAB EVAL
hx multiple CVA/TIA, CAD, HTN, HLD, presenting with weakness. LKW 16:45, unable to walk secondary to weakness in left leg, increased weakness left arm, having dysarthria and some expressive aphasia.

## 2021-09-09 NOTE — PROGRESS NOTE ADULT - TIME BILLING
review of relevant history, clinical examination, review of data and images, discussion of treatment with the multidisciplinary team and any consultants involved in this patient’s care as well as family discussion.

## 2021-09-09 NOTE — CONSULT NOTE ADULT - ASSESSMENT
The patient is a 81y Female who is followed by neurology because of acute left sided weakness    Stroke  She presented with signs of acute right MCA syndrome  She was given alteplase with great improvement of symptoms NIHSS went for 6 to1    NSICU admission for post alteplase vitals and neurochecks per protocol  no antiplatelets or anticoagulant for 24 hours after alteplase administration  repeat head CT  24 hours after alteplase administration  MRI brain  PT/OT/Speech therapy  NPO pending swallow eval  keep BP under 180/105  lipid panel LDL= 56  she is at goal LDL <70  continue lipitor  HgbA1c= 5.1%  DVT ppx    will follow with you    Cem Abraham MD PhD   [id number]

## 2021-09-09 NOTE — PROGRESS NOTE ADULT - ASSESSMENT
81 year old female with recurrent stroke-like Sx - L sided weakness and dysarthria. S/p tPA, no signs of LVO. Clinically improving.  Had multiple similar episodes before, symptoms resolve; neuroimaging WNL, no signs of significant cerebral vascular disease on prior admissions; came twice in July 2021.  Had 19-hr vEEG as a w/up for szs with no contributory findings.  PMH of CAD, stroke/TIA, HLD, HTN, renal Ca s/p L nephrectomy 10/2020, 2 right cardiac stents, on ASA/Brilinta.  Hyponatremia resolved; likely nutritional.    Plan:  neurochecks, VS  CTh tonight as post tPA scan   MRI when possible  stroke core measures to repeat  TTE acceptable with no LV clots 07/29/2021  diet as tolerated  PT/OT  BM regimen  SCDs, no chemoppx as post tPA <24 hrs

## 2021-09-09 NOTE — PHYSICAL THERAPY INITIAL EVALUATION ADULT - PERTINENT HX OF CURRENT PROBLEM, REHAB EVAL
present to ED with c/o unable to walk secondary to weakness in left leg, increased weakness left arm

## 2021-09-09 NOTE — OCCUPATIONAL THERAPY INITIAL EVALUATION ADULT - NS ASR OT EQUIP NEEDS DISCH
Pt already has the following equipment:  RW, SAC, shower seat, grab bar in shower, w/c, MedAlert.  Least restrictive device recommended pending pt's functional progress.  Commode over toilet may be recommended pending progress.

## 2021-09-09 NOTE — CONSULT NOTE ADULT - SUBJECTIVE AND OBJECTIVE BOX
[facility]                                     Neurology at [place]                                 Leigh Prather, & Anjum                                  [address]                                        [place], [place], [address]                                             [phone]    CC: john  HPI:  The patient is a 81y RH Female who presented with acute left sided weakness yesterday as well as speech and language difficulties.  I was consulted by phone during the code stroke and recommended alteplase which was given. Her NIHSS yesterday was 6  Today she reports feeling much better with mild residual left leg>arm weakness.  She has had left sided weakness in past that has resolved.  Neurology is asked to evaluate.    PAST MEDICAL & SURGICAL HISTORY:  HTN (hypertension)    Rheumatic fever    TIA (transient ischemic attack)    CAD S/P percutaneous coronary angioplasty    Polio    Cerebrovascular accident (CVA)  November 2020, June 2020, May 2020    Status post tonsillectomy    H/O left nephrectomy    Renal cancer        MEDICATIONS  (STANDING):  atorvastatin 40 milliGRAM(s) Oral at bedtime  influenza   Vaccine 0.5 milliLiter(s) IntraMuscular once    MEDICATIONS  (PRN):      Allergies    Biaxin (Muscle Pain; Joint Pain)  codeine (Faint)  Metoprolol Succinate ER (Unknown)  monoctanoin (Unknown)    Intolerances    Isosorbide Mononitrate Extended Release (Faint)      SOCIAL HISTORY:  no tob,   no alcohol   no drugs    FAMILY HISTORY:  no stroke in mother, did not know father's history      ROS: 14 point ROS negative other than what is present in HPI or below    Vital Signs Last 24 Hrs  T(C): 37.3 (09 Sep 2021 11:44), Max: 37.3 (09 Sep 2021 11:44)  T(F): 99.1 (09 Sep 2021 11:44), Max: 99.1 (09 Sep 2021 11:44)  HR: 74 (09 Sep 2021 13:39) (59 - 97)  BP: 124/74 (09 Sep 2021 13:39) (109/73 - 171/78)  BP(mean): 90 (09 Sep 2021 13:39) (78 - 108)  RR: 15 (09 Sep 2021 13:39) (12 - 22)  SpO2: 97% (09 Sep 2021 13:39) (96% - 100%)      General: NAD    Detailed Neurologic Exam:    Mental status: The patient is awake and alert and has normal attention span.  The patient is fully oriented in 3 spheres. The patient is oriented to current events. The patient is able to name objects, follow commands, repeat sentences.    Cranial nerves: Pupils equal and react symmetrically to light. There is no visual field deficit to confrontation. Extraocular motion is full with no nystagmus. There is no ptosis. Facial sensation is intact. Facial musculature is symmetric. Palate elevates symmetrically. Tongue is midline.    Motor: There is normal bulk and tone.  There is no tremor.  Strength is 5/5 in the right arm and leg.   Strength is 5/5 in the left arm and 4/5 leg.    Sensation: Intact to light touch and pin in 4 extremities    Reflexes: 1+ throughout and plantar responses are flexor.    Cerebellar: There is no dysmetria on finger to nose testing.    Gait : deferred    [facility] SS:  DATE: 9/9/21  TIME: 1300  1A: Level of consciousness (0-3): 0  1B: Questions (0-2): 0  1C: Commands (0-2): 0  2: Gaze (0-2): 0  3: Visual fields (0-3): 0  4: Facial palsy (0-3): 0  MOTOR:  5A: Left arm motor drift (0-4): 0  5B: Right arm motor drift (0-4): 0  6A: Left leg motor drift (0-4): 1  6B: Right leg motor drift (0-4): 0  7: Limb ataxia (0-2): 0  SENSORY:  8: Sensation (0-2): 0  SPEECH:  9: Language (0-3): 0  10: Dysarthria (0-2): 0  EXTINCTION:  11: Extinction/inattention (0-2): 0    TOTAL SCORE: 1    prehospital mRS= 0      LABS:                         13.8   7.31  )-----------( 196      ( 09 Sep 2021 04:31 )             40.6       09-09    139  |  104  |  19.1  ----------------------------<  87  4.2   |  22.0  |  0.81    Ca    8.7      09 Sep 2021 04:31  Phos  4.1     09-09  Mg     2.1     09-09    TPro  6.8  /  Alb  4.1  /  TBili  1.0  /  DBili  x   /  AST  31  /  ALT  19  /  AlkPhos  93  09-08      PT/INR - ( 08 Sep 2021 17:36 )   PT: 12.3 sec;   INR: 1.06 ratio         PTT - ( 08 Sep 2021 17:36 )  PTT:28.9 sec    Lipid Profile in AM (09.09.21 @ 04:31)    Cholesterol, Serum: 132 mg/dL    Triglycerides, Serum: 63: Lipemic. Interpret with caution mg/dL    HDL Cholesterol, Serum: 63 mg/dL    Non HDL Cholesterol: 69: Patients Atherosclerotic Cardiovascular Disease (ASCVD) Risk  Optimal Level (mg/dL)  LDL Cholesterol (LDL-C)  All Patients                                < 100  ASCVD at Very High Risk1    < 70  Non-HDL Cholesterol (Non-HDL-C)  All Patients                       < 130  ASCVD at Very High Risk1   < 100  Non-HDL-Cholesterol (Non-HDL-C) is also a key target for cardiovascular  risk reduction.  Consider Familial Hypercholesterolemia when: LDL-C > 190 mg/dL or  Non-HDL-C > 220 mg/dL.  LDL-C calculation using the Friedewald equation is not provided when  triglycerides > 400 mg/dL, in which case we recommend repeating the test  after fasting, if it was not done before.  When triglycerides >150 mg/dL, calculated LDL-C is provided but maystill  be inaccurate (particularly when LDL-C < 70 mg/dL). It can be  recalculated off-line using other equations (e.g. Joaquin SS, Mahnaz MJ,  Garrett LITTLE, et al.TINO 2013;310:2061 - 8).  1 Olivier Trotter.,et al. "2019 AHA/ACC. . . guideline on the  management of blood cholesterol: a report of the American College of  Cardiology/American Heart Association Task Force on Clinical Practice  Guidelines." Circulation;139:e1082 - e1143.  These values apply only to persons 20 years and older.  Lipid Panel updated with new test, reference ranges and interpretive  comments effective 10-. mg/dL    LDL Cholesterol Calculated: 56 mg/dL    A1C with Estimated Average Glucose in AM (09.09.21 @ 04:31)    A1C with Estimated Average Glucose Result: 5.1 %    Estimated Average Glucose: 100 mg/dL        RADIOLOGY & ADDITIONAL STUDIES (independently reviewed unless otherwise noted):  CT head: no acute CVA, mass or blood (+) SVID  CTA head: no aneurysm, AVM, LVO or sig stenosis in COW  CTA neck: no sig carotid or vertebral stenosis  CT Perfusion head - CBF<30% volume 0ml, Tmax>6s volume =0ml
                                           [facility]                              [address] [address]                                                 PHONE: [phone]                                                 FAX: [phone]  ------------------------------------------------------------------------------------------------    Chief complaint: L side weakness    81y Female with past medical history as under presented with acute left sided weakness yesterday as well as speech and language difficulties. Neurology recommended alteplase which was given. She reported compliance with all her medications. She reports she had been independent without any symptoms prior to the episode.    At the time of evaluation, pt reports feeling much better with mild residual left leg>arm weakness.     PAST MEDICAL & SURGICAL HISTORY:  HTN (hypertension)    Rheumatic fever    TIA (transient ischemic attack)    CAD S/P percutaneous coronary angioplasty    Polio    Cerebrovascular accident (CVA)  November 2020, June 2020, May 2020    Status post tonsillectomy    H/O left nephrectomy    Renal cancer        Biaxin (Muscle Pain; Joint Pain)  codeine (Faint)  Isosorbide Mononitrate Extended Release (Faint)  Metoprolol Succinate ER (Unknown)  monoctanoin (Unknown)      Review of Systems:  Positive for L sided weakness  Rest of the systems were reviewed and was negative.     Family history:   No pertinent family history in first degree relative of early CAD, sudden cardiac death or  congenital heart disease    Social History:  No smoking   No alcohol  No other drug use    Vital Signs Last 24 Hrs  T(C): 37.3 (09 Sep 2021 11:44), Max: 37.3 (09 Sep 2021 11:44)  T(F): 99.1 (09 Sep 2021 11:44), Max: 99.1 (09 Sep 2021 11:44)  HR: 95 (09 Sep 2021 15:00) (59 - 97)  BP: 111/97 (09 Sep 2021 15:00) (109/73 - 171/78)  BP(mean): 103 (09 Sep 2021 15:00) (78 - 108)  RR: 20 (09 Sep 2021 15:00) (12 - 22)  SpO2: 98% (09 Sep 2021 15:00) (96% - 100%)    PHYSICAL EXAM:  Constitutional: Appears well; alert and co-operative  HEENT:     Head: Normocephalic and atraumatic  Eyes: Conjunctiva normal, No scleral icterus  Neck: Supple, No JVD  Mouth/Throat: Mucous membranes are normal. Mucosa are not pale or dry.  Cardiovascular: regular S1, S2  Respiratory: Lungs clear to auscultation; no crepitations, no wheeze  Gastrointestinal:  Soft, Non-tender, + BS	  Musculoskeletal: Normal range of motion. No edema  Skin: Warm and dry. No cyanosis . No diaphoresis.  Neurologic: Alert oriented to time place and person. L side weakness  Psychiatric: Normal mood and affect, Speech is normal and behavior is normal.        LABS:                        13.8   7.31  )-----------( 196      ( 09 Sep 2021 04:31 )             40.6     09-09    139  |  104  |  19.1  ----------------------------<  87  4.2   |  22.0  |  0.81    Ca    8.7      09 Sep 2021 04:31  Phos  4.1     09-09  Mg     2.1     09-09    TPro  6.8  /  Alb  4.1  /  TBili  1.0  /  DBili  x   /  AST  31  /  ALT  19  /  AlkPhos  93  09-08    CARDIAC MARKERS ( 08 Sep 2021 17:36 )  x     / <0.01 ng/mL / x     / x     / x          PT/INR - ( 08 Sep 2021 17:36 )   PT: 12.3 sec;   INR: 1.06 ratio         PTT - ( 08 Sep 2021 17:36 )  PTT:28.9 sec    RADIOLOGY & ADDITIONAL STUDIES: (reviewed)  CXR was independently visualized/reviewed and demonstrated: clear lungs    CARDIOLOGY TESTING:(reviewed)     ECG (Independent visualization): NSR    MEGHANN  3/29/21  Interpretation Summary  EF 60-65%, mild prolapse of P2 segment of the posterior mitral valve without obvious flail segment, mod MR, mild TR, mild AI    Cardiac Cath  11/30/20  Interpretation Summary  prior stent in LAD is patent with non-obstructive CAD.     Echocardiogram  11/29/20  Interpretation Summary  grade 1 diastolic dysfunction, mildly enlarged left atrium, mild mitral annular calcifcation, mild thickening of the anterior and posterior mitral valve leaflets, mild to moderate MR, mild TR, mild to moderate AI, sclerotic aortic valve.     Nuclear Stress  Oct 2019  Interpretation Summary  EF 43%, a small severe mostly fixed defect involving the base to mid inferolateral and apical inferoseptum suggestive of previous MI, no clear evidence of ischemia, basal inferolateral, mid inferolateral and apical septal segments severely hypokinetic.     Holter  Jan 2019  Interpretation Summary  NSR with sinus bradycardia and sinus tachycardia, rare APCs and couplets, two episodes of 3 - 10 beat SVT at 113-121 bpm, rare episodes of ventricular triplets at max rate of 141 bpm    MEDICATIONS:(reviewed)  Home Medications:    MEDICATIONS  (STANDING):  atorvastatin 40 milliGRAM(s) Oral at bedtime  influenza   Vaccine 0.5 milliLiter(s) IntraMuscular once    MEDICATIONS  (PRN):      ASSESSMENT AND PLAN:    81y Female with prior history of HTN, HLD, CAD, s/p pci to LAD, TIA's, polio recent TIA on DAPT [ASA and Brilinta-reportedly non responder to Plavix]  presented with acute left sided weakness yesterday as well as speech and language difficulties. Neurology recommended alteplase which was given.    CVA  - Will check ILR  - ICU monitoring for now  - FU CT  - Echo ordered to assess LV function    - Serial cardiac enzymes  - Telemetry monitoring    CAD  - On ASA and Brilinta    Dyslipidemia  - Continue statin

## 2021-09-09 NOTE — OCCUPATIONAL THERAPY INITIAL EVALUATION ADULT - GENERAL OBSERVATIONS, REHAB EVAL
Pt received supine in bed, +bilateral VCDs, +IV, +primafit, +cardiac monitor with , +BP cuff, pt agreeable to OT eval.

## 2021-09-09 NOTE — PHYSICAL THERAPY INITIAL EVALUATION ADULT - ACTIVE RANGE OF MOTION EXAMINATION, REHAB EVAL
left LE 4/5/Left UE Active ROM was WFL (within functional limits)/Right UE Active ROM was WFL (within functional limits)/Right LE Active ROM was WFL (within functional limits)

## 2021-09-09 NOTE — PHYSICAL THERAPY INITIAL EVALUATION ADULT - ADDITIONAL COMMENTS
as per pt: resides in the private house with no steps to enter, (+) ramp, pt ambulates with SAC at times, owns RW, W/C (that used to belong to the pt's ),  (+) driving, and life line, family available as needed to assist upon D/C home

## 2021-09-10 ENCOUNTER — TRANSCRIPTION ENCOUNTER (OUTPATIENT)
Age: 81
End: 2021-09-10

## 2021-09-10 ENCOUNTER — APPOINTMENT (OUTPATIENT)
Dept: NEUROLOGY | Facility: CLINIC | Age: 81
End: 2021-09-10

## 2021-09-10 LAB
ANION GAP SERPL CALC-SCNC: 14 MMOL/L — SIGNIFICANT CHANGE UP (ref 5–17)
BUN SERPL-MCNC: 16.8 MG/DL — SIGNIFICANT CHANGE UP (ref 8–20)
CALCIUM SERPL-MCNC: 9.1 MG/DL — SIGNIFICANT CHANGE UP (ref 8.6–10.2)
CHLORIDE SERPL-SCNC: 104 MMOL/L — SIGNIFICANT CHANGE UP (ref 98–107)
CO2 SERPL-SCNC: 21 MMOL/L — LOW (ref 22–29)
CREAT SERPL-MCNC: 0.94 MG/DL — SIGNIFICANT CHANGE UP (ref 0.5–1.3)
GLUCOSE SERPL-MCNC: 92 MG/DL — SIGNIFICANT CHANGE UP (ref 70–99)
HCT VFR BLD CALC: 38.4 % — SIGNIFICANT CHANGE UP (ref 34.5–45)
HGB BLD-MCNC: 13.1 G/DL — SIGNIFICANT CHANGE UP (ref 11.5–15.5)
MAGNESIUM SERPL-MCNC: 2 MG/DL — SIGNIFICANT CHANGE UP (ref 1.8–2.6)
MCHC RBC-ENTMCNC: 30.1 PG — SIGNIFICANT CHANGE UP (ref 27–34)
MCHC RBC-ENTMCNC: 34.1 GM/DL — SIGNIFICANT CHANGE UP (ref 32–36)
MCV RBC AUTO: 88.3 FL — SIGNIFICANT CHANGE UP (ref 80–100)
PHOSPHATE SERPL-MCNC: 4.1 MG/DL — SIGNIFICANT CHANGE UP (ref 2.4–4.7)
PLATELET # BLD AUTO: 195 K/UL — SIGNIFICANT CHANGE UP (ref 150–400)
POTASSIUM SERPL-MCNC: 4.5 MMOL/L — SIGNIFICANT CHANGE UP (ref 3.5–5.3)
POTASSIUM SERPL-SCNC: 4.5 MMOL/L — SIGNIFICANT CHANGE UP (ref 3.5–5.3)
RBC # BLD: 4.35 M/UL — SIGNIFICANT CHANGE UP (ref 3.8–5.2)
RBC # FLD: 13.2 % — SIGNIFICANT CHANGE UP (ref 10.3–14.5)
SODIUM SERPL-SCNC: 139 MMOL/L — SIGNIFICANT CHANGE UP (ref 135–145)
WBC # BLD: 7.34 K/UL — SIGNIFICANT CHANGE UP (ref 3.8–10.5)
WBC # FLD AUTO: 7.34 K/UL — SIGNIFICANT CHANGE UP (ref 3.8–10.5)

## 2021-09-10 PROCEDURE — 99232 SBSQ HOSP IP/OBS MODERATE 35: CPT

## 2021-09-10 PROCEDURE — 95819 EEG AWAKE AND ASLEEP: CPT | Mod: 26

## 2021-09-10 PROCEDURE — 99233 SBSQ HOSP IP/OBS HIGH 50: CPT

## 2021-09-10 RX ADMIN — TICAGRELOR 90 MILLIGRAM(S): 90 TABLET ORAL at 05:14

## 2021-09-10 RX ADMIN — Medication 81 MILLIGRAM(S): at 11:29

## 2021-09-10 RX ADMIN — TICAGRELOR 90 MILLIGRAM(S): 90 TABLET ORAL at 17:07

## 2021-09-10 RX ADMIN — ENOXAPARIN SODIUM 40 MILLIGRAM(S): 100 INJECTION SUBCUTANEOUS at 11:29

## 2021-09-10 RX ADMIN — ATORVASTATIN CALCIUM 40 MILLIGRAM(S): 80 TABLET, FILM COATED ORAL at 21:02

## 2021-09-10 NOTE — PROGRESS NOTE ADULT - SUBJECTIVE AND OBJECTIVE BOX
[facility]                                     [facility]                                 Leigh Prather, & Anjum                                  [address]                                        [place], [place], [address]                                             [phone]    CC: [id number]  HPI:  The patient is a 81y RH Female who presented with acute left sided weakness yesterday as well as speech and language difficulties.  I was consulted by phone during the code stroke and recommended alteplase which was given. Her NIHSS yesterday was 6  Today she reports feeling much better with mild residual left leg>arm weakness.  She has had left sided weakness in past that has resolved.  Neurology is asked to evaluate.    Interval history: no new issues, left side weakness resolved    Review of systems (neurology): Denies headache or dizziness. Denies weakness/numbness.  Denies speech/language deficits. Denies diplopia/blurred vision.  Denies confusion    MEDICATIONS  (STANDING):  aspirin enteric coated 81 milliGRAM(s) Oral daily  atorvastatin 40 milliGRAM(s) Oral at bedtime  enoxaparin Injectable 40 milliGRAM(s) SubCutaneous daily  influenza   Vaccine 0.5 milliLiter(s) IntraMuscular once  ticagrelor 90 milliGRAM(s) Oral every 12 hours    MEDICATIONS  (PRN):      Vital Signs Last 24 Hrs  T(C): 37 (10 Sep 2021 11:56), Max: 37.2 (09 Sep 2021 22:36)  T(F): 98.6 (10 Sep 2021 11:56), Max: 98.9 (09 Sep 2021 22:36)  HR: 98 (10 Sep 2021 11:56) (66 - 98)  BP: 115/84 (10 Sep 2021 11:56) (108/80 - 150/85)  BP(mean): 86 (09 Sep 2021 21:00) (81 - 105)  RR: 18 (10 Sep 2021 11:56) (16 - 22)  SpO2: 96% (10 Sep 2021 11:56) (94% - 100%)  Detailed Neurologic Exam:    Mental status: The patient is awake and alert and has normal attention span.  The patient is fully oriented in 3 spheres. The patient is oriented to current events. The patient is able to name objects, follow commands, repeat sentences.    Cranial nerves: Pupils equal and react symmetrically to light. There is no visual field deficit to confrontation. Extraocular motion is full with no nystagmus. There is no ptosis. Facial sensation is intact. Facial musculature is symmetric. Palate elevates symmetrically. Tongue is midline.    Motor: There is normal bulk and tone.  There is no tremor.  Strength is 5/5 in the right arm and leg.   Strength is 5/5 in the left arm and 5/5 leg.    Sensation: Intact to light touch and pin in 4 extremities    Reflexes: 1+ throughout and plantar responses are flexor.    Cerebellar: There is no dysmetria on finger to nose testing.    Gait : deferred      LABS:                          13.1   7.34  )-----------( 195      ( 10 Sep 2021 08:46 )             38.4     09-10    139  |  104  |  16.8  ----------------------------<  92  4.5   |  21.0<L>  |  0.94    Ca    9.1      10 Sep 2021 08:45  Phos  4.1     09-10  Mg     2.0     09-10    TPro  6.8  /  Alb  4.1  /  TBili  1.0  /  DBili  x   /  AST  31  /  ALT  19  /  AlkPhos  93  09-08    LIVER FUNCTIONS - ( 08 Sep 2021 17:36 )  Alb: 4.1 g/dL / Pro: 6.8 g/dL / ALK PHOS: 93 U/L / ALT: 19 U/L / AST: 31 U/L / GGT: x           PT/INR - ( 08 Sep 2021 17:36 )   PT: 12.3 sec;   INR: 1.06 ratio         PTT - ( 08 Sep 2021 17:36 )  PTT:28.9 sec    Lipid Profile in AM (09.09.21 @ 04:31)    Cholesterol, Serum: 132 mg/dL    Triglycerides, Serum: 63: Lipemic. Interpret with caution mg/dL    HDL Cholesterol, Serum: 63 mg/dL    Non HDL Cholesterol: 69: Patients Atherosclerotic Cardiovascular Disease (ASCVD) Risk  Optimal Level (mg/dL)  LDL Cholesterol (LDL-C)  All Patients                                < 100  ASCVD at Very High Risk1    < 70  Non-HDL Cholesterol (Non-HDL-C)  All Patients                       < 130  ASCVD at Very High Risk1   < 100  Non-HDL-Cholesterol (Non-HDL-C) is also a key target for cardiovascular  risk reduction.  Consider Familial Hypercholesterolemia when: LDL-C > 190 mg/dL or  Non-HDL-C > 220 mg/dL.  LDL-C calculation using the Friedewald equation is not provided when  triglycerides > 400 mg/dL, in which case we recommend repeating the test  after fasting, if it was not done before.  When triglycerides >150 mg/dL, calculated LDL-C is provided but maystill  be inaccurate (particularly when LDL-C < 70 mg/dL). It can be  recalculated off-line using other equations (e.g. Joaquin SS, Mahnaz LEMON,  Garrett LITTLE, et al.TINO 2013;310:2061 - 8).  1 Olivier Trotter.,et al. "2019 AHA/ACC. . . guideline on the  management of blood cholesterol: a report of the American College of  Cardiology/American Heart Association Task Force on Clinical Practice  Guidelines." Circulation;139:e1082 - e1143.  These values apply only to persons 20 years and older.  Lipid Panel updated with new test, reference ranges and interpretive  comments effective 10-. mg/dL    LDL Cholesterol Calculated: 56 mg/dL    A1C with Estimated Average Glucose in AM (09.09.21 @ 04:31)    A1C with Estimated Average Glucose Result: 5.1 %    Estimated Average Glucose: 100 mg/dL    _____________________________________________________________  EEG SUMMARY/CLASSIFICATION 9/10/21    Normal EEG in the awake and drowsy states.  _____________________________________________________________  EEG IMPRESSION/CLINICAL CORRELATE    Normal EEG study.  No epileptiform pattern or seizure seen.  _____________________________________________________________    RADIOLOGY & ADDITIONAL STUDIES (independently reviewed unless otherwise noted):  MRI brain no acute CVA, mass or blood    CT head: no acute CVA, mass or blood (+) SVID  CTA head: no aneurysm, AVM, LVO or sig stenosis in COW  CTA neck: no sig carotid or vertebral stenosis  CT Perfusion head - CBF<30% volume 0ml, Tmax>6s volume =0ml

## 2021-09-10 NOTE — PROGRESS NOTE ADULT - SUBJECTIVE AND OBJECTIVE BOX
[facility]                              [address] [address]                                                 PHONE: [phone]                                                 FAX: [phone]  -----------------------------------------------------------------------------------------------  Pt seen and examined. FU for ? TIA    Overnight events/Complaints: Pt without complains. Weakness improved in the left leg. Pt ambulated.    Vital Signs Last 24 Hrs  T(C): 36.6 (10 Sep 2021 05:00), Max: 37.3 (09 Sep 2021 11:44)  T(F): 97.8 (10 Sep 2021 05:00), Max: 99.1 (09 Sep 2021 11:44)  HR: 89 (10 Sep 2021 05:00) (66 - 95)  BP: 117/71 (10 Sep 2021 05:00) (108/80 - 150/85)  BP(mean): 86 (09 Sep 2021 21:00) (81 - 105)  RR: 18 (10 Sep 2021 05:00) (13 - 23)  SpO2: 97% (10 Sep 2021 05:00) (94% - 100%)  I&O's Summary    09 Sep 2021 07:01  -  10 Sep 2021 07:00  --------------------------------------------------------  IN: 0 mL / OUT: 600 mL / NET: -600 mL        PHYSICAL EXAM:  Constitutional: Appears well; alert and co-operative  HEENT:     Head: Normocephalic and atraumatic  Eyes: Conjunctiva normal, No scleral icterus  Neck: Supple, No JVD  Mouth/Throat: Mucous membranes are normal. Mucosa are not pale or dry.  Cardiovascular: regular S1, S2  Respiratory: Lungs clear to auscultation; no crepitations, no wheeze  Gastrointestinal:  Soft, Non-tender, + BS	  Musculoskeletal: Normal range of motion. No edema  Skin: Warm and dry. No cyanosis . No diaphoresis.  Neurologic: Alert oriented to time place and person. L side weakness  Psychiatric: Normal mood and affect, Speech is normal and behavior is normal.        LABS:                        13.1   7.34  )-----------( 195      ( 10 Sep 2021 08:46 )             38.4     09-10    139  |  104  |  16.8  ----------------------------<  92  4.5   |  21.0<L>  |  0.94    Ca    9.1      10 Sep 2021 08:45  Phos  4.1     09-10  Mg     2.0     09-10    TPro  6.8  /  Alb  4.1  /  TBili  1.0  /  DBili  x   /  AST  31  /  ALT  19  /  AlkPhos  93  09-08    CARDIAC MARKERS ( 08 Sep 2021 17:36 )  x     / <0.01 ng/mL / x     / x     / x          PT/INR - ( 08 Sep 2021 17:36 )   PT: 12.3 sec;   INR: 1.06 ratio         PTT - ( 08 Sep 2021 17:36 )  PTT:28.9 sec    RADIOLOGY & ADDITIONAL STUDIES: (reviewed)  CXR was independently visualized/reviewed and demonstrated: clear lungs    CARDIOLOGY TESTING:(reviewed)     ECG (Independent visualization): NSR    MEGHANN  3/29/21  Interpretation Summary  EF 60-65%, mild prolapse of P2 segment of the posterior mitral valve without obvious flail segment, mod MR, mild TR, mild AI    Cardiac Cath  11/30/20  Interpretation Summary  prior stent in LAD is patent with non-obstructive CAD.     Echocardiogram  11/29/20  Interpretation Summary  grade 1 diastolic dysfunction, mildly enlarged left atrium, mild mitral annular calcification mild thickening of the anterior and posterior mitral valve leaflets, mild to moderate MR, mild TR, mild to moderate AI, sclerotic aortic valve.     Nuclear Stress  Oct 2019  Interpretation Summary  EF 43%, a small severe mostly fixed defect involving the base to mid inferolateral and apical inferoseptum suggestive of previous MI, no clear evidence of ischemia, basal inferolateral, mid inferolateral and apical septal segments severely hypokinetic.    ILR interrogation without events    MEDICATIONS:(reviewed)  MEDICATIONS  (STANDING):  aspirin enteric coated 81 milliGRAM(s) Oral daily  atorvastatin 40 milliGRAM(s) Oral at bedtime  enoxaparin Injectable 40 milliGRAM(s) SubCutaneous daily  influenza   Vaccine 0.5 milliLiter(s) IntraMuscular once  ticagrelor 90 milliGRAM(s) Oral every 12 hours    ASSESSMENT AND PLAN:    81y Female with prior history of HTN, HLD, CAD, s/p pci to LAD, TIA's, polio recent TIA on DAPT [ASA and Brilinta-reportedly non responder to Plavix]  presented with acute left sided weakness yesterday as well as speech and language difficulties. Neurology recommended alteplase which was given.    CVA, CAD  - ILR without events  - Prior MEGHANN with no cardioembolic source. MRI negative for acute stroke. EEG negative for seizure  - FU with neurology. If event deemed to be ? TIA, can consider d/c ASA and start low dose anticoagulation with Xarelto 10 mg daily  - decrease Brilinta to 60 mg BID    Dyslipidemia  - Continue statin    d/w Dr. Vann

## 2021-09-10 NOTE — DISCHARGE NOTE NURSING/CASE MANAGEMENT/SOCIAL WORK - PATIENT PORTAL LINK FT
You can access the FollowMyHealth Patient Portal offered by [facility] by registering at the following website: [email or web address]. By joining [facility]’s FollowMyHealth portal, you will also be able to view your health information using other applications (apps) compatible with our system.

## 2021-09-10 NOTE — PROGRESS NOTE ADULT - SUBJECTIVE AND OBJECTIVE BOX
HPI:  81 year old female w/ PMHX of CAD, stroke/TIA, HLD, HTN, renal Ca s/p L nephrectomy 10/2020, 2 right cardiac stents, on ASA/Plavix/Brilinta, presents w/ L sided weakness and difficulty speaking. LKW 16:45. Received TPA 17:39. Has hx of multiple visits to [facility] for multiple code strokes, last visit 7/25/2021. Currently in NAD, denies HA, nausea, vomiting. Follows with cardiology, neurology Dr. Abraham regarding stroke workup   NIHSS= 6    ICU course:  9/8: Code stroke called in ED. CTH shows no acute CVA, mass, or heme. CTA head shows no aneurysm, AVM, LVO or significant stenosis in COW. CTP negative. Patient received tPA at 17:39.   9/9: MRI shows no acute infarction. Brilinta and ASA resumed 24hrs post tPA. Lovenox started. Patient neurologically stable and appropriate for downgrade.      Interval history  :  no acute complaint     CONSTITUTIONAL: No weakness, fevers or chills, no weight loss   EYES/ENT: No visual changes;  No vertigo or throat pain   NECK: No pain or stiffness  RESPIRATORY: No cough, wheezing, hemoptysis; No shortness of breath  CARDIOVASCULAR: No chest pain or palpitations  GASTROINTESTINAL: No abdominal or epigastric pain. No nausea, vomiting, or hematemesis; No diarrhea or constipation. No melena or hematochezia.  GENITOURINARY: No dysuria, frequency or hematuria  NEUROLOGICAL: No numbness or weakness  All other review of systems is negative unless indicated above.     Vital Signs Last 24 Hrs  T(C): 37.2 (09 Sep 2021 22:36), Max: 37.3 (09 Sep 2021 11:44)  T(F): 98.9 (09 Sep 2021 22:36), Max: 99.1 (09 Sep 2021 11:44)  HR: 75 (09 Sep 2021 22:36) (59 - 95)  BP: 127/80 (09 Sep 2021 22:36) (108/80 - 150/85)  BP(mean): 86 (09 Sep 2021 21:00) (78 - 105)  RR: 18 (09 Sep 2021 22:36) (12 - 23)  SpO2: 94% (09 Sep 2021 22:36) (94% - 100%)    PHYSICAL EXAM:  GENERAL: NAD, well-developed  HEAD:  Atraumatic, Normocephalic  EYES: EOMI, PERRLA, conjunctiva and sclera clear  NECK: Supple, No JVD  CHEST/LUNG: Clear to auscultation bilaterally; No wheeze  HEART: Regular rate and rhythm; No murmurs, rubs, or gallops  ABDOMEN: Soft, Nontender, Nondistended; Bowel sounds present  EXTREMITIES:  2+ Peripheral Pulses, No clubbing, cyanosis, or edema  PSYCH: AAOx3  NEUROLOGY: non-focal  SKIN: No rashes or lesions   HPI   81 year old female w/ PMHX of CAD, stroke/TIA, HLD, HTN, renal Ca s/p L nephrectomy 10/2020, 2 right cardiac stents, on ASA/Plavix/Brilinta, presents w/ L sided weakness and difficulty speaking. LKW 16:45. Received TPA 17:39. Has hx of multiple visits to [facility] for multiple code strokes, last visit 2021. Currently in NAD, denies HA, nausea, vomiting. Follows with cardiology, neurology Dr. Abraham regarding stroke workup   NIHSS= 6      SUBJECTIVE:    Patient is a 81y old Female who presents with a chief complaint of stroke (10 Sep 2021 04:49)    Currently admitted to medicine with the primary diagnosis of Weakness    Today is hospital day 2d. This morning she is resting comfortably in bed and reports no new issues or overnight events.     PAST MEDICAL & SURGICAL HISTORY  HTN (hypertension)    Rheumatic fever    TIA (transient ischemic attack)    CAD S/P percutaneous coronary angioplasty    Polio    Cerebrovascular accident (CVA)  2020, 2020, May 2020    Status post tonsillectomy    H/O left nephrectomy    Renal cancer        ROS:  CONSTITUTIONAL: No weakness, fevers or chills, no weight loss   EYES/ENT: No visual changes;  No vertigo or throat pain   NECK: No pain or stiffness  RESPIRATORY: No cough, wheezing, hemoptysis; No shortness of breath  CARDIOVASCULAR: No chest pain or palpitations  GASTROINTESTINAL: No abdominal or epigastric pain. No nausea, vomiting, or hematemesis; No diarrhea or constipation. No melena or hematochezia.  GENITOURINARY: No dysuria, frequency or hematuria  NEUROLOGICAL: No numbness or weakness  All other review of systems is negative unless indicated above.     SOCIAL HISTORY:  Negative for smoking/alcohol/drug use.     ALLERGIES:  Biaxin (Muscle Pain; Joint Pain)  codeine (Faint)  Metoprolol Succinate ER (Unknown)  monoctanoin (Unknown)    MEDICATIONS:  STANDING MEDICATIONS  aspirin enteric coated 81 milliGRAM(s) Oral daily  atorvastatin 40 milliGRAM(s) Oral at bedtime  enoxaparin Injectable 40 milliGRAM(s) SubCutaneous daily  influenza   Vaccine 0.5 milliLiter(s) IntraMuscular once  ticagrelor 90 milliGRAM(s) Oral every 12 hours    PRN MEDICATIONS    VITALS:   T(F): 98.9  HR: 75  BP: 127/80  RR: 18  SpO2: 94%        LABS:                        13.8   7.31  )-----------( 196      ( 09 Sep 2021 04:31 )             40.6     09-09    139  |  104  |  19.1  ----------------------------<  87  4.2   |  22.0  |  0.81    Ca    8.7      09 Sep 2021 04:31  Phos  4.1     09-09  Mg     2.1     09-09    TPro  6.8  /  Alb  4.1  /  TBili  1.0  /  DBili  x   /  AST  31  /  ALT  19  /  AlkPhos  93  09-08    PT/INR - ( 08 Sep 2021 17:36 )   PT: 12.3 sec;   INR: 1.06 ratio         PTT - ( 08 Sep 2021 17:36 )  PTT:28.9 sec  Urinalysis Basic - ( 08 Sep 2021 18:37 )    Color: Yellow / Appearance: Clear / S.010 / pH: x  Gluc: x / Ketone: Negative  / Bili: Negative / Urobili: Negative mg/dL   Blood: x / Protein: Negative mg/dL / Nitrite: Negative   Leuk Esterase: Negative / RBC: 3-5 /HPF / WBC 0-2   Sq Epi: x / Non Sq Epi: Occasional / Bacteria: x      CARDIAC MARKERS ( 08 Sep 2021 17:36 )  x     / <0.01 ng/mL / x     / x     / x            PHYSICAL EXAM:    PHYSICAL EXAM:  GENERAL: NAD, well-developed  HEAD:  Atraumatic, Normocephalic  EYES: EOMI, PERRLA, conjunctiva and sclera clear  NECK: Supple, No JVD  CHEST/LUNG: Clear to auscultation bilaterally; No wheeze  HEART: Regular rate and rhythm; No murmurs, rubs, or gallops  ABDOMEN: Soft, Nontender, Nondistended; Bowel sounds present  EXTREMITIES:  2+ Peripheral Pulses, No clubbing, cyanosis, or edema  PSYCH: AAOx3  NEUROLOGY: non-focal  SKIN: No rashes or lesions

## 2021-09-10 NOTE — PROGRESS NOTE ADULT - ASSESSMENT
The patient is a 81y Female who is followed by neurology because of acute left sided weakness    Stroke  She presented with signs of acute right MCA syndrome  She was given alteplase with great improvement of symptoms NIHSS went for 6 to1, now resolved  Suggested EEG because of recurrent left sided weakness events- it was normal    Stroke work up negative, no seizures on EEG    OK for discharge  discussed with Dr Vann    Thank you for allowing me to participate in the care of your patient    Cem Abraham MD, PhD   [id number]

## 2021-09-10 NOTE — DISCHARGE NOTE PROVIDER - NSDCCPCAREPLAN_GEN_ALL_CORE_FT
PRINCIPAL DISCHARGE DIAGNOSIS  Diagnosis: Weakness  Assessment and Plan of Treatment: MRI negative for acute stroke. EEG negative for seizure. Pt. was started on lovenox for dvt ppx and statin for Dyslipidemia. SBP goal maintained at 120-180  Physical therapy evaluated the pt. who is ambulatory reccomending home w/ home PT.  Evaluated by Cardiology. no stroke found on MRI,   ILR checked/interrogated without events. Prior MEGHANN with no cardioembolic source. troponins negative.  restart cardizem (home medication for NSVT found on prior ILR interrogation) once BP persistently >130  Weakness improved in the left leg.      SECONDARY DISCHARGE DIAGNOSES  Diagnosis: Dysarthria  Assessment and Plan of Treatment: improved, Follow up with Neurology    Diagnosis: Expressive aphasia  Assessment and Plan of Treatment: improved, Follow up with Neurology

## 2021-09-10 NOTE — PROGRESS NOTE ADULT - ASSESSMENT
81 year old female w/ PMHX of CAD, stroke/TIA, HLD, HTN, renal Ca s/p L nephrectomy 10/2020, 2 right cardiac stents, on ASA/Plavix/Brilinta, presents w/ L sided weakness and difficulty speaking    s/p tPA on sep 8,  MRI negative for acute stroke  EEG negative for seizure  home meds resulted including aspirin and brillinta  lovenox dvt ppx started  continue with physical therapy and prepare for discharge in 24 to 48 hr  repeat stat ct head if worsening mental status  SBP goal 120-180  Dash diet  DVT ppx  follow up with cardiology since echo was ordered but discontinued, may be deemed unnecessary since no stroke found on MRI, f/u with ILR interrogation  restart cardizem (home medication for NSVT found on prior ILR interrogation) once BP persistently >130    H/o CAD:  Cardiac Cath  11/30/20  Interpretation Summary  prior stent in LAD is patent with non-obstructive CAD.  stress test done Oct 2019  Interpretation Summary  EF 43%, a small severe mostly fixed defect involving the base to mid inferolateral and apical inferoseptum suggestive of previous MI, no clear evidence of ischemia, basal inferolateral, mid inferolateral and apical septal segments severely hypokinetic.                 81 year old female w/ PMHX of CAD, stroke/TIA, HLD, HTN, renal Ca s/p L nephrectomy 10/2020, 2 right cardiac stents, on ASA/Plavix/Brilinta, presents w/ L sided weakness and difficulty speaking    Possible TIA/h/o recurrent TIA with work up negative  s/p tPA on sep 8  MRI negative for acute stroke  EEG negative for seizure  home meds resulted including aspirin and brillinta  lovenox dvt ppx started  continue with physical therapy and prepare for discharge in 24 to 48 hr  repeat stat ct head if worsening mental status  SBP goal 120-180  Dash diet  DVT ppx  follow up with cardiology since echo was ordered but discontinued, may be deemed unnecessary since no stroke found on MRI, f/u with ILR interrogation  restart cardizem (home medication for NSVT found on prior ILR interrogation) once BP persistently >130    H/o CAD:  Cardiac Cath  11/30/20  Interpretation Summary  prior stent in LAD is patent with non-obstructive CAD.  stress test done Oct 2019  Interpretation Summary  EF 43%, a small severe mostly fixed defect involving the base to mid inferolateral and apical inferoseptal suggestive of previous MI, no clear evidence of ischemia, basal inferolateral, mid inferolateral and apical septal segments severely hypokinetic.                 81 year old female w/ PMHX of CAD, stroke/TIA, HLD, HTN, renal Ca s/p L nephrectomy 10/2020, 2 right cardiac stents, on ASA/Plavix/Brilinta, presents w/ L sided weakness and difficulty speaking    Possible TIA/h/o recurrent TIA with work up negative  s/p tPA on sep 8  MRI negative for acute stroke  EEG negative for seizure  home meds resulted including aspirin and brillinta  lovenox dvt ppx started  continue with physical therapy and prepare for discharge in 24 to 48 hr  repeat stat ct head if worsening mental status  SBP goal 120-180  Dash diet  DVT ppx  follow up with cardiology since echo was ordered but discontinued, may be deemed unnecessary since no stroke found on MRI, f/u with ILR interrogation  restart cardizem (home medication for NSVT found on prior ILR interrogation) once BP persistently >130    H/o CAD:  Cardiac Cath  11/30/20  Interpretation Summary  prior stent in LAD is patent with non-obstructive CAD.  stress test done Oct 2019  Interpretation Summary  EF 43%, a small severe mostly fixed defect involving the base to mid inferolateral and apical inferoseptal suggestive of previous MI, no clear evidence of ischemia, basal inferolateral, mid inferolateral and apical septal segments severely hypokinetic.      prepare for discharge  AM labs not needed

## 2021-09-10 NOTE — CHART NOTE - NSCHARTNOTEFT_GEN_A_CORE
81 year old female with PMH HTN, CAD s/p PCI, Left RCC s/p Nephrectomy and multiple TIA presented with left sided weakness and slurring - treated with tPA.  MR brain without any acute ischemia. Extensive work up in past  Improved symptoms - ambulating with PT earlier.  LDL 56, A1c 5.1.  EEG reported to be normal  Awaiting home care (independent, lives alone) - discharge home once arrangements in place
HPI:  81 year old female with PMH of CAD, stroke/TIA, HLD, HTN, renal Ca s/p L nephrectomy 10/2020, 2 right cardiac stents, on ASA/Plavix/Brilinta, presents w/ L sided weakness and difficulty speaking. LKW 16:45. Received TPA 17:39. Has hx of multiple visits to [facility] for multiple code strokes, last visit 7/25/2021. Currently in NAD, denies HA, nausea, vomiting. Follows with cardiology, neurology Dr. Abraham regarding stroke workup.  NIHSS= 6 for dysarthria and L sided weakness  mRs= 0, lives alone  (08 Sep 2021 18:39)    Hospital Course:  9/8: Code stroke called in ED. CTH shows no acute CVA, mass, or heme. CTA head shows no aneurysm, AVM, LVO or significant stenosis in COW. CTP negative. Patient received tPA at 17:39.   9/9: MRI shows no acute infarction. Brilinta and ASA resumed 24hrs post tPA. Lovenox started. Patient neurologically stable and appropriate for downgrade.     ICU Vital Signs Last 24 Hrs:  T(C): 36.4 (09 Sep 2021 15:45), Max: 37.3 (09 Sep 2021 11:44)  T(F): 97.6 (09 Sep 2021 15:45), Max: 99.1 (09 Sep 2021 11:44)  HR: 86 (09 Sep 2021 15:39) (59 - 97)  BP: 136/80 (09 Sep 2021 15:39) (109/73 - 168/96)  BP(mean): 98 (09 Sep 2021 15:39) (78 - 108)  RR: 17 (09 Sep 2021 15:39) (12 - 20)  SpO2: 98% (09 Sep 2021 15:39) (96% - 100%)    Physical Exam:  GENERAL: NAD, well-groomed, well-developed  HEAD:  Atraumatic, normocephalic  JUANA COMA SCORE: E-4 V-5 M-6 = 15  MENTAL STATUS: AAO x3; Awake; Opens eyes spontaneously; Appropriately conversant without aphasia - speech fluent; Comprehension intact; Following commands   CRANIAL NERVES: PERRL. EOMI without nystagmus. Facial sensation intact V1-3 distribution b/l. Face symmetric w/ normal eye closure and smile, tongue midline. Hearing grossly intact. Speech clear  MOTOR: strength 5/5 in RUE, LUE, RLE. 4/5 in LLE   SENSATION: grossly intact to light touch all extremities  CHEST/LUNG: Nonlabored breathing, no signs of respiratory distress   HEART: +S1/+S2; Regular rate and rhythm  ABDOMEN: Soft, nontender, nondistended  EXTREMITIES: No peripheral swelling noted   SKIN: Warm, dry    Radiology:  MR Head w/ IV Cont (09.09.21 @ 17:25):  IMPRESSION:   No acute infarction.    CT Angio Head & Neck w/ IV Cont (09.08.21 @ 17:32):  IMPRESSION:  1. No perfusion abnormality identified.  2. Minimal plaque in both carotid bifurcations in the neck, without significant stenosis. No dissection or occlusion noted.  3. Patent vertebrals with left-sided dominance.  4. Widely patent vasculature noted intracranially.    CT Perfusion w/ Maps w/ IV Cont (09.08.21 @ 17:32):  CT PERFUSION  rapid imaging was conducted.  No core infarct or significant delay in mean transit time is noted.  Blood flow maps are symmetric.  CBF<30% volume: 0 mL  Tmax>6.0 s volume:0 mL  Mismatch volume: 0 mL  Mismatch ratio: None    Assessment: 81 year old female with recurrent stroke-like Sx - L sided weakness and dysarthria. S/p tPA, no signs of LVO. Clinically improving. Had multiple similar episodes before, symptoms resolve; neuroimaging WNL, no signs of significant cerebral vascular disease on prior admissions; came twice in July 2021. Had 19-hr vEEG as a w/up for szs with no contributory findings.  PMH of CAD, stroke/TIA, HLD, HTN, renal Ca s/p L nephrectomy 10/2020, 2 right cardiac stents, on ASA/Brilinta.  Hyponatremia resolved; likely nutritional.    Plan:  -D/w attending Dr. Troncoso  -Patient neurologically stable, clinically improving - appropriate for downgrade to telemetry bed. Patient signed out to and accepted by hospitalist Dr. Vann. Discussed at length, all questions answered   -Neurology consulted and following - appreciate recommendations   -24hr post tPA CTH no longer warranted, MRI obtained in place - shows no acute infarct   -STAT CTH for any decline in neurologic status   -SBP goal 100-180  -LDL 56  -Lipitor 40mg QHS   -TTE acceptable with no LV clots 07/29/2021  -Cardiology following - appreciate recommendations   -Saturating well on NC  -Maintain SpO2>92%  -OOB with assist/as tolerated   -DASH diet   -Voiding   -SCDs b/l and Lovenox 40mg daily for DVT prophylaxis   -ASA 81mg daily   -Brilinta 90mg BID   -Afebrile, monitor WBC and temperature   -HgA1C 5.1, TSH 2.10  -PT/OT - recommending D/C home with assist
Nutrition Note: Aware pt downgraded from ICU. Chart reviewed; RD to follow up with full nutrition assessment per medical floor protocol.
called for code stroke  acute left sided weakness, worse than baseline weakness from prior stroke, dysarthria and aphasia  Her last known well was 1645  NIHSS=7    CT head did not show acute CVA, mass or blood  CTA head: no aneurysm, AVM, LVO or sig stenosis in COW  CTA neck: no sig carotid or vertebral stenosis  CT Perfusion head - CBF<30% volume 0ml, Tmax>6s volume =0ml    Suspect acute stroke  perfusion may be negative due to acuity of event    no contraindication for alteplase, suggest to administer  Was not a candidate for thrombectomy as they had no LVO or ischemic penumbra detected on CTA and CTP head    will need NSICU for post alteplase monitoring  formal consult to follow in AM    Thank you for allowing me to participate in the care of your patient    Cem Abraham MD, PhD   [id number]

## 2021-09-10 NOTE — DISCHARGE NOTE PROVIDER - NSDCMRMEDTOKEN_GEN_ALL_CORE_FT
aspirin 81 mg oral delayed release tablet: 1 tab(s) orally once a day  atorvastatin 80 mg oral tablet: 1 tab(s) orally once a day  Brilinta (ticagrelor) 90 mg oral tablet: 1 tab(s) orally 2 times a day  clopidogrel 75 mg oral tablet: 1 tab(s) orally once a day  dilTIAZem 30 mg oral tablet: 1 tab(s) orally 3 times a day  Multiple Vitamins oral tablet: 1 tab(s) orally once a day   aspirin 81 mg oral delayed release tablet: 1 tab(s) orally once a day  atorvastatin 80 mg oral tablet: 1 tab(s) orally once a day  Brilinta (ticagrelor) 90 mg oral tablet: 1 tab(s) orally 2 times a day  dilTIAZem 30 mg oral tablet: 1 tab(s) orally 3 times a day  Multiple Vitamins oral tablet: 1 tab(s) orally once a day

## 2021-09-10 NOTE — DISCHARGE NOTE PROVIDER - CARE PROVIDER_API CALL
Anoop Sanchez; MPH)  Cardiology; Internal Medicine  [address] [place] [id number]  Phone: [phone]  Fax: [phone]  Follow Up Time: 1 week    Cem Abraham; PhD)  Neurology; Vascular Neurology  [address] [place] [address], [facility] [address]  [place] [address]  Phone: [phone]  Fax: [phone]  Follow Up Time: 2 weeks

## 2021-09-10 NOTE — DISCHARGE NOTE PROVIDER - PROVIDER TOKENS
PROVIDER:[TOKEN:[[id number]],FOLLOWUP:[1 week]],PROVIDER:[TOKEN:[[id number]],FOLLOWUP:[2 weeks]]

## 2021-09-10 NOTE — DISCHARGE NOTE NURSING/CASE MANAGEMENT/SOCIAL WORK - NSDCPEFALRISK_GEN_ALL_CORE
For information on Fall & injury Prevention, visit [email or web address]

## 2021-09-10 NOTE — DISCHARGE NOTE PROVIDER - CARE PROVIDERS DIRECT ADDRESSES
[email or web address]

## 2021-09-10 NOTE — EEG REPORT - NS EEG TEXT BOX
[facility]   COMPREHENSIVE EPILEPSY CENTER   REPORT OF ROUTINE VIDEO EEG     [facility]: [address] [place] [address] [address], [place] [address], Ph#: [phone]  [facility]: [id number], [place] [address], Ph#: [phone]  [facility]: [address] [place] [address], Ph#: [phone]    Patient Name: BREANA HELM  Age and : 81y (40)  MRN #: [id number]  Location: [facility] 5TWR 5216 02  Referring Physician: Juliann Vann    Study Date: 09-10-21    _____________________________________________________________  TECHNICAL INFORMATION    Placement and Labeling of Electrodes:  The EEG was performed utilizing 20 channels referential EEG connections (coronal over temporal over parasagittal montage) using all standard 10-20 electrode placements with EKG.  Recording was at a sampling rate of 256 samples per second per channel.  Time synchronized digital video recording was done simultaneously with EEG recording.  A low light infrared camera was used for low light recording.  Manjit and seizure detection algorithms were utilized.    _____________________________________________________________  HISTORY    Patient is a 81y old  Female who presents with a chief complaint of stroke (10 Sep 2021 10:40)      PERTINENT MEDICATION:  MEDICATIONS  (STANDING):  aspirin enteric coated 81 milliGRAM(s) Oral daily  atorvastatin 40 milliGRAM(s) Oral at bedtime  enoxaparin Injectable 40 milliGRAM(s) SubCutaneous daily  influenza   Vaccine 0.5 milliLiter(s) IntraMuscular once  ticagrelor 90 milliGRAM(s) Oral every 12 hours    _____________________________________________________________  STUDY INTERPRETATION    Findings: The background was continuous, spontaneously variable and reactive. During wakefulness, the posterior dominant rhythm consisted of symmetric, well-modulated 9 Hz activity, with amplitude to 30 uV, that attenuated to eye opening.     Background Slowing:  No generalized background slowing was present.      Focal Slowing:   None was present.    Sleep Background:  Drowsiness was characterized by fragmentation, attenuation, and slowing of the background activity.    Stage II sleep transients were not recorded.    Other Non-Epileptiform Findings:  None were present.    Interictal Epileptiform Activity:   None were present.    Events:  Clinical events: None recorded.  Seizures: None recorded.    Activation Procedures:   Hyperventilation was not performed.    Photic stimulation was performed and did not elicit any abnormality.     Artifacts:  Intermittent myogenic and movement artifacts were noted.    ECG:  The heart rate on single channel ECG was predominantly between 70-80 BPM.    _____________________________________________________________  EEG SUMMARY/CLASSIFICATION    Normal EEG in the awake and drowsy states.  _____________________________________________________________  EEG IMPRESSION/CLINICAL CORRELATE    Normal EEG study.  No epileptiform pattern or seizure seen.  _____________________________________________________________    Yokasta Edwards DO  Attending Physician, [facility] Epilepsy [place]     [facility]   COMPREHENSIVE EPILEPSY CENTER   REPORT OF ROUTINE VIDEO EEG     [facility]: [address] [place] [address] [address], [place] [address], Ph#: [phone]  [facility]: [phone], [place] [address], Ph#: [phone]  [facility]: [address] [place] [address], Ph#: [phone]    Patient Name: BREANA HELM  Age and : 81y (40)  MRN #: [id number]  Location: [facility] 5TWR 5216 02  Referring Physician: Juliann Vann    Study Date: 09-10-21    _____________________________________________________________  TECHNICAL INFORMATION    Placement and Labeling of Electrodes:  The EEG was performed utilizing 20 channels referential EEG connections (coronal over temporal over parasagittal montage) using all standard 10-20 electrode placements with EKG.  Recording was at a sampling rate of 256 samples per second per channel.  Time synchronized digital video recording was done simultaneously with EEG recording.  A low light infrared camera was used for low light recording.  Manjit and seizure detection algorithms were utilized.    _____________________________________________________________  HISTORY    Patient is a 81y old  Female who presents with a chief complaint of stroke (10 Sep 2021 10:40)      PERTINENT MEDICATION:  MEDICATIONS  (STANDING):  aspirin enteric coated 81 milliGRAM(s) Oral daily  atorvastatin 40 milliGRAM(s) Oral at bedtime  enoxaparin Injectable 40 milliGRAM(s) SubCutaneous daily  influenza   Vaccine 0.5 milliLiter(s) IntraMuscular once  ticagrelor 90 milliGRAM(s) Oral every 12 hours    _____________________________________________________________  STUDY INTERPRETATION    Findings: The background was continuous, spontaneously variable and reactive. During wakefulness, the posterior dominant rhythm consisted of symmetric, well-modulated 9 Hz activity, with amplitude to 30 uV, that attenuated to eye opening.     Background Slowing:  No generalized background slowing was present.      Focal Slowing:   None was present.    Sleep Background:  Drowsiness was characterized by fragmentation, attenuation, and slowing of the background activity.    Stage II sleep transients were not recorded.    Other Non-Epileptiform Findings:  None were present.    Interictal Epileptiform Activity:   None were present.    Events:  Clinical events: None recorded.  Seizures: None recorded.    Activation Procedures:   Hyperventilation was not performed.    Photic stimulation was performed and did not elicit any abnormality.     Artifacts:  Intermittent myogenic and movement artifacts were noted.    ECG:  The heart rate on single channel ECG was predominantly between 70-80 BPM with likely PVCs, this can be further evaluated with a 12 lead EKG.    _____________________________________________________________  EEG SUMMARY/CLASSIFICATION    Normal EEG in the awake and drowsy states.  _____________________________________________________________  EEG IMPRESSION/CLINICAL CORRELATE    Normal EEG study.  No epileptiform pattern or seizure seen.  _____________________________________________________________    Yokasta Edwards DO  Attending Physician, [facility] Epilepsy [place]

## 2021-09-10 NOTE — DISCHARGE NOTE PROVIDER - HOSPITAL COURSE
81 year old female w/ PMHX of CAD, stroke/TIA, HLD, HTN, renal Ca s/p L nephrectomy 10/2020, 2 right cardiac stents, on ASA/Plavix/Brilinta, presents w/ L sided weakness and difficulty speaking admitted for Possible TIA/h/o recurrent TIA with work up negative s/p tPA on sep 8.  MRI negative for acute stroke. EEG negative for seizure. Pt. was started on lovenox for dvt ppx and statin for Dyslipidemia. SBP goal maintained at 120-180  Physical therapy evaluated the pt. who is ambulatory reccomending home w/ home PT.  Evaluated by Cardiology since echo was ordered but discontinued, may be deemed unnecessary since no stroke found on MRI,   ILR checked/interrogated without events. Prior MEGHANN with no cardioembolic source. troponins negative.  restart cardizem (home medication for NSVT found on prior ILR interrogation) once BP persistently >130  Weakness improved in the left leg.  Cardiac Cath on 11/30/20: prior stent in LAD is patent with non-obstructive CAD.   stress test done Oct 2019: EF 43%, a small severe mostly fixed defect involving the base to mid inferolateral and apical inferoseptal suggestive of previous MI, no clear evidence of ischemia, basal inferolateral, mid inferolateral and apical septal segments severely hypokinetic. 81 year old female w/ PMHX of CAD, stroke/TIA, HLD, HTN, renal Ca s/p L nephrectomy 10/2020, 2 right cardiac stents, on ASA/Brilinta, presents w/ L sided weakness and difficulty speaking admitted for Possible TIA/h/o recurrent TIA with work up negative s/p tPA on sep 8.  MRI negative for acute stroke. EEG negative for seizure. Pt. was started on lovenox for dvt ppx and statin for Dyslipidemia. SBP goal maintained at 120-180  Physical therapy evaluated the pt. who is ambulatory reccomending home w/ home PT.  Evaluated by Cardiology since echo was ordered but discontinued, may be deemed unnecessary since no stroke found on MRI,   ILR checked/interrogated without events. Prior MEGHANN with no cardioembolic source. troponins negative.  restart cardizem (home medication for NSVT found on prior ILR interrogation) once BP persistently >130  Weakness improved in the left leg.  Cardiac Cath on 11/30/20: prior stent in LAD is patent with non-obstructive CAD.   stress test done Oct 2019: EF 43%, a small severe mostly fixed defect involving the base to mid inferolateral and apical inferoseptal suggestive of previous MI, no clear evidence of ischemia, basal inferolateral, mid inferolateral and apical septal segments severely hypokinetic. 81 year old female w/ PMHX of CAD, stroke/TIA, HLD, HTN, renal Ca s/p L nephrectomy 10/2020, 2 right cardiac stents, on ASA/Brilinta, presents w/ L sided weakness and difficulty speaking admitted for Possible TIA/h/o recurrent TIA with work up negative s/p tPA on sep 8.  MRI negative for acute stroke. EEG negative for seizure. Pt. was started on lovenox for dvt ppx and statin for Dyslipidemia. SBP goal maintained at 120-180  Physical therapy evaluated the pt. who is ambulatory reccomending home w/ home PT.  Evaluated by Cardiology since echo was ordered but discontinued, may be deemed unnecessary since no stroke found on MRI,   ILR checked/interrogated without events. Prior MEGHANN with no cardioembolic source. troponins negative.  restart cardizem (home medication for NSVT found on prior ILR interrogation) once BP persistently >130  Weakness improved in the left leg.  Cardiac Cath on 11/30/20: prior stent in LAD is patent with non-obstructive CAD.   stress test done Oct 2019: EF 43%, a small severe mostly fixed defect involving the base to mid inferolateral and apical inferoseptal suggestive of previous MI, no clear evidence of ischemia, basal inferolateral, mid inferolateral and apical septal segments severely hypokinetic. EEG Nl.    time spent>35 mins

## 2021-09-11 VITALS
TEMPERATURE: 98 F | DIASTOLIC BLOOD PRESSURE: 74 MMHG | HEART RATE: 88 BPM | RESPIRATION RATE: 18 BRPM | SYSTOLIC BLOOD PRESSURE: 129 MMHG | OXYGEN SATURATION: 98 %

## 2021-09-11 PROCEDURE — 85610 PROTHROMBIN TIME: CPT

## 2021-09-11 PROCEDURE — 86769 SARS-COV-2 COVID-19 ANTIBODY: CPT

## 2021-09-11 PROCEDURE — 36415 COLL VENOUS BLD VENIPUNCTURE: CPT

## 2021-09-11 PROCEDURE — 70498 CT ANGIOGRAPHY NECK: CPT | Mod: MA

## 2021-09-11 PROCEDURE — 84443 ASSAY THYROID STIM HORMONE: CPT

## 2021-09-11 PROCEDURE — 80053 COMPREHEN METABOLIC PANEL: CPT

## 2021-09-11 PROCEDURE — 84100 ASSAY OF PHOSPHORUS: CPT

## 2021-09-11 PROCEDURE — 70496 CT ANGIOGRAPHY HEAD: CPT | Mod: MA

## 2021-09-11 PROCEDURE — 80061 LIPID PANEL: CPT

## 2021-09-11 PROCEDURE — 83036 HEMOGLOBIN GLYCOSYLATED A1C: CPT

## 2021-09-11 PROCEDURE — 85025 COMPLETE CBC W/AUTO DIFF WBC: CPT

## 2021-09-11 PROCEDURE — 96374 THER/PROPH/DIAG INJ IV PUSH: CPT

## 2021-09-11 PROCEDURE — 70450 CT HEAD/BRAIN W/O DYE: CPT | Mod: MA

## 2021-09-11 PROCEDURE — 99285 EMERGENCY DEPT VISIT HI MDM: CPT | Mod: 25

## 2021-09-11 PROCEDURE — 97530 THERAPEUTIC ACTIVITIES: CPT

## 2021-09-11 PROCEDURE — 71045 X-RAY EXAM CHEST 1 VIEW: CPT

## 2021-09-11 PROCEDURE — 92523 SPEECH SOUND LANG COMPREHEN: CPT

## 2021-09-11 PROCEDURE — 81001 URINALYSIS AUTO W/SCOPE: CPT

## 2021-09-11 PROCEDURE — 93005 ELECTROCARDIOGRAM TRACING: CPT

## 2021-09-11 PROCEDURE — 99239 HOSP IP/OBS DSCHRG MGMT >30: CPT

## 2021-09-11 PROCEDURE — U0005: CPT

## 2021-09-11 PROCEDURE — 85027 COMPLETE CBC AUTOMATED: CPT

## 2021-09-11 PROCEDURE — 97116 GAIT TRAINING THERAPY: CPT

## 2021-09-11 PROCEDURE — 95819 EEG AWAKE AND ASLEEP: CPT

## 2021-09-11 PROCEDURE — 85730 THROMBOPLASTIN TIME PARTIAL: CPT

## 2021-09-11 PROCEDURE — 83735 ASSAY OF MAGNESIUM: CPT

## 2021-09-11 PROCEDURE — 97167 OT EVAL HIGH COMPLEX 60 MIN: CPT

## 2021-09-11 PROCEDURE — 97163 PT EVAL HIGH COMPLEX 45 MIN: CPT

## 2021-09-11 PROCEDURE — 80048 BASIC METABOLIC PNL TOTAL CA: CPT

## 2021-09-11 PROCEDURE — 84484 ASSAY OF TROPONIN QUANT: CPT

## 2021-09-11 PROCEDURE — 82962 GLUCOSE BLOOD TEST: CPT

## 2021-09-11 PROCEDURE — 70552 MRI BRAIN STEM W/DYE: CPT

## 2021-09-11 PROCEDURE — 0042T: CPT

## 2021-09-11 PROCEDURE — U0003: CPT

## 2021-09-11 RX ORDER — CLOPIDOGREL BISULFATE 75 MG/1
1 TABLET, FILM COATED ORAL
Qty: 0 | Refills: 0 | DISCHARGE

## 2021-09-11 RX ADMIN — ENOXAPARIN SODIUM 40 MILLIGRAM(S): 100 INJECTION SUBCUTANEOUS at 13:28

## 2021-09-11 RX ADMIN — Medication 81 MILLIGRAM(S): at 13:28

## 2021-09-11 RX ADMIN — TICAGRELOR 90 MILLIGRAM(S): 90 TABLET ORAL at 05:08

## 2021-09-11 NOTE — SPEECH LANGUAGE PATHOLOGY EVALUATION - SLP PERTINENT HISTORY OF CURRENT PROBLEM
81 year old female w/ PMHX of CAD, stroke/TIA, HLD, HTN, renal Ca s/p L nephrectomy 10/2020, 2 right cardiac stents, on ASA/Plavix/Brilinta, presents w/ L sided weakness and difficulty speaking. LKW 16:45. Received TPA 9/8/ 17:39. Has hx of multiple visits to [facility] for multiple code strokes, last visit 7/25/2021. Currently in NAD, denies HA, nausea, vomiting. Follows with cardiology, neurology Dr. Abraham regarding stroke workup

## 2021-09-11 NOTE — PROGRESS NOTE ADULT - SUBJECTIVE AND OBJECTIVE BOX
Patient is a 81y old  Female who presents with a chief complaint of stroke (11 Sep 2021 08:03)    pt miguel angel any weakness,numbness,ambulating w/o assistance. denies cp,sob,dizziness,clear speech.  SUBJECTIVE / OVERNIGHT EVENTS:  REVIEW OF SYSTEMS: All systems are reviewed and found to be negative except above    MEDICATIONS  (STANDING):  aspirin enteric coated 81 milliGRAM(s) Oral daily  atorvastatin 40 milliGRAM(s) Oral at bedtime  enoxaparin Injectable 40 milliGRAM(s) SubCutaneous daily  influenza   Vaccine 0.5 milliLiter(s) IntraMuscular once  ticagrelor 90 milliGRAM(s) Oral every 12 hours    MEDICATIONS  (PRN):      CAPILLARY BLOOD GLUCOSE        I&O's Summary      PHYSICAL EXAM:  Vital Signs Last 24 Hrs  T(C): 36.5 (11 Sep 2021 08:46), Max: 36.9 (10 Sep 2021 16:41)  T(F): 97.7 (11 Sep 2021 08:46), Max: 98.5 (10 Sep 2021 16:41)  HR: 73 (11 Sep 2021 08:46) (73 - 84)  BP: 112/73 (11 Sep 2021 08:46) (108/70 - 129/87)  BP(mean): --  RR: 17 (11 Sep 2021 08:46) (15 - 18)  SpO2: 100% (11 Sep 2021 08:46) (96% - 100%)    CONSTITUTIONAL: NAD,  EYES: PERRLA; conjunctiva and sclera clear  ENMT: Moist oral mucosa,   RESPIRATORY: Normal respiratory effort; lungs are clear to auscultation bilaterally  CARDIOVASCULAR: Regular rate and rhythm, normal S1 and S2, no murmur   EXTS: No lower extremity edema; Peripheral pulses are 2+ bilaterally  ABDOMEN: Nontender to palpation, normoactive bowel sounds, no rebound/guarding;   MUSCLOSKELETAL ; no joint swelling or tenderness to palpation  PSYCH: affect appropriate  NEUROLOGY: A+O to person, place, and time; CN 2-12 are intact and symmetric; no gross sensory deficits; motor 4/5 LLE rest 5/5      LABS:                        13.1   7.34  )-----------( 195      ( 10 Sep 2021 08:46 )             38.4     09-10    139  |  104  |  16.8  ----------------------------<  92  4.5   |  21.0<L>  |  0.94    Ca    9.1      10 Sep 2021 08:45  Phos  4.1     09-10  Mg     2.0     09-10                  RADIOLOGY & ADDITIONAL TESTS:  Results Reviewed:   Imaging Personally Reviewed:  Electrocardiogram Personally Reviewed:    COORDINATION OF CARE:  Care Discussed with Consultants/Other Providers [Y/N]:  Prior or Outpatient Records Reviewed [Y/N]:

## 2021-09-11 NOTE — SPEECH LANGUAGE PATHOLOGY EVALUATION - SLP GENERAL OBSERVATIONS
Pt received OOB in chair, A&A, 0x4, cooperative and reporting overall function has returned to baseline

## 2021-09-11 NOTE — SPEECH LANGUAGE PATHOLOGY EVALUATION - SLP SHORT TERM MEMORY
Was the patient seen in the last year in this department? Yes LOV-10/2/18    Does patient have an active prescription for medications requested? No     Received Request Via: Pharmacy   intact

## 2021-09-11 NOTE — SPEECH LANGUAGE PATHOLOGY EVALUATION - SLP DIAGNOSIS
Receptive/expressive language and cognitive/linguistic skills appear WFL. Pt reports her speech and language has returned to baseline

## 2021-09-11 NOTE — PROGRESS NOTE ADULT - SUBJECTIVE AND OBJECTIVE BOX
[facility] - [facility], [facility]                                   [address] [place] [address]                                                      PHONE: [phone]                                                         FAX: [phone]  [email or web address]  ---------------------------------------------------------------------------------------------------------------------------------    Overnight events/patient complaints: Comfortable. No CP.      Biaxin (Muscle Pain; Joint Pain)  codeine (Faint)  Isosorbide Mononitrate Extended Release (Faint)  Metoprolol Succinate ER (Unknown)  monoctanoin (Unknown)    MEDICATIONS  (STANDING):  aspirin enteric coated 81 milliGRAM(s) Oral daily  atorvastatin 40 milliGRAM(s) Oral at bedtime  enoxaparin Injectable 40 milliGRAM(s) SubCutaneous daily  influenza   Vaccine 0.5 milliLiter(s) IntraMuscular once  ticagrelor 90 milliGRAM(s) Oral every 12 hours    MEDICATIONS  (PRN):      Vital Signs Last 24 Hrs  T(C): 36.5 (11 Sep 2021 04:20), Max: 37 (10 Sep 2021 11:56)  T(F): 97.7 (11 Sep 2021 04:20), Max: 98.6 (10 Sep 2021 11:56)  HR: 73 (11 Sep 2021 04:20) (73 - 98)  BP: 108/70 (11 Sep 2021 04:20) (108/70 - 129/87)  BP(mean): --  RR: 17 (11 Sep 2021 04:20) (15 - 18)  SpO2: 96% (11 Sep 2021 04:20) (96% - 98%)  ICU Vital Signs Last 24 Hrs  BREANA HELM  I&O's Detail    I&O's Summary    Drug Dosing Weight  BREANA HELM      PHYSICAL EXAM:  General: NAD.  HEENT: Head; normocephalic.  Eyes: Pupils reactive.  Neck: Supple.  CARDIOVASCULAR: Normal S1 and S2.   LUNGS: Normal breath sounds bilaterally.  ABDOMEN: Soft.  EXTREMITIES: No clubbing, cyanosis or edema.   SKIN: warm. ILR site well healed.  NEURO: Alert/oriented.    PSYCH: normal affect.        LABS:                        13.1   7.34  )-----------( 195      ( 10 Sep 2021 08:46 )             38.4     09-10    139  |  104  |  16.8  ----------------------------<  92  4.5   |  21.0<L>  |  0.94    Ca    9.1      10 Sep 2021 08:45  Phos  4.1     09-10  Mg     2.0     09-10      BREANA HELM            RADIOLOGY & ADDITIONAL STUDIES:    INTERPRETATION OF TELEMETRY (personally reviewed): NSR    ECHO:   1. Normal left atrial size.   2. Segmental wall motion abnormalities in RCA territory,.   3. Left ventricular ejection fraction, by visual estimation, is 50%. Grade I diastolic dysfunction.   4. Normal right atrial size.   5. Normal right ventricular size and function.   6. Mild to moderate mitral valve regurgitation.   7. Mild to moderate aortic regurgitation.   8. Mild tricuspid regurgitation.   9. Trivial pericardial effusion.    MD Barbie, RPVI Electronically signed on 7/29/2021 at 8:50:48 PM      ASSESSMENT AND PLAN:  81y Female with prior history of HTN, HLD, CAD, s/p PCI to LAD, TIA's, polio, recent TIA on DAPT [ASA and Brilinta-reportedly non responder to Plavix], s/p ILR presented with acute left sided weakness as well as speech and language difficulties c/w R MCA syndrome s/p alteplase with improvement / resolved.    1. ILR showed no events.  2. Prior MEGHANN with no cardioembolic source. MRI negative for acute stroke. EEG negative for seizure.  3. Neurology f/u appreciated. OK for discharge per neurology.  4. Please call with any questions or changes. Follow up as outpatient with her cardiologist Dr. Mayco Cyr.

## 2021-09-11 NOTE — PROGRESS NOTE ADULT - ASSESSMENT
81 year old female w/ PMHX of CAD, stroke/TIA, HLD, HTN, renal Ca s/p L nephrectomy 10/2020, 2 right cardiac stents, on ASA/Plavix/Brilinta, presents w/ L sided weakness and difficulty speaking    Possible TIA/h/o recurrent TIA with work up negative  s/p tPA on sep 8  MRI negative for acute stroke  EEG negative for seizure  cleared by neurology for discharge    Dash diet  DVT ppx  cleared by cardiology      H/o CAD:  Cardiac Cath  11/30/20  Interpretation Summary  prior stent in LAD is patent with non-obstructive CAD.  stress test done Oct 2019  EF 43%, a small severe mostly fixed defect involving the base to mid inferolateral and apical inferoseptal suggestive of previous MI, no clear evidence of ischemia, basal inferolateral, mid inferolateral and apical septal segments severely hypokinetic.  meds per cardiology rec    dc plan with home care  den hernandez                 81 year old female w/ PMHX of CAD, stroke/TIA, HLD, HTN, renal Ca s/p L nephrectomy 10/2020, 2 right cardiac stents, on ASA/Plavix/Brilinta, presents w/ L sided weakness and difficulty speaking    Possible TIA/h/o recurrent TIA with work up negative  s/p tPA on sep 8  MRI negative for acute stroke  EEG negative for seizure  cleared by neurology for discharge    Dash diet  DVT ppx  cleared by cardiology      H/o CAD:  Cardiac Cath  11/30/20  Interpretation Summary  prior stent in LAD is patent with non-obstructive CAD.  stress test done Oct 2019  EF 43%, a small severe mostly fixed defect involving the base to mid inferolateral and apical inferoseptal suggestive of previous MI, no clear evidence of ischemia, basal inferolateral, mid inferolateral and apical septal segments severely hypokinetic.  meds per cardiology rec, spoke with cardiology today rec to continue baby asa, brilinta 90 mg bid. dc plavix    dc plan with home care  den hernandez

## 2021-09-16 ENCOUNTER — APPOINTMENT (OUTPATIENT)
Dept: NEUROLOGY | Facility: CLINIC | Age: 81
End: 2021-09-16
Payer: MEDICARE

## 2021-09-16 VITALS
WEIGHT: 140 LBS | DIASTOLIC BLOOD PRESSURE: 80 MMHG | SYSTOLIC BLOOD PRESSURE: 138 MMHG | HEIGHT: 62 IN | BODY MASS INDEX: 25.76 KG/M2

## 2021-09-16 PROCEDURE — 99213 OFFICE O/P EST LOW 20 MIN: CPT

## 2021-09-16 NOTE — DATA REVIEWED
[de-identified] : Brain MRI was performed on 6/2/2020.\par There was no acute infarct.\par There was moderate small vessel ischemic disease of a chronic nature.\par

## 2021-09-16 NOTE — HISTORY OF PRESENT ILLNESS
[FreeTextEntry1] : I saw this patient in the office today.\par \par As you recall, she had originally presented to [facility] in May of 2020.\par She had speech difficulty and left-sided weakness.\par She received thrombolysis.\par She ultimately improved back to baseline.\par She then returned to the emergency department on 6/1/2020.\par She had similar symptoms.\par There was no acute infarct on her previous brain MRI and she was treated with t-PA again.\par She was ultimately discharged with a loop recorder in place.\par I had seen her again in [facility] ([facility] ER on 11/29/2020.\par She had presented with left sided chest pain and dyspnea followed by slurred speech. \par MRI was again negative for acute infarct. \par \par She had another episode in March of 2021.

## 2021-09-16 NOTE — ASSESSMENT
[FreeTextEntry1] : This is an 81 year-old woman who had episodes suspicious for stroke.\par She received t-PA with resolution of symptoms on both occasions.\par \par I would recommend she continue antiplatelet and statin. \par \par I will see the patient back in 4 months. \par \par \par

## 2021-09-16 NOTE — PHYSICAL EXAM
[General Appearance - Alert] : alert [General Appearance - In No Acute Distress] : in no acute distress [General Appearance - Well-Appearing] : healthy appearing [Oriented To Time, Place, And Person] : oriented to person, place, and time [Affect] : the affect was normal [Memory Remote] : remote memory was not impaired [Memory Recent] : recent memory was not impaired [Cranial Nerves Optic (II)] : visual acuity intact bilaterally,  visual fields full to confrontation, pupils equal round and reactive to light [Cranial Nerves Oculomotor (III)] : extraocular motion intact [Cranial Nerves Trigeminal (V)] : facial sensation intact symmetrically [Cranial Nerves Facial (VII)] : face symmetrical [Cranial Nerves Vestibulocochlear (VIII)] : hearing was intact bilaterally [Cranial Nerves Glossopharyngeal (IX)] : tongue and palate midline [Cranial Nerves Accessory (XI - Cranial And Spinal)] : head turning and shoulder shrug symmetric [Cranial Nerves Hypoglossal (XII)] : there was no tongue deviation with protrusion [Motor Tone] : muscle tone was normal in all four extremities [Sensation Tactile Decrease] : light touch was intact [Sensation Pain / Temperature Decrease] : pain and temperature was intact [Sensation Vibration Decrease] : vibration was intact [Limited Balance] : the patient's balance was impaired [2+] : Patella left 2+ [Optic Disc Abnormality] : the optic disc were normal in size and color [Edema] : there was no peripheral edema [Involuntary Movements] : no involuntary movements were seen [Dysarthria] : no dysarthria [Aphasia] : no dysphasia/aphasia [Romberg's Sign] : Romberg's sign was negtive [Coordination - Dysmetria Impaired Finger-to-Nose Bilateral] : not present [Plantar Reflex Right Only] : normal on the right [Plantar Reflex Left Only] : normal on the left [FreeTextEntry6] : There is some mild give way weakness in the left leg. Strength is otherwise 5/5 throughout. [FreeTextEntry8] : The patient is ambulating with a cane for support.

## 2021-10-06 PROBLEM — I10 ESSENTIAL HYPERTENSION: Status: ACTIVE | Noted: 2020-06-11

## 2021-11-11 NOTE — ED ADULT NURSE REASSESSMENT NOTE - MUSCLE PAIN OR WEAKNESS
What Is The Reason For Today's Visit?: Full Body Skin Examination What Is The Reason For Today's Visit? (Being Monitored For X): concerning skin lesions on an annual basis no

## 2022-01-13 ENCOUNTER — APPOINTMENT (OUTPATIENT)
Dept: NEUROLOGY | Facility: CLINIC | Age: 82
End: 2022-01-13

## 2022-02-03 NOTE — ED ADULT NURSE REASSESSMENT NOTE - RESPIRATION RHYTHM, QM
Intermediate Repair Preamble Text (Leave Blank If You Do Not Want): Undermining was performed with blunt dissection.
regular
regular

## 2022-04-08 NOTE — HISTORY OF PRESENT ILLNESS
[FreeTextEntry1] : Mr. 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 \par \par Patient is s/p Right VATS, pleural bx and placement of Pleurx catheter on 03/17/2022. Path of right pleura bx revealed fragments of pleura with chronic inflammation and reactive changes. Fragments of skeletal muscle. Right pleural fluid negative for malignant cells. \par \par I have reviewed the patient's medical records and diagnostic images at time of this office consultation and have made the following recommendation:\par 1. CXR and Path reviewed and explained to patient, patient is doing well, I recommended patient to return to office in 6 weeks with CXR. \par 2. Continue drainage three times/week. 	\par \par I personally performed the services described in the documentation, reviewed the documentation recorded by the scribe in my presence and it accurately and completely records my words and actions.\par \par I, Sanjana Oh NP, am scribing for and the presence of PARK Gomez, the following sections HISTORY OF PRESENT ILLNESS, PAST MEDICAL/FAMILY/SOCIAL HISTORY; REVIEW OF SYSTEMS; VITAL SIGNS; PHYSICAL EXAM; DISPOSITION.  [FreeTextEntry1] : I saw this patient in the office today.\par \par She had originally presented to [facility] in May of 2020.\par She had speech difficulty and left-sided weakness.\par She received thrombolysis.\par She ultimately improved back to baseline.\par \par She then returned to the emergency department on 6/1/2020.\par She had similar symptoms.\par There was no acute infarct on her previous brain MRI and she was treated with t-PA again.\par She was ultimately discharged with a loop recorder in place.\par \par She is now status post left nephrectomy in October of 2020.\par \par I had seen her again in [facility] ER on 11/29/2020.\par She had presented with left sided chest pain and dyspnea followed by slurred speech. \par MRI was again negative for acute infarct.

## 2022-04-23 ENCOUNTER — INPATIENT (INPATIENT)
Facility: HOSPITAL | Age: 82
LOS: 2 days | Discharge: HOME | DRG: 62 | End: 2022-04-26
Admitting: NEUROLOGICAL SURGERY
Payer: MEDICARE

## 2022-04-23 VITALS
HEART RATE: 80 BPM | HEIGHT: 62 IN | WEIGHT: 142.86 LBS | DIASTOLIC BLOOD PRESSURE: 120 MMHG | RESPIRATION RATE: 18 BRPM | SYSTOLIC BLOOD PRESSURE: 163 MMHG | OXYGEN SATURATION: 98 %

## 2022-04-23 DIAGNOSIS — I63.9 CEREBRAL INFARCTION, UNSPECIFIED: ICD-10-CM

## 2022-04-23 DIAGNOSIS — Z90.5 ACQUIRED ABSENCE OF KIDNEY: Chronic | ICD-10-CM

## 2022-04-23 DIAGNOSIS — Z90.89 ACQUIRED ABSENCE OF OTHER ORGANS: Chronic | ICD-10-CM

## 2022-04-23 DIAGNOSIS — C64.9 MALIGNANT NEOPLASM OF UNSPECIFIED KIDNEY, EXCEPT RENAL PELVIS: Chronic | ICD-10-CM

## 2022-04-23 LAB
ALBUMIN SERPL ELPH-MCNC: 3.7 G/DL — SIGNIFICANT CHANGE UP (ref 3.3–5.2)
ALP SERPL-CCNC: 43 U/L — SIGNIFICANT CHANGE UP (ref 40–120)
ALT FLD-CCNC: 17 U/L — SIGNIFICANT CHANGE UP
ANION GAP SERPL CALC-SCNC: 14 MMOL/L — SIGNIFICANT CHANGE UP (ref 5–17)
APTT BLD: 29.6 SEC — SIGNIFICANT CHANGE UP (ref 27.5–35.5)
AST SERPL-CCNC: 28 U/L — SIGNIFICANT CHANGE UP
BASOPHILS # BLD AUTO: 0.09 K/UL — SIGNIFICANT CHANGE UP (ref 0–0.2)
BASOPHILS NFR BLD AUTO: 1.5 % — SIGNIFICANT CHANGE UP (ref 0–2)
BILIRUB SERPL-MCNC: 0.6 MG/DL — SIGNIFICANT CHANGE UP (ref 0.4–2)
BUN SERPL-MCNC: 20.1 MG/DL — HIGH (ref 8–20)
CALCIUM SERPL-MCNC: 8.8 MG/DL — SIGNIFICANT CHANGE UP (ref 8.6–10.2)
CHLORIDE SERPL-SCNC: 96 MMOL/L — LOW (ref 98–107)
CO2 SERPL-SCNC: 21 MMOL/L — LOW (ref 22–29)
CREAT SERPL-MCNC: 0.95 MG/DL — SIGNIFICANT CHANGE UP (ref 0.5–1.3)
EGFR: 60 ML/MIN/1.73M2 — SIGNIFICANT CHANGE UP
EOSINOPHIL # BLD AUTO: 0.13 K/UL — SIGNIFICANT CHANGE UP (ref 0–0.5)
EOSINOPHIL NFR BLD AUTO: 2.1 % — SIGNIFICANT CHANGE UP (ref 0–6)
GLUCOSE BLDC GLUCOMTR-MCNC: 76 MG/DL — SIGNIFICANT CHANGE UP (ref 70–99)
GLUCOSE BLDC GLUCOMTR-MCNC: 76 MG/DL — SIGNIFICANT CHANGE UP (ref 70–99)
GLUCOSE SERPL-MCNC: 81 MG/DL — SIGNIFICANT CHANGE UP (ref 70–99)
HCT VFR BLD CALC: 37.6 % — SIGNIFICANT CHANGE UP (ref 34.5–45)
HGB BLD-MCNC: 12.7 G/DL — SIGNIFICANT CHANGE UP (ref 11.5–15.5)
IMM GRANULOCYTES NFR BLD AUTO: 0.3 % — SIGNIFICANT CHANGE UP (ref 0–1.5)
INR BLD: 1.13 RATIO — SIGNIFICANT CHANGE UP (ref 0.88–1.16)
LYMPHOCYTES # BLD AUTO: 1.36 K/UL — SIGNIFICANT CHANGE UP (ref 1–3.3)
LYMPHOCYTES # BLD AUTO: 22.4 % — SIGNIFICANT CHANGE UP (ref 13–44)
MCHC RBC-ENTMCNC: 30.3 PG — SIGNIFICANT CHANGE UP (ref 27–34)
MCHC RBC-ENTMCNC: 33.8 GM/DL — SIGNIFICANT CHANGE UP (ref 32–36)
MCV RBC AUTO: 89.7 FL — SIGNIFICANT CHANGE UP (ref 80–100)
MONOCYTES # BLD AUTO: 0.6 K/UL — SIGNIFICANT CHANGE UP (ref 0–0.9)
MONOCYTES NFR BLD AUTO: 9.9 % — SIGNIFICANT CHANGE UP (ref 2–14)
NEUTROPHILS # BLD AUTO: 3.87 K/UL — SIGNIFICANT CHANGE UP (ref 1.8–7.4)
NEUTROPHILS NFR BLD AUTO: 63.8 % — SIGNIFICANT CHANGE UP (ref 43–77)
PLATELET # BLD AUTO: 174 K/UL — SIGNIFICANT CHANGE UP (ref 150–400)
POTASSIUM SERPL-MCNC: 4.2 MMOL/L — SIGNIFICANT CHANGE UP (ref 3.5–5.3)
POTASSIUM SERPL-SCNC: 4.2 MMOL/L — SIGNIFICANT CHANGE UP (ref 3.5–5.3)
PROT SERPL-MCNC: 6 G/DL — LOW (ref 6.6–8.7)
PROTHROM AB SERPL-ACNC: 13.1 SEC — SIGNIFICANT CHANGE UP (ref 10.5–13.4)
RBC # BLD: 4.19 M/UL — SIGNIFICANT CHANGE UP (ref 3.8–5.2)
RBC # FLD: 12 % — SIGNIFICANT CHANGE UP (ref 10.3–14.5)
SARS-COV-2 RNA SPEC QL NAA+PROBE: SIGNIFICANT CHANGE UP
SODIUM SERPL-SCNC: 131 MMOL/L — LOW (ref 135–145)
TROPONIN T SERPL-MCNC: <0.01 NG/ML — SIGNIFICANT CHANGE UP (ref 0–0.06)
WBC # BLD: 6.07 K/UL — SIGNIFICANT CHANGE UP (ref 3.8–10.5)
WBC # FLD AUTO: 6.07 K/UL — SIGNIFICANT CHANGE UP (ref 3.8–10.5)

## 2022-04-23 PROCEDURE — 99291 CRITICAL CARE FIRST HOUR: CPT

## 2022-04-23 PROCEDURE — 70496 CT ANGIOGRAPHY HEAD: CPT | Mod: 26,MA

## 2022-04-23 PROCEDURE — 93010 ELECTROCARDIOGRAM REPORT: CPT

## 2022-04-23 PROCEDURE — 70498 CT ANGIOGRAPHY NECK: CPT | Mod: 26,MA

## 2022-04-23 PROCEDURE — 0042T: CPT

## 2022-04-23 RX ORDER — ALTEPLASE 100 MG
52.5 KIT INTRAVENOUS ONCE
Refills: 0 | Status: COMPLETED | OUTPATIENT
Start: 2022-04-23 | End: 2022-04-23

## 2022-04-23 RX ORDER — DEXTROSE 50 % IN WATER 50 %
15 SYRINGE (ML) INTRAVENOUS ONCE
Refills: 0 | Status: DISCONTINUED | OUTPATIENT
Start: 2022-04-23 | End: 2022-04-24

## 2022-04-23 RX ORDER — DILTIAZEM HCL 120 MG
5 CAPSULE, EXT RELEASE 24 HR ORAL EVERY 6 HOURS
Refills: 0 | Status: DISCONTINUED | OUTPATIENT
Start: 2022-04-23 | End: 2022-04-24

## 2022-04-23 RX ORDER — INSULIN LISPRO 100/ML
VIAL (ML) SUBCUTANEOUS EVERY 6 HOURS
Refills: 0 | Status: DISCONTINUED | OUTPATIENT
Start: 2022-04-23 | End: 2022-04-24

## 2022-04-23 RX ORDER — GLUCAGON INJECTION, SOLUTION 0.5 MG/.1ML
1 INJECTION, SOLUTION SUBCUTANEOUS ONCE
Refills: 0 | Status: DISCONTINUED | OUTPATIENT
Start: 2022-04-23 | End: 2022-04-26

## 2022-04-23 RX ORDER — ALTEPLASE 100 MG
5.8 KIT INTRAVENOUS ONCE
Refills: 0 | Status: COMPLETED | OUTPATIENT
Start: 2022-04-23 | End: 2022-04-23

## 2022-04-23 RX ORDER — SODIUM CHLORIDE 9 MG/ML
1000 INJECTION INTRAMUSCULAR; INTRAVENOUS; SUBCUTANEOUS
Refills: 0 | Status: DISCONTINUED | OUTPATIENT
Start: 2022-04-23 | End: 2022-04-24

## 2022-04-23 RX ORDER — SODIUM CHLORIDE 9 MG/ML
1000 INJECTION, SOLUTION INTRAVENOUS
Refills: 0 | Status: DISCONTINUED | OUTPATIENT
Start: 2022-04-23 | End: 2022-04-24

## 2022-04-23 RX ORDER — DEXTROSE 50 % IN WATER 50 %
25 SYRINGE (ML) INTRAVENOUS ONCE
Refills: 0 | Status: DISCONTINUED | OUTPATIENT
Start: 2022-04-23 | End: 2022-04-24

## 2022-04-23 RX ORDER — HYDRALAZINE HCL 50 MG
10 TABLET ORAL
Refills: 0 | Status: DISCONTINUED | OUTPATIENT
Start: 2022-04-23 | End: 2022-04-24

## 2022-04-23 RX ORDER — DEXTROSE 50 % IN WATER 50 %
12.5 SYRINGE (ML) INTRAVENOUS ONCE
Refills: 0 | Status: DISCONTINUED | OUTPATIENT
Start: 2022-04-23 | End: 2022-04-24

## 2022-04-23 RX ADMIN — SODIUM CHLORIDE 60 MILLILITER(S): 9 INJECTION INTRAMUSCULAR; INTRAVENOUS; SUBCUTANEOUS at 17:36

## 2022-04-23 RX ADMIN — ALTEPLASE 52.5 MILLIGRAM(S): KIT at 12:39

## 2022-04-23 RX ADMIN — ALTEPLASE 348 MILLIGRAM(S): KIT at 12:37

## 2022-04-23 RX ADMIN — ALTEPLASE 5.8 MILLIGRAM(S): KIT at 12:39

## 2022-04-23 RX ADMIN — ALTEPLASE 52.5 MILLIGRAM(S): KIT at 13:37

## 2022-04-23 NOTE — ED PROVIDER NOTE - CLINICAL SUMMARY MEDICAL DECISION MAKING FREE TEXT BOX
Pt presenting as code stroke within the tpa window. Fingerstick wnl, bp appropriate, pt no longer on ac. If noncon head CT normal, will give tpa, cva labs, neuroicu admit.

## 2022-04-23 NOTE — PROVIDER CONTACT NOTE (OTHER) - SITUATION
PA made aware pt 2 IVs from ED do not work. as per order, need to wait 4 hours till able to perform a blood draw. tPA time 1237, therefore will get access at 1637.
pt c/o chest discomfort. EKG ordered

## 2022-04-23 NOTE — ED ADULT NURSE REASSESSMENT NOTE - NS ED NURSE REASSESS COMMENT FT1
TPA complete Pt with no complaints for RN at this time. Pt Denies any chest pain, shortness of breath, nausea or dizziness or HA pt educated on plan of care, pt able to successfully teach back plan of care to RN, RN will continue to reeducate pt during hospital stay.

## 2022-04-23 NOTE — ED PROVIDER NOTE - ATTENDING CONTRIBUTION TO CARE
I have personally performed a face to face medical and diagnostic evaluation of the patient. I have discussed with and reviewed the resident's note and agree with the History, ROS, Physical Exam and MDM unless otherwise indicated. A brief summary of my personal evaluation and impression can be found below.    82yo woman PMH HTN, HLD, afib not on a/c, multiple CVA and no longer on plavix presents with acute onset of left sided weakness and difficulty speaking at around 11:20am. Pt noted symptoms and pressed her life alert. Pt denies any symptoms prior to onset of weakness. No fever, cough, chest pain, SOB, palpitations, n/v/d, abdominal pain, dysuria. Pt denies headache, change in vision, neck pain, lightheadedness.    Pt arrived with FS 83 and is awake and alert and responding to all questions with stuttering and staccato like speech with mild slurring. She has minimal left pronator drift but does not completely fall, has difficulty lifting left leg against gravity. Otherwise A&Ox4, 5/5 strength RUE and RLE, PERRL, EOMI, no facial droop, clear lungs, soft, nontender abdomen, pulses intact in all extremities, no joint deformities or evidence of trauma.    Code stroke was initiated and had no evidence of bleed on CT and r/b/a of alteplase was discussed and pt understands risks including severe bleeding that could potentially lead to disability and death and pt verbally agreed to treatment. Spoke with telestroke who agreed with plan and neuro ICU was consulted for evaluation.    Upon my evaluation, this patient had a high probability of imminent or life-threatening deterioration due to stroke, which required my direct attention, intervention, and personal management. Frequent personal assessment and adjustment of medical interventions was performed.      I have personally provided 35 minutes of critical care time exclusive of time spent on separately billable procedures. Time includes review of laboratory data, radiology results, discussion with consultants, patient and family; monitoring for potential decompensation, as well as time spent retrieving data and reviewing the chart and documenting the visit. Interventions were performed as documented above.

## 2022-04-23 NOTE — ED ADULT NURSE NOTE - OBJECTIVE STATEMENT
Assumed care of the Pt AOx3 in no acute distress. Pt presents to ED with onset of left sided weakness and aphasia. Pt NIH 4. Pt last known well 1120am. Pt slurred speak and left sided weakness this time. Pt Denies any chest pain, shortness of breath, nausea or dizziness at this time Pt VSS no complaints for RN at this time pt educated on plan of care, pt able to successfully teach back plan of care to RN, RN will continue to reeducate pt during hospital stay.

## 2022-04-23 NOTE — CHART NOTE - NSCHARTNOTEFT_GEN_A_CORE
Telestroke audio consult    82 yo woman with h/o atrial fibrillation not on anticoagulation pressed her life alert at 11:20 today due to acute left sided weakness. NIHSS reported as 3.   CT head reviewed. - no bleed. CTA  - no LVO.   Agree with ED team proceeding with IV TPA.

## 2022-04-23 NOTE — H&P ADULT - NSHPPHYSICALEXAM_GEN_ALL_CORE
Neuro - Patient is awake and alert oriented X 3, pupils 2mm and reactive B/L , face symmetric , No gross visual field cut , Normal gaze, no nystagmus, CN's - intact  1-X11  Motor- 5/5 RUE and LUE;  5/5  RLE and 4+/5 LLE no aphasia   Sensation - intact  No ataxia  No dysmetria  Heart - Nl RR ; No S3 S4  Lungs- clear  Abd - soft and non tender  Knees- B/L synovial thickening

## 2022-04-23 NOTE — ED PROVIDER NOTE - PHYSICAL EXAMINATION
Gen: no acute distress  Head: normocephalic, atraumatic  Lung: CTAB, no respiratory distress, no wheezing, rales, rhonchi  CV: normal s1/s2, rrr,   Abd: soft, no tenderness, non-distended  MSK: No edema, no visible deformities, full range of motion in all 4 extremities  Neuro: minimal effort against gravity to LLE, mildly decreased strength to LUE, 5/5 strength to RUE/RLE, mild dysarthria otherwise CN 2-12 intact  Skin: No rash   Psych: normal affect

## 2022-04-23 NOTE — H&P ADULT - HISTORY OF PRESENT ILLNESS
HPI: 82 y/o F pt with hx of htn, hld, afib not on ac, prior CVAs presenting today as code stroke with onset of L sided weakness with LKW 11:20 today. States that she felt the onset of sx and immedately pressed her "life alert" button. Pt currently c/o L leg weakness and states her speech feels slurred. Pt denies any chest pain, dyspnea, fevers, n/v/d, abdominal pain, dysuria, headache, cough, congestion, sore throat, neck pain, back pain, dizziness, syncope, or other complaint.

## 2022-04-23 NOTE — ED ADULT TRIAGE NOTE - CHIEF COMPLAINT QUOTE
LKW 11:20 aphasic, Left sided weakness sudden onset. hx afib. code stroke requested by EMS MD at bedside 1205. brought to CT

## 2022-04-23 NOTE — H&P ADULT - ASSESSMENT
ASSESSMENT/PLAN:  82 y/o F pt with hx of htn, hld, afib not on ac, prior CVAs presenting today as code stroke with onset of L sided weakness and dysarthria  at 11:20 today.  S/P rTPA 1236 pm ; NIH 3     NEURO:  Neuro checks q 1 hr  CT at 1300 on 4/24/22  MRI of brain  Stroke core measures  Na - > 135  Activity: [X] OOB as tolerated  [X] PT [X] OT     PULM:  maintain O2 sats > 92 %  Incentive spirometer    CV: Hx of  HTN, afib , HLD; Loop recorder  SBP goal- 100- < 180  NS with 20meq KCl at 60 cc/hr   Cardiac ECHO  EKG    RENAL:  Fluids: Normal Saline 60 cc/hr   Goal Na - > 135  No denis       GI:  Diet: Dyspahgia eval   GI prophylaxis [X] not indicated   Bowel regimen  [X] senna    ENDO:  HLD   Check HGBA1C  Check Chol   Check TSH  Goal euglycemia (-180)    HEME/ONC:  VTE prophylaxis: [X] SCDs [] chemoprophylaxis- hold till after 24 Hrs CT obtain 4/23/22    ID:  Monitor for fever    SOCIAL/FAMILY:  [X] updated at bedside [] family meeting    CODE STATUS:  [X] Full Code     DISPOSITION:  [X] ICU [] Stroke Unit [] Floor [] EMU [] RCU [] PCU    X[] Patient is at high risk of neurologic deterioration/death due to:  risk of intracerebral hemorrhage  and herniation       Time spent: 45critical care minutes

## 2022-04-23 NOTE — ED PROVIDER NOTE - PROGRESS NOTE DETAILS
D/w Dr. Brantley on call for telestroke. Given pt's sx and PE, agrees with plan for TPA. Pt currently in CT, if noncon without bleed will proceed with administration of tpa. - Franco Ash, PGY-3 TPA bolus administered, NeuroICU consulted. - Franco Ash, PGY-3

## 2022-04-23 NOTE — PATIENT PROFILE ADULT - FALL HARM RISK - HARM RISK INTERVENTIONS

## 2022-04-23 NOTE — H&P ADULT - NSHPLABSRESULTS_GEN_ALL_CORE
04-23    131<L>  |  96<L>  |  20.1<H>  ----------------------------<  81  4.2   |  21.0<L>  |  0.95    Ca    8.8      23 Apr 2022 12:38    TPro  6.0<L>  /  Alb  3.7  /  TBili  0.6  /  DBili  x   /  AST  28  /  ALT  17  /  AlkPhos  43  04-23                          12.7   6.07  )-----------( 174      ( 23 Apr 2022 12:38 )             37.6       CT Angio Neck w/ IV Cont (04.23.22 @ 12:25) >      IMPRESSION:    CT PERFUSION: No core infarction or ischemic penumbra by CT perfusion   technique.    CTA neck and head: No large vessel occlusion or major stenosis.    < from: CT Brain Stroke Protocol (04.23.22 @ 12:18) >    FINDINGS: There is no acute intracranial hemorrhage, mass effect, shift   of the midline structures, herniation, extra-axial fluid collection, or   hydrocephalus.    No hemorrhage is detected by R[employer] artificial intelligence software.    There is diffuse cerebral volume loss with prominence of the sulci,   fissures, and cisternal spaces which is normal forthe patient's age.   There is moderate patchy confluent deep and periventricular white matter   hypoattenuation statistically compatible with microvascular changes given   calcific atherosclerotic disease of the intracranial arteries.    The paranasal sinuses and mastoid air cells are clear. The calvarium is   intact. The imaged orbits are unremarkable.    A loop recorder device overlies the left chest wall on the  views.    IMPRESSION: No acute intracranial hemorrhage, mass effect, or shiftof   the midline structures.

## 2022-04-23 NOTE — ED PROVIDER NOTE - OBJECTIVE STATEMENT
80 y/o F pt with hx of htn, hld, afib not on ac, prior CVAs presenting today as code stroke with onset of L sided weakness with LKW 11:20 today. States that she felt the onset of sx and immedately pressed her "life alert" button. Pt currently c/o L leg weakness and states her speech feels slurred. Pt denies any chest pain, dyspnea, fevers, n/v/d, abdominal pain, dysuria, headache, cough, congestion, sore throat, neck pain, back pain, dizziness, syncope, or other complaint.

## 2022-04-23 NOTE — ED ADULT NURSE REASSESSMENT NOTE - NS ED NURSE REASSESS COMMENT FT1
Pt resting comfortably in bed no complaints for RN at this time Pt with improvement in speech and left sided upper arm strength NIH remains 4 at this time. VSS no complaints for RN pt educated on plan of care, pt able to successfully teach back plan of care to RN, RN will continue to reeducate pt during hospital stay.

## 2022-04-24 LAB
ANION GAP SERPL CALC-SCNC: 17 MMOL/L — SIGNIFICANT CHANGE UP (ref 5–17)
BUN SERPL-MCNC: 20 MG/DL — SIGNIFICANT CHANGE UP (ref 8–20)
CALCIUM SERPL-MCNC: 8.6 MG/DL — SIGNIFICANT CHANGE UP (ref 8.6–10.2)
CHLORIDE SERPL-SCNC: 103 MMOL/L — SIGNIFICANT CHANGE UP (ref 98–107)
CHOLEST SERPL-MCNC: 102 MG/DL — SIGNIFICANT CHANGE UP
CO2 SERPL-SCNC: 19 MMOL/L — LOW (ref 22–29)
CREAT SERPL-MCNC: 1.06 MG/DL — SIGNIFICANT CHANGE UP (ref 0.5–1.3)
EGFR: 53 ML/MIN/1.73M2 — LOW
GLUCOSE BLDC GLUCOMTR-MCNC: 104 MG/DL — HIGH (ref 70–99)
GLUCOSE BLDC GLUCOMTR-MCNC: 66 MG/DL — LOW (ref 70–99)
GLUCOSE BLDC GLUCOMTR-MCNC: 85 MG/DL — SIGNIFICANT CHANGE UP (ref 70–99)
GLUCOSE SERPL-MCNC: 61 MG/DL — LOW (ref 70–99)
HCT VFR BLD CALC: 40 % — SIGNIFICANT CHANGE UP (ref 34.5–45)
HDLC SERPL-MCNC: 54 MG/DL — SIGNIFICANT CHANGE UP
HGB BLD-MCNC: 13 G/DL — SIGNIFICANT CHANGE UP (ref 11.5–15.5)
LIPID PNL WITH DIRECT LDL SERPL: 38 MG/DL — SIGNIFICANT CHANGE UP
MAGNESIUM SERPL-MCNC: 2.1 MG/DL — SIGNIFICANT CHANGE UP (ref 1.6–2.6)
MCHC RBC-ENTMCNC: 30.7 PG — SIGNIFICANT CHANGE UP (ref 27–34)
MCHC RBC-ENTMCNC: 32.5 GM/DL — SIGNIFICANT CHANGE UP (ref 32–36)
MCV RBC AUTO: 94.6 FL — SIGNIFICANT CHANGE UP (ref 80–100)
NON HDL CHOLESTEROL: 48 MG/DL — SIGNIFICANT CHANGE UP
PHOSPHATE SERPL-MCNC: 3.8 MG/DL — SIGNIFICANT CHANGE UP (ref 2.4–4.7)
PLATELET # BLD AUTO: 167 K/UL — SIGNIFICANT CHANGE UP (ref 150–400)
POTASSIUM SERPL-MCNC: 4.4 MMOL/L — SIGNIFICANT CHANGE UP (ref 3.5–5.3)
POTASSIUM SERPL-SCNC: 4.4 MMOL/L — SIGNIFICANT CHANGE UP (ref 3.5–5.3)
RBC # BLD: 4.23 M/UL — SIGNIFICANT CHANGE UP (ref 3.8–5.2)
RBC # FLD: 12.1 % — SIGNIFICANT CHANGE UP (ref 10.3–14.5)
SODIUM SERPL-SCNC: 139 MMOL/L — SIGNIFICANT CHANGE UP (ref 135–145)
TRIGL SERPL-MCNC: 51 MG/DL — SIGNIFICANT CHANGE UP
TSH SERPL-MCNC: 2.08 UIU/ML — SIGNIFICANT CHANGE UP (ref 0.27–4.2)
WBC # BLD: 6.37 K/UL — SIGNIFICANT CHANGE UP (ref 3.8–10.5)
WBC # FLD AUTO: 6.37 K/UL — SIGNIFICANT CHANGE UP (ref 3.8–10.5)

## 2022-04-24 PROCEDURE — 70450 CT HEAD/BRAIN W/O DYE: CPT | Mod: 26

## 2022-04-24 PROCEDURE — 99233 SBSQ HOSP IP/OBS HIGH 50: CPT

## 2022-04-24 PROCEDURE — 99291 CRITICAL CARE FIRST HOUR: CPT

## 2022-04-24 RX ORDER — ASPIRIN/CALCIUM CARB/MAGNESIUM 324 MG
81 TABLET ORAL DAILY
Refills: 0 | Status: DISCONTINUED | OUTPATIENT
Start: 2022-04-24 | End: 2022-04-26

## 2022-04-24 RX ORDER — ESCITALOPRAM OXALATE 10 MG/1
5 TABLET, FILM COATED ORAL DAILY
Refills: 0 | Status: DISCONTINUED | OUTPATIENT
Start: 2022-04-24 | End: 2022-04-26

## 2022-04-24 RX ORDER — TICAGRELOR 90 MG/1
1 TABLET ORAL
Qty: 0 | Refills: 0 | DISCHARGE

## 2022-04-24 RX ORDER — ENOXAPARIN SODIUM 100 MG/ML
40 INJECTION SUBCUTANEOUS EVERY 24 HOURS
Refills: 0 | Status: DISCONTINUED | OUTPATIENT
Start: 2022-04-24 | End: 2022-04-26

## 2022-04-24 RX ORDER — ATORVASTATIN CALCIUM 80 MG/1
20 TABLET, FILM COATED ORAL AT BEDTIME
Refills: 0 | Status: DISCONTINUED | OUTPATIENT
Start: 2022-04-24 | End: 2022-04-26

## 2022-04-24 RX ADMIN — Medication 81 MILLIGRAM(S): at 20:34

## 2022-04-24 RX ADMIN — ENOXAPARIN SODIUM 40 MILLIGRAM(S): 100 INJECTION SUBCUTANEOUS at 17:50

## 2022-04-24 RX ADMIN — ATORVASTATIN CALCIUM 20 MILLIGRAM(S): 80 TABLET, FILM COATED ORAL at 20:34

## 2022-04-24 RX ADMIN — ESCITALOPRAM OXALATE 5 MILLIGRAM(S): 10 TABLET, FILM COATED ORAL at 17:50

## 2022-04-24 NOTE — PROGRESS NOTE ADULT - ASSESSMENT
81 year old female with pmh of htn, hld, afib not on a/c, prior however with ILR, previous TIA/CVA (>4) coming to hospital with complaints of left leg weakness and slurred speech. was given rTPA in ED and admitted to neuro ICU. now being downgraded to floor for further care. pending neurology and cardio consults. admission nih 3    #Left leg weakness  - 2/2 ?acute cva vs tia vs other etiology - mr pending  - s/p tpa   - neurochecks  - in patient with ILR- interrogation per cardio   - cardio consult pending   - neuro consult pending  - statin -> decreased by ICU given LDL 38  - check echo   - aspirin    #HLD  - statin     #CAD   - w/ hx of pci in past   - aspirin    #Depression   - lexapro    #DVT Prophylaxis  -lovenox SC  81 year old female with pmh of htn, hld, afib not on a/c, prior however with ILR, previous TIA/CVA (>4) coming to hospital with complaints of left leg weakness and slurred speech. was given rTPA in ED and admitted to neuro ICU. now being downgraded to floor for further care. pending neurology and cardio consults. admission nih 3    #Left leg weakness- resolved   - 2/2 ?acute cva vs tia vs other etiology - mr pending  - s/p tpa   - neurochecks  - in patient with ILR- interrogation per cardio   - cardio consult pending   - neuro consult pending  - statin -> decreased by ICU given LDL 38  - check echo   - aspirin    #HLD  - statin     #CAD   - w/ hx of pci in past   - aspirin    #Depression   - lexapro    #DVT Prophylaxis  -lovenox SC

## 2022-04-24 NOTE — PROGRESS NOTE ADULT - ASSESSMENT
ASSESSMENT/PLAN:  82 y/o F pt with hx of htn, hld, afib not on ac, prior CVAs presenting today as code stroke with onset of L sided weakness and dysarthria  at 11:20 today.  S/P rTPA 1236 pm ; [facility] 3     NEURO:  Neuro checks q 1 hr  CT at 1300 on 4/24/22  MRI of brain  Stroke core measures  Na - > 135  Activity: [X] OOB as tolerated  [X] PT [X] OT     PULM:  maintain O2 sats > 92 %  Incentive spirometer    CV: Hx of  HTN, afib , HLD; Loop recorder  SBP goal- 100- < 180  NS with 20meq KCl at 60 cc/hr   Cardiac ECHO  EKG    RENAL:  Fluids: Normal Saline 60 cc/hr   Goal Na - > 135  No denis       GI:  Diet: Dyspahgia eval   GI prophylaxis [X] not indicated   Bowel regimen  [X] senna    ENDO:  HLD   Check HGBA1C  Check Chol   Check TSH  Goal euglycemia (-180)    HEME/ONC:  VTE prophylaxis: [X] SCDs [] chemoprophylaxis- hold till after 24 Hrs CT obtain 4/23/22    ID:  Monitor for fever    SOCIAL/FAMILY:  [X] updated at bedside [] family meeting    CODE STATUS:  [X] Full Code     DISPOSITION:  [X] ICU [] Stroke Unit [] Floor [] EMU [] RCU [] PCU    X[] Patient is at high risk of neurologic deterioration/death due to:  risk of intracerebral hemorrhage  and herniation  ASSESSMENT/PLAN:  80 y/o F pt with hx of htn, hld, afib not on ac, prior CVAs presenting today as code stroke with onset of L sided weakness and dysarthria  at 11:20 today.  S/P rTPA 1236 pm ; [facility] 3     NEURO:  Neuro checks q 2 hr  CT at 1300 on 4/24/22  MRI of brain- if patients accepts.   Stroke core measures- as above   Na - > 135- < 145   Activity: [X] OOB as tolerated  [X] PT [X] OT     PULM:  maintain O2 sats > 92 %  Incentive spirometer    CV: Hx of  HTN,  , HLD; Loop recorder  If on loop recorder - if afib will need to be on AC   SBP goal- 100- < 180  Cardiac ECHO  EKG    RENAL:  Goal Na - > 135  No denis       GI:  Diet: Dyspahgia eval passed - DASH  diet   GI prophylaxis [X] not indicated   Bowel regimen  [X] senna    ENDO:  HLD   Check HGBA1C  Goal euglycemia (-180)    HEME/ONC:  VTE prophylaxis: [X] SCDs [] chemoprophylaxis- hold till after 24 Hrs CT obtain 4/23/22    ID:  Monitor for fever    SOCIAL/FAMILY:  [X] updated at bedside [] family meeting    CODE STATUS:  [X] Full Code     DISPOSITION:  [X] ICU [] Stroke Unit [] Floor [] EMU [] RCU [] PCU    X[] Patient is at high risk of neurologic deterioration/death due to:  risk of intracerebral hemorrhage  and herniation  ASSESSMENT/PLAN:  82 y/o F pt with hx of htn, hld, afib not on ac, prior CVAs presenting today as code stroke with onset of L sided weakness and dysarthria  at 11:20 today.  S/P rTPA 1236 pm ; [facility] 3     NEURO:  Neuro checks q 2 hr  CT at 1300 on 4/24/22  Cont ASA after CT   Decrease atorvastatin to 20 mg q day   MRI of brain- if patients accepts.   Stroke core measures- as above   Na - > 135- < 145   Activity: [X] OOB as tolerated  [X] PT [X] OT     PULM:  maintain O2 sats > 92 %  Incentive spirometer    CV: Hx of  HTN,  , HLD; Loop recorder  If on loop recorder - if afib will need to be on AC   Goal - < 140  Review need of cardzem home med since SBP < 110 in hospital without taking BP meds    SBP goal- 100- < 180  Cardiac ECHO  EKG    RENAL:  Goal Na - > 135  No denis       GI:  Diet: Dyspahgia eval passed - DASH  diet   GI prophylaxis [X] not indicated   Bowel regimen  [X] senna    ENDO:  HLD   Check HGBA1C  Goal euglycemia (-180)    HEME/ONC:  VTE prophylaxis: [X] SCDs [] chemoprophylaxis- hold till after 24 Hrs CT obtain 4/23/22    ID:  Monitor for fever      Misc - anxiety - will call pharmacy for Name of med she takes    SOCIAL/FAMILY:  [X] updated at bedside [] family meeting    CODE STATUS:  [X] Full Code     DISPOSITION:  [X] ICU [] Stroke Unit [] Floor [] EMU [] RCU [] PCU    X[] Patient is at high risk of neurologic deterioration/death due to:  risk of intracerebral hemorrhage  and herniation

## 2022-04-24 NOTE — PHYSICAL THERAPY INITIAL EVALUATION ADULT - ASR EQUIP NEEDS DISCH PT EVAL
Pt given booklet about living will. Will read through and discuss with family and call as needed.   pt. has RW at home

## 2022-04-24 NOTE — OCCUPATIONAL THERAPY INITIAL EVALUATION ADULT - NS ASR OT EQUIP NEEDS DISCH
Pt already has RW, SAC, shower seat, grab bar in shower, ramp to enter through garage, life alert device.

## 2022-04-24 NOTE — PROGRESS NOTE ADULT - SUBJECTIVE AND OBJECTIVE BOX
Patient is a 81y old  Female who presents with a chief complaint of Stroke / TIA presentation s/p Alteplase (24 Apr 2022 09:18)    Patient seen and examined at bedside in NSICU.    ALLERGIES:  Biaxin (Muscle Pain; Joint Pain)  codeine (Faint)  Isosorbide Mononitrate Extended Release (Faint)  Metoprolol Succinate ER (Unknown)  monoctanoin (Unknown)    MEDICATIONS  (STANDING):  aspirin enteric coated 81 milliGRAM(s) Oral daily  atorvastatin 20 milliGRAM(s) Oral at bedtime  enoxaparin Injectable 40 milliGRAM(s) SubCutaneous every 24 hours  escitalopram 5 milliGRAM(s) Oral daily  glucagon  Injectable 1 milliGRAM(s) IntraMuscular once    MEDICATIONS  (PRN):    Vital Signs Last 24 Hrs  T(F): 98.2 (24 Apr 2022 11:18), Max: 98.2 (24 Apr 2022 11:18)  HR: 82 (24 Apr 2022 13:00) (75 - 103)  BP: 116/97 (24 Apr 2022 13:00) (94/57 - 172/71)  RR: 20 (24 Apr 2022 13:00) (12 - 25)  SpO2: 98% (24 Apr 2022 13:00) (89% - 100%)  I&O's Summary    23 Apr 2022 07:01  -  24 Apr 2022 07:00  --------------------------------------------------------  IN: 840 mL / OUT: 850 mL / NET: -10 mL    24 Apr 2022 07:01  -  24 Apr 2022 15:38  --------------------------------------------------------  IN: 180 mL / OUT: 0 mL / NET: 180 mL      PHYSICAL EXAM:  General: NAD, A/O x 3  ENT: MMM, no thrush  Neck: Supple, No JVD  Lungs: Clear to auscultation bilaterally, good air entry, non-labored breathing  Cardio: +s1/s2  Abdomen: Soft, Nontender, Nondistended; Bowel sounds present  Extremities: No calf tenderness, No pitting edema    LABS:                        13.0   6.37  )-----------( 167      ( 24 Apr 2022 04:45 )             40.0     04-24    139  |  103  |  20.0  ----------------------------<  61  4.4   |  19.0  |  1.06    Ca    8.6      24 Apr 2022 04:45  Phos  3.8     04-24  Mg     2.1     04-24    TPro  6.0  /  Alb  3.7  /  TBili  0.6  /  DBili  x   /  AST  28  /  ALT  17  /  AlkPhos  43  04-23    PT/INR - ( 23 Apr 2022 12:38 )   PT: 13.1 sec;   INR: 1.13 ratio    PTT - ( 23 Apr 2022 12:38 )  PTT:29.6 sec    04-24 Chol 102 mg/dL LDL -- HDL 54 mg/dL Trig 51 mg/dL  TSH 2.08   TSH with FT4 reflex --  Total T3 --    Glucose  POCT Blood Glucose.: 85 mg/dL (24 Apr 2022 11:26)  POCT Blood Glucose.: 104 mg/dL (24 Apr 2022 07:52)  POCT Blood Glucose.: 66 mg/dL (24 Apr 2022 05:51)  POCT Blood Glucose.: 76 mg/dL (23 Apr 2022 23:41)  POCT Blood Glucose.: 76 mg/dL (23 Apr 2022 17:33)    COVID-19 PCR: NotDetec (04-23-22 @ 12:38)    RADIOLOGY & ADDITIONAL TESTS:  < from: CT Head No Cont (04.24.22 @ 13:27) >  IMPRESSION: Age-appropriate involutional and ischemic gliotic changes. No   hemorrhage. No change from 4/23/2022.  < end of copied text >  < from: CT Angio Neck w/ IV Cont (04.23.22 @ 12:25) >  IMPRESSION:  CT PERFUSION: No core infarction or ischemic penumbra by CT perfusion   technique.  CTA neck and head: No large vessel occlusion or major stenosis.  < end of copied text >    < from: CT Brain Stroke Protocol (04.23.22 @ 12:18) >  IMPRESSION: No acute intracranial hemorrhage, mass effect, or shiftof   the midline structures.  < end of copied text >    Care Discussed with Consultants/Other Providers:   Neuro ICU

## 2022-04-24 NOTE — OCCUPATIONAL THERAPY INITIAL EVALUATION ADULT - ADDITIONAL COMMENTS
Pt lives in private home with a ramp through garage to enter.  Once inside there are no stairs she needs to manage except basement laundry.  Full flight with bilateral HR.   Pt drives.  Pt already has RW, SAC,  shower chair, grab bar in shower, ramp to enter through garage, life alert device.

## 2022-04-24 NOTE — CHART NOTE - NSCHARTNOTEFT_GEN_A_CORE
HPI: 82 y/o F pt with hx of Htn, Hld, afib not on ac, prior CVAs presenting today as code stroke with onset of L sided weakness with LKW 11:20 4/23/22.  Pt currently c/o L leg weakness and states her speech feels slurred. hit med alert button and presents to ER  Pt denies any chest pain, dyspnea, fevers, n/v/d, abdominal pain, dysuria, headache, cough, congestion, sore throat, neck pain, back pain, dizziness, syncope, or other complaint.    S/P rTPA 1236 pm ;    Admission : [facility] 3 ; mRS - 0    Hospital Course:   4/23: patient received TPA at 1236pm. Symptoms have resolved and patient is back to baseline   4/24: post TPA CT head shows no acute hemorrhage or infarct.     Physical Exam:   PHYSICAL EXAM:    Constitutional: No Acute Distress     Neurological: Awake, alert oriented to person, place and time, Following Commands, PERRL, EOMI, No Gaze Preference, Face Symmetrical, Speech Fluent, No dysmetria, No ataxia, No nystagmus     Motor exam:          Upper extremity                         Delt     Bicep     Tricep    HG                                                 R         5/5        5/5        5/5       5/5                                               L          5/5        5/5        5/5       5/5          Lower extremity                        HF         KF        KE       DF         PF                                                  R        5/5        5/5        5/5       5/5         5/5                                               L         5/5        5/5       5/5       5/5          5/5                                                 Sensation: [X ] intact to light touch     Pulmonary: Clear to Auscultation, No rales, No rhonchi, No wheezes     Cardiovascular: S1, S2, Regular rate and rhythm     Gastrointestinal: Soft, Non-tender, Non-distended     Extremities: No calf tenderness     Plan   Neuro: q4 neuro checks, started on home Lexapro 5mg qd   Cards: NO HX OF AFIB PER CARDIOLOGY, -180. Home Diltiazem held for hypotension here. Home Atovastatin 80 decreased to 20 and would hold Nexlizet since LDL 38.  TTE pending. [place] [facility] consulted.   Resp: RA   GI: DASH diet   : voiding   Heme: ASA 81 mg, Lovenox 40qd for DVT ppx   ID: afebrile, no issues   Endo: A1C pending, fs okay here. TSH 2.08  PT/OT  Neurology consulted to follow on floor   Pt signed out to medicine dr. vicky [place]. Transfer to [facility]

## 2022-04-24 NOTE — PROGRESS NOTE ADULT - SUBJECTIVE AND OBJECTIVE BOX
SUMMARY:HPI:   HPI: 82 y/o F pt with hx of htn, hld, afib not on ac, prior CVAs presenting today as code stroke with onset of L sided weakness with LKW 11:20 today. States that she felt the onset of sx and immedately pressed her "life alert" button. Pt currently c/o L leg weakness and states her speech feels slurred. Pt denies any chest pain, dyspnea, fevers, n/v/d, abdominal pain, dysuria, headache, cough, congestion, sore throat, neck pain, back pain, dizziness, syncope, or other complaint.    S/P rTPA 1236 pm ;    Admission : [facility] 3 ; mRS - 0      OVERNIGHT EVENTS:       Allergies    Biaxin (Muscle Pain; Joint Pain)  codeine (Faint)  Metoprolol Succinate ER (Unknown)  monoctanoin (Unknown)    Intolerances    Isosorbide Mononitrate Extended Release (Faint)      REVIEW OF SYSTEMS:   [X ] All ROS addressed below are non-contributory, except:  Neuro: [ ] Headache [ ] Back pain [ ] Numbness [ ] Weakness [ ] Ataxia [ ] Dizziness [ ] Aphasia [ ] Dysarthria [ ] Visual disturbance  Resp: [ ] Shortness of breath/dyspnea, [ ] Orthopnea [ ] Cough  CV: [ ] Chest pain [ ] Palpitation [ ] Lightheadedness [ ] Syncope  Renal: [ ] Thirst [ ] Edema  GI: [ ] Nausea [ ] Emesis [ ] Abdominal pain [ ] Constipation [ ] Diarrhea  Hem: [ ] Hematemesis [ ] bright red blood per rectum  ID: [ ] Fever [ ] Chills [ ] Dysuria  ENT: [ ] Rhinorrhea      VITALS: [ ] Reviewed  Vital Signs Last 24 Hrs  T(C): 36.4 (24 Apr 2022 07:33), Max: 36.8 (23 Apr 2022 12:37)  T(F): 97.5 (24 Apr 2022 07:33), Max: 98.3 (23 Apr 2022 13:37)  HR: 90 (24 Apr 2022 08:00) (79 - 100)  BP: 104/68 (24 Apr 2022 08:00) (94/57 - 178/119)  BP(mean): 80 (24 Apr 2022 08:00) (69 - 131)  RR: 19 (24 Apr 2022 08:00) (12 - 24)  SpO2: 100% (24 Apr 2022 08:00) (89% - 100%)  CAPILLARY BLOOD GLUCOSE      POCT Blood Glucose.: 104 mg/dL (24 Apr 2022 07:52)  POCT Blood Glucose.: 66 mg/dL (24 Apr 2022 05:51)  POCT Blood Glucose.: 76 mg/dL (23 Apr 2022 23:41)  POCT Blood Glucose.: 76 mg/dL (23 Apr 2022 17:33)  POCT Blood Glucose.: 83 mg/dL (23 Apr 2022 12:09)        LABS:  PT/INR - ( 23 Apr 2022 12:38 )   PT: 13.1 sec;   INR: 1.13 ratio         PTT - ( 23 Apr 2022 12:38 )  PTT:29.6 sec  04-24    139  |  103  |  20.0  ----------------------------<  61<L>  4.4   |  19.0<L>  |  1.06    Ca    8.6      24 Apr 2022 04:45  Phos  3.8     04-24  Mg     2.1     04-24    TPro  6.0<L>  /  Alb  3.7  /  TBili  0.6  /  DBili  x   /  AST  28  /  ALT  17  /  AlkPhos  43  04-23                          13.0   6.37  )-----------( 167      ( 24 Apr 2022 04:45 )             40.0       STROKE CORE MEASURES:   HGBa1C  TSH- 2.08  LDL- 38  Trig- 51     MEDICATION LEVELS:     IMAGING/DATA: [ ] Reviewed    IVF FLUIDS/MEDICATIONS: [ ] Reviewed  MEDICATIONS  (STANDING):  dextrose 5%. 1000 milliLiter(s) (50 mL/Hr) IV Continuous <Continuous>  dextrose 5%. 1000 milliLiter(s) (100 mL/Hr) IV Continuous <Continuous>  dextrose 50% Injectable 25 Gram(s) IV Push once  dextrose 50% Injectable 12.5 Gram(s) IV Push once  dextrose 50% Injectable 25 Gram(s) IV Push once  glucagon  Injectable 1 milliGRAM(s) IntraMuscular once  insulin lispro (ADMELOG) corrective regimen sliding scale   SubCutaneous every 6 hours  sodium chloride 0.9%. 1000 milliLiter(s) (60 mL/Hr) IV Continuous <Continuous>    MEDICATIONS  (PRN):  dextrose Oral Gel 15 Gram(s) Oral once PRN Blood Glucose LESS THAN 70 milliGRAM(s)/deciliter  diltiazem Injectable 5 milliGRAM(s) IV Push every 6 hours PRN HR sustained > 115  hydrALAZINE Injectable 10 milliGRAM(s) IV Push every 2 hours PRN SBP >180    I&O's Summary    23 Apr 2022 07:01  -  24 Apr 2022 07:00  --------------------------------------------------------  IN: 840 mL / OUT: 850 mL / NET: -10 mL    24 Apr 2022 07:01  -  24 Apr 2022 09:19  --------------------------------------------------------  IN: 120 mL / OUT: 0 mL / NET: 120 mL        CT PERFUSION: No core infarction or ischemic penumbra by CT perfusion   technique.    CTA neck and head: No large vessel occlusion or major stenosis.     CT Brain Stroke Protocol (04.23.22 @ 12:18) >    FINDINGS: There is no acute intracranial hemorrhage, mass effect, shift   of the midline structures, herniation, extra-axial fluid collection, or   hydrocephalus.    No hemorrhage is detected by RAPID artificial intelligence software.    There is diffuse cerebral volume loss with prominence of the sulci,   fissures, and cisternal spaces which is normal forthe patient's age.   There is moderate patchy confluent deep and periventricular white matter   hypoattenuation statistically compatible with microvascular changes given   calcific atherosclerotic disease of the intracranial arteries.    The paranasal sinuses and mastoid air cells are clear. The calvarium is   intact. The imaged orbits are unremarkable.    A loop recorder device overlies the left chest wall on the  views.    IMPRESSION: No acute intracranial hemorrhage, mass effect, or shiftof   the midline structures.        EXAMINATION:  PHYSICAL EXAM:    Constitutional: No Acute Distress     Neurological: Awake, alert oriented to person, place and time, Following Commands, PERRL, EOMI, No Gaze Preference, Face Symmetrical, Speech Fluent, No dysmetria, No ataxia, No nystagmus     Motor exam:          Upper extremity                         Delt     Bicep     Tricep    HG                                                 R         5/5 5/5 5/5 5/5                                               L          5/5 5/5 5/5 5/5          Lower extremity                        HF         KF        KE       DF         PF                                                  R        5/5 5/5 5/5 5/5 5/5                                               L         5/5 5/5 5/5 5/5 5/5                                                 Sensation: [ ] intact to light touch  [ ] decreased:     Pulmonary: Clear to Auscultation, No rales, No rhonchi, No wheezes     Cardiovascular: S1, S2, Regular rate and rhythm     Gastrointestinal: Soft, Non-tender, Non-distended     Extremities: No calf tenderness        SUMMARY:HPI:   HPI: 80 y/o F pt with hx of Htn, Hld, afib not on ac, prior CVAs presenting today as code stroke with onset of L sided weakness with LKW 11:20 4/23/22.  Pt currently c/o L leg weakness and states her speech feels slurred. hit med alert button and presents to ER  Pt denies any chest pain, dyspnea, fevers, n/v/d, abdominal pain, dysuria, headache, cough, congestion, sore throat, neck pain, back pain, dizziness, syncope, or other complaint.    S/P rTPA 1236 pm ;    Admission : [facility] 3 ; mRS - 0      OVERNIGHT EVENTS: None       Allergies    Biaxin (Muscle Pain; Joint Pain)  codeine (Faint)  Metoprolol Succinate ER (Unknown)  monoctanoin (Unknown)    Intolerances    Isosorbide Mononitrate Extended Release (Faint)      REVIEW OF SYSTEMS:   [X ] All ROS addressed below are non-contributory, except:  Neuro: [ ] Headache [ ] Back pain [ ] Numbness [ ] Weakness [ ] Ataxia [ ] Dizziness [ ] Aphasia [ ] Dysarthria [ ] Visual disturbance  Resp: [ ] Shortness of breath/dyspnea, [ ] Orthopnea [ ] Cough  CV: [ ] Chest pain [ ] Palpitation [ ] Lightheadedness [ ] Syncope  Renal: [ ] Thirst [ ] Edema  GI: [ ] Nausea [ ] Emesis [ ] Abdominal pain [ ] Constipation [ ] Diarrhea  Hem: [ ] Hematemesis [ ] bright red blood per rectum  ID: [ ] Fever [ ] Chills [ ] Dysuria  ENT: [ ] Rhinorrhea      VITALS: [X ] Reviewed  Vital Signs Last 24 Hrs  T(C): 36.4 (24 Apr 2022 07:33), Max: 36.8 (23 Apr 2022 12:37)  T(F): 97.5 (24 Apr 2022 07:33), Max: 98.3 (23 Apr 2022 13:37)  HR: 90 (24 Apr 2022 08:00) (79 - 100)  BP: 104/68 (24 Apr 2022 08:00) (94/57 - 178/119)  BP(mean): 80 (24 Apr 2022 08:00) (69 - 131)  RR: 19 (24 Apr 2022 08:00) (12 - 24)  SpO2: 100% (24 Apr 2022 08:00) ( 100%)  CAPILLARY BLOOD GLUCOSE      POCT Blood Glucose.: 104 mg/dL (24 Apr 2022 07:52)  POCT Blood Glucose.: 66 mg/dL (24 Apr 2022 05:51)  POCT Blood Glucose.: 76 mg/dL (23 Apr 2022 23:41)  POCT Blood Glucose.: 76 mg/dL (23 Apr 2022 17:33)  POCT Blood Glucose.: 83 mg/dL (23 Apr 2022 12:09)        LABS:  PT/INR - ( 23 Apr 2022 12:38 )   PT: 13.1 sec;   INR: 1.13 ratio         PTT - ( 23 Apr 2022 12:38 )  PTT:29.6 sec  04-24    139  |  103  |  20.0  ----------------------------<  61<L>  4.4   |  19.0<L>  |  1.06    Ca    8.6      24 Apr 2022 04:45  Phos  3.8     04-24  Mg     2.1     04-24    TPro  6.0<L>  /  Alb  3.7  /  TBili  0.6  /  DBili  x   /  AST  28  /  ALT  17  /  AlkPhos  43  04-23                          13.0   6.37  )-----------( 167      ( 24 Apr 2022 04:45 )             40.0       STROKE CORE MEASURES:   HGBa1C- P   TSH- 2.08  LDL- 38  Trig- 51     MEDICATION LEVELS:     IMAGING/DATA: [X ] Reviewed    IVF FLUIDS/MEDICATIONS: [X ] Reviewed  MEDICATIONS  (STANDING):  dextrose 5%. 1000 milliLiter(s) (50 mL/Hr) IV Continuous <Continuous>  dextrose 5%. 1000 milliLiter(s) (100 mL/Hr) IV Continuous <Continuous>  dextrose 50% Injectable 25 Gram(s) IV Push once  dextrose 50% Injectable 12.5 Gram(s) IV Push once  dextrose 50% Injectable 25 Gram(s) IV Push once  glucagon  Injectable 1 milliGRAM(s) IntraMuscular once  insulin lispro (ADMELOG) corrective regimen sliding scale   SubCutaneous every 6 hours    MEDICATIONS  (PRN):  dextrose Oral Gel 15 Gram(s) Oral once PRN Blood Glucose LESS THAN 70 milliGRAM(s)/deciliter  diltiazem Injectable 5 milliGRAM(s) IV Push every 6 hours PRN HR sustained > 115  hydrALAZINE Injectable 10 milliGRAM(s) IV Push every 2 hours PRN SBP >180    I&O's Summary    23 Apr 2022 07:01  -  24 Apr 2022 07:00  --------------------------------------------------------  IN: 840 mL / OUT: 850 mL / NET: -10 mL    24 Apr 2022 07:01  -  24 Apr 2022 09:19  --------------------------------------------------------  IN: 120 mL / OUT: 0 mL / NET: 120 mL        CT PERFUSION: No core infarction or ischemic penumbra by CT perfusion   technique.    CTA neck and head: No large vessel occlusion or major stenosis.     CT Brain Stroke Protocol (04.23.22 @ 12:18) >    FINDINGS: There is no acute intracranial hemorrhage, mass effect, shift   of the midline structures, herniation, extra-axial fluid collection, or   hydrocephalus.    No hemorrhage is detected by RAPID artificial intelligence software.    There is diffuse cerebral volume loss with prominence of the sulci,   fissures, and cisternal spaces which is normal forthe patient's age.   There is moderate patchy confluent deep and periventricular white matter   hypoattenuation statistically compatible with microvascular changes given   calcific atherosclerotic disease of the intracranial arteries.    The paranasal sinuses and mastoid air cells are clear. The calvarium is   intact. The imaged orbits are unremarkable.    A loop recorder device overlies the left chest wall on the  views.    IMPRESSION: No acute intracranial hemorrhage, mass effect, or shiftof   the midline structures.        EXAMINATION:  PHYSICAL EXAM:    Constitutional: No Acute Distress     Neurological: Awake, alert oriented to person, place and time, Following Commands, PERRL, EOMI, No Gaze Preference, Face Symmetrical, Speech Fluent, No dysmetria, No ataxia, No nystagmus     Motor exam:          Upper extremity                         Delt     Bicep     Tricep    HG                                                 R         5/5        5/5        5/5       5/5                                               L          5/5        5/5        5/5       5/5          Lower extremity                        HF         KF        KE       DF         PF                                                  R        5/5 5/5 5/5 5/5 5/5                                               L         5/5 5/5 5/5 5/5 5/5                                                 Sensation: [X ] intact to light touch     Pulmonary: Clear to Auscultation, No rales, No rhonchi, No wheezes     Cardiovascular: S1, S2, Regular rate and rhythm     Gastrointestinal: Soft, Non-tender, Non-distended     Extremities: No calf tenderness        SUMMARY:HPI:   HPI: 80 y/o F pt with hx of Htn, Hld, afib not on ac, prior CVAs presenting today as code stroke with onset of L sided weakness with LKW 11:20 4/23/22.  Pt currently c/o L leg weakness and states her speech feels slurred. hit med alert button and presents to ER  Pt denies any chest pain, dyspnea, fevers, n/v/d, abdominal pain, dysuria, headache, cough, congestion, sore throat, neck pain, back pain, dizziness, syncope, or other complaint.    S/P rTPA 1236 pm ;    Admission : [facility] 3 ; mRS - 0      OVERNIGHT EVENTS: None       Allergies    Biaxin (Muscle Pain; Joint Pain)  codeine (Faint)  Metoprolol Succinate ER (Unknown)  monoctanoin (Unknown)    Intolerances    Isosorbide Mononitrate Extended Release (Faint)      REVIEW OF SYSTEMS:   [X ] All ROS addressed below are non-contributory, except:  Neuro: [ ] Headache [ ] Back pain [ ] Numbness [ ] Weakness [ ] Ataxia [ ] Dizziness [ ] Aphasia [ ] Dysarthria [ ] Visual disturbance  Resp: [ ] Shortness of breath/dyspnea, [ ] Orthopnea [ ] Cough  CV: [ ] Chest pain [ ] Palpitation [ ] Lightheadedness [ ] Syncope  Renal: [ ] Thirst [ ] Edema  GI: [ ] Nausea [ ] Emesis [ ] Abdominal pain [ ] Constipation [ ] Diarrhea  Hem: [ ] Hematemesis [ ] bright red blood per rectum  ID: [ ] Fever [ ] Chills [ ] Dysuria  ENT: [ ] Rhinorrhea      VITALS: [X ] Reviewed  Vital Signs Last 24 Hrs  T(C): 36.4 (24 Apr 2022 07:33), Max: 36.8 (23 Apr 2022 12:37)  T(F): 97.5 (24 Apr 2022 07:33), Max: 98.3 (23 Apr 2022 13:37)  HR: 90 (24 Apr 2022 08:00) (79 - 100)  BP: 104/68 (24 Apr 2022 08:00) (94/57 - 178/119)  BP(mean): 80 (24 Apr 2022 08:00) (69 - 131)  RR: 19 (24 Apr 2022 08:00) (12 - 24)  SpO2: 100% (24 Apr 2022 08:00) ( 100%)  CAPILLARY BLOOD GLUCOSE      POCT Blood Glucose.: 104 mg/dL (24 Apr 2022 07:52)  POCT Blood Glucose.: 66 mg/dL (24 Apr 2022 05:51)  POCT Blood Glucose.: 76 mg/dL (23 Apr 2022 23:41)  POCT Blood Glucose.: 76 mg/dL (23 Apr 2022 17:33)  POCT Blood Glucose.: 83 mg/dL (23 Apr 2022 12:09)        LABS:  PT/INR - ( 23 Apr 2022 12:38 )   PT: 13.1 sec;   INR: 1.13 ratio         PTT - ( 23 Apr 2022 12:38 )  PTT:29.6 sec  04-24    139  |  103  |  20.0  ----------------------------<  61<L>  4.4   |  19.0<L>  |  1.06    Ca    8.6      24 Apr 2022 04:45  Phos  3.8     04-24  Mg     2.1     04-24    TPro  6.0<L>  /  Alb  3.7  /  TBili  0.6  /  DBili  x   /  AST  28  /  ALT  17  /  AlkPhos  43  04-23                          13.0   6.37  )-----------( 167      ( 24 Apr 2022 04:45 )             40.0       STROKE CORE MEASURES:   HGBa1C- P   TSH- 2.08  LDL- 38  Trig- 51     MEDICATION LEVELS:     IMAGING/DATA: [X ] Reviewed      Home Medications:  aspirin 81 mg oral delayed release tablet: 1 tab(s) orally once a day (08 Sep 2021 19:33)  atorvastatin 80 mg oral tablet: 1 tab(s) orally once a day (08 Sep 2021 19:33)  Brilinta (ticagrelor) 90 mg oral tablet: 1 tab(s) orally 2 times a day (08 Sep 2021 19:33)  dilTIAZem 30 mg oral tablet: 1 tab(s) orally 3 times a day (08 Sep 2021 19:33)  Multiple Vitamins oral tablet: 1 tab(s) orally once a day (08 Sep 2021 19:33)      IVF FLUIDS/MEDICATIONS: [X ] Reviewed  MEDICATIONS  (STANDING):  dextrose 5%. 1000 milliLiter(s) (50 mL/Hr) IV Continuous <Continuous>  dextrose 5%. 1000 milliLiter(s) (100 mL/Hr) IV Continuous <Continuous>  dextrose 50% Injectable 25 Gram(s) IV Push once  dextrose 50% Injectable 12.5 Gram(s) IV Push once  dextrose 50% Injectable 25 Gram(s) IV Push once  glucagon  Injectable 1 milliGRAM(s) IntraMuscular once  insulin lispro (ADMELOG) corrective regimen sliding scale   SubCutaneous every 6 hours    MEDICATIONS  (PRN):  dextrose Oral Gel 15 Gram(s) Oral once PRN Blood Glucose LESS THAN 70 milliGRAM(s)/deciliter  diltiazem Injectable 5 milliGRAM(s) IV Push every 6 hours PRN HR sustained > 115  hydrALAZINE Injectable 10 milliGRAM(s) IV Push every 2 hours PRN SBP >180    I&O's Summary    23 Apr 2022 07:01  -  24 Apr 2022 07:00  --------------------------------------------------------  IN: 840 mL / OUT: 850 mL / NET: -10 mL    24 Apr 2022 07:01  -  24 Apr 2022 09:19  --------------------------------------------------------  IN: 120 mL / OUT: 0 mL / NET: 120 mL        CT PERFUSION: No core infarction or ischemic penumbra by CT perfusion   technique.    CTA neck and head: No large vessel occlusion or major stenosis.     CT Brain Stroke Protocol (04.23.22 @ 12:18) >    FINDINGS: There is no acute intracranial hemorrhage, mass effect, shift   of the midline structures, herniation, extra-axial fluid collection, or   hydrocephalus.    No hemorrhage is detected by RAPID artificial intelligence software.    There is diffuse cerebral volume loss with prominence of the sulci,   fissures, and cisternal spaces which is normal forthe patient's age.   There is moderate patchy confluent deep and periventricular white matter   hypoattenuation statistically compatible with microvascular changes given   calcific atherosclerotic disease of the intracranial arteries.    The paranasal sinuses and mastoid air cells are clear. The calvarium is   intact. The imaged orbits are unremarkable.    A loop recorder device overlies the left chest wall on the  views.    IMPRESSION: No acute intracranial hemorrhage, mass effect, or shiftof   the midline structures.        EXAMINATION:  PHYSICAL EXAM:    Constitutional: No Acute Distress     Neurological: Awake, alert oriented to person, place and time, Following Commands, PERRL, EOMI, No Gaze Preference, Face Symmetrical, Speech Fluent, No dysmetria, No ataxia, No nystagmus     Motor exam:          Upper extremity                         Delt     Bicep     Tricep    HG                                                 R         5/5        5/5        5/5       5/5                                               L          5/5        5/5        5/5       5/5          Lower extremity                        HF         KF        KE       DF         PF                                                  R        5/5        5/5        5/5       5/5         5/5                                               L         5/5        5/5       5/5       5/5          5/5                                                 Sensation: [X ] intact to light touch     Pulmonary: Clear to Auscultation, No rales, No rhonchi, No wheezes     Cardiovascular: S1, S2, Regular rate and rhythm     Gastrointestinal: Soft, Non-tender, Non-distended     Extremities: No calf tenderness

## 2022-04-25 ENCOUNTER — TRANSCRIPTION ENCOUNTER (OUTPATIENT)
Age: 82
End: 2022-04-25

## 2022-04-25 LAB
A1C WITH ESTIMATED AVERAGE GLUCOSE RESULT: 5.2 % — SIGNIFICANT CHANGE UP (ref 4–5.6)
ANION GAP SERPL CALC-SCNC: 13 MMOL/L — SIGNIFICANT CHANGE UP (ref 5–17)
BUN SERPL-MCNC: 18.6 MG/DL — SIGNIFICANT CHANGE UP (ref 8–20)
CALCIUM SERPL-MCNC: 8.3 MG/DL — LOW (ref 8.6–10.2)
CHLORIDE SERPL-SCNC: 105 MMOL/L — SIGNIFICANT CHANGE UP (ref 98–107)
CO2 SERPL-SCNC: 22 MMOL/L — SIGNIFICANT CHANGE UP (ref 22–29)
CREAT SERPL-MCNC: 1.03 MG/DL — SIGNIFICANT CHANGE UP (ref 0.5–1.3)
EGFR: 55 ML/MIN/1.73M2 — LOW
ESTIMATED AVERAGE GLUCOSE: 103 MG/DL — SIGNIFICANT CHANGE UP (ref 68–114)
GLUCOSE SERPL-MCNC: 94 MG/DL — SIGNIFICANT CHANGE UP (ref 70–99)
HCT VFR BLD CALC: 35.4 % — SIGNIFICANT CHANGE UP (ref 34.5–45)
HGB BLD-MCNC: 11.8 G/DL — SIGNIFICANT CHANGE UP (ref 11.5–15.5)
MAGNESIUM SERPL-MCNC: 2 MG/DL — SIGNIFICANT CHANGE UP (ref 1.8–2.6)
MCHC RBC-ENTMCNC: 29.9 PG — SIGNIFICANT CHANGE UP (ref 27–34)
MCHC RBC-ENTMCNC: 33.3 GM/DL — SIGNIFICANT CHANGE UP (ref 32–36)
MCV RBC AUTO: 89.6 FL — SIGNIFICANT CHANGE UP (ref 80–100)
PHOSPHATE SERPL-MCNC: 3.2 MG/DL — SIGNIFICANT CHANGE UP (ref 2.4–4.7)
PLATELET # BLD AUTO: 164 K/UL — SIGNIFICANT CHANGE UP (ref 150–400)
POTASSIUM SERPL-MCNC: 4.3 MMOL/L — SIGNIFICANT CHANGE UP (ref 3.5–5.3)
POTASSIUM SERPL-SCNC: 4.3 MMOL/L — SIGNIFICANT CHANGE UP (ref 3.5–5.3)
RAPID RVP RESULT: SIGNIFICANT CHANGE UP
RBC # BLD: 3.95 M/UL — SIGNIFICANT CHANGE UP (ref 3.8–5.2)
RBC # FLD: 11.9 % — SIGNIFICANT CHANGE UP (ref 10.3–14.5)
SARS-COV-2 RNA SPEC QL NAA+PROBE: SIGNIFICANT CHANGE UP
SODIUM SERPL-SCNC: 140 MMOL/L — SIGNIFICANT CHANGE UP (ref 135–145)
WBC # BLD: 5.7 K/UL — SIGNIFICANT CHANGE UP (ref 3.8–10.5)
WBC # FLD AUTO: 5.7 K/UL — SIGNIFICANT CHANGE UP (ref 3.8–10.5)

## 2022-04-25 PROCEDURE — 99233 SBSQ HOSP IP/OBS HIGH 50: CPT

## 2022-04-25 PROCEDURE — 70551 MRI BRAIN STEM W/O DYE: CPT | Mod: 26

## 2022-04-25 PROCEDURE — 99222 1ST HOSP IP/OBS MODERATE 55: CPT

## 2022-04-25 RX ORDER — DILTIAZEM HCL 120 MG
30 CAPSULE, EXT RELEASE 24 HR ORAL EVERY 8 HOURS
Refills: 0 | Status: DISCONTINUED | OUTPATIENT
Start: 2022-04-25 | End: 2022-04-26

## 2022-04-25 RX ADMIN — ESCITALOPRAM OXALATE 5 MILLIGRAM(S): 10 TABLET, FILM COATED ORAL at 17:10

## 2022-04-25 RX ADMIN — ENOXAPARIN SODIUM 40 MILLIGRAM(S): 100 INJECTION SUBCUTANEOUS at 17:11

## 2022-04-25 RX ADMIN — Medication 81 MILLIGRAM(S): at 17:11

## 2022-04-25 RX ADMIN — Medication 30 MILLIGRAM(S): at 21:00

## 2022-04-25 RX ADMIN — ATORVASTATIN CALCIUM 20 MILLIGRAM(S): 80 TABLET, FILM COATED ORAL at 21:00

## 2022-04-25 RX ADMIN — Medication 1 MILLIGRAM(S): at 14:06

## 2022-04-25 NOTE — PROGRESS NOTE ADULT - ASSESSMENT
81 year old female with history of TIA / CVA, s/p ILR, CAD, HLD and HTN presented with left leg weakness and slurred speech. She was given tPA and was admitted to Neuro ICU where she was monitored closely. Downgraded to Hospitalist Service on 4/24/22.      1) TIA  - s/p tPA  - Speech improved, left leg weakness improving   - CT and MRI Brain with no acute stroke   - ILR interrogation pending   - Per Cardio: Pt did not tolerate Brilinta due to dizziness as per pt. Plavix non responder and cannot receive Effient due to h/o CVA.  - Will likely need anticoagulation   - Continue Aspirin and Lipitor   - Neuro Consult pending      2) CAD / HLD / HTN  - Continue Aspirin and Lipitor  - Resume Cardizem     DVT Prophylaxis -- Lovenox SQ    Dispo: Home with PT / OT in 24 hours.

## 2022-04-25 NOTE — CONSULT NOTE ADULT - ASSESSMENT
IMPRESSION:  -  S/P iv tPA for right hemispheric stroke syndrome.  Risk Factors. HTN and Afib    ASSESSMENT/ PLAN:       - Neuro checks and vital signs Q 2 hours.  - SBP goal - keep normotensive.  - ASA 81 mg PO or 300 MS QD.  - Needs anticoagulation if Afib is confirmed   - Lipitor for LDL goal of < 70 .  - Telemetry monitoring.  - CT/CTA/CTP  -images and reports were reviewed.   - MRI Brain stroke protocol -images and reports were reviewed.   - Check fasting Lipid panel and HbA1c  - TTE with bubble study.report noted as above.  - Cardiology consultation appreciated.  - PT OT SLP evaluation.  - SCD/ SQ Lovenox for DVT prophylaxis.

## 2022-04-25 NOTE — PROGRESS NOTE ADULT - SUBJECTIVE AND OBJECTIVE BOX
Cerebral infarction    HPI:   HPI: 80 y/o F pt with hx of htn, hld, afib not on ac, prior CVAs presenting today as code stroke with onset of L sided weakness with LKW 11:20 today. States that she felt the onset of sx and immedately pressed her "life alert" button. Pt currently c/o L leg weakness and states her speech feels slurred. Pt denies any chest pain, dyspnea, fevers, n/v/d, abdominal pain, dysuria, headache, cough, congestion, sore throat, neck pain, back pain, dizziness, syncope, or other complaint. (23 Apr 2022 13:13)    Interval History:  Patient was seen and examined at bedside around 8:45 am.  Speech is back to baseline. Left leg weakness is improving.  Denies paresthesia, headache, visual symptoms or difficulty swallowing.   Denies chest pain, palpitations, shortness of breath, nausea, vomiting or abdominal pain.    ROS:  As per interval history otherwise unremarkable.    PHYSICAL EXAM:  Vital Signs   T(C): 36.6 (25 Apr 2022 10:22), Max: 37.1 (24 Apr 2022 16:00)  T(F): 97.8 (25 Apr 2022 10:22), Max: 98.8 (24 Apr 2022 16:00)  HR: 76 (25 Apr 2022 10:22) (75 - 86)  BP: 117/74 (25 Apr 2022 10:22) (104/61 - 150/69)  BP(mean): 94 (24 Apr 2022 21:43) (74 - 94)  RR: 18 (25 Apr 2022 10:22) (18 - 23)  SpO2: 97% (25 Apr 2022 10:22) (96% - 99%)  General: Elderly female sitting in bed comfortably. No acute distress  HEENT: PERRLA. EOMI. Clear conjunctivae. Moist mucus membrane  Neck: Supple.    Chest: CTA bilaterally. No wheezing, rales or rhonchi.   Heart: Normal S1 & S2. RRR.   Abdomen: Soft. Non-tender. Non-distended. + BS  Ext: No pedal edema. No calf tenderness   Neuro: AAO x 3. CN II-XII grossly WNL. Motor: 5/5 in RUE/RLE/LUE and 3-4/5 in LLE. Sensation decreased on LLE. Reflexes 1+. Cerebellar sings intact. No speech disorder  Skin: Warm and Dry  Psychiatry: Normal mood and affect    I&O's Summary    24 Apr 2022 07:01  -  25 Apr 2022 07:00  --------------------------------------------------------  IN: 850 mL / OUT: 0 mL / NET: 850 mL    LABS:                        11.8   5.70  )-----------( 164      ( 25 Apr 2022 05:54 )             35.4     04-25    140  |  105  |  18.6  ----------------------------<  94  4.3   |  22.0  |  1.03    Ca    8.3<L>      25 Apr 2022 05:54  Phos  3.2     04-25  Mg     2.0     04-25    RADIOLOGY & ADDITIONAL STUDIES:  Reviewed     MEDICATIONS  (STANDING):  aspirin enteric coated 81 milliGRAM(s) Oral daily  atorvastatin 20 milliGRAM(s) Oral at bedtime  enoxaparin Injectable 40 milliGRAM(s) SubCutaneous every 24 hours  escitalopram 5 milliGRAM(s) Oral daily  glucagon  Injectable 1 milliGRAM(s) IntraMuscular once

## 2022-04-25 NOTE — PROGRESS NOTE ADULT - SUBJECTIVE AND OBJECTIVE BOX
[facility], [facility]                              [address] [address]                                                 PHONE: [phone]                                                 FAX: [phone]  -----------------------------------------------------------------------------------------------  Pt seen and examined. FU for CVA    Overnight events/Complaints: Pt reports mild L sided weakness. No obvious speech/visual deficit as per pt.     Vital Signs Last 24 Hrs  T(C): 36.7 (25 Apr 2022 04:51), Max: 37.1 (24 Apr 2022 16:00)  T(F): 98 (25 Apr 2022 04:51), Max: 98.8 (24 Apr 2022 16:00)  HR: 84 (25 Apr 2022 04:51) (71 - 103)  BP: 110/72 (25 Apr 2022 04:51) (104/61 - 150/69)  BP(mean): 94 (24 Apr 2022 21:43) (73 - 131)  RR: 18 (25 Apr 2022 04:51) (12 - 25)  SpO2: 96% (25 Apr 2022 04:51) (96% - 100%)  I&O's Summary    24 Apr 2022 07:01  -  25 Apr 2022 07:00  --------------------------------------------------------  IN: 850 mL / OUT: 0 mL / NET: 850 mL    PHYSICAL EXAM:  Constitutional: Appears well; alert and co-operative  HEENT:     Head: Normocephalic and atraumatic  Neck: supple. No JVD  Cardiovascular: regular S1 S2  Respiratory: Lungs clear to auscultation; no crepitations, no wheeze  Gastrointestinal:  Soft, Non-tender, + BS	  Musculoskeletal: Normal range of motion. No edema  Skin: Warm and dry. No cyanosis . No diaphoresis.  Neurologic: Alert oriented to time place and person. Mild L sided weakness and no tremor.        LABS:                        11.8   5.70  )-----------( 164      ( 25 Apr 2022 05:54 )             35.4     04-25    140  |  105  |  18.6  ----------------------------<  94  4.3   |  22.0  |  1.03    Ca    8.3<L>      25 Apr 2022 05:54  Phos  3.2     04-25  Mg     2.0     04-25    TPro  6.0<L>  /  Alb  3.7  /  TBili  0.6  /  DBili  x   /  AST  28  /  ALT  17  /  AlkPhos  43  04-23    CARDIAC MARKERS ( 23 Apr 2022 12:38 )  x     / <0.01 ng/mL / x     / x     / x          PT/INR - ( 23 Apr 2022 12:38 )   PT: 13.1 sec;   INR: 1.13 ratio         PTT - ( 23 Apr 2022 12:38 )  PTT:29.6 sec    RADIOLOGY & ADDITIONAL STUDIES: (reviewed)  CT was independently visualized/reviewed  and demonstrated: no hemorrhage    CARDIOLOGY TESTING:(reviewed)     12 lead EKG independently visualized/reviewed  and demonstrated NSR    MEGHANN independently visualized/reviewed and demonstrated :   Summary:   1. Left ventricular ejection fraction, by visual estimation, is 60 to 65%. Increased LV wall thickness.   2. Small right ventricle. Small right atrium.   3. There is mild prolapse of P2 segment of the posterior mitral valve without obvious flail segment. Moderate mitral valve regurgitation.   4. Mild tricuspid regurgitation.   5. Mild aortic regurgitation.   6. Color flow doppler and intravenous injection of agitated saline demonstrates the presence of an intact intra atrial septum.   7. No obvious cardioembolism source noted.    MD Chris Electronically signed on 3/29/2021 at 2:51:31 PM      CARDIAC CATH:  VENTRICLES: LVEF 55%  CORONARY VESSELS: The coronary circulation is right dominant.  LM:   --  LM: Normal.  LAD:   --  Proximal LAD: There was a 20 % stenosis.  --  Mid LAD: There was a 0 % stenosis at the site of a prior stent.  CX:   --  Circumflex: Angiography showed minor luminal irregularities with  no flow limiting lesions.  RCA:   --  RCA: Angiography showed minor luminal irregularities with no  flow limiting lesions.  --  Ostial RCA: There was a 20 % stenosis.  COMPLICATIONS: There were no complications. No complications occurred  during the cathlab visit.  DIAGNOSTIC IMPRESSIONS: Prior stent in LAD is patent with non obstructive  CAD  DIAGNOSTIC RECOMMENDATIONS: The patient should continue with the present  medications.  INTERVENTIONAL IMPRESSIONS: Prior stent in LAD is patent with non  obstructive CAD    MEDICATIONS:(reviewed)  MEDICATIONS  (STANDING):  aspirin enteric coated 81 milliGRAM(s) Oral daily  atorvastatin 20 milliGRAM(s) Oral at bedtime  enoxaparin Injectable 40 milliGRAM(s) SubCutaneous every 24 hours  escitalopram 5 milliGRAM(s) Oral daily  glucagon  Injectable 1 milliGRAM(s) IntraMuscular once    ASSESSMENT AND PLAN:    81y Female with past medical history significant for PMHx HTN, HLD, CAD sp PCI to LAD, TIA, on ASA and Plavix nonresponder, sp prior MEGHANN and ILR currently in place pw acute left sided weakness given TPA.     1) Interrogate ILR today  2) monitor on tele  3) cw ASA. Pt did not tolerate Brilinta due to dizziness as per pt. Plavix non responder and cannot receive Effient due to h/o CVA. Will await ILR interrogation but given CVA on ASA, may need to consider long term anticoagulation  4) cw statin

## 2022-04-25 NOTE — DISCHARGE NOTE NURSING/CASE MANAGEMENT/SOCIAL WORK - NSDCFUADDAPPT_GEN_ALL_CORE_FT
PATIENT PREFERS TO SET UP HER OWN DC FOLLOW UP APPOINTMENTS AS WELL AS NEUROLOGY APPT.  ***ADVANCED ILLNESS CVA*** PATIENT PREFERS TO SET UP HER OWN DISCHARGE FOLLOW UP APPOINTMENTS AS WELL AS NEUROLOGY APPOINTMENT UPON DISCHARGE. PATIENT UNDERSTANDS HOW TO TAKE HER MEDICATIONS AS PRESCRIBED AND CAN AFFORD TO PAY FOR THEM.

## 2022-04-25 NOTE — DISCHARGE NOTE NURSING/CASE MANAGEMENT/SOCIAL WORK - PATIENT PORTAL LINK FT
You can access the FollowMyHealth Patient Portal offered by [facility] by registering at the following website: [email or web address]. By joining [facility]’s FollowMyHealth portal, you will also be able to view your health information using other applications (apps) compatible with our system.

## 2022-04-25 NOTE — CONSULT NOTE ADULT - SUBJECTIVE AND OBJECTIVE BOX
[facility], [facility]                                   [address] [place] [address]                                                      PHONE: [phone]                                                         FAX: [phone]  [email or web address]  ---------------------------------------------------------------------------------------------------------------------------------    Reason for Consult: CVA sp TPA  HPI:  BREANA HELM is an 81y Female PMHx HTN, HLD, CAD sp PCI to LAD, TIA, on ASA and plavix nonresponder, sp prior MEGHANN and ILR currently in place pw acute left sided weakness given TPA.     PAST MEDICAL & SURGICAL HISTORY:  HTN (hypertension)    Rheumatic fever    TIA (transient ischemic attack)    CAD S/P percutaneous coronary angioplasty    Polio    Cerebrovascular accident (CVA)  November 2020, June 2020, May 2020    Status post tonsillectomy    H/O left nephrectomy    Renal cancer        Biaxin (Muscle Pain; Joint Pain)  codeine (Faint)  Isosorbide Mononitrate Extended Release (Faint)  Metoprolol Succinate ER (Unknown)  monoctanoin (Unknown)      MEDICATIONS  (STANDING):  aspirin enteric coated 81 milliGRAM(s) Oral daily  atorvastatin 20 milliGRAM(s) Oral at bedtime  enoxaparin Injectable 40 milliGRAM(s) SubCutaneous every 24 hours  escitalopram 5 milliGRAM(s) Oral daily  glucagon  Injectable 1 milliGRAM(s) IntraMuscular once    MEDICATIONS  (PRN):      Social History:  Cigarettes:     no               Alchohol:     no            Illicit Drug Abuse:  no  FHx no SCD  ROS: Negative other than as mentioned in HPI.    Vital Signs Last 24 Hrs  T(C): 36.8 (24 Apr 2022 11:18), Max: 36.8 (24 Apr 2022 11:18)  T(F): 98.2 (24 Apr 2022 11:18), Max: 98.2 (24 Apr 2022 11:18)  HR: 71 (24 Apr 2022 15:00) (71 - 103)  BP: 134/73 (24 Apr 2022 15:00) (94/57 - 172/71)  BP(mean): 92 (24 Apr 2022 15:00) (69 - 131)  RR: 12 (24 Apr 2022 15:00) (12 - 25)  SpO2: 100% (24 Apr 2022 15:00) (89% - 100%)  ICU Vital Signs Last 24 Hrs  BREANA HELM  I&O's Detail    23 Apr 2022 07:01  -  24 Apr 2022 07:00  --------------------------------------------------------  IN:    sodium chloride 0.9%: 840 mL  Total IN: 840 mL    OUT:    Voided (mL): 850 mL  Total OUT: 850 mL    Total NET: -10 mL      24 Apr 2022 07:01  -  24 Apr 2022 15:50  --------------------------------------------------------  IN:    sodium chloride 0.9%: 180 mL  Total IN: 180 mL    OUT:  Total OUT: 0 mL    Total NET: 180 mL        I&O's Summary    23 Apr 2022 07:01  -  24 Apr 2022 07:00  --------------------------------------------------------  IN: 840 mL / OUT: 850 mL / NET: -10 mL    24 Apr 2022 07:01  -  24 Apr 2022 15:50  --------------------------------------------------------  IN: 180 mL / OUT: 0 mL / NET: 180 mL      Drug Dosing Weight  BREANA HELM      PHYSICAL EXAM:  General:  alert and cooperative.  HEENT: Head; normocephalic, atraumatic.  Eyes: Pupils reactive, cornea wnl.  Neck: Supple, no nodes adenopathy, no NVD or carotid bruit or thyromegaly.  CARDIOVASCULAR: Normal S1 and S2, No murmur, rub, gallop or lift.   LUNGS: No rales, rhonchi or wheeze. Normal breath sounds bilaterally.  ABDOMEN: Soft, nontender without mass or organomegaly. bowel sounds normoactive.  EXTREMITIES: No clubbing, cyanosis or edema. Distal pulses wnl.   SKIN: warm and dry with normal turgor.  NEURO: Alert/oriented x 3/normal motor exam. No pathologic reflexes.    PSYCH: normal affect.        LABS:                        13.0   6.37  )-----------( 167      ( 24 Apr 2022 04:45 )             40.0     04-24    139  |  103  |  20.0  ----------------------------<  61<L>  4.4   |  19.0<L>  |  1.06    Ca    8.6      24 Apr 2022 04:45  Phos  3.8     04-24  Mg     2.1     04-24    TPro  6.0<L>  /  Alb  3.7  /  TBili  0.6  /  DBili  x   /  AST  28  /  ALT  17  /  AlkPhos  43  04-23    BREANA HELM  CARDIAC MARKERS ( 23 Apr 2022 12:38 )  x     / <0.01 ng/mL / x     / x     / x          PT/INR - ( 23 Apr 2022 12:38 )   PT: 13.1 sec;   INR: 1.13 ratio         PTT - ( 23 Apr 2022 12:38 )  PTT:29.6 sec      RADIOLOGY & ADDITIONAL STUDIES:    INTERPRETATION OF TELEMETRY (personally reviewed):    ECG: NS @ 86 no acute ischemic changes    ECHO: < from: TTE Echo Complete w/o Contrast w/ Doppler (07.29.21 @ 17:16) >    Summary:   1. Normal left atrial size.   2. Segmental wall motion abnormalities in RCA territory,.   3. Left ventricular ejection fraction, by visual estimation, is 50%. Grade I diastolic dysfunction.   4. Normal right atrial size.   5. Normal right ventricular size and function.   6. Mild to moderate mitral valve regurgitation.   7. Mild to moderate aortic regurgitation.   8. Mild tricuspid regurgitation.   9. Trivial pericardial effusion.    MD Barbie, RPVI Electronically signed on 7/29/2021 at 8:50:48 PM    < end of copied text >            CARDIAC CATHETERIZATION: < from: Cardiac Cath Lab - Adult (11.30.20 @ 12:19) >  VENTRICLES: LVEF 55%  CORONARY VESSELS: The coronary circulation is right dominant.  LM:   --  LM: Normal.  LAD:   --  Proximal LAD: There was a 20 % stenosis.  --  Mid LAD: There was a 0 % stenosis at the site of a prior stent.  CX:   --  Circumflex: Angiography showed minor luminal irregularities with  no flow limiting lesions.  RCA:   --  RCA: Angiography showed minor luminal irregularities with no  flow limiting lesions.  --  Ostial RCA: There was a 20 % stenosis.  COMPLICATIONS: There were no complications. No complications occurred  during the cathlab visit.  DIAGNOSTIC IMPRESSIONS: Prior stent in LAD is patent with non obstructive  CAD  DIAGNOSTIC RECOMMENDATIONS: The patient should continue with the present  medications.  INTERVENTIONAL IMPRESSIONS: Prior stent in LAD is patent with non  obstructive CAD  Prepared and signed by  Antonino Gamboa MD  Signed 11/30/2020 13:07:27    < end of copied text >      Assessment and Plan:  In summary, BREANA HELM is an 81y Female with past medical history significant for PMHx HTN, HLD, CAD sp PCI to LAD, TIA, on ASA and plavix nonresponder, sp prior MEGHANN and ILR currently in place pw acute left sided weakness given TPA.     1) Interrogate ILR tomorrow  2) monitor on tele  3) cw ASA consider starting Brilinta again  4) cw statin/nexlizet
Neurology consult    BREANA HELM 81y Female     Patient is a 81y old  Female who presents with a chief complaint of Stroke / TIA presentation s/p Alteplase (25 Apr 2022 15:59)      HPI:   HPI: 80 y/o F pt with hx of htn, hld, afib not on ac, prior CVAs presenting today as code stroke with onset of L sided weakness with LKW 11:20 today. States that she felt the onset of sx and immedately pressed her "life alert" button. Pt currently c/o L leg weakness and states her speech feels slurred. Pt denies any chest pain, dyspnea, fevers, n/v/d, abdominal pain, dysuria, headache, cough, congestion, sore throat, neck pain, back pain, dizziness, syncope, or other complaint.     (23 Apr 2022 13:13)      PMH: HTN (hypertension)    Rheumatic fever    TIA (transient ischemic attack)    CAD S/P percutaneous coronary angioplasty    Polio    Cerebrovascular accident (CVA)       PSH: Status post tonsillectomy    H/O left nephrectomy    Renal cancer      FAMILY HISTORY:    SOCIAL HISTORY:  No history of tobacco or alcohol use     Allergies    Biaxin (Muscle Pain; Joint Pain)  codeine (Faint)  Metoprolol Succinate ER (Unknown)  monoctanoin (Unknown)    Intolerances    Isosorbide Mononitrate Extended Release (Faint)      Height (cm): 157.5 (04-24 @ 21:43)  Weight (kg): 66.4 (04-24 @ 21:43)  BMI (kg/m2): 26.8 (04-24 @ 21:43)    Vital Signs Last 24 Hrs  T(C): 36.5 (25 Apr 2022 20:27), Max: 36.7 (24 Apr 2022 21:43)  T(F): 97.7 (25 Apr 2022 20:27), Max: 98 (24 Apr 2022 21:43)  HR: 94 (25 Apr 2022 20:27) (75 - 94)  BP: 114/68 (25 Apr 2022 20:27) (110/72 - 143/70)  BP(mean): 94 (24 Apr 2022 21:43) (94 - 94)  RR: 19 (25 Apr 2022 20:27) (18 - 19)  SpO2: 97% (25 Apr 2022 20:27) (96% - 97%)  MEDICATIONS    aspirin enteric coated 81 milliGRAM(s) Oral daily  atorvastatin 20 milliGRAM(s) Oral at bedtime  diltiazem    Tablet 30 milliGRAM(s) Oral every 8 hours  enoxaparin Injectable 40 milliGRAM(s) SubCutaneous every 24 hours  escitalopram 5 milliGRAM(s) Oral daily  glucagon  Injectable 1 milliGRAM(s) IntraMuscular once        LABS:  CBC Full  -  ( 25 Apr 2022 05:54 )  WBC Count : 5.70 K/uL  RBC Count : 3.95 M/uL  Hemoglobin : 11.8 g/dL  Hematocrit : 35.4 %  Platelet Count - Automated : 164 K/uL  Mean Cell Volume : 89.6 fl  Mean Cell Hemoglobin : 29.9 pg  Mean Cell Hemoglobin Concentration : 33.3 gm/dL  Auto Neutrophil # : x  Auto Lymphocyte # : x  Auto Monocyte # : x  Auto Eosinophil # : x  Auto Basophil # : x  Auto Neutrophil % : x  Auto Lymphocyte % : x  Auto Monocyte % : x  Auto Eosinophil % : x  Auto Basophil % : x      04-25    140  |  105  |  18.6  ----------------------------<  94  4.3   |  22.0  |  1.03    Ca    8.3<L>      25 Apr 2022 05:54  Phos  3.2     04-25  Mg     2.0     04-25        Hemoglobin A1C:       On Neurological Examination:    Mental Status - Patient is  awake, alert and oriented X3.  Speech is fluent. Patient can name, repeat and follow commands correctly  There is no dysarthria.    Cranial Nerves - PERRL, EOMI,  Visual fields are full to finger counting, no gross facial asymmetry, tongue/uvula midline    Motor Exam -   Right upper ---5/5 No drift  Left upper ---5/5 No drift  Right lower ---5/5 No drift  Left lower  ---5/5 No drift     nml bulk/tone    Sensory    Intact to light touch and pinprick bilaterally    Coord: FTN intact bilaterally     Gait -  Not assessed.     Reflexes:                       NIH SS:   Date:  04-25-22  Time: 0935  1a) Level of consciousness (0-3):   1b) Questions (0-2):   1c) Commands (0-2):   2  ) Gaze (0-2):   3  ) Visual field (0-3):   4  ) Facial palsy (0-3):   Motor  5a) Left arm (0-4):   5b) Right arm (0-4):   6a) Left leg (0-4):   6b) Right leg (0-4):   7  ) Ataxia (0-2):   Sensory  8  ) Sensory (0-2):   Speech  9  ) Language (0-3):   10) Dysarthria (0-2):   Extinction  11) Extinction/inattention (0-2):     Total score: = 0    Patient was last seen well at 1120 on 4-23-22  Prestroke Modified Alva: =0       RADIOLOGY ( All neurological imaging studies were independently reviewed and interpreted by me)  [facility]     < from: CT Brain Stroke Protocol (04.23.22 @ 12:18) >  IMPRESSION: No acute intracranial hemorrhage, mass effect, or shiftof   the midline structures.    Dr. Antonino Gamboa discussed findings with Dr. Ash on 4/23/2022 at 12:32 PM  ANTONINO GAMBOA MD; Attending Radiologist       CT Head No Cont (04.24.22 @ 13:27) >    IMPRESSION: Age-appropriate involutional and ischemic gliotic changes. No   hemorrhage. No change from 4/23/2022.    JAKY BLACK MD; Attending Radiologist      CTA  CTP   CT Angio Head w/ IV Cont (04.23.22 @ 12:24) >    IMPRESSION:    CT PERFUSION: No core infarction or ischemic penumbra by CT perfusion   technique.    CTA neck and head: No large vessel occlusion or major stenosis.  ANTONINO GAMBOA MD; Attending Radiologist      MRI:     MR Head No Cont (04.25.22 @ 14:50) >  IMPRESSION:  NO EVIDENCE OF A MASS OR CURRENT EVIDENCE OF ACUTE ISCHEMIA. ATROPHYWITH   CHRONIC WHITE MATTER ISCHEMIC CHANGES. NO SIGNIFICANT INTERVAL CHANGE   FROM PRIOR MRI OF 9/9/2021    KENYATTA CELESTIN MD; Attending Radiologist  TTE     TTE Echo Complete w/ Contrast w/ Doppler (04.24.22 @ 10:04) >    Summary:   1. Left ventricular ejection fraction, by visual estimation, is 60 to   65%.   2. Technically fair study.   3. Spectral Doppler shows impaired relaxation pattern of left   ventricular myocardial filling (Grade I diastolic dysfunction).   4. Moderate mitral valve regurgitation.   5. Moderate tricuspid regurgitation.   6. Moderate aortic regurgitation.   7. Sclerotic aortic valve with normal opening.   8. Endocardial visualization was enhanced with intravenous echo contrast.    MD Tom Electronically signed on 4/24/2022 at 5:59:04 PM

## 2022-04-26 ENCOUNTER — TRANSCRIPTION ENCOUNTER (OUTPATIENT)
Age: 82
End: 2022-04-26

## 2022-04-26 VITALS
HEART RATE: 80 BPM | DIASTOLIC BLOOD PRESSURE: 70 MMHG | OXYGEN SATURATION: 96 % | RESPIRATION RATE: 18 BRPM | SYSTOLIC BLOOD PRESSURE: 104 MMHG | TEMPERATURE: 98 F

## 2022-04-26 PROCEDURE — U0003: CPT

## 2022-04-26 PROCEDURE — 70450 CT HEAD/BRAIN W/O DYE: CPT | Mod: MA

## 2022-04-26 PROCEDURE — 84443 ASSAY THYROID STIM HORMONE: CPT

## 2022-04-26 PROCEDURE — 0042T: CPT

## 2022-04-26 PROCEDURE — 85027 COMPLETE CBC AUTOMATED: CPT

## 2022-04-26 PROCEDURE — U0005: CPT

## 2022-04-26 PROCEDURE — 80061 LIPID PANEL: CPT

## 2022-04-26 PROCEDURE — 93005 ELECTROCARDIOGRAM TRACING: CPT

## 2022-04-26 PROCEDURE — C8929: CPT

## 2022-04-26 PROCEDURE — 80048 BASIC METABOLIC PNL TOTAL CA: CPT

## 2022-04-26 PROCEDURE — 36415 COLL VENOUS BLD VENIPUNCTURE: CPT

## 2022-04-26 PROCEDURE — 70551 MRI BRAIN STEM W/O DYE: CPT

## 2022-04-26 PROCEDURE — 84484 ASSAY OF TROPONIN QUANT: CPT

## 2022-04-26 PROCEDURE — 70496 CT ANGIOGRAPHY HEAD: CPT | Mod: MA

## 2022-04-26 PROCEDURE — 97167 OT EVAL HIGH COMPLEX 60 MIN: CPT

## 2022-04-26 PROCEDURE — 99232 SBSQ HOSP IP/OBS MODERATE 35: CPT

## 2022-04-26 PROCEDURE — 84100 ASSAY OF PHOSPHORUS: CPT

## 2022-04-26 PROCEDURE — 85610 PROTHROMBIN TIME: CPT

## 2022-04-26 PROCEDURE — 85730 THROMBOPLASTIN TIME PARTIAL: CPT

## 2022-04-26 PROCEDURE — 99239 HOSP IP/OBS DSCHRG MGMT >30: CPT

## 2022-04-26 PROCEDURE — 37195 THROMBOLYTIC THERAPY STROKE: CPT

## 2022-04-26 PROCEDURE — 83036 HEMOGLOBIN GLYCOSYLATED A1C: CPT

## 2022-04-26 PROCEDURE — 0225U NFCT DS DNA&RNA 21 SARSCOV2: CPT

## 2022-04-26 PROCEDURE — 85025 COMPLETE CBC W/AUTO DIFF WBC: CPT

## 2022-04-26 PROCEDURE — 70498 CT ANGIOGRAPHY NECK: CPT | Mod: MA

## 2022-04-26 PROCEDURE — 99291 CRITICAL CARE FIRST HOUR: CPT | Mod: 25

## 2022-04-26 PROCEDURE — 80053 COMPREHEN METABOLIC PANEL: CPT

## 2022-04-26 PROCEDURE — 83735 ASSAY OF MAGNESIUM: CPT

## 2022-04-26 PROCEDURE — 82962 GLUCOSE BLOOD TEST: CPT

## 2022-04-26 RX ORDER — ATORVASTATIN CALCIUM 80 MG/1
1 TABLET, FILM COATED ORAL
Qty: 30 | Refills: 0
Start: 2022-04-26 | End: 2022-05-25

## 2022-04-26 RX ORDER — FONDAPARINUX SODIUM 2.5 MG/.5ML
1 INJECTION, SOLUTION SUBCUTANEOUS
Qty: 30 | Refills: 0
Start: 2022-04-26 | End: 2022-05-25

## 2022-04-26 RX ORDER — ATORVASTATIN CALCIUM 80 MG/1
1 TABLET, FILM COATED ORAL
Qty: 0 | Refills: 0 | DISCHARGE

## 2022-04-26 RX ADMIN — ESCITALOPRAM OXALATE 5 MILLIGRAM(S): 10 TABLET, FILM COATED ORAL at 13:14

## 2022-04-26 RX ADMIN — Medication 30 MILLIGRAM(S): at 13:20

## 2022-04-26 RX ADMIN — Medication 30 MILLIGRAM(S): at 05:36

## 2022-04-26 RX ADMIN — Medication 81 MILLIGRAM(S): at 13:15

## 2022-04-26 NOTE — PROGRESS NOTE ADULT - SUBJECTIVE AND OBJECTIVE BOX
INTERVAL HPI/OVERNIGHT EVENTS:    CC:    Vital Signs Last 24 Hrs  T(C): 36.5 (25 Apr 2022 20:27), Max: 36.6 (25 Apr 2022 10:22)  T(F): 97.7 (25 Apr 2022 20:27), Max: 97.8 (25 Apr 2022 10:22)  HR: 94 (25 Apr 2022 20:27) (76 - 94)  BP: 114/68 (25 Apr 2022 20:27) (114/68 - 143/70)  BP(mean): --  RR: 19 (25 Apr 2022 20:27) (18 - 19)  SpO2: 97% (25 Apr 2022 20:27) (96% - 97%)    PHYSICAL EXAM:    GENERAL:   CHEST/LUNG:   HEART:   ABDOMEN:   EXTREMITIES:      MEDICATIONS  (STANDING):  aspirin enteric coated 81 milliGRAM(s) Oral daily  atorvastatin 20 milliGRAM(s) Oral at bedtime  diltiazem    Tablet 30 milliGRAM(s) Oral every 8 hours  enoxaparin Injectable 40 milliGRAM(s) SubCutaneous every 24 hours  escitalopram 5 milliGRAM(s) Oral daily  glucagon  Injectable 1 milliGRAM(s) IntraMuscular once    MEDICATIONS  (PRN):      Allergies    Biaxin (Muscle Pain; Joint Pain)  codeine (Faint)  Metoprolol Succinate ER (Unknown)  monoctanoin (Unknown)    Intolerances    Isosorbide Mononitrate Extended Release (Faint)        LABS:                          11.8   5.70  )-----------( 164      ( 25 Apr 2022 05:54 )             35.4     04-25    140  |  105  |  18.6  ----------------------------<  94  4.3   |  22.0  |  1.03    Ca    8.3<L>      25 Apr 2022 05:54  Phos  3.2     04-25  Mg     2.0     04-25            RADIOLOGY & ADDITIONAL TESTS:   INTERVAL HPI/OVERNIGHT EVENTS:    CC: acute CVA, hyperlipidemia, hypertension, CAD s/p PCI    Chart and course reviewed.  States she feels better and close to her baseline  mild leg weakness     Vital Signs Last 24 Hrs  T(C): 36.5 (25 Apr 2022 20:27), Max: 36.6 (25 Apr 2022 10:22)  T(F): 97.7 (25 Apr 2022 20:27), Max: 97.8 (25 Apr 2022 10:22)  HR: 94 (25 Apr 2022 20:27) (76 - 94)  BP: 114/68 (25 Apr 2022 20:27) (114/68 - 143/70)  BP(mean): --  RR: 19 (25 Apr 2022 20:27) (18 - 19)  SpO2: 97% (25 Apr 2022 20:27) (96% - 97%)    PHYSICAL EXAM:    GENERAL: alert, not in distress  CHEST/LUNG: b/l air entry  HEART: reg  ABDOMEN: soft, bs+  EXTREMITIES:  no edema, tenderness  neuro: ao x 3, motor 5/5 all extremities except 4/5 left LE.    MEDICATIONS  (STANDING):  aspirin enteric coated 81 milliGRAM(s) Oral daily  atorvastatin 20 milliGRAM(s) Oral at bedtime  diltiazem    Tablet 30 milliGRAM(s) Oral every 8 hours  enoxaparin Injectable 40 milliGRAM(s) SubCutaneous every 24 hours  escitalopram 5 milliGRAM(s) Oral daily  glucagon  Injectable 1 milliGRAM(s) IntraMuscular once    MEDICATIONS  (PRN):      Allergies    Biaxin (Muscle Pain; Joint Pain)  codeine (Faint)  Metoprolol Succinate ER (Unknown)  monoctanoin (Unknown)    Intolerances    Isosorbide Mononitrate Extended Release (Faint)        LABS:                          11.8   5.70  )-----------( 164      ( 25 Apr 2022 05:54 )             35.4     04-25    140  |  105  |  18.6  ----------------------------<  94  4.3   |  22.0  |  1.03    Ca    8.3<L>      25 Apr 2022 05:54  Phos  3.2     04-25  Mg     2.0     04-25            RADIOLOGY & ADDITIONAL TESTS:

## 2022-04-26 NOTE — DISCHARGE NOTE PROVIDER - PROVIDER TOKENS
PROVIDER:[TOKEN:[[id number]],FOLLOWUP:[1 week]],FREE:[LAST:[PCP],PHONE:[(   )    -],FAX:[(   )    -],FOLLOWUP:[1 week]],PROVIDER:[TOKEN:[[id number]],FOLLOWUP:[1 week]]

## 2022-04-26 NOTE — PROGRESS NOTE ADULT - REASON FOR ADMISSION
Stroke / TIA presentation s/p Alteplase

## 2022-04-26 NOTE — PROGRESS NOTE ADULT - SUBJECTIVE AND OBJECTIVE BOX
[facility], [facility]                              [address] [address]                                                 PHONE: [phone]                                                 FAX: [phone]  -----------------------------------------------------------------------------------------------  Pt seen and examined. FU for CVA    Overnight events/Complaints: Pt reports mild L sided weakness. No obvious speech/visual deficit as per pt.     Vital Signs Last 24 Hrs  T(C): 36.1 (26 Apr 2022 08:54), Max: 36.5 (25 Apr 2022 20:27)  T(F): 97 (26 Apr 2022 08:54), Max: 97.7 (25 Apr 2022 20:27)  HR: 78 (26 Apr 2022 08:54) (78 - 94)  BP: 111/83 (26 Apr 2022 08:54) (111/83 - 143/70)  BP(mean): --  RR: 18 (26 Apr 2022 08:54) (18 - 19)  SpO2: 96% (26 Apr 2022 08:54) (96% - 97%)  I&O's Summary    PHYSICAL EXAM:  Constitutional: Appears well; alert and co-operative  HEENT:     Head: Normocephalic and atraumatic  Neck: supple. No JVD  Cardiovascular: regular S1 S2  Respiratory: Lungs clear to auscultation; no crepitations, no wheeze  Gastrointestinal:  Soft, Non-tender, + BS	  Musculoskeletal: Normal range of motion. No edema  Skin: Warm and dry. No cyanosis . No diaphoresis.  Neurologic: Alert oriented to time place and person. Mild L sided weakness and no tremor.        LABS:                        11.8   5.70  )-----------( 164      ( 25 Apr 2022 05:54 )             35.4     04-25    140  |  105  |  18.6  ----------------------------<  94  4.3   |  22.0  |  1.03    Ca    8.3<L>      25 Apr 2022 05:54  Phos  3.2     04-25  Mg     2.0     04-25    RADIOLOGY & ADDITIONAL STUDIES: (reviewed)  CT was independently visualized/reviewed  and demonstrated: no hemorrhage    CARDIOLOGY TESTING:(reviewed)     12 lead EKG independently visualized/reviewed  and demonstrated NSR    MEGHANN independently visualized/reviewed and demonstrated :   Summary:   1. Left ventricular ejection fraction, by visual estimation, is 60 to 65%. Increased LV wall thickness.   2. Small right ventricle. Small right atrium.   3. There is mild prolapse of P2 segment of the posterior mitral valve without obvious flail segment. Moderate mitral valve regurgitation.   4. Mild tricuspid regurgitation.   5. Mild aortic regurgitation.   6. Color flow doppler and intravenous injection of agitated saline demonstrates the presence of an intact intra atrial septum.   7. No obvious cardioembolism source noted.    MD Chris Electronically signed on 3/29/2021 at 2:51:31 PM      CARDIAC CATH:  VENTRICLES: LVEF 55%  CORONARY VESSELS: The coronary circulation is right dominant.  LM:   --  LM: Normal.  LAD:   --  Proximal LAD: There was a 20 % stenosis.  --  Mid LAD: There was a 0 % stenosis at the site of a prior stent.  CX:   --  Circumflex: Angiography showed minor luminal irregularities with  no flow limiting lesions.  RCA:   --  RCA: Angiography showed minor luminal irregularities with no  flow limiting lesions.  --  Ostial RCA: There was a 20 % stenosis.  COMPLICATIONS: There were no complications. No complications occurred  during the cathlab visit.  DIAGNOSTIC IMPRESSIONS: Prior stent in LAD is patent with non obstructive  CAD  DIAGNOSTIC RECOMMENDATIONS: The patient should continue with the present  medications.  INTERVENTIONAL IMPRESSIONS: Prior stent in LAD is patent with non  obstructive CAD    MEDICATIONS:(reviewed)  MEDICATIONS  (STANDING):  aspirin enteric coated 81 milliGRAM(s) Oral daily  atorvastatin 20 milliGRAM(s) Oral at bedtime  diltiazem    Tablet 30 milliGRAM(s) Oral every 8 hours  enoxaparin Injectable 40 milliGRAM(s) SubCutaneous every 24 hours  escitalopram 5 milliGRAM(s) Oral daily  glucagon  Injectable 1 milliGRAM(s) IntraMuscular once    ASSESSMENT AND PLAN:    81y Female with past medical history significant for PMHx HTN, HLD, CAD sp PCI to LAD, TIA, on ASA and Plavix nonresponder, sp prior MEGHANN and ILR currently in place pw acute left sided weakness given TPA.     1) ILR with brief tachy but no obvious AF  2) Will start Xarelto 15 mg daily with ASA 81 mg daily given recurrent CVA needing tPA. Pt did not tolerate Brilinta due to dizziness as per pt. Plavix non responder and cannot receive Effient due to h/o CVA.   3) cw statin     No further inpt cardiac work-up needed. Pls recall if any qns/concerns. Will arrange outpt FU post discharge.    Plan d/w Dr. Latif

## 2022-04-26 NOTE — DISCHARGE NOTE PROVIDER - ATTENDING DISCHARGE PHYSICAL EXAMINATION:
alert, not in distress  lungs: b/l air entry  cvs: regular  abdo: soft, bs+  ext: no edema, tenderness  motor 5/5 all ext except left LE 4/5

## 2022-04-26 NOTE — DISCHARGE NOTE PROVIDER - CARE PROVIDER_API CALL
Anoop Sanchez (MD; MPH)  Cardiology; Internal Medicine  [address] [place] [address]  Phone: [phone]  Fax: [phone]  Follow Up Time: 1 week    PCP,   Phone: (   )    -  Fax: (   )    -  Follow Up Time: 1 week    Sherlyn Avalos (NP; RN)  NP in Adult Health  [address] [place] [address]  Phone: [phone]  Fax: [phone]  Follow Up Time: 1 week

## 2022-04-26 NOTE — DISCHARGE NOTE PROVIDER - NSDCCPCAREPLAN_GEN_ALL_CORE_FT
PRINCIPAL DISCHARGE DIAGNOSIS  Diagnosis: Transient ischemic attack (TIA)  Assessment and Plan of Treatment: - Start xarelto and continue aspirin and lipitor   - Follow up with Cardiology and Neurology in 1 week      SECONDARY DISCHARGE DIAGNOSES  Diagnosis: CAD (coronary artery disease)  Assessment and Plan of Treatment: - Continue aspirin, lipitor and cardizem  - Follow up with PCP and Cardiology in 1 week     PRINCIPAL DISCHARGE DIAGNOSIS  Diagnosis: Acute cerebrovascular accident (CVA)  Assessment and Plan of Treatment: s/p tPA  Continue aspirin, anticoagulation.  follow up with neurology and cardiology.      SECONDARY DISCHARGE DIAGNOSES  Diagnosis: CAD (coronary artery disease)  Assessment and Plan of Treatment: - Continue aspirin, lipitor and cardizem  - Follow up with PCP and Cardiology in 1 week

## 2022-04-26 NOTE — PROGRESS NOTE ADULT - ASSESSMENT
IMPRESSION:  -  S/P iv tPA for right hemispheric stroke syndrome.  Risk Factors. HTN and  ? Afib    ASSESSMENT/ PLAN:       - Neuro checks and vital signs Q 4 hours.  - SBP goal - keep normotensive.  - ASA 81 mg PO or 300 MS QD.  - Needs anticoagulation if Afib is confirmed   - Lipitor for LDL goal of < 70 .  - Telemetry monitoring.  - CT/CTA/CTP  -images and reports were reviewed.   - MRI Brain stroke protocol -images and reports were reviewed.   - TTE with bubble study.report noted as above.  - Cardiology consultation appreciated.  - PT OT SLP evaluation.  - SCD/ SQ Lovenox for DVT prophylaxis.

## 2022-04-26 NOTE — PROGRESS NOTE ADULT - SUBJECTIVE AND OBJECTIVE BOX
Neurology consult    BREANA HELM 81y Female     Patient is a 81y old  Female who presents with a chief complaint of Stroke / TIA presentation s/p Alteplase (25 Apr 2022 15:59)      HPI:   HPI: 80 y/o F pt with hx of htn, hld, afib not on ac, prior CVAs presenting today as code stroke with onset of L sided weakness with LKW 11:20 today. States that she felt the onset of sx and immedately pressed her "life alert" button. Pt currently c/o L leg weakness and states her speech feels slurred. Pt denies any chest pain, dyspnea, fevers, n/v/d, abdominal pain, dysuria, headache, cough, congestion, sore throat, neck pain, back pain, dizziness, syncope, or other complaint.     (23 Apr 2022 13:13)      PMH: HTN (hypertension)    Rheumatic fever    TIA (transient ischemic attack)    CAD S/P percutaneous coronary angioplasty    Polio    Cerebrovascular accident (CVA)       PSH: Status post tonsillectomy    H/O left nephrectomy    Renal cancer      FAMILY HISTORY:    SOCIAL HISTORY:  No history of tobacco or alcohol use     Allergies    Biaxin (Muscle Pain; Joint Pain)  codeine (Faint)  Metoprolol Succinate ER (Unknown)  monoctanoin (Unknown)    Intolerances    Isosorbide Mononitrate Extended Release (Faint)      Height (cm): 157.5 (04-24 @ 21:43)  Weight (kg): 66.4 (04-24 @ 21:43)  BMI (kg/m2): 26.8 (04-24 @ 21:43)            Vital Signs Last 24 Hrs  T(C): 36.7 (26 Apr 2022 15:44), Max: 36.7 (26 Apr 2022 15:44)  T(F): 98 (26 Apr 2022 15:44), Max: 98 (26 Apr 2022 15:44)  HR: 80 (26 Apr 2022 15:44) (78 - 80)  BP: 104/70 (26 Apr 2022 15:44) (104/70 - 111/83)  BP(mean): --  RR: 18 (26 Apr 2022 15:44) (16 - 18)  SpO2: 96% (26 Apr 2022 15:44) (96% - 99%)    MEDICATIONS    aspirin enteric coated 81 milliGRAM(s) Oral daily  atorvastatin 20 milliGRAM(s) Oral at bedtime  diltiazem    Tablet 30 milliGRAM(s) Oral every 8 hours  enoxaparin Injectable 40 milliGRAM(s) SubCutaneous every 24 hours  escitalopram 5 milliGRAM(s) Oral daily  glucagon  Injectable 1 milliGRAM(s) IntraMuscular once         LABS:  CBC Full  -  ( 25 Apr 2022 05:54 )  WBC Count : 5.70 K/uL  RBC Count : 3.95 M/uL  Hemoglobin : 11.8 g/dL  Hematocrit : 35.4 %  Platelet Count - Automated : 164 K/uL  Mean Cell Volume : 89.6 fl  Mean Cell Hemoglobin : 29.9 pg  Mean Cell Hemoglobin Concentration : 33.3 gm/dL  Auto Neutrophil # : x  Auto Lymphocyte # : x  Auto Monocyte # : x  Auto Eosinophil # : x  Auto Basophil # : x  Auto Neutrophil % : x  Auto Lymphocyte % : x  Auto Monocyte % : x  Auto Eosinophil % : x  Auto Basophil % : x      04-25    140  |  105  |  18.6  ----------------------------<  94  4.3   |  22.0  |  1.03    Ca    8.3<L>      25 Apr 2022 05:54  Phos  3.2     04-25  Mg     2.0     04-25      xxxxxxxxxxxxxxxxxxxx  Vital Signs Last 24 Hrs  T(C): 36.5 (25 Apr 2022 20:27), Max: 36.7 (24 Apr 2022 21:43)  T(F): 97.7 (25 Apr 2022 20:27), Max: 98 (24 Apr 2022 21:43)  HR: 94 (25 Apr 2022 20:27) (75 - 94)  BP: 114/68 (25 Apr 2022 20:27) (110/72 - 143/70)  BP(mean): 94 (24 Apr 2022 21:43) (94 - 94)  RR: 19 (25 Apr 2022 20:27) (18 - 19)  SpO2: 97% (25 Apr 2022 20:27) (96% - 97%)  MEDICATIONS    aspirin enteric coated 81 milliGRAM(s) Oral daily  atorvastatin 20 milliGRAM(s) Oral at bedtime  diltiazem    Tablet 30 milliGRAM(s) Oral every 8 hours  enoxaparin Injectable 40 milliGRAM(s) SubCutaneous every 24 hours  escitalopram 5 milliGRAM(s) Oral daily  glucagon  Injectable 1 milliGRAM(s) IntraMuscular once        LABS:  CBC Full  -  ( 25 Apr 2022 05:54 )  WBC Count : 5.70 K/uL  RBC Count : 3.95 M/uL  Hemoglobin : 11.8 g/dL  Hematocrit : 35.4 %  Platelet Count - Automated : 164 K/uL  Mean Cell Volume : 89.6 fl  Mean Cell Hemoglobin : 29.9 pg  Mean Cell Hemoglobin Concentration : 33.3 gm/dL  Auto Neutrophil # : x  Auto Lymphocyte # : x  Auto Monocyte # : x  Auto Eosinophil # : x  Auto Basophil # : x  Auto Neutrophil % : x  Auto Lymphocyte % : x  Auto Monocyte % : x  Auto Eosinophil % : x  Auto Basophil % : x      04-25    140  |  105  |  18.6  ----------------------------<  94  4.3   |  22.0  |  1.03    Ca    8.3<L>      25 Apr 2022 05:54  Phos  3.2     04-25  Mg     2.0     04-25      On Neurological Examination:    Mental Status - Patient is  awake, alert and oriented X3.  Speech is fluent. Patient can name, repeat and follow commands correctly  There is no dysarthria.    Cranial Nerves - PERRL, EOMI,  Visual fields are full to finger counting, no gross facial asymmetry, tongue/uvula midline    Motor Exam -   Right upper ---5/5 No drift  Left upper ---5/5 No drift  Right lower ---5/5 No drift  Left lower  ---5/5 No drift     nml bulk/tone    Sensory    Intact to light touch and pinprick bilaterally    Coord: FTN intact bilaterally     Gait -  Not assessed.   RADIOLOGY ( All neurological imaging studies were independently reviewed and interpreted by me)  [facility]     < from: CT Brain Stroke Protocol (04.23.22 @ 12:18) >  IMPRESSION: No acute intracranial hemorrhage, mass effect, or shiftof   the midline structures.    Dr. Antonino Gamboa discussed findings with Dr. Ash on 4/23/2022 at 12:32 PM  ANTONINO GAMBOA MD; Attending Radiologist       CT Head No Cont (04.24.22 @ 13:27) >    IMPRESSION: Age-appropriate involutional and ischemic gliotic changes. No   hemorrhage. No change from 4/23/2022.    JAKY BLACK MD; Attending Radiologist      CTA  CTP   CT Angio Head w/ IV Cont (04.23.22 @ 12:24) >    IMPRESSION:    CT PERFUSION: No core infarction or ischemic penumbra by CT perfusion   technique.    CTA neck and head: No large vessel occlusion or major stenosis.  ANTONINO GAMBOA MD; Attending Radiologist      MRI:     MR Head No Cont (04.25.22 @ 14:50) >  IMPRESSION:  NO EVIDENCE OF A MASS OR CURRENT EVIDENCE OF ACUTE ISCHEMIA. ATROPHYWITH   CHRONIC WHITE MATTER ISCHEMIC CHANGES. NO SIGNIFICANT INTERVAL CHANGE   FROM PRIOR MRI OF 9/9/2021    KENYATTA CELESTIN MD; Attending Radiologist  TTE     TTE Echo Complete w/ Contrast w/ Doppler (04.24.22 @ 10:04) >    Summary:   1. Left ventricular ejection fraction, by visual estimation, is 60 to   65%.   2. Technically fair study.   3. Spectral Doppler shows impaired relaxation pattern of left   ventricular myocardial filling (Grade I diastolic dysfunction).   4. Moderate mitral valve regurgitation.   5. Moderate tricuspid regurgitation.   6. Moderate aortic regurgitation.   7. Sclerotic aortic valve with normal opening.   8. Endocardial visualization was enhanced with intravenous echo contrast.    MD Tom Electronically signed on 4/24/2022 at 5:59:04 PM

## 2022-04-26 NOTE — DISCHARGE NOTE PROVIDER - NSDCMRMEDTOKEN_GEN_ALL_CORE_FT
aspirin 81 mg oral delayed release tablet: 1 tab(s) orally once a day  atorvastatin 80 mg oral tablet: 1 tab(s) orally once a day  dilTIAZem 30 mg oral tablet: 1 tab(s) orally 3 times a day  Lexapro 5 mg oral tablet: 5 milligram(s) orally once a day  Multiple Vitamins oral tablet: 1 tab(s) orally once a day  Nexlizet 180 mg-10 mg oral tablet: 1 tab(s) orally once a day  Occupational Therapy in the home 2-3x per week x 1 month : 1 application buccal once a day   Physical Therapy in the Home 2-3 x per week x 1 month:    aspirin 81 mg oral delayed release tablet: 1 tab(s) orally once a day  atorvastatin 80 mg oral tablet: 1 tab(s) orally once a day  dilTIAZem 30 mg oral tablet: 1 tab(s) orally 3 times a day  Lexapro 5 mg oral tablet: 5 milligram(s) orally once a day  Multiple Vitamins oral tablet: 1 tab(s) orally once a day  Nexlizet 180 mg-10 mg oral tablet: 1 tab(s) orally once a day

## 2022-04-26 NOTE — DISCHARGE NOTE PROVIDER - HOSPITAL COURSE
81 year old female with history of TIA / CVA, s/p ILR, CAD, HLD and HTN presented with left leg weakness and slurred speech. She was given tPA and was admitted to Neuro ICU where she was monitored closely. Downgraded to Hospitalist Service on 4/24/22.      1) TIA  - s/p tPA  - Speech improved, left leg weakness improving   - CT and MRI Brain with no acute stroke   - ILR interrogation pending   - Per Cardio: Pt did not tolerate Brilinta due to dizziness as per pt. Plavix non responder and cannot receive Effient due to h/o CVA.  - Will likely need anticoagulation   - Continue Aspirin and Lipitor   - Neuro Consult pending      2) CAD / HLD / HTN  - Continue Aspirin and Lipitor  - Resume Cardizem     DVT Prophylaxis -- Lovenox SQ    Dispo: Home with PT / OT in 24 hours. 81 year old female with history of TIA / CVA, s/p ILR, CAD, HLD and HTN presented with left leg weakness and slurred speech. Pt admitted to [facility] with concern for stroke. She was given tPA and was admitted to Neuro ICU where she was monitored closely. CT and MRI Brain with no acute stroke. Pt seen by neurology and cardiology and did not tolerate Brilinta due to dizziness as per pt, did not respond to plavix and cannot receive Effient due to h/o CVA. Downgraded to Hospitalist Service on 4/24/22. Pt underwent interrogation of ILR. Pt initiated on xarelto and ASA. Pt is now medically stable for discharge with appropriate outpatient follow up.     All electrolyte abnormalities were monitored carefully and repleted as necessary during this hospitalization. At the time of discharge patient was hemodynamically stable and amenable to all terms of discharge.

## 2022-05-12 ENCOUNTER — APPOINTMENT (OUTPATIENT)
Dept: NEUROLOGY | Facility: CLINIC | Age: 82
End: 2022-05-12

## 2022-05-31 NOTE — ED ADULT TRIAGE NOTE - NS ED TRIAGE AVPU SCALE
Alert-The patient is alert, awake and responds to voice. The patient is oriented to time, place, and person. The triage nurse is able to obtain subjective information.
fall precautions

## 2022-06-06 ENCOUNTER — INPATIENT (INPATIENT)
Facility: HOSPITAL | Age: 82
LOS: 1 days | Discharge: HOME | DRG: 101 | End: 2022-06-08
Attending: HOSPITALIST | Admitting: HOSPITALIST
Payer: MEDICARE

## 2022-06-06 VITALS — HEIGHT: 62 IN

## 2022-06-06 DIAGNOSIS — C64.9 MALIGNANT NEOPLASM OF UNSPECIFIED KIDNEY, EXCEPT RENAL PELVIS: Chronic | ICD-10-CM

## 2022-06-06 DIAGNOSIS — Z90.5 ACQUIRED ABSENCE OF KIDNEY: Chronic | ICD-10-CM

## 2022-06-06 DIAGNOSIS — Z90.89 ACQUIRED ABSENCE OF OTHER ORGANS: Chronic | ICD-10-CM

## 2022-06-06 DIAGNOSIS — R29.898 OTHER SYMPTOMS AND SIGNS INVOLVING THE MUSCULOSKELETAL SYSTEM: ICD-10-CM

## 2022-06-06 LAB
ALBUMIN SERPL ELPH-MCNC: 4.2 G/DL — SIGNIFICANT CHANGE UP (ref 3.3–5.2)
ALP SERPL-CCNC: 54 U/L — SIGNIFICANT CHANGE UP (ref 40–120)
ALT FLD-CCNC: 15 U/L — SIGNIFICANT CHANGE UP
ANION GAP SERPL CALC-SCNC: 14 MMOL/L — SIGNIFICANT CHANGE UP (ref 5–17)
APTT BLD: 33.1 SEC — SIGNIFICANT CHANGE UP (ref 27.5–35.5)
AST SERPL-CCNC: 31 U/L — SIGNIFICANT CHANGE UP
BASOPHILS # BLD AUTO: 0.08 K/UL — SIGNIFICANT CHANGE UP (ref 0–0.2)
BASOPHILS NFR BLD AUTO: 1.1 % — SIGNIFICANT CHANGE UP (ref 0–2)
BILIRUB SERPL-MCNC: 0.6 MG/DL — SIGNIFICANT CHANGE UP (ref 0.4–2)
BUN SERPL-MCNC: 20.3 MG/DL — HIGH (ref 8–20)
CALCIUM SERPL-MCNC: 9 MG/DL — SIGNIFICANT CHANGE UP (ref 8.6–10.2)
CHLORIDE SERPL-SCNC: 95 MMOL/L — LOW (ref 98–107)
CO2 SERPL-SCNC: 23 MMOL/L — SIGNIFICANT CHANGE UP (ref 22–29)
CREAT SERPL-MCNC: 0.99 MG/DL — SIGNIFICANT CHANGE UP (ref 0.5–1.3)
EGFR: 57 ML/MIN/1.73M2 — LOW
EOSINOPHIL # BLD AUTO: 0.15 K/UL — SIGNIFICANT CHANGE UP (ref 0–0.5)
EOSINOPHIL NFR BLD AUTO: 2.1 % — SIGNIFICANT CHANGE UP (ref 0–6)
GLUCOSE SERPL-MCNC: 161 MG/DL — HIGH (ref 70–99)
HCT VFR BLD CALC: 37.8 % — SIGNIFICANT CHANGE UP (ref 34.5–45)
HGB BLD-MCNC: 13.1 G/DL — SIGNIFICANT CHANGE UP (ref 11.5–15.5)
IMM GRANULOCYTES NFR BLD AUTO: 0.1 % — SIGNIFICANT CHANGE UP (ref 0–1.5)
INR BLD: 1.3 RATIO — HIGH (ref 0.88–1.16)
LYMPHOCYTES # BLD AUTO: 1.93 K/UL — SIGNIFICANT CHANGE UP (ref 1–3.3)
LYMPHOCYTES # BLD AUTO: 27.1 % — SIGNIFICANT CHANGE UP (ref 13–44)
MCHC RBC-ENTMCNC: 30.3 PG — SIGNIFICANT CHANGE UP (ref 27–34)
MCHC RBC-ENTMCNC: 34.7 GM/DL — SIGNIFICANT CHANGE UP (ref 32–36)
MCV RBC AUTO: 87.5 FL — SIGNIFICANT CHANGE UP (ref 80–100)
MONOCYTES # BLD AUTO: 0.7 K/UL — SIGNIFICANT CHANGE UP (ref 0–0.9)
MONOCYTES NFR BLD AUTO: 9.8 % — SIGNIFICANT CHANGE UP (ref 2–14)
NEUTROPHILS # BLD AUTO: 4.25 K/UL — SIGNIFICANT CHANGE UP (ref 1.8–7.4)
NEUTROPHILS NFR BLD AUTO: 59.8 % — SIGNIFICANT CHANGE UP (ref 43–77)
PLATELET # BLD AUTO: 202 K/UL — SIGNIFICANT CHANGE UP (ref 150–400)
POTASSIUM SERPL-MCNC: 4.4 MMOL/L — SIGNIFICANT CHANGE UP (ref 3.5–5.3)
POTASSIUM SERPL-SCNC: 4.4 MMOL/L — SIGNIFICANT CHANGE UP (ref 3.5–5.3)
PROT SERPL-MCNC: 6.5 G/DL — LOW (ref 6.6–8.7)
PROTHROM AB SERPL-ACNC: 15.1 SEC — HIGH (ref 10.5–13.4)
RBC # BLD: 4.32 M/UL — SIGNIFICANT CHANGE UP (ref 3.8–5.2)
RBC # FLD: 11.9 % — SIGNIFICANT CHANGE UP (ref 10.3–14.5)
SODIUM SERPL-SCNC: 132 MMOL/L — LOW (ref 135–145)
TROPONIN T SERPL-MCNC: <0.01 NG/ML — SIGNIFICANT CHANGE UP (ref 0–0.06)
WBC # BLD: 7.12 K/UL — SIGNIFICANT CHANGE UP (ref 3.8–10.5)
WBC # FLD AUTO: 7.12 K/UL — SIGNIFICANT CHANGE UP (ref 3.8–10.5)

## 2022-06-06 PROCEDURE — 99223 1ST HOSP IP/OBS HIGH 75: CPT

## 2022-06-06 PROCEDURE — 71045 X-RAY EXAM CHEST 1 VIEW: CPT | Mod: 26

## 2022-06-06 PROCEDURE — 99291 CRITICAL CARE FIRST HOUR: CPT

## 2022-06-06 PROCEDURE — 70498 CT ANGIOGRAPHY NECK: CPT | Mod: 26,MA

## 2022-06-06 PROCEDURE — 0042T: CPT

## 2022-06-06 PROCEDURE — 95819 EEG AWAKE AND ASLEEP: CPT | Mod: 26

## 2022-06-06 PROCEDURE — 70496 CT ANGIOGRAPHY HEAD: CPT | Mod: 26,MA

## 2022-06-06 PROCEDURE — 93010 ELECTROCARDIOGRAM REPORT: CPT

## 2022-06-06 RX ORDER — ESCITALOPRAM OXALATE 10 MG/1
5 TABLET, FILM COATED ORAL DAILY
Refills: 0 | Status: DISCONTINUED | OUTPATIENT
Start: 2022-06-06 | End: 2022-06-08

## 2022-06-06 RX ORDER — ATORVASTATIN CALCIUM 80 MG/1
80 TABLET, FILM COATED ORAL AT BEDTIME
Refills: 0 | Status: DISCONTINUED | OUTPATIENT
Start: 2022-06-06 | End: 2022-06-08

## 2022-06-06 RX ORDER — DILTIAZEM HCL 120 MG
30 CAPSULE, EXT RELEASE 24 HR ORAL THREE TIMES A DAY
Refills: 0 | Status: DISCONTINUED | OUTPATIENT
Start: 2022-06-06 | End: 2022-06-08

## 2022-06-06 RX ORDER — ENOXAPARIN SODIUM 100 MG/ML
40 INJECTION SUBCUTANEOUS EVERY 24 HOURS
Refills: 0 | Status: DISCONTINUED | OUTPATIENT
Start: 2022-06-06 | End: 2022-06-06

## 2022-06-06 RX ORDER — RIVAROXABAN 15 MG-20MG
15 KIT ORAL
Refills: 0 | Status: DISCONTINUED | OUTPATIENT
Start: 2022-06-06 | End: 2022-06-08

## 2022-06-06 RX ORDER — ATORVASTATIN CALCIUM 80 MG/1
40 TABLET, FILM COATED ORAL AT BEDTIME
Refills: 0 | Status: DISCONTINUED | OUTPATIENT
Start: 2022-06-06 | End: 2022-06-06

## 2022-06-06 RX ORDER — ASPIRIN/CALCIUM CARB/MAGNESIUM 324 MG
81 TABLET ORAL DAILY
Refills: 0 | Status: DISCONTINUED | OUTPATIENT
Start: 2022-06-06 | End: 2022-06-08

## 2022-06-06 RX ORDER — LEVETIRACETAM 250 MG/1
1000 TABLET, FILM COATED ORAL ONCE
Refills: 0 | Status: DISCONTINUED | OUTPATIENT
Start: 2022-06-06 | End: 2022-06-06

## 2022-06-06 RX ADMIN — ENOXAPARIN SODIUM 40 MILLIGRAM(S): 100 INJECTION SUBCUTANEOUS at 22:52

## 2022-06-06 RX ADMIN — ATORVASTATIN CALCIUM 40 MILLIGRAM(S): 80 TABLET, FILM COATED ORAL at 22:52

## 2022-06-06 RX ADMIN — Medication 81 MILLIGRAM(S): at 22:52

## 2022-06-06 NOTE — ED PROVIDER NOTE - PHYSICAL EXAMINATION
Constitutional: Awake, alert, anxious, appears tremulous  Eyes: no scleral icterus  HENT: normocephalic, atraumatic, moist oral mucosa  Neck: supple  CV: RRR, no murmur  Pulm: non-labored respirations, CTAB  Abdomen: soft, non-tender, non-distended  Extremities: no edema, no deformity  Skin: no rash, no jaundice  Neuro: AAOx3, +mild slurred speech, CNs II-XII otherwise intact, no facial asymmetry, 1/5 strength LLE; otherwise 5/5 strength and sensation in all extremities, no dysmetria

## 2022-06-06 NOTE — ED ADULT NURSE REASSESSMENT NOTE - NS ED NURSE REASSESS COMMENT FT1
report given to yolette miller at 17:45. pt moved to b2r. rr even and unlabored. rn made aware or transfer. pt educated on plan of care, pt able to successfully teach back plan of care to RN, RN will continue to reeducate pt during hospital stay.

## 2022-06-06 NOTE — H&P ADULT - NSHPPHYSICALEXAM_GEN_ALL_CORE
T(C): 36.9 (06-06-22 @ 17:43), Max: 36.9 (06-06-22 @ 17:43)  HR: 83 (06-06-22 @ 17:43) (83 - 83)  BP: 163/83 (06-06-22 @ 17:43) (163/83 - 163/83)  RR: 19 (06-06-22 @ 17:43) (19 - 19)  SpO2: 98% (06-06-22 @ 17:43) (98% - 98%)    GENERAL: patient appears well, no acute distress, appropriate, pleasant  EYES: sclera clear, no exudates  ENMT: oropharynx clear without erythema, no exudates, moist mucous membranes  NECK: supple, soft, no thyromegaly noted  LUNGS: good air entry bilaterally, clear to auscultation, symmetric breath sounds, no wheezing or rhonchi appreciated  HEART: soft S1/S2, regular rate and rhythm, no murmurs noted, no lower extremity edema  GASTROINTESTINAL: abdomen is soft, nontender, nondistended, normoactive bowel sounds, no palpable masses  INTEGUMENT: good skin turgor, warm skin, appears well perfused  MUSCULOSKELETAL: no clubbing or cyanosis, no obvious deformity  NEUROLOGIC: awake, alert, oriented x3,  muscle tone in LLE extremity 4/5, no obvious sensory deficits  PSYCHIATRIC: mood is good, affect is congruent, linear and logical thought process  HEME/LYMPH: no palpable supraclavicular nodules, no obvious ecchymosis or petechiae

## 2022-06-06 NOTE — CONSULT NOTE ADULT - ASSESSMENT
The patient is a 81y Female who is followed by neurology because of left leg weakness.    left leg weakness  third similar episode   doubt CVA  Possible seizure  possible functional  no alteplase due to her being on xarelto  Was not a candidate for thrombectomy as they had no LVO or ischemic penumbra detected on CTA and CTP head  will check EEG    will follow with you    Cem Abraham MD PhD   [id number]

## 2022-06-06 NOTE — ED ADULT NURSE REASSESSMENT NOTE - NS ED NURSE REASSESS COMMENT FT1
Right 18G IV in AC was noted to be causing pain and discomfort to pt also noted to be in arterial line.  area was hard during assessment and + for pain. RN held back from flushing and attempted to insert new IV in right hand. no discoloration noted to infiltration sight, but due to contrast being push through line during CT scan obtain at 1700 incident report was done. pt had + pulses bilaterally and states it was only painful when touched. Emergency Department charge and attending physician made aware.

## 2022-06-06 NOTE — EEG REPORT - NS EEG TEXT BOX
[facility]   COMPREHENSIVE EPILEPSY CENTER   REPORT OF ROUTINE VIDEO EEG     [facility]: [address], [place] [address], Ph#: [phone]  [facility]: [address], [place] [address], Ph#: [phone]  [facility]: [address] [place] [address], Ph#: [phone]    Patient Name: BREANA HELM  Age and : 81y (40)  MRN #: [id number]  Location: [place] [address]  Referring Physician: Norman Garsia    Study Date: 22    _____________________________________________________________  TECHNICAL INFORMATION    Placement and Labeling of Electrodes:  The EEG was performed utilizing 20 channels referential EEG connections (coronal over temporal over parasagittal montage) using all standard 10-20 electrode placements with EKG.  Recording was at a sampling rate of 256 samples per second per channel.  Time synchronized digital video recording was done simultaneously with EEG recording.  A low light infrared camera was used for low light recording.  Manjit and seizure detection algorithms were utilized.    _____________________________________________________________  HISTORY    Patient is a 81y old  Female who presents with a chief complaint of speech difficulty and left lower extremity weakness.    PERTINENT MEDICATION:  none    _____________________________________________________________  STUDY INTERPRETATION    Findings: The background was continuous and reactive. During wakefulness, the posterior dominant rhythm consisted of symmetric, well-modulated 9-10 Hz activity, with amplitude to 30 uV, that attenuated to eye opening.      Background Slowing:  No generalized background slowing was present.    Focal Slowing:   None were present.    Sleep Background:  Drowsiness and stage II sleep transients were not recorded.    Other Findings:  Occasional 30-120s runs of diffuse, max right posterior-temporal, sharply contoured theta activities, with minimal evolution in frequency. No clinical correlate.    Events:  Clinical events: None recorded.  Seizures: None recorded.     Activation Procedures:   Hyperventilation was not performed.    Photic stimulation was not performed.     Artifacts:  Intermittent myogenic and movement artifacts were noted.    ECG:  The heart rate on single channel ECG was predominantly between 70-90 BPM.    _____________________________________________________________  EEG SUMMARY/CLASSIFICATION    EEG in the awake state.  - Occasional 30-120s runs of diffuse, max right posterior-temporal, sharply contoured theta activities, with minimal evolution in frequency. No clinical correlate.    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 If clinically indicated, recommend prolonged recording for further evaluation.     _____________________________________________________________    Jon Clinton MD  Director, Epilepsy/EMU - [facility]

## 2022-06-06 NOTE — ED PROVIDER NOTE - ATTENDING CONTRIBUTION TO CARE
I, Manpreet Carpenter, personally saw the patient with the resident, and completed the key components of the history and physical exam. I then discussed the management plan with the resident.    Upon my evaluation, this patient had a high probability of imminent or life-threatening deterioration due to stroke , which required my direct attention, intervention, and personal management.    82 yo F hx of TIA / CVA, s/p ILR, CAD, HLD and HTN p/w left leg weakness started at 4pm associated with difficulty speaking. She had similar episode 1.5 months ago received TPA and no on xarlto. CT head unremarkable. not a candidate for TPA. patient admitted for stroke work up.     I have personally provided 40  minutes of critical care time exclusive of time spent on separately billable procedures.  Time includes review of laboratory data, radiology results, discussion with consultants, and monitoring for potential decompensation.  Interventions were performed as documented.

## 2022-06-06 NOTE — CONSULT NOTE ADULT - SUBJECTIVE AND OBJECTIVE BOX
[facility]                                     Neurology at [place]                                 Leigh Prather, & Anjum                                  [address]                                        [place], [place], [address]                                             [phone]    CC: left leg weakness  HPI: The patient is a 81y Female who presented with onset of speech difficulty and left leg weakness at 1600 today.  Had two other similar presentaions with no finding of stroke on MRI, during her last presentation in April 2022 she received alteplase.  She has A fib and takes xarelto.  Neurology is asked to evaluate.    PAST MEDICAL & SURGICAL HISTORY:  HTN (hypertension)      Rheumatic fever      TIA (transient ischemic attack)      CAD S/P percutaneous coronary angioplasty      [place]      Cerebrovascular accident (CVA)  November 2020, June 2020, May 2020      Status post tonsillectomy      H/O left nephrectomy      Renal cancer          MEDICATIONS  (STANDING):    MEDICATIONS  (PRN):      Allergies    Biaxin (Muscle Pain; Joint Pain)  codeine (Faint)  Metoprolol Succinate ER (Unknown)  monoctanoin (Unknown)    Intolerances    Isosorbide Mononitrate Extended Release (Faint)      SOCIAL HISTORY:  no tob,   no alcohol   no drugs    FAMILY HISTORY:  no stroke in either parent      ROS: 14 point ROS negative other than what is present in HPI or below    Vital Signs Last 24 Hrs  T(C): --  T(F): --  HR: --  BP: --  BP(mean): --  RR: --  SpO2: --      General: NAD    Detailed Neurologic Exam:    Mental status: The patient is awake and alert and has normal attention span.  The patient is fully oriented in 3 spheres. The patient is oriented to current events. The patient is able to name objects, follow commands, repeat sentences.  She is stuttering, not dysarthric    Cranial nerves: Pupils equal and react symmetrically to light. There is no visual field deficit to confrontation. Extraocular motion is full with no nystagmus. There is no ptosis. Facial sensation is intact. Facial musculature is symmetric. Palate elevates symmetrically.  Tongue is midline.    Motor: There is normal bulk and tone.  There is b/l tremor.  Strength is 5/5 in the right arm and leg.   Strength is 5/5 in the left arm and 3-4/5 leg. (left leg exam is inconsistent    Sensation: Intact to light touch and pin in 4 extremities    Reflexes: 1+ throughout and plantar responses are flexor.    Cerebellar: There is no dysmetria on finger to nose testing.    Gait : deferred    NIH SS:  DATE: 6/6/22  TIME: 1740  1A: Level of consciousness (0-3): 0  1B: Questions (0-2): 0  1C: Commands (0-2): 0  2: Gaze (0-2): 0  3: Visual fields (0-3): 0  4: Facial palsy (0-3): 0  MOTOR:  5A: Left arm motor drift (0-4): 0  5B: Right arm motor drift (0-4): 0  6A: Left leg motor drift (0-4): 2  6B: Right leg motor drift (0-4): 0  7: Limb ataxia (0-2): 0  SENSORY:  8: Sensation (0-2): 0  SPEECH:  9: Language (0-3): 0  10: Dysarthria (0-2): 0  EXTINCTION:  11: Extinction/inattention (0-2): 0    TOTAL SCORE: 2    LABS:   pending    RADIOLOGY & ADDITIONAL STUDIES (independently reviewed unless otherwise noted):  CT head: no acute CVA, mass or blood (+) atrophy and SVID  CTA head: no LVO or sig stenosis in COW  CT Perfusion head - CBF<30% volume 0ml, Tmax>6s volume =0ml

## 2022-06-06 NOTE — H&P ADULT - ASSESSMENT
80yo F with PMHx of TIA / CVA, s/p ILR, CAD s/p stent, HLD, HTN presented with left leg weakness and slurred speech.    LLE weakness   -recurrent symptoms, prior CT, MRI without CVA  -NIH score of 8 on presentation, not tpa candidate   -CTH, CT perifusion, CTA head/neck without acute finding  -pt still with LLE weakness which has improved as per pt  -admit to telemetry, neurochecks q4hrs   -Neurology eval noted, EEG ordered   -cont Xarelto, ASA, statin   -EEG report noted, continuos EEG ordered   -Dr. Clinton consulted     pAFib   -EKG NSR  -resumed home Cardizem  -cont Xarelto     CAD s/p stent   -cont statin, ASA     HTN   cont Cardizem    Depression   -cont Lexapro      DVT ppx  on Xarelto

## 2022-06-06 NOTE — ED ADULT NURSE REASSESSMENT NOTE - NS ED NURSE REASSESS COMMENT FT1
Assumed care of pt at 19:15 as stated in report from RN. Charting as noted. Patient A&O x3, no pain/discomfort, pt states she has been having SOB since sunday while she was at [employer] and it has become progressivley worse with time. pt states while getting physical therapy at home due to weakness to left leg she began to have worsen SOB and numbness to left leg and slurred speech. pt  Updated on the plan of care. Call bell within reach, bed locked in lowest position. IV site flushed w/ NS. No redness, swelling or pain noted to site. No signs of acute distress noted, safety maintained. Assumed care of pt at 19:15 as stated in report from RN. Charting as noted. Patient A&O x3, no pain/discomfort, pt states she has been having SOB since sunday while she was at [employer] and it has become progressively worse with time. pt states while getting physical therapy at home due to weakness to left leg she began to have worsen SOB and numbness to left leg and slurred speech. pt  Updated on the plan of care. Call bell within reach, bed locked in lowest position. IV sight noted to be hard and painful to pt dc'd IV and noted to be place in arterial line notified pt, follow policy and procedure for infiltrated iv sight. Emergency Department charge nurse notified, provider jarvis ashley also notified . No signs of acute distress noted, safety maintained.

## 2022-06-06 NOTE — ED PROVIDER NOTE - PROGRESS NOTE DETAILS
Jefferson: Spoke with Dr. Abraham, who advises admission with plan for EEG. Jefferson: Pt with improved speech. Notes that L leg weakness is still present but improved. Will admit.

## 2022-06-06 NOTE — ED PROVIDER NOTE - CLINICAL SUMMARY MEDICAL DECISION MAKING FREE TEXT BOX
81y F w/ hx stroke on Xarelto, HTN, HLD, presenting for left leg weakness. Code Stroke activated on arrival. Not a candidate for tPA given AC use. Pt with recent admission for similar symptoms; had negative MRI at that time. Spoke with Dr. Abraham of neurology; will obtain CTH/CTA/CTP, EKG, CXR, labs.

## 2022-06-06 NOTE — H&P ADULT - HISTORY OF PRESENT ILLNESS
80yo F with PMHx of TIA / CVA, s/p ILR, CAD s/p stent, HLD, HTN presented with left leg weakness and slurred speech. Pt was admitted to [facility] on 4/23/22 with similar symptoms received tPA at the time, CT and MRI Brain without acute stroke. As per chart pt not tolerate Brilinta, did not respond to Plavix and not candidate for Effient due to h/o CVA, started on Xarelto for suspected afib given recurrent stoke symptoms. Pt states she was talking on the phone with her sister when she suddenly had difficulty with her speech, and she been feeling very weak in her left leg and having difficulty breathing during her PT session today. Pt reports to intermittent sob for the past 3 weeks but worsen yesterday and today. She states that she was unable to participate in [employer] yesterday due to sob. Denies cp, palpitations, syncope, dizziness, n/v, fever or cough. In the ED CTH, CTA head/neck and CT perfusion without acute finding. Evaluated by neurology in the ED, EEG was ordered.

## 2022-06-06 NOTE — ED PROVIDER NOTE - OBJECTIVE STATEMENT
81y F w/ hx TIA / CVA, s/p ILR, CAD, HLD and HTN, presenting for leg weakness. Pt reports sudden onset at 1600 today of L leg weakness and difficulty speaking. Has had 2 prior similar episodes, including 1.5 months ago when she was seen in this hospital and given tPA. Was started on Xarelto during that admission. Code Stroke activated on arrival.

## 2022-06-06 NOTE — ED PROVIDER NOTE - NS ED ROS FT
Constitutional: no fever, no chills  Eyes: no vision changes  ENT: no nasal congestion, no sore throat  CV: no chest pain  Resp: no cough, no shortness of breath  GI: no abdominal pain, no vomiting, no diarrhea  : no dysuria  MSK: no joint pain  Skin: no rash  Neuro: no headache, +focal weakness, no paresthesias

## 2022-06-07 LAB
A1C WITH ESTIMATED AVERAGE GLUCOSE RESULT: 4.9 % — SIGNIFICANT CHANGE UP (ref 4–5.6)
ANION GAP SERPL CALC-SCNC: 13 MMOL/L — SIGNIFICANT CHANGE UP (ref 5–17)
BUN SERPL-MCNC: 17.3 MG/DL — SIGNIFICANT CHANGE UP (ref 8–20)
CALCIUM SERPL-MCNC: 8.5 MG/DL — LOW (ref 8.6–10.2)
CHLORIDE SERPL-SCNC: 100 MMOL/L — SIGNIFICANT CHANGE UP (ref 98–107)
CHOLEST SERPL-MCNC: 106 MG/DL — SIGNIFICANT CHANGE UP
CO2 SERPL-SCNC: 24 MMOL/L — SIGNIFICANT CHANGE UP (ref 22–29)
CREAT SERPL-MCNC: 0.96 MG/DL — SIGNIFICANT CHANGE UP (ref 0.5–1.3)
EGFR: 59 ML/MIN/1.73M2 — LOW
ESTIMATED AVERAGE GLUCOSE: 94 MG/DL — SIGNIFICANT CHANGE UP (ref 68–114)
GLUCOSE SERPL-MCNC: 90 MG/DL — SIGNIFICANT CHANGE UP (ref 70–99)
HDLC SERPL-MCNC: 63 MG/DL — SIGNIFICANT CHANGE UP
LIPID PNL WITH DIRECT LDL SERPL: 35 MG/DL — SIGNIFICANT CHANGE UP
NON HDL CHOLESTEROL: 43 MG/DL — SIGNIFICANT CHANGE UP
POTASSIUM SERPL-MCNC: 4.3 MMOL/L — SIGNIFICANT CHANGE UP (ref 3.5–5.3)
POTASSIUM SERPL-SCNC: 4.3 MMOL/L — SIGNIFICANT CHANGE UP (ref 3.5–5.3)
SARS-COV-2 RNA SPEC QL NAA+PROBE: SIGNIFICANT CHANGE UP
SODIUM SERPL-SCNC: 137 MMOL/L — SIGNIFICANT CHANGE UP (ref 135–145)
TRIGL SERPL-MCNC: 41 MG/DL — SIGNIFICANT CHANGE UP

## 2022-06-07 PROCEDURE — 99232 SBSQ HOSP IP/OBS MODERATE 35: CPT

## 2022-06-07 PROCEDURE — 95720 EEG PHY/QHP EA INCR W/VEEG: CPT

## 2022-06-07 PROCEDURE — 99233 SBSQ HOSP IP/OBS HIGH 50: CPT

## 2022-06-07 PROCEDURE — 99222 1ST HOSP IP/OBS MODERATE 55: CPT

## 2022-06-07 RX ADMIN — Medication 1 TABLET(S): at 11:55

## 2022-06-07 RX ADMIN — RIVAROXABAN 15 MILLIGRAM(S): KIT at 16:31

## 2022-06-07 RX ADMIN — ATORVASTATIN CALCIUM 80 MILLIGRAM(S): 80 TABLET, FILM COATED ORAL at 22:47

## 2022-06-07 RX ADMIN — Medication 30 MILLIGRAM(S): at 22:47

## 2022-06-07 RX ADMIN — Medication 81 MILLIGRAM(S): at 11:54

## 2022-06-07 RX ADMIN — Medication 30 MILLIGRAM(S): at 15:46

## 2022-06-07 RX ADMIN — Medication 30 MILLIGRAM(S): at 05:09

## 2022-06-07 RX ADMIN — ESCITALOPRAM OXALATE 5 MILLIGRAM(S): 10 TABLET, FILM COATED ORAL at 11:54

## 2022-06-07 NOTE — CONSULT NOTE ADULT - ASSESSMENT
This is an 82yo RH female with a history of HTN, HLD, CAD s/p stent, renal cancer s/p nephrectomy and anxiety who presented with LLE weakness and difficulty producing speech. Personally reviewed all imagines, labs, EEG and other studies.    Impression:  1. Recurrent difficulty producing speech and LLE weakness: This is patient's 8th episode since onset in 5/2020. All stroke workup in the past have been negative. Episode persists despite initiation of Xarelto in April. Do not believe these are CVA. There is a suspicion for focal seizures, via mechanism of either activation of negative motor area or inactivation of positive motor area. What's atypical for seizure is the prolonged duration of symptoms.    2. Use of Xarelto: While patient was hospitalized in 6/2020 after an habitual episode, which had been attributed to CVA at the time, ILR was placed. Then while she was hospitalized in 4/2022 for another episode, she was deemed as a nonresponder to ASA/Plavix given recurrent stroke-like events. Cardiology replaced Plavix with Xarelto. ILR was interrogated and no AF detected. Never had clear documentation of pAF. Since there is low suspicion for CVA at this time, would re-evaluate the indication for Xarelto.      Recommendation:  - cvEEG  - will likely start antiseizure medication tomorrow   - recommend discussion with Neuro-vascular (Dr. Abraham already following) and Cardiology consult to re-evaluate the risks/benefits for Xarelto  - PT  - seizure precaution        Thank you for allowing Epilepsy to participate in the care of this patient.   ______________________  Jon Clinton MD   Director, Epilepsy/EMU - [facility]   Epilepsy Consult #: [phone] ([phone])  This is an 82yo RH female with a history of HTN, HLD, CAD s/p stent, renal cancer s/p nephrectomy and anxiety who presented with LLE weakness and difficulty producing speech. Personally reviewed all imagines, labs, EEG and other studies.    Impression:  1. Recurrent difficulty producing speech and LLE weakness: This is patient's 8th episode since onset in 5/2020. All stroke workup in the past have been negative. Episode persists despite initiation of Xarelto in April. Do not believe these are CVA. There is a suspicion for focal seizures, via mechanism of either activation of negative motor area or inactivation of positive motor area. What's atypical for seizure is the prolonged duration of symptoms.    2. Use of Xarelto: While patient was hospitalized in 6/2020 after an habitual episode, which had been attributed to CVA at the time, ILR was placed. Then while she was hospitalized in 4/2022 for another episode, she was deemed as a nonresponder to ASA/Plavix given recurrent stroke-like events. Cardiology replaced Plavix with Xarelto. ILR was interrogated and no AF detected. Never had clear documentation of pAF. Since there is low suspicion for CVA at this time, should re-evaluate the indication for Xarelto.      Recommendation:  - cvEEG  - will likely start antiseizure medication tomorrow   - recommend discussion with Neuro-vascular (Dr. Abraham already following) and Cardiology consult to re-evaluate the risks/benefits of Xarelto  - PT  - seizure precaution        Thank you for allowing Epilepsy to participate in the care of this patient.   ______________________  Jon Clinton MD   Director, Epilepsy/EMU - [facility]   Epilepsy Consult #: [phone] ([phone])

## 2022-06-07 NOTE — CHART NOTE - NSCHARTNOTEFT_GEN_A_CORE
Called by ASHLEY Del Castillo, stating that pt IV site infiltrated that was accidental placed in the artery, pt post IV contrast study for r/o stroke. IV site was removed by RN and another peripheral IV site was established. Pt seen at bedside and site examined, pt was initially c/o pain at the site. Pt is currently receiving heat pack at the site with minimal swellings and pain noted. will continue to monitor, RN informed of plan.

## 2022-06-07 NOTE — ED ADULT NURSE REASSESSMENT NOTE - NS ED NURSE REASSESS COMMENT FT1
genevieve winston completed on pt incident on IV sight infiltration. new IV placed in right hand. Emergency Department charge, provider jarvis ashley and pt made aware of situation. no new orders obtained physician will evaluate pt.

## 2022-06-07 NOTE — PATIENT PROFILE ADULT - FALL HARM RISK - UNIVERSAL INTERVENTIONS
Bed in lowest position, wheels locked, appropriate side rails in place/Call bell, personal items and telephone in reach/Instruct patient to call for assistance before getting out of bed or chair/Non-slip footwear when patient is out of bed/[place] to call system/Physically safe environment - no spills, clutter or unnecessary equipment/Purposeful Proactive Rounding/Room/bathroom lighting operational, light cord in reach

## 2022-06-07 NOTE — PATIENT PROFILE ADULT - FUNCTIONAL ASSESSMENT - BASIC MOBILITY 6.
Anemia of chronic renal failure, unspecified CKD stage Acute kidney injury superimposed on CKD Anemia of chronic renal failure, unspecified CKD stage Anemia of chronic renal failure, unspecified CKD stage Acute kidney injury superimposed on CKD Acute kidney injury superimposed on CKD Acute kidney injury superimposed on CKD 4 = No assist / stand by assistance

## 2022-06-07 NOTE — CONSULT NOTE ADULT - SUBJECTIVE AND OBJECTIVE BOX
[facility]                                                                     MD Yokasta Price,                                               Epilepsy Consult #: [id number] ([phone])                                               Office: [address] [place] [address], [address]                                                 Phone: [phone]; Fax: [phone]                            ==============================================    EPILEPSY INITIAL CONSULT NOTE      ADMITTING DIAGNOSIS: Other symptom or sign involving musculoskeletal system        HPI:  80yo F with PMHx of TIA / CVA, s/p ILR, CAD s/p stent, HLD, HTN presented with left leg weakness and slurred speech. Pt was admitted to [facility] on 4/23/22 with similar symptoms received tPA at the time, CT and MRI Brain without acute stroke. As per chart pt not tolerate Brilinta, did not respond to Plavix and not candidate for Effient due to h/o CVA, started on Xarelto for suspected afib given recurrent stoke symptoms. Pt states she was talking on the phone with her sister when she suddenly had difficulty with her speech, and she been feeling very weak in her left leg and having difficulty breathing during her PT session today. Pt reports to intermittent sob for the past 3 weeks but worsen yesterday and today. She states that she was unable to participate in [employer] yesterday due to sob. Denies cp, palpitations, syncope, dizziness, n/v, fever or cough. In the ED CTH, CTA head/neck and CT perfusion without acute finding. Evaluated by neurology in the ED, EEG was ordered.      (06 Jun 2022 22:42)      Recurrent episodes of LLE weakness and difficulty producing speech. First episode occurred on 5/20/20, and then on 6/1/20, 11/28/20, 3/27/21, 7/25/21, 9/8/21, 4/23/22, 6/6/22. All of which had been worked up as acute CVA, but CTH/CTA/CTP/MRI have always been unremarkable. 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 Patient states that she usually knows something is "off" at the onset of these episodes because her thought process is slowed. She then notes difficulty producing speech in the sense that it takes tremendous effort to get the words out, while knowing exactly what she wants to say, with fully intact comprehension. At the same time, also notes weakness in the left leg. Difficulty producing speech usually resolves within hours, but it takes 12-48 hours for the strength of the left leg to return to baseline.     Of note, while patient was hospitalized in 6/2020 after an habitual episode, which had been attributed to CVA, ILR was placed. Then while she was hospitalized in 4/2022 for another episode, she was deemed as a nonresponder to ASA/Plavix given recurrent stroke-like events. Cardiology replaced Plavix with Xarelto. ILR was interrogated and no AF detected. There is no clear documentation patient has ever had pAF.      SEIZURE RISK FACTORS:  Patient was a product of normal pregnancy and delivery. No history of febrile seizure, TBI, CNS infection, or FH of seizures.    CURRENT ASM:  none    PREVIOUS ASM:  none    IMAGING:   Multiple MRIs in the past; most recent as below  MRI w/o 4/25/22: no acute ischemia    NEUROPHYSIOLOGY:  rEEG 6/7/22 ([facility]): normal, SREDA  rEEG 9/10/21 ([facility]): normal A/D  cvEEG 7/28 - 7/29/21 ([facility]): normal, SREDA    NEUROPSYCHOLOGY:   none    PMH:  HTN (hypertension)    Rheumatic fever    CAD S/P percutaneous coronary angioplasty    Polio        PSH:  Status post tonsillectomy    H/O left nephrectomy    Renal cancer        MEDICATION:  aspirin enteric coated 81 milliGRAM(s) Oral daily  atorvastatin 80 milliGRAM(s) Oral at bedtime  diltiazem    Tablet 30 milliGRAM(s) Oral three times a day  escitalopram 5 milliGRAM(s) Oral daily  multivitamin 1 Tablet(s) Oral daily  rivaroxaban 15 milliGRAM(s) Oral with dinner    ALLERGIES:  Biaxin (Muscle Pain; Joint Pain)  codeine (Faint)  Isosorbide Mononitrate Extended Release (Faint)  Metoprolol Succinate ER (Unknown)  monoctanoin (Unknown)    FH:  FAMILY HISTORY:  FHx: breast cancer      noncontributory    SH:  No EtOH, tobacco, illicit drugs.  Social alcohol. No tobacco or illicit drugs.  +Tobacco. No alcohol or illicit drugs.  +Tobacco. Social alcohol. No illicit drugs.  Unknown    ROS:  Unable to obtain as patient is intubated and sedated... altered... cognitively impaired.    Negative for constitutional, skin, eyes, ENT, respiratory, cardiovascular, gastrointestinal, genitourinary, musculoskeletal, neurologic, psychiatric, hematology/lymphatics, endocrine, allergic/immunologic.    Neurology as per HPI, otherwise negative for constitutional, skin, eyes, ENT, respiratory, cardiovascular, gastrointestinal, genitourinary, musculoskeletal, psychiatric, hematology/lymphatics, endocrine, allergic/immunologic.    VITALS:  T(C): 36.9 (06-07-22 @ 15:26), Max: 36.9 (06-07-22 @ 15:26)  HR: 81 (06-07-22 @ 15:26) (71 - 81)  BP: 111/69 (06-07-22 @ 15:26) (105/68 - 111/69)  ABP: --  RR: 18 (06-07-22 @ 15:26) (18 - 18)  SpO2: 96% (06-07-22 @ 15:26) (96% - 97%)  CVP(cm H2O): --    GENERAL PHYSICAL EXAM:  GEN: no distress  HEENT: NCAT, OP clear  EYES: sclera white, conjunctiva clear, no nystagmus  NECK: supple  CV: RRR, no murmur     		  PULM: CTAB, no wheezing  ABD: soft, +BS, NT  EXT: peripheral pulse intact, no cyanosis  MSK: muscle tone and strength normal  SKIN: warm, dry    NEUROLOGICAL EXAM:  Mental Status  Orientation: awake and alert // alert and oriented to person, place, time, and situation   Language: clear and fluent    Cranial Nerves  II: full visual fields intact   III, IV, VI: PERRL, EOMI  V, VII: facial sensation and movement intact and symmetric   VIII: hearing intact   IX, X: uvula midline, soft palate elevates normally   XI: BL shoulder shrug intact   XII: tongue midline    Motor  5/5 BUE and BLE                 Tone and bulk are normal in upper and lower limbs  No pronator drift    Sensation  Intact to light touch and pinprick in all 4 EXTs    Reflex  2+ in BL biceps and patella                                    Plantar responses downward bilaterally    Coordination  Normal FTN bilaterally    Gait  Deferred      LABS:                          13.1   7.12  )-----------( 202      ( 06 Jun 2022 17:38 )             37.8     06-07    137  |  100  |  17.3  ----------------------------<  90  4.3   |  24.0  |  0.96    Ca    8.5<L>      07 Jun 2022 04:23    TPro  6.5<L>  /  Alb  4.2  /  TBili  0.6  /  DBili  x   /  AST  31  /  ALT  15  /  AlkPhos  54  06-06    CAPILLARY BLOOD GLUCOSE        LIVER FUNCTIONS - ( 06 Jun 2022 17:38 )  Alb: 4.2 g/dL / Pro: 6.5 g/dL / ALK PHOS: 54 U/L / ALT: 15 U/L / AST: 31 U/L / GGT: x           PT/INR - ( 06 Jun 2022 17:38 )   PT: 15.1 sec;   INR: 1.30 ratio         PTT - ( 06 Jun 2022 17:38 )  PTT:33.1 sec      OTHER IMAGING AND STUDIES:                                                                          [facility]                                                                     MD Yokasta Price,                                               Epilepsy Consult #: [phone] ([phone])                                               Office: [address] [place] [address], [address]                                                 Phone: [phone]; Fax: [phone]                            ==============================================    EPILEPSY INITIAL CONSULT NOTE      ADMITTING DIAGNOSIS: Other symptom or sign involving musculoskeletal system        HPI:  80yo F with PMHx of TIA / CVA, s/p ILR, CAD s/p stent, HLD, HTN presented with left leg weakness and slurred speech. Pt was admitted to [facility] on 4/23/22 with similar symptoms received tPA at the time, CT and MRI Brain without acute stroke. As per chart pt not tolerate Brilinta, did not respond to Plavix and not candidate for Effient due to h/o CVA, started on Xarelto for suspected afib given recurrent stoke symptoms. Pt states she was talking on the phone with her sister when she suddenly had difficulty with her speech, and she been feeling very weak in her left leg and having difficulty breathing during her PT session today. Pt reports to intermittent sob for the past 3 weeks but worsen yesterday and today. She states that she was unable to participate in [employer] yesterday due to sob. Denies cp, palpitations, syncope, dizziness, n/v, fever or cough. In the ED CTH, CTA head/neck and CT perfusion without acute finding. Evaluated by neurology in the ED, EEG was ordered.      (06 Jun 2022 22:42)      Recurrent episodes of LLE weakness and difficulty producing speech. First episode occurred on 5/20/20, and then on 6/1/20, 11/28/20, 3/27/21, 7/25/21, 9/8/21, 4/23/22, 6/6/22. All of which had been worked up as acute CVA, but CTH/CTA/CTP/MRI have always been unremarkable. 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 Patient states that she usually knows something is "off" at the onset of these episodes because her thought process is slowed. She then notes difficulty producing speech in the sense that it takes tremendous effort to get the words out, while knowing exactly what she wants to say, with fully intact comprehension. At the same time, also notes weakness in the left leg. Difficulty producing speech usually resolves within hours, but it takes 12-48 hours for the strength of the left leg to return to baseline.     Of note, while patient was hospitalized in 6/2020 after an habitual episode, which had been attributed to CVA, ILR was placed. Then while she was hospitalized in 4/2022 for another episode, she was deemed as a nonresponder to ASA/Plavix given recurrent stroke-like events. Cardiology replaced Plavix with Xarelto. ILR was interrogated and no AF detected. There is no clear documentation patient has ever had pAF.      SEIZURE RISK FACTORS:  Patient was a product of normal pregnancy and delivery. No history of febrile seizure, TBI, CNS infection, or FH of seizures.    CURRENT ASM:  none    PREVIOUS ASM:  none    IMAGING:   Multiple MRIs in the past; most recent as below  MRI w/o 4/25/22: no acute ischemia    NEUROPHYSIOLOGY:  rEEG 6/7/22 ([facility]): normal, SREDA  rEEG 9/10/21 ([facility]): normal A/D  cvEEG 7/28 - 7/29/21 ([facility]): normal, SREDA    NEUROPSYCHOLOGY:   none    PMH:  HTN (hypertension)    Rheumatic fever    CAD S/P percutaneous coronary angioplasty    Polio        PSH:  Status post tonsillectomy    H/O left nephrectomy    Renal cancer        MEDICATION:  aspirin enteric coated 81 milliGRAM(s) Oral daily  atorvastatin 80 milliGRAM(s) Oral at bedtime  diltiazem    Tablet 30 milliGRAM(s) Oral three times a day  escitalopram 5 milliGRAM(s) Oral daily  multivitamin 1 Tablet(s) Oral daily  rivaroxaban 15 milliGRAM(s) Oral with dinner    ALLERGIES:  Biaxin (Muscle Pain; Joint Pain)  codeine (Faint)  Isosorbide Mononitrate Extended Release (Faint)  Metoprolol Succinate ER (Unknown)  monoctanoin (Unknown)    FH:  FAMILY HISTORY:  FHx: breast cancer      noncontributory    SH:  No EtOH, tobacco, illicit drugs.  Social alcohol. No tobacco or illicit drugs.  +Tobacco. No alcohol or illicit drugs.  +Tobacco. Social alcohol. No illicit drugs.  Unknown    ROS:  Unable to obtain as patient is intubated and sedated... altered... cognitively impaired.    Negative for constitutional, skin, eyes, ENT, respiratory, cardiovascular, gastrointestinal, genitourinary, musculoskeletal, neurologic, psychiatric, hematology/lymphatics, endocrine, allergic/immunologic.    Neurology as per HPI, otherwise negative for constitutional, skin, eyes, ENT, respiratory, cardiovascular, gastrointestinal, genitourinary, musculoskeletal, psychiatric, hematology/lymphatics, endocrine, allergic/immunologic.    VITALS:  T(C): 36.9 (06-07-22 @ 15:26), Max: 36.9 (06-07-22 @ 15:26)  HR: 81 (06-07-22 @ 15:26) (71 - 81)  BP: 111/69 (06-07-22 @ 15:26) (105/68 - 111/69)  ABP: --  RR: 18 (06-07-22 @ 15:26) (18 - 18)  SpO2: 96% (06-07-22 @ 15:26) (96% - 97%)  CVP(cm H2O): --    GENERAL PHYSICAL EXAM:  GEN: no distress  HEENT: NCAT, OP clear  EYES: sclera white, conjunctiva clear, no nystagmus  NECK: supple  CV: RRR, no murmur     		  PULM: CTAB, no wheezing  ABD: soft, +BS, NT  EXT: peripheral pulse intact, no cyanosis  SKIN: warm, dry    NEUROLOGICAL EXAM:  Mental Status  Orientation: alert and oriented to person, place, time, and situation   Language: clear and fluent    Cranial Nerves  II: full visual fields intact   III, IV, VI: PERRL, EOMI  V, VII: facial sensation and movement intact and symmetric   VIII: hearing intact   IX, X: uvula midline, soft palate elevates normally   XI: BL shoulder shrug intact   XII: tongue midline    Motor  5/5 BUE, RLE  4/5 in LLE             Tone and bulk are normal in upper and lower limbs  No pronator drift    Sensation  Intact to light touch and pinprick in all 4 EXTs    Reflex  2+ in BL biceps and patella                                    Plantar responses downward bilaterally    Coordination  Normal FTN bilaterally    Gait  Deferred      LABS:                          13.1   7.12  )-----------( 202      ( 06 Jun 2022 17:38 )             37.8     06-07    137  |  100  |  17.3  ----------------------------<  90  4.3   |  24.0  |  0.96    Ca    8.5<L>      07 Jun 2022 04:23    TPro  6.5<L>  /  Alb  4.2  /  TBili  0.6  /  DBili  x   /  AST  31  /  ALT  15  /  AlkPhos  54  06-06    CAPILLARY BLOOD GLUCOSE        LIVER FUNCTIONS - ( 06 Jun 2022 17:38 )  Alb: 4.2 g/dL / Pro: 6.5 g/dL / ALK PHOS: 54 U/L / ALT: 15 U/L / AST: 31 U/L / GGT: x           PT/INR - ( 06 Jun 2022 17:38 )   PT: 15.1 sec;   INR: 1.30 ratio         PTT - ( 06 Jun 2022 17:38 )  PTT:33.1 sec

## 2022-06-08 ENCOUNTER — TRANSCRIPTION ENCOUNTER (OUTPATIENT)
Age: 82
End: 2022-06-08

## 2022-06-08 VITALS
HEART RATE: 78 BPM | RESPIRATION RATE: 18 BRPM | OXYGEN SATURATION: 98 % | DIASTOLIC BLOOD PRESSURE: 64 MMHG | SYSTOLIC BLOOD PRESSURE: 116 MMHG | TEMPERATURE: 98 F

## 2022-06-08 PROCEDURE — U0003: CPT

## 2022-06-08 PROCEDURE — 80048 BASIC METABOLIC PNL TOTAL CA: CPT

## 2022-06-08 PROCEDURE — 70498 CT ANGIOGRAPHY NECK: CPT | Mod: MA

## 2022-06-08 PROCEDURE — 99233 SBSQ HOSP IP/OBS HIGH 50: CPT

## 2022-06-08 PROCEDURE — 84484 ASSAY OF TROPONIN QUANT: CPT

## 2022-06-08 PROCEDURE — 0042T: CPT

## 2022-06-08 PROCEDURE — 97163 PT EVAL HIGH COMPLEX 45 MIN: CPT

## 2022-06-08 PROCEDURE — 93005 ELECTROCARDIOGRAM TRACING: CPT

## 2022-06-08 PROCEDURE — 85730 THROMBOPLASTIN TIME PARTIAL: CPT

## 2022-06-08 PROCEDURE — 85025 COMPLETE CBC W/AUTO DIFF WBC: CPT

## 2022-06-08 PROCEDURE — 95819 EEG AWAKE AND ASLEEP: CPT

## 2022-06-08 PROCEDURE — 71045 X-RAY EXAM CHEST 1 VIEW: CPT

## 2022-06-08 PROCEDURE — 95813 EEG EXTND MNTR 61-119 MIN: CPT | Mod: 26

## 2022-06-08 PROCEDURE — 99232 SBSQ HOSP IP/OBS MODERATE 35: CPT

## 2022-06-08 PROCEDURE — 80061 LIPID PANEL: CPT

## 2022-06-08 PROCEDURE — 85610 PROTHROMBIN TIME: CPT

## 2022-06-08 PROCEDURE — 36415 COLL VENOUS BLD VENIPUNCTURE: CPT

## 2022-06-08 PROCEDURE — 70496 CT ANGIOGRAPHY HEAD: CPT | Mod: MA

## 2022-06-08 PROCEDURE — 95700 EEG CONT REC W/VID EEG TECH: CPT

## 2022-06-08 PROCEDURE — 99239 HOSP IP/OBS DSCHRG MGMT >30: CPT

## 2022-06-08 PROCEDURE — 82962 GLUCOSE BLOOD TEST: CPT

## 2022-06-08 PROCEDURE — 80053 COMPREHEN METABOLIC PANEL: CPT

## 2022-06-08 PROCEDURE — 70450 CT HEAD/BRAIN W/O DYE: CPT | Mod: MA

## 2022-06-08 PROCEDURE — 95714 VEEG EA 12-26 HR UNMNTR: CPT

## 2022-06-08 PROCEDURE — 95813 EEG EXTND MNTR 61-119 MIN: CPT

## 2022-06-08 PROCEDURE — U0005: CPT

## 2022-06-08 PROCEDURE — 83036 HEMOGLOBIN GLYCOSYLATED A1C: CPT

## 2022-06-08 PROCEDURE — 99285 EMERGENCY DEPT VISIT HI MDM: CPT | Mod: 25

## 2022-06-08 RX ORDER — LEVETIRACETAM 250 MG/1
1000 TABLET, FILM COATED ORAL ONCE
Refills: 0 | Status: COMPLETED | OUTPATIENT
Start: 2022-06-08 | End: 2022-06-08

## 2022-06-08 RX ORDER — RIVAROXABAN 15 MG-20MG
15 KIT ORAL DAILY
Refills: 0 | Status: DISCONTINUED | OUTPATIENT
Start: 2022-06-08 | End: 2022-06-08

## 2022-06-08 RX ORDER — RIVAROXABAN 15 MG-20MG
1 KIT ORAL
Qty: 0 | Refills: 0 | DISCHARGE
Start: 2022-06-08

## 2022-06-08 RX ORDER — LEVETIRACETAM 250 MG/1
1 TABLET, FILM COATED ORAL
Qty: 60 | Refills: 0
Start: 2022-06-08 | End: 2022-07-07

## 2022-06-08 RX ORDER — LEVETIRACETAM 250 MG/1
500 TABLET, FILM COATED ORAL
Refills: 0 | Status: DISCONTINUED | OUTPATIENT
Start: 2022-06-08 | End: 2022-06-08

## 2022-06-08 RX ADMIN — LEVETIRACETAM 1000 MILLIGRAM(S): 250 TABLET, FILM COATED ORAL at 11:51

## 2022-06-08 RX ADMIN — Medication 81 MILLIGRAM(S): at 11:29

## 2022-06-08 RX ADMIN — Medication 1 TABLET(S): at 11:29

## 2022-06-08 RX ADMIN — ESCITALOPRAM OXALATE 5 MILLIGRAM(S): 10 TABLET, FILM COATED ORAL at 11:29

## 2022-06-08 NOTE — PHYSICAL THERAPY INITIAL EVALUATION ADULT - PERTINENT HX OF CURRENT PROBLEM, REHAB EVAL
82yo F with PMHx of TIA / CVA, s/p ILR, CAD s/p stent, HLD, HTN presented with left leg weakness and slurred speech.

## 2022-06-08 NOTE — EEG REPORT - NS EEG TEXT BOX
[facility] Epilepsy Center Epilepsy Monitoring Unit Report  [facility]: [address], [place] [address], Phone [phone] [place] Office: [address], [address], [place] [address] Phone [phone]  [facility]: [address] [place] [address], Phone [phone] [place] Office: [address] [place] [address], Phone [phone]  Patient Name: Alexandrea Medina   Age: 81 year, : 1940 Patient ID: -, MRN #: -, Callejas: -  Physician Ordering Inpatient EEG: Jon Clinton Referral Source to EMU: non-elective admission – ER  EMU Study Started: 11:02 on 22   EMU Study Ended: pending discharge  Study Information:  EEG Recording Technique: The patient underwent continuous Video-EEG monitoring, using Telemetry System hardware on the XLTek Digital System. EEG and video data were stored on a computer hard drive with important events saved in digital archive files. The material was reviewed by a physician (electroencephalographer / epileptologist) on a daily basis. Manjit and seizure detection algorithms were utilized and reviewed. An EEG Technician attended to the patient, and was available throughout daytime work hours.  The epilepsy center neurologist was available in person or on call 24-hours per day.  EEG Placement and Labeling of Electrodes: The EEG was performed utilizing 20 channel referential EEG connections (coronal over temporal over parasagittal montage) using all standard 10-20 electrode placements with EKG, with additional electrodes placed in the inferior temporal region using the modified 10-10 montage electrode placements for elective admissions, or if deemed necessary. Recording was at a sampling rate of 256 samples per second per channel. Time synchronized digital video recording was done simultaneously with EEG recording. A low light infrared camera was used for low light recording.        History: This is an 80yo RH female with a history of HTN, HLD, CAD s/p stent, renal cancer s/p nephrectomy and anxiety who presented with LLE weakness and difficulty producing speech.  Recurrent episodes of LLE weakness and difficulty producing speech. First episode occurred on 20, and then on 20, 20, 3/27/21, 21, 21, 22, 22. All of which had been worked up as acute CVA, but CTH/CTA/CTP/MRI have always been unremarkable. 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 Patient states that she usually knows something is "off" at the onset of these episodes because her thought process is slowed. She then notes difficulty producing speech in the sense that it takes tremendous effort to get the words out, while knowing exactly what she wants to say, with fully intact comprehension. At the same time, also notes weakness in the left leg. Difficulty producing speech usually resolves within hours, but it takes 12-48 hours for the strength of the left leg to return to baseline.   Of note, while patient was hospitalized in 2020 after an habitual episode, which had been attributed to CVA, ILR was placed. Then while she was hospitalized in 2022 for another episode, she was deemed as a nonresponder to ASA/Plavix given recurrent stroke-like events. Cardiology replaced Plavix with Xarelto. ILR was interrogated and no AF detected. There is no clear documentation patient has ever had pAF.  SEIZURE RISK FACTORS: Patient was a product of normal pregnancy and delivery. No history of febrile seizure, TBI, CNS infection, or FH of seizures.  CURRENT ASM: none  PREVIOUS ASM: none  IMAGING:  Multiple MRIs in the past; most recent as below MRI w/o 22: no acute ischemia  NEUROPHYSIOLOGY: rEEG 22 ([facility]): normal, SREDA rEEG 9/10/21 ([facility]): normal A/D cvEEG  - 21 ([facility]): normal, SREDA  Home Antiseizure Medication and Device none  Interpretation:  Start Date: 2022 – Day 1                                Start Time – 11:02       Duration – 20h 55m  Daily EEG Visual Analysis Findings: The background was continuous and reactive. During wakefulness, the posterior dominant rhythm consisted of symmetric, well-modulated 9-10 Hz activity, with amplitude to 30 uV, that attenuated to eye opening.    Background Slowing: No generalized background slowing was present.  Focal Slowing:  None were present.  Sleep Background: Drowsiness was characterized by fragmentation, attenuation, and slowing of the background activity.   Sleep was characterized by the presence of vertex waves, symmetric sleep spindles and K-complexes.  Other Non-Epileptiform Findings: Occasional 30-120s runs of diffuse, max right posterior-temporal, sharply contoured theta activities, with minimal evolution in frequency. No clinical correlate.  BP, 7uV             Interictal Epileptiform Activity:  None were present.  Events: No events or seizures recorded.  Artifacts: Intermittent myogenic and movement artifacts were noted.  ECG: The heart rate on single channel ECG was predominantly between 70-90 BPM.  ASM: None   EEG Summary: Normal EEG in the awake, drowsy and asleep states. - Occasional 30-120s runs of diffuse, max right posterior-temporal, sharply contoured theta activities, with minimal evolution in frequency. No clinical correlate. 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  Impression/Clinical Correlate: No events or seizures were recorded. Normal video EEG in awake, drowsy and asleep states. ________________________________________  Jon Clinton MD Director, Epilepsy/EMU - [facility]   [facility] Epilepsy Center Epilepsy Monitoring Unit Report  [facility]: [address], [place] [address], Phone [phone] [place] Office: [address], [address], [place] [address] Phone [phone]  [facility]: [address] [place] [address], Phone [phone] [place] Office: [address] [place] [address], Phone [phone]  Patient Name: Alexandrea Medina   Age: 81 year, : 1940 Patient ID: -, MRN #: -, Callejas: -  Physician Ordering Inpatient EEG: Jon Clinton Referral Source to EMU: non-elective admission – ER  EMU Study Started: 11:02 on 22   EMU Study Ended: 11:03 on 22  Study Information:  EEG Recording Technique: The patient underwent continuous Video-EEG monitoring, using Telemetry System hardware on the XLTek Digital System. EEG and video data were stored on a computer hard drive with important events saved in digital archive files. The material was reviewed by a physician (electroencephalographer / epileptologist) on a daily basis. Manjit and seizure detection algorithms were utilized and reviewed. An EEG Technician attended to the patient, and was available throughout daytime work hours.  The epilepsy center neurologist was available in person or on call 24-hours per day.  EEG Placement and Labeling of Electrodes: The EEG was performed utilizing 20 channel referential EEG connections (coronal over temporal over parasagittal montage) using all standard 10-20 electrode placements with EKG, with additional electrodes placed in the inferior temporal region using the modified 10-10 montage electrode placements for elective admissions, or if deemed necessary. Recording was at a sampling rate of 256 samples per second per channel. Time synchronized digital video recording was done simultaneously with EEG recording. A low light infrared camera was used for low light recording.        History: This is an 82yo RH female with a history of HTN, HLD, CAD s/p stent, renal cancer s/p nephrectomy and anxiety who presented with LLE weakness and difficulty producing speech.  Recurrent episodes of LLE weakness and difficulty producing speech. First episode occurred on 20, and then on 20, 20, 3/27/21, 21, 21, 22, 22. All of which had been worked up as acute CVA, but CTH/CTA/CTP/MRI have always been unremarkable. 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 Patient states that she usually knows something is "off" at the onset of these episodes because her thought process is slowed. She then notes difficulty producing speech in the sense that it takes tremendous effort to get the words out, while knowing exactly what she wants to say, with fully intact comprehension. At the same time, also notes weakness in the left leg. Difficulty producing speech usually resolves within hours, but it takes 12-48 hours for the strength of the left leg to return to baseline.   Of note, while patient was hospitalized in 2020 after an habitual episode, which had been attributed to CVA, ILR was placed. Then while she was hospitalized in 2022 for another episode, she was deemed as a nonresponder to ASA/Plavix given recurrent stroke-like events. Cardiology replaced Plavix with Xarelto. ILR was interrogated and no AF detected. There is no clear documentation patient has ever had pAF.  SEIZURE RISK FACTORS: Patient was a product of normal pregnancy and delivery. No history of febrile seizure, TBI, CNS infection, or FH of seizures.  CURRENT ASM: none  PREVIOUS ASM: none  IMAGING:  Multiple MRIs in the past; most recent as below MRI w/o 22: no acute ischemia  NEUROPHYSIOLOGY: rEEG 22 ([facility]): normal, SREDA rEEG 9/10/21 ([facility]): normal A/D cvEEG  - 21 ([facility]): normal, SREDA  Home Antiseizure Medication and Device none  Interpretation:  Start Date: 2022 – Day 1                                Start Time – 11:02       Duration – 20h 55m  Daily EEG Visual Analysis Findings: The background was continuous and reactive. During wakefulness, the posterior dominant rhythm consisted of symmetric, well-modulated 9-10 Hz activity, with amplitude to 30 uV, that attenuated to eye opening.    Background Slowing: No generalized background slowing was present.  Focal Slowing:  None were present.  Sleep Background: Drowsiness was characterized by fragmentation, attenuation, and slowing of the background activity.   Sleep was characterized by the presence of vertex waves, symmetric sleep spindles and K-complexes.  Other Non-Epileptiform Findings: Occasional 30-120s runs of diffuse, max right posterior-temporal, sharply contoured theta activities, with minimal evolution in frequency. No clinical correlate.  BP, 7uV             Interictal Epileptiform Activity:  None were present.  Events: No events or seizures recorded.  Artifacts: Intermittent myogenic and movement artifacts were noted.  ECG: The heart rate on single channel ECG was predominantly between 70-90 BPM.  ASM: None   Start Date: 2022 – Day 2                                       Start Time – 08:00      Duration – 3h  Daily EEG Visual Analysis FINDINGS:  The background, artifacts and HR on single channel ECG were similar as previous day.  Interictal Epileptiform Activity:  None were present.  Events: No events or seizures recorded.  ASM: LEV 1000mg IV/500mg PO  EEG Summary: Normal EEG in the awake, drowsy and asleep states. - Occasional 30-120s runs of diffuse, max right posterior-temporal, sharply contoured theta activities, with minimal evolution in frequency. No clinical correlate. 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  Impression/Clinical Correlate: No events or seizures were recorded. Normal video EEG in awake, drowsy and asleep states.  ________________________________________  Jon Clinton MD Director, Epilepsy/EMU - [facility]

## 2022-06-08 NOTE — DISCHARGE NOTE NURSING/CASE MANAGEMENT/SOCIAL WORK - NSDCPEFALRISK_GEN_ALL_CORE
For information on Fall & Injury Prevention, visit: [email or web address] OR  [email or web address] OR  https://www.cdc.gov/steadi/patient.html

## 2022-06-08 NOTE — PHYSICAL THERAPY INITIAL EVALUATION ADULT - GENERAL OBSERVATIONS, REHAB EVAL
Patient received lying in bed, NAD, breathing RA, +EEG. Pt agreeable to Physical Therapy evaluation.

## 2022-06-08 NOTE — PROGRESS NOTE ADULT - SUBJECTIVE AND OBJECTIVE BOX
[facility]                                                                     MD Yokasta Price DO                                              Epilepsy Consult #: [id number] ([phone])                                               Office: [address] [place] [address], [address]                                                 Phone: [phone]; Fax: [phone]                            ==============================================    EPILEPSY FOLLOW-UP NOTE      INTERVAL HISTORY:  Continuous video EEG normal. LLE weakness resolved.    MEDICATIONS:   aspirin enteric coated 81 milliGRAM(s) Oral daily  atorvastatin 80 milliGRAM(s) Oral at bedtime  diltiazem    Tablet 30 milliGRAM(s) Oral three times a day  escitalopram 5 milliGRAM(s) Oral daily  levETIRAcetam 500 milliGRAM(s) Oral two times a day  levETIRAcetam 1000 milliGRAM(s) Oral once  multivitamin 1 Tablet(s) Oral daily  rivaroxaban 15 milliGRAM(s) Oral daily    LAST 24-HR VITALS:  T(C): 36.5 (06-08-22 @ 11:11), Max: 36.9 (06-07-22 @ 15:26)  HR: 78 (06-08-22 @ 11:11) (57 - 81)  BP: 116/64 (06-08-22 @ 11:11) (106/61 - 117/70)  ABP: --  RR: 18 (06-08-22 @ 11:11) (18 - 18)  SpO2: 98% (06-08-22 @ 11:11) (95% - 99%)  CVP(cm H2O): --    GENERAL PHYSICAL EXAM:  GEN: no distress   HEENT: NCAT, OP clear  EYES: sclera white, conjunctiva clear, no nystagmus  NECK: supple  CV: RRR, no murmur     		  PULM: CTAB, no wheezing  ABD: soft, +BS, NT  EXT: peripheral pulse intact, no cyanosis  MSK: muscle tone and strength normal  SKIN: warm, dry    NEUROLOGICAL EXAM:  Mental Status  Orientation: awake and alert  Language: clear     Cranial Nerves  II: full visual fields intact   III, IV, VI: PERRL, EOMI  V, VII: facial sensation and movement intact and symmetric   VIII: hearing intact   IX, X: uvula midline, soft palate elevates normally   XI: BL shoulder shrug intact   XII: tongue midline    Motor  5/5 BUE and BLE                 Tone and bulk are normal in upper and lower limbs  No pronator drift    Sensation  Intact to light touch and pinprick in all 4 EXTs    Reflex  2+ in BL biceps and patella                                    Plantar responses downward bilaterally    Coordination  Normal FTN bilaterally    Gait  Deferred       LABS:                          13.1   7.12  )-----------( 202      ( 06 Jun 2022 17:38 )             37.8     06-07    137  |  100  |  17.3  ----------------------------<  90  4.3   |  24.0  |  0.96    Ca    8.5<L>      07 Jun 2022 04:23    TPro  6.5<L>  /  Alb  4.2  /  TBili  0.6  /  DBili  x   /  AST  31  /  ALT  15  /  AlkPhos  54  06-06    CAPILLARY BLOOD GLUCOSE        LIVER FUNCTIONS - ( 06 Jun 2022 17:38 )  Alb: 4.2 g/dL / Pro: 6.5 g/dL / ALK PHOS: 54 U/L / ALT: 15 U/L / AST: 31 U/L / GGT: x           PT/INR - ( 06 Jun 2022 17:38 )   PT: 15.1 sec;   INR: 1.30 ratio         PTT - ( 06 Jun 2022 17:38 )  PTT:33.1 sec      OTHER IMAGING AND STUDIES:    non-elective EMU 6/7 - 6/8/22:  EEG Summary:  Normal EEG in the awake, drowsy and asleep states.  - Occasional 30-120s runs of diffuse, max right posterior-temporal, sharply contoured theta activities, with minimal evolution in frequency. No clinical correlate. 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    Impression/Clinical Correlate:  No events or seizures were recorded. Normal video EEG in awake, drowsy and asleep states.        IMAGING:   Multiple MRIs in the past; most recent as below  MRI w/o 4/25/22: no acute ischemia    NEUROPHYSIOLOGY:  rEEG 6/7/22 ([facility]): normal, SREDA  rEEG 9/10/21 ([facility]): normal A/D  cvEEG 7/28 - 7/29/21 ([facility]): normal, SREDA
                             [facility]                                     Neurology at [place]                                 Leigh Prather & Anjum                                  [address]                                        [place], [place], [address]                                             [phone]    CC: left leg weakness  HPI: The patient is a 81y Female who presented with onset of speech difficulty and left leg weakness at 1600 today.  Had two other similar presentaions with no finding of stroke on MRI, during her last presentation in April 2022 she received alteplase.  She has A fib and takes xarelto.  Neurology is asked to evaluate.    Interval history: stuttering resolved, left leg a bit stronger    Review of systems (neurology): Denies headache or dizziness. (+) left legweakness/numbness.  Denies speech/language deficits. Denies diplopia/blurred vision.  Denies confusion    MEDICATIONS  (STANDING):  aspirin enteric coated 81 milliGRAM(s) Oral daily  atorvastatin 80 milliGRAM(s) Oral at bedtime  diltiazem    Tablet 30 milliGRAM(s) Oral three times a day  escitalopram 5 milliGRAM(s) Oral daily  multivitamin 1 Tablet(s) Oral daily  rivaroxaban 15 milliGRAM(s) Oral with dinner    MEDICATIONS  (PRN):      Vital Signs Last 24 Hrs  T(C): 36.6 (07 Jun 2022 11:35), Max: 36.9 (06 Jun 2022 17:43)  T(F): 97.9 (07 Jun 2022 11:35), Max: 98.4 (06 Jun 2022 17:43)  HR: 71 (07 Jun 2022 09:09) (71 - 83)  BP: 105/68 (07 Jun 2022 11:35) (105/68 - 163/83)  BP(mean): --  RR: 18 (07 Jun 2022 11:35) (18 - 19)  SpO2: 97% (07 Jun 2022 11:35) (96% - 98%)    Detailed Neurologic Exam:    Mental status: The patient is awake and alert and has normal attention span.  The patient is fully oriented in 3 spheres. The patient is oriented to current events. The patient is able to name objects, follow commands, repeat sentences.  She is stuttering, not dysarthric    Cranial nerves: Pupils equal and react symmetrically to light. There is no visual field deficit to confrontation. Extraocular motion is full with no nystagmus. There is no ptosis. Facial sensation is intact. Facial musculature is symmetric. Palate elevates symmetrically.  Tongue is midline.    Motor: There is normal bulk and tone.  There is b/l tremor.  Strength is 5/5 in the right arm and leg.   Strength is 5/5 in the left arm and 4/5 leg. (left leg exam is inconsistent- give way weakness)    Sensation: Intact to light touch and pin in 4 extremities    Reflexes: 1+ throughout and plantar responses are flexor.    Cerebellar: There is no dysmetria on finger to nose testing.    Gait : deferred      LABS:                         13.1   7.12  )-----------( 202      ( 06 Jun 2022 17:38 )             37.8     06-07    137  |  100  |  17.3  ----------------------------<  90  4.3   |  24.0  |  0.96    Ca    8.5<L>      07 Jun 2022 04:23    TPro  6.5<L>  /  Alb  4.2  /  TBili  0.6  /  DBili  x   /  AST  31  /  ALT  15  /  AlkPhos  54  06-06    LIVER FUNCTIONS - ( 06 Jun 2022 17:38 )  Alb: 4.2 g/dL / Pro: 6.5 g/dL / ALK PHOS: 54 U/L / ALT: 15 U/L / AST: 31 U/L / GGT: x           PT/INR - ( 06 Jun 2022 17:38 )   PT: 15.1 sec;   INR: 1.30 ratio         PTT - ( 06 Jun 2022 17:38 )  PTT:33.1 sec    Lipid Profile (06.07.22 @ 04:23)    Cholesterol, Serum: 106 mg/dL    Triglycerides, Serum: 41 mg/dL    HDL Cholesterol, Serum: 63 mg/dL    Non HDL Cholesterol: 43:     A1C with Estimated Average Glucose (06.07.22 @ 04:23)    A1C with Estimated Average Glucose Result: 4.9 %    Estimated Average Glucose: 94 mg/dL    EEG pending      RADIOLOGY & ADDITIONAL STUDIES (independently reviewed unless otherwise noted):  CT head: no acute CVA, mass or blood (+) atrophy and SVID  CTA head: no LVO or sig stenosis in COW  CT Perfusion head - CBF<30% volume 0ml, Tmax>6s volume =0ml
                             [facility]                                     Neurology at [place]                                 Leigh Prather & Anjum                                  [address]                                        [place], [place], [address]                                             [phone]    CC: left leg weakness  HPI: The patient is a 81y Female who presented with onset of speech difficulty and left leg weakness at 1600 today.  Had two other similar presentaions with no finding of stroke on MRI, during her last presentation in April 2022 she received alteplase.  She has A fib and takes xarelto.  Neurology is asked to evaluate.    Interval history: stuttering resolved, left leg at normal power    Review of systems (neurology): Denies headache or dizziness. No weakness/numbness.  Denies speech/language deficits. Denies diplopia/blurred vision.  Denies confusion      MEDICATIONS  (STANDING):  aspirin enteric coated 81 milliGRAM(s) Oral daily  atorvastatin 80 milliGRAM(s) Oral at bedtime  diltiazem    Tablet 30 milliGRAM(s) Oral three times a day  escitalopram 5 milliGRAM(s) Oral daily  levETIRAcetam 500 milliGRAM(s) Oral two times a day  levETIRAcetam 1000 milliGRAM(s) Oral once  multivitamin 1 Tablet(s) Oral daily  rivaroxaban 15 milliGRAM(s) Oral daily    MEDICATIONS  (PRN):      Vital Signs Last 24 Hrs  T(C): 36.5 (08 Jun 2022 11:11), Max: 36.9 (07 Jun 2022 15:26)  T(F): 97.7 (08 Jun 2022 11:11), Max: 98.4 (07 Jun 2022 15:26)  HR: 78 (08 Jun 2022 11:11) (57 - 81)  BP: 116/64 (08 Jun 2022 11:11) (105/68 - 117/70)  BP(mean): --  RR: 18 (08 Jun 2022 11:11) (18 - 18)  SpO2: 98% (08 Jun 2022 11:11) (95% - 99%)    Detailed Neurologic Exam:    Mental status: The patient is awake and alert and has normal attention span.  The patient is fully oriented in 3 spheres. The patient is oriented to current events. The patient is able to name objects, follow commands, repeat sentences.      Cranial nerves: Pupils equal and react symmetrically to light. There is no visual field deficit to confrontation. Extraocular motion is full with no nystagmus. There is no ptosis. Facial sensation is intact. Facial musculature is symmetric. Palate elevates symmetrically.  Tongue is midline.    Motor: There is normal bulk and tone.  There is b/l tremor.  Strength is 5/5 in the right arm and leg.   Strength is 5/5 in the left arm and leg.     Sensation: Intact to light touch and pin in 4 extremities    Reflexes: 1+ throughout and plantar responses are flexor.    Cerebellar: There is no dysmetria on finger to nose testing.    Gait : deferred      LABS:                                    13.1   7.12  )-----------( 202      ( 06 Jun 2022 17:38 )             37.8     06-07    137  |  100  |  17.3  ----------------------------<  90  4.3   |  24.0  |  0.96    Ca    8.5<L>      07 Jun 2022 04:23    TPro  6.5<L>  /  Alb  4.2  /  TBili  0.6  /  DBili  x   /  AST  31  /  ALT  15  /  AlkPhos  54  06-06    LIVER FUNCTIONS - ( 06 Jun 2022 17:38 )  Alb: 4.2 g/dL / Pro: 6.5 g/dL / ALK PHOS: 54 U/L / ALT: 15 U/L / AST: 31 U/L / GGT: x           PT/INR - ( 06 Jun 2022 17:38 )   PT: 15.1 sec;   INR: 1.30 ratio         PTT - ( 06 Jun 2022 17:38 )  PTT:33.1 sec  Lipid Profile (06.07.22 @ 04:23)    Cholesterol, Serum: 106 mg/dL    Triglycerides, Serum: 41 mg/dL    HDL Cholesterol, Serum: 63 mg/dL    Non HDL Cholesterol: 43:     A1C with Estimated Average Glucose (06.07.22 @ 04:23)    A1C with Estimated Average Glucose Result: 4.9 %    Estimated Average Glucose: 94 mg/dL    EEG Summary: 6/7-6/8/22  Normal EEG in the awake, drowsy and asleep states.  - Occasional 30-120s runs of diffuse, max right posterior-temporal, sharply contoured theta activities, with minimal evolution in frequency. No clinical correlate. 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    Impression/Clinical Correlate:  No events or seizures were recorded. Normal video EEG in awake, drowsy and asleep states.  ________________________________________      RADIOLOGY & ADDITIONAL STUDIES (independently reviewed unless otherwise noted):  CT head: no acute CVA, mass or blood (+) atrophy and SVID  CTA head: no LVO or sig stenosis in COW  CT Perfusion head - CBF<30% volume 0ml, Tmax>6s volume =0ml
Patient is a 81y old  Female who presents with a chief complaint of Left leg weakness (07 Jun 2022 11:50)    Patient seen and examined at bedside.     ALLERGIES:  Biaxin (Muscle Pain; Joint Pain)  codeine (Faint)  Isosorbide Mononitrate Extended Release (Faint)  Metoprolol Succinate ER (Unknown)  monoctanoin (Unknown)    MEDICATIONS  (STANDING):  aspirin enteric coated 81 milliGRAM(s) Oral daily  atorvastatin 80 milliGRAM(s) Oral at bedtime  diltiazem    Tablet 30 milliGRAM(s) Oral three times a day  escitalopram 5 milliGRAM(s) Oral daily  multivitamin 1 Tablet(s) Oral daily  rivaroxaban 15 milliGRAM(s) Oral with dinner    MEDICATIONS  (PRN):    Vital Signs Last 24 Hrs  T(F): 97.9 (07 Jun 2022 11:35), Max: 98.4 (06 Jun 2022 17:43)  HR: 71 (07 Jun 2022 09:09) (71 - 83)  BP: 105/68 (07 Jun 2022 11:35) (105/68 - 163/83)  RR: 18 (07 Jun 2022 11:35) (18 - 19)  SpO2: 97% (07 Jun 2022 11:35) (96% - 98%)  I&O's Summary    PHYSICAL EXAM:  General: NAD, A/O x 3  ENT: MMM, no thrush  Neck: Supple, No JVD  Lungs: Clear to auscultation bilaterally, good air entry, non-labored breathing  Cardio: +s1/s2  Abdomen: Soft, Nontender, Nondistended; Bowel sounds present  Extremities: No calf tenderness, No pitting edema    LABS:                        13.1   7.12  )-----------( 202      ( 06 Jun 2022 17:38 )             37.8     06-07    137  |  100  |  17.3  ----------------------------<  90  4.3   |  24.0  |  0.96    Ca    8.5      07 Jun 2022 04:23    TPro  6.5  /  Alb  4.2  /  TBili  0.6  /  DBili  x   /  AST  31  /  ALT  15  /  AlkPhos  54  06-06    PT/INR - ( 06 Jun 2022 17:38 )   PT: 15.1 sec;   INR: 1.30 ratio    PTT - ( 06 Jun 2022 17:38 )  PTT:33.1 sec    06-07 Chol 106 mg/dL LDL -- HDL 63 mg/dL Trig 41 mg/dL    POCT Blood Glucose.: 127 mg/dL (06 Jun 2022 17:06)    COVID-19 PCR: NotDetec (06-06-22 @ 22:47)    RADIOLOGY & ADDITIONAL TESTS:  - no new tests  
Patient is a 81y old  Female who presents with a chief complaint of Left leg weakness (07 Jun 2022 19:51)    Patient seen and examined at bedside.      ALLERGIES:  Biaxin (Muscle Pain; Joint Pain)  codeine (Faint)  Isosorbide Mononitrate Extended Release (Faint)  Metoprolol Succinate ER (Unknown)  monoctanoin (Unknown)    MEDICATIONS  (STANDING):  aspirin enteric coated 81 milliGRAM(s) Oral daily  atorvastatin 80 milliGRAM(s) Oral at bedtime  diltiazem    Tablet 30 milliGRAM(s) Oral three times a day  escitalopram 5 milliGRAM(s) Oral daily  multivitamin 1 Tablet(s) Oral daily  rivaroxaban 15 milliGRAM(s) Oral with dinner    MEDICATIONS  (PRN):    Vital Signs Last 24 Hrs  T(F): 97.8 (08 Jun 2022 07:36), Max: 98.4 (07 Jun 2022 15:26)  HR: 63 (08 Jun 2022 07:36) (57 - 81)  BP: 106/61 (08 Jun 2022 07:36) (105/68 - 117/70)  RR: 18 (08 Jun 2022 07:36) (18 - 18)  SpO2: 99% (08 Jun 2022 07:36) (95% - 99%)  I&O's Summary    PHYSICAL EXAM:  General: NAD, A/O x 3  ENT: MMM, no thrush  Neck: Supple, No JVD  Lungs: Clear to auscultation bilaterally, good air entry, non-labored breathing  Cardio: +s1/s2  Abdomen: Soft, Nontender, Nondistended; Bowel sounds present  Extremities: No calf tenderness, No pitting edema      LABS:                        13.1   7.12  )-----------( 202      ( 06 Jun 2022 17:38 )             37.8     06-07    137  |  100  |  17.3  ----------------------------<  90  4.3   |  24.0  |  0.96    Ca    8.5      07 Jun 2022 04:23    TPro  6.5  /  Alb  4.2  /  TBili  0.6  /  DBili  x   /  AST  31  /  ALT  15  /  AlkPhos  54  06-06    PT/INR - ( 06 Jun 2022 17:38 )   PT: 15.1 sec;   INR: 1.30 ratio    PTT - ( 06 Jun 2022 17:38 )  PTT:33.1 sec    06-07 Chol 106 mg/dL LDL -- HDL 63 mg/dL Trig 41 mg/dL    COVID-19 PCR: NotDetec (06-06-22 @ 22:47)    RADIOLOGY & ADDITIONAL TESTS:  - no new tests

## 2022-06-08 NOTE — DISCHARGE NOTE NURSING/CASE MANAGEMENT/SOCIAL WORK - HISTORY OF COVID-19 VACCINATION
Yes
PT came to ED complaining of Left knee pain and calf pain. PT sent in by her PMD to rule out DVT. Pt is s/p left knee surgery on 2/18.  Pt able to ambulate with even gait. No bleeding or swelling noted at this time. No tenderness upon palpation.  PT denies SOB, chest pain, fever, chillls, D/N/V, /GI symptoms.

## 2022-06-08 NOTE — DISCHARGE NOTE PROVIDER - NSDCMRMEDTOKEN_GEN_ALL_CORE_FT
aspirin 81 mg oral delayed release tablet: 1 tab(s) orally once a day  atorvastatin 80 mg oral tablet: 1 tab(s) orally once a day  dilTIAZem 30 mg oral tablet: 1 tab(s) orally 3 times a day  Keppra 500 mg oral tablet: 1 tab(s) orally 2 times a day  Lexapro 5 mg oral tablet: 5 milligram(s) orally once a day  Multiple Vitamins oral tablet: 1 tab(s) orally once a day  Nexlizet 180 mg-10 mg oral tablet: 1 tab(s) orally once a day  rivaroxaban 15 mg oral tablet: 1 tab(s) orally once a day (in the evening)

## 2022-06-08 NOTE — PROGRESS NOTE ADULT - ASSESSMENT
80yo F with PMHx of TIA / CVA, s/p ILR, CAD s/p stent, HLD, HTN presented with left leg weakness and slurred speech.    #LLE weakness- resolved  - patient refusing MR at this understands small possibility of stroke can not be excluded patient stating had multiple mr over past few episodes  - epilepsy consult appreciated- possible medication start   - neuro consult appreciated  - neurochecks  - aspirin statin xarelto   - on cveeg    #pAFib   - Cardizem and xarelto    #CAD s/p stent   - statin, aspirin     #HTN- Essential  - monitor blood pressure  - cardizem     #Depression   - Lexapro      #DVT Prophylaxis  - xarelto    patient speaking to family directly.   
The patient is a 81y Female who is followed by neurology because of left leg weakness.    left leg weakness  third similar episode- resolved  doubt CVA  refused MRI brain  Possible seizure  did not receive  alteplase due to her being on xarelto  Was not a candidate for thrombectomy as they had no LVO or ischemic penumbra detected on CTA and CTP head  EEG as above  Started on keppra by Dr Clinton from Epilepsy  From stroke point of view I think it would be OK to stop xarelto as I do not think these events are stroke/TIA    discussed with Jones Vann and Oz    Thank you for allowing me to participate in the care of your patient    Cem Abraham MD, PhD   [id number]        
82yo F with PMHx of TIA / CVA, s/p ILR, CAD s/p stent, HLD, HTN presented with left leg weakness and slurred speech.    #LLE weakness   - neuro consult appreciated  - neurochecks  - aspirin statin xarelto   - on cveeg    #pAFib   - Cardizem and xarelto    #CAD s/p stent   - statin, aspirin     #HTN- Essential  - monitor blood pressure  - cardizem     #Depression   - Lexapro      #DVT Prophylaxis  - xarelto    patient speaking to family directly. 
The patient is a 81y Female who is followed by neurology because of left leg weakness.    left leg weakness  third similar episode- improving  doubt CVA  refusing MRI brain  Possible seizure  possible functional  did not receive  alteplase due to her being on xarelto  Was not a candidate for thrombectomy as they had no LVO or ischemic penumbra detected on CTA and CTP head  EEG pending    will follow with you    Cem Abraham MD PhD   [id number]    
This is an 82yo RH female with a history of HTN, HLD, CAD s/p stent, renal cancer s/p nephrectomy and anxiety who presented with LLE weakness and difficulty producing speech. Personally reviewed all imagines, labs, EEG and other studies.    Impression:  1. Recurrent difficulty producing speech and LLE weakness: This is patient's 8th episode since onset in 5/2020. All stroke workup in the past have been negative. Episode persists despite initiation of Xarelto in April. Do not believe these are CVA. There is a suspicion for focal seizures, via mechanism of either activation of negative motor area or inactivation of positive motor area. What's atypical for seizure is the prolonged duration of symptoms.    2. Use of Xarelto: While patient was hospitalized in 6/2020 after an habitual episode, which had been attributed to CVA at the time, ILR was placed. Then while she was hospitalized in 4/2022 for another episode, she was deemed as a nonresponder to ASA/Plavix given recurrent stroke-like events. Cardiology replaced Plavix with Xarelto. ILR was interrogated and no AF detected. Never had clear documentation of pAF. Since there is low suspicion for CVA at this time, should re-evaluate the indication for Xarelto.      Recommendation:  - discontinue cvEEG  - empirical treatment with levetiracetam: 1000mg x1 -> 500mg BID  - defer use of Xarelto to Neuro-vascular and Cardiology  - seizure precaution  - anticipate discharge today      No acute intervention from Epilepsy at this time.  Please reconsult if needed.   ______________________  Jon Clinton MD   Director, Epilepsy/EMU - [facility]   Epilepsy Consult #: [id number] ([phone])

## 2022-06-08 NOTE — DISCHARGE NOTE PROVIDER - CARE PROVIDERS DIRECT ADDRESSES
[email or web address]

## 2022-06-08 NOTE — PHYSICAL THERAPY INITIAL EVALUATION ADULT - ADDITIONAL COMMENTS
Patient states she lives in a private house with ramp access. She notes there are stairs to the basement where her laundry area is located. She was independent prior to admission with intermittent use of cane for ambulation, also has a RW and wheelchair. States her family is close by and able to assist if needed.

## 2022-06-08 NOTE — DISCHARGE NOTE PROVIDER - NSDCCPCAREPLAN_GEN_ALL_CORE_FT
PRINCIPAL DISCHARGE DIAGNOSIS  Diagnosis: Left leg weakness  Assessment and Plan of Treatment: - probable 2/2 seizure  - follow up with neurology and cardiology  - take medications as rx

## 2022-06-08 NOTE — DISCHARGE NOTE PROVIDER - HOSPITAL COURSE
80yo F with PMHx of TIA / CVA, s/p ILR, CAD s/p stent, HLD, HTN presented with left leg weakness and slurred speech. Pt was admitted to [facility] on 4/23/22 with similar symptoms received tPA at the time, CT and MRI Brain without acute stroke. s/p eeg and ?seizure was seen by neurology and cardiology while admitted. patient refusing mri. started on antiepileptic medications now being discharged in stable condition

## 2022-06-08 NOTE — DISCHARGE NOTE PROVIDER - PROVIDER TOKENS
PROVIDER:[TOKEN:[[id number]]],PROVIDER:[TOKEN:[[id number]]],PROVIDER:[TOKEN:[[id number]]]

## 2022-06-08 NOTE — DISCHARGE NOTE NURSING/CASE MANAGEMENT/SOCIAL WORK - PATIENT PORTAL LINK FT
You can access the FollowMyHealth Patient Portal offered by [facility] by registering at the following website: [email or web address]. By joining [employer]’s FollowMyHealth portal, you will also be able to view your health information using other applications (apps) compatible with our system.

## 2022-06-08 NOTE — DISCHARGE NOTE PROVIDER - CARE PROVIDER_API CALL
Jon Clinton)  EEGEpilepsy; Neurology  [address] [place] [address]  Phone: [phone]  Fax: [phone]  Follow Up Time:     Jarvis Cobian)  Cardiovascular Disease; Internal Medicine  [address] [place] [address]  Phone: [phone]  Fax: [phone]  Follow Up Time:     Cem Abraham; PhD)  Neurology; Vascular Neurology  [address], [address]  [place] [address]  Phone: [phone]  Fax: [phone]  Follow Up Time:

## 2022-06-08 NOTE — CONSULT NOTE ADULT - SUBJECTIVE AND OBJECTIVE BOX
[place] [facility]                                   [address] [place] [address]                                                      PHONE: [phone]                                                         FAX: [phone]  [email or web address]  ---------------------------------------------------------------------------------------------------------------------------------    Reason for Consult: Xarelto Use    HPI:  BREANA HELM is an 81y Female with recurrent hospitalizations for TIA-CVA like symptoms in the last two years as per neuro never documented CVA s/p ILR on chronic AC with xarelto .  Presently on the ER having an EEG for evaluation of epilepsy.  called to evaluate about the use of xarelto by neuro .  Denies any CP or SOB      PAST MEDICAL & SURGICAL HISTORY:  HTN (hypertension)      Rheumatic fever      TIA (transient ischemic attack)      CAD S/P percutaneous coronary angioplasty      [place]      Cerebrovascular accident (CVA)  November 2020, June 2020, May 2020      Status post tonsillectomy      H/O left nephrectomy      Renal cancer          Biaxin (Muscle Pain; Joint Pain)  codeine (Faint)  Isosorbide Mononitrate Extended Release (Faint)  Metoprolol Succinate ER (Unknown)  monoctanoin (Unknown)      MEDICATIONS  (STANDING):  aspirin enteric coated 81 milliGRAM(s) Oral daily  atorvastatin 80 milliGRAM(s) Oral at bedtime  diltiazem    Tablet 30 milliGRAM(s) Oral three times a day  escitalopram 5 milliGRAM(s) Oral daily  levETIRAcetam 500 milliGRAM(s) Oral two times a day  levETIRAcetam  IVPB 1000 milliGRAM(s) IV Intermittent once  multivitamin 1 Tablet(s) Oral daily  rivaroxaban 15 milliGRAM(s) Oral daily    MEDICATIONS  (PRN):      Social History:  Cigarettes:                    Alchohol:                 Illicit Drug Abuse:      REVIEW OF SYSTEMS:    Constitutional: No fever, weight loss or fatigue  Eyes: No eye pain, visual disturbances, or discharge  ENMT:  No difficulty hearing, tinnitus, vertigo; No sinus or throat pain  Neck: No pain or stiffness  Respiratory: No cough, wheezing, chills or hemoptysis  Cardiovascular: No chest pain, palpitations, shortness of breath, dizziness or leg swelling  Gastrointestinal: No abdominal or epigastric pain. No nausea, vomiting or hematemesis; No diarrhea or constipation. No melena or hematochezia.  Genitourinary: No dysuria, frequency, hematuria or incontinence  Rectal: No pain, hemorrhoids or incontinence  Neurological: No headaches, memory loss, loss of strength, numbness or tremors  Skin: No itching, burning, rashes or lesions   Lymph Nodes: No enlarged glands  Endocrine: No heat or cold intolerance; No hair loss  Musculoskeletal: No joint pain or swelling; No muscle, back or extremity pain  Psychiatric: No depression, anxiety, mood swings or difficulty sleeping  Heme/Lymph: No easy bruising or bleeding gums  Allergy and Immunologic: No hives or eczema    Vital Signs Last 24 Hrs  T(C): 36.6 (08 Jun 2022 07:36), Max: 36.9 (07 Jun 2022 15:26)  T(F): 97.8 (08 Jun 2022 07:36), Max: 98.4 (07 Jun 2022 15:26)  HR: 63 (08 Jun 2022 07:36) (57 - 81)  BP: 106/61 (08 Jun 2022 07:36) (105/68 - 117/70)  BP(mean): --  RR: 18 (08 Jun 2022 07:36) (18 - 18)  SpO2: 99% (08 Jun 2022 07:36) (95% - 99%)  ICU Vital Signs Last 24 Hrs  BREANA HELM  I&O's Detail    I&O's Summary    Drug Dosing Weight  BREANA HELM      PHYSICAL EXAM:  General: Appears well developed, well nourished alert and cooperative.  HEENT: Head; normocephalic, atraumatic.  Eyes: Pupils reactive, cornea wnl.  Neck: Supple, no nodes adenopathy, no NVD or carotid bruit or thyromegaly.  CARDIOVASCULAR: Normal S1 and S2, No murmur, rub, gallop or lift.   LUNGS: No rales, rhonchi or wheeze. Normal breath sounds bilaterally.  ABDOMEN: Soft, nontender without mass or organomegaly. bowel sounds normoactive.  EXTREMITIES: No clubbing, cyanosis or edema. Distal pulses wnl.   SKIN: warm and dry with normal turgor.  NEURO: Alert/oriented x 3/normal motor exam. No pathologic reflexes.    PSYCH: normal affect.        LABS:                        13.1   7.12  )-----------( 202      ( 06 Jun 2022 17:38 )             37.8     06-07    137  |  100  |  17.3  ----------------------------<  90  4.3   |  24.0  |  0.96    Ca    8.5<L>      07 Jun 2022 04:23    TPro  6.5<L>  /  Alb  4.2  /  TBili  0.6  /  DBili  x   /  AST  31  /  ALT  15  /  AlkPhos  54  06-06    BREANA HELM  CARDIAC MARKERS ( 06 Jun 2022 17:38 )  x     / <0.01 ng/mL / x     / x     / x          PT/INR - ( 06 Jun 2022 17:38 )   PT: 15.1 sec;   INR: 1.30 ratio         PTT - ( 06 Jun 2022 17:38 )  PTT:33.1 sec      RADIOLOGY & ADDITIONAL STUDIES:    INTERPRETATION OF TELEMETRY (personally reviewed):    ECG: NSR    ECHO: < from: TTE Echo Complete w/ Contrast w/ Doppler (04.24.22 @ 10:04) >  Summary:   1. Left ventricular ejection fraction, by visual estimation, is 60 to   65%.   2. Technically fair study.   3. Spectral Doppler shows impaired relaxation pattern of left   ventricular myocardial filling (Grade I diastolic dysfunction).   4. Moderate mitral valve regurgitation.   5. Moderate tricuspid regurgitation.   6. Moderate aortic regurgitation.   7. Sclerotic aortic valve with normal opening.   8. Endocardial visualization was enhanced with intravenous echo contrast.    MD Tom Electronically signed on 4/24/2022 at 5:59:04 PM        < end of copied text >        ACTIVE PROBLEMS:  HEALTH ISSUES - PROBLEM Dx:          Assessment and Plan:    In summary,  BREANA HELM is an 81y Female with recurrent hospitalizations for TIA-CVA like symptoms in the last two years as per neuro never documented CVA s/p ILR on chronic AC with xarelto .  Presently on the ER having an EEG for evaluation of epilepsy.  called to evaluate about the use of xarelto by neuro .  Denies any CP or SOB    Continue present cardiac therapy including AC with xarelto pending further findings on EEG, response to treatment and ILR monitoring will make decision about long term AC  as outpatient with primary cardiologist   Discussed in detail with patient that agreed with plan  and notified Hospitalist Dr Soo FRENCH MD, FAC, Replaced by [facility]

## 2022-06-21 NOTE — CDI QUERY NOTE - NSCDI_DOCCLARIFY_GEN_ALL_CORE_FT
In responding to this request, please exercise your independent professional judgment.  The fact that a question is asked does not imply that any particular answer is desired or expected.
Yes

## 2022-06-21 NOTE — CDI QUERY NOTE - NSCDIOTHERTXTBX_GEN_ALL_CORE_HH
This patient was noted to have suspected seizures.  She has a history of CVA.         Can the etiology of the seizure be clarified, after study?    -Seizures likely sequelae of prior CVA  -Other (please specify)  -Not clinically significant      Chart documentation:      D/C note:  80yo F with PMHx of TIA / CVA,...presented with left leg weakness and slurred speech. Pt was admitted to [facility] on 4/23/22 with similar symptoms received tPA at the time, CT and MRI Brain without acute stroke. s/p eeg and ?seizure was seen by neurology and cardiology while admitted. patient refusing mri. started on antiepileptic medications now being discharged in stable condition .  PRINCIPAL DISCHARGE DIAGNOSIS  Diagnosis: Left leg weakness  Assessment and Plan of Treatment: - probable 2/2 seizure      Epilepsy Attending Consult:  80yo F with PMHx of TIA / CVA...  · Reason for Referral/Consultation:   possible seizure  HPI:...Recurrent episodes of LLE weakness and difficulty producing speech. First episode occurred on 5/20/20, and then on 6/1/20, 11/28/20, 3/27/21, 7/25/21, 9/8/21, 4/23/22, 6/6/22. All of which had been worked up as acute CVA, but CTH/CTA/CTP/MRI have always been unremarkable. ......She then notes difficulty producing speech in the sense that it takes tremendous effort to get the words out, while knowing exactly what she wants to say, with fully intact comprehension. At the same time, also notes weakness in the left leg. Difficulty producing speech usually resolves within hours, but it takes 12-48 hours for the strength of the left leg to return to baseline.   Impression:  1. Recurrent difficulty producing speech and LLE weakness: This is patient's 8th episode since onset in 5/2020. All stroke workup in the past have been negative. Episode persists despite initiation of Xarelto in April. Do not believe these are CVA. There is a suspicion for focal seizures, via mechanism of either activation of negative motor area or inactivation of positive motor area. What's atypical for seizure is the prolonged duration of symptoms.  2. Use of Xarelto: While patient was hospitalized in 6/2020 after an habitual episode, which had been attributed to CVA at the time, ILR was placed. Then while she was hospitalized in 4/2022 for another episode, she was deemed as a nonresponder to ASA/Plavix given recurrent stroke-like events. Cardiology replaced Plavix with Xarelto. ILR was interrogated and no AF detected. Never had clear documentation of pAF. Since there is low suspicion for CVA at this time, should re-evaluate the indication for Xarelto.        6/8 Neuro note:  left leg weakness  third similar episode- resolved  doubt CVA  refused MRI brain  Possible seizure  EEG Summary: 6/7-6/8/22  Normal EEG in the awake, drowsy and asleep states.  - Occasional 30-120s runs of diffuse, max right posterior-temporal, sharply contoured theta activities, with minimal evolution in frequency. No clinical correlate. 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

## 2022-07-07 ENCOUNTER — HOSPITAL ENCOUNTER (EMERGENCY)
Facility: CLINIC | Age: 82
Discharge: HOME | End: 2022-07-07
Attending: EMERGENCY MEDICINE | Admitting: EMERGENCY MEDICINE
Payer: COMMERCIAL

## 2022-07-07 VITALS
TEMPERATURE: 98.7 F | RESPIRATION RATE: 16 BRPM | HEART RATE: 57 BPM | OXYGEN SATURATION: 95 % | DIASTOLIC BLOOD PRESSURE: 73 MMHG | SYSTOLIC BLOOD PRESSURE: 152 MMHG

## 2022-07-07 DIAGNOSIS — R58 BLEEDING: ICD-10-CM

## 2022-07-07 PROCEDURE — 99282 EMERGENCY DEPT VISIT SF MDM: CPT

## 2022-07-07 ASSESSMENT — ENCOUNTER SYMPTOMS
WOUND: 1
MYALGIAS: 0

## 2022-07-07 NOTE — ED TRIAGE NOTES
Pt went to podiatry clinic and had two ingrown nails removed yesterday. Pt reports that since yesterday she it has been bleeding. Pt is on xerolto and was told not to stop taking the medication. Pt denies any pain but its bleeding.

## 2022-07-07 NOTE — ED PROVIDER NOTES
History   Chief Complaint:  Bleeding Toe     The history is provided by the patient.      Annie Archer is a 81 year old female on Xarelto who presents with a bleeding toe. The patient reports that she had 2 ingrown toenails removed yesterday from her right foot. She then notes that her right great toe began bleeding last evening and did not stop until the wound was redressed by her neighbor. She awoke early this morning to find it bleeding again, requiring a second redressing by her son. The patient is on Xarelto but was not told by her podiatrist to temporarily pause this medication. She denies foot pain.    Review of Systems   Musculoskeletal: Negative for myalgias (R foot).   Skin: Positive for wound (R great toe).   All other systems reviewed and are negative.    Allergies:  Amoxicillin-Pot Clavulanate  Ergotamine  Sulfa Drugs  Cephalexin    Medications:  Xarelto  Oretic  Toprol  Norvasc  Zestril    Past Medical History:     Chronic left shoulder pain   Atrial fibrillation   ERIC  Dyslipidemia  Thyroid nodules   Osteoarthritis  Cellulitis  Ovarian cyst  Palpitations   Migraines  Rheumatic fever  Obesity    Past Surgical History:    Fibroadenoma removal, breast  Tonsillectomy  Dilation and curettage  Salpingo-oophorectomy, bilateral  Lipoma removal breast  Unspecified right eye surgery  Colonoscopy    Family History:    Father: unspecified cancer, hypertension, MM  Mother: Alzheimer's disease, eclampsia, hypertension, osteoporosis  Sister(s): ovarian cancer, hypertension, colon cancer, MS    Social History:  The patient presents to the ED with her son  PCP: [facility]    Physical Exam     Patient Vitals for the past 24 hrs:   BP Temp Pulse Resp SpO2   07/07/22 0604 (!) 152/73 98.7  F (37.1  C) 57 16 95 %       Physical Exam  General:  Alert, nontoxic in appearance  CV:  Appears well perfused  Lungs:  No obvious respiratory distress  Neuro:  Speaking clearly, no slurred speech  MSK:  Minor  oozing of blood from lateral aspect of right great toe nail      Emergency Department Course     Emergency Department Course:         Reviewed:  I reviewed nursing notes, vitals, past medical history and Care Everywhere    Procedure:  Wound Repair:  Indication:  Bleeding  Procedure:  Tissue adhesive applied to nail bed with good closure. Dressing applied.  Pt tolerated procedure well    Assessments:  0612 I obtained history and examined the patient as noted above.   0643 I rechecked the patient and explained findings.     Disposition:  The patient was discharged to home.     Impression & Plan     Medical Decision Making:  Annie Archer is an 81-year-old woman who presents with bleeding from her right great toe.  She is on Xarelto and yesterday had an ingrown toenail removed with a podiatrist.  She reports that she has had a couple of episodes of bleeding when she has been walking and cannot fully stop the bleeding so she came to the ER.  On my evaluation there is very minimal bleeding from the lateral aspect of the right great toe.  I did apply a small amount of tissue adhesive just to the corner where  the bleeding seen to be coming from and then the toe was wrapped in Surgicel and gauze dressing.  Patient was also given a postop shoe to limit flexion of the toe.  With this I adequately stopped the bleeding and the patient was discharged with recommendation to continue with instructions from the podiatrist.    Diagnosis:    ICD-10-CM    1. Bleeding  R58        Scribe Disclosure:  IIan, am serving as a scribe at 6:11 AM on 7/7/2022 to document services personally performed by Jacky Henderson MD based on my observations and the provider's statements to me.          Jacky Henderson MD  07/09/22 1148

## 2022-07-08 ENCOUNTER — INPATIENT (INPATIENT)
Facility: HOSPITAL | Age: 82
LOS: 0 days | Discharge: LEFT BEFORE DISCHARGE | DRG: 556 | End: 2022-07-09
Attending: STUDENT IN AN ORGANIZED HEALTH CARE EDUCATION/TRAINING PROGRAM | Admitting: HOSPITALIST
Payer: MEDICARE

## 2022-07-08 VITALS
DIASTOLIC BLOOD PRESSURE: 83 MMHG | HEART RATE: 79 BPM | RESPIRATION RATE: 16 BRPM | HEIGHT: 62 IN | OXYGEN SATURATION: 99 % | SYSTOLIC BLOOD PRESSURE: 159 MMHG | WEIGHT: 162.92 LBS | TEMPERATURE: 98 F

## 2022-07-08 DIAGNOSIS — Z90.89 ACQUIRED ABSENCE OF OTHER ORGANS: Chronic | ICD-10-CM

## 2022-07-08 DIAGNOSIS — C64.9 MALIGNANT NEOPLASM OF UNSPECIFIED KIDNEY, EXCEPT RENAL PELVIS: Chronic | ICD-10-CM

## 2022-07-08 DIAGNOSIS — R29.898 OTHER SYMPTOMS AND SIGNS INVOLVING THE MUSCULOSKELETAL SYSTEM: ICD-10-CM

## 2022-07-08 DIAGNOSIS — Z90.5 ACQUIRED ABSENCE OF KIDNEY: Chronic | ICD-10-CM

## 2022-07-08 LAB
ALBUMIN SERPL ELPH-MCNC: 4.5 G/DL — SIGNIFICANT CHANGE UP (ref 3.3–5.2)
ALP SERPL-CCNC: 49 U/L — SIGNIFICANT CHANGE UP (ref 40–120)
ALT FLD-CCNC: 16 U/L — SIGNIFICANT CHANGE UP
ANION GAP SERPL CALC-SCNC: 11 MMOL/L — SIGNIFICANT CHANGE UP (ref 5–17)
APTT BLD: 32.9 SEC — SIGNIFICANT CHANGE UP (ref 27.5–35.5)
AST SERPL-CCNC: 29 U/L — SIGNIFICANT CHANGE UP
BASOPHILS # BLD AUTO: 0.09 K/UL — SIGNIFICANT CHANGE UP (ref 0–0.2)
BASOPHILS NFR BLD AUTO: 1.3 % — SIGNIFICANT CHANGE UP (ref 0–2)
BILIRUB SERPL-MCNC: 0.5 MG/DL — SIGNIFICANT CHANGE UP (ref 0.4–2)
BUN SERPL-MCNC: 22.2 MG/DL — HIGH (ref 8–20)
CALCIUM SERPL-MCNC: 9 MG/DL — SIGNIFICANT CHANGE UP (ref 8.6–10.2)
CHLORIDE SERPL-SCNC: 93 MMOL/L — LOW (ref 98–107)
CO2 SERPL-SCNC: 25 MMOL/L — SIGNIFICANT CHANGE UP (ref 22–29)
CREAT SERPL-MCNC: 1.05 MG/DL — SIGNIFICANT CHANGE UP (ref 0.5–1.3)
EGFR: 53 ML/MIN/1.73M2 — LOW
EOSINOPHIL # BLD AUTO: 0.22 K/UL — SIGNIFICANT CHANGE UP (ref 0–0.5)
EOSINOPHIL NFR BLD AUTO: 3.2 % — SIGNIFICANT CHANGE UP (ref 0–6)
GLUCOSE SERPL-MCNC: 110 MG/DL — HIGH (ref 70–99)
HCT VFR BLD CALC: 36.6 % — SIGNIFICANT CHANGE UP (ref 34.5–45)
HGB BLD-MCNC: 12.4 G/DL — SIGNIFICANT CHANGE UP (ref 11.5–15.5)
IMM GRANULOCYTES NFR BLD AUTO: 0.1 % — SIGNIFICANT CHANGE UP (ref 0–1.5)
INR BLD: 1.44 RATIO — HIGH (ref 0.88–1.16)
LYMPHOCYTES # BLD AUTO: 1.55 K/UL — SIGNIFICANT CHANGE UP (ref 1–3.3)
LYMPHOCYTES # BLD AUTO: 22.3 % — SIGNIFICANT CHANGE UP (ref 13–44)
MCHC RBC-ENTMCNC: 29.5 PG — SIGNIFICANT CHANGE UP (ref 27–34)
MCHC RBC-ENTMCNC: 33.9 GM/DL — SIGNIFICANT CHANGE UP (ref 32–36)
MCV RBC AUTO: 86.9 FL — SIGNIFICANT CHANGE UP (ref 80–100)
MONOCYTES # BLD AUTO: 0.84 K/UL — SIGNIFICANT CHANGE UP (ref 0–0.9)
MONOCYTES NFR BLD AUTO: 12.1 % — SIGNIFICANT CHANGE UP (ref 2–14)
NEUTROPHILS # BLD AUTO: 4.23 K/UL — SIGNIFICANT CHANGE UP (ref 1.8–7.4)
NEUTROPHILS NFR BLD AUTO: 61 % — SIGNIFICANT CHANGE UP (ref 43–77)
PLATELET # BLD AUTO: 186 K/UL — SIGNIFICANT CHANGE UP (ref 150–400)
POTASSIUM SERPL-MCNC: 4.3 MMOL/L — SIGNIFICANT CHANGE UP (ref 3.5–5.3)
POTASSIUM SERPL-SCNC: 4.3 MMOL/L — SIGNIFICANT CHANGE UP (ref 3.5–5.3)
PROT SERPL-MCNC: 6.5 G/DL — LOW (ref 6.6–8.7)
PROTHROM AB SERPL-ACNC: 16.8 SEC — HIGH (ref 10.5–13.4)
RBC # BLD: 4.21 M/UL — SIGNIFICANT CHANGE UP (ref 3.8–5.2)
RBC # FLD: 11.9 % — SIGNIFICANT CHANGE UP (ref 10.3–14.5)
SARS-COV-2 RNA SPEC QL NAA+PROBE: SIGNIFICANT CHANGE UP
SODIUM SERPL-SCNC: 129 MMOL/L — LOW (ref 135–145)
TROPONIN T SERPL-MCNC: <0.01 NG/ML — SIGNIFICANT CHANGE UP (ref 0–0.06)
WBC # BLD: 6.94 K/UL — SIGNIFICANT CHANGE UP (ref 3.8–10.5)
WBC # FLD AUTO: 6.94 K/UL — SIGNIFICANT CHANGE UP (ref 3.8–10.5)

## 2022-07-08 PROCEDURE — 71045 X-RAY EXAM CHEST 1 VIEW: CPT | Mod: 26

## 2022-07-08 PROCEDURE — 99285 EMERGENCY DEPT VISIT HI MDM: CPT

## 2022-07-08 PROCEDURE — 93010 ELECTROCARDIOGRAM REPORT: CPT

## 2022-07-08 PROCEDURE — 99223 1ST HOSP IP/OBS HIGH 75: CPT

## 2022-07-08 PROCEDURE — 70498 CT ANGIOGRAPHY NECK: CPT | Mod: 26,MA

## 2022-07-08 PROCEDURE — 0042T: CPT | Mod: MA

## 2022-07-08 PROCEDURE — 70496 CT ANGIOGRAPHY HEAD: CPT | Mod: 26,MA

## 2022-07-08 RX ORDER — RIVAROXABAN 15 MG-20MG
20 KIT ORAL ONCE
Refills: 0 | Status: DISCONTINUED | OUTPATIENT
Start: 2022-07-08 | End: 2022-07-08

## 2022-07-08 RX ORDER — ATORVASTATIN CALCIUM 80 MG/1
80 TABLET, FILM COATED ORAL AT BEDTIME
Refills: 0 | Status: DISCONTINUED | OUTPATIENT
Start: 2022-07-08 | End: 2022-07-09

## 2022-07-08 RX ORDER — DILTIAZEM HCL 120 MG
30 CAPSULE, EXT RELEASE 24 HR ORAL ONCE
Refills: 0 | Status: DISCONTINUED | OUTPATIENT
Start: 2022-07-08 | End: 2022-07-08

## 2022-07-08 RX ORDER — ESCITALOPRAM OXALATE 10 MG/1
5 TABLET, FILM COATED ORAL DAILY
Refills: 0 | Status: DISCONTINUED | OUTPATIENT
Start: 2022-07-08 | End: 2022-07-08

## 2022-07-08 RX ORDER — ASPIRIN/CALCIUM CARB/MAGNESIUM 324 MG
81 TABLET ORAL DAILY
Refills: 0 | Status: DISCONTINUED | OUTPATIENT
Start: 2022-07-08 | End: 2022-07-09

## 2022-07-08 RX ORDER — RIVAROXABAN 15 MG-20MG
15 KIT ORAL DAILY
Refills: 0 | Status: DISCONTINUED | OUTPATIENT
Start: 2022-07-08 | End: 2022-07-09

## 2022-07-08 RX ORDER — ATORVASTATIN CALCIUM 80 MG/1
80 TABLET, FILM COATED ORAL AT BEDTIME
Refills: 0 | Status: DISCONTINUED | OUTPATIENT
Start: 2022-07-08 | End: 2022-07-08

## 2022-07-08 RX ORDER — ESCITALOPRAM OXALATE 10 MG/1
5 TABLET, FILM COATED ORAL DAILY
Refills: 0 | Status: DISCONTINUED | OUTPATIENT
Start: 2022-07-08 | End: 2022-07-09

## 2022-07-08 RX ORDER — LEVETIRACETAM 250 MG/1
500 TABLET, FILM COATED ORAL
Refills: 0 | Status: DISCONTINUED | OUTPATIENT
Start: 2022-07-08 | End: 2022-07-08

## 2022-07-08 RX ORDER — ASPIRIN/CALCIUM CARB/MAGNESIUM 324 MG
81 TABLET ORAL DAILY
Refills: 0 | Status: DISCONTINUED | OUTPATIENT
Start: 2022-07-08 | End: 2022-07-08

## 2022-07-08 RX ORDER — DILTIAZEM HCL 120 MG
30 CAPSULE, EXT RELEASE 24 HR ORAL DAILY
Refills: 0 | Status: DISCONTINUED | OUTPATIENT
Start: 2022-07-08 | End: 2022-07-09

## 2022-07-08 RX ORDER — LEVETIRACETAM 250 MG/1
500 TABLET, FILM COATED ORAL
Refills: 0 | Status: DISCONTINUED | OUTPATIENT
Start: 2022-07-08 | End: 2022-07-09

## 2022-07-08 RX ADMIN — ATORVASTATIN CALCIUM 80 MILLIGRAM(S): 80 TABLET, FILM COATED ORAL at 22:23

## 2022-07-08 NOTE — ED PROVIDER NOTE - PHYSICAL EXAMINATION
General: well appearing, NAD  Head: NC, AT  EENT: EOMI, no scleral icterus  Cardiac: RRR, no apparent murmurs, no lower extremity edema  Respiratory: CTABL, no respiratory distress   Abdomen: soft, ND, NT, nonperitonitic  MSK/Vascular: full ROM, soft compartments, warm extremities  Neuro: AAOx3, sensation to light touch intact Left LE strength 2/5, Otherwise 5/5. No drift in UE, or RLE. No slurred speech, speech not fluent  Psych: calm, cooperative

## 2022-07-08 NOTE — ED PROVIDER NOTE - ATTENDING APP SHARED VISIT CONTRIBUTION OF CARE
h/o  seizure, stroke with tpa within last  3 months with difficulty speaking and left leg weakness; code stroke  ; pt not a candidate for tpa due to  recent stroke ; pt to be evaluated by neurology;

## 2022-07-08 NOTE — ED PROVIDER NOTE - NS ED ATTENDING STATEMENT MOD
This was a shared visit with the MAURISIO. I reviewed and verified the documentation and independently performed the documented:

## 2022-07-08 NOTE — ED ADULT NURSE REASSESSMENT NOTE - NS ED NURSE REASSESS COMMENT FT1
Assumed care for pt at 17:00 as primary RN. Pt laying comfortably on stretcher, denies pain/discomfort. Patient remains A&Ox4. Will continue to monitor. Pt currently NSR on monitor and satting at 96% on RA.

## 2022-07-08 NOTE — ED PROVIDER NOTE - OBJECTIVE STATEMENT
81 y f with pmh of repeated cvas, ra, htn, renal cancer presenting for left LE weakness and decreased sensation since 1445 today. Also associated with decreased fluency of speech. Denies ha, cp, sob, ab pain.

## 2022-07-08 NOTE — H&P ADULT - NSHPREVIEWOFSYSTEMS_GEN_ALL_CORE
Review of Systems:  CONSTITUTIONAL: No fevers or chills  EYES/ENT: No visual changes;  No vertigo or throat pain   NECK: No pain or stiffness  RESPIRATORY: No cough, wheezing, hemoptysis; No shortness of breath,   CARDIOVASCULAR: No chest pain or palpitations  GASTROINTESTINAL: No abdominal or epigastric pain. No nausea, vomiting, or hematemesis; No diarrhea or constipation.   GENITOURINARY: No dysuria, frequency or hematuria  NEUROLOGICAL: +lle weakness and slurred speech  SKIN: No itching, burning, rashes, or lesions

## 2022-07-08 NOTE — ED ADULT NURSE NOTE - NSIMPLEMENTINTERV_GEN_ALL_ED
Implemented All Fall Risk Interventions:  [place] to call system. Call bell, personal items and telephone within reach. Instruct patient to call for assistance. Room bathroom lighting operational. Non-slip footwear when patient is off stretcher. Physically safe environment: no spills, clutter or unnecessary equipment. Stretcher in lowest position, wheels locked, appropriate side rails in place. Provide visual cue, wrist band, yellow gown, etc. Monitor gait and stability. Monitor for mental status changes and reorient to person, place, and time. Review medications for side effects contributing to fall risk. Reinforce activity limits and safety measures with patient and family.

## 2022-07-08 NOTE — H&P ADULT - HISTORY OF PRESENT ILLNESS
81 year old female with pmh of ?tia x multiple episodes, cad s/p stents, hld, htn, ILR ?afib however on a/c coming to hospital with left lower leg weakness and slurred speech. was admitted to [facility] multiple times including most recently 06/2022 where mri was refused. patient states ~1445 today start having slurred speech and left leg weakness. states was like previous episodes. seen by neuro in ed awaiting mri. denies cp sob nausea vomiting diarrhea

## 2022-07-08 NOTE — ED ADULT NURSE NOTE - OBJECTIVE STATEMENT
pt presents to ED as code stroke. LKW approx 1445. hx of seizure and multiple strokes in past with residual L sided weakness. Pt with fragmented speech and LLE has no effort against gravity. pt sent fro Stat CT on arrival. not a candidate for TPA as per MD. pt resting comfortably, offering no complaints at this time. CM in place NAD noted. awaiting plan of care.

## 2022-07-08 NOTE — ED ADULT NURSE NOTE - CHPI ED NUR SYMPTOMS NEG
no change in level of consciousness/no confusion/no dizziness/no fever/no loss of consciousness/no nausea/no numbness/no vomiting

## 2022-07-08 NOTE — ED PROVIDER NOTE - NS ED ROS FT
Constitutional: no fever, no chills  Head: NC, AT   Eyes: no redness   ENMT: no nasal congestion/drainage, no sore throat   CV: no chest pain, no edema  Resp: no cough, no dyspnea  GI: no abdominal pain, no nausea, no vomiting, no diarrhea  : no dysuria, no hematuria   Skin: no lesions, no rashes   Neuro: no LOC, no headache, no sensory deficits, decrease strength in L LE. difficulty w speech

## 2022-07-08 NOTE — CONSULT NOTE ADULT - SUBJECTIVE AND OBJECTIVE BOX
LSW 3 PM   Neurology consult    BREANA HELM 81y Female     Patient is a 81y old  Female who presents with a chief complaint of     HPI:  81 y f with pmh of repeated cvas, ra, htn, renal cancer presenting for left LE weakness and decreased sensation since 1445 today. Also associated with decreased fluency of speech. Denies ha, cp, sob, ab pain.      PMH: HTN (hypertension)    Rheumatic fever    TIA (transient ischemic attack)    CAD S/P percutaneous coronary angioplasty    Polio    Cerebrovascular accident (CVA)         PSH: Status post tonsillectomy    H/O left nephrectomy    Renal cancer          FAMILY HISTORY:  FHx: breast cancer        SOCIAL HISTORY:  No history of tobacco or alcohol use     Allergies    Biaxin (Muscle Pain; Joint Pain)  codeine (Faint)  Metoprolol Succinate ER (Unknown)  monoctanoin (Unknown)    Intolerances    Isosorbide Mononitrate Extended Release (Faint)      Height (cm): 157.5 (07-08 @ 15:32)  Weight (kg): 73.9 (07-08 @ 15:32)  BMI (kg/m2): 29.8 (07-08 @ 15:32)    Vital Signs Last 24 Hrs  T(C): 36.4 (08 Jul 2022 15:32), Max: 36.4 (08 Jul 2022 15:32)  T(F): 97.6 (08 Jul 2022 15:32), Max: 97.6 (08 Jul 2022 15:32)  HR: 79 (08 Jul 2022 15:32) (79 - 79)  BP: 159/83 (08 Jul 2022 15:32) (159/83 - 159/83)  BP(mean): --  RR: 16 (08 Jul 2022 15:32) (16 - 16)  SpO2: 99% (08 Jul 2022 15:32) (99% - 99%)    Parameters below as of 08 Jul 2022 15:32  Patient On (Oxygen Delivery Method): room air      MEDICATIONS          LABS:  CBC Full  -  ( 08 Jul 2022 16:27 )  WBC Count : 6.94 K/uL  RBC Count : 4.21 M/uL  Hemoglobin : 12.4 g/dL  Hematocrit : 36.6 %  Platelet Count - Automated : 186 K/uL  Mean Cell Volume : 86.9 fl  Mean Cell Hemoglobin : 29.5 pg  Mean Cell Hemoglobin Concentration : 33.9 gm/dL  Auto Neutrophil # : 4.23 K/uL  Auto Lymphocyte # : 1.55 K/uL  Auto Monocyte # : 0.84 K/uL  Auto Eosinophil # : 0.22 K/uL  Auto Basophil # : 0.09 K/uL  Auto Neutrophil % : 61.0 %  Auto Lymphocyte % : 22.3 %  Auto Monocyte % : 12.1 %  Auto Eosinophil % : 3.2 %  Auto Basophil % : 1.3 %      07-08    129<L>  |  93<L>  |  22.2<H>  ----------------------------<  110<H>  4.3   |  25.0  |  1.05    Ca    9.0      08 Jul 2022 16:27    TPro  6.5<L>  /  Alb  4.5  /  TBili  0.5  /  DBili  x   /  AST  29  /  ALT  16  /  AlkPhos  49  07-08    LIVER FUNCTIONS - ( 08 Jul 2022 16:27 )  Alb: 4.5 g/dL / Pro: 6.5 g/dL / ALK PHOS: 49 U/L / ALT: 16 U/L / AST: 29 U/L / GGT: x           Hemoglobin A1C:       PT/INR - ( 08 Jul 2022 16:27 )   PT: 16.8 sec;   INR: 1.44 ratio         PTT - ( 08 Jul 2022 16:27 )  PTT:32.9 sec      On Neurological Examination:    Mental Status - Patient is  awake, alert and oriented X3.  Speech is very hesitant and appears deliberate. Patient can name objects and follow commands.      Cranial Nerves - PERRL, EOMI,  Visual fields are full to finger counting, no gross facial asymmetry, tongue/uvula midline    Motor Exam -   Right upper ---5/5 No drift  Left upper ---5/5 No drift  Right lower ---5/5 No drift  Left lower  ---1/5 No drift  Connell's test is positive.     nml bulk/tone    Sensory    Intact to light touch and pinprick bilaterally    Coord: FTN intact bilaterally     Gait -  Not assessed.     [facility] SS:   Date:  07/08/ 22  Time: 1600  1a) Level of consciousness (0-3):   1b) Questions (0-2):   1c) Commands (0-2):   2  ) Gaze (0-2):   3  ) Visual field (0-3):   4  ) Facial palsy (0-3):   Motor  5a) Left arm (0-4):   5b) Right arm (0-4):   6a) Left leg (0-4): ------------------------------------3  6b) Right leg (0-4):   7  ) Ataxia (0-2):   Sensory  8  ) Sensory (0-2):   Speech  9  ) Language (0-3):   10) Dysarthria (0-2): -------------------------------------2  Extinction  11) Extinction/inattention (0-2):     Total score: = 5    Patient was last seen well at 1445  Prestroke Modified [place]: 0         RADIOLOGY ( All neurological imaging studies were independently reviewed and interpreted by me)  [facility]     < from: CT Brain Stroke Protocol (07.08.22 @ 15:45) >      IMPRESSION:    1.  No CT evidence foracute intracranial hemorrhage, territorial   infarction or mass effect. If the patient has persistent symptoms and/or   new focal neurologic deficits, consider further evaluation with MRI.    2.  Nonspecific findings suggestive of prior chronic lacunar infarcts and   chronic small vessel ischemia.      Findings were discussed with Dr. Joe Salamanca (emergency medicine) via   telephone on 7/8/2022 at 3:50 PM with verbal read back.    MEETA GRIFFIN MD; Attending Radiologist      CTA  CTP  CT Angio Brain Stroke Protocol  w/ IV Cont (07.08.22 @ 16:04) >    IMPRESSION:    1.  No proximal large vessel arterial occlusion, flow-limiting stenosis   or dissection within the head and neck.    2.  Tiny 2 mm outpouching at left posterior communicating artery origin,   likely reflects an incidental infundibulum versus less likely a tiny   saccular aneurysm.    3.  No acute core ischemic infarct or critically hypoperfused tissue at   risk is identified on perfusion imaging.      Findings were discussed with Dr. Joe Salamanca (emergency medicine) via   telephone on 7/8/2022 at 3:50 PM with verbal read back.  MEETA GRIFFIN MD; Attending Radiologist        MRI:     MR Head No Cont (04.25.22 @ 14:50) >  IMPRESSION:  NO EVIDENCE OF A MASS OR CURRENT EVIDENCE OF ACUTE ISCHEMIA. ATROPHYWITH   CHRONIC WHITE MATTER ISCHEMIC CHANGES. NO SIGNIFICANT INTERVAL CHANGE   FROM PRIOR MRI OF 9/9/2021    KENYATTA CELESTIN MD; Attending Radiologist      TTE        LSW 3 PM

## 2022-07-08 NOTE — H&P ADULT - NSHPPHYSICALEXAM_GEN_ALL_CORE
Vital Signs Last 24 Hrs  T(F): 97.6 (08 Jul 2022 15:32), Max: 97.6 (08 Jul 2022 15:32)  HR: 79 (08 Jul 2022 15:32) (79 - 79)  BP: 159/83 (08 Jul 2022 15:32) (159/83 - 159/83)  RR: 16 (08 Jul 2022 15:32) (16 - 16)  SpO2: 99% (08 Jul 2022 15:32) (99% - 99%)    Physical Exam:  Constitutional: NAD, awake and alert, well-developed  Neck: Soft and supple, No LAD, No JVD  Respiratory: cta b/l no wheezing no rhonchi  Cardiovascular: +s1/s2 no edema b/l le  Gastrointestinal: soft nt nd bs+  Vascular: 2+ peripheral pulses  Neurological: A/O x 3   Musculoskeletal: 5/5 strength b/l upper extremity; 5/5 right lower extremity; 1/5 strength left lower extremity  : Contraindicated  Breasts: Contraindicated  Rectal: Contraindicated

## 2022-07-08 NOTE — H&P ADULT - ASSESSMENT
81 year old female presented to hospital lle weakness and slurred speech    #LLE weakness  - check mr and us carotids  - neuro consult pending d/w Dr Webb  - neuro-checks  - aspirin statin xarelto   - pt/ot/pmr consult    #pAFib? in patient ilr no proven afib  - Cardizem and xarelto    #CAD s/p stent   - statin, aspirin     #HTN- Essential  - monitor blood pressure  - cardizem     #Depression   - Lexapro      #DVT Prophylaxis  - xarelto   81 year old female presented to hospital lle weakness and slurred speech    #LLE weakness  - check mr and us carotids  - neuro consult pending d/w Dr Webb  - neuro-checks  - aspirin statin xarelto   - pt/ot/pmr consult  - given multiple similar episodes with no proven cva will get dariel consult as d/w neuro team    #pAFib? in patient ilr no proven afib  - Cardizem and xarelto    #CAD s/p stent   - statin, aspirin     #HTN- Essential  - monitor blood pressure  - cardizem     #Depression   - Lexapro      #DVT Prophylaxis  - xarelto

## 2022-07-09 VITALS
DIASTOLIC BLOOD PRESSURE: 69 MMHG | OXYGEN SATURATION: 98 % | TEMPERATURE: 98 F | SYSTOLIC BLOOD PRESSURE: 117 MMHG | HEART RATE: 62 BPM | RESPIRATION RATE: 18 BRPM

## 2022-07-09 LAB
A1C WITH ESTIMATED AVERAGE GLUCOSE RESULT: 5.2 % — SIGNIFICANT CHANGE UP (ref 4–5.6)
ANION GAP SERPL CALC-SCNC: 10 MMOL/L — SIGNIFICANT CHANGE UP (ref 5–17)
BUN SERPL-MCNC: 16.4 MG/DL — SIGNIFICANT CHANGE UP (ref 8–20)
CALCIUM SERPL-MCNC: 8.4 MG/DL — LOW (ref 8.6–10.2)
CHLORIDE SERPL-SCNC: 102 MMOL/L — SIGNIFICANT CHANGE UP (ref 98–107)
CHOLEST SERPL-MCNC: 101 MG/DL — SIGNIFICANT CHANGE UP
CO2 SERPL-SCNC: 28 MMOL/L — SIGNIFICANT CHANGE UP (ref 22–29)
CREAT SERPL-MCNC: 1.02 MG/DL — SIGNIFICANT CHANGE UP (ref 0.5–1.3)
EGFR: 55 ML/MIN/1.73M2 — LOW
ESTIMATED AVERAGE GLUCOSE: 103 MG/DL — SIGNIFICANT CHANGE UP (ref 68–114)
GLUCOSE SERPL-MCNC: 98 MG/DL — SIGNIFICANT CHANGE UP (ref 70–99)
HCT VFR BLD CALC: 38.2 % — SIGNIFICANT CHANGE UP (ref 34.5–45)
HDLC SERPL-MCNC: 60 MG/DL — SIGNIFICANT CHANGE UP
HGB BLD-MCNC: 12.9 G/DL — SIGNIFICANT CHANGE UP (ref 11.5–15.5)
LIPID PNL WITH DIRECT LDL SERPL: 34 MG/DL — SIGNIFICANT CHANGE UP
MCHC RBC-ENTMCNC: 29.7 PG — SIGNIFICANT CHANGE UP (ref 27–34)
MCHC RBC-ENTMCNC: 33.8 GM/DL — SIGNIFICANT CHANGE UP (ref 32–36)
MCV RBC AUTO: 88 FL — SIGNIFICANT CHANGE UP (ref 80–100)
NON HDL CHOLESTEROL: 41 MG/DL — SIGNIFICANT CHANGE UP
PLATELET # BLD AUTO: 180 K/UL — SIGNIFICANT CHANGE UP (ref 150–400)
POTASSIUM SERPL-MCNC: 4.7 MMOL/L — SIGNIFICANT CHANGE UP (ref 3.5–5.3)
POTASSIUM SERPL-SCNC: 4.7 MMOL/L — SIGNIFICANT CHANGE UP (ref 3.5–5.3)
RBC # BLD: 4.34 M/UL — SIGNIFICANT CHANGE UP (ref 3.8–5.2)
RBC # FLD: 12.1 % — SIGNIFICANT CHANGE UP (ref 10.3–14.5)
SODIUM SERPL-SCNC: 140 MMOL/L — SIGNIFICANT CHANGE UP (ref 135–145)
TRIGL SERPL-MCNC: 33 MG/DL — SIGNIFICANT CHANGE UP
WBC # BLD: 6.64 K/UL — SIGNIFICANT CHANGE UP (ref 3.8–10.5)
WBC # FLD AUTO: 6.64 K/UL — SIGNIFICANT CHANGE UP (ref 3.8–10.5)

## 2022-07-09 PROCEDURE — 70450 CT HEAD/BRAIN W/O DYE: CPT | Mod: MA

## 2022-07-09 PROCEDURE — 36415 COLL VENOUS BLD VENIPUNCTURE: CPT

## 2022-07-09 PROCEDURE — 70496 CT ANGIOGRAPHY HEAD: CPT | Mod: MA

## 2022-07-09 PROCEDURE — 70551 MRI BRAIN STEM W/O DYE: CPT | Mod: 26

## 2022-07-09 PROCEDURE — 93005 ELECTROCARDIOGRAM TRACING: CPT

## 2022-07-09 PROCEDURE — 99233 SBSQ HOSP IP/OBS HIGH 50: CPT

## 2022-07-09 PROCEDURE — U0003: CPT

## 2022-07-09 PROCEDURE — 71045 X-RAY EXAM CHEST 1 VIEW: CPT

## 2022-07-09 PROCEDURE — 70551 MRI BRAIN STEM W/O DYE: CPT | Mod: MA

## 2022-07-09 PROCEDURE — 80048 BASIC METABOLIC PNL TOTAL CA: CPT

## 2022-07-09 PROCEDURE — U0005: CPT

## 2022-07-09 PROCEDURE — 85730 THROMBOPLASTIN TIME PARTIAL: CPT

## 2022-07-09 PROCEDURE — 93880 EXTRACRANIAL BILAT STUDY: CPT

## 2022-07-09 PROCEDURE — 80053 COMPREHEN METABOLIC PANEL: CPT

## 2022-07-09 PROCEDURE — 70498 CT ANGIOGRAPHY NECK: CPT | Mod: MA

## 2022-07-09 PROCEDURE — 93880 EXTRACRANIAL BILAT STUDY: CPT | Mod: 26

## 2022-07-09 PROCEDURE — 0042T: CPT | Mod: MA

## 2022-07-09 PROCEDURE — 99285 EMERGENCY DEPT VISIT HI MDM: CPT

## 2022-07-09 PROCEDURE — 85027 COMPLETE CBC AUTOMATED: CPT

## 2022-07-09 PROCEDURE — 84484 ASSAY OF TROPONIN QUANT: CPT

## 2022-07-09 PROCEDURE — 85025 COMPLETE CBC W/AUTO DIFF WBC: CPT

## 2022-07-09 PROCEDURE — 82962 GLUCOSE BLOOD TEST: CPT

## 2022-07-09 PROCEDURE — 80061 LIPID PANEL: CPT

## 2022-07-09 PROCEDURE — 83036 HEMOGLOBIN GLYCOSYLATED A1C: CPT

## 2022-07-09 PROCEDURE — 85610 PROTHROMBIN TIME: CPT

## 2022-07-09 RX ADMIN — Medication 30 MILLIGRAM(S): at 05:21

## 2022-07-09 RX ADMIN — ESCITALOPRAM OXALATE 5 MILLIGRAM(S): 10 TABLET, FILM COATED ORAL at 17:20

## 2022-07-09 RX ADMIN — LEVETIRACETAM 500 MILLIGRAM(S): 250 TABLET, FILM COATED ORAL at 17:20

## 2022-07-09 RX ADMIN — LEVETIRACETAM 500 MILLIGRAM(S): 250 TABLET, FILM COATED ORAL at 05:16

## 2022-07-09 RX ADMIN — Medication 81 MILLIGRAM(S): at 17:19

## 2022-07-09 NOTE — PROGRESS NOTE ADULT - ASSESSMENT
81 year old female presented to hospital lle weakness and slurred speech    #LLE weakness, dysarthria - R/u CVA  mri negative  us carotids pending read  neuro consulted   Patient with frequent presentations for simialr symptoms. always with negative work up  Was evaluated for seizures in past  Psych consult for potential conversion disorder?   cont neuro-checks  aspirin statin xarelto   pt/ot/pmr consult    #pAFib? in patient ilr no proven afib  - Cardizem and xarelto    #CAD s/p stent   - statin, aspirin     #HTN- Essential  - monitor blood pressure  - cardizem     #Depression   - Lexapro      #DVT Prophylaxis  - xarelto      Dispo: Pending psych eval, PT   
IMPRESSION:  -Recurrent episodes of dysarthria and left leg weakness. Now completely resolved.  Etiology and mechanism is unclear. Dr Clinton in the past has raised the possibility of seizure phenomenon.    ASSESSMENT/ PLAN:           - Needs psychiatry evaluation to R/O conversion disorder.  - Neuro checks and vital signs Q 4 hours.  - SBP goal keep normotensive.  - Continue ASA and Xarelto  - Lipitor for LDL goal of < 70 .  - Telemetry monitoring.  - CT/CTA/CTP -images and reports were reviewed.   - MRI Brain stroke protocol.-- No acute infarct. official report pending.  - PT OT   -  DVT prophylaxis.

## 2022-07-09 NOTE — PROGRESS NOTE ADULT - SUBJECTIVE AND OBJECTIVE BOX
Neurology consult    BREANA HELM 81y Female     Patient is a 81y old  Female who presents with a chief complaint of     HPI:  81 y f with pmh of repeated cvas, ra, htn, renal cancer presenting for left LE weakness and decreased sensation since 1445 today. Also associated with decreased fluency of speech. Denies ha, cp, sob, ab pain.      PMH: HTN (hypertension)    Rheumatic fever    TIA (transient ischemic attack)    CAD S/P percutaneous coronary angioplasty    [place]    Cerebrovascular accident (CVA)         PSH: Status post tonsillectomy    H/O left nephrectomy    Renal cancer          FAMILY HISTORY:  FHx: breast cancer        SOCIAL HISTORY:  No history of tobacco or alcohol use     Allergies    Biaxin (Muscle Pain; Joint Pain)  codeine (Faint)  Metoprolol Succinate ER (Unknown)  monoctanoin (Unknown)    Intolerances    Isosorbide Mononitrate Extended Release (Faint)      Height (cm): 157.5 (07-08 @ 15:32)  Weight (kg): 73.9 (07-08 @ 15:32)  BMI (kg/m2): 29.8 (07-08 @ 15:32)          Vital Signs Last 24 Hrs  T(C): 36.5 (09 Jul 2022 11:29), Max: 36.7 (08 Jul 2022 20:30)  T(F): 97.7 (09 Jul 2022 11:29), Max: 98 (08 Jul 2022 20:30)  HR: 62 (09 Jul 2022 11:29) (62 - 80)  BP: 117/69 (09 Jul 2022 11:29) (93/59 - 159/83)  BP(mean): --  RR: 18 (09 Jul 2022 11:29) (16 - 18)  SpO2: 98% (09 Jul 2022 11:29) (96% - 99%)    Parameters below as of 09 Jul 2022 11:29  Patient On (Oxygen Delivery Method): room air        MEDICATIONS    aspirin enteric coated 81 milliGRAM(s) Oral daily  atorvastatin 80 milliGRAM(s) Oral at bedtime  diltiazem    Tablet 30 milliGRAM(s) Oral daily  escitalopram 5 milliGRAM(s) Oral daily  levETIRAcetam 500 milliGRAM(s) Oral two times a day  rivaroxaban 15 milliGRAM(s) Oral daily         LABS:  CBC Full  -  ( 09 Jul 2022 08:04 )  WBC Count : 6.64 K/uL  RBC Count : 4.34 M/uL  Hemoglobin : 12.9 g/dL  Hematocrit : 38.2 %  Platelet Count - Automated : 180 K/uL  Mean Cell Volume : 88.0 fl  Mean Cell Hemoglobin : 29.7 pg  Mean Cell Hemoglobin Concentration : 33.8 gm/dL  Auto Neutrophil # : x  Auto Lymphocyte # : x  Auto Monocyte # : x  Auto Eosinophil # : x  Auto Basophil # : x  Auto Neutrophil % : x  Auto Lymphocyte % : x  Auto Monocyte % : x  Auto Eosinophil % : x  Auto Basophil % : x      07-09    140  |  102  |  16.4  ----------------------------<  98  4.7   |  28.0  |  1.02    Ca    8.4<L>      09 Jul 2022 08:04    TPro  6.5<L>  /  Alb  4.5  /  TBili  0.5  /  DBili  x   /  AST  29  /  ALT  16  /  AlkPhos  49  07-08    LIVER FUNCTIONS - ( 08 Jul 2022 16:27 )  Alb: 4.5 g/dL / Pro: 6.5 g/dL / ALK PHOS: 49 U/L / ALT: 16 U/L / AST: 29 U/L / GGT: x           Hemoglobin A1C:   Lipid Panel 07-09 @ 08:04  Total Cholesterol, Serum 101  LDL --  Triglycerides 33      PT/INR - ( 08 Jul 2022 16:27 )   PT: 16.8 sec;   INR: 1.44 ratio         PTT - ( 08 Jul 2022 16:27 )  PTT:32.9 sec  On Neurological Examination:    Mental Status - Patient is  awake, alert and oriented X3.  Speech is normal. She can name and repeat well. There is no dysarthria.      Cranial Nerves - PERRL, EOMI,  Visual fields are full to finger counting, no gross facial asymmetry, tongue/uvula midline    Motor Exam -   Right upper ---5/5 No drift  Left upper ---5/5 No drift  Right lower ---5/5 No drift  Left lower  ---1/5 No drift       nml bulk/tone    Sensory    Intact to light touch and pinprick bilaterally    Coord: FTN intact bilaterally     Gait -  Not assessed.        RADIOLOGY ( All neurological imaging studies were independently reviewed and interpreted by me)  [facility]     < from: CT Brain Stroke Protocol (07.08.22 @ 15:45) >      IMPRESSION:    1.  No CT evidence foracute intracranial hemorrhage, territorial   infarction or mass effect. If the patient has persistent symptoms and/or   new focal neurologic deficits, consider further evaluation with MRI.    2.  Nonspecific findings suggestive of prior chronic lacunar infarcts and   chronic small vessel ischemia.      Findings were discussed with Dr. Joe Salamanca (emergency medicine) via   telephone on 7/8/2022 at 3:50 PM with verbal read back.    MEETA GRIFFIN MD; Attending Radiologist      CTA  CTP  CT Angio Brain Stroke Protocol  w/ IV Cont (07.08.22 @ 16:04) >    IMPRESSION:    1.  No proximal large vessel arterial occlusion, flow-limiting stenosis   or dissection within the head and neck.    2.  Tiny 2 mm outpouching at left posterior communicating artery origin,   likely reflects an incidental infundibulum versus less likely a tiny   saccular aneurysm.    3.  No acute core ischemic infarct or critically hypoperfused tissue at   risk is identified on perfusion imaging.      Findings were discussed with Dr. Joe Salamanca (emergency medicine) via   telephone on 7/8/2022 at 3:50 PM with verbal read back.  MEETA GRIFFIN MD; Attending Radiologist        MRI:     MR Head No Cont (04.25.22 @ 14:50) >  IMPRESSION:  NO EVIDENCE OF A MASS OR CURRENT EVIDENCE OF ACUTE ISCHEMIA. ATROPHYWITH   CHRONIC WHITE MATTER ISCHEMIC CHANGES. NO SIGNIFICANT INTERVAL CHANGE   FROM PRIOR MRI OF 9/9/2021    KENYATTA CELESTIN MD; Attending Radiologist          TTE        LSW 3 PM
[facility] Division of Hospital Medicine    Chief Complaint:    R/o Stroke    SUBJECTIVE / OVERNIGHT EVENTS:  admitted ot medicine   dysasrthria, leg weakness resolved    Patient denies chest pain, SOB, abd pain, N/V, fever, chills, dysuria or any other complaints. All remainder ROS negative.     MEDICATIONS  (STANDING):  aspirin enteric coated 81 milliGRAM(s) Oral daily  atorvastatin 80 milliGRAM(s) Oral at bedtime  diltiazem    Tablet 30 milliGRAM(s) Oral daily  escitalopram 5 milliGRAM(s) Oral daily  levETIRAcetam 500 milliGRAM(s) Oral two times a day  rivaroxaban 15 milliGRAM(s) Oral daily    MEDICATIONS  (PRN):        I&O's Summary      PHYSICAL EXAM:  Vital Signs Last 24 Hrs  T(C): 36.5 (09 Jul 2022 11:29), Max: 36.7 (08 Jul 2022 20:30)  T(F): 97.7 (09 Jul 2022 11:29), Max: 98 (08 Jul 2022 20:30)  HR: 62 (09 Jul 2022 11:29) (62 - 80)  BP: 117/69 (09 Jul 2022 11:29) (93/59 - 159/83)  BP(mean): --  RR: 18 (09 Jul 2022 11:29) (16 - 18)  SpO2: 98% (09 Jul 2022 11:29) (96% - 99%)    Parameters below as of 09 Jul 2022 11:29  Patient On (Oxygen Delivery Method): room air            CONSTITUTIONAL: NAD, elderly  ENMT: Moist oral mucosa, no pharyngeal injection or exudates; normal dentition  RESPIRATORY: Normal respiratory effort; lungs are clear to auscultation bilaterally  CARDIOVASCULAR: Regular rate and rhythm, normal S1 and S2, no murmur/rub/gallop; Peripheral pulses are 2+ bilaterally  ABDOMEN: Nontender to palpation, normoactive bowel sounds, no rebound/guarding;   MUSCLOSKELETAL:  No clubbing or cyanosis of digits; no joint swelling or tenderness to palpation  PSYCH: A+O to person, place, and time; affect appropriate  NEUROLOGY: CN 2-12 are intact and symmetric; no gross sensory deficits;   SKIN: No rashes; no palpable lesions    LABS:                        12.9   6.64  )-----------( 180      ( 09 Jul 2022 08:04 )             38.2     07-09    140  |  102  |  16.4  ----------------------------<  98  4.7   |  28.0  |  1.02    Ca    8.4<L>      09 Jul 2022 08:04    TPro  6.5<L>  /  Alb  4.5  /  TBili  0.5  /  DBili  x   /  AST  29  /  ALT  16  /  AlkPhos  49  07-08    PT/INR - ( 08 Jul 2022 16:27 )   PT: 16.8 sec;   INR: 1.44 ratio         PTT - ( 08 Jul 2022 16:27 )  PTT:32.9 sec  CARDIAC MARKERS ( 08 Jul 2022 16:27 )  x     / <0.01 ng/mL / x     / x     / x              CAPILLARY BLOOD GLUCOSE      POCT Blood Glucose.: 130 mg/dL (08 Jul 2022 15:32)        RADIOLOGY & ADDITIONAL TESTS:  Results Reviewed:   Imaging Personally Reviewed:  Electrocardiogram Personally Reviewed:

## 2022-07-09 NOTE — PATIENT PROFILE ADULT - FALL HARM RISK - UNIVERSAL INTERVENTIONS
Bed in lowest position, wheels locked, appropriate side rails in place/Call bell, personal items and telephone in reach/Instruct patient to call for assistance before getting out of bed or chair/Non-slip footwear when patient is out of bed/[place]rock to call system/Physically safe environment - no spills, clutter or unnecessary equipment/Purposeful Proactive Rounding/Room/bathroom lighting operational, light cord in reach

## 2022-07-14 NOTE — SPEECH LANGUAGE PATHOLOGY EVALUATION - SLP ORIENTATION
A&A Ox4 FREE:[LAST:[your primary care doctor],PHONE:[(   )    -],FAX:[(   )    -],FOLLOWUP:[1-3 Days]]

## 2022-07-15 ENCOUNTER — APPOINTMENT (OUTPATIENT)
Dept: NEUROLOGY | Facility: CLINIC | Age: 82
End: 2022-07-15

## 2022-07-15 VITALS
WEIGHT: 138 LBS | OXYGEN SATURATION: 99 % | DIASTOLIC BLOOD PRESSURE: 78 MMHG | SYSTOLIC BLOOD PRESSURE: 124 MMHG | HEIGHT: 62 IN | BODY MASS INDEX: 25.4 KG/M2 | HEART RATE: 63 BPM

## 2022-07-15 DIAGNOSIS — E78.5 HYPERLIPIDEMIA, UNSPECIFIED: ICD-10-CM

## 2022-07-15 DIAGNOSIS — I25.10 ATHEROSCLEROTIC HEART DISEASE OF NATIVE CORONARY ARTERY W/OUT ANGINA PECTORIS: ICD-10-CM

## 2022-07-15 DIAGNOSIS — Z51.81 ENCOUNTER FOR THERAPEUTIC DRUG LVL MONITORING: ICD-10-CM

## 2022-07-15 DIAGNOSIS — Z85.528 PERSONAL HISTORY OF OTHER MALIGNANT NEOPLASM OF KIDNEY: ICD-10-CM

## 2022-07-15 DIAGNOSIS — R56.9 UNSPECIFIED CONVULSIONS: ICD-10-CM

## 2022-07-15 PROCEDURE — 99215 OFFICE O/P EST HI 40 MIN: CPT

## 2022-07-15 NOTE — PHYSICAL EXAM
[FreeTextEntry1] : GENERAL PHYSICAL EXAM:\par GEN: no distress\par HEENT: NCAT, OP clear\par EYES: sclera white, conjunctiva clear, no nystagmus\par NECK: supple\par CV: RRR    		\par PULM: CTAB, no wheezing\par GI: soft ABD, +BS, NT, ND\par EXT: peripheral pulse intact, no cyanosis\par SKIN: warm, dry, no rash or lesion on exposed skin \par \par NEUROLOGICAL EXAM:\par Mental Status\par Orientation: alert and oriented to person, place, time, and situation \par Language: clear and fluent\par \par Cranial Nerves\par II: full visual fields intact \par III, IV, VI: PERRL, EOMI\par V, VII: facial sensation and movement intact and symmetric \par VIII: hearing intact \par IX, X: uvula midline, soft palate elevates normally \par XI: BL shoulder shrug intact \par XII: tongue midline\par \par Motor\par 5/5 in RUE, RLE\par 4+/5 in LUE, LLE             \par Tone and bulk are normal in upper and lower limbs\par No pronator drift\par \par Sensation\par Intact to light touch and pinprick in all 4 EXTs\par \par Reflex\par 3+ in BL biceps, triceps, brachioradialis, patella, ankle                                    \par Plantar responses downward bilaterally\par \par Coordination\par Normal FTN bilaterally\par \par Gait\par Ambulates with cane\par \par

## 2022-07-15 NOTE — CONSULT LETTER
[Dear  ___] : Dear  [unfilled], [Sincerely,] : Sincerely, [FreeTextEntry1] : I had the pleasure of seeing your patient, BREANA HELM, in my office today. Please see my note below. \par \par If you have any questions, please do not hesitate to contact me.  [FreeTextEntry3] : Jon Clinton MD\par Director, Epilepsy/EMU\[facility] \par [facility] [place]\par [address]\par [place], [facility] \par Tel: [phone]; Fax: [phone] \par \par

## 2022-07-15 NOTE — ASSESSMENT
[FreeTextEntry1] : BREANA HELM is a 81 year-old RH female with a history of HTN, HLD, CAD s/p stent, renal cancer s/p nephrectomy and anxiety who presents with recurrent episodes of LLE weakness and difficulty producing speech with negative stroke workup, suspicious for focal seizures. Side effects on LEV 500mg BID; will reduce by half. \par \par - decreased LEV 500mg BID to 250mg BID\par - CBC, CMP, LEV trough levels today and in 2 wks\par - do not believe pt needs to be on Xarelto; pt to f/u with her cardiologist\par - f/u in 3 months \par \par \par Personally reviewed all images, labs and other studies. More than 50% of time spent on counseling and educating patient on differential, workup, disease course, and treatment/management. All questions and concerns answered and addressed in detail to patient's complete satisfaction. Patient verbalized understanding and agreed to plan.\par

## 2022-07-15 NOTE — HISTORY OF PRESENT ILLNESS
[FreeTextEntry1] : PCP: Dr. Juana Vann\par \par This is an 80yo RH female with a history of HTN, HLD, CAD s/p stent, renal cancer s/p nephrectomy, anxiety and recurrent episodes of LLE weakness and difficulty producing speech with negative stroke workup who presents for posthospital followup. \par \par Recurrent episodes of LLE weakness and difficulty producing speech. First episode occurred on 5/20/20, and then on 6/1/20, 11/28/20, 3/27/21, 7/25/21, 9/8/21, 4/23/22, 6/6/22. All of which had been worked up as acute CVA, but CTH/CTA/CTP/MRI have always been unremarkable. 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 Patient states that she usually knows something is "off" at the onset of these episodes because her thought process is slowed. She then notes difficulty producing speech in the sense that it takes tremendous effort to get the words out, while knowing exactly what she wants to say, with fully intact comprehension. At the same time, also notes weakness in the left leg. Difficulty producing speech usually resolves within hours, but it takes 12-48 hours for the strength of the left leg to return to baseline. Saw pt in June at [facility] and suspect these episodes are focal seizures via mechanism of either activation of negative motor area or inactivation of positive motor area. Discharged her with LEV 500mg BID, but not tolerating it due to dizziness and fatigue.\par \par Of note, while patient was hospitalized in 6/2020 after a habitual episode, which had been attributed to CVA, ILR was placed. Then while she was hospitalized in 4/2022 for another episode, she was deemed as a nonresponder to ASA/Plavix given recurrent stroke-like events. Cardiology replaced Plavix with Xarelto. ILR was interrogated and no AF detected. Never had clear documentation of pAF. Since there is low suspicion for CVA at this time, Cardiology consult was placed during hospitalization to re-evaluate the indication for Xarelto. Cardiology recommended continuing Xarelto at this time and followup outpt.\par \par SEIZURE RISK FACTORS:\par Patient was a product of normal pregnancy and delivery. No history of febrile seizure, TBI, CNS infection, or FH of seizures.\par \par CURRENT ASM:\par LEV 500mg BID – started 6/2022, dizzy and fatigue\par \par PREVIOUS ASM:\par none\par \par IMAGING: \par Multiple MRIs in the past; most recent as below\par MRI w/o 4/25/22: no acute ischemia\par \par NEUROPHYSIOLOGY:\par Non-elective EMU 6/7 – 6/8/22 ([facility]): SREDA, otherwise normal\par rEEG 6/7/22 ([facility]): normal, SREDA\par rEEG 9/10/21 ([facility]): normal A/D\par cvEEG 7/28 - 7/29/21 ([facility]): normal, SREDA\par \par

## 2022-08-03 RX ORDER — LEVETIRACETAM 250 MG/1
250 TABLET, FILM COATED ORAL
Qty: 60 | Refills: 5 | Status: COMPLETED | COMMUNITY
Start: 2022-07-15 | End: 2022-08-03

## 2022-08-10 NOTE — ED PROVIDER NOTE - MDM ORDERS SUBMITTED SELECTION
Imaging Studies/Labs/EKG Purse String (Intermediate) Text: Given the location of the defect and the characteristics of the surrounding skin a purse string intermediate closure was deemed most appropriate.  Undermining was performed circumfirentially around the surgical defect.  A purse string suture was then placed and tightened.

## 2022-09-18 ENCOUNTER — INPATIENT (INPATIENT)
Facility: HOSPITAL | Age: 82
LOS: 2 days | Discharge: HOME | DRG: 92 | End: 2022-09-21
Attending: HOSPITALIST | Admitting: INTERNAL MEDICINE
Payer: MEDICARE

## 2022-09-18 VITALS
HEART RATE: 80 BPM | WEIGHT: 134.92 LBS | OXYGEN SATURATION: 100 % | TEMPERATURE: 98 F | RESPIRATION RATE: 19 BRPM | SYSTOLIC BLOOD PRESSURE: 127 MMHG | HEIGHT: 62 IN | DIASTOLIC BLOOD PRESSURE: 70 MMHG

## 2022-09-18 DIAGNOSIS — C64.9 MALIGNANT NEOPLASM OF UNSPECIFIED KIDNEY, EXCEPT RENAL PELVIS: Chronic | ICD-10-CM

## 2022-09-18 DIAGNOSIS — R56.9 UNSPECIFIED CONVULSIONS: ICD-10-CM

## 2022-09-18 DIAGNOSIS — Z90.89 ACQUIRED ABSENCE OF OTHER ORGANS: Chronic | ICD-10-CM

## 2022-09-18 DIAGNOSIS — Z90.5 ACQUIRED ABSENCE OF KIDNEY: Chronic | ICD-10-CM

## 2022-09-18 LAB
ALBUMIN SERPL ELPH-MCNC: 4.3 G/DL — SIGNIFICANT CHANGE UP (ref 3.3–5.2)
ALP SERPL-CCNC: 52 U/L — SIGNIFICANT CHANGE UP (ref 40–120)
ALT FLD-CCNC: 13 U/L — SIGNIFICANT CHANGE UP
ANION GAP SERPL CALC-SCNC: 14 MMOL/L — SIGNIFICANT CHANGE UP (ref 5–17)
AST SERPL-CCNC: 26 U/L — SIGNIFICANT CHANGE UP
BASOPHILS # BLD AUTO: 0.09 K/UL — SIGNIFICANT CHANGE UP (ref 0–0.2)
BASOPHILS NFR BLD AUTO: 1.5 % — SIGNIFICANT CHANGE UP (ref 0–2)
BILIRUB SERPL-MCNC: 0.6 MG/DL — SIGNIFICANT CHANGE UP (ref 0.4–2)
BUN SERPL-MCNC: 23 MG/DL — HIGH (ref 8–20)
CALCIUM SERPL-MCNC: 9.3 MG/DL — SIGNIFICANT CHANGE UP (ref 8.4–10.5)
CHLORIDE SERPL-SCNC: 95 MMOL/L — LOW (ref 98–107)
CO2 SERPL-SCNC: 25 MMOL/L — SIGNIFICANT CHANGE UP (ref 22–29)
CREAT SERPL-MCNC: 1.06 MG/DL — SIGNIFICANT CHANGE UP (ref 0.5–1.3)
EGFR: 52 ML/MIN/1.73M2 — LOW
EOSINOPHIL # BLD AUTO: 0.1 K/UL — SIGNIFICANT CHANGE UP (ref 0–0.5)
EOSINOPHIL NFR BLD AUTO: 1.6 % — SIGNIFICANT CHANGE UP (ref 0–6)
FLUAV AG NPH QL: SIGNIFICANT CHANGE UP
FLUBV AG NPH QL: SIGNIFICANT CHANGE UP
GLUCOSE SERPL-MCNC: 80 MG/DL — SIGNIFICANT CHANGE UP (ref 70–99)
HCT VFR BLD CALC: 38.1 % — SIGNIFICANT CHANGE UP (ref 34.5–45)
HGB BLD-MCNC: 13.2 G/DL — SIGNIFICANT CHANGE UP (ref 11.5–15.5)
IMM GRANULOCYTES NFR BLD AUTO: 0.3 % — SIGNIFICANT CHANGE UP (ref 0–0.9)
LYMPHOCYTES # BLD AUTO: 1.23 K/UL — SIGNIFICANT CHANGE UP (ref 1–3.3)
LYMPHOCYTES # BLD AUTO: 20.3 % — SIGNIFICANT CHANGE UP (ref 13–44)
MCHC RBC-ENTMCNC: 30.3 PG — SIGNIFICANT CHANGE UP (ref 27–34)
MCHC RBC-ENTMCNC: 34.6 GM/DL — SIGNIFICANT CHANGE UP (ref 32–36)
MCV RBC AUTO: 87.6 FL — SIGNIFICANT CHANGE UP (ref 80–100)
MONOCYTES # BLD AUTO: 0.67 K/UL — SIGNIFICANT CHANGE UP (ref 0–0.9)
MONOCYTES NFR BLD AUTO: 11 % — SIGNIFICANT CHANGE UP (ref 2–14)
NEUTROPHILS # BLD AUTO: 3.96 K/UL — SIGNIFICANT CHANGE UP (ref 1.8–7.4)
NEUTROPHILS NFR BLD AUTO: 65.3 % — SIGNIFICANT CHANGE UP (ref 43–77)
PLATELET # BLD AUTO: 222 K/UL — SIGNIFICANT CHANGE UP (ref 150–400)
POTASSIUM SERPL-MCNC: 4.5 MMOL/L — SIGNIFICANT CHANGE UP (ref 3.5–5.3)
POTASSIUM SERPL-SCNC: 4.5 MMOL/L — SIGNIFICANT CHANGE UP (ref 3.5–5.3)
PROT SERPL-MCNC: 6.5 G/DL — LOW (ref 6.6–8.7)
RBC # BLD: 4.35 M/UL — SIGNIFICANT CHANGE UP (ref 3.8–5.2)
RBC # FLD: 12.3 % — SIGNIFICANT CHANGE UP (ref 10.3–14.5)
RSV RNA NPH QL NAA+NON-PROBE: SIGNIFICANT CHANGE UP
SARS-COV-2 RNA SPEC QL NAA+PROBE: SIGNIFICANT CHANGE UP
SODIUM SERPL-SCNC: 134 MMOL/L — LOW (ref 135–145)
TROPONIN T SERPL-MCNC: <0.01 NG/ML — SIGNIFICANT CHANGE UP (ref 0–0.06)
WBC # BLD: 6.07 K/UL — SIGNIFICANT CHANGE UP (ref 3.8–10.5)
WBC # FLD AUTO: 6.07 K/UL — SIGNIFICANT CHANGE UP (ref 3.8–10.5)

## 2022-09-18 PROCEDURE — 70450 CT HEAD/BRAIN W/O DYE: CPT | Mod: 26

## 2022-09-18 PROCEDURE — 71045 X-RAY EXAM CHEST 1 VIEW: CPT | Mod: 26

## 2022-09-18 PROCEDURE — 99223 1ST HOSP IP/OBS HIGH 75: CPT

## 2022-09-18 PROCEDURE — 99233 SBSQ HOSP IP/OBS HIGH 50: CPT

## 2022-09-18 PROCEDURE — 99285 EMERGENCY DEPT VISIT HI MDM: CPT

## 2022-09-18 RX ORDER — ACETAMINOPHEN 500 MG
650 TABLET ORAL ONCE
Refills: 0 | Status: COMPLETED | OUTPATIENT
Start: 2022-09-18 | End: 2022-09-18

## 2022-09-18 RX ORDER — ESCITALOPRAM OXALATE 10 MG/1
5 TABLET, FILM COATED ORAL DAILY
Refills: 0 | Status: DISCONTINUED | OUTPATIENT
Start: 2022-09-18 | End: 2022-09-21

## 2022-09-18 RX ORDER — DILTIAZEM HCL 120 MG
30 CAPSULE, EXT RELEASE 24 HR ORAL THREE TIMES A DAY
Refills: 0 | Status: DISCONTINUED | OUTPATIENT
Start: 2022-09-18 | End: 2022-09-21

## 2022-09-18 RX ORDER — ASPIRIN/CALCIUM CARB/MAGNESIUM 324 MG
81 TABLET ORAL DAILY
Refills: 0 | Status: DISCONTINUED | OUTPATIENT
Start: 2022-09-18 | End: 2022-09-21

## 2022-09-18 RX ORDER — DIVALPROEX SODIUM 125 MG/1
125 TABLET, DELAYED RELEASE ORAL
Qty: 60 | Refills: 0 | Status: COMPLETED | COMMUNITY
Start: 2022-08-03 | End: 2022-09-18

## 2022-09-18 RX ORDER — ATORVASTATIN CALCIUM 80 MG/1
80 TABLET, FILM COATED ORAL AT BEDTIME
Refills: 0 | Status: DISCONTINUED | OUTPATIENT
Start: 2022-09-18 | End: 2022-09-21

## 2022-09-18 RX ORDER — RIVAROXABAN 15 MG-20MG
15 KIT ORAL
Refills: 0 | Status: DISCONTINUED | OUTPATIENT
Start: 2022-09-18 | End: 2022-09-21

## 2022-09-18 RX ORDER — SODIUM CHLORIDE 9 MG/ML
1000 INJECTION INTRAMUSCULAR; INTRAVENOUS; SUBCUTANEOUS ONCE
Refills: 0 | Status: COMPLETED | OUTPATIENT
Start: 2022-09-18 | End: 2022-09-18

## 2022-09-18 RX ADMIN — ATORVASTATIN CALCIUM 80 MILLIGRAM(S): 80 TABLET, FILM COATED ORAL at 21:44

## 2022-09-18 RX ADMIN — SODIUM CHLORIDE 1000 MILLILITER(S): 9 INJECTION INTRAMUSCULAR; INTRAVENOUS; SUBCUTANEOUS at 11:48

## 2022-09-18 RX ADMIN — RIVAROXABAN 15 MILLIGRAM(S): KIT at 19:02

## 2022-09-18 RX ADMIN — Medication 1 MILLIGRAM(S): at 21:22

## 2022-09-18 NOTE — ED ADULT NURSE NOTE - INTERVENTIONS DEFINITIONS
Allen to call system/Instruct patient to call for assistance/Stretcher in lowest position, wheels locked, appropriate side rails in place/Monitor gait and stability/Monitor for mental status changes and reorient to person, place, and time/Review medications for side effects contributing to fall risk/Reinforce activity limits and safety measures with patient and family

## 2022-09-18 NOTE — ED ADULT NURSE NOTE - OBJECTIVE STATEMENT
Assumed pt care @ this time. Pt received A&Ox4 c/o difficulty with speech and LLE /weakness approx 10am with generalized body shakes. MD Cornell at bedside. Pt has hx of this reoccuring problem and was started on keppra. Pt states it did not help and has been noncompliant with med for a few months. Pt also endorses intermittent cp with L shoulder pain. EKG performed upon arrival. Pt received on CM in NSR w/. Respirations even & unlabored. Pt denies any sob, NVD, dysuria. Pt safety maintained. Pt made aware of plan of care and verbalized understanding.

## 2022-09-18 NOTE — ED PROVIDER NOTE - PROGRESS NOTE DETAILS
Aneta: spoke to Dr. Reyes covering neuro/epilepsy, agrees with/recommends admission, eeg tonight, Dr. Clinton/epilepsy team to follow tmrw.

## 2022-09-18 NOTE — CONSULT NOTE ADULT - ASSESSMENT
The patient is a 82y Female with recurrent stereotypic episodes of slurred speech and left leg weakness and numbness.      Rule out seizure.   Agree with repeating CT head.  Would recommend repeating c-EEG monitoring.   Had side effects of Keppra in the past.   Hold antiseizure drug for now until EEG done.     Case discussed with ER attending Dr Cornell.

## 2022-09-18 NOTE — CONSULT NOTE ADULT - SUBJECTIVE AND OBJECTIVE BOX
[facility]                                        Neurology at [place]                                 Leigh Prather & Anjum                                  [address]                                        [place], [place], [address]                                             [phone]        CC: r/o seizure     HPI:   The patient is an 82 year old woman known to the epilepsy and general neurology service from prior admissions.   Seen By Dr Clinton in June of 2022.  80yo F with PMHx of TIA / CVA, s/p ILR, CAD s/p stent, HLD, HTN presented with left leg weakness and slurred speech. Pt was admitted to [facility] on 4/23/22 with similar symptoms received tPA at the time, CT and MRI Brain without acute stroke. As per chart pt not tolerate Brilinta, did not respond to Plavix and not candidate for Effient due to h/o CVA, started on Xarelto for suspected afib given recurrent stoke symptoms. Pt states she was talking on the phone with her sister when she suddenly had difficulty with her speech, and she been feeling very weak in her left leg and having difficulty breathing during her PT session today. Pt reports to intermittent sob for the past 3 weeks but worsen yesterday and today. She states that she was unable to participate in [employer] yesterday due to sob. Denies cp, palpitations, syncope, dizziness, n/v, fever or cough. In the ED CTH, CTA head/neck and CT perfusion without acute finding. Evaluated by neurology in the ED, EEG was ordered.      (06 Jun 2022 22:42)    Recurrent episodes of LLE weakness and difficulty producing speech. First episode occurred on 5/20/20, and then on 6/1/20, 11/28/20, 3/27/21, 7/25/21, 9/8/21, 4/23/22, 6/6/22. All of which had been worked up as acute CVA, but CTH/CTA/CTP/MRI have always been unremarkable. 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 Patient states that she usually knows something is "off" at the onset of these episodes because her thought process is slowed. She then notes difficulty producing speech in the sense that it takes tremendous effort to get the words out, while knowing exactly what she wants to say, with fully intact comprehension. At the same time, also notes weakness in the left leg. Difficulty producing speech usually resolves within hours, but it takes 12-48 hours for the strength of the left leg to return to baseline.     Of note, while patient was hospitalized in 6/2020 after an habitual episode, which had been attributed to CVA, ILR was placed. Then while she was hospitalized in 4/2022 for another episode, she was deemed as a nonresponder to ASA/Plavix given recurrent stroke-like events. Cardiology replaced Plavix with Xarelto. ILR was interrogated and no AF detected. There is no clear documentation patient has ever had pAF.    Now presenting with another episode of slurred speech and left leg weakness and numbness.   This was followed by generalized shaking during which she reports that she was awake and aware of her symptoms.  The speech is back to normal and the left leg is improving from onset.   She has had multiple stroke work ups in the past which were negative.    ROS:   Denies headache or dizziness.  Denies chest pain.  Denies shortness of breath.    MEDICATIONS:  · 	rivaroxaban 15 mg oral tablet: 1 tab(s) orally once a day (in the evening)  · 	atorvastatin 80 mg oral tablet: 1 tab(s) orally once a day  · 	dilTIAZem 30 mg oral tablet: 1 tab(s) orally 3 times a day  · 	aspirin 81 mg oral delayed release tablet: 1 tab(s) orally once a day  · 	Lexapro 5 mg oral tablet: 5 milligram(s) orally once a day  · 	Nexlizet 180 mg-10 mg oral tablet: 1 tab(s) orally once a day  · 	alendronate 70 mg oral tablet: 1 tab(s) orally once a week        Vital Signs Last 24 Hrs  T(C): 36.5 (18 Sep 2022 11:02), Max: 36.5 (18 Sep 2022 11:02)  T(F): 97.7 (18 Sep 2022 11:02), Max: 97.7 (18 Sep 2022 11:02)  HR: 65 (18 Sep 2022 11:33) (65 - 80)  BP: 153/95 (18 Sep 2022 11:33) (127/70 - 153/95)  RR: 18 (18 Sep 2022 11:33) (18 - 19)  SpO2: 99% (18 Sep 2022 11:33) (99% - 100%)    Parameters below as of 18 Sep 2022 11:33  Patient On (Oxygen Delivery Method): room air        Detailed Neurologic Exam:    Mental status: The patient is awake and alert. There is no aphasia. There is no dysarthria.     Cranial nerves: Pupils equal and react symmetrically to light. There is no visual field deficit to threat. Extraocular motion is full with no nystagmus. Facial sensation is intact. Facial musculature is symmetric. Palate elevates symmetrically. Tongue is midline.    Motor: There is normal bulk and tone.  There is no tremor.  Strength grossly 5/5 in upper extremities bilaterally and in right lower extremity. The left lower extremity is 3+ to 4-/5.    Sensation: Slightly decreased in left leg.    Reflexes: 1+ throughout and plantar responses are flexor.    Cerebellar: No dysmetria on finger nose testing.    Labs:     09-18    134<L>  |  95<L>  |  23.0<H>  ----------------------------<  80  4.5   |  25.0  |  1.06    Ca    9.3      18 Sep 2022 11:36    TPro  6.5<L>  /  Alb  4.3  /  TBili  0.6  /  DBili  x   /  AST  26  /  ALT  13  /  AlkPhos  52  09-18                            13.2   6.07  )-----------( 222      ( 18 Sep 2022 11:36 )             38.1

## 2022-09-18 NOTE — ED ADULT TRIAGE NOTE - CHIEF COMPLAINT QUOTE
Pt BIBA c/o witnessed seizure like activity at home at 10am.  Pt states she was seen in June for same and  "they told me I had small seizures but I couldn't tolerate either medication."  Pt c/o chest pain on arrival; 4 baby asp admin by ems pta

## 2022-09-18 NOTE — ED PROVIDER NOTE - CLINICAL SUMMARY MEDICAL DECISION MAKING FREE TEXT BOX
feeling of shaking and LLE weakness/stuttering speech which is pt's symptoms when she has these possible focal seizures. mulitple neg stroke w/u's with same and Dr. Clinton note recommnded stop xarelto as unlikely stroke. labs, epilepsy consult, trop for cp. reassess

## 2022-09-18 NOTE — CHART NOTE - NSCHARTNOTEFT_GEN_A_CORE
Called by RN for pt who felt lightheaded going to the bathroom and BP was 82/60, Upon arrival at bedside pt was having limb shaking- in seizure like activity but pt was alert and aware of events.  HPI:  82 year old female with pmh of ? tia x multiple episodes, cad s/p stents, hld, htn, ILR ?afib, H/O left nephrectomy for Renal cancer c/o witnessed seizure like activity at home at 10am.  Pt states she was seen in June for same and  "they told me I had small seizures but I couldn't tolerate either medication." this morning ith difficulty speaking, LLE weakness and feeling of generalized shaking. pt seen here multiple times for exact presentation, Patient states that she usually knows something is "off" at the onset of these episodes because her thought process is slowed. She then notes difficulty producing speech in the sense that it takes tremendous effort to get the words out, while knowing exactly what she wants to say, with fully intact comprehension. At the same time, also notes weakness in the left leg. Difficulty producing speech usually resolves within hours, but it takes 12-48 hours for the strength of the left leg to return to baseline. Has had multiple neg stroke w/u's, eeg's performed with Dr. Clinton following believing possible focal seizures. pt did not tolerate keppra and another unknown antiepileptic so currently not on something.  In ED-  Pt c/o chest pain on arrival; 4 baby asp admin by ems en route.     Prior history- As per chart pt not tolerate Brilinta, did not respond to Plavix and not candidate for Effient due to h/o CVA, started on Xarelto for suspected afib given recurrent stoke symptoms.  EEG- video EEG detected benign variant known as subclinical rhythmic electrographic discharge of adults (SREDA), but no epileptiform discharges or seizure.  ILR has been interrogated int eh past and no AF detected.  has left AMA in the most immediate prior admission    SH- Denies any toxic habits  FH- Br Ca in family (18 Sep 2022 15:47)    Allergies  Biaxin (Muscle Pain; Joint Pain)  codeine (Faint)  Metoprolol Succinate ER (Unknown)  monoctanoin (Unknown)  Known  Intolerances  Isosorbide Mononitrate Extended Release (Faint)    ROS: Negative except for HPI/current complaints    Head-NCAT  PERRL, EOMI, anicteric  Extremities- noted tremors of both legs   Neuro- speech dysarthria noted; lact of fluency noted but when spoken are clear and appropriate             CN II-XII grossly intact             strength 5/5 bilaterally upper and lower extremities  Impression: 1- seizure like activity  Plan: 1- ativan 1mg iv. once          2- plan for v-EEG per Epilepsy service          3- d/w patient plan of care and all questions answered

## 2022-09-18 NOTE — ED ADULT NURSE REASSESSMENT NOTE - NS ED NURSE REASSESS COMMENT FT1
Pt is resting in stretcher comfortably at this time, no apparent distress noted at this time. Pt safety maintained. Pt denies any complaints at this time. VSS.

## 2022-09-18 NOTE — H&P ADULT - NSHPPHYSICALEXAM_GEN_ALL_CORE
Vital Signs Last 24 Hrs  T(C): 36.5 (09-18-22 @ 11:02), Max: 36.5 (09-18-22 @ 11:02)  T(F): 97.7 (09-18-22 @ 11:02), Max: 97.7 (09-18-22 @ 11:02)  HR: 74 (09-18-22 @ 15:00) (65 - 80)  BP: 147/87 (09-18-22 @ 15:00) (127/70 - 153/95)  BP(mean): --  RR: 18 (09-18-22 @ 15:00) (18 - 19)  SpO2: 97% (09-18-22 @ 15:00) (97% - 100%)    Gen: No acute distress, non toxic  HEENT: Mucous membranes moist, pink conjunctivae, EOMI  CV: RRR, nl s1/s2.  Resp: CTAB, normal rate and effort  GI: Abdomen soft, NT, ND. No rebound, no guarding  : No CVAT  Neuro: A&O x 3, moving all 4 extremities. weakness/difficulty lifting LLE. nl strength throughout otherwise, nl sensation. cn 2-12 wnl. occasional stuttering. no other dysarthria or aphasia, left lower extremity is 3+ to 4-/5. Sensation: Slightly decreased in left leg.  MSK: No spine or joint tenderness to palpation  Skin: No rashes. intact and perfused

## 2022-09-18 NOTE — ED PROVIDER NOTE - PHYSICAL EXAMINATION
Gen: No acute distress, non toxic  HEENT: Mucous membranes moist, pink conjunctivae, EOMI  CV: RRR, nl s1/s2.  Resp: CTAB, normal rate and effort  GI: Abdomen soft, NT, ND. No rebound, no guarding  : No CVAT  Neuro: A&O x 3, moving all 4 extremities. weakness/difficulty lifting LLE. nl strength throughout otherwise, nl sensation. cn 2-12 wnl. occsaional stuttering. no other dysarthria or aphasia  MSK: No spine or joint tenderness to palpation  Skin: No rashes. intact and perfused.

## 2022-09-18 NOTE — ED ADULT NURSE REASSESSMENT NOTE - NS ED NURSE REASSESS COMMENT FT1
report given to yolette perez at 20:00. continued cardiac monitoring in progress switched to tele box. rr even and unlabored. pt moved to cdu 12r. rn made aware of transfer of care.

## 2022-09-18 NOTE — ED PROVIDER NOTE - OBJECTIVE STATEMENT
81 y/o female hx htn, hld present with episode of her "typical seizure" like activity.  this morning ith difficulty speaking, LLE weakness and feeling of generalized shaking. pt seen here multiple times for exact presentation, with multiple neg stroke w/u's, eeg's performed with Dr. Clinton following believing possible focal seizures. pt did not tolerate keppra and another unknown antiepileptic so currently not on something. first episode since thursday. pt also with some mild chest discomfort.     ROS: No fever/chills. No eye pain/changes in vision, No ear pain/sore throat/dysphagia, No palpitations. No SOB/cough/. No abdominal pain, N/V/D, no black/bloody bm. No dysuria/frequency/discharge, No headache. No Dizziness.    No rashes or breaks in skin. No numbness/tingling/weakness.

## 2022-09-18 NOTE — ED ADULT NURSE REASSESSMENT NOTE - NS ED NURSE REASSESS COMMENT FT1
pt awaiting bed. continued cardiac monitoring in place. denies pain at this time. blanket given for comfort. pt educated on plan of care, pt able to successfully teach back plan of care to RN, RN will continue to reeducate pt during hospital stay.

## 2022-09-18 NOTE — H&P ADULT - ASSESSMENT
82 year old female with pmh of ? tia x multiple episodes, cad s/p stents, hld, htn, ILR ?afib, H/O left nephrectomy for Renal cancer c/o witnessed seizure like activity at home at 10am.  Pt states she was seen in June for same and  "they told me I had small seizures but I couldn't tolerate either medication." this morning ith difficulty speaking, LLE weakness and feeling of generalized shaking. pt seen here multiple times for exact presentation, Patient states that she usually knows something is "off" at the onset of these episodes because her thought process is slowed. She then notes difficulty producing speech in the sense that it takes tremendous effort to get the words out, while knowing exactly what she wants to say, with fully intact comprehension. At the same time, also notes weakness in the left leg. Difficulty producing speech usually resolves within hours, but it takes 12-48 hours for the strength of the left leg to return to baseline. Has had multiple neg stroke w/u's, eeg's performed with Dr. Clinton following believing possible focal seizures. pt did not tolerate keppra and another unknown antiepileptic so currently not on something.  In ED-  Pt c/o chest pain on arrival; 4 baby asp admin by ems en route.     Prior history- As per chart pt not tolerate Brilinta, did not respond to Plavix and not candidate for Effient due to h/o CVA, started on Xarelto for suspected afib given recurrent stoke symptoms.  EEG- video EEG detected benign variant known as subclinical rhythmic electrographic discharge of adults (SREDA), but no epileptiform discharges or seizure.  ILR has been interrogated int eh past and no AF detected.  has left AMA in the most immediate prior admission    # Seizure like activity  Multiple similar presentation in the past  Stroke and Sz w/u has been neg  CTH ordered and P  Neuro eval noted  vEEG  to consult Cards in AM for ILR interrogation    # HLD, HTN  Cont home meds    # Mild hyponatremia  unclear etiology  monitor    Xarelto ( did not find official document that it was discontinued)

## 2022-09-19 LAB
ANION GAP SERPL CALC-SCNC: 14 MMOL/L — SIGNIFICANT CHANGE UP (ref 5–17)
BUN SERPL-MCNC: 19.7 MG/DL — SIGNIFICANT CHANGE UP (ref 8–20)
CALCIUM SERPL-MCNC: 8.7 MG/DL — SIGNIFICANT CHANGE UP (ref 8.4–10.5)
CHLORIDE SERPL-SCNC: 100 MMOL/L — SIGNIFICANT CHANGE UP (ref 98–107)
CO2 SERPL-SCNC: 23 MMOL/L — SIGNIFICANT CHANGE UP (ref 22–29)
CREAT SERPL-MCNC: 0.93 MG/DL — SIGNIFICANT CHANGE UP (ref 0.5–1.3)
EGFR: 61 ML/MIN/1.73M2 — SIGNIFICANT CHANGE UP
GLUCOSE SERPL-MCNC: 75 MG/DL — SIGNIFICANT CHANGE UP (ref 70–99)
HCT VFR BLD CALC: 40 % — SIGNIFICANT CHANGE UP (ref 34.5–45)
HGB BLD-MCNC: 13.2 G/DL — SIGNIFICANT CHANGE UP (ref 11.5–15.5)
MAGNESIUM SERPL-MCNC: 2 MG/DL — SIGNIFICANT CHANGE UP (ref 1.6–2.6)
MCHC RBC-ENTMCNC: 30.5 PG — SIGNIFICANT CHANGE UP (ref 27–34)
MCHC RBC-ENTMCNC: 33 GM/DL — SIGNIFICANT CHANGE UP (ref 32–36)
MCV RBC AUTO: 92.4 FL — SIGNIFICANT CHANGE UP (ref 80–100)
PHOSPHATE SERPL-MCNC: 3.5 MG/DL — SIGNIFICANT CHANGE UP (ref 2.4–4.7)
PLATELET # BLD AUTO: 186 K/UL — SIGNIFICANT CHANGE UP (ref 150–400)
POTASSIUM SERPL-MCNC: 4.1 MMOL/L — SIGNIFICANT CHANGE UP (ref 3.5–5.3)
POTASSIUM SERPL-SCNC: 4.1 MMOL/L — SIGNIFICANT CHANGE UP (ref 3.5–5.3)
RBC # BLD: 4.33 M/UL — SIGNIFICANT CHANGE UP (ref 3.8–5.2)
RBC # FLD: 12.4 % — SIGNIFICANT CHANGE UP (ref 10.3–14.5)
SODIUM SERPL-SCNC: 137 MMOL/L — SIGNIFICANT CHANGE UP (ref 135–145)
WBC # BLD: 7.11 K/UL — SIGNIFICANT CHANGE UP (ref 3.8–10.5)
WBC # FLD AUTO: 7.11 K/UL — SIGNIFICANT CHANGE UP (ref 3.8–10.5)

## 2022-09-19 PROCEDURE — 95720 EEG PHY/QHP EA INCR W/VEEG: CPT

## 2022-09-19 PROCEDURE — 99233 SBSQ HOSP IP/OBS HIGH 50: CPT

## 2022-09-19 RX ORDER — LAMOTRIGINE 25 MG/1
25 TABLET, ORALLY DISINTEGRATING ORAL
Refills: 0 | Status: DISCONTINUED | OUTPATIENT
Start: 2022-09-19 | End: 2022-09-20

## 2022-09-19 RX ORDER — LEVETIRACETAM 250 MG/1
1000 TABLET, FILM COATED ORAL ONCE
Refills: 0 | Status: COMPLETED | OUTPATIENT
Start: 2022-09-19 | End: 2022-09-19

## 2022-09-19 RX ADMIN — Medication 30 MILLIGRAM(S): at 23:22

## 2022-09-19 RX ADMIN — Medication 30 MILLIGRAM(S): at 13:12

## 2022-09-19 RX ADMIN — Medication 81 MILLIGRAM(S): at 13:12

## 2022-09-19 RX ADMIN — LAMOTRIGINE 25 MILLIGRAM(S): 25 TABLET, ORALLY DISINTEGRATING ORAL at 17:03

## 2022-09-19 RX ADMIN — RIVAROXABAN 15 MILLIGRAM(S): KIT at 16:58

## 2022-09-19 RX ADMIN — LEVETIRACETAM 400 MILLIGRAM(S): 250 TABLET, FILM COATED ORAL at 16:58

## 2022-09-19 RX ADMIN — Medication 30 MILLIGRAM(S): at 05:51

## 2022-09-19 RX ADMIN — ESCITALOPRAM OXALATE 5 MILLIGRAM(S): 10 TABLET, FILM COATED ORAL at 13:12

## 2022-09-19 RX ADMIN — ATORVASTATIN CALCIUM 80 MILLIGRAM(S): 80 TABLET, FILM COATED ORAL at 23:22

## 2022-09-19 NOTE — PROVIDER CONTACT NOTE (CHANGE IN STATUS NOTIFICATION) - ASSESSMENT
RN witnessed pt with generalized body shakes, pt was aware of the seizure-like activity at the time of onset.  PA present to witness

## 2022-09-19 NOTE — PROVIDER CONTACT NOTE (CHANGE IN STATUS NOTIFICATION) - ACTION/TREATMENT ORDERED:
Keppra x1 ordered to be administered, red button pressed on EEG machine to ele time of seizure symptoms Keppra x1 and Lamictal ordered to be administered, red button pressed on EEG machine to ele time of seizure symptoms

## 2022-09-19 NOTE — PROVIDER CONTACT NOTE (CHANGE IN STATUS NOTIFICATION) - BACKGROUND
82F adm with convulsions after having a witnessed seizure at home AEB speech difficulties, weakness and generalized body shakes

## 2022-09-20 LAB
ANION GAP SERPL CALC-SCNC: 10 MMOL/L — SIGNIFICANT CHANGE UP (ref 5–17)
BUN SERPL-MCNC: 28.4 MG/DL — HIGH (ref 8–20)
CALCIUM SERPL-MCNC: 8.2 MG/DL — LOW (ref 8.4–10.5)
CHLORIDE SERPL-SCNC: 103 MMOL/L — SIGNIFICANT CHANGE UP (ref 98–107)
CO2 SERPL-SCNC: 25 MMOL/L — SIGNIFICANT CHANGE UP (ref 22–29)
CREAT SERPL-MCNC: 0.95 MG/DL — SIGNIFICANT CHANGE UP (ref 0.5–1.3)
EGFR: 60 ML/MIN/1.73M2 — SIGNIFICANT CHANGE UP
GLUCOSE SERPL-MCNC: 135 MG/DL — HIGH (ref 70–99)
MAGNESIUM SERPL-MCNC: 2.1 MG/DL — SIGNIFICANT CHANGE UP (ref 1.6–2.6)
POTASSIUM SERPL-MCNC: 4.4 MMOL/L — SIGNIFICANT CHANGE UP (ref 3.5–5.3)
POTASSIUM SERPL-SCNC: 4.4 MMOL/L — SIGNIFICANT CHANGE UP (ref 3.5–5.3)
SODIUM SERPL-SCNC: 137 MMOL/L — SIGNIFICANT CHANGE UP (ref 135–145)

## 2022-09-20 PROCEDURE — 95720 EEG PHY/QHP EA INCR W/VEEG: CPT

## 2022-09-20 PROCEDURE — 99233 SBSQ HOSP IP/OBS HIGH 50: CPT

## 2022-09-20 RX ORDER — PERAMPANEL 2 MG/1
2 TABLET ORAL
Refills: 0 | Status: DISCONTINUED | OUTPATIENT
Start: 2022-09-20 | End: 2022-09-20

## 2022-09-20 RX ORDER — PERAMPANEL 2 MG/1
2 TABLET ORAL ONCE
Refills: 0 | Status: DISCONTINUED | OUTPATIENT
Start: 2022-09-20 | End: 2022-09-20

## 2022-09-20 RX ORDER — PERAMPANEL 2 MG/1
2 TABLET ORAL
Refills: 0 | Status: DISCONTINUED | OUTPATIENT
Start: 2022-09-20 | End: 2022-09-21

## 2022-09-20 RX ADMIN — Medication 30 MILLIGRAM(S): at 22:22

## 2022-09-20 RX ADMIN — ATORVASTATIN CALCIUM 80 MILLIGRAM(S): 80 TABLET, FILM COATED ORAL at 22:18

## 2022-09-20 RX ADMIN — Medication 30 MILLIGRAM(S): at 14:05

## 2022-09-20 RX ADMIN — PERAMPANEL 2 MILLIGRAM(S): 2 TABLET ORAL at 17:38

## 2022-09-20 RX ADMIN — Medication 30 MILLIGRAM(S): at 06:02

## 2022-09-20 RX ADMIN — Medication 81 MILLIGRAM(S): at 12:15

## 2022-09-20 RX ADMIN — ESCITALOPRAM OXALATE 5 MILLIGRAM(S): 10 TABLET, FILM COATED ORAL at 12:15

## 2022-09-20 RX ADMIN — RIVAROXABAN 15 MILLIGRAM(S): KIT at 17:38

## 2022-09-20 RX ADMIN — PERAMPANEL 2 MILLIGRAM(S): 2 TABLET ORAL at 01:41

## 2022-09-20 NOTE — EEG REPORT - NS EEG TEXT BOX
[facility] Epilepsy Monitoring Unit Report  [facility]: [address], [place] [address], Phone [phone] [place] Office: [address], [address], [place] [address] Phone [phone]  [facility]: [address] [place] [address], Phone [phone] [place] Office: [address] [place] [address], Phone [phone]  Patient Name: Alexandrea Medina   Age: 82 year, : 1940 Patient ID: -, MRN #: -, Callejas: -  Physician Ordering Inpatient EEG: Elias Arellano Referral Source to EMU: non-elective admission – ER  EMU Study Started: 10:33 on 22   EMU Study Ended: pending discharge  Study Information:  EEG Recording Technique: The patient underwent continuous Video-EEG monitoring, using Telemetry System hardware on the XLTek Digital System. EEG and video data were stored on a computer hard drive with important events saved in digital archive files. The material was reviewed by a physician (electroencephalographer / epileptologist) on a daily basis. Manjit and seizure detection algorithms were utilized and reviewed. An EEG Technician attended to the patient, and was available throughout daytime work hours.  The epilepsy center neurologist was available in person or on call 24-hours per day.  EEG Placement and Labeling of Electrodes: The EEG was performed utilizing 20 channel referential EEG connections (coronal over temporal over parasagittal montage) using all standard 10-20 electrode placements with EKG, with additional electrodes placed in the inferior temporal region using the modified 10-10 montage electrode placements for elective admissions, or if deemed necessary. Recording was at a sampling rate of 256 samples per second per channel. Time synchronized digital video recording was done simultaneously with EEG recording. A low light infrared camera was used for low light recording.        History: This is an 81yo RH female with a history of HTN, HLD, CAD s/p stent, renal cancer s/p nephrectomy, anxiety and recurrent episodes of LLE weakness and difficulty producing speech with negative stroke workup who was admitted for another self-limiting episode that lasted a few hours.  Since admission, has been having intermittent episodes of full body myoclonic twitching. 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 Patient states that these myoclonus usually accompany the transient LLE weakness and difficulty with speech production.  Prior history: Recurrent episodes of LLE weakness and difficulty producing speech. First episode occurred on 20, and then on 20, 20, 3/27/21, 21, 21, 22, 22. All of which had been worked up as acute CVA, but CTH/CTA/CTP/MRI have always been unremarkable. 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 Patient states that she usually knows something is "off" at the onset of these episodes because her thought process is slowed. She then notes difficulty producing speech in the sense that it takes tremendous effort to get the words out, while knowing exactly what she wants to say, with fully intact comprehension. At the same time, also notes weakness in the left leg. Difficulty producing speech usually resolves within hours, but it takes 12-48 hours for the strength of the left leg to return to baseline. Saw pt in  at [facility] and suspect these episodes are focal seizures via mechanism of either activation of negative motor area or inactivation of positive motor area. Discharged her with LEV, but unable to tolerate due to dizziness and fatigue. Switched to VPA DR, again stopped due to side effect.  SEIZURE RISK FACTORS: Patient was a product of normal pregnancy and delivery. No history of febrile seizure, TBI, CNS infection, or FH of seizures.  CURRENT ASM: None   PREVIOUS ASM: LEV – 2022 to 2022, level 28 on 22 on 500mg BID, dizzy and fatigue even on 250mg BID VPA DR 125mg BID – 2 weeks in 2022, dizziness, SOB, fatigue  IMAGING:  Multiple MRIs in the past; most recent as below MRI w/o 22: no acute ischemia  NEUROPHYSIOLOGY: Non-elective EMU  – 22 ([facility]): SREDA, otherwise normal rEEG 22 ([facility]): normal, SREDA rEEG 9/10/21 ([facility]): normal A/D cvEEG  - 21 ([facility]): normal, SREDA  Home Antiseizure Medication and Device none  Interpretation:  Start Date: 2022 – Day 1                                Start Time – 10:33       Duration – 20h 48m  Daily EEG Visual Analysis Findings: The background was continuous and reactive. During wakefulness, the posterior dominant rhythm consisted of symmetric, well-modulated 9-10 Hz activity, with amplitude to 30 uV, that attenuated to eye opening.   Background Slowing: No generalized background slowing was present.  Focal Slowing:  None were present.  Sleep Background: Drowsiness was characterized by fragmentation, attenuation, and slowing of the background activity.   Sleep was characterized by the presence of vertex waves, symmetric sleep spindles and K-complexes.  Other Non-Epileptiform Findings: Occasional 30-120s runs of diffuse, max right posterior-temporal, sharply contoured theta activities, with minimal evolution in frequency. No clinical correlate.  BP, 7uV          Interictal Epileptiform Activity:  None were present.  Events: === Diffuse Myoclonus ===	 Seizure type: nonepileptic myoclonus VS surface-negative myoclonic seizures Electrographic onset location: no ictal rhythm Electrographic evolution: no ictal rhythm State at onset: awake	  Clinical/EEG Findings A cluster of whole body myoclonus from 15:40 to 16:00 and then intermittently around 17:15. EEG immediately before, during and after the event showed baseline awake background without ictal rhythm.   EKG Findings: baseline HR Event of interest?: yes  Artifacts: Intermittent myogenic and movement artifacts were noted.  ECG: The heart rate on single channel ECG was predominantly between 70-90 BPM.  ASM: After the cluster of myoclonus: LEV 1000mg, LTG 25mg x1, PER 2mg x1  EEG Summary: Normal EEG in the awake, drowsy and asleep states. - Clusters of whole body myoclonus without ictal correlate on EEG. - Occasional 30-120s runs of diffuse, max right posterior-temporal, sharply contoured theta activities, with minimal evolution in frequency. No clinical correlate. 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  Impression/Clinical Correlate:  – : myoclonic twitching  Clusters of whole body myoclonus without ictal correlate on EEG, which may be scalp-negative myoclonic seizures VS nonepileptic (like cortical) myoclonus. Normal interictal findings.   ________________________________________  Jon Clinton MD Director, Epilepsy/EMU - [facility]

## 2022-09-21 ENCOUNTER — TRANSCRIPTION ENCOUNTER (OUTPATIENT)
Age: 82
End: 2022-09-21

## 2022-09-21 VITALS
OXYGEN SATURATION: 95 % | RESPIRATION RATE: 18 BRPM | HEART RATE: 84 BPM | TEMPERATURE: 97 F | DIASTOLIC BLOOD PRESSURE: 74 MMHG | SYSTOLIC BLOOD PRESSURE: 136 MMHG

## 2022-09-21 LAB
ANION GAP SERPL CALC-SCNC: 9 MMOL/L — SIGNIFICANT CHANGE UP (ref 5–17)
BUN SERPL-MCNC: 24.1 MG/DL — HIGH (ref 8–20)
CALCIUM SERPL-MCNC: 8.3 MG/DL — LOW (ref 8.4–10.5)
CHLORIDE SERPL-SCNC: 104 MMOL/L — SIGNIFICANT CHANGE UP (ref 98–107)
CO2 SERPL-SCNC: 25 MMOL/L — SIGNIFICANT CHANGE UP (ref 22–29)
CREAT SERPL-MCNC: 1.05 MG/DL — SIGNIFICANT CHANGE UP (ref 0.5–1.3)
EGFR: 53 ML/MIN/1.73M2 — LOW
GLUCOSE SERPL-MCNC: 104 MG/DL — HIGH (ref 70–99)
MAGNESIUM SERPL-MCNC: 1.9 MG/DL — SIGNIFICANT CHANGE UP (ref 1.6–2.6)
POTASSIUM SERPL-MCNC: 4.4 MMOL/L — SIGNIFICANT CHANGE UP (ref 3.5–5.3)
POTASSIUM SERPL-SCNC: 4.4 MMOL/L — SIGNIFICANT CHANGE UP (ref 3.5–5.3)
SODIUM SERPL-SCNC: 138 MMOL/L — SIGNIFICANT CHANGE UP (ref 135–145)

## 2022-09-21 PROCEDURE — 95714 VEEG EA 12-26 HR UNMNTR: CPT

## 2022-09-21 PROCEDURE — 97163 PT EVAL HIGH COMPLEX 45 MIN: CPT

## 2022-09-21 PROCEDURE — 95700 EEG CONT REC W/VID EEG TECH: CPT

## 2022-09-21 PROCEDURE — 93005 ELECTROCARDIOGRAM TRACING: CPT

## 2022-09-21 PROCEDURE — 87637 SARSCOV2&INF A&B&RSV AMP PRB: CPT

## 2022-09-21 PROCEDURE — 80048 BASIC METABOLIC PNL TOTAL CA: CPT

## 2022-09-21 PROCEDURE — 70450 CT HEAD/BRAIN W/O DYE: CPT | Mod: MG

## 2022-09-21 PROCEDURE — 84100 ASSAY OF PHOSPHORUS: CPT

## 2022-09-21 PROCEDURE — 82962 GLUCOSE BLOOD TEST: CPT

## 2022-09-21 PROCEDURE — 80053 COMPREHEN METABOLIC PANEL: CPT

## 2022-09-21 PROCEDURE — G1004: CPT

## 2022-09-21 PROCEDURE — 99285 EMERGENCY DEPT VISIT HI MDM: CPT

## 2022-09-21 PROCEDURE — 95720 EEG PHY/QHP EA INCR W/VEEG: CPT

## 2022-09-21 PROCEDURE — 95711 VEEG 2-12 HR UNMONITORED: CPT

## 2022-09-21 PROCEDURE — 99233 SBSQ HOSP IP/OBS HIGH 50: CPT

## 2022-09-21 PROCEDURE — 85025 COMPLETE CBC W/AUTO DIFF WBC: CPT

## 2022-09-21 PROCEDURE — 85027 COMPLETE CBC AUTOMATED: CPT

## 2022-09-21 PROCEDURE — 99239 HOSP IP/OBS DSCHRG MGMT >30: CPT

## 2022-09-21 PROCEDURE — 36415 COLL VENOUS BLD VENIPUNCTURE: CPT

## 2022-09-21 PROCEDURE — 83735 ASSAY OF MAGNESIUM: CPT

## 2022-09-21 PROCEDURE — 71045 X-RAY EXAM CHEST 1 VIEW: CPT

## 2022-09-21 PROCEDURE — 84484 ASSAY OF TROPONIN QUANT: CPT

## 2022-09-21 RX ORDER — PERAMPANEL 2 MG/1
1 TABLET ORAL
Qty: 14 | Refills: 0
Start: 2022-09-21 | End: 2022-10-04

## 2022-09-21 RX ORDER — DILTIAZEM HCL 120 MG
1 CAPSULE, EXT RELEASE 24 HR ORAL
Qty: 0 | Refills: 0 | DISCHARGE
Start: 2022-09-21

## 2022-09-21 RX ORDER — DILTIAZEM HCL 120 MG
1 CAPSULE, EXT RELEASE 24 HR ORAL
Qty: 0 | Refills: 0 | DISCHARGE

## 2022-09-21 RX ADMIN — PERAMPANEL 2 MILLIGRAM(S): 2 TABLET ORAL at 18:02

## 2022-09-21 RX ADMIN — Medication 30 MILLIGRAM(S): at 05:50

## 2022-09-21 RX ADMIN — Medication 81 MILLIGRAM(S): at 11:41

## 2022-09-21 RX ADMIN — ESCITALOPRAM OXALATE 5 MILLIGRAM(S): 10 TABLET, FILM COATED ORAL at 11:41

## 2022-09-21 RX ADMIN — Medication 30 MILLIGRAM(S): at 13:28

## 2022-09-21 NOTE — DISCHARGE NOTE PROVIDER - HOSPITAL COURSE
82y/oF PMH multiple TIAs, CAD s/p stents, HLD, HTN, ILR, s/p L nephrectomy for renal CA presenting with c/o seizure-like activity, with reported slurred speech and LLE weakness and numbness, admitted for r/o seizure. epilepsy recs appreciated. s/p cvEEG: normal EEG, clusters of whole-body myoclonus without ictal correlate; may be scalp-negative myoclonic seizures vs nonepileptic myoclonus. Pt sensitive to medication, unable to tolerate low dose levetiracetam and divalproex sodium  started on perampanel, tolerating thus far. pt to dc home.

## 2022-09-21 NOTE — PROGRESS NOTE ADULT - SUBJECTIVE AND OBJECTIVE BOX
BREANA HELM    [id number]    82y      Female    CC: seizures     INTERVAL HPI/OVERNIGHT EVENTS: pt seen and examined. tolerating perampanel so far    REVIEW OF SYSTEMS:    CONSTITUTIONAL: No fever, weight loss  RESPIRATORY: No cough, wheezing, hemoptysis; No shortness of breath  CARDIOVASCULAR: No chest pain, palpitations  GASTROINTESTINAL: No abdominal or epigastric pain. No nausea, vomiting  NEUROLOGICAL: No headaches    Vital Signs Last 24 Hrs  T(C): 36.7 (20 Sep 2022 16:16), Max: 36.8 (20 Sep 2022 11:53)  T(F): 98.1 (20 Sep 2022 16:16), Max: 98.2 (20 Sep 2022 11:53)  HR: 68 (20 Sep 2022 16:16) (68 - 88)  BP: 143/79 (20 Sep 2022 16:16) (101/60 - 143/79)  BP(mean): --  RR: 18 (20 Sep 2022 16:16) (16 - 18)  SpO2: 97% (20 Sep 2022 16:16) (97% - 98%)    Parameters below as of 20 Sep 2022 16:16  Patient On (Oxygen Delivery Method): room air        PHYSICAL EXAM:    GENERAL: NAD  HEENT: PERRL, +EOMI  NECK: soft, supple  CHEST/LUNG: Clear to auscultation bilaterally  HEART: S1S2+, Regular rate and rhythm  ABDOMEN: Soft, Nontender, Nondistended; Bowel sounds present  SKIN: warm, dry  NEURO: AAOX3, CNII-XII grossly intact   PSYCH: normal affect     LABS:                        13.2   7.11  )-----------( 186      ( 19 Sep 2022 03:03 )             40.0     09-20    137  |  103  |  28.4<H>  ----------------------------<  135<H>  4.4   |  25.0  |  0.95    Ca    8.2<L>      20 Sep 2022 04:08  Phos  3.5     09-19  Mg     2.1     09-20              MEDICATIONS  (STANDING):  aspirin enteric coated 81 milliGRAM(s) Oral daily  atorvastatin 80 milliGRAM(s) Oral at bedtime  diltiazem    Tablet 30 milliGRAM(s) Oral three times a day  escitalopram 5 milliGRAM(s) Oral daily  perampanel 2 milliGRAM(s) Oral <User Schedule>  rivaroxaban 15 milliGRAM(s) Oral with dinner    MEDICATIONS  (PRN):      RADIOLOGY & ADDITIONAL TESTS:  
                                                                     [facility]                                                                     Jon Clinton MD                                              Epilepsy Consult #: 83-EPILEPSY ([phone])                                        Office: [address] [place] [address], 2nd floor, [place], [address]                                                 Phone: [phone]; Fax: [phone]                            ==============================================    EPILEPSY FOLLOW-UP NOTE      INTERVAL HISTORY:  Since admission, patient has been having intermittent episodes of full body myoclonic twitching. 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 Patient states that these myoclonus usually accompany the transient LLE weakness and difficulty with speech production. Loaded with levetiracetam 1000mg this early evening after a cluster of myoclonic twitching.      MEDICATIONS:   aspirin enteric coated 81 milliGRAM(s) Oral daily  atorvastatin 80 milliGRAM(s) Oral at bedtime  diltiazem    Tablet 30 milliGRAM(s) Oral three times a day  escitalopram 5 milliGRAM(s) Oral daily  perampanel Oral Tab/Cap - Peds 2 milliGRAM(s) Oral once  perampanel Oral Tab/Cap - Peds 2 milliGRAM(s) Oral <User Schedule>  rivaroxaban 15 milliGRAM(s) Oral with dinner    LAST 24-HR VITALS:  T(C): 36.7 (09-19-22 @ 16:39), Max: 36.7 (09-19-22 @ 16:39)  HR: 90 (09-19-22 @ 16:39) (79 - 93)  BP: 149/86 (09-19-22 @ 16:39) (107/71 - 149/86)  ABP: --  RR: 16 (09-19-22 @ 16:39) (16 - 18)  SpO2: 99% (09-19-22 @ 16:39) (93% - 99%)  CVP(cm H2O): --    GENERAL PHYSICAL EXAM:  GEN: no distress   HEENT: NCAT, OP clear  EYES: sclera white, conjunctiva clear, no nystagmus  NECK: supple  CV: RRR, no murmur     		  PULM: CTAB, no wheezing  ABD: soft, +BS, NT  EXT: peripheral pulse intact, no cyanosis  MSK: muscle tone and strength normal  SKIN: warm, dry    NEUROLOGICAL EXAM:  Mental Status  Orientation: awake and alert  Language: clear     Cranial Nerves  II: full visual fields intact   III, IV, VI: PERRL, EOMI  V, VII: facial sensation and movement intact and symmetric   VIII: hearing intact   IX, X: uvula midline, soft palate elevates normally   XI: BL shoulder shrug intact   XII: tongue midline    Motor  5/5 BUE and BLE                   Sensation  Intact to light touch and pinprick in all 4 EXTs    Reflex  2+ in BL biceps and patella                                    Plantar responses downward bilaterally    Coordination  Normal FTN bilaterally    Gait  Deferred       LABS:                          13.2   7.11  )-----------( 186      ( 19 Sep 2022 03:03 )             40.0     09-19    137  |  100  |  19.7  ----------------------------<  75  4.1   |  23.0  |  0.93    Ca    8.7      19 Sep 2022 03:03  Phos  3.5     09-19  Mg     2.0     09-19    TPro  6.5<L>  /  Alb  4.3  /  TBili  0.6  /  DBili  x   /  AST  26  /  ALT  13  /  AlkPhos  52  09-18    CAPILLARY BLOOD GLUCOSE        LIVER FUNCTIONS - ( 18 Sep 2022 11:36 )  Alb: 4.3 g/dL / Pro: 6.5 g/dL / ALK PHOS: 52 U/L / ALT: 13 U/L / AST: 26 U/L / GGT: x                 OTHER IMAGING AND STUDIES:    < from: CT Head No Cont (09.18.22 @ 15:51) >  IMPRESSION:    No acute intracranial pathology is noted. If the patient has new and   persistent symptoms, consider short inter    
                                                                     [facility]                                                                     Jon Clinton MD                                              Epilepsy Consult #: [phone]-EPILEPSY ([phone])                                        Office: [address] [place] [address], 2nd floor, [place], [address]                                                 Phone: [phone]; Fax: [phone]                            ==============================================    EPILEPSY FOLLOW-UP NOTE      INTERVAL HISTORY:  Tolerating perampanel. Hasn't had any more myoclonus today.    MEDICATIONS  (STANDING):  aspirin enteric coated 81 milliGRAM(s) Oral daily  atorvastatin 80 milliGRAM(s) Oral at bedtime  diltiazem    Tablet 30 milliGRAM(s) Oral three times a day  escitalopram 5 milliGRAM(s) Oral daily  perampanel 2 milliGRAM(s) Oral <User Schedule>  rivaroxaban 15 milliGRAM(s) Oral with dinner    Vital Signs Last 24 Hrs  T(C): 36.8 (20 Sep 2022 11:53), Max: 36.8 (20 Sep 2022 11:53)  T(F): 98.2 (20 Sep 2022 11:53), Max: 98.2 (20 Sep 2022 11:53)  HR: 84 (20 Sep 2022 11:53) (72 - 90)  BP: 110/69 (20 Sep 2022 11:53) (101/60 - 149/86)  BP(mean): --  RR: 18 (20 Sep 2022 11:53) (16 - 18)  SpO2: 98% (20 Sep 2022 11:53) (97% - 99%)    Parameters below as of 20 Sep 2022 11:53  Patient On (Oxygen Delivery Method): room air    GENERAL PHYSICAL EXAM:  GEN: no distress   HEENT: NCAT, OP clear  EYES: sclera white, conjunctiva clear, no nystagmus  NECK: supple  CV: RRR, no murmur     		  PULM: CTAB, no wheezing  ABD: soft, +BS, NT  EXT: peripheral pulse intact, no cyanosis  MSK: muscle tone and strength normal  SKIN: warm, dry    NEUROLOGICAL EXAM:  Mental Status  Orientation: awake and alert  Language: clear     Cranial Nerves  II: full visual fields intact   III, IV, VI: PERRL, EOMI  V, VII: facial sensation and movement intact and symmetric   VIII: hearing intact   IX, X: uvula midline, soft palate elevates normally   XI: BL shoulder shrug intact   XII: tongue midline    Motor  5/5 BUE and BLE                   Sensation  Intact to light touch and pinprick in all 4 EXTs    Reflex  2+ in BL biceps and patella                                    Plantar responses downward bilaterally    Coordination  Normal FTN bilaterally    Gait  Deferred       LABS:                            13.2   7.11  )-----------( 186      ( 19 Sep 2022 03:03 )             40.0     09-20    137  |  103  |  28.4<H>  ----------------------------<  135<H>  4.4   |  25.0  |  0.95    Ca    8.2<L>      20 Sep 2022 04:08  Phos  3.5     09-19  Mg     2.1     09-20      CAPILLARY BLOOD GLUCOSE          OTHER IMAGING AND STUDIES:      non-elective EMU 9/19 - 9/20/22:  EEG Summary:  Normal EEG in the awake, drowsy and asleep states.  - Clusters of whole body myoclonus without ictal correlate on EEG.  - Occasional 30-120s runs of diffuse, max right posterior-temporal, sharply contoured theta activities, with minimal evolution in frequency. No clinical correlate. 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    Impression/Clinical Correlate:  9/19 – 9/20: myoclonic twitching    Clusters of whole body myoclonus without ictal correlate on EEG, which may be scalp-negative myoclonic seizures VS nonepileptic (like cortical) myoclonus. Normal interictal findings.           < from: CT Head No Cont (09.18.22 @ 15:51) >  IMPRESSION:    No acute intracranial pathology is noted. If the patient has new and   persistent symptoms, consider short inter    
BREANA HELM    [id number]    82y      Female    CC: seizures    INTERVAL HPI/OVERNIGHT EVENTS: pt seen and examined. o/n report of lightheadedness, hypotension. better this am but has not been out of bed again.     REVIEW OF SYSTEMS:    CONSTITUTIONAL: No fever, weight loss  RESPIRATORY: No cough, wheezing, hemoptysis; No shortness of breath  CARDIOVASCULAR: No chest pain, palpitations  GASTROINTESTINAL: No abdominal or epigastric pain. No nausea, vomiting  NEUROLOGICAL: No headaches    Vital Signs Last 24 Hrs  T(C): 36.4 (19 Sep 2022 09:01), Max: 36.4 (19 Sep 2022 05:21)  T(F): 97.6 (19 Sep 2022 09:01), Max: 97.6 (19 Sep 2022 09:01)  HR: 79 (19 Sep 2022 13:10) (74 - 93)  BP: 129/78 (19 Sep 2022 13:10) (82/60 - 167/80)  BP(mean): 95 (19 Sep 2022 13:10) (95 - 95)  RR: 16 (19 Sep 2022 13:10) (16 - 20)  SpO2: 97% (19 Sep 2022 13:10) (93% - 99%)    Parameters below as of 19 Sep 2022 13:10  Patient On (Oxygen Delivery Method): room air        PHYSICAL EXAM:    GENERAL: NAD  HEENT: PERRL, +EOMI  NECK: soft, supple  CHEST/LUNG: Clear to auscultation bilaterally  HEART: S1S2+, Regular rate and rhythm  ABDOMEN: Soft, Nontender, Nondistended; Bowel sounds present  SKIN: warm, dry  NEURO: AAOX3, moving all extremities   PSYCH: normal affect     LABS:                        13.2   7.11  )-----------( 186      ( 19 Sep 2022 03:03 )             40.0     09-19    137  |  100  |  19.7  ----------------------------<  75  4.1   |  23.0  |  0.93    Ca    8.7      19 Sep 2022 03:03  Phos  3.5     09-19  Mg     2.0     09-19    TPro  6.5<L>  /  Alb  4.3  /  TBili  0.6  /  DBili  x   /  AST  26  /  ALT  13  /  AlkPhos  52  09-18            MEDICATIONS  (STANDING):  aspirin enteric coated 81 milliGRAM(s) Oral daily  atorvastatin 80 milliGRAM(s) Oral at bedtime  diltiazem    Tablet 30 milliGRAM(s) Oral three times a day  escitalopram 5 milliGRAM(s) Oral daily  rivaroxaban 15 milliGRAM(s) Oral with dinner    MEDICATIONS  (PRN):      RADIOLOGY & ADDITIONAL TESTS:  
                                                                     [facility]                                                                     Jon Clinton MD                                              Epilepsy Consult #: 83-EPILEPSY ([phone])                                        Office: [address] [place] [address], [address]nd floor, [place], [place], [address]                                                 Phone: [phone]; Fax: [phone]                            ==============================================    EPILEPSY FOLLOW-UP NOTE      INTERVAL HISTORY:  No further seizure-like activity >24h.    MEDICATIONS  (STANDING):  aspirin enteric coated 81 milliGRAM(s) Oral daily  atorvastatin 80 milliGRAM(s) Oral at bedtime  diltiazem    Tablet 30 milliGRAM(s) Oral three times a day  escitalopram 5 milliGRAM(s) Oral daily  perampanel 2 milliGRAM(s) Oral <User Schedule>  rivaroxaban 15 milliGRAM(s) Oral with dinner    Vital Signs Last 24 Hrs  T(C): 36.6 (21 Sep 2022 08:04), Max: 36.9 (20 Sep 2022 23:12)  T(F): 97.8 (21 Sep 2022 08:04), Max: 98.4 (20 Sep 2022 23:12)  HR: 84 (21 Sep 2022 08:04) (68 - 84)  BP: 133/70 (21 Sep 2022 08:04) (110/69 - 147/82)  BP(mean): --  RR: 18 (21 Sep 2022 08:04) (18 - 18)  SpO2: 99% (21 Sep 2022 08:04) (94% - 99%)    Parameters below as of 21 Sep 2022 08:04  Patient On (Oxygen Delivery Method): room air      GENERAL PHYSICAL EXAM:  GEN: no distress   HEENT: NCAT, OP clear  EYES: sclera white, conjunctiva clear, no nystagmus  NECK: supple  CV: RRR, no murmur     		  PULM: CTAB, no wheezing  ABD: soft, +BS, NT  EXT: peripheral pulse intact, no cyanosis  MSK: muscle tone and strength normal  SKIN: warm, dry    NEUROLOGICAL EXAM:  Mental Status  Orientation: awake and alert  Language: clear     Cranial Nerves  II: full visual fields intact   III, IV, VI: PERRL, EOMI  V, VII: facial sensation and movement intact and symmetric   VIII: hearing intact   IX, X: uvula midline, soft palate elevates normally   XI: BL shoulder shrug intact   XII: tongue midline    Motor  5/5 BUE and BLE                   Sensation  Intact to light touch and pinprick in all 4 EXTs    Reflex  2+ in BL biceps and patella                                    Plantar responses downward bilaterally    Coordination  Normal FTN bilaterally    Gait  Deferred       LABS:        09-21    138  |  104  |  24.1<H>  ----------------------------<  104<H>  4.4   |  25.0  |  1.05    Ca    8.3<L>      21 Sep 2022 03:07  Mg     1.9     09-21      CAPILLARY BLOOD GLUCOSE        OTHER IMAGING AND STUDIES:      non-elective EMU 9/19 - 9/21/22:  EEG Summary:  Normal EEG in the awake, drowsy and asleep states.  - Clusters of whole-body myoclonus without ictal correlate on EEG.  - Occasional 30-120s runs of diffuse, max right posterior-temporal, sharply contoured theta activities, with minimal evolution in frequency. No clinical correlate. 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    Impression/Clinical Correlate:  9/19 – 9/20: myoclonic twitching    Clusters of whole body myoclonus without ictal correlate on EEG, which may be scalp-negative myoclonic seizures VS nonepileptic (like cortical) myoclonus. Normal interictal findings.       < from: CT Head No Cont (09.18.22 @ 15:51) >  IMPRESSION:    No acute intracranial pathology is noted. If the patient has new and   persistent symptoms, consider short inter

## 2022-09-21 NOTE — DISCHARGE NOTE PROVIDER - CARE PROVIDER_API CALL
Jon Clinton)  EEGEpilepsy; Neurology  [address] [facility], [address] [place] [address]  Phone: [phone]  Fax: [phone]  Follow Up Time:     PMD,   Phone: (   )    -  Fax: (   )    -  Follow Up Time:

## 2022-09-21 NOTE — DISCHARGE NOTE PROVIDER - NSDCCPCAREPLAN_GEN_ALL_CORE_FT
PRINCIPAL DISCHARGE DIAGNOSIS  Diagnosis: Myoclonus  Assessment and Plan of Treatment: continue perampanel 2mg x2 weeks then continue on 4mg daily at bedtime

## 2022-09-21 NOTE — DISCHARGE NOTE PROVIDER - NSDCFUSCHEDAPPT_GEN_ALL_CORE_FT
Jon Clinton Physician [facility]  NEUROLOGY [address]  Scheduled Appointment: 10/03/2022

## 2022-09-21 NOTE — EEG REPORT - NS EEG TEXT BOX
[facility] Epilepsy Monitoring Unit Report  [facility]: [address], [place] [address], Phone [phone] [place] Office: [address], [address], [place] [address] Phone [phone]  [facility]: [address] [place] [address], Phone [phone] [place] Office: [address] [place] [address], Phone [phone]  Patient Name: Alexandrea Medina   Age: 82 year, : 1940 Patient ID: -, MRN #: -, Callejas: -  Physician Ordering Inpatient EEG: Elias Arellano Referral Source to EMU: non-elective admission – ER  EMU Study Started: 10:33 on 22   EMU Study Ended: pending discharge  Study Information:  EEG Recording Technique: The patient underwent continuous Video-EEG monitoring, using Telemetry System hardware on the XLTek Digital System. EEG and video data were stored on a computer hard drive with important events saved in digital archive files. The material was reviewed by a physician (electroencephalographer / epileptologist) on a daily basis. Manjit and seizure detection algorithms were utilized and reviewed. An EEG Technician attended to the patient, and was available throughout daytime work hours.  The epilepsy center neurologist was available in person or on call 24-hours per day.  EEG Placement and Labeling of Electrodes: The EEG was performed utilizing 20 channel referential EEG connections (coronal over temporal over parasagittal montage) using all standard 10-20 electrode placements with EKG, with additional electrodes placed in the inferior temporal region using the modified 10-10 montage electrode placements for elective admissions, or if deemed necessary. Recording was at a sampling rate of 256 samples per second per channel. Time synchronized digital video recording was done simultaneously with EEG recording. A low light infrared camera was used for low light recording.        History: This is an 83yo RH female with a history of HTN, HLD, CAD s/p stent, renal cancer s/p nephrectomy, anxiety and recurrent episodes of LLE weakness and difficulty producing speech with negative stroke workup who was admitted for another self-limiting episode that lasted a few hours.  Since admission, has been having intermittent episodes of full body myoclonic twitching. 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 Patient states that these myoclonus usually accompany the transient LLE weakness and difficulty with speech production.  Prior history: Recurrent episodes of LLE weakness and difficulty producing speech. First episode occurred on 20, and then on 20, 20, 3/27/21, 21, 21, 22, 22. All of which had been worked up as acute CVA, but CTH/CTA/CTP/MRI have always been unremarkable. 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 Patient states that she usually knows something is "off" at the onset of these episodes because her thought process is slowed. She then notes difficulty producing speech in the sense that it takes tremendous effort to get the words out, while knowing exactly what she wants to say, with fully intact comprehension. At the same time, also notes weakness in the left leg. Difficulty producing speech usually resolves within hours, but it takes 12-48 hours for the strength of the left leg to return to baseline. Saw pt in  at [facility] and suspect these episodes are focal seizures via mechanism of either activation of negative motor area or inactivation of positive motor area. Discharged her with LEV, but unable to tolerate due to dizziness and fatigue. Switched to VPA DR, again stopped due to side effect.  SEIZURE RISK FACTORS: Patient was a product of normal pregnancy and delivery. No history of febrile seizure, TBI, CNS infection, or FH of seizures.  CURRENT ASM: None   PREVIOUS ASM: LEV – 2022 to 2022, level 28 on 22 on 500mg BID, dizzy and fatigue even on 250mg BID VPA DR 125mg BID – 2 weeks in 2022, dizziness, SOB, fatigue  IMAGING:  Multiple MRIs in the past; most recent as below MRI w/o 22: no acute ischemia  NEUROPHYSIOLOGY: Non-elective EMU  – 22 ([facility]): SREDA, otherwise normal rEEG 22 ([facility]): normal, SREDA rEEG 9/10/21 ([facility]): normal A/D cvEEG  - 21 ([facility]): normal, SREDA  Home Antiseizure Medication and Device none  Interpretation:  Start Date: 2022 – Day 1                                Start Time – 10:33       Duration – 20h 48m  Daily EEG Visual Analysis Findings: The background was continuous and reactive. During wakefulness, the posterior dominant rhythm consisted of symmetric, well-modulated 9-10 Hz activity, with amplitude to 30 uV, that attenuated to eye opening.   Background Slowing: No generalized background slowing was present.  Focal Slowing:  None were present.  Sleep Background: Drowsiness was characterized by fragmentation, attenuation, and slowing of the background activity.   Sleep was characterized by the presence of vertex waves, symmetric sleep spindles and K-complexes.  Other Non-Epileptiform Findings: Occasional 30-120s runs of diffuse, max right posterior-temporal, sharply contoured theta activities, with minimal evolution in frequency. No clinical correlate.  BP, 7uV          Interictal Epileptiform Activity:  None were present.  Events: === Diffuse Myoclonus ===	 Seizure type: nonepileptic myoclonus VS surface-negative myoclonic seizures Electrographic onset location: no ictal rhythm Electrographic evolution: no ictal rhythm State at onset: awake	  Clinical/EEG Findings A cluster of whole-body myoclonus from 15:40 to 16:00 and then intermittently around 17:15. EEG immediately before, during and after the event showed baseline awake background without ictal rhythm.   EKG Findings: baseline HR Event of interest?: yes  Artifacts: Intermittent myogenic and movement artifacts were noted.  ECG: The heart rate on single channel ECG was predominantly between 70-90 BPM.  ASM: After the cluster of myoclonus: LEV 1000mg, LTG 25mg x1, PER 2mg x1  Start Date: 2022 – Day 2                                       Start Time – 08:00      Duration – 23h 15m  Daily EEG Visual Analysis FINDINGS:  The background, artifacts and HR on single channel ECG were similar as previous day.  Interictal Epileptiform Activity:  None were present.   Events: No events or seizures recorded.  ASM: PER 2mg QHS  Start Date: 2022 – Day 2                                       Start Time – 08:00      Duration – 3h  Daily EEG Visual Analysis FINDINGS:  The background, artifacts and HR on single channel ECG were similar as previous day.  Interictal Epileptiform Activity:  None were present.   Events: No events or seizures recorded.  ASM: PER 2mg QHS  EEG Summary: Normal EEG in the awake, drowsy and asleep states. - Clusters of whole-body myoclonus without ictal correlate on EEG. - Occasional 30-120s runs of diffuse, max right posterior-temporal, sharply contoured theta activities, with minimal evolution in frequency. No clinical correlate. 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  Impression/Clinical Correlate:  – : myoclonic twitching  Clusters of whole body myoclonus without ictal correlate on EEG, which may be scalp-negative myoclonic seizures VS nonepileptic (like cortical) myoclonus. Normal interictal findings.   ________________________________________  Jon Clinton MD Director, Epilepsy/EMU - [facility]       [facility] Epilepsy Monitoring Unit Report  [facility]: [address], [place] [address], Phone [phone] [place] Office: [address], [address], [place] [address] Phone [phone]  [facility]: [address] [place] [address], Phone [phone] [place] Office: [address] [place] [address], Phone [phone]  Patient Name: Alexandrea Medina   Age: 82 year, : 1940 Patient ID: -, MRN #: -, Callejas: -  Physician Ordering Inpatient EEG: Elias Arellano Referral Source to EMU: non-elective admission – ER  EMU Study Started: 10:33 on 22   EMU Study Ended: 11:52 on 22  Study Information:  EEG Recording Technique: The patient underwent continuous Video-EEG monitoring, using Telemetry System hardware on the XLTek Digital System. EEG and video data were stored on a computer hard drive with important events saved in digital archive files. The material was reviewed by a physician (electroencephalographer / epileptologist) on a daily basis. Manjit and seizure detection algorithms were utilized and reviewed. An EEG Technician attended to the patient, and was available throughout daytime work hours.  The epilepsy center neurologist was available in person or on call 24-hours per day.  EEG Placement and Labeling of Electrodes: The EEG was performed utilizing 20 channel referential EEG connections (coronal over temporal over parasagittal montage) using all standard 10-20 electrode placements with EKG, with additional electrodes placed in the inferior temporal region using the modified 10-10 montage electrode placements for elective admissions, or if deemed necessary. Recording was at a sampling rate of 256 samples per second per channel. Time synchronized digital video recording was done simultaneously with EEG recording. A low light infrared camera was used for low light recording.        History: This is an 83yo RH female with a history of HTN, HLD, CAD s/p stent, renal cancer s/p nephrectomy, anxiety and recurrent episodes of LLE weakness and difficulty producing speech with negative stroke workup who was admitted for another self-limiting episode that lasted a few hours.  Since admission, has been having intermittent episodes of full body myoclonic twitching. 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 Patient states that these myoclonus usually accompany the transient LLE weakness and difficulty with speech production.  Prior history: Recurrent episodes of LLE weakness and difficulty producing speech. First episode occurred on 20, and then on 20, 20, 3/27/21, 21, 21, 22, 22. All of which had been worked up as acute CVA, but CTH/CTA/CTP/MRI have always been unremarkable. 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 Patient states that she usually knows something is "off" at the onset of these episodes because her thought process is slowed. She then notes difficulty producing speech in the sense that it takes tremendous effort to get the words out, while knowing exactly what she wants to say, with fully intact comprehension. At the same time, also notes weakness in the left leg. Difficulty producing speech usually resolves within hours, but it takes 12-48 hours for the strength of the left leg to return to baseline. Saw pt in  at [facility] and suspect these episodes are focal seizures via mechanism of either activation of negative motor area or inactivation of positive motor area. Discharged her with LEV, but unable to tolerate due to dizziness and fatigue. Switched to VPA DR, again stopped due to side effect.  SEIZURE RISK FACTORS: Patient was a product of normal pregnancy and delivery. No history of febrile seizure, TBI, CNS infection, or FH of seizures.  CURRENT ASM: None   PREVIOUS ASM: LEV – 2022 to 2022, level 28 on 22 on 500mg BID, dizzy and fatigue even on 250mg BID VPA DR 125mg BID – 2 weeks in 2022, dizziness, SOB, fatigue  IMAGING:  Multiple MRIs in the past; most recent as below MRI w/o 22: no acute ischemia  NEUROPHYSIOLOGY: Non-elective EMU  – 22 ([facility]): SREDA, otherwise normal rEEG 22 ([facility]): normal, SREDA rEEG 9/10/21 ([facility]): normal A/D cvEEG  - 21 ([facility]): normal, SREDA  Home Antiseizure Medication and Device none  Interpretation:  Start Date: 2022 – Day 1                                Start Time – 10:33       Duration – 20h 48m  Daily EEG Visual Analysis Findings: The background was continuous and reactive. During wakefulness, the posterior dominant rhythm consisted of symmetric, well-modulated 9-10 Hz activity, with amplitude to 30 uV, that attenuated to eye opening.   Background Slowing: No generalized background slowing was present.  Focal Slowing:  None were present.  Sleep Background: Drowsiness was characterized by fragmentation, attenuation, and slowing of the background activity.   Sleep was characterized by the presence of vertex waves, symmetric sleep spindles and K-complexes.  Other Non-Epileptiform Findings: Occasional 30-120s runs of diffuse, max right posterior-temporal, sharply contoured theta activities, with minimal evolution in frequency. No clinical correlate.  BP, 7uV          Interictal Epileptiform Activity:  None were present.  Events: === Diffuse Myoclonus ===	 Seizure type: nonepileptic myoclonus VS surface-negative myoclonic seizures Electrographic onset location: no ictal rhythm Electrographic evolution: no ictal rhythm State at onset: awake	  Clinical/EEG Findings A cluster of whole-body myoclonus from 15:40 to 16:00 and then intermittently around 17:15. EEG immediately before, during and after the event showed baseline awake background without ictal rhythm.   EKG Findings: baseline HR Event of interest?: yes  Artifacts: Intermittent myogenic and movement artifacts were noted.  ECG: The heart rate on single channel ECG was predominantly between 70-90 BPM.  ASM: After the cluster of myoclonus: LEV 1000mg, LTG 25mg x1, PER 2mg x1  Start Date: 2022 – Day 2                                       Start Time – 08:00      Duration – 23h 15m  Daily EEG Visual Analysis FINDINGS:  The background, artifacts and HR on single channel ECG were similar as previous day.  Interictal Epileptiform Activity:  None were present.   Events: No events or seizures recorded.  ASM: PER 2mg QHS  Start Date: 2022 – Day 2                                       Start Time – 08:00      Duration – 3h 50m  Daily EEG Visual Analysis FINDINGS:  The background, artifacts and HR on single channel ECG were similar as previous day.  Interictal Epileptiform Activity:  None were present.   Events: No events or seizures recorded.  ASM: PER 2mg QHS  EEG Summary: Normal EEG in the awake, drowsy and asleep states. - Clusters of whole-body myoclonus without ictal correlate on EEG. - Occasional 30-120s runs of diffuse, max right posterior-temporal, sharply contoured theta activities, with minimal evolution in frequency. No clinical correlate. 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  Impression/Clinical Correlate:  – : myoclonic twitching  – : no event or seizure  Clusters of whole-body myoclonus without ictal correlate on EEG, which may be scalp-negative myoclonic seizures VS nonepileptic (like cortical) myoclonus. Normal interictal findings.   ________________________________________  Jon Clinton MD Director, Epilepsy/EMU - [facility]

## 2022-09-21 NOTE — PHYSICAL THERAPY INITIAL EVALUATION ADULT - PERTINENT HX OF CURRENT PROBLEM, REHAB EVAL
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

## 2022-09-21 NOTE — PROGRESS NOTE ADULT - ASSESSMENT
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
This is an 81yo RH female with a history of HTN, HLD, CAD s/p stent, renal cancer s/p nephrectomy, anxiety and recurrent episodes of LLE weakness and difficulty producing speech with negative stroke workup who was admitted for another self-limiting episode that lasted a few hours. Also with intermittent clusters of whole body myoclonus. Personally reviewed all imagines, labs, EEG and other studies.    Impression:  Recurrent episodes of LLE weakness, difficulty producing speech, whole body myoclonus. Negative stroke workup in the past. Myoclonus without ictal correlate on EEG. Top ddx include scalp-negative myoclonic seizures VS nonepileptic myoclonus, likely cortical given aphasia during these episodes. Unfortunately patient extremely sensitive to medication and unable to tolerate low dose levetiracetam and divalproex sodium     Recommendation:  - discontinue cvEEG  - discharge on: perampanel 2mg QHS x2weeks -> 4mg QHS  - consider zonisamide next if doesn't tolerate perampanel   - seizure precaution  - cleared for discharge from epilepsy perspective       No acute intervention from Epilepsy at this time.  Please reconsult if needed.   ______________________  Jon Clinton MD   Director, Epilepsy/EMU - [facility]   Epilepsy Consult #: [phone] ([phone])   
82y/oF PMH multiple TIAs, CAD s/p stents, HLD, HTN, ILR, s/p L nephrectomy for renal CA presenting with c/o seizure-like activity, with reported slurred speech and LLE weakness and numbness, admitted for r/o seizure     Seizure-like activity   -multiple similar episodes in the past   -neuro recs appreciated   -vEEG   -previous reports of side effects with Keppra and other AED, pt unsure which   -CTH neg     HTN, HLD   -cont home meds     Hyponatremia, mild  -improved  -cont to monitor     vte ppx: xarelto
This is an 81yo RH female with a history of HTN, HLD, CAD s/p stent, renal cancer s/p nephrectomy, anxiety and recurrent episodes of LLE weakness and difficulty producing speech with negative stroke workup who was admitted for another self-limiting episode that lasted a few hours. Also with intermittent clusters of whole body myoclonus. Personally reviewed all imagines, labs, EEG and other studies.    Impression:  Recurrent episodes of LLE weakness, difficulty producing speech, whole body myoclonus. Negative stroke workup in the past. Myoclonus without ictal correlate on EEG. Top ddx include scalp-negative myoclonic seizures VS nonepileptic myoclonus, likely cortical given aphasia during these episodes. Unfortunately patient extremely sensitive to medication and unable to tolerate low dose levetiracetam and divalproex sodium     Recommendation:  - cvEEG  - trial of perampanel 2mg QHS   - consider zonisamide next if doesn't tolerate perampanel   - seizure precaution  - likely discharge tomorrow if remains free of myoclonus      Will continue to follow with you.   ______________________  Jon Clinton MD   Director, Epilepsy/EMU - [facility]   Epilepsy Consult #: [phone] ([phone])   
This is an 81yo RH female with a history of HTN, HLD, CAD s/p stent, renal cancer s/p nephrectomy, anxiety and recurrent episodes of LLE weakness and difficulty producing speech with negative stroke workup who was admitted for another self-limiting episode that lasted a few hours. Also with intermittent clusters of whole body myoclonus. Personally reviewed all imagines, labs, EEG and other studies.    Impression:  Recurrent episodes of LLE weakness, difficulty producing speech, whole body myoclonus. Negative stroke workup in the past. Myoclonus without ictal correlate on EEG. Top ddx include scalp-negative myoclonic seizures VS nonepileptic myoclonus, like cortical given aphasia during these episodes. Unfortunately patient extremely sensitive to medication and unable to tolerate low dose levetiracetam and divalproex sodium     Recommendation:  - cvEEG  - trial of perampanel 2mg QHS (ordered)   - consider zonisamide next if doesn't tolerate perampanel   - seizure precaution      Will continue to follow with you.   ______________________  Jon Clinton MD   Director, Epilepsy/EMU - [facility]   Epilepsy Consult #: [phone] ([phone])

## 2022-09-21 NOTE — DISCHARGE NOTE PROVIDER - NSDCMRMEDTOKEN_GEN_ALL_CORE_FT
alendronate 70 mg oral tablet: 1 tab(s) orally once a week  aspirin 81 mg oral delayed release tablet: 1 tab(s) orally once a day  atorvastatin 80 mg oral tablet: 1 tab(s) orally once a day  dilTIAZem 30 mg oral tablet: 1 tab(s) orally 3 times a day  Fycompa 4 mg oral tablet: 1 tab(s) orally once a day (at bedtime)  after completing 2 weeks 2mg MDD:1 tab/day  Lexapro 5 mg oral tablet: 5 milligram(s) orally once a day  Nexlizet 180 mg-10 mg oral tablet: 1 tab(s) orally once a day  perampanel 2 mg oral tablet: 1 tab(s) orally once a day (at bedtime) MDD:1tab/day  rivaroxaban 15 mg oral tablet: 1 tab(s) orally once a day (in the evening)

## 2022-09-21 NOTE — DISCHARGE NOTE NURSING/CASE MANAGEMENT/SOCIAL WORK - NSDCPEFALRISK_GEN_ALL_CORE
For information on Fall & Injury Prevention, visit: [email or web address] OR  [email or web address] OR  https://www.cdc.gov/steadi/patient.html

## 2022-09-21 NOTE — DISCHARGE NOTE PROVIDER - ATTENDING DISCHARGE PHYSICAL EXAMINATION:
Vital Signs Last 24 Hrs  T(C): 36.8 (21 Sep 2022 11:14), Max: 36.9 (20 Sep 2022 23:12)  T(F): 98.3 (21 Sep 2022 11:14), Max: 98.4 (20 Sep 2022 23:12)  HR: 83 (21 Sep 2022 11:14) (68 - 84)  BP: 104/58 (21 Sep 2022 11:14) (104/58 - 147/82)  BP(mean): --  RR: 18 (21 Sep 2022 11:14) (18 - 18)  SpO2: 96% (21 Sep 2022 11:14) (94% - 99%)    Parameters below as of 21 Sep 2022 11:14  Patient On (Oxygen Delivery Method): room air    GENERAL: NAD  HEENT: PERRL, +EOMI  NECK: soft, supple  CHEST/LUNG: Clear to auscultation bilaterally  HEART: S1S2+, Regular rate and rhythm  ABDOMEN: Soft, Nontender, Nondistended; Bowel sounds present  SKIN: warm, dry  NEURO: AAOX3, CNII-XII grossly intact   PSYCH: normal affect

## 2022-09-21 NOTE — DISCHARGE NOTE NURSING/CASE MANAGEMENT/SOCIAL WORK - PATIENT PORTAL LINK FT
You can access the FollowMyHealth Patient Portal offered by [facility] by registering at the following website: [email or web address]. By joining [employer]’s FollowMyHealth portal, you will also be able to view your health information using other applications (apps) compatible with our system.

## 2022-09-28 NOTE — PATIENT PROFILE ADULT - BRAND OF COVID-19 VACCINATION
Tatyana with home health called stating forehead wound is slow healing, wants to know if new orders are needed   Pf[employer] dose 1 and 2

## 2022-10-02 ENCOUNTER — HOSPITAL ENCOUNTER (INPATIENT)
Facility: CLINIC | Age: 82
LOS: 2 days | Discharge: HOME | DRG: 378 | End: 2022-10-04
Attending: PHYSICIAN ASSISTANT | Admitting: STUDENT IN AN ORGANIZED HEALTH CARE EDUCATION/TRAINING PROGRAM
Payer: COMMERCIAL

## 2022-10-02 ENCOUNTER — APPOINTMENT (OUTPATIENT)
Dept: CT IMAGING | Facility: CLINIC | Age: 82
DRG: 378 | End: 2022-10-02
Attending: PHYSICIAN ASSISTANT
Payer: COMMERCIAL

## 2022-10-02 DIAGNOSIS — K57.32 DIVERTICULITIS OF COLON: ICD-10-CM

## 2022-10-02 DIAGNOSIS — R10.2 SUPRAPUBIC ABDOMINAL PAIN: ICD-10-CM

## 2022-10-02 DIAGNOSIS — K62.5 BRBPR (BRIGHT RED BLOOD PER RECTUM): ICD-10-CM

## 2022-10-02 LAB
ALBUMIN UR-MCNC: NEGATIVE MG/DL
ANION GAP SERPL CALCULATED.3IONS-SCNC: 5 MMOL/L (ref 3–14)
APPEARANCE UR: CLEAR
ATRIAL RATE - MUSE: 81 BPM
BASOPHILS # BLD AUTO: 0 10E3/UL (ref 0–0.2)
BASOPHILS NFR BLD AUTO: 0 %
BILIRUB UR QL STRIP: NEGATIVE
BUN SERPL-MCNC: 32 MG/DL (ref 7–30)
CALCIUM SERPL-MCNC: 9.3 MG/DL (ref 8.5–10.1)
CHLORIDE BLD-SCNC: 106 MMOL/L (ref 94–109)
CO2 SERPL-SCNC: 28 MMOL/L (ref 20–32)
COLOR UR AUTO: ABNORMAL
CREAT SERPL-MCNC: 0.7 MG/DL (ref 0.52–1.04)
DIASTOLIC BLOOD PRESSURE - MUSE: NORMAL MMHG
EOSINOPHIL # BLD AUTO: 0.2 10E3/UL (ref 0–0.7)
EOSINOPHIL NFR BLD AUTO: 2 %
ERYTHROCYTE [DISTWIDTH] IN BLOOD BY AUTOMATED COUNT: 12.8 % (ref 10–15)
GFR SERPL CREATININE-BSD FRML MDRD: 86 ML/MIN/1.73M2
GLUCOSE BLD-MCNC: 119 MG/DL (ref 70–99)
GLUCOSE UR STRIP-MCNC: NEGATIVE MG/DL
HCT VFR BLD AUTO: 47.3 % (ref 35–47)
HGB BLD-MCNC: 15.5 G/DL (ref 11.7–15.7)
HGB UR QL STRIP: ABNORMAL
IMM GRANULOCYTES # BLD: 0 10E3/UL
IMM GRANULOCYTES NFR BLD: 0 %
INTERPRETATION ECG - MUSE: NORMAL
KETONES UR STRIP-MCNC: NEGATIVE MG/DL
LEUKOCYTE ESTERASE UR QL STRIP: NEGATIVE
LYMPHOCYTES # BLD AUTO: 2 10E3/UL (ref 0.8–5.3)
LYMPHOCYTES NFR BLD AUTO: 19 %
MCH RBC QN AUTO: 30.6 PG (ref 26.5–33)
MCHC RBC AUTO-ENTMCNC: 32.8 G/DL (ref 31.5–36.5)
MCV RBC AUTO: 94 FL (ref 78–100)
MONOCYTES # BLD AUTO: 0.7 10E3/UL (ref 0–1.3)
MONOCYTES NFR BLD AUTO: 7 %
NEUTROPHILS # BLD AUTO: 7.4 10E3/UL (ref 1.6–8.3)
NEUTROPHILS NFR BLD AUTO: 72 %
NITRATE UR QL: NEGATIVE
NRBC # BLD AUTO: 0 10E3/UL
NRBC BLD AUTO-RTO: 0 /100
P AXIS - MUSE: NORMAL DEGREES
PH UR STRIP: 6.5 [PH] (ref 5–7)
PLATELET # BLD AUTO: 256 10E3/UL (ref 150–450)
POTASSIUM BLD-SCNC: 4.2 MMOL/L (ref 3.4–5.3)
PR INTERVAL - MUSE: NORMAL MS
QRS DURATION - MUSE: 76 MS
QT - MUSE: 392 MS
QTC - MUSE: 420 MS
R AXIS - MUSE: 5 DEGREES
RBC # BLD AUTO: 5.06 10E6/UL (ref 3.8–5.2)
RBC URINE: <1 /HPF
SARS-COV-2 RNA RESP QL NAA+PROBE: NEGATIVE
SODIUM SERPL-SCNC: 139 MMOL/L (ref 133–144)
SP GR UR STRIP: 1.02 (ref 1–1.03)
SQUAMOUS EPITHELIAL: <1 /HPF
SYSTOLIC BLOOD PRESSURE - MUSE: NORMAL MMHG
T AXIS - MUSE: 19 DEGREES
UROBILINOGEN UR STRIP-MCNC: NORMAL MG/DL
VENTRICULAR RATE- MUSE: 69 BPM
WBC # BLD AUTO: 10.2 10E3/UL (ref 4–11)
WBC URINE: 1 /HPF

## 2022-10-02 PROCEDURE — 96365 THER/PROPH/DIAG IV INF INIT: CPT

## 2022-10-02 PROCEDURE — 258N000003 HC RX IP 258 OP 636: Performed by: STUDENT IN AN ORGANIZED HEALTH CARE EDUCATION/TRAINING PROGRAM

## 2022-10-02 PROCEDURE — 99285 EMERGENCY DEPT VISIT HI MDM: CPT | Mod: 25

## 2022-10-02 PROCEDURE — 96375 TX/PRO/DX INJ NEW DRUG ADDON: CPT

## 2022-10-02 PROCEDURE — 96368 THER/DIAG CONCURRENT INF: CPT

## 2022-10-02 PROCEDURE — 80048 BASIC METABOLIC PNL TOTAL CA: CPT | Performed by: EMERGENCY MEDICINE

## 2022-10-02 PROCEDURE — 250N000013 HC RX MED GY IP 250 OP 250 PS 637: Performed by: PHYSICIAN ASSISTANT

## 2022-10-02 PROCEDURE — 250N000011 HC RX IP 250 OP 636: Performed by: STUDENT IN AN ORGANIZED HEALTH CARE EDUCATION/TRAINING PROGRAM

## 2022-10-02 PROCEDURE — 80048 BASIC METABOLIC PNL TOTAL CA: CPT | Performed by: PHYSICIAN ASSISTANT

## 2022-10-02 PROCEDURE — 250N000011 HC RX IP 250 OP 636: Performed by: EMERGENCY MEDICINE

## 2022-10-02 PROCEDURE — 74177 CT ABD & PELVIS W/CONTRAST: CPT

## 2022-10-02 PROCEDURE — U0005 INFEC AGEN DETEC AMPLI PROBE: HCPCS | Performed by: PHYSICIAN ASSISTANT

## 2022-10-02 PROCEDURE — 36415 COLL VENOUS BLD VENIPUNCTURE: CPT | Performed by: EMERGENCY MEDICINE

## 2022-10-02 PROCEDURE — 81001 URINALYSIS AUTO W/SCOPE: CPT | Performed by: PHYSICIAN ASSISTANT

## 2022-10-02 PROCEDURE — 250N000009 HC RX 250: Performed by: PHYSICIAN ASSISTANT

## 2022-10-02 PROCEDURE — 120N000001 HC R&B MED SURG/OB

## 2022-10-02 PROCEDURE — 85025 COMPLETE CBC W/AUTO DIFF WBC: CPT | Performed by: EMERGENCY MEDICINE

## 2022-10-02 PROCEDURE — 85025 COMPLETE CBC W/AUTO DIFF WBC: CPT | Performed by: PHYSICIAN ASSISTANT

## 2022-10-02 PROCEDURE — 96366 THER/PROPH/DIAG IV INF ADDON: CPT

## 2022-10-02 PROCEDURE — 250N000011 HC RX IP 250 OP 636: Performed by: PHYSICIAN ASSISTANT

## 2022-10-02 PROCEDURE — C9803 HOPD COVID-19 SPEC COLLECT: HCPCS

## 2022-10-02 PROCEDURE — 99223 1ST HOSP IP/OBS HIGH 75: CPT | Mod: AI | Performed by: STUDENT IN AN ORGANIZED HEALTH CARE EDUCATION/TRAINING PROGRAM

## 2022-10-02 PROCEDURE — 96376 TX/PRO/DX INJ SAME DRUG ADON: CPT

## 2022-10-02 RX ORDER — AMLODIPINE BESYLATE 2.5 MG/1
2.5 TABLET ORAL DAILY
COMMUNITY

## 2022-10-02 RX ORDER — IOPAMIDOL 755 MG/ML
125 INJECTION, SOLUTION INTRAVASCULAR ONCE
Status: COMPLETED | OUTPATIENT
Start: 2022-10-02 | End: 2022-10-02

## 2022-10-02 RX ORDER — HYDROCHLOROTHIAZIDE 25 MG/1
25 TABLET ORAL DAILY
COMMUNITY

## 2022-10-02 RX ORDER — CEFTRIAXONE 1 G/1
1 INJECTION, POWDER, FOR SOLUTION INTRAMUSCULAR; INTRAVENOUS EVERY 24 HOURS
Status: DISCONTINUED | OUTPATIENT
Start: 2022-10-02 | End: 2022-10-04

## 2022-10-02 RX ORDER — KETOCONAZOLE 20 MG/ML
SHAMPOO TOPICAL
COMMUNITY

## 2022-10-02 RX ORDER — PROCHLORPERAZINE MALEATE 5 MG
5 TABLET ORAL EVERY 6 HOURS PRN
Status: DISCONTINUED | OUTPATIENT
Start: 2022-10-02 | End: 2022-10-04 | Stop reason: HOSPADM

## 2022-10-02 RX ORDER — ACETAMINOPHEN 650 MG/1
650 SUPPOSITORY RECTAL EVERY 4 HOURS PRN
Status: DISCONTINUED | OUTPATIENT
Start: 2022-10-02 | End: 2022-10-04 | Stop reason: HOSPADM

## 2022-10-02 RX ORDER — PROCHLORPERAZINE 25 MG
12.5 SUPPOSITORY, RECTAL RECTAL EVERY 12 HOURS PRN
Status: DISCONTINUED | OUTPATIENT
Start: 2022-10-02 | End: 2022-10-04 | Stop reason: HOSPADM

## 2022-10-02 RX ORDER — ONDANSETRON 4 MG/1
4 TABLET, ORALLY DISINTEGRATING ORAL EVERY 6 HOURS PRN
Status: DISCONTINUED | OUTPATIENT
Start: 2022-10-02 | End: 2022-10-04 | Stop reason: HOSPADM

## 2022-10-02 RX ORDER — DESONIDE 0.5 MG/G
CREAM TOPICAL PRN
COMMUNITY

## 2022-10-02 RX ORDER — METOPROLOL SUCCINATE 25 MG/1
12.5 TABLET, EXTENDED RELEASE ORAL DAILY
COMMUNITY

## 2022-10-02 RX ORDER — METRONIDAZOLE 500 MG/100ML
500 INJECTION, SOLUTION INTRAVENOUS EVERY 12 HOURS
Status: DISCONTINUED | OUTPATIENT
Start: 2022-10-02 | End: 2022-10-04

## 2022-10-02 RX ORDER — FLUOCINONIDE TOPICAL SOLUTION USP, 0.05% 0.5 MG/ML
SOLUTION TOPICAL 2 TIMES DAILY
COMMUNITY

## 2022-10-02 RX ORDER — LISINOPRIL 20 MG/1
20 TABLET ORAL DAILY
COMMUNITY

## 2022-10-02 RX ORDER — MORPHINE SULFATE 4 MG/ML
4 INJECTION, SOLUTION INTRAMUSCULAR; INTRAVENOUS
Status: DISCONTINUED | OUTPATIENT
Start: 2022-10-02 | End: 2022-10-03

## 2022-10-02 RX ORDER — ACETAMINOPHEN 325 MG/1
650 TABLET ORAL EVERY 4 HOURS PRN
Status: DISCONTINUED | OUTPATIENT
Start: 2022-10-02 | End: 2022-10-04 | Stop reason: HOSPADM

## 2022-10-02 RX ORDER — ONDANSETRON 2 MG/ML
4 INJECTION INTRAMUSCULAR; INTRAVENOUS EVERY 6 HOURS PRN
Status: DISCONTINUED | OUTPATIENT
Start: 2022-10-02 | End: 2022-10-04 | Stop reason: HOSPADM

## 2022-10-02 RX ORDER — SODIUM CHLORIDE 9 MG/ML
INJECTION, SOLUTION INTRAVENOUS CONTINUOUS
Status: DISCONTINUED | OUTPATIENT
Start: 2022-10-02 | End: 2022-10-03

## 2022-10-02 RX ORDER — OXYCODONE HYDROCHLORIDE 5 MG/1
5 TABLET ORAL EVERY 4 HOURS PRN
Status: DISCONTINUED | OUTPATIENT
Start: 2022-10-02 | End: 2022-10-04 | Stop reason: HOSPADM

## 2022-10-02 RX ADMIN — SODIUM CHLORIDE: 9 INJECTION, SOLUTION INTRAVENOUS at 18:04

## 2022-10-02 RX ADMIN — METRONIDAZOLE 500 MG: 500 INJECTION, SOLUTION INTRAVENOUS at 21:33

## 2022-10-02 RX ADMIN — IOPAMIDOL 125 ML: 755 INJECTION, SOLUTION INTRAVENOUS at 14:30

## 2022-10-02 RX ADMIN — ONDANSETRON 4 MG: 2 INJECTION INTRAMUSCULAR; INTRAVENOUS at 18:03

## 2022-10-02 RX ADMIN — SODIUM CHLORIDE 76 ML: 9 INJECTION, SOLUTION INTRAVENOUS at 14:31

## 2022-10-02 RX ADMIN — ONDANSETRON 4 MG: 2 INJECTION INTRAMUSCULAR; INTRAVENOUS at 20:40

## 2022-10-02 RX ADMIN — AMOXICILLIN AND CLAVULANATE POTASSIUM 1 TABLET: 875; 125 TABLET, FILM COATED ORAL at 15:41

## 2022-10-02 RX ADMIN — MORPHINE SULFATE 4 MG: 4 INJECTION, SOLUTION INTRAMUSCULAR; INTRAVENOUS at 18:49

## 2022-10-02 RX ADMIN — CEFTRIAXONE SODIUM 1 G: 1 INJECTION, POWDER, FOR SOLUTION INTRAMUSCULAR; INTRAVENOUS at 20:23

## 2022-10-02 ASSESSMENT — ENCOUNTER SYMPTOMS
VOMITING: 0
NAUSEA: 0
FEVER: 0
ABDOMINAL PAIN: 1
CHILLS: 0
DIARRHEA: 1
BLOOD IN STOOL: 1

## 2022-10-02 ASSESSMENT — ACTIVITIES OF DAILY LIVING (ADL)
ADLS_ACUITY_SCORE: 35

## 2022-10-02 NOTE — PHARMACY-ADMISSION MEDICATION HISTORY
Pharmacy Medication History  Admission medication history interview status for the 10/2/2022  admission is complete. See EPIC admission navigator for prior to admission medications     Location of Interview: Patient room  Medication history sources: Patient    Significant changes made to the medication list:  Removed ondansetron tablet  Added lisinopril tablet  Added metoprolol succinate tablet  Added rivaroxaban tablet  Added amlodipine besylate tablet  Added hydrochlorothiazide tablet  Added desonide cream  Added fluocinonide solution  Added ketoconazole shampoo    In the past week, patient estimated taking medication this percent of the time: greater than 90%    Additional medication history information:   none    Medication reconciliation completed by provider prior to medication history? No    Time spent in this activity: 5 minutes    Prior to Admission medications    Medication Sig Last Dose Taking? Auth Provider Long Term End Date   amLODIPine (NORVASC) 2.5 MG tablet   Take 2.5 mg by mouth daily 10/2/2022 at am Yes Unknown, Entered By History Yes    desonide (DESOWEN) 0.05 % external cream Apply topically as needed Past Week at Unknown time Yes Unknown, Entered By History              fluocinonide (LIDEX) 0.05 % external solution Apply topically 2 times daily 10/1/2022 at Unknown time Yes Unknown, Entered By History     hydrochlorothiazide (HYDRODIURIL) 25 MG tablet Take 25 mg by mouth daily 10/2/2022 at am Yes Unknown, Entered By History Yes    ketoconazole (NIZORAL) 2 % external shampoo Apply topically three times a week 10/1/2022 at Unknown time Yes Unknown, Entered By History     lisinopril (ZESTRIL) 20 MG tablet Take 20 mg by mouth daily 10/2/2022 at am Yes Unknown, Entered By History Yes    metoprolol succinate ER (TOPROL XL) 25 MG 24 hr tablet Take 12.5 mg by mouth daily 10/2/2022 at am Yes Unknown, Entered By History Yes    rivaroxaban ANTICOAGULANT (XARELTO) 20 MG TABS tablet Take 20 mg by mouth  daily (with dinner) 10/2/2022 at am Yes Unknown, Entered By History Yes    albuterol (PROAIR HFA/PROVENTIL HFA/VENTOLIN HFA) 108 (90 Base) MCG/ACT inhaler Inhale 2 puffs into the lungs every 4 hours as needed for shortness of breath / dyspnea or wheezing (cough)  Patient not taking: Reported on 10/2/2022 Not Taking at Unknown time  Stiven Atwood MD Yes 11/15/19   ondansetron (ZOFRAN ODT) 4 MG ODT tab Take 1 tablet (4 mg) by mouth every 6 hours as needed  Patient not taking: Reported on 10/2/2022 Not Taking at Unknown time  Stiven Atwood MD         The information provided in this note is only as accurate as the sources available at the time of update(s)

## 2022-10-02 NOTE — H&P
[facility]    History and Physical - Hospitalist Service       Date of Admission:  10/2/2022    Assessment & Plan      Annie Archer is a 82 year old female with past medical history significant for atrial fibrillation on Xarelto, hypertension, dyslipidemia, ERIC admitted on 10/2/2022 with GI bleed     Lower GI bleed likely diverticular bleed  Acute Diverticulitis   The patient presents to the ED with GI bleeding. She reports diarrhea that started last evening then on-going crampy abdominal pain. This morning she had two episodes of bright red bloody stools. In the ED she is hemodynamically stable with an initial hemoglobin of 15.5. CT of the abdomen showed clonic diverticulosis with mild pericolonic fast stranding along the sigmoid colon, findings worrisome for acute diverticulitis.   * While in the ED the patient had a third episode of bleeding. She was given a dose of augmentin and shortly thereafter developed significant nausea.   - Admit to inpatient   - Serial hemoglobin q6h  - Ceftriaxone and metronidazole while inpatient   - GI consultation   - Hold PTA Xarelto   - Clear liquid diet  - IV fluids     Hypertension   Dyslipidemia  - Resume PTA medications when verified     Atrial fibrillation   - Hold PTA Xarelto     Pancreatic lesion  Incidentally noted on CT which showed a few hypodense foci in the pancreas measure up to 1.5 cm in the pancreatic neck area, indeterminate, could represent sidebranch intraductal papillary mucinous neoplasm versus other pancreatic cystic lesions.   - Recommend follow-up exam with contrast enhanced MRI/MRCP in 6-12 months to assess for interval change    Right renal cyst   Again incidentally noted on CT imaging which showed a 8.5 cm mildly complex right renal cyst with thin enhancing septations and 1.6 cm partially exophytic mildly hyperattenuating left renal lesion, could represent cyst with hemorrhagic/proteinaceous component.   - Recommend further  outpatient evaluation with renal ultrasound to assess the internal component of these two cystic lesions.    Diet: Clear liquids  DVT Prophylaxis: Pneumatic Compression Devices  Quijano Catheter: Not present  Central Lines: None  Cardiac Monitoring: None  Code Status: Full Code     Disposition Plan   The patient's care was discussed with the Patient.    Mary Fonseca  Hospitalist Service  [facility]  Securely message with the Vocera Web Console (learn more here)  Text page via [employer] Paging/Directory         ______________________________________________________________________    Chief Complaint   GI bleeding     History is obtained from the patient    History of Present Illness   Annie Archer is a 82 year old female who presents to the ED with GI bleeding. She reports diarrhea that started last evening then on-going crampy abdominal pain. This morning she had two episodes of bright red bloody stools. While in the ED she had a third episode.     She is continuing to have some lower suprapubic abdominal pain as well as nausea. She thinks the nausea is likely from the augmentin she received in the ED as she has had nausea with this antibiotic before. She has not vomited.     She does report one prior episode of GI bleeding several years ago, but does not recall what it was from.     Review of Systems    The 10 point Review of Systems is negative other than noted in the HPI or here.     Past Medical History    I have reviewed this patient's medical history and updated it with pertinent information if needed.   No past medical history on file.    Past Surgical History   I have reviewed this patient's surgical history and updated it with pertinent information if needed.  No past surgical history on file.    Social History   I have reviewed this patient's social history and updated it with pertinent information if needed.       Family History   Reviewed and non-contributory     Prior to  Admission Medications   Prior to Admission Medications   Prescriptions Last Dose Informant Patient Reported? Taking?   albuterol (PROAIR HFA/PROVENTIL HFA/VENTOLIN HFA) 108 (90 Base) MCG/ACT inhaler   No No   Sig: Inhale 2 puffs into the lungs every 4 hours as needed for shortness of breath / dyspnea or wheezing (cough)   ondansetron (ZOFRAN ODT) 4 MG ODT tab   No No   Sig: Take 1 tablet (4 mg) by mouth every 6 hours as needed      Facility-Administered Medications: None     Allergies   Allergies   Allergen Reactions     Amoxicillin-Pot Clavulanate Other (See Comments)     Severe nausea. 10/16/2019     Ergotamine Other (See Comments)     chest tightness     Sulfa Drugs Hives     Cephalexin Rash     Tolerated Ancef        Physical Exam   Vital Signs: Temp: 98  F (36.7  C) Temp src: Oral BP: (!) 160/80 Pulse: 72   Resp: 16 SpO2: 99 %      Weight: 0 lbs 0 oz    Constitutional: Awake, alert, cooperative, no apparent distress. [place]  Eyes: Conjunctiva and pupils examined and normal.  HEENT: Moist mucous membranes, normal dentition.  Respiratory: Clear to auscultation bilaterally, no crackles or wheezing.  Cardiovascular: Regular rate and rhythm, normal S1 and S2, and no murmur noted.  GI: Soft, non-distended, tender over the lower abdomen, normal bowel sounds.  Skin: No rashes, no cyanosis, no edema.  Musculoskeletal: No joint swelling, erythema or tenderness.  Neurologic: Cranial nerves 2-12 intact, normal strength and sensation.  Psychiatric: Alert, oriented to person, place and time, no obvious anxiety or depression.      Data   Data reviewed today: I reviewed all medications, new labs and imaging results over the last 24 hours.     Recent Labs   Lab 10/02/22  1241   WBC 10.2   HGB 15.5   MCV 94         POTASSIUM 4.2   CHLORIDE 106   CO2 28   BUN 32*   CR 0.70   ANIONGAP 5   RICKY 9.3   *     Recent Results (from the past 24 hour(s))   CT Abdomen Pelvis w Contrast    Narrative    EXAM: CT ABDOMEN AND  PELVIS WITH CONTRAST  LOCATION: [facility]  DATE/TIME: 10/02/2022, 2:42 PM    INDICATION: Lower abdominal pain, rectal bleed.  COMPARISON: None available.  TECHNIQUE: CT scan of the abdomen and pelvis was performed after the injection of 125 mL Isovue 370 intravenously. Multiplanar reformats were obtained. Dose reduction techniques were used.    FINDINGS:   LOWER CHEST: Mild bibasilar pulmonary opacities, likely atelectasis. Small hiatal hernia.    ABDOMEN/PELVIS:    HEPATOBILIARY: No suspicious focal hepatic lesion. No radiodense gallstones.    PANCREAS: No main pancreatic ductal dilatation. A few hypodense foci in the pancreas including 1.5 cm in the pancreatic neck area (series 2 image 61) and 1.1 cm pancreatic tail (series 2 image 60).    SPLEEN: No splenomegaly.    ADRENAL GLANDS: 1.7 cm left adrenal nodule (series 2 image 61). No right adrenal nodule.    KIDNEYS/BLADDER: No radiodense kidney/ureteral stones or hydronephrosis in either kidney. There is 8.5 cm complex renal cyst at the lower pole of the right kidney with a few thin enhancing septations. There is 1.6 cm partially exophytic mildly   hyperattenuating lesion with elevated Hounsfield units of 35.    BOWEL: No abnormally dilated bowel loops. The appendix is visualized and appears normal. Colonic diverticulosis with mild pericolonic fat stranding along the sigmoid colon, findings worrisome for acute diverticulitis. No evidence of colonic perforation   or pericolonic loculated collection.    PERITONEUM: No evidence of free fluid in the abdomen and pelvis. No free peritoneal or portal venous gas.    PELVIC ORGANS: Fibroid uterus.    VASCULATURE: Scattered atherosclerotic vascular calcification of the abdominal aorta and iliac vessels.    LYMPH NODES: No significant abdominopelvic lymphadenopathy.    MUSCULOSKELETAL: No suspicious osseous lesion.      Impression    IMPRESSION:   1.  Colonic diverticulosis with mild pericolonic  fat stranding along the sigmoid colon, findings worrisome for acute diverticulitis.  2.  A few hypodense foci in the pancreas measure up to 1.5 cm in the pancreatic neck area, indeterminate, could represent sidebranch intraductal papillary mucinous neoplasm versus other pancreatic cystic lesions. Recommend follow-up exam with contrast   enhanced MRI/MRCP in 6-12 months to assess for interval change  3.  8.5 cm mildly complex right renal cyst with thin enhancing septations and 1.6 cm partially exophytic mildly hyperattenuating left renal lesion, could represent cyst with hemorrhagic/proteinaceous component. Recommend further evaluation with renal   ultrasound to assess the internal component of these two cystic lesions.  4.  Fibroid uterus.

## 2022-10-02 NOTE — ED PROVIDER NOTES
Emergency Department Attending Supervision Note  10/2/2022  5:18 PM      I evaluated this patient in conjunction with Gary Carnes PA-C , please see primary note for full details      Briefly, the patient presented with left lower quadrant abdominal pain and bloody bowel movements, 2 prior to arrival in the emergency department and 1 since she arrived.  Patient denies chest pain, shortness of breath.      On my exam, nontoxic appearance, normal respiratory rate, left lower quadrant abdominal tenderness, hard of hearing    Results:  CT Abdomen Pelvis w Contrast   Final Result   IMPRESSION:    1.  Colonic diverticulosis with mild pericolonic fat stranding along the sigmoid colon, findings worrisome for acute diverticulitis.   2.  A few hypodense foci in the pancreas measure up to 1.5 cm in the pancreatic neck area, indeterminate, could represent sidebranch intraductal papillary mucinous neoplasm versus other pancreatic cystic lesions. Recommend follow-up exam with contrast    enhanced MRI/MRCP in 6-12 months to assess for interval change   3.  8.5 cm mildly complex right renal cyst with thin enhancing septations and 1.6 cm partially exophytic mildly hyperattenuating left renal lesion, could represent cyst with hemorrhagic/proteinaceous component. Recommend further evaluation with renal    ultrasound to assess the internal component of these two cystic lesions.   4.  Fibroid uterus.               Labs Ordered and Resulted from Time of ED Arrival to Time of ED Departure   BASIC METABOLIC PANEL - Abnormal       Result Value    Sodium 139      Potassium 4.2      Chloride 106      Carbon Dioxide (CO2) 28      Anion Gap 5      Urea Nitrogen 32 (*)     Creatinine 0.70      Calcium 9.3      Glucose 119 (*)     GFR Estimate 86     CBC WITH PLATELETS AND DIFFERENTIAL - Abnormal    WBC Count 10.2      RBC Count 5.06      Hemoglobin 15.5      Hematocrit 47.3 (*)     MCV 94      MCH 30.6      MCHC 32.8      RDW 12.8      Platelet  Count 256      % Neutrophils 72      % Lymphocytes 19      % Monocytes 7      % Eosinophils 2      % Basophils 0      % Immature Granulocytes 0      NRBCs per 100 WBC 0      Absolute Neutrophils 7.4      Absolute Lymphocytes 2.0      Absolute Monocytes 0.7      Absolute Eosinophils 0.2      Absolute Basophils 0.0      Absolute Immature Granulocytes 0.0      Absolute NRBCs 0.0     ROUTINE UA WITH MICROSCOPIC REFLEX TO CULTURE - Abnormal    Color Urine Straw      Appearance Urine Clear      Glucose Urine Negative      Bilirubin Urine Negative      Ketones Urine Negative      Specific Gravity Urine 1.021      Blood Urine Small (*)     pH Urine 6.5      Protein Albumin Urine Negative      Urobilinogen Urine Normal      Nitrite Urine Negative      Leukocyte Esterase Urine Negative      RBC Urine <1      WBC Urine 1      Squamous Epithelials Urine <1     COVID-19 VIRUS (CORONAVIRUS) BY PCR           MDM    82-year-old female presenting with abdominal pain and bloody bowel movements.  Imaging demonstrates findings of diverticulitis.  Given anticoagulated and multiple episodes of bloody bowel movements, patient admitted to the hospitalist service for further evaluation management    Diagnosis    ICD-10-CM    1. Diverticulitis of colon  K57.32    2. BRBPR (bright red blood per rectum)  K62.5    3. Suprapubic abdominal pain  R10.2          MD Rai Kim Christopher E, MD  10/02/22 6416

## 2022-10-02 NOTE — ED NOTES
"Pt c/o nausea while admitting physician examining pt.  Pt given zofran which subsided nausea, pt states having pain also in left lower chest area and into back but states is not new and \"everything is just uncomfortable.\"    "

## 2022-10-02 NOTE — ED PROVIDER NOTES
History     Chief Complaint:  GI Problem (Pt sts she has had cramping since last night and today noted red blood in depends. Pt had 1 episode of blood in toilet. Pt is on xarelto)      SOILA Archer is a 82 year old female who has a history of atrial fibrillation, takes Xarelto, and presents with GI problem. The patient had a diarrheal episode last night between 2300 and 0200. She also has intermittent suprapubic abdominal cramping. This morning she woke up at around 0800 and felt like she had to go to the bathroom for a bowel movement. She did not make it in time, and had gelatinous blood in her pants. She also had another episode afterwards as well. The cramping does not last very long. She denies fever, chills, nausea, vomiting, or rectal pain.    Review of Systems   Constitutional: Negative for chills and fever.   Gastrointestinal: Positive for abdominal pain, blood in stool and diarrhea. Negative for nausea and vomiting.        No rectal pain   All other systems reviewed and are negative.    Allergies:  Amoxicillin-Pot Clavulanate  Ergotamine  Sulfa Drugs  Cephalexin    Medications:    albuterol   ondansetron  Amlodipine  Lisinopril  Fluocinonide  ketoconazole  Hydrocortisone  xarelto    Past Medical History:    Atrial fibrillation  Multiple thyroid nodules  Osteoarthritis  Cellulitis  Ovarian cyst  ERIC    Past Surgical History:    Bilateral oophorectomy  Breast surgery  Orthopedic surgery  Colonoscopy     Social History:  Presents alone  Retired Physician    Physical Exam     Patient Vitals for the past 24 hrs:   BP Temp Temp src Pulse Resp SpO2   10/02/22 1415 -- -- -- -- -- 99 %   10/02/22 1400 (!) 160/80 -- -- 72 -- 99 %   10/02/22 1345 -- -- -- -- -- 98 %   10/02/22 1330 132/73 -- -- 64 -- 98 %   10/02/22 1315 (!) 148/91 -- -- -- -- --   10/02/22 1229 (!) 171/85 98  F (36.7  C) Oral (!) 8 16 99 %       Physical Exam  Constitutional: Pleasant. Cooperative.   Eyes: Pupils equally round and  reactive  HENT: Head is normal in appearance. Oropharynx is normal with moist mucus membranes.  Cardiovascular: Regular rate and rhythm and without murmurs.  Respiratory: Normal respiratory effort, lungs are clear bilaterally.  GI: Mild TTP to suprapubic region, otherwise non-tender, soft, non-distended. No guarding, rebound, or rigidity.  Musculoskeletal: No asymmetry of the lower extremities, no tenderness to palpation.   Skin: Normal, without rash.  Neurologic: Cranial nerves grossly intact, normal cognition, no focal deficits. Alert and oriented x 3.   Psychiatric: Normal affect.  Rectal: Declined  Nursing notes and vital signs reviewed.      Emergency Department Course     Imaging:  CT Abdomen Pelvis w Contrast   Final Result   IMPRESSION:    1.  Colonic diverticulosis with mild pericolonic fat stranding along the sigmoid colon, findings worrisome for acute diverticulitis.   2.  A few hypodense foci in the pancreas measure up to 1.5 cm in the pancreatic neck area, indeterminate, could represent sidebranch intraductal papillary mucinous neoplasm versus other pancreatic cystic lesions. Recommend follow-up exam with contrast    enhanced MRI/MRCP in 6-12 months to assess for interval change   3.  8.5 cm mildly complex right renal cyst with thin enhancing septations and 1.6 cm partially exophytic mildly hyperattenuating left renal lesion, could represent cyst with hemorrhagic/proteinaceous component. Recommend further evaluation with renal    ultrasound to assess the internal component of these two cystic lesions.   4.  Fibroid uterus.           Report per radiology    Laboratory:  Labs Ordered and Resulted from Time of ED Arrival to Time of ED Departure   BASIC METABOLIC PANEL - Abnormal       Result Value    Sodium 139      Potassium 4.2      Chloride 106      Carbon Dioxide (CO2) 28      Anion Gap 5      Urea Nitrogen 32 (*)     Creatinine 0.70      Calcium 9.3      Glucose 119 (*)     GFR Estimate 86     CBC  WITH PLATELETS AND DIFFERENTIAL - Abnormal    WBC Count 10.2      RBC Count 5.06      Hemoglobin 15.5      Hematocrit 47.3 (*)     MCV 94      MCH 30.6      MCHC 32.8      RDW 12.8      Platelet Count 256      % Neutrophils 72      % Lymphocytes 19      % Monocytes 7      % Eosinophils 2      % Basophils 0      % Immature Granulocytes 0      NRBCs per 100 WBC 0      Absolute Neutrophils 7.4      Absolute Lymphocytes 2.0      Absolute Monocytes 0.7      Absolute Eosinophils 0.2      Absolute Basophils 0.0      Absolute Immature Granulocytes 0.0      Absolute NRBCs 0.0     ROUTINE UA WITH MICROSCOPIC REFLEX TO CULTURE - Abnormal    Color Urine Straw      Appearance Urine Clear      Glucose Urine Negative      Bilirubin Urine Negative      Ketones Urine Negative      Specific Gravity Urine 1.021      Blood Urine Small (*)     pH Urine 6.5      Protein Albumin Urine Negative      Urobilinogen Urine Normal      Nitrite Urine Negative      Leukocyte Esterase Urine Negative      RBC Urine <1      WBC Urine 1      Squamous Epithelials Urine <1       Emergency Department Course:    Reviewed:  I reviewed nursing notes, vitals, past medical history and Care Everywhere    Assessments:  1350 I obtained history and examined the patient as noted above.   1520 I rechecked the patient and explained findings.   1635 I spoke with Dr. Rai CORONA to discuss the patient's findings, presentation, and plan of care.    Consults:  1655 I spoke with Dr. Fonseca of the Hospitalist service to discuss the patient's findings, presentation, and plan of care.    Interventions:  1541 Augmentin 875-125 mg tablet PO    Disposition:  The patient was admitted to the hospital under the care of     Impression & Plan      Medical Decision Making:  Annie Archer is a 82 year old female who presents to the ED for evaluation of suprapubic abdominal pain and BRBPR.  Patient is anticoagulated.  See HPI as above for additional details.  Vitals and  physical exam as above.  Differential was broad and included external hemorrhoid, internal hemorrhoid, AVM, diverticulosis, fissure, UTI, appendicitis, diverticulitis, constipation, viral GI illness, amongst others.  Hemoglobin is reassuring as are vital signs.  Patient had 2 episodes of BRBPR prior to presentation to the ED and has not had recurrence.  CT notable for diverticulitis.  Suspect diverticulitis as etiology of patient's pain, may be attributable to patient's bleeding as possibly bleeding diverticula versus internal hemorrhoid.  Patient declines rectal exam, and has no pain to suggest for external hemorrhoid or fissure.  Initially discussed antibiotics for diverticulitis and plan for close observation at home to see if recurrence of BRBPR with plan to return.  Unfortunately, patient had another episode of BRBPR while in the ED, and thus admission was discussed, and patient is in agreement for admission.  All questions answered.  Initial dose of antibiotics provided here in the ED.  Discussed the case with the hospitalist, agreed to admit the patient.  All questions answered.  Patient admitted in stable condition.      Diagnosis:    ICD-10-CM    1. Diverticulitis of colon  K57.32    2. BRBPR (bright red blood per rectum)  K62.5      Covid-19  Annie Archer was evaluated during a global COVID-19 pandemic, which necessitated consideration that the patient might be at risk for infection with the SARS-CoV-2 virus that causes COVID-19.   Applicable protocols for evaluation were followed during the patient's care.   COVID-19 was considered as part of the patient's evaluation. The plan for testing is:  a test was obtained during this visit. Test result is pending.    Scribe Disclosure:  Chanda CORONA Hired, am serving as a scribe at 1:58 PM on 10/2/2022 to document services personally performed by Gary Carnes PA-C based on my observations and the provider's statements to me.     This record was created at least  in part using electronic voice recognition software, so please excuse any typographical errors.       Gary Carnes PA-C  10/02/22 0618

## 2022-10-02 NOTE — ED NOTES
[facility]  ED Nurse Handoff Report    ED Chief complaint: GI Problem (Pt sts she has had cramping since last night and today noted red blood in depends. Pt had 1 episode of blood in toilet. Pt is on xarelto)      ED Diagnosis:   Final diagnoses:   Diverticulitis of colon   BRBPR (bright red blood per rectum)   Suprapubic abdominal pain       Code Status: Full Code    Allergies:   Allergies   Allergen Reactions     Amoxicillin-Pot Clavulanate Other (See Comments)     Severe nausea. 10/16/2019     Ergotamine Other (See Comments)     chest tightness     Sulfa Drugs Hives     Cephalexin Rash     Tolerated Ancef        Patient Story:Details:   GI Problem (Pt sts she has had cramping since last night and today noted red blood in depends. Pt had 1 episode of blood in toilet)     Focused Assessment:  gi    Treatments and/or interventions provided: monitor, antibiotics  Patient's response to treatments and/or interventions: improved    To be done/followed up on inpatient unit:  same as above    Does this patient have any cognitive concerns?: n/a    Activity level - Baseline/Home:  Independent  Activity Level - Current:   Independent    Patient's Preferred language: English   Needed?: No    Isolation: None  Infection: Not Applicable  Patient tested for COVID 19 prior to admission: YES  Bariatric?: No    Vital Signs:   Vitals:    10/02/22 1415 10/02/22 1420 10/02/22 1425 10/02/22 1545   BP:    (!) 148/77   Pulse:    63   Resp:       Temp:       TempSrc:       SpO2: 99% 97% 95%        Cardiac Rhythm:     Was the PSS-3 completed:   Yes  What interventions are required if any?               Family Comments: aware of admit  OBS brochure/video discussed/provided to patient/family: No              Name of person given brochure if not patient:              Relationship to patient:     For the majority of the shift this patient's behavior was Green.   Behavioral interventions performed were n/a.    ED  NURSE PHONE NUMBER: [phone]

## 2022-10-03 ENCOUNTER — APPOINTMENT (OUTPATIENT)
Dept: NEUROLOGY | Facility: CLINIC | Age: 82
End: 2022-10-03

## 2022-10-03 VITALS
HEIGHT: 62 IN | BODY MASS INDEX: 24.66 KG/M2 | OXYGEN SATURATION: 98 % | SYSTOLIC BLOOD PRESSURE: 136 MMHG | DIASTOLIC BLOOD PRESSURE: 80 MMHG | WEIGHT: 134 LBS

## 2022-10-03 LAB
ALBUMIN SERPL-MCNC: 3.1 G/DL (ref 3.4–5)
ALP SERPL-CCNC: 65 U/L (ref 40–150)
ALT SERPL W P-5'-P-CCNC: 15 U/L (ref 0–50)
ANION GAP SERPL CALCULATED.3IONS-SCNC: 3 MMOL/L (ref 3–14)
AST SERPL W P-5'-P-CCNC: 14 U/L (ref 0–45)
BILIRUB SERPL-MCNC: 1.2 MG/DL (ref 0.2–1.3)
BUN SERPL-MCNC: 19 MG/DL (ref 7–30)
CALCIUM SERPL-MCNC: 8.5 MG/DL (ref 8.5–10.1)
CHLORIDE BLD-SCNC: 108 MMOL/L (ref 94–109)
CO2 SERPL-SCNC: 28 MMOL/L (ref 20–32)
CREAT SERPL-MCNC: 0.62 MG/DL (ref 0.52–1.04)
ERYTHROCYTE [DISTWIDTH] IN BLOOD BY AUTOMATED COUNT: 12.8 % (ref 10–15)
GFR SERPL CREATININE-BSD FRML MDRD: 88 ML/MIN/1.73M2
GLUCOSE BLD-MCNC: 122 MG/DL (ref 70–99)
HCT VFR BLD AUTO: 44.1 % (ref 35–47)
HGB BLD-MCNC: 13.6 G/DL (ref 11.7–15.7)
HGB BLD-MCNC: 14.2 G/DL (ref 11.7–15.7)
HGB BLD-MCNC: 14.3 G/DL (ref 11.7–15.7)
HGB BLD-MCNC: 14.6 G/DL (ref 11.7–15.7)
MCH RBC QN AUTO: 30.8 PG (ref 26.5–33)
MCHC RBC AUTO-ENTMCNC: 33.1 G/DL (ref 31.5–36.5)
MCV RBC AUTO: 93 FL (ref 78–100)
PLATELET # BLD AUTO: 190 10E3/UL (ref 150–450)
POTASSIUM BLD-SCNC: 3.3 MMOL/L (ref 3.4–5.3)
POTASSIUM BLD-SCNC: 3.8 MMOL/L (ref 3.4–5.3)
PROT SERPL-MCNC: 6.4 G/DL (ref 6.8–8.8)
RBC # BLD AUTO: 4.74 10E6/UL (ref 3.8–5.2)
SODIUM SERPL-SCNC: 139 MMOL/L (ref 133–144)
WBC # BLD AUTO: 7.7 10E3/UL (ref 4–11)

## 2022-10-03 PROCEDURE — 99232 SBSQ HOSP IP/OBS MODERATE 35: CPT | Performed by: PHYSICIAN ASSISTANT

## 2022-10-03 PROCEDURE — 99214 OFFICE O/P EST MOD 30 MIN: CPT

## 2022-10-03 PROCEDURE — 85018 HEMOGLOBIN: CPT | Performed by: STUDENT IN AN ORGANIZED HEALTH CARE EDUCATION/TRAINING PROGRAM

## 2022-10-03 PROCEDURE — 250N000013 HC RX MED GY IP 250 OP 250 PS 637: Performed by: PHYSICIAN ASSISTANT

## 2022-10-03 PROCEDURE — 250N000011 HC RX IP 250 OP 636: Performed by: EMERGENCY MEDICINE

## 2022-10-03 PROCEDURE — 120N000001 HC R&B MED SURG/OB

## 2022-10-03 PROCEDURE — 84132 ASSAY OF SERUM POTASSIUM: CPT | Performed by: PHYSICIAN ASSISTANT

## 2022-10-03 PROCEDURE — 36415 COLL VENOUS BLD VENIPUNCTURE: CPT | Performed by: PHYSICIAN ASSISTANT

## 2022-10-03 PROCEDURE — 85018 HEMOGLOBIN: CPT | Performed by: PHYSICIAN ASSISTANT

## 2022-10-03 PROCEDURE — 258N000003 HC RX IP 258 OP 636: Performed by: STUDENT IN AN ORGANIZED HEALTH CARE EDUCATION/TRAINING PROGRAM

## 2022-10-03 PROCEDURE — 36415 COLL VENOUS BLD VENIPUNCTURE: CPT | Performed by: STUDENT IN AN ORGANIZED HEALTH CARE EDUCATION/TRAINING PROGRAM

## 2022-10-03 PROCEDURE — 80053 COMPREHEN METABOLIC PANEL: CPT | Performed by: STUDENT IN AN ORGANIZED HEALTH CARE EDUCATION/TRAINING PROGRAM

## 2022-10-03 PROCEDURE — 250N000011 HC RX IP 250 OP 636: Performed by: STUDENT IN AN ORGANIZED HEALTH CARE EDUCATION/TRAINING PROGRAM

## 2022-10-03 RX ORDER — AMLODIPINE BESYLATE 2.5 MG/1
2.5 TABLET ORAL DAILY
Status: DISCONTINUED | OUTPATIENT
Start: 2022-10-03 | End: 2022-10-04 | Stop reason: HOSPADM

## 2022-10-03 RX ORDER — NALOXONE HYDROCHLORIDE 0.4 MG/ML
0.2 INJECTION, SOLUTION INTRAMUSCULAR; INTRAVENOUS; SUBCUTANEOUS
Status: DISCONTINUED | OUTPATIENT
Start: 2022-10-03 | End: 2022-10-04 | Stop reason: HOSPADM

## 2022-10-03 RX ORDER — LISINOPRIL 20 MG/1
20 TABLET ORAL DAILY
Status: DISCONTINUED | OUTPATIENT
Start: 2022-10-03 | End: 2022-10-04 | Stop reason: HOSPADM

## 2022-10-03 RX ORDER — METRONIDAZOLE 500 MG/100ML
500 INJECTION, SOLUTION INTRAVENOUS EVERY 12 HOURS
Status: DISCONTINUED | OUTPATIENT
Start: 2022-10-03 | End: 2022-10-03

## 2022-10-03 RX ORDER — NALOXONE HYDROCHLORIDE 0.4 MG/ML
0.4 INJECTION, SOLUTION INTRAMUSCULAR; INTRAVENOUS; SUBCUTANEOUS
Status: DISCONTINUED | OUTPATIENT
Start: 2022-10-03 | End: 2022-10-04 | Stop reason: HOSPADM

## 2022-10-03 RX ORDER — LIDOCAINE 40 MG/G
CREAM TOPICAL
Status: DISCONTINUED | OUTPATIENT
Start: 2022-10-03 | End: 2022-10-04 | Stop reason: HOSPADM

## 2022-10-03 RX ORDER — POTASSIUM CHLORIDE 1500 MG/1
40 TABLET, EXTENDED RELEASE ORAL ONCE
Status: COMPLETED | OUTPATIENT
Start: 2022-10-03 | End: 2022-10-03

## 2022-10-03 RX ADMIN — METRONIDAZOLE 500 MG: 500 INJECTION, SOLUTION INTRAVENOUS at 08:13

## 2022-10-03 RX ADMIN — METOPROLOL SUCCINATE 12.5 MG: 25 TABLET, EXTENDED RELEASE ORAL at 11:57

## 2022-10-03 RX ADMIN — POTASSIUM CHLORIDE 40 MEQ: 1500 TABLET, EXTENDED RELEASE ORAL at 08:23

## 2022-10-03 RX ADMIN — CEFTRIAXONE SODIUM 1 G: 1 INJECTION, POWDER, FOR SOLUTION INTRAMUSCULAR; INTRAVENOUS at 19:59

## 2022-10-03 RX ADMIN — LISINOPRIL 20 MG: 20 TABLET ORAL at 11:57

## 2022-10-03 RX ADMIN — METRONIDAZOLE 500 MG: 500 INJECTION, SOLUTION INTRAVENOUS at 20:49

## 2022-10-03 RX ADMIN — AMLODIPINE BESYLATE 2.5 MG: 2.5 TABLET ORAL at 11:57

## 2022-10-03 RX ADMIN — MORPHINE SULFATE 4 MG: 4 INJECTION, SOLUTION INTRAMUSCULAR; INTRAVENOUS at 05:41

## 2022-10-03 RX ADMIN — SODIUM CHLORIDE: 9 INJECTION, SOLUTION INTRAVENOUS at 06:29

## 2022-10-03 ASSESSMENT — ACTIVITIES OF DAILY LIVING (ADL)
ADLS_ACUITY_SCORE: 24
ADLS_ACUITY_SCORE: 22
USE_OF_HEARING_ASSISTIVE_DEVICES: BILATERAL HEARING AIDS
ADLS_ACUITY_SCORE: 35
HEARING_DIFFICULTY_OR_DEAF: OTHER (SEE COMMENTS)
DRESSING/BATHING_DIFFICULTY: NO
CHANGE_IN_FUNCTIONAL_STATUS_SINCE_ONSET_OF_CURRENT_ILLNESS/INJURY: NO
WEAR_GLASSES_OR_BLIND: YES
ADLS_ACUITY_SCORE: 24
DESCRIBE_HEARING_LOSS: BILATERAL HEARING LOSS
WALKING_OR_CLIMBING_STAIRS_DIFFICULTY: YES
ADLS_ACUITY_SCORE: 24
ADLS_ACUITY_SCORE: 35
ADLS_ACUITY_SCORE: 24
ADLS_ACUITY_SCORE: 24
DIFFICULTY_COMMUNICATING: NO
DIFFICULTY_EATING/SWALLOWING: NO
TRANSFERRING: 0-->INDEPENDENT
WALKING_OR_CLIMBING_STAIRS: AMBULATION DIFFICULTY, REQUIRES EQUIPMENT;STAIR CLIMBING DIFFICULTY, REQUIRES EQUIPMENT
ADLS_ACUITY_SCORE: 24
ADLS_ACUITY_SCORE: 22
ADLS_ACUITY_SCORE: 35
FALL_HISTORY_WITHIN_LAST_SIX_MONTHS: NO
DOING_ERRANDS_INDEPENDENTLY_DIFFICULTY: NO
ADLS_ACUITY_SCORE: 24
CONCENTRATING,_REMEMBERING_OR_MAKING_DECISIONS_DIFFICULTY: NO
TRANSFERRING: 0-->INDEPENDENT
VISION_MANAGEMENT: PRESCRIPTION GLASSES
TOILETING_ISSUES: NO

## 2022-10-03 NOTE — ASSESSMENT
[FreeTextEntry1] : BREANA HELM is a 82 year-old RH female with a history of HTN, HLD, CAD s/p stent, renal cancer s/p nephrectomy and anxiety who presents with recurrent episodes of LLE weakness, difficulty producing speech and whole body myoclonus. Negative stroke workup. No abnormal EEG correlate during active myoclonus. 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 Pt very sensitive to ASM, even at extremely low dosage.\par \par - continue PER 2mg QHS -> increase to 4mg QHS in a few days\par - f/u in 3-4 months \par \par \par Personally reviewed all images, labs and other studies. More than 50% of time spent on counseling and educating patient on differential, workup, disease course, and treatment/management. All questions and concerns answered and addressed in detail to patient's complete satisfaction. Patient verbalized understanding and agreed to plan.\par

## 2022-10-03 NOTE — CONSULTS
[facility]  Gastroenterology Consultation         Annie Archer  [address]  [place] [address]  82 year old female    Admission Date/Time: 10/2/2022  Primary Care Provider: Clinic, [facility]  Referring / Attending Physician:  Dr. Mary Fonseca    We were asked to see the patient in consultation by Dr. Mary Fonseca for evaluation of diverticulitis.      CC: abdominal pain and bright red rectal bleeding    HPI:  Annie Archer is a 82 year old female who has a past medical history of atrial fibrillation on Xarelto hypertension, dyslipidemia, ERIC whom presented with complaints of lower abdominal pain and bright red rectal bleeding. She reports she had loose watery stool 2 nights ago with abdominal pain described as cramping and intermittent followed by bright red mucus colored stool on 2 occassions. She had no fevers, chills, nausea or vomiting. Denies anal pain. She presented to the ED last evening and had an additional stool that was bright red in color.    She denies prior history of similar symptoms. Has had a hemorrhoid in past with some anal discomfort and bleeding. Last colonoscopy unknown but reports was done likely in last 5 years. Takes Xarelto and last taken day of presentation. Has no other source of blood loss.      Labs noted hemoglobin of 14.6 and stable. CT of A/P revealed colonic diverticulosis with mild pericolonic fat stranding along the sigmoid colon consistent with acute diverticulitis with other incidental findings of a 1.5 cm hypodense foci in pancreas concerning for IPMN.    ROS: A comprehensive ten point review of systems was negative aside from those in mentioned in the HPI.      PAST MED HX:  I have reviewed this patient's medical history and updated it with pertinent information if needed.   No past medical history on file.    MEDICATIONS: Prior to Admission Medications   Prescriptions Last Dose Informant Patient Reported?  Taking?   albuterol (PROAIR HFA/PROVENTIL HFA/VENTOLIN HFA) 108 (90 Base) MCG/ACT inhaler Not Taking at Unknown time  No No   Sig: Inhale 2 puffs into the lungs every 4 hours as needed for shortness of breath / dyspnea or wheezing (cough)   Patient not taking: Reported on 10/2/2022   amLODIPine (NORVASC) 2.5 MG tablet 10/2/2022 at am  Yes Yes   Sig: Take 2.5 mg by mouth daily   desonide (DESOWEN) 0.05 % external cream Past Week at Unknown time  Yes Yes   Sig: Apply topically as needed   fluocinonide (LIDEX) 0.05 % external solution 10/1/2022 at Unknown time  Yes Yes   Sig: Apply topically 2 times daily   hydrochlorothiazide (HYDRODIURIL) 25 MG tablet 10/2/2022 at am  Yes Yes   Sig: Take 25 mg by mouth daily   ketoconazole (NIZORAL) 2 % external shampoo 10/1/2022 at Unknown time  Yes Yes   Sig: Apply topically three times a week   lisinopril (ZESTRIL) 20 MG tablet 10/2/2022 at am  Yes Yes   Sig: Take 20 mg by mouth daily   metoprolol succinate ER (TOPROL XL) 25 MG 24 hr tablet 10/2/2022 at am  Yes Yes   Sig: Take 12.5 mg by mouth daily   ondansetron (ZOFRAN ODT) 4 MG ODT tab Not Taking at Unknown time  No No   Sig: Take 1 tablet (4 mg) by mouth every 6 hours as needed   Patient not taking: Reported on 10/2/2022   rivaroxaban ANTICOAGULANT (XARELTO) 20 MG TABS tablet 10/2/2022 at am  Yes Yes   Sig: Take 20 mg by mouth daily (with dinner)      Facility-Administered Medications: None       ALLERGIES:   Allergies   Allergen Reactions     Amoxicillin-Pot Clavulanate Other (See Comments)     Severe nausea. 10/16/2019     Ergotamine Other (See Comments)     chest tightness     Sulfa Drugs Hives     Cephalexin Rash     Tolerated Ancef        SOCIAL HISTORY:       FAMILY HISTORY:  No family history on file.    PHYSICAL EXAM:   General  Alert, oriented and comfortable  Vital Signs with Ranges  Temp: 98.5  F (36.9  C) Temp src: Oral BP: 130/60 Pulse: 75   Resp: 14 SpO2: 95 % O2 Device: None (Room air) Oxygen Delivery: 2 LPM  No  intake/output data recorded.    Constitutional: healthy, alert and no distress   Cardiovascular: negative, PMI normal. No lifts, heaves, or thrills. RRR. No murmurs, clicks gallops or rub  Respiratory: negative, Percussion normal. Good diaphragmatic excursion. Lungs clear  Abdomen: Abdomen soft. BS normal. No masses, organomegaly, positive findings: tenderness mild suprapubic      ADDITIONAL COMMENTS:   I reviewed the patient's new clinical lab test results.   Recent Labs   Lab Test 10/03/22  0626 10/03/22  0024 10/02/22  1241 04/08/21  1212   WBC 7.7  --  10.2 5.6   HGB 14.6 14.3 15.5 14.2   MCV 93  --  94 92     --  256 228     Recent Labs   Lab Test 10/03/22  0626 10/02/22  1241 04/08/21  1212   POTASSIUM 3.3* 4.2 4.3   CHLORIDE 108 106 106   CO2 28 28 29   BUN 19 32* 18   ANIONGAP 3 5 4     Recent Labs   Lab Test 10/03/22  0626 10/02/22  1448 04/08/21  1212 10/16/19  1202   ALBUMIN 3.1*  --  3.3* 3.2*   BILITOTAL 1.2  --  0.5 0.7   ALT 15  --  19 15   AST 14  --  20 12   PROTEIN  --  Negative  --   --    LIPASE  --   --   --  95       I reviewed the patient's new imaging results.        CONSULTATION ASSESSMENT AND PLAN:    Annie Archer is a 82 female with abdominal pain and bright red rectal bleeding with CT findings suggesting diverticulitis.    Diverticulitis of colon  BRBPR (bright red blood per rectum)  Suprapubic abdominal pain  CT of A/P notes findings consistent with diverticulitis  Has had bright red rectal bleeding and hemoglobin stable near baseline at 14-15  Has no rectal pain  Started on antibiotics  Findings consistent with diverticulitis with diverticular bleed  Recommend to continue antibiotics, fluids, slowly advance diet to low fiber diet and postpone a colonoscopy for 1-2 months given risk for colonic perforation in setting of active infection    -- Ok with GI to restart Xarelto  -- Continue antibiotics   -- Serial hemoglobin  -- Advance diet as tolerated very slowly to low fiber  --  Outpatient colonoscopy    Pancreatic neoplasm  Has 1.5 cm hypodense foci in pancreas and may represent IPMN    -- recommend MRCP in 6-12 months and/or possible endoscopic ultrasound        [facility].  Office: [phone]  Cell : [phone] (Dr. Melendez)  Cell: [phone] (Lucy Alexander PA-C)

## 2022-10-03 NOTE — HISTORY OF PRESENT ILLNESS
[FreeTextEntry1] : LAST OFFICE VISIT: 7/15/22\par \par PCP: Dr. Juana Vann\par \par INTERIM HISTORY:\par Unable to tolerate LEV 250mg BID. Very tired, dizzy, faint, can't sleep. Self-discontinued on 7/31. Switched to VPA DR, but unable to tolerate 125mg BID, felt dizzy, SOB, fatigue; again self-discontinued 8/16.\par \par On 9/18, had another episode of L sided weakness and difficulty with speech. During the hospitalization, patient had intermittent full body myoclonic twitching, which usually accompanies the transient LLE weakness and difficulty with speech production. 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 Discharged on PER 2mg QHS, with instruction to increase to 4mg QHS after 2 wks. Pt tolerating PER without any side effect. \par \par CURRENT ASM:\par PER 2mg QHS – started 9/2022\par \par \par PRIOR HISTORY:\par 7/15/22: This is an 82yo RH female with a history of HTN, HLD, CAD s/p stent, renal cancer s/p nephrectomy, anxiety and recurrent episodes of LLE weakness and difficulty producing speech with negative stroke workup who presents for posthospital followup. \par \par Recurrent episodes of LLE weakness and difficulty producing speech. First episode occurred on 5/20/20, and then on 6/1/20, 11/28/20, 3/27/21, 7/25/21, 9/8/21, 4/23/22, 6/6/22. All of which had been worked up as acute CVA, but CTH/CTA/CTP/MRI have always been unremarkable. 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 Patient states that she usually knows something is "off" at the onset of these episodes because her thought process is slowed. She then notes difficulty producing speech in the sense that it takes tremendous effort to get the words out, while knowing exactly what she wants to say, with fully intact comprehension. At the same time, also notes weakness in the left leg. Difficulty producing speech usually resolves within hours, but it takes 12-48 hours for the strength of the left leg to return to baseline. Saw pt in June at [facility] and suspect these episodes are focal seizures via mechanism of either activation of negative motor area or inactivation of positive motor area. Discharged her with LEV 500mg BID, but not tolerating it due to dizziness and fatigue.\par \par Of note, while patient was hospitalized in 6/2020 after a habitual episode, which had been attributed to CVA, ILR was placed. Then while she was hospitalized in 4/2022 for another episode, she was deemed as a nonresponder to ASA/Plavix given recurrent stroke-like events. Cardiology replaced Plavix with Xarelto. ILR was interrogated and no AF detected. Never had clear documentation of pAF. Since there is low suspicion for CVA at this time, Cardiology consult was placed during hospitalization to re-evaluate the indication for Xarelto. Cardiology recommended continuing Xarelto at this time and followup outpt. CURRENT ASM: LEV 500mg BID – started 6/2022, dizzy and fatigue.\par \par \par SEIZURE RISK FACTORS:\par Patient was a product of normal pregnancy and delivery. No history of febrile seizure, TBI, CNS infection, or FH of seizures.\par \par PREVIOUS ASM:\par LEV – started June to July 2022; dizzy and fatigue\par VPA – 1-2 weeks in 8/2022; dizzy, SOB, fatigue\par \par IMAGING: \par Multiple MRIs in the past; most recent as below\par MRI w/o 4/25/22: no acute ischemia\par \par NEUROPHYSIOLOGY:\par Non-elective EMU 9/19 -9/22/22 ([facility]): \par -i: Clusters of whole-body myoclonus without ictal correlate on EEG.\par -ii: SREDA\par \par Non-elective EMU 6/7 – 6/8/22 ([facility]): SREDA, otherwise normal\par rEEG 6/7/22 ([facility]): normal, SREDA\par rEEG 9/10/21 ([facility]): normal A/D\par cvEEG 7/28 - 7/29/21 ([facility]): normal, SREDA

## 2022-10-03 NOTE — PROGRESS NOTES
[facility]    Medicine Progress Note - Hospitalist Service    Date of Admission:  10/2/2022    Assessment & Plan        Annie Archer is a 82 year old female with past medical history significant for atrial fibrillation on Xarelto, hypertension, dyslipidemia, ERIC admitted on 10/2/2022 with GI bleed      Lower GI bleed likely diverticular bleed  Acute Diverticulitis   The patient presents to the ED with GI bleeding. She reports diarrhea that started last evening then on-going crampy abdominal pain.  Morning of presentation she had two episodes of bright red bloody stools. In the ED she was hemodynamically stable with an initial hemoglobin of 15.5. CT of the abdomen showed colonic diverticulosis with mild pericolonic fast stranding along the sigmoid colon, findings worrisome for acute diverticulitis.   * While in the ED the patient had a third episode of bleeding. She was given a dose of augmentin and shortly thereafter developed significant nausea (states intolerance to Augmentin in the past).   - Admit to inpatient   - Serial hemoglobin q8h.  Full CBC in a.m.  - Continue IV ceftriaxone and metronidazole while inpatient.  Transition to p.o. antibiotics at discharge.  - GI consult completed, input greatly appreciated  -- Per GI continue clear liquid diet and slowly advance to low fiber diet.  Postpone colonoscopy for 1-2 months given risk for colonic perforation in the setting of active infection.  Okay to restart Xarelto, continue IV antibiotics, serial hemoglobin.  - Hold PTA Xarelto today, can likely resume tomorrow if hemoglobin remains stable, okay from GI standpoint  - Patient tolerating clear liquid diet, can discontinue IV fluids.  Recent Labs   Lab 10/03/22  1300 10/03/22  0626 10/03/22  0024 10/02/22  1241   HGB 14.2 14.6 14.3 15.5        Hypertension   Dyslipidemia  - Resume PTA medications lisinopril, Toprol-XL, and amlodipine with hold parameters.  Hold PTA  hydrochlorothiazide.    Hypokalemia, mild  --Potassium 3.3.  Replacement protocol ordered with repeat potassium 3.8.  --BMP in a.m.  --Holding HCTZ     Atrial fibrillation   - Hold PTA Xarelto today, can likely resume on 10/4 if hemoglobin stable     Pancreatic lesion  Incidentally noted on CT which showed a few hypodense foci in the pancreas measure up to 1.5 cm in the pancreatic neck area, indeterminate, could represent sidebranch intraductal papillary mucinous neoplasm versus other pancreatic cystic lesions.   - Recommend follow-up exam with contrast enhanced MRI/MRCP in 6-12 months to assess for interval change     Right renal cyst   Again incidentally noted on CT imaging which showed a 8.5 cm mildly complex right renal cyst with thin enhancing septations and 1.6 cm partially exophytic mildly hyperattenuating left renal lesion, could represent cyst with hemorrhagic/proteinaceous component.   - Recommend further outpatient evaluation with renal ultrasound to assess the internal component of these two cystic lesions.       Diet: Clear Liquid Diet    DVT Prophylaxis: Pneumatic Compression Devices  Quijano Catheter: Not present  Central Lines: None  Cardiac Monitoring: None  Code Status: Full Code      Disposition Plan      Expected Discharge Date: 10/04/2022                The patient's care was discussed with the patient, bedside nurse.    Patient was discussed with Dr. Beltrán of the hospitalist service.  He is in agreement with my assessment and plan of care.    BALDO Ragland  Hospitalist Service  [facility]  Securely message with the Vocera Web Console (learn more here)  Text page via [employer] Paging/Directory         Clinically Significant Risk Factors Present on Admission             # Hypoalbuminemia: Albumin = 3.1 g/dL (Ref range: 3.4 - 5.0 g/dL) on admission, will monitor as appropriate   # Coagulation Defect: home medication list includes an anticoagulant medication   #  Hypertension: home medication list includes antihypertensive(s)          ______________________________________________________________________    Interval History   Patient sitting up at the edge of the bed on my arrival.  She states that her abdominal pain is improving.  Denies any bowel movement since admission.  Denies any fever, chills, chest pain, shortness of breath, nausea or vomiting.    Data reviewed today: I reviewed all medications, new labs and imaging results over the last 24 hours.     Physical Exam   Vital Signs: Temp: 98.5  F (36.9  C) Temp src: Oral BP: 130/60 Pulse: 75   Resp: 14 SpO2: 95 % O2 Device: None (Room air) Oxygen Delivery: 2 LPM  Weight: 0 lbs 0 oz     Constitutional: Alert, oriented to person, place, situation.  Cooperative, lying in bed in NAD.  Respiratory:  Lungs CTAB.  No crackles, wheezes, or rhonchi, no labored breathing.  Cardiovascular:  Heart RRR, no MRG, no edema.  GI:  Abdomen soft, nondistended, mild tenderness to RLQ with palpation.  Bowel sounds normoactive.    Skin/Integumen:  Warm, dry, non-diaphoretic.  MSK: CMS x4 grossly intact.      Data   Recent Labs   Lab 10/03/22  0626 10/03/22  0024 10/02/22  1241   WBC 7.7  --  10.2   HGB 14.6 14.3 15.5   MCV 93  --  94     --  256     --  139   POTASSIUM 3.3*  --  4.2   CHLORIDE 108  --  106   CO2 28  --  28   BUN 19  --  32*   CR 0.62  --  0.70   ANIONGAP 3  --  5   RICKY 8.5  --  9.3   *  --  119*   ALBUMIN 3.1*  --   --    PROTTOTAL 6.4*  --   --    BILITOTAL 1.2  --   --    ALKPHOS 65  --   --    ALT 15  --   --    AST 14  --   --      Recent Results (from the past 24 hour(s))   CT Abdomen Pelvis w Contrast    Narrative    EXAM: CT ABDOMEN AND PELVIS WITH CONTRAST  LOCATION: [facility]  DATE/TIME: 10/02/2022, 2:42 PM    INDICATION: Lower abdominal pain, rectal bleed.  COMPARISON: None available.  TECHNIQUE: CT scan of the abdomen and pelvis was performed after the injection of  125 mL Isovue 370 intravenously. Multiplanar reformats were obtained. Dose reduction techniques were used.    FINDINGS:   LOWER CHEST: Mild bibasilar pulmonary opacities, likely atelectasis. Small hiatal hernia.    ABDOMEN/PELVIS:    HEPATOBILIARY: No suspicious focal hepatic lesion. No radiodense gallstones.    PANCREAS: No main pancreatic ductal dilatation. A few hypodense foci in the pancreas including 1.5 cm in the pancreatic neck area (series 2 image 61) and 1.1 cm pancreatic tail (series 2 image 60).    SPLEEN: No splenomegaly.    ADRENAL GLANDS: 1.7 cm left adrenal nodule (series 2 image 61). No right adrenal nodule.    KIDNEYS/BLADDER: No radiodense kidney/ureteral stones or hydronephrosis in either kidney. There is 8.5 cm complex renal cyst at the lower pole of the right kidney with a few thin enhancing septations. There is 1.6 cm partially exophytic mildly   hyperattenuating lesion with elevated Hounsfield units of 35.    BOWEL: No abnormally dilated bowel loops. The appendix is visualized and appears normal. Colonic diverticulosis with mild pericolonic fat stranding along the sigmoid colon, findings worrisome for acute diverticulitis. No evidence of colonic perforation   or pericolonic loculated collection.    PERITONEUM: No evidence of free fluid in the abdomen and pelvis. No free peritoneal or portal venous gas.    PELVIC ORGANS: Fibroid uterus.    VASCULATURE: Scattered atherosclerotic vascular calcification of the abdominal aorta and iliac vessels.    LYMPH NODES: No significant abdominopelvic lymphadenopathy.    MUSCULOSKELETAL: No suspicious osseous lesion.      Impression    IMPRESSION:   1.  Colonic diverticulosis with mild pericolonic fat stranding along the sigmoid colon, findings worrisome for acute diverticulitis.  2.  A few hypodense foci in the pancreas measure up to 1.5 cm in the pancreatic neck area, indeterminate, could represent sidebranch intraductal papillary mucinous neoplasm versus  other pancreatic cystic lesions. Recommend follow-up exam with contrast   enhanced MRI/MRCP in 6-12 months to assess for interval change  3.  8.5 cm mildly complex right renal cyst with thin enhancing septations and 1.6 cm partially exophytic mildly hyperattenuating left renal lesion, could represent cyst with hemorrhagic/proteinaceous component. Recommend further evaluation with renal   ultrasound to assess the internal component of these two cystic lesions.  4.  Fibroid uterus.       Medications     sodium chloride 100 mL/hr at 10/03/22 0629       cefTRIAXone  1 g Intravenous Q24H     metroNIDAZOLE  500 mg Intravenous Q12H     sodium chloride (PF)  3 mL Intracatheter Q8H

## 2022-10-03 NOTE — PROGRESS NOTES
PT up to the floor and settled. PT up with SBAGB walker. Denies pain. Incontinent of bladder. Denies pain or nausea at this time. Vitally stable & oriented x4. Continue with plan of care.

## 2022-10-03 NOTE — PLAN OF CARE
Pt here with diverticulitis. A&O. - flatus, c/o mild bloating, denies nausea. VSS. Tolerating clear liquid diet, advanced to full liquid for dinner. Up with SBA, W - ambulated in halls x2. Denies pain. Pt scoring green on the Aggression Stop Light Tool. Plan for serial hgb checks. Discharge home pending.

## 2022-10-04 VITALS
HEART RATE: 60 BPM | WEIGHT: 220 LBS | HEIGHT: 66 IN | RESPIRATION RATE: 14 BRPM | DIASTOLIC BLOOD PRESSURE: 61 MMHG | TEMPERATURE: 97.4 F | BODY MASS INDEX: 35.36 KG/M2 | SYSTOLIC BLOOD PRESSURE: 128 MMHG | OXYGEN SATURATION: 97 %

## 2022-10-04 LAB
ANION GAP SERPL CALCULATED.3IONS-SCNC: 6 MMOL/L (ref 3–14)
BUN SERPL-MCNC: 13 MG/DL (ref 7–30)
CALCIUM SERPL-MCNC: 9.1 MG/DL (ref 8.5–10.1)
CHLORIDE BLD-SCNC: 108 MMOL/L (ref 94–109)
CO2 SERPL-SCNC: 27 MMOL/L (ref 20–32)
CREAT SERPL-MCNC: 0.65 MG/DL (ref 0.52–1.04)
ERYTHROCYTE [DISTWIDTH] IN BLOOD BY AUTOMATED COUNT: 12.9 % (ref 10–15)
GFR SERPL CREATININE-BSD FRML MDRD: 87 ML/MIN/1.73M2
GLUCOSE BLD-MCNC: 121 MG/DL (ref 70–99)
HCT VFR BLD AUTO: 41.6 % (ref 35–47)
HGB BLD-MCNC: 13.7 G/DL (ref 11.7–15.7)
HGB BLD-MCNC: 13.7 G/DL (ref 11.7–15.7)
MCH RBC QN AUTO: 30.6 PG (ref 26.5–33)
MCHC RBC AUTO-ENTMCNC: 32.9 G/DL (ref 31.5–36.5)
MCV RBC AUTO: 93 FL (ref 78–100)
PLATELET # BLD AUTO: 208 10E3/UL (ref 150–450)
POTASSIUM BLD-SCNC: 3.9 MMOL/L (ref 3.4–5.3)
RBC # BLD AUTO: 4.48 10E6/UL (ref 3.8–5.2)
SODIUM SERPL-SCNC: 141 MMOL/L (ref 133–144)
WBC # BLD AUTO: 6 10E3/UL (ref 4–11)

## 2022-10-04 PROCEDURE — 85027 COMPLETE CBC AUTOMATED: CPT | Performed by: PHYSICIAN ASSISTANT

## 2022-10-04 PROCEDURE — 80048 BASIC METABOLIC PNL TOTAL CA: CPT | Performed by: PHYSICIAN ASSISTANT

## 2022-10-04 PROCEDURE — 250N000013 HC RX MED GY IP 250 OP 250 PS 637: Performed by: PHYSICIAN ASSISTANT

## 2022-10-04 PROCEDURE — 250N000011 HC RX IP 250 OP 636: Performed by: STUDENT IN AN ORGANIZED HEALTH CARE EDUCATION/TRAINING PROGRAM

## 2022-10-04 PROCEDURE — 36415 COLL VENOUS BLD VENIPUNCTURE: CPT | Performed by: PHYSICIAN ASSISTANT

## 2022-10-04 PROCEDURE — 99239 HOSP IP/OBS DSCHRG MGMT >30: CPT | Performed by: PHYSICIAN ASSISTANT

## 2022-10-04 RX ORDER — METRONIDAZOLE 500 MG/1
500 TABLET ORAL 3 TIMES DAILY
Qty: 30 TABLET | Refills: 0 | Status: SHIPPED | OUTPATIENT
Start: 2022-10-04 | End: 2023-04-14

## 2022-10-04 RX ORDER — DOCUSATE SODIUM 100 MG/1
100 CAPSULE, LIQUID FILLED ORAL 2 TIMES DAILY PRN
Refills: 0 | COMMUNITY
Start: 2022-10-04

## 2022-10-04 RX ORDER — CEFDINIR 300 MG/1
300 CAPSULE ORAL EVERY 12 HOURS SCHEDULED
Status: DISCONTINUED | OUTPATIENT
Start: 2022-10-04 | End: 2022-10-04 | Stop reason: HOSPADM

## 2022-10-04 RX ORDER — METRONIDAZOLE 500 MG/1
500 TABLET ORAL 3 TIMES DAILY
Status: DISCONTINUED | OUTPATIENT
Start: 2022-10-04 | End: 2022-10-04 | Stop reason: HOSPADM

## 2022-10-04 RX ORDER — POLYETHYLENE GLYCOL 3350 17 G/17G
1 POWDER, FOR SOLUTION ORAL DAILY PRN
COMMUNITY
Start: 2022-10-04

## 2022-10-04 RX ORDER — CEFDINIR 300 MG/1
300 CAPSULE ORAL EVERY 12 HOURS
Qty: 20 CAPSULE | Refills: 0 | Status: SHIPPED | OUTPATIENT
Start: 2022-10-04 | End: 2023-04-14

## 2022-10-04 RX ORDER — ACETAMINOPHEN 325 MG/1
650 TABLET ORAL EVERY 4 HOURS PRN
COMMUNITY
Start: 2022-10-04

## 2022-10-04 RX ADMIN — CEFDINIR 300 MG: 300 CAPSULE ORAL at 10:31

## 2022-10-04 RX ADMIN — AMLODIPINE BESYLATE 2.5 MG: 2.5 TABLET ORAL at 08:43

## 2022-10-04 RX ADMIN — METOPROLOL SUCCINATE 12.5 MG: 25 TABLET, EXTENDED RELEASE ORAL at 08:43

## 2022-10-04 RX ADMIN — METRONIDAZOLE 500 MG: 500 INJECTION, SOLUTION INTRAVENOUS at 08:43

## 2022-10-04 RX ADMIN — LISINOPRIL 20 MG: 20 TABLET ORAL at 08:43

## 2022-10-04 ASSESSMENT — ACTIVITIES OF DAILY LIVING (ADL)
ADLS_ACUITY_SCORE: 24
ADLS_ACUITY_SCORE: 27
ADLS_ACUITY_SCORE: 24
ADLS_ACUITY_SCORE: 27
ADLS_ACUITY_SCORE: 24

## 2022-10-04 NOTE — PROVIDER NOTIFICATION
Son Hernandez re pt IV that is symptomatic and have an IV ABX schedule. Writer asked provider if could possibly switch her IV metronidazole to PO. Order awaiting.

## 2022-10-04 NOTE — PROGRESS NOTES
Patient discharged from gen surg unit with NA via wc.  Orientation:x4  Respirations status: WNL  Ambulation status: Independent  Pain status: Controlled  VS: WNL  PIV: Removed and dressed  Discharge outcome: After visit summary provided and explained, patient verbalized understanding. Left the floor with all of her belongings and medications.

## 2022-10-04 NOTE — DISCHARGE SUMMARY
[facility]    Discharge Summary  Hospitalist    Date of Admission:  10/2/2022  Date of Discharge:  10/4/2022  Discharging Provider: BALDO Ragland  Date of Service (when I saw the patient): 10/04/22    Discharge Diagnoses     Lower GI bleed, most likely due to diverticulitis/diverticular bleed    Acute Diverticulitis     Secondary diagnoses:    Hypertension     Dyslipidemia    Hypokalemia, mild    Atrial fibrillation     Incidental pancreatic lesion    Incidental right renal cyst     History of Present Illness   Annie Archer is a 82 year old female with past medical history significant for atrial fibrillation on Xarelto, hypertension, dyslipidemia, ERIC admitted on 10/2/2022 with GI bleed.    Please refer to the history and physical from Dr. Fonseca on 10/2/2022 for additional information.    Hospital Course   Lower GI bleed, most likely due to diverticulitis/diverticular bleed  Acute Diverticulitis of sigmoid colon  *Patient presented to ED with GI bleeding. She reported diarrhea that started the evening prior with ongoing crampy abdominal pain. Morning of presentation she had two episodes of bright red bloody stools. In the ED she was hemodynamically stable with an initial hemoglobin of 15.5.   *CT of the abdomen showed colonic diverticulosis with mild pericolonic fast stranding along the sigmoid colon, findings worrisome for acute diverticulitis.   * While in the ED the patient had a third episode of bleeding. She was given a dose of augmentin and shortly thereafter developed significant nausea (states intolerance to Augmentin in the past).   --Admitted inpatient  --Dr. Melendez of gastroenterology consulted, input greatly appreciated.  --Hemoglobin has remained stable 14.3--14.6--14.2--13.6--13.7  --Patient was given IV ceftriaxone and metronidazole while inpatient.   --Transition to p.o. antibiotics at discharge; cefdinir and metronidazole as below for 10 more days to complete  full course of therapy.  (Note the patient has antibiotic intolerances, but has tolerated cefdinir in the past)  --Patient was slowly advanced to low fiber diet.  She is to continue this for the next 2 weeks.  Education provided on diet recommendations.   --Postpone colonoscopy for 1-2 months given risk for colonic perforation in the setting of active infection.  Okay to restart Xarelto at discharge per GI.  --Dr. Melendez's clinic will schedule the patient for an outpatient colonoscopy.   **She will also need MRCP or abdominal MRI as outpatient to further evaluate pancreatic lesion (probable IPMN), and incidental renal cyst (documented below).  --Recommend follow-up with PCP within 7 days for repeat hemoglobin check, and general hospital follow-up.      Recent Labs   Lab 10/04/22  0721 10/03/22  2224 10/03/22  1300 10/03/22  0626 10/03/22  0024   HGB 13.7  13.7 13.6 14.2 14.6 14.3        Hypertension   Dyslipidemia  - Resume PTA medications lisinopril, Toprol-XL, and amlodipine.  PTA hydrochlorothiazide was held, but now the patient tolerating diet, this can be resumed at discharge.     Hypokalemia, mild  --Potassium 3.3.  Replacement protocol ordered with repeat potassium 3.8.  --BMP day of discharge was stable with creatinine 0.65, K 3.9.  --Resume HCTZ as above.  Follow-up with PCP.     Atrial fibrillation   --Hemoglobin stable, resume PTA Xarelto.     Incidental pancreatic lesion  Incidentally noted on CT which showed a few hypodense foci in the pancreas measure up to 1.5 cm in the pancreatic neck area, indeterminate, could represent sidebranch intraductal papillary mucinous neoplasm versus other pancreatic cystic lesions.   - Recommend follow-up exam with contrast enhanced MRI/MRCP in 6-12 months to assess for interval change  --Discussed with Dr. Melendez of GI.  He will schedule patient for outpatient abdominal MRI/MRCP within the next 1-2 months.     Incidental right renal cyst   Incidentally noted on CT  imaging which showed a 8.5 cm mildly complex right renal cyst with thin enhancing septations and 1.6 cm partially exophytic mildly hyperattenuating left renal lesion, could represent cyst with hemorrhagic/proteinaceous component.   - Recommend further outpatient evaluation with renal ultrasound to assess the internal component of these two cystic lesions.      Cedric Hernandez PA-C    Significant Results and Procedures   No procedures.  Results as documented in data section below.    Pending Results   None    Code Status   Full Code       Primary Care Physician   [facility]    Physical Exam   Temp: 97.4  F (36.3  C) Temp src: Oral BP: 128/61 Pulse: 60   Resp: 14 SpO2: 97 % O2 Device: None (Room air)    Vitals:    10/03/22 0812   Weight: 99.8 kg (220 lb)     Vital Signs with Ranges  Temp:  [97.3  F (36.3  C)-97.4  F (36.3  C)] 97.4  F (36.3  C)  Pulse:  [60-72] 60  Resp:  [14-20] 14  BP: (128-141)/(61-73) 128/61  SpO2:  [96 %-97 %] 97 %  I/O last 3 completed shifts:  In: 240 [P.O.:240]  Out: -     Constitutional: Alert, oriented to person, place, situation.  Cooperative, lying in bed in NAD.  Respiratory:  Lungs CTAB.  No crackles, wheezes, or rhonchi, no labored breathing.  Cardiovascular:  Heart RRR, no MRG, no edema.  GI:  Abdomen soft, nondistended, mild tenderness to lower quadrants with palpation.  Bowel sounds normoactive.    Skin/Integumen:  Warm, dry, non-diaphoretic.  MSK: CMS x4 grossly intact.    Discharge Disposition   Discharged to home  Condition at discharge: Stable    Consultations This Hospital Stay   GASTROENTEROLOGY IP CONSULT    Time Spent on this Encounter   I, BALDO Ragland, personally saw the patient today and spent greater than 30 minutes discharging this patient.    Discharge Orders      Reason for your hospital stay    Bright red blood per rectum, due to acute colonic diverticulitis     Follow-up and recommended labs and tests     Follow-up with  Gastroenterology, Dr. Melendez in 1-2 months for colonoscopy (Office: [phone]).  He will also check an abdominal MRI in the coming months and will schedule this with you.  Dr. Melendez's clinic will coordinate with you on when to hold your anticoagulation medication prior to the procedure.  You should follow-up with your primary care provider or clinic within 1 week for post-hospitalization checkup.     Activity    Your activity upon discharge: activity as tolerated     Diet    Follow this diet upon discharge: Follow a low fiber diet for 2 weeks, then resume your usual diet.     Discharge Medications   Discharge Medication List as of 10/4/2022  1:19 PM      START taking these medications    Details   acetaminophen (TYLENOL) 325 MG tablet Take 2 tablets (650 mg) by mouth every 4 hours as needed for mild pain, OTC      cefdinir (OMNICEF) 300 MG capsule Take 1 capsule (300 mg) by mouth every 12 hours, Disp-20 capsule, R-0, E-Prescribe      docusate sodium (COLACE) 100 MG capsule Take 1 capsule (100 mg) by mouth 2 times daily as needed for constipation, R-0, OTC      metroNIDAZOLE (FLAGYL) 500 MG tablet Take 1 tablet (500 mg) by mouth 3 times daily . Do not drink any alcohol on this medication., Disp-30 tablet, R-0, E-Prescribe      polyethylene glycol (MIRALAX) 17 GM/Dose powder Take 17 g (1 capful) by mouth daily as needed for constipation, OTC         CONTINUE these medications which have NOT CHANGED    Details   amLODIPine (NORVASC) 2.5 MG tablet Take 2.5 mg by mouth daily, Historical      desonide (DESOWEN) 0.05 % external cream Apply topically as neededHistorical      fluocinonide (LIDEX) 0.05 % external solution Apply topically 2 times dailyHistorical      hydrochlorothiazide (HYDRODIURIL) 25 MG tablet Take 25 mg by mouth daily, Historical      ketoconazole (NIZORAL) 2 % external shampoo Apply topically three times a weekHistorical      lisinopril (ZESTRIL) 20 MG tablet Take 20 mg by mouth daily, Historical       metoprolol succinate ER (TOPROL XL) 25 MG 24 hr tablet Take 12.5 mg by mouth daily, Historical      rivaroxaban ANTICOAGULANT (XARELTO) 20 MG TABS tablet Take 20 mg by mouth daily (with dinner), Historical      albuterol (PROAIR HFA/PROVENTIL HFA/VENTOLIN HFA) 108 (90 Base) MCG/ACT inhaler Inhale 2 puffs into the lungs every 4 hours as needed for shortness of breath / dyspnea or wheezing (cough), Disp-1 Inhaler, R-1, Local Print      ondansetron (ZOFRAN ODT) 4 MG ODT tab Take 1 tablet (4 mg) by mouth every 6 hours as needed, Disp-20 tablet, R-0, Local Print           Allergies   Allergies   Allergen Reactions     Amoxicillin-Pot Clavulanate Other (See Comments)     Severe nausea. 10/16/2019     Ergotamine Other (See Comments)     chest tightness     Sulfa Drugs Hives     Cephalexin Rash     Tolerated Ancef      Data   Most Recent 3 CBC's:  Recent Labs   Lab Test 10/04/22  0721 10/03/22  2224 10/03/22  1300 10/03/22  0626 10/03/22  0024 10/02/22  1241   WBC 6.0  --   --  7.7  --  10.2   HGB 13.7  13.7 13.6 14.2 14.6   < > 15.5   MCV 93  --   --  93  --  94     --   --  190  --  256    < > = values in this interval not displayed.      Most Recent 3 BMP's:  Recent Labs   Lab Test 10/04/22  0721 10/03/22  1300 10/03/22  0626 10/02/22  1241     --  139 139   POTASSIUM 3.9 3.8 3.3* 4.2   CHLORIDE 108  --  108 106   CO2 27  --  28 28   BUN 13  --  19 32*   CR 0.65  --  0.62 0.70   ANIONGAP 6  --  3 5   RICKY 9.1  --  8.5 9.3   *  --  122* 119*     Most Recent 2 LFT's:  Recent Labs   Lab Test 10/03/22  0626 04/08/21  1212   AST 14 20   ALT 15 19   ALKPHOS 65 63   BILITOTAL 1.2 0.5     Most Recent INR's and Anticoagulation Dosing History:  Anticoagulation Dose History    There is no flowsheet data to display.       Most Recent 3 Troponin's:  Recent Labs   Lab Test 10/16/19  1202 03/27/18  1959   TROPI <0.015 <0.015     Most Recent Cholesterol Panel:No lab results found.  Most Recent 6 Bacteria Isolates  From Any Culture (See EPIC Reports for Culture Details):No lab results found.  Most Recent TSH, T4 and A1c Labs:  Recent Labs   Lab Test 03/27/18 1959   TSH 1.79     Results for orders placed or performed during the hospital encounter of 10/02/22   CT Abdomen Pelvis w Contrast    Narrative    EXAM: CT ABDOMEN AND PELVIS WITH CONTRAST  LOCATION: [facility]  DATE/TIME: 10/02/2022, 2:42 PM    INDICATION: Lower abdominal pain, rectal bleed.  COMPARISON: None available.  TECHNIQUE: CT scan of the abdomen and pelvis was performed after the injection of 125 mL Isovue 370 intravenously. Multiplanar reformats were obtained. Dose reduction techniques were used.    FINDINGS:   LOWER CHEST: Mild bibasilar pulmonary opacities, likely atelectasis. Small hiatal hernia.    ABDOMEN/PELVIS:    HEPATOBILIARY: No suspicious focal hepatic lesion. No radiodense gallstones.    PANCREAS: No main pancreatic ductal dilatation. A few hypodense foci in the pancreas including 1.5 cm in the pancreatic neck area (series 2 image 61) and 1.1 cm pancreatic tail (series 2 image 60).    SPLEEN: No splenomegaly.    ADRENAL GLANDS: 1.7 cm left adrenal nodule (series 2 image 61). No right adrenal nodule.    KIDNEYS/BLADDER: No radiodense kidney/ureteral stones or hydronephrosis in either kidney. There is 8.5 cm complex renal cyst at the lower pole of the right kidney with a few thin enhancing septations. There is 1.6 cm partially exophytic mildly   hyperattenuating lesion with elevated Hounsfield units of 35.    BOWEL: No abnormally dilated bowel loops. The appendix is visualized and appears normal. Colonic diverticulosis with mild pericolonic fat stranding along the sigmoid colon, findings worrisome for acute diverticulitis. No evidence of colonic perforation   or pericolonic loculated collection.    PERITONEUM: No evidence of free fluid in the abdomen and pelvis. No free peritoneal or portal venous gas.    PELVIC ORGANS:  Fibroid uterus.    VASCULATURE: Scattered atherosclerotic vascular calcification of the abdominal aorta and iliac vessels.    LYMPH NODES: No significant abdominopelvic lymphadenopathy.    MUSCULOSKELETAL: No suspicious osseous lesion.      Impression    IMPRESSION:   1.  Colonic diverticulosis with mild pericolonic fat stranding along the sigmoid colon, findings worrisome for acute diverticulitis.  2.  A few hypodense foci in the pancreas measure up to 1.5 cm in the pancreatic neck area, indeterminate, could represent sidebranch intraductal papillary mucinous neoplasm versus other pancreatic cystic lesions. Recommend follow-up exam with contrast   enhanced MRI/MRCP in 6-12 months to assess for interval change  3.  8.5 cm mildly complex right renal cyst with thin enhancing septations and 1.6 cm partially exophytic mildly hyperattenuating left renal lesion, could represent cyst with hemorrhagic/proteinaceous component. Recommend further evaluation with renal   ultrasound to assess the internal component of these two cystic lesions.  4.  Fibroid uterus.

## 2022-10-04 NOTE — PROGRESS NOTES
Problem: bloody stools, diverticulitis  Vitals and oxygen: VSS on RA  Orientation/Neuro: A&Ox4  Activity Level: SBA with walker  Diet: full liquid  Comorbidity: A-fib, ERIC, HTN  Labs: HgB 13.6, K+ 3.8  IV Fluids/Drains/Tubes: R PIV with intermittent abx  Pain Management: denies pain  Skin: WDL  GI: distended, abd fullness, BS active, passing flatus  : WDL  Respiratory: CPAP  Cardio: A-fib  Plan: continue current plan of care     Erendira Carrington(PA)

## 2022-10-06 ENCOUNTER — PATIENT OUTREACH (OUTPATIENT)
Dept: CARE COORDINATION | Facility: CLINIC | Age: 82
End: 2022-10-06

## 2022-10-06 ENCOUNTER — NURSE TRIAGE (OUTPATIENT)
Dept: NURSING | Facility: CLINIC | Age: 82
End: 2022-10-06

## 2022-10-06 NOTE — PROGRESS NOTES
[facility] Resource Center Contact  [facility]/Voicemail     Clinical Data: Transitional Care Management Outreach     Outreach attempted x 2.  Left message on patient's voicemail, providing [facility]'s 24/7 scheduling and nurse triage phone number [phone] ([phone]) for questions/concerns and/or to schedule an appt with an [facility] provider, if they do not have a PCP.      Plan:  [facility] will do no further outreaches at this time.       Latonya Wood  Community Health Worker  [facility], [facility]  Ph: [phone]    *Connected Care Resource Team does NOT follow patient ongoing. Referrals are identified based on internal discharge reports and the outreach is to ensure patient has an understanding of their discharge instructions.

## 2022-10-07 ENCOUNTER — NURSE TRIAGE (OUTPATIENT)
Dept: NURSING | Facility: CLINIC | Age: 82
End: 2022-10-07

## 2022-10-07 NOTE — TELEPHONE ENCOUNTER
S-(situation): orange colored stools    B-(background): Pt recently discharged from [facility] on 10/4/22. On cefdinir and Flagyl.    A-(assessment):   Orange colored stools 2-3 episodes per day. This started yesterday. Stools described as soft, but not liquid or runny    Pt states that other than orange stools, she feels fine.    No abdominal pain      R-(recommendations):   Advised to reach out to PCP ([facility]) to ask for advice.  Follow up with PCP Monday if this persists.    Pt states she has a visit with provider on 10/12    Pt verbalized understanding.    Catalina Melchor RN/[place] Nurse Advisor            Reason for Disposition    [1] Abnormal color is unexplained AND [2] persists > 24 hours    Additional Information    Negative: Patient sounds very sick or weak to the triager    Negative: [1] Stool is light gray or whitish AND [2] unexplained    Negative: [1] Stool is mucus or pus AND [2] persists > 24 hours    Protocols used: STOOLS - UNUSUAL COLOR-A-

## 2022-10-09 ENCOUNTER — NURSE TRIAGE (OUTPATIENT)
Dept: NURSING | Facility: CLINIC | Age: 82
End: 2022-10-09

## 2022-10-09 NOTE — TELEPHONE ENCOUNTER
Pt calling. She states that she was in the hospital for diverticulitis and lower GI bleed. She was discharged with instructions of a low fiber diet but was not given a list and had questions regarding this. Discussed appropriate foods from [facility] low fiber diet and starting with bland foods and advancing low fiber diet as tolerated.     All pt's questions were addressed.    Jaimee Willis RN, BSN  [facility] Nurse Advisor 11:44 AM 10/9/2022        Additional Information    Negative: [1] Caller is not with the adult (patient) AND [2] reporting urgent symptoms    Negative: Lab result questions    Negative: Medication questions    Negative: Caller can't be reached by phone    Negative: Caller has already spoken to PCP or another triager    Negative: RN needs further essential information from caller in order to complete triage    Negative: Requesting regular office appointment    Negative: [1] Caller requesting NON-URGENT health information AND [2] PCP's office is the best resource    Health Information question, no triage required and triager able to answer question    Protocols used: INFORMATION ONLY CALL-A-

## 2022-11-09 ENCOUNTER — EMERGENCY (EMERGENCY)
Facility: HOSPITAL | Age: 82
LOS: 1 days | Discharge: HOME | End: 2022-11-09
Attending: EMERGENCY MEDICINE
Payer: MEDICARE

## 2022-11-09 VITALS
OXYGEN SATURATION: 99 % | DIASTOLIC BLOOD PRESSURE: 84 MMHG | SYSTOLIC BLOOD PRESSURE: 157 MMHG | TEMPERATURE: 98 F | RESPIRATION RATE: 18 BRPM | HEART RATE: 69 BPM

## 2022-11-09 VITALS
HEIGHT: 62 IN | SYSTOLIC BLOOD PRESSURE: 157 MMHG | OXYGEN SATURATION: 98 % | DIASTOLIC BLOOD PRESSURE: 92 MMHG | HEART RATE: 73 BPM | TEMPERATURE: 98 F | RESPIRATION RATE: 19 BRPM

## 2022-11-09 DIAGNOSIS — Z90.5 ACQUIRED ABSENCE OF KIDNEY: Chronic | ICD-10-CM

## 2022-11-09 DIAGNOSIS — C64.9 MALIGNANT NEOPLASM OF UNSPECIFIED KIDNEY, EXCEPT RENAL PELVIS: Chronic | ICD-10-CM

## 2022-11-09 DIAGNOSIS — Z90.89 ACQUIRED ABSENCE OF OTHER ORGANS: Chronic | ICD-10-CM

## 2022-11-09 LAB
ALBUMIN SERPL ELPH-MCNC: 4.1 G/DL — SIGNIFICANT CHANGE UP (ref 3.3–5.2)
ALP SERPL-CCNC: 59 U/L — SIGNIFICANT CHANGE UP (ref 40–120)
ALT FLD-CCNC: 13 U/L — SIGNIFICANT CHANGE UP
ANION GAP SERPL CALC-SCNC: 8 MMOL/L — SIGNIFICANT CHANGE UP (ref 5–17)
APPEARANCE UR: CLEAR — SIGNIFICANT CHANGE UP
APTT BLD: 27.5 SEC — SIGNIFICANT CHANGE UP (ref 27.5–35.5)
AST SERPL-CCNC: 31 U/L — SIGNIFICANT CHANGE UP
BACTERIA # UR AUTO: ABNORMAL
BASOPHILS # BLD AUTO: 0.08 K/UL — SIGNIFICANT CHANGE UP (ref 0–0.2)
BASOPHILS NFR BLD AUTO: 1.2 % — SIGNIFICANT CHANGE UP (ref 0–2)
BILIRUB SERPL-MCNC: 0.4 MG/DL — SIGNIFICANT CHANGE UP (ref 0.4–2)
BILIRUB UR-MCNC: NEGATIVE — SIGNIFICANT CHANGE UP
BUN SERPL-MCNC: 22.8 MG/DL — HIGH (ref 8–20)
CALCIUM SERPL-MCNC: 8.9 MG/DL — SIGNIFICANT CHANGE UP (ref 8.4–10.5)
CHLORIDE SERPL-SCNC: 102 MMOL/L — SIGNIFICANT CHANGE UP (ref 96–108)
CO2 SERPL-SCNC: 29 MMOL/L — SIGNIFICANT CHANGE UP (ref 22–29)
COLOR SPEC: YELLOW — SIGNIFICANT CHANGE UP
CREAT SERPL-MCNC: 1 MG/DL — SIGNIFICANT CHANGE UP (ref 0.5–1.3)
DIFF PNL FLD: ABNORMAL
EGFR: 56 ML/MIN/1.73M2 — LOW
EOSINOPHIL # BLD AUTO: 0.15 K/UL — SIGNIFICANT CHANGE UP (ref 0–0.5)
EOSINOPHIL NFR BLD AUTO: 2.3 % — SIGNIFICANT CHANGE UP (ref 0–6)
EPI CELLS # UR: SIGNIFICANT CHANGE UP
GLUCOSE SERPL-MCNC: 94 MG/DL — SIGNIFICANT CHANGE UP (ref 70–99)
GLUCOSE UR QL: NEGATIVE — SIGNIFICANT CHANGE UP
HCT VFR BLD CALC: 40.4 % — SIGNIFICANT CHANGE UP (ref 34.5–45)
HGB BLD-MCNC: 13.6 G/DL — SIGNIFICANT CHANGE UP (ref 11.5–15.5)
IMM GRANULOCYTES NFR BLD AUTO: 0.2 % — SIGNIFICANT CHANGE UP (ref 0–0.9)
INR BLD: 1.15 RATIO — SIGNIFICANT CHANGE UP (ref 0.88–1.16)
KETONES UR-MCNC: NEGATIVE — SIGNIFICANT CHANGE UP
LEUKOCYTE ESTERASE UR-ACNC: ABNORMAL
LYMPHOCYTES # BLD AUTO: 1.15 K/UL — SIGNIFICANT CHANGE UP (ref 1–3.3)
LYMPHOCYTES # BLD AUTO: 17.3 % — SIGNIFICANT CHANGE UP (ref 13–44)
MCHC RBC-ENTMCNC: 30.4 PG — SIGNIFICANT CHANGE UP (ref 27–34)
MCHC RBC-ENTMCNC: 33.7 GM/DL — SIGNIFICANT CHANGE UP (ref 32–36)
MCV RBC AUTO: 90.2 FL — SIGNIFICANT CHANGE UP (ref 80–100)
MONOCYTES # BLD AUTO: 0.73 K/UL — SIGNIFICANT CHANGE UP (ref 0–0.9)
MONOCYTES NFR BLD AUTO: 11 % — SIGNIFICANT CHANGE UP (ref 2–14)
NEUTROPHILS # BLD AUTO: 4.52 K/UL — SIGNIFICANT CHANGE UP (ref 1.8–7.4)
NEUTROPHILS NFR BLD AUTO: 68 % — SIGNIFICANT CHANGE UP (ref 43–77)
NITRITE UR-MCNC: POSITIVE
PH UR: 7 — SIGNIFICANT CHANGE UP (ref 5–8)
PLATELET # BLD AUTO: 205 K/UL — SIGNIFICANT CHANGE UP (ref 150–400)
POTASSIUM SERPL-MCNC: 5 MMOL/L — SIGNIFICANT CHANGE UP (ref 3.5–5.3)
POTASSIUM SERPL-SCNC: 5 MMOL/L — SIGNIFICANT CHANGE UP (ref 3.5–5.3)
PROT SERPL-MCNC: 6.5 G/DL — LOW (ref 6.6–8.7)
PROT UR-MCNC: NEGATIVE — SIGNIFICANT CHANGE UP
PROTHROM AB SERPL-ACNC: 13.4 SEC — SIGNIFICANT CHANGE UP (ref 10.5–13.4)
RBC # BLD: 4.48 M/UL — SIGNIFICANT CHANGE UP (ref 3.8–5.2)
RBC # FLD: 12.2 % — SIGNIFICANT CHANGE UP (ref 10.3–14.5)
RBC CASTS # UR COMP ASSIST: SIGNIFICANT CHANGE UP /HPF (ref 0–4)
SARS-COV-2 RNA SPEC QL NAA+PROBE: SIGNIFICANT CHANGE UP
SODIUM SERPL-SCNC: 139 MMOL/L — SIGNIFICANT CHANGE UP (ref 135–145)
SP GR SPEC: 1.01 — SIGNIFICANT CHANGE UP (ref 1.01–1.02)
UROBILINOGEN FLD QL: NEGATIVE — SIGNIFICANT CHANGE UP
WBC # BLD: 6.64 K/UL — SIGNIFICANT CHANGE UP (ref 3.8–10.5)
WBC # FLD AUTO: 6.64 K/UL — SIGNIFICANT CHANGE UP (ref 3.8–10.5)
WBC UR QL: SIGNIFICANT CHANGE UP /HPF (ref 0–5)

## 2022-11-09 PROCEDURE — 70450 CT HEAD/BRAIN W/O DYE: CPT | Mod: 26,MG

## 2022-11-09 PROCEDURE — 70450 CT HEAD/BRAIN W/O DYE: CPT | Mod: MG

## 2022-11-09 PROCEDURE — G1004: CPT

## 2022-11-09 PROCEDURE — 85730 THROMBOPLASTIN TIME PARTIAL: CPT

## 2022-11-09 PROCEDURE — U0003: CPT

## 2022-11-09 PROCEDURE — U0005: CPT

## 2022-11-09 PROCEDURE — 36415 COLL VENOUS BLD VENIPUNCTURE: CPT

## 2022-11-09 PROCEDURE — 85610 PROTHROMBIN TIME: CPT

## 2022-11-09 PROCEDURE — 80053 COMPREHEN METABOLIC PANEL: CPT

## 2022-11-09 PROCEDURE — 85025 COMPLETE CBC W/AUTO DIFF WBC: CPT

## 2022-11-09 PROCEDURE — 81001 URINALYSIS AUTO W/SCOPE: CPT

## 2022-11-09 PROCEDURE — 82962 GLUCOSE BLOOD TEST: CPT

## 2022-11-09 PROCEDURE — 99284 EMERGENCY DEPT VISIT MOD MDM: CPT | Mod: 25

## 2022-11-09 PROCEDURE — 99284 EMERGENCY DEPT VISIT MOD MDM: CPT

## 2022-11-09 PROCEDURE — 99285 EMERGENCY DEPT VISIT HI MDM: CPT

## 2022-11-09 RX ORDER — PERAMPANEL 2 MG/1
2 TABLET ORAL ONCE
Refills: 0 | Status: DISCONTINUED | OUTPATIENT
Start: 2022-11-09 | End: 2022-11-09

## 2022-11-09 RX ORDER — PERAMPANEL 2 MG/1
2 TABLET ORAL AT BEDTIME
Refills: 0 | Status: DISCONTINUED | OUTPATIENT
Start: 2022-11-09 | End: 2022-11-09

## 2022-11-09 RX ADMIN — PERAMPANEL 2 MILLIGRAM(S): 2 TABLET ORAL at 17:42

## 2022-11-09 NOTE — ED ADULT TRIAGE NOTE - CHIEF COMPLAINT QUOTE
Pt BIBA c/o sudden onset LLE weakness and slowed speech that began at noon.  Pt states she was completely normal prior to onset and was able to NAIR.  Pt on plavix.  Code stroke called

## 2022-11-09 NOTE — ED PROVIDER NOTE - PATIENT PORTAL LINK FT
You can access the FollowMyHealth Patient Portal offered by [facility] by registering at the following website: [email or web address]. By joining [facility]’s FollowMyHealth portal, you will also be able to view your health information using other applications (apps) compatible with our system.

## 2022-11-09 NOTE — CONSULT NOTE ADULT - ASSESSMENT
83yo RH female with a history of HTN, HLD, CAD s/p stent, renal cancer s/p nephrectomy, anxiety and recurrent episodes of self-limiting LLE weakness +/- myoclonus +/- difficulty producing speech. She presents today with another episode of LLE weakness that is already improving at the time of the interview. Personally reviewed all imagines, labs, EEG and other studies.    Impression:  Recurrent episodes of LLE weakness, self-limiting. Nearly back to baseline at time of exam. Exact etiology unknown, possibly seizures with negative motor sign or phenomenon resembling cortical spreading depression. Appears to be responding to perampanel.    Recommendation:  - increase perampanel 6mg QHS to 8mg QHS      => today: 2mg now -> 6mg later this evening       => tomorrow and onward: 8mg QHS (script already sent to [facility])   - okay to go home if able to walk  - patient already has appointment to followup with me in my practice      Thank you for allowing Epilepsy to participate in the care of this patient.   ______________________  Jon Clinton MD   Director, Epilepsy/EMU - [facility]   Epilepsy Consult #: [phone] ([phone])

## 2022-11-09 NOTE — ED PROVIDER NOTE - OBJECTIVE STATEMENT
83 y/o female comes in c/o  sudden onset of left sided lower extremity weakness and difficulty speaking   pt has been well controlled perampanel , pt with recurrent episode that resolve spontaneously   pt evaluate by Dr Clinton who will increase he daily dosage

## 2022-11-09 NOTE — CONSULT NOTE ADULT - SUBJECTIVE AND OBJECTIVE BOX
[facility]                                                                     MD Jon June MD                                                                                                          Epilepsy Consult #: [phone] ([phone])                                        Office: [address] [place] [address], [address]nd floor, [place], [place], [address]                                                 Phone: [phone]; Fax: [phone]                            ==============================================    EPILEPSY INITIAL CONSULT NOTE      ADMITTING DIAGNOSIS:     HPI:  This is an 83yo RH female with a history of HTN, HLD, CAD s/p stent, renal cancer s/p nephrectomy, anxiety and recurrent episodes of self-limiting LLE weakness +/- myoclonus +/- difficulty producing speech. She presents today with another episode of LLE weakness that is already improving at the time of the interview.     Patient has been here numerous times for similar episodes with negative stroke workup. Multiple cvEEGs have been normal in the past. During her last admission in September, EEG captured clusters of whole-body myoclonus without ictal correlate on EEG. These events are thought to be either scalp-negative seizures or phenomenon related to cortical spreading depression. Patient unable to tolerate low dose levetiracetam or divalproex sodium DR, which are treatment choices for both epilepsy and cortical spreading depression. Discharged on perampanel during the last admission, currently at perampanel 6mg QHS. Today's episode is considered to be a very mild one, possibly reflecting her response to perampanel?      CURRENT MED:  perampanel 6mg QHS - started 9/2022    PREVIOUS MED:  LEV – started June to July 2022; dizzy and fatigue  VPA – 1-2 weeks in 8/2022; dizzy, SOB, fatigue    IMAGING:   Multiple MRIs in the past; most recent as below  MRI w/o 4/25/22: no acute ischemia    NEUROPHYSIOLOGY:  Non-elective EMU 9/19 -9/22/22 ([facility]):   -i: Clusters of whole-body myoclonus without ictal correlate on EEG.  -ii: SREDA    Non-elective EMU 6/7 – 6/8/22 ([facility]): SREDA, otherwise normal  rEEG 6/7/22 ([facility]): normal, SREDA  rEEG 9/10/21 ([facility]): normal A/D  cvEEG 7/28 - 7/29/21 ([facility]): normal, SREDA        PMH:  HTN (hypertension)    Rheumatic fever    TIA (transient ischemic attack)    CAD S/P percutaneous coronary angioplasty    Polio          PSH:  Status post tonsillectomy    H/O left nephrectomy    Renal cancer        MEDICATION:  perampanel 2 milliGRAM(s) Oral once    ALLERGIES:  Biaxin (Muscle Pain; Joint Pain)  codeine (Faint)  Isosorbide Mononitrate Extended Release (Faint)  Metoprolol Succinate ER (Unknown)  monoctanoin (Unknown)    FH:  FHx: breast cancer  FH: CHF (congestive heart failure)      SH:  Social alcohol. No tobacco or illicit drugs.    ROS:  Neurology as per HPI, otherwise negative for constitutional, skin, eyes, ENT, respiratory, cardiovascular, gastrointestinal, genitourinary, musculoskeletal, psychiatric, hematology/lymphatics, endocrine, allergic/immunologic.    VITALS:  T(C): 36.8 (11-09-22 @ 15:55), Max: 36.8 (11-09-22 @ 15:55)  HR: 69 (11-09-22 @ 15:55) (69 - 73)  BP: 157/84 (11-09-22 @ 15:55) (157/84 - 157/92)  ABP: --  RR: 18 (11-09-22 @ 15:55) (18 - 19)  SpO2: 99% (11-09-22 @ 15:55) (98% - 99%)  CVP(cm H2O): --    GENERAL PHYSICAL EXAM:  GEN: no distress  HEENT: NCAT, OP clear  EYES: sclera white, conjunctiva clear, no nystagmus  NECK: supple  CV: RRR, no murmur     		  PULM: CTAB, no wheezing  ABD: soft, +BS, NT  EXT: peripheral pulse intact, no cyanosis  MSK: muscle tone and strength normal  SKIN: warm, dry    NEUROLOGICAL EXAM:  Mental Status  Orientation: alert and oriented to person, place, time, and situation   Language: clear and fluent    Cranial Nerves  II: full visual fields intact   III, IV, VI: PERRL, EOMI  V, VII: facial sensation and movement intact and symmetric   VIII: hearing intact   IX, X: uvula midline, soft palate elevates normally   XI: BL shoulder shrug intact   XII: tongue midline    Motor  4/5 LLE  5/5 BUE and RLE                   Sensation  Intact to light touch and pinprick in all 4 EXTs    Reflex  2+ in BL biceps and patella                                      Coordination  Normal FTN bilaterally    Gait  Deferred      LABS:                          13.6   6.64  )-----------( 205      ( 09 Nov 2022 14:30 )             40.4     11-09    139  |  102  |  22.8<H>  ----------------------------<  94  5.0   |  29.0  |  1.00    Ca    8.9      09 Nov 2022 14:30    TPro  6.5<L>  /  Alb  4.1  /  TBili  0.4  /  DBili  x   /  AST  31  /  ALT  13  /  AlkPhos  59  11-09    CAPILLARY BLOOD GLUCOSE      POCT Blood Glucose.: 123 mg/dL (09 Nov 2022 13:08)    LIVER FUNCTIONS - ( 09 Nov 2022 14:30 )  Alb: 4.1 g/dL / Pro: 6.5 g/dL / ALK PHOS: 59 U/L / ALT: 13 U/L / AST: 31 U/L / GGT: x           PT/INR - ( 09 Nov 2022 14:30 )   PT: 13.4 sec;   INR: 1.15 ratio         PTT - ( 09 Nov 2022 14:30 )  PTT:27.5 sec  Urinalysis Basic - ( 09 Nov 2022 15:30 )    Color: x / Appearance: x / SG: x / pH: x  Gluc: x / Ketone: x  / Bili: x / Urobili: x   Blood: x / Protein: Negative / Nitrite: x   Leuk Esterase: x / RBC: x / WBC x   Sq Epi: x / Non Sq Epi: x / Bacteria: x

## 2022-11-09 NOTE — ED PROVIDER NOTE - CLINICAL SUMMARY MEDICAL DECISION MAKING FREE TEXT BOX
pt with LLe weakness and slurred speech recurrent episodes has now spontaneously resolved   pt is followed by DR Clinton   will increase meds

## 2022-11-11 NOTE — PHYSICAL THERAPY INITIAL EVALUATION ADULT - PERTINENT HX OF CURRENT PROBLEM, REHAB EVAL
Pt presents to [facility] with reports of stroke like symptoms. Pt had shown improvement however as of 7/31 c/o return of symptoms, now having improved again.
80F with a history of coronary artery disease, stroke/TIA, and hypertension who presented with left sided weakness and dysarthria similar to her prior episodes of stroke/TIA.
PROVIDER:[TOKEN:[[id number]],FOLLOWUP:[2 weeks]]

## 2022-11-12 ENCOUNTER — EMERGENCY (EMERGENCY)
Facility: HOSPITAL | Age: 82
LOS: 1 days | Discharge: HOME | End: 2022-11-12
Attending: STUDENT IN AN ORGANIZED HEALTH CARE EDUCATION/TRAINING PROGRAM
Payer: MEDICARE

## 2022-11-12 VITALS
HEIGHT: 62 IN | HEART RATE: 97 BPM | DIASTOLIC BLOOD PRESSURE: 60 MMHG | SYSTOLIC BLOOD PRESSURE: 110 MMHG | OXYGEN SATURATION: 95 % | TEMPERATURE: 98 F | RESPIRATION RATE: 18 BRPM | WEIGHT: 134.92 LBS

## 2022-11-12 VITALS
SYSTOLIC BLOOD PRESSURE: 134 MMHG | DIASTOLIC BLOOD PRESSURE: 74 MMHG | HEART RATE: 63 BPM | RESPIRATION RATE: 20 BRPM | TEMPERATURE: 98 F | OXYGEN SATURATION: 96 %

## 2022-11-12 DIAGNOSIS — Z90.5 ACQUIRED ABSENCE OF KIDNEY: Chronic | ICD-10-CM

## 2022-11-12 DIAGNOSIS — Z90.89 ACQUIRED ABSENCE OF OTHER ORGANS: Chronic | ICD-10-CM

## 2022-11-12 DIAGNOSIS — C64.9 MALIGNANT NEOPLASM OF UNSPECIFIED KIDNEY, EXCEPT RENAL PELVIS: Chronic | ICD-10-CM

## 2022-11-12 LAB
ANION GAP SERPL CALC-SCNC: 12 MMOL/L — SIGNIFICANT CHANGE UP (ref 5–17)
BASOPHILS # BLD AUTO: 0.1 K/UL — SIGNIFICANT CHANGE UP (ref 0–0.2)
BASOPHILS NFR BLD AUTO: 1.4 % — SIGNIFICANT CHANGE UP (ref 0–2)
BUN SERPL-MCNC: 21.8 MG/DL — HIGH (ref 8–20)
CALCIUM SERPL-MCNC: 8.8 MG/DL — SIGNIFICANT CHANGE UP (ref 8.4–10.5)
CHLORIDE SERPL-SCNC: 100 MMOL/L — SIGNIFICANT CHANGE UP (ref 96–108)
CO2 SERPL-SCNC: 23 MMOL/L — SIGNIFICANT CHANGE UP (ref 22–29)
CREAT SERPL-MCNC: 1 MG/DL — SIGNIFICANT CHANGE UP (ref 0.5–1.3)
EGFR: 56 ML/MIN/1.73M2 — LOW
EOSINOPHIL # BLD AUTO: 0.15 K/UL — SIGNIFICANT CHANGE UP (ref 0–0.5)
EOSINOPHIL NFR BLD AUTO: 2.1 % — SIGNIFICANT CHANGE UP (ref 0–6)
GLUCOSE SERPL-MCNC: 82 MG/DL — SIGNIFICANT CHANGE UP (ref 70–99)
HCT VFR BLD CALC: 37 % — SIGNIFICANT CHANGE UP (ref 34.5–45)
HGB BLD-MCNC: 12.7 G/DL — SIGNIFICANT CHANGE UP (ref 11.5–15.5)
IMM GRANULOCYTES NFR BLD AUTO: 0.3 % — SIGNIFICANT CHANGE UP (ref 0–0.9)
LYMPHOCYTES # BLD AUTO: 1.46 K/UL — SIGNIFICANT CHANGE UP (ref 1–3.3)
LYMPHOCYTES # BLD AUTO: 20.7 % — SIGNIFICANT CHANGE UP (ref 13–44)
MCHC RBC-ENTMCNC: 30.5 PG — SIGNIFICANT CHANGE UP (ref 27–34)
MCHC RBC-ENTMCNC: 34.3 GM/DL — SIGNIFICANT CHANGE UP (ref 32–36)
MCV RBC AUTO: 88.9 FL — SIGNIFICANT CHANGE UP (ref 80–100)
MONOCYTES # BLD AUTO: 0.7 K/UL — SIGNIFICANT CHANGE UP (ref 0–0.9)
MONOCYTES NFR BLD AUTO: 9.9 % — SIGNIFICANT CHANGE UP (ref 2–14)
NEUTROPHILS # BLD AUTO: 4.64 K/UL — SIGNIFICANT CHANGE UP (ref 1.8–7.4)
NEUTROPHILS NFR BLD AUTO: 65.6 % — SIGNIFICANT CHANGE UP (ref 43–77)
PLATELET # BLD AUTO: 209 K/UL — SIGNIFICANT CHANGE UP (ref 150–400)
POTASSIUM SERPL-MCNC: 4.3 MMOL/L — SIGNIFICANT CHANGE UP (ref 3.5–5.3)
POTASSIUM SERPL-SCNC: 4.3 MMOL/L — SIGNIFICANT CHANGE UP (ref 3.5–5.3)
RBC # BLD: 4.16 M/UL — SIGNIFICANT CHANGE UP (ref 3.8–5.2)
RBC # FLD: 12.1 % — SIGNIFICANT CHANGE UP (ref 10.3–14.5)
SODIUM SERPL-SCNC: 135 MMOL/L — SIGNIFICANT CHANGE UP (ref 135–145)
WBC # BLD: 7.07 K/UL — SIGNIFICANT CHANGE UP (ref 3.8–10.5)
WBC # FLD AUTO: 7.07 K/UL — SIGNIFICANT CHANGE UP (ref 3.8–10.5)

## 2022-11-12 PROCEDURE — 85025 COMPLETE CBC W/AUTO DIFF WBC: CPT

## 2022-11-12 PROCEDURE — 80048 BASIC METABOLIC PNL TOTAL CA: CPT

## 2022-11-12 PROCEDURE — 99283 EMERGENCY DEPT VISIT LOW MDM: CPT

## 2022-11-12 PROCEDURE — 99284 EMERGENCY DEPT VISIT MOD MDM: CPT

## 2022-11-12 PROCEDURE — 36415 COLL VENOUS BLD VENIPUNCTURE: CPT

## 2022-11-12 RX ORDER — PERAMPANEL 2 MG/1
8 TABLET ORAL ONCE
Refills: 0 | Status: DISCONTINUED | OUTPATIENT
Start: 2022-11-12 | End: 2022-11-12

## 2022-11-12 RX ADMIN — PERAMPANEL 8 MILLIGRAM(S): 2 TABLET ORAL at 18:22

## 2022-11-12 NOTE — ED PROVIDER NOTE - CLINICAL SUMMARY MEDICAL DECISION MAKING FREE TEXT BOX
h/o of seizure, recently seen by neuro Dr. Clinton and uptitrated medication which she only took 1 dose of last night. only with new dose of medication for 1 night. exam is nonfocal. patient has f/u with Dr. Clinton. will check baseline labs/electrolytes

## 2022-11-12 NOTE — ED PROVIDER NOTE - PATIENT PORTAL LINK FT
You can access the FollowMyHealth Patient Portal offered by [facility] by registering at the following website: [email or web address] By joining [employer]’s FollowMyHealth portal, you will also be able to view your health information using other applications (apps) compatible with our system.

## 2022-11-12 NOTE — ED PROVIDER NOTE - CARE PROVIDER_API CALL
Jon Clinton)  EEGEpilepsy; Neurology  [address], [address] [place] [address]  Phone: [phone]  Fax: [phone]  Follow Up Time: 4-6 Days

## 2022-11-12 NOTE — ED PROVIDER NOTE - NSFOLLOWUPINSTRUCTIONS_ED_ALL_ED_FT
Continue your home medications at new dose. Follow up with Dr. Clinton as soon as possible. Return for any worsening or concerning symptoms

## 2022-11-12 NOTE — ED ADULT NURSE NOTE - CHIEF COMPLAINT QUOTE
pt A&Ox4, states that she had a seizure while sitting in a chair denies falling or head trauma pt has hx of seizure and just had her seizure meds increased and only had 1 dose so far. resp even and unlabored no distress noted,

## 2022-11-12 NOTE — ED PROVIDER NOTE - PROGRESS NOTE DETAILS
given home dose of medication. recommend continue to take higher dose of home medication that Dr. Clinton recommended and following up outpt with Dr. Clinton closely

## 2022-11-12 NOTE — ED PROVIDER NOTE - OBJECTIVE STATEMENT
82yof with a history of HTN, HLD, CAD s/p stent, renal cancer s/p nephrectomy, anxiety and recurrent episodes of self-limiting LLE weakness +/- myoclonus +/-  here today with episode of shaking. reports was awake throughout and can feel it coming where she shakes in upper extremities. pressed her alert button and came to hospital. Did not fall or hit head Was just seen 3 days ago for similar episode and see by her epilepsy Dr. Clinton and increased perampanel to 8mgqhs  which she just got last night and took first dose of. Patient is back to baseline at time of my exam. no complaints. Denies fever, dizziness, n/v, cp, sob, abd pain

## 2022-11-16 ENCOUNTER — INPATIENT (INPATIENT)
Facility: HOSPITAL | Age: 82
LOS: 0 days | Discharge: HOME | DRG: 101 | End: 2022-11-17
Attending: FAMILY MEDICINE | Admitting: STUDENT IN AN ORGANIZED HEALTH CARE EDUCATION/TRAINING PROGRAM
Payer: MEDICARE

## 2022-11-16 VITALS
OXYGEN SATURATION: 99 % | HEART RATE: 78 BPM | TEMPERATURE: 98 F | WEIGHT: 138.01 LBS | HEIGHT: 62 IN | DIASTOLIC BLOOD PRESSURE: 81 MMHG | SYSTOLIC BLOOD PRESSURE: 147 MMHG | RESPIRATION RATE: 18 BRPM

## 2022-11-16 DIAGNOSIS — R56.9 UNSPECIFIED CONVULSIONS: ICD-10-CM

## 2022-11-16 DIAGNOSIS — C64.9 MALIGNANT NEOPLASM OF UNSPECIFIED KIDNEY, EXCEPT RENAL PELVIS: Chronic | ICD-10-CM

## 2022-11-16 DIAGNOSIS — Z90.89 ACQUIRED ABSENCE OF OTHER ORGANS: Chronic | ICD-10-CM

## 2022-11-16 DIAGNOSIS — Z90.5 ACQUIRED ABSENCE OF KIDNEY: Chronic | ICD-10-CM

## 2022-11-16 LAB
ALBUMIN SERPL ELPH-MCNC: 4 G/DL — SIGNIFICANT CHANGE UP (ref 3.3–5.2)
ALP SERPL-CCNC: 49 U/L — SIGNIFICANT CHANGE UP (ref 40–120)
ALT FLD-CCNC: 15 U/L — SIGNIFICANT CHANGE UP
ANION GAP SERPL CALC-SCNC: 13 MMOL/L — SIGNIFICANT CHANGE UP (ref 5–17)
APPEARANCE UR: CLEAR — SIGNIFICANT CHANGE UP
AST SERPL-CCNC: 37 U/L — HIGH
BACTERIA # UR AUTO: ABNORMAL
BASOPHILS # BLD AUTO: 0.1 K/UL — SIGNIFICANT CHANGE UP (ref 0–0.2)
BASOPHILS NFR BLD AUTO: 1.5 % — SIGNIFICANT CHANGE UP (ref 0–2)
BILIRUB SERPL-MCNC: 0.5 MG/DL — SIGNIFICANT CHANGE UP (ref 0.4–2)
BILIRUB UR-MCNC: NEGATIVE — SIGNIFICANT CHANGE UP
BUN SERPL-MCNC: 20.3 MG/DL — HIGH (ref 8–20)
CALCIUM SERPL-MCNC: 8.8 MG/DL — SIGNIFICANT CHANGE UP (ref 8.4–10.5)
CHLORIDE SERPL-SCNC: 101 MMOL/L — SIGNIFICANT CHANGE UP (ref 96–108)
CO2 SERPL-SCNC: 24 MMOL/L — SIGNIFICANT CHANGE UP (ref 22–29)
COLOR SPEC: YELLOW — SIGNIFICANT CHANGE UP
CREAT SERPL-MCNC: 1.02 MG/DL — SIGNIFICANT CHANGE UP (ref 0.5–1.3)
DIFF PNL FLD: NEGATIVE — SIGNIFICANT CHANGE UP
EGFR: 55 ML/MIN/1.73M2 — LOW
EOSINOPHIL # BLD AUTO: 0.17 K/UL — SIGNIFICANT CHANGE UP (ref 0–0.5)
EOSINOPHIL NFR BLD AUTO: 2.5 % — SIGNIFICANT CHANGE UP (ref 0–6)
EPI CELLS # UR: SIGNIFICANT CHANGE UP
GLUCOSE SERPL-MCNC: 79 MG/DL — SIGNIFICANT CHANGE UP (ref 70–99)
GLUCOSE UR QL: NEGATIVE — SIGNIFICANT CHANGE UP
HCT VFR BLD CALC: 38.3 % — SIGNIFICANT CHANGE UP (ref 34.5–45)
HGB BLD-MCNC: 12.9 G/DL — SIGNIFICANT CHANGE UP (ref 11.5–15.5)
IMM GRANULOCYTES NFR BLD AUTO: 0.3 % — SIGNIFICANT CHANGE UP (ref 0–0.9)
KETONES UR-MCNC: NEGATIVE — SIGNIFICANT CHANGE UP
LEUKOCYTE ESTERASE UR-ACNC: ABNORMAL
LYMPHOCYTES # BLD AUTO: 1.22 K/UL — SIGNIFICANT CHANGE UP (ref 1–3.3)
LYMPHOCYTES # BLD AUTO: 17.9 % — SIGNIFICANT CHANGE UP (ref 13–44)
MAGNESIUM SERPL-MCNC: 2.2 MG/DL — SIGNIFICANT CHANGE UP (ref 1.6–2.6)
MCHC RBC-ENTMCNC: 30 PG — SIGNIFICANT CHANGE UP (ref 27–34)
MCHC RBC-ENTMCNC: 33.7 GM/DL — SIGNIFICANT CHANGE UP (ref 32–36)
MCV RBC AUTO: 89.1 FL — SIGNIFICANT CHANGE UP (ref 80–100)
MONOCYTES # BLD AUTO: 0.65 K/UL — SIGNIFICANT CHANGE UP (ref 0–0.9)
MONOCYTES NFR BLD AUTO: 9.5 % — SIGNIFICANT CHANGE UP (ref 2–14)
NEUTROPHILS # BLD AUTO: 4.67 K/UL — SIGNIFICANT CHANGE UP (ref 1.8–7.4)
NEUTROPHILS NFR BLD AUTO: 68.3 % — SIGNIFICANT CHANGE UP (ref 43–77)
NITRITE UR-MCNC: POSITIVE
PH UR: 8 — SIGNIFICANT CHANGE UP (ref 5–8)
PHOSPHATE SERPL-MCNC: 3.3 MG/DL — SIGNIFICANT CHANGE UP (ref 2.4–4.7)
PLATELET # BLD AUTO: 194 K/UL — SIGNIFICANT CHANGE UP (ref 150–400)
POTASSIUM SERPL-MCNC: 4.9 MMOL/L — SIGNIFICANT CHANGE UP (ref 3.5–5.3)
POTASSIUM SERPL-SCNC: 4.9 MMOL/L — SIGNIFICANT CHANGE UP (ref 3.5–5.3)
PROT SERPL-MCNC: 6.5 G/DL — LOW (ref 6.6–8.7)
PROT UR-MCNC: NEGATIVE — SIGNIFICANT CHANGE UP
RBC # BLD: 4.3 M/UL — SIGNIFICANT CHANGE UP (ref 3.8–5.2)
RBC # FLD: 12 % — SIGNIFICANT CHANGE UP (ref 10.3–14.5)
RBC CASTS # UR COMP ASSIST: SIGNIFICANT CHANGE UP /HPF (ref 0–4)
SODIUM SERPL-SCNC: 138 MMOL/L — SIGNIFICANT CHANGE UP (ref 135–145)
SP GR SPEC: 1.01 — SIGNIFICANT CHANGE UP (ref 1.01–1.02)
UROBILINOGEN FLD QL: NEGATIVE — SIGNIFICANT CHANGE UP
WBC # BLD: 6.83 K/UL — SIGNIFICANT CHANGE UP (ref 3.8–10.5)
WBC # FLD AUTO: 6.83 K/UL — SIGNIFICANT CHANGE UP (ref 3.8–10.5)
WBC UR QL: ABNORMAL /HPF (ref 0–5)

## 2022-11-16 PROCEDURE — 99285 EMERGENCY DEPT VISIT HI MDM: CPT

## 2022-11-16 PROCEDURE — 99497 ADVNCD CARE PLAN 30 MIN: CPT | Mod: 25

## 2022-11-16 PROCEDURE — 99223 1ST HOSP IP/OBS HIGH 75: CPT

## 2022-11-16 RX ORDER — AZTREONAM 2 G
1 VIAL (EA) INJECTION
Qty: 14 | Refills: 0
Start: 2022-11-16 | End: 2022-11-22

## 2022-11-16 RX ORDER — CEFPODOXIME PROXETIL 100 MG
1 TABLET ORAL
Qty: 14 | Refills: 0
Start: 2022-11-16 | End: 2022-11-22

## 2022-11-16 RX ORDER — ASPIRIN/CALCIUM CARB/MAGNESIUM 324 MG
81 TABLET ORAL DAILY
Refills: 0 | Status: DISCONTINUED | OUTPATIENT
Start: 2022-11-16 | End: 2022-11-17

## 2022-11-16 RX ORDER — CEFTRIAXONE 500 MG/1
1000 INJECTION, POWDER, FOR SOLUTION INTRAMUSCULAR; INTRAVENOUS ONCE
Refills: 0 | Status: DISCONTINUED | OUTPATIENT
Start: 2022-11-16 | End: 2022-11-16

## 2022-11-16 RX ORDER — SODIUM CHLORIDE 9 MG/ML
1000 INJECTION INTRAMUSCULAR; INTRAVENOUS; SUBCUTANEOUS ONCE
Refills: 0 | Status: COMPLETED | OUTPATIENT
Start: 2022-11-16 | End: 2022-11-16

## 2022-11-16 RX ORDER — LANOLIN ALCOHOL/MO/W.PET/CERES
3 CREAM (GRAM) TOPICAL AT BEDTIME
Refills: 0 | Status: DISCONTINUED | OUTPATIENT
Start: 2022-11-16 | End: 2022-11-17

## 2022-11-16 RX ORDER — ESCITALOPRAM OXALATE 10 MG/1
5 TABLET, FILM COATED ORAL DAILY
Refills: 0 | Status: DISCONTINUED | OUTPATIENT
Start: 2022-11-16 | End: 2022-11-17

## 2022-11-16 RX ORDER — DILTIAZEM HCL 120 MG
30 CAPSULE, EXT RELEASE 24 HR ORAL THREE TIMES A DAY
Refills: 0 | Status: DISCONTINUED | OUTPATIENT
Start: 2022-11-16 | End: 2022-11-17

## 2022-11-16 RX ORDER — PERAMPANEL 2 MG/1
8 TABLET ORAL AT BEDTIME
Refills: 0 | Status: DISCONTINUED | OUTPATIENT
Start: 2022-11-16 | End: 2022-11-17

## 2022-11-16 RX ORDER — ACETAMINOPHEN 500 MG
650 TABLET ORAL EVERY 6 HOURS
Refills: 0 | Status: DISCONTINUED | OUTPATIENT
Start: 2022-11-16 | End: 2022-11-17

## 2022-11-16 RX ORDER — ATORVASTATIN CALCIUM 80 MG/1
80 TABLET, FILM COATED ORAL AT BEDTIME
Refills: 0 | Status: DISCONTINUED | OUTPATIENT
Start: 2022-11-16 | End: 2022-11-17

## 2022-11-16 RX ORDER — RIVAROXABAN 15 MG-20MG
20 KIT ORAL
Refills: 0 | Status: DISCONTINUED | OUTPATIENT
Start: 2022-11-16 | End: 2022-11-17

## 2022-11-16 RX ORDER — ONDANSETRON 8 MG/1
4 TABLET, FILM COATED ORAL EVERY 8 HOURS
Refills: 0 | Status: DISCONTINUED | OUTPATIENT
Start: 2022-11-16 | End: 2022-11-17

## 2022-11-16 RX ADMIN — PERAMPANEL 8 MILLIGRAM(S): 2 TABLET ORAL at 21:39

## 2022-11-16 RX ADMIN — SODIUM CHLORIDE 1000 MILLILITER(S): 9 INJECTION INTRAMUSCULAR; INTRAVENOUS; SUBCUTANEOUS at 13:18

## 2022-11-16 RX ADMIN — Medication 1 TABLET(S): at 16:41

## 2022-11-16 RX ADMIN — RIVAROXABAN 20 MILLIGRAM(S): KIT at 17:23

## 2022-11-16 RX ADMIN — Medication 30 MILLIGRAM(S): at 21:40

## 2022-11-16 RX ADMIN — ATORVASTATIN CALCIUM 80 MILLIGRAM(S): 80 TABLET, FILM COATED ORAL at 21:39

## 2022-11-16 RX ADMIN — SODIUM CHLORIDE 1000 MILLILITER(S): 9 INJECTION INTRAMUSCULAR; INTRAVENOUS; SUBCUTANEOUS at 15:00

## 2022-11-16 NOTE — ED PROVIDER NOTE - OBJECTIVE STATEMENT
83 y/o F with PMH HTN, TIA, CVA, seizure d/o presents for seizure that occurred at home with prodrome. Per patient, seizure was similar to prior seizures, with "terrible shaking", no tongue biting, no incontinence.  It was self-limited, patient denies postictal period. Patient has been compliant with her antiseizure medications, saw Dr. Clinton last week, and has had 3 ED visits for the same in the past 2 weeks. She has otherwise been in her usual state of health, denies fevers, chest pain, shortness of breath.  Upon examination she notes lower abdominal tenderness to palpation that she did not notice before. She also noticed after her seizure she has difficulty with speech and left-sided weakness, which has been present after every other seizure, she states it resolves on its own.  Currently it is still present.

## 2022-11-16 NOTE — ED PROVIDER NOTE - PHYSICAL EXAMINATION
Const: Awake, alert and oriented. In no acute distress. Well appearing.  HEENT: NC/AT. Moist mucous membranes.  Eyes: No scleral icterus. EOMI.  Neck:. Soft and supple. Full ROM without pain.  Cardiac: Regular rate and regular rhythm. +S1/S2. No murmurs. Peripheral pulses 2+ and symmetric. No LE edema.  Resp: Speaking in full sentences. No evidence of respiratory distress. No wheezes, rales or rhonchi.  Abd: Soft, non-tender, non-distended. Normal bowel sounds in all 4 quadrants. No guarding or rebound.  Back: Spine midline and non-tender. No CVAT.  Skin: No rashes, abrasions or lacerations.  Neuro: Awake, alert & oriented x 3. CN II-XII symmetrically intact, + left pronator drift, + left LE drift. Mild aphasia. Sensation symmetrically intact.

## 2022-11-16 NOTE — PATIENT PROFILE ADULT - FALL HARM RISK - UNIVERSAL INTERVENTIONS
Bed in lowest position, wheels locked, appropriate side rails in place/Call bell, personal items and telephone in reach/Instruct patient to call for assistance before getting out of bed or chair/Non-slip footwear when patient is out of bed/Herod to call system/Physically safe environment - no spills, clutter or unnecessary equipment/Purposeful Proactive Rounding/Room/bathroom lighting operational, light cord in reach

## 2022-11-16 NOTE — H&P ADULT - ASSESSMENT
83 y/o F with PMH HTN, TIA, CVA, seizure d/o presents for mgmt of seizures and UTI    #Seizure D/O w/ Andrei's paralysis  3 seizures in last 2 weeks, last one was today  reports L sided facial numbness and speech slurring after seizures  Resolved on exam  - obtain head CT  - vEEG 24 hours  - Epilepsy on board  - c/w Perampanel 8mg QHS  - Ativan PRN for status    #Uncomplicated Cystitis  Suprapubic tenderness present  Moderate UTI present on UA  - Bactrim DS Q12  - NO cephalosporins 2/2 hx of seizures    #TIA/CVA  #CAD hx  #HTN  stable  - c/w Diltiazem Q24  - c/w Xarelto  - c/w ASA  - Tele monitoring    #Healthcare Maintenance  DVT PPx - Xarelto   Diet - DASH  Dispo - Likely home pending vEEG

## 2022-11-16 NOTE — H&P ADULT - HISTORY OF PRESENT ILLNESS
83 y/o F with PMH HTN, TIA, CVA, seizure d/o presents for seizure that occurred at home with prodrome. Per patient, seizure was similar to prior seizures, with "terrible shaking", no tongue biting, no incontinence.  It was self-limited, patient denies postictal period. Patient has been compliant with her antiseizure medications, saw Dr. Clinton last week, and has had 3 ED visits for the same in the past 2 weeks. She has otherwise been in her usual state of health, denies fevers, chest pain, shortness of breath.  Upon examination she notes lower abdominal tenderness to palpation that she did not notice before. She also noticed after her seizure she has difficulty with speech and left-sided weakness, which has been present after every other seizure, she states it resolves on its own, on examination by medicine attending, it has resolved.

## 2022-11-16 NOTE — ED PROVIDER NOTE - PROGRESS NOTE DETAILS
Benigno: I spoke to Epileptologist who recommends video EEG, order placed, will admit to hospitalist.

## 2022-11-16 NOTE — ED ADULT NURSE NOTE - PRO INTERPRETER NEED 2
Complex Repair And Double M Plasty Text: The defect edges were debeveled with a #15 scalpel blade.  The primary defect was closed partially with a complex linear closure.  Given the location of the remaining defect, shape of the defect and the proximity to free margins a double M plasty was deemed most appropriate for complete closure of the defect.  Using a sterile surgical marker, an appropriate advancement flap was drawn incorporating the defect and placing the expected incisions within the relaxed skin tension lines where possible.    The area thus outlined was incised deep to adipose tissue with a #15 scalpel blade.  The skin margins were undermined to an appropriate distance in all directions utilizing iris scissors. English

## 2022-11-16 NOTE — H&P ADULT - NSHPLABSRESULTS_GEN_ALL_CORE
LABS:                         12.9   6.83  )-----------( 194      ( 2022 13:30 )             38.3     11-16    138  |  101  |  20.3<H>  ----------------------------<  79  4.9   |  24.0  |  1.02    Ca    8.8      2022 13:30  Phos  3.3     -  Mg     2.2     -16    TPro  6.5<L>  /  Alb  4.0  /  TBili  0.5  /  DBili  x   /  AST  37<H>  /  ALT  15  /  AlkPhos  49  11-16      Urinalysis Basic - ( 2022 12:25 )    Color: Yellow / Appearance: Clear / S.010 / pH: x  Gluc: x / Ketone: Negative  / Bili: Negative / Urobili: Negative   Blood: x / Protein: Negative / Nitrite: Positive   Leuk Esterase: Moderate / RBC: 0-2 /HPF / WBC 26-50 /HPF   Sq Epi: x / Non Sq Epi: Occasional / Bacteria: Few        RADIOLOGY, EKG & ADDITIONAL TESTS:

## 2022-11-16 NOTE — PATIENT PROFILE ADULT - FUNCTIONAL ASSESSMENT - DAILY ACTIVITY 2.
Emergency  S: CC: "I have pain in the UR  I feel throbbing pain that is 10/10  The pain wakes me at night " Pt identifies #4  Pt states she is experiencing pain in #9 as well but due to emergency visit only one concern can be addressed and patient opted for #4  O: PA taken of #4  #4: (-) Percussion, (-) Palpation    #4 has fractured B cusp with decay extending to bone level  A: #4: Non-restorable     P: Informed Pt that #4 is NOT savable  Discussed pros, cons, and timelines related to options: 1) Extraction, 2) No tx  Pt opted for option #4 due to timeline and financials  Pt is tx planned for ext of all maxillary teeth and opted for ext today  Discussed immediate vs traditional complete detnure and patient stated she is interested in immediate for maxillary arch  #22 and 27 caries cleanout is necessary to evaluate restorability of teeth prior to finalizing complete vs partial denture for mandible  Procedures Performed:    Lalo Uribe presents for Ext #4    [address], patient denies any changes  Obtained a direct and personal consent  Risks and complications were explained  Pt agreed and consented  Consent scanned in doc center  Pre-Op BP WNL  Administered 1 carp of 2 % Lidocaine w/ 1:100,000 epi via infiltration  ? Adequate anesthesia obtained, reflected gingiva, elevated, and extracted #4  Socket irrigated, curetted, filed to remove bony extrusions  Upon dismissal, patient received POI, gauze, and verbally discussed 400mg Ibuprofen and 500mg Tylenol every 6 hours for first 3 days then PRN      NV: Ext #9
4 = No assist / stand by assistance

## 2022-11-16 NOTE — ED ADULT TRIAGE NOTE - CHIEF COMPLAINT QUOTE
Pt BIBA from home, states she has been having seizures, pt presents with full body twitches, denies pain, recently increased fyompa medication 8mg

## 2022-11-16 NOTE — H&P ADULT - CONVERSATION DETAILS
GOC discussed w/ patient. Pt AAOx3, understands GOC. Requests to be full code at this time. Pt will remain FULL CODE

## 2022-11-16 NOTE — ED PROVIDER NOTE - CLINICAL SUMMARY MEDICAL DECISION MAKING FREE TEXT BOX
82-year-old female with past medical history of seizure disorder presents after seizure that occurred this afternoon.  Patient with residual aphasia, left upper and lower extremity weakness that is common after her seizures, and spontaneously resolves.  We will check basic labs, IV hydration, consults epilepsy and reassess.

## 2022-11-16 NOTE — ED PROVIDER NOTE - NS ED ROS FT
Const: Denies fever, chills  HEENT: Denies blurry vision, sore throat  Neck: Denies neck pain/stiffness  Resp: Denies coughing, SOB  Cardiovascular: Denies CP, palpitations, LE edema  GI: Denies nausea, vomiting, abdominal pain, diarrhea, constipation, blood in stool  : Denies urinary frequency/urgency/dysuria, hematuria  MSK: Denies back pain  Neuro: + seizure, + difficulty with speech, + left sided weakness. Denies HA, dizziness, numbness  Skin: Denies rashes.

## 2022-11-16 NOTE — H&P ADULT - NSHPPHYSICALEXAM_GEN_ALL_CORE
PHYSICAL EXAM:    General: in no acute distress  Eyes: PERRLA, EOMI; conjunctiva and sclera clear  Head: Normocephalic; atraumatic  ENMT: No nasal discharge; airway clear  Neck: Supple; non tender; no masses  Respiratory: No wheezes, rales or rhonchi  Cardiovascular: Regular rate and rhythm. S1 and S2 Normal; No murmurs, gallops or rubs  Gastrointestinal: Soft, mild suprapubic tenderness, non-distended; Normal bowel sounds  Genitourinary: No costovertebral angle tenderness, suprapubic  tenderness present  Extremities: Normal range of motion, No clubbing, cyanosis or edema  Vascular: Peripheral pulses palpable 2+ bilaterally  Neurological: Alert and oriented x4  Skin: Warm and dry. No acute rash  Lymph Nodes: No acute cervical adenopathy  Musculoskeletal: Normal gait, tone, without deformities  Psychiatric: Cooperative and appropriate

## 2022-11-16 NOTE — ED PROVIDER NOTE - CHIEF COMPLAINT
Vital Signs Last 24 Hrs  T(C): 36.7 (04 Apr 2021 04:41), Max: 39.2 (03 Apr 2021 21:40)  T(F): 98 (04 Apr 2021 04:41), Max: 102.6 (03 Apr 2021 21:40)  HR: 52 (04 Apr 2021 04:41) (52 - 83)  BP: 153/78 (04 Apr 2021 04:41) (107/80 - 170/67)  BP(mean): --  RR: 20 (04 Apr 2021 04:41) (12 - 22)  SpO2: 100% (04 Apr 2021 04:41) (98% - 100%)    Constitutional: elderly female in NAD  Eyes: PERRL, sclera clear  ENMT: MMM, clear oropharynx  Neck: Supple, no cervical LAD  Respiratory: CTAB, no rales, rhonchi or wheezes  Cardiovascular: RRR, no m/g/r  Gastrointestinal: +BS, soft, NT/ND  Neurological: AAOx1  Psychiatric: Appropriate affect and mood  Extremities: No LE edema The patient is a 82y Female complaining of tremor.

## 2022-11-17 ENCOUNTER — TRANSCRIPTION ENCOUNTER (OUTPATIENT)
Age: 82
End: 2022-11-17

## 2022-11-17 VITALS
OXYGEN SATURATION: 97 % | TEMPERATURE: 98 F | SYSTOLIC BLOOD PRESSURE: 111 MMHG | RESPIRATION RATE: 18 BRPM | HEART RATE: 89 BPM | DIASTOLIC BLOOD PRESSURE: 69 MMHG

## 2022-11-17 LAB
ALBUMIN SERPL ELPH-MCNC: 3.4 G/DL — SIGNIFICANT CHANGE UP (ref 3.3–5.2)
ALP SERPL-CCNC: 40 U/L — SIGNIFICANT CHANGE UP (ref 40–120)
ALT FLD-CCNC: 10 U/L — SIGNIFICANT CHANGE UP
ANION GAP SERPL CALC-SCNC: 12 MMOL/L — SIGNIFICANT CHANGE UP (ref 5–17)
AST SERPL-CCNC: 22 U/L — SIGNIFICANT CHANGE UP
BASOPHILS # BLD AUTO: 0.07 K/UL — SIGNIFICANT CHANGE UP (ref 0–0.2)
BASOPHILS NFR BLD AUTO: 1.3 % — SIGNIFICANT CHANGE UP (ref 0–2)
BILIRUB SERPL-MCNC: 0.5 MG/DL — SIGNIFICANT CHANGE UP (ref 0.4–2)
BUN SERPL-MCNC: 19.7 MG/DL — SIGNIFICANT CHANGE UP (ref 8–20)
CALCIUM SERPL-MCNC: 8.4 MG/DL — SIGNIFICANT CHANGE UP (ref 8.4–10.5)
CHLORIDE SERPL-SCNC: 107 MMOL/L — SIGNIFICANT CHANGE UP (ref 96–108)
CO2 SERPL-SCNC: 23 MMOL/L — SIGNIFICANT CHANGE UP (ref 22–29)
CREAT SERPL-MCNC: 1.32 MG/DL — HIGH (ref 0.5–1.3)
EGFR: 40 ML/MIN/1.73M2 — LOW
EOSINOPHIL # BLD AUTO: 0.18 K/UL — SIGNIFICANT CHANGE UP (ref 0–0.5)
EOSINOPHIL NFR BLD AUTO: 3.3 % — SIGNIFICANT CHANGE UP (ref 0–6)
GLUCOSE SERPL-MCNC: 85 MG/DL — SIGNIFICANT CHANGE UP (ref 70–99)
HCT VFR BLD CALC: 35.2 % — SIGNIFICANT CHANGE UP (ref 34.5–45)
HGB BLD-MCNC: 11.9 G/DL — SIGNIFICANT CHANGE UP (ref 11.5–15.5)
IMM GRANULOCYTES NFR BLD AUTO: 0 % — SIGNIFICANT CHANGE UP (ref 0–0.9)
LYMPHOCYTES # BLD AUTO: 1.31 K/UL — SIGNIFICANT CHANGE UP (ref 1–3.3)
LYMPHOCYTES # BLD AUTO: 24 % — SIGNIFICANT CHANGE UP (ref 13–44)
MCHC RBC-ENTMCNC: 30.1 PG — SIGNIFICANT CHANGE UP (ref 27–34)
MCHC RBC-ENTMCNC: 33.8 GM/DL — SIGNIFICANT CHANGE UP (ref 32–36)
MCV RBC AUTO: 89.1 FL — SIGNIFICANT CHANGE UP (ref 80–100)
MONOCYTES # BLD AUTO: 0.65 K/UL — SIGNIFICANT CHANGE UP (ref 0–0.9)
MONOCYTES NFR BLD AUTO: 11.9 % — SIGNIFICANT CHANGE UP (ref 2–14)
NEUTROPHILS # BLD AUTO: 3.25 K/UL — SIGNIFICANT CHANGE UP (ref 1.8–7.4)
NEUTROPHILS NFR BLD AUTO: 59.5 % — SIGNIFICANT CHANGE UP (ref 43–77)
PLATELET # BLD AUTO: 193 K/UL — SIGNIFICANT CHANGE UP (ref 150–400)
POTASSIUM SERPL-MCNC: 4.1 MMOL/L — SIGNIFICANT CHANGE UP (ref 3.5–5.3)
POTASSIUM SERPL-SCNC: 4.1 MMOL/L — SIGNIFICANT CHANGE UP (ref 3.5–5.3)
PROT SERPL-MCNC: 5.5 G/DL — LOW (ref 6.6–8.7)
RBC # BLD: 3.95 M/UL — SIGNIFICANT CHANGE UP (ref 3.8–5.2)
RBC # FLD: 12.1 % — SIGNIFICANT CHANGE UP (ref 10.3–14.5)
SARS-COV-2 RNA SPEC QL NAA+PROBE: SIGNIFICANT CHANGE UP
SODIUM SERPL-SCNC: 142 MMOL/L — SIGNIFICANT CHANGE UP (ref 135–145)
WBC # BLD: 5.46 K/UL — SIGNIFICANT CHANGE UP (ref 3.8–10.5)
WBC # FLD AUTO: 5.46 K/UL — SIGNIFICANT CHANGE UP (ref 3.8–10.5)

## 2022-11-17 PROCEDURE — 96361 HYDRATE IV INFUSION ADD-ON: CPT

## 2022-11-17 PROCEDURE — 84100 ASSAY OF PHOSPHORUS: CPT

## 2022-11-17 PROCEDURE — 80053 COMPREHEN METABOLIC PANEL: CPT

## 2022-11-17 PROCEDURE — 96360 HYDRATION IV INFUSION INIT: CPT

## 2022-11-17 PROCEDURE — 99223 1ST HOSP IP/OBS HIGH 75: CPT

## 2022-11-17 PROCEDURE — 85025 COMPLETE CBC W/AUTO DIFF WBC: CPT

## 2022-11-17 PROCEDURE — 95700 EEG CONT REC W/VID EEG TECH: CPT

## 2022-11-17 PROCEDURE — 83735 ASSAY OF MAGNESIUM: CPT

## 2022-11-17 PROCEDURE — 36415 COLL VENOUS BLD VENIPUNCTURE: CPT

## 2022-11-17 PROCEDURE — 99285 EMERGENCY DEPT VISIT HI MDM: CPT | Mod: 25

## 2022-11-17 PROCEDURE — 82962 GLUCOSE BLOOD TEST: CPT

## 2022-11-17 PROCEDURE — 87186 SC STD MICRODIL/AGAR DIL: CPT

## 2022-11-17 PROCEDURE — 95813 EEG EXTND MNTR 61-119 MIN: CPT

## 2022-11-17 PROCEDURE — U0005: CPT

## 2022-11-17 PROCEDURE — 99238 HOSP IP/OBS DSCHRG MGMT 30/<: CPT

## 2022-11-17 PROCEDURE — 95714 VEEG EA 12-26 HR UNMNTR: CPT

## 2022-11-17 PROCEDURE — 87086 URINE CULTURE/COLONY COUNT: CPT

## 2022-11-17 PROCEDURE — U0003: CPT

## 2022-11-17 PROCEDURE — 95711 VEEG 2-12 HR UNMONITORED: CPT

## 2022-11-17 PROCEDURE — 81001 URINALYSIS AUTO W/SCOPE: CPT

## 2022-11-17 RX ORDER — PERAMPANEL 2 MG/1
2 TABLET ORAL
Qty: 60 | Refills: 0
Start: 2022-11-17 | End: 2022-12-16

## 2022-11-17 RX ADMIN — Medication 2 MILLIGRAM(S): at 01:52

## 2022-11-17 RX ADMIN — ESCITALOPRAM OXALATE 5 MILLIGRAM(S): 10 TABLET, FILM COATED ORAL at 12:52

## 2022-11-17 RX ADMIN — Medication 81 MILLIGRAM(S): at 12:52

## 2022-11-17 NOTE — PROGRESS NOTE ADULT - SUBJECTIVE AND OBJECTIVE BOX
Patient is a 82y old  Female who presents with a chief complaint of Seizure, UTI (2022 11:50)      INTERVAL HPI/OVERNIGHT EVENTS:    MEDICATIONS  (STANDING):  aspirin enteric coated 81 milliGRAM(s) Oral daily  atorvastatin 80 milliGRAM(s) Oral at bedtime  diltiazem    Tablet 30 milliGRAM(s) Oral three times a day  escitalopram 5 milliGRAM(s) Oral daily  perampanel 8 milliGRAM(s) Oral at bedtime  rivaroxaban 20 milliGRAM(s) Oral with dinner  trimethoprim  160 mG/sulfamethoxazole 800 mG 1 Tablet(s) Oral two times a day    MEDICATIONS  (PRN):  acetaminophen     Tablet .. 650 milliGRAM(s) Oral every 6 hours PRN Temp greater or equal to 38C (100.4F), Mild Pain (1 - 3)  aluminum hydroxide/magnesium hydroxide/simethicone Suspension 30 milliLiter(s) Oral every 4 hours PRN Dyspepsia  LORazepam   Injectable 2 milliGRAM(s) IV Push every 4 hours PRN convulsions  melatonin 3 milliGRAM(s) Oral at bedtime PRN Insomnia  ondansetron Injectable 4 milliGRAM(s) IV Push every 8 hours PRN Nausea and/or Vomiting      Allergies    Biaxin (Muscle Pain; Joint Pain)  codeine (Faint)  Metoprolol Succinate ER (Unknown)  monoctanoin (Unknown)    Intolerances    Isosorbide Mononitrate Extended Release (Faint)      REVIEW OF SYSTEMS:  CONSTITUTIONAL: No fever, weight loss, or fatigue  EYES: No eye pain, visual disturbances, or discharge  ENMT:  No difficulty hearing, tinnitus, vertigo; No sinus or throat pain  NECK: No pain or stiffness  RESPIRATORY: No cough, wheezing, chills or hemoptysis; No shortness of breath  CARDIOVASCULAR: No chest pain, palpitations, lightheadedness, or leg swelling  GASTROINTESTINAL: No abdominal or epigastric pain. No nausea, vomiting, or hematemesis; No diarrhea or constipation. No melena or hematochezia.  GENITOURINARY: No dysuria, frequency, hematuria, or incontinence  NEUROLOGICAL: No headaches, memory loss, vertigo, loss of strength, numbness, or tremors  SKIN: No itching, burning, rashes, or lesions   LYMPH NODES: No enlarged glands  ENDOCRINE: No heat or cold intolerance; No hair loss; No polydipsia or polyuria  MUSCULOSKELETAL: No joint pain or swelling; No muscle, back, or extremity pain  PSYCHIATRIC: No depression, anxiety, or mood swings  HEME/LYMPH: No easy bruising, or bleeding gums  ALLERGY AND IMMUNOLOGIC: No hives or eczema    PHYSICAL EXAM:  GENERAL: NAD, well-groomed, well-developed  HEAD:  Atraumatic, Normocephalic  EYES: EOMI, PERRLA, conjunctiva and sclera clear  ENMT: Moist mucous membranes, Good dentition, No lesions  NECK: Supple, No JVD appreciated  NERVOUS SYSTEM:  Alert & Oriented X3, Good concentration; All 4 extremities mobile, no gross sensory deficits.   CHEST/LUNG: Clear to auscultation bilaterally; No rales, rhonchi, wheezing, or rubs appreciated  HEART: Regular rate and rhythm; No murmurs, rubs, or gallops  ABDOMEN: Soft, Nontender, Nondistended; Bowel sounds present  EXTREMITIES:  No clubbing, cyanosis, or edema appreciated  LYMPH: No lymphadenopathy noted  SKIN: No rashes or lesions appreciated    Vital Signs Last 24 Hrs  T(C): 36.6 (2022 12:04), Max: 36.7 (2022 19:17)  T(F): 97.9 (2022 12:04), Max: 98 (2022 19:17)  HR: 78 (2022 12:04) (71 - 98)  BP: 125/68 (2022 12:04) (110/65 - 149/75)  BP(mean): 93 (2022 19:17) (93 - 93)  RR: 18 (2022 12:04) (18 - 18)  SpO2: 97% (2022 12:04) (92% - 98%)    Parameters below as of 2022 12:04  Patient On (Oxygen Delivery Method): room air        LABS:                        11.9   5.46  )-----------( 193      ( 2022 02:53 )             35.2     2022 02:53    142    |  107    |  19.7   ----------------------------<  85     4.1     |  23.0   |  1.32     Ca    8.4        2022 02:53  Phos  3.3       2022 13:30  Mg     2.2       2022 13:30    TPro  5.5    /  Alb  3.4    /  TBili  0.5    /  DBili  x      /  AST  22     /  ALT  10     /  AlkPhos  40     2022 02:53      Urinalysis Basic - ( 2022 12:25 )    Color: Yellow / Appearance: Clear / S.010 / pH: x  Gluc: x / Ketone: Negative  / Bili: Negative / Urobili: Negative   Blood: x / Protein: Negative / Nitrite: Positive   Leuk Esterase: Moderate / RBC: 0-2 /HPF / WBC 26-50 /HPF   Sq Epi: x / Non Sq Epi: Occasional / Bacteria: Few      CAPILLARY BLOOD GLUCOSE          RADIOLOGY & ADDITIONAL TESTS:    Imaging Personally Reviewed:  [ ] YES     Consultant(s) Notes Reviewed:      Care Discussed with Consultants/Other Providers:    Advanced Directives: [ ] DNR  [ ] No feeding tube  [ ] MOLST in chart  [ ] MOLST completed today  [ ] Unknown

## 2022-11-17 NOTE — DISCHARGE NOTE NURSING/CASE MANAGEMENT/SOCIAL WORK - NSDCPEFALRISK_GEN_ALL_CORE
For information on Fall & Injury Prevention, visit: [email or web address] OR  [email or web address] OR  https://www.cdc.gov/steadi/patient.html

## 2022-11-17 NOTE — DISCHARGE NOTE PROVIDER - NSDCMRMEDTOKEN_GEN_ALL_CORE_FT
alendronate 70 mg oral tablet: 1 tab(s) orally once a week  aspirin 81 mg oral delayed release tablet: 1 tab(s) orally once a day  atorvastatin 80 mg oral tablet: 1 tab(s) orally once a day  Bactrim  mg-160 mg oral tablet: 1 tab(s) orally 2 times a day   dilTIAZem 30 mg oral tablet: 1 tab(s) orally 3 times a day  Fycompa 4 mg oral tablet: 1 tab(s) orally once a day (at bedtime)  after completing 2 weeks 2mg MDD:1 tab/day  Lexapro 5 mg oral tablet: 5 milligram(s) orally once a day  Nexlizet 180 mg-10 mg oral tablet: 1 tab(s) orally once a day  perampanel 2 mg oral tablet: 1 tab(s) orally once a day (at bedtime) MDD:1tab/day  rivaroxaban 15 mg oral tablet: 1 tab(s) orally once a day (in the evening)   alendronate 70 mg oral tablet: 1 tab(s) orally once a week  aspirin 81 mg oral delayed release tablet: 1 tab(s) orally once a day  atorvastatin 80 mg oral tablet: 1 tab(s) orally once a day  Bactrim  mg-160 mg oral tablet: 1 tab(s) orally 2 times a day   dilTIAZem 30 mg oral tablet: 1 tab(s) orally 3 times a day  Fycompa 4 mg oral tablet: 2 tab(s) orally once a day (at bedtime) MDD:2 per day  Lexapro 5 mg oral tablet: 5 milligram(s) orally once a day  Nexlizet 180 mg-10 mg oral tablet: 1 tab(s) orally once a day  rivaroxaban 15 mg oral tablet: 1 tab(s) orally once a day (in the evening)

## 2022-11-17 NOTE — DISCHARGE NOTE PROVIDER - HOSPITAL COURSE
83 y/o F with PMH HTN, TIA, CVA, seizure d/o presents for seizure that occurred at home with prodrome. Per patient, seizure was similar to prior seizures, with "terrible shaking", no tongue biting, no incontinence.  It was self-limited, patient denies postictal period. Patient has been compliant with her antiseizure medications, saw Dr. Clinton last week, and has had 3 ED visits for the same in the past 2 weeks. She has otherwise been in her usual state of health, denies fevers, chest pain, shortness of breath.  Upon examination she notes lower abdominal tenderness to palpation that she did not notice before. She also noticed after her seizure she has difficulty with speech and left-sided weakness, which has been present after every other seizure, she states it resolves on its own, on examination by medicine attending, it has resolved.    Patient was admitted and epilepsy team consulted. Patient underwent vEEG and as per team it was unremarkable. Shaking episode determined to be non-epileptiform.         GENERAL: Elderly adult female, no acute distress, appropriate behavior  EYES: sclera clear, no exudates, PERRLA  ENMT: moist mucous membranes, oropharynx clear without erythema, no exudates  NECK: supple, soft, no thyromegaly noted  LUNGS:  clear to auscultation,  no rales, wheezing or rhonchi appreciated  HEART: S1/S2, regular rate and rhythm, no murmurs noted, no lower extremity edema appreciated  GASTROINTESTINAL: abdomen is soft, nontender, nondistended, normoactive bowel sounds, no palpable masses  INTEGUMENT: good skin turgor, warm, dry and intact, no lesions appreciated  MUSCULOSKELETAL: no clubbing or cyanosis, no obvious deformity  NEUROLOGIC: awake, alert, oriented x3  PSYCHIATRIC: mood is good, affect is congruent, linear and logical thought process  HEME/LYMPH:  no obvious ecchymosis or petechiae        #Seizure D/O w/ Andrei's paralysis  3 seizures in last 2 weeks, last one was today  reports L sided facial numbness and speech slurring after seizures  Resolved on exam  - obtain head CT  - vEEG 24 hours  - Epilepsy on board  - c/w Perampanel 8mg QHS  - Ativan PRN for status    #Uncomplicated Cystitis  Suprapubic tenderness present  Moderate UTI present on UA  - Bactrim DS Q12  - NO cephalosporins 2/2 hx of seizures    #TIA/CVA  #CAD hx  #HTN  stable  - c/w Diltiazem Q24  - c/w Xarelto  - c/w ASA  - Tele monitoring    #Healthcare Maintenance  DVT PPx - Xarelto   Diet - DASH  Dispo - Likely home pending vEEG   83 y/o F with PMH HTN, TIA, CVA, seizure d/o presents for seizure that occurred at home with prodrome. Per patient, seizure was similar to prior seizures, with "terrible shaking", no tongue biting, no incontinence.  It was self-limited, patient denies postictal period. Patient has been compliant with her antiseizure medications, saw Dr. Clinton last week, and has had 3 ED visits for the same in the past 2 weeks. She has otherwise been in her usual state of health, denies fevers, chest pain, shortness of breath.  Upon examination she notes lower abdominal tenderness to palpation that she did not notice before. She also noticed after her seizure she has difficulty with speech and left-sided weakness, which has been present after every other seizure, she states it resolves on its own, on examination by medicine attending, it has resolved.    Patient was admitted and epilepsy team consulted. Patient underwent vEEG and as per team it was unremarkable. Shaking episode determined to be non-epileptiform.         GENERAL: Elderly adult female, no acute distress, appropriate behavior  EYES: sclera clear, no exudates, PERRLA  ENMT: moist mucous membranes, oropharynx clear without erythema, no exudates  NECK: supple, soft, no thyromegaly noted  LUNGS:  clear to auscultation,  no rales, wheezing or rhonchi appreciated  HEART: S1/S2, regular rate and rhythm, no murmurs noted, no lower extremity edema appreciated  GASTROINTESTINAL: abdomen is soft, nontender, nondistended, normoactive bowel sounds, no palpable masses  INTEGUMENT: good skin turgor, warm, dry and intact, no lesions appreciated  MUSCULOSKELETAL: no clubbing or cyanosis, no obvious deformity  NEUROLOGIC: awake, alert, oriented x3  PSYCHIATRIC: mood is good, affect is congruent, linear and logical thought process  HEME/LYMPH:  no obvious ecchymosis or petechiae        #Seizure D/O w/ Andrei's paralysis  - 3 reported seizures in last 2 weeks prior to episode prompting arrival  - reports L sided facial numbness and speech slurring after seizures, Resolved on exam  - head CT no acute changes  - vEEG 24 hours unremarkable as per Epilepsy, "shaking events" were said to be non-epileptic and likely stress-related  - c/w Perampanel 8mg QHS    #Uncomplicated Cystitis  - Suprapubic tenderness present on arrival  - UA +nitrites, mod LE, WBCs  - continue Bactrim DS Q12 x3 days  - NO cephalosporins 2/2 hx of seizures    #TIA/CVA  #CAD hx  #HTN  stable  - c/w Diltiazem Q24  - c/w Xarelto  - c/w ASA    #Healthcare Maintenance  DVT PPx - Xarelto   Diet - DASH  Dispo - Likely home pending vEEG   83 y/o F with PMH HTN, TIA, CVA, seizure d/o presents for seizure that occurred at home with prodrome. Per patient, seizure was similar to prior seizures, with "terrible shaking", no tongue biting, no incontinence.  It was self-limited, patient denies postictal period. Patient has been compliant with her antiseizure medications, saw Dr. Clinton last week, and has had 3 ED visits for the same in the past 2 weeks. She has otherwise been in her usual state of health, denies fevers, chest pain, shortness of breath.  Upon examination she notes lower abdominal tenderness to palpation that she did not notice before. She also noticed after her seizure she has difficulty with speech and left-sided weakness, which has been present after every other seizure, she states it resolves on its own, on examination by medicine attending, it has resolved.    Patient was admitted and epilepsy team consulted. Patient underwent vEEG and as per team it was unremarkable. Shaking episode determined to be non-epileptiform.         GENERAL: Elderly adult female, no acute distress, appropriate behavior  EYES: sclera clear, no exudates, PERRLA  ENMT: moist mucous membranes, oropharynx clear without erythema, no exudates  NECK: supple, soft, no thyromegaly noted  LUNGS:  clear to auscultation,  no rales, wheezing or rhonchi appreciated  HEART: S1/S2, regular rate and rhythm, no murmurs noted, no lower extremity edema appreciated  GASTROINTESTINAL: abdomen is soft, nontender, nondistended, normoactive bowel sounds, no palpable masses  INTEGUMENT: good skin turgor, warm, dry and intact, no lesions appreciated  MUSCULOSKELETAL: no clubbing or cyanosis, no obvious deformity  NEUROLOGIC: awake, alert, oriented x3  PSYCHIATRIC: mood is good, affect is congruent, linear and logical thought process  HEME/LYMPH:  no obvious ecchymosis or petechiae        #Seizure D/O w/ Andrei's paralysis  - 3 reported seizures in last 2 weeks prior to episode prompting arrival  - reports L sided facial numbness and speech slurring after seizures, Resolved on exam  - head CT no acute changes  - vEEG 24 hours unremarkable as per Epilepsy, "shaking events" were said to be non-epileptic and likely stress-related  - c/w Perampanel 8mg QHS    #Uncomplicated Cystitis  - Suprapubic tenderness present on arrival  - UA +nitrites, mod LE, WBCs  - continue Bactrim DS Q12 x3 days  - NO cephalosporins 2/2 hx of seizures    #Hx TIA/CVA, CAD  #HTN  stable  - c/w Diltiazem Q24  - c/w Xarelto  - c/w ASA    #Healthcare Maintenance  DVT PPx - Xarelto   Diet - DASH  Dispo - Likely home pending vEEG

## 2022-11-17 NOTE — DISCHARGE NOTE NURSING/CASE MANAGEMENT/SOCIAL WORK - NSDCPEPT PROEDMA_GEN_ALL_CORE
Hide Include Location In Plan Question?: No Yes Detail Level: Simple Additional Note: Continue to monitor because patient denies treatment today

## 2022-11-17 NOTE — PROGRESS NOTE ADULT - ASSESSMENT
81 y/o F with PMH HTN, TIA, CVA, seizure d/o presents for mgmt of seizures and UTI    #Seizure D/O w/ Andrei's paralysis  3 seizures in last 2 weeks, last one was today  reports L sided facial numbness and speech slurring after seizures  Resolved on exam  - obtain head CT  - vEEG 24 hours  - Epilepsy on board  - c/w Perampanel 8mg QHS  - Ativan PRN for status    #Uncomplicated Cystitis  Suprapubic tenderness present  Moderate UTI present on UA  - Bactrim DS Q12  - NO cephalosporins 2/2 hx of seizures    #TIA/CVA  #CAD hx  #HTN  stable  - c/w Diltiazem Q24  - c/w Xarelto  - c/w ASA  - Tele monitoring    #Healthcare Maintenance  DVT PPx - Xarelto   Diet - DASH  Dispo - Likely home pending vEEG

## 2022-11-17 NOTE — DISCHARGE NOTE PROVIDER - NSDCFUSCHEDAPPT_GEN_ALL_CORE_FT
Jon Clinton Physician [facility]  NEUROLOGY [address]  Scheduled Appointment: 02/07/2023

## 2022-11-17 NOTE — DISCHARGE NOTE NURSING/CASE MANAGEMENT/SOCIAL WORK - PATIENT PORTAL LINK FT
You can access the FollowMyHealth Patient Portal offered by [facility] by registering at the following website: [email or web address]. By joining [employer]’s FollowMyHealth portal, you will also be able to view your health information using other applications (apps) compatible with our system.

## 2022-11-17 NOTE — DISCHARGE NOTE PROVIDER - NSDCCPCAREPLAN_GEN_ALL_CORE_FT
PRINCIPAL DISCHARGE DIAGNOSIS  Diagnosis: Seizure disorder  Assessment and Plan of Treatment: 3 reported seizures in last 2 weeks prior to episode prompting arrival  - symptoms resolved.  - head CT no acute changes  - video EEG 24 hours unremarkable as per Epilepsy, "shaking events" were said to be non-epileptic and likely stress-related  - continue home Perampanel 8mg at bedtime      SECONDARY DISCHARGE DIAGNOSES  Diagnosis: Hypertension  Assessment and Plan of Treatment: continue home medications    Diagnosis: Acute UTI  Assessment and Plan of Treatment: we started you on 3 days of antibiotics to finish at home, and follow up with your primary care doctor

## 2022-11-17 NOTE — PROVIDER CONTACT NOTE (CHANGE IN STATUS NOTIFICATION) - RECOMMENDATIONS
applied 2 L NC and administered PRN Lorazepam for seizure activity.  PA ok with my course of action.

## 2022-11-17 NOTE — CONSULT NOTE ADULT - SUBJECTIVE AND OBJECTIVE BOX
Neurology Epilepsy Consult    Patient is a 82y old  Female who presents with a chief complaint of Seizure, UTI (2022 16:17)      HPI:  83 y/o F with PMH HTN, CAD s/p cardiac stent, renal cancer s/p nephrectomy and epilepsy followed by Dr. MORALES in the epilepsy outpatient clinic. She developed episodes of LLE weakness and word finding difficulty in 2022 and underwent an extensive investigation including CTH/CTA/CTP, MRI brain and multiple EEG including vEEG monitoring and all are unrevealing. Due to a concern of epilepsy, she was initially treated with Keppra low dose but she could not tolerate it due to dizziness and insomnia. She could not tolerate valproic acid due to fatigue and shortness of breath. She is now on Fycompa started from 2 mg and now titrated up to 4 mg and now 8 mg daily.     This past week, she presented to ER due to a reported shaking event at home concerning for seizure. Yesterday, she presented with "terrible shaking" without tongue biting or incontinence. It was self-limited. She denies any other sickness.   Video EEG was started in [facility]U.  Today, she reported feeling stress and having blurry vision.     PAST MEDICAL & SURGICAL HISTORY:  HTN (hypertension)  Rheumatic fever  CAD S/P percutaneous coronary angioplasty  [place]  Cerebrovascular accident (CVA)  2020, 2020, May 2020  Status post tonsillectomy  H/O left nephrectomy  Renal cancer  FAMILY HISTORY:  FHx: breast cancer    FH: CHF (congestive heart failure)    Social History: No smoking, No drinking, No drug use    Allergies    Biaxin (Muscle Pain; Joint Pain)  codeine (Faint)  Metoprolol Succinate ER (Unknown)  monoctanoin (Unknown)  Intolerances  Isosorbide Mononitrate Extended Release (Faint)      MEDICATIONS  (STANDING):  aspirin enteric coated 81 milliGRAM(s) Oral daily  atorvastatin 80 milliGRAM(s) Oral at bedtime  diltiazem    Tablet 30 milliGRAM(s) Oral three times a day  escitalopram 5 milliGRAM(s) Oral daily  perampanel 8 milliGRAM(s) Oral at bedtime  rivaroxaban 20 milliGRAM(s) Oral with dinner  trimethoprim  160 mG/sulfamethoxazole 800 mG 1 Tablet(s) Oral two times a day    MEDICATIONS  (PRN):  acetaminophen     Tablet .. 650 milliGRAM(s) Oral every 6 hours PRN Temp greater or equal to 38C (100.4F), Mild Pain (1 - 3)  aluminum hydroxide/magnesium hydroxide/simethicone Suspension 30 milliLiter(s) Oral every 4 hours PRN Dyspepsia  LORazepam   Injectable 2 milliGRAM(s) IV Push every 4 hours PRN convulsions  melatonin 3 milliGRAM(s) Oral at bedtime PRN Insomnia  ondansetron Injectable 4 milliGRAM(s) IV Push every 8 hours PRN Nausea and/or Vomiting      Review of systems:    Constitutional: as per HPI  Eyes: No eye pain or discharge  ENMT:  No difficulty hearing; No sinus or throat pain  Neck: No pain or stiffness  Respiratory: No cough, wheezing, chills or hemoptysis  Cardiovascular: No chest pain, palpitations, shortness of breath, dyspnea on exertion  Gastrointestinal: No abdominal pain, nausea, vomiting or hematemesis; No diarrhea or constipation.   Genitourinary: No dysuria, frequency, hematuria or incontinence  Neurological: As per HPI  Skin: No rashes or lesions   Endocrine: No heat or cold intolerance; No hair loss  Musculoskeletal: No joint pain or swelling  Psychiatric: No depression, anxiety, mood swings  Heme/Lymph: No easy bruising or bleeding gums    Vital Signs Last 24 Hrs  T(C): 36.5 (2022 07:37), Max: 36.7 (2022 12:08)  T(F): 97.7 (2022 07:37), Max: 98 (2022 12:08)  HR: 73 (2022 07:37) (71 - 98)  BP: 144/65 (2022 07:37) (110/65 - 149/75)  BP(mean): 93 (2022 19:17) (93 - 93)  RR: 18 (2022 07:37) (18 - 18)  SpO2: 98% (2022 07:37) (92% - 99%)    Parameters below as of 2022 07:37  Patient On (Oxygen Delivery Method): room air        Neurological Exam:    GENERAL APPEARANCE:  In no acute distress. Alert & oriented. Behavior and affect appropriate to situation.  SKIN:  Warm and dry. No rash or suspicious lesions noted. Skin turgor - normal for age.  HEENT:  Neck supple. No thyromegaly. No lymphadenopathy.  CARDIOVASCULAR:  Regular rate and rhythm.    NEUROLOGIC EXAM:  MENTAL STATUS:  Oriented to person, place and time.  Speech is fluent, without dysarthria or aphasia.  Recent and remote memory are intact.  Attention span and concentration are fair.  Adequate historian with normal fund of knowledge.  Moods are euthymic  Thought process is slow without delusions or hallucinations.  FUNDUSCOPIC:  No acute abnormality  CRANIAL NERVES:  CN 2: Visual fields are full to confrontation.  CN 3, 4, 6: Extraocular movements are intact. No nystagmus or ophthalmoplegia is evident. Pupils are equally round and reactive to light and accommodation.  CN 5: Facial sensation is intact in all 3 divisions.  CN 7: Facial excursion - full and symmetric  CN 8: Hearing is intact.  CN 9,10,12: The palate elevates symmetrically to phonation. No dysphonia is noted. Tongue, uvula and palate are midline.  CN 11: Shoulders shrug symmetrically. Sternocleidomastoids full strength bilaterally.  MOTOR:  Strength is 5/5 throughout for age and stature. Tone and bulk are normal, without gross atrophy, fasciculations or adventitious movements.  COORDINATION: Intact finger to nose, heel to shin  but slow to movement  SENSORY:  Intact to light touch, temperature, sharp and vibratory perception in all four extremities without side to side asymmetry or proximal to distal gradient.  GAIT: Need assistance  REFLEXES:  Biceps 2+ (R)  2+ (L)  Triceps 2+ (R)  2+ (L)  Brachiorad 2+ (R)  2+ (L)  Patellar 2+ (R)  2+ (L)  Achilles 1+ (R)  1+ (L)  Plantar down down      Labs:   CBC Full  -  ( 2022 02:53 )  WBC Count : 5.46 K/uL  RBC Count : 3.95 M/uL  Hemoglobin : 11.9 g/dL  Hematocrit : 35.2 %  Platelet Count - Automated : 193 K/uL  Mean Cell Volume : 89.1 fl  Mean Cell Hemoglobin : 30.1 pg  Mean Cell Hemoglobin Concentration : 33.8 gm/dL  Auto Neutrophil # : 3.25 K/uL  Auto Lymphocyte # : 1.31 K/uL  Auto Monocyte # : 0.65 K/uL  Auto Eosinophil # : 0.18 K/uL  Auto Basophil # : 0.07 K/uL  Auto Neutrophil % : 59.5 %  Auto Lymphocyte % : 24.0 %  Auto Monocyte % : 11.9 %  Auto Eosinophil % : 3.3 %  Auto Basophil % : 1.3 %        142  |  107  |  19.7  ----------------------------<  85  4.1   |  23.0  |  1.32<H>    Ca    8.4      2022 02:53  Phos  3.3       Mg     2.2         TPro  5.5<L>  /  Alb  3.4  /  TBili  0.5  /  DBili  x   /  AST  22  /  ALT  10  /  AlkPhos  40      LIVER FUNCTIONS - ( 2022 02:53 )  Alb: 3.4 g/dL / Pro: 5.5 g/dL / ALK PHOS: 40 U/L / ALT: 10 U/L / AST: 22 U/L / GGT: x             Urinalysis Basic - ( 2022 12:25 )    Color: Yellow / Appearance: Clear / S.010 / pH: x  Gluc: x / Ketone: Negative  / Bili: Negative / Urobili: Negative   Blood: x / Protein: Negative / Nitrite: Positive   Leuk Esterase: Moderate / RBC: 0-2 /HPF / WBC 26-50 /HPF   Sq Epi: x / Non Sq Epi: Occasional / Bacteria: Few      Neuroimaging:  NCHCT:   IMPRESSION: Mild chronic microvascular changes without evidence of an acute transcortical infarction or hemorrhage.    Images were reviewed personally.    22 @ 11:54      Assessment: This is a 82y year old Female presenting with "terrible shaking" events at home.  She has PMH of HTN, CAD s/p cardiac stent, renal cancer s/p nephrectomy and epilepsy followed by Dr. MORALES in the epilepsy outpatient clinic.     Plan  1. Video EEG captured multiple whole body shaking without loss of consciousness last night. During the episodes, she was alert and communication without any confusion. EEG shows no epileptiform abnormality and essentially a normal study.  I informed her that the "shaking" events are non-epileptic and likely stress-related.  2. Please keep her Fycompa 8 mg night the same dose (prescribed by Dr. MORALES)  3. Will disconnect vEEG today  4. She can be discharged to home today from neuro perspective. She has an existing f/u appt with Dr. Morales in 3 months.

## 2022-11-19 LAB
-  AMIKACIN: SIGNIFICANT CHANGE UP
-  AMOXICILLIN/CLAVULANIC ACID: SIGNIFICANT CHANGE UP
-  AMPICILLIN/SULBACTAM: SIGNIFICANT CHANGE UP
-  AMPICILLIN: SIGNIFICANT CHANGE UP
-  AZTREONAM: SIGNIFICANT CHANGE UP
-  CEFAZOLIN: SIGNIFICANT CHANGE UP
-  CEFEPIME: SIGNIFICANT CHANGE UP
-  CEFOXITIN: SIGNIFICANT CHANGE UP
-  CEFTRIAXONE: SIGNIFICANT CHANGE UP
-  CIPROFLOXACIN: SIGNIFICANT CHANGE UP
-  ERTAPENEM: SIGNIFICANT CHANGE UP
-  GENTAMICIN: SIGNIFICANT CHANGE UP
-  IMIPENEM: SIGNIFICANT CHANGE UP
-  LEVOFLOXACIN: SIGNIFICANT CHANGE UP
-  MEROPENEM: SIGNIFICANT CHANGE UP
-  NITROFURANTOIN: SIGNIFICANT CHANGE UP
-  PIPERACILLIN/TAZOBACTAM: SIGNIFICANT CHANGE UP
-  TOBRAMYCIN: SIGNIFICANT CHANGE UP
-  TRIMETHOPRIM/SULFAMETHOXAZOLE: SIGNIFICANT CHANGE UP
CULTURE RESULTS: SIGNIFICANT CHANGE UP
METHOD TYPE: SIGNIFICANT CHANGE UP
ORGANISM # SPEC MICROSCOPIC CNT: SIGNIFICANT CHANGE UP
ORGANISM # SPEC MICROSCOPIC CNT: SIGNIFICANT CHANGE UP
SPECIMEN SOURCE: SIGNIFICANT CHANGE UP

## 2022-12-05 ENCOUNTER — EMERGENCY (EMERGENCY)
Facility: HOSPITAL | Age: 82
LOS: 1 days | Discharge: HOME | End: 2022-12-05
Attending: EMERGENCY MEDICINE
Payer: MEDICARE

## 2022-12-05 VITALS
DIASTOLIC BLOOD PRESSURE: 78 MMHG | HEART RATE: 56 BPM | SYSTOLIC BLOOD PRESSURE: 135 MMHG | TEMPERATURE: 99 F | OXYGEN SATURATION: 98 % | RESPIRATION RATE: 18 BRPM | HEIGHT: 62 IN | WEIGHT: 134.92 LBS

## 2022-12-05 DIAGNOSIS — Z90.89 ACQUIRED ABSENCE OF OTHER ORGANS: Chronic | ICD-10-CM

## 2022-12-05 DIAGNOSIS — Z90.5 ACQUIRED ABSENCE OF KIDNEY: Chronic | ICD-10-CM

## 2022-12-05 DIAGNOSIS — C64.9 MALIGNANT NEOPLASM OF UNSPECIFIED KIDNEY, EXCEPT RENAL PELVIS: Chronic | ICD-10-CM

## 2022-12-05 LAB
ALBUMIN SERPL ELPH-MCNC: 3.8 G/DL — SIGNIFICANT CHANGE UP (ref 3.3–5.2)
ALP SERPL-CCNC: 51 U/L — SIGNIFICANT CHANGE UP (ref 40–120)
ALT FLD-CCNC: 13 U/L — SIGNIFICANT CHANGE UP
ANION GAP SERPL CALC-SCNC: 7 MMOL/L — SIGNIFICANT CHANGE UP (ref 5–17)
APPEARANCE UR: CLEAR — SIGNIFICANT CHANGE UP
AST SERPL-CCNC: 28 U/L — SIGNIFICANT CHANGE UP
BACTERIA # UR AUTO: ABNORMAL
BASOPHILS # BLD AUTO: 0.07 K/UL — SIGNIFICANT CHANGE UP (ref 0–0.2)
BASOPHILS NFR BLD AUTO: 1.1 % — SIGNIFICANT CHANGE UP (ref 0–2)
BILIRUB SERPL-MCNC: 0.5 MG/DL — SIGNIFICANT CHANGE UP (ref 0.4–2)
BILIRUB UR-MCNC: NEGATIVE — SIGNIFICANT CHANGE UP
BUN SERPL-MCNC: 21.4 MG/DL — HIGH (ref 8–20)
CALCIUM SERPL-MCNC: 8.6 MG/DL — SIGNIFICANT CHANGE UP (ref 8.4–10.5)
CHLORIDE SERPL-SCNC: 101 MMOL/L — SIGNIFICANT CHANGE UP (ref 96–108)
CO2 SERPL-SCNC: 26 MMOL/L — SIGNIFICANT CHANGE UP (ref 22–29)
COLOR SPEC: YELLOW — SIGNIFICANT CHANGE UP
CREAT SERPL-MCNC: 1.11 MG/DL — SIGNIFICANT CHANGE UP (ref 0.5–1.3)
DIFF PNL FLD: ABNORMAL
EGFR: 50 ML/MIN/1.73M2 — LOW
EOSINOPHIL # BLD AUTO: 0.11 K/UL — SIGNIFICANT CHANGE UP (ref 0–0.5)
EOSINOPHIL NFR BLD AUTO: 1.7 % — SIGNIFICANT CHANGE UP (ref 0–6)
EPI CELLS # UR: SIGNIFICANT CHANGE UP
GLUCOSE SERPL-MCNC: 81 MG/DL — SIGNIFICANT CHANGE UP (ref 70–99)
GLUCOSE UR QL: NEGATIVE MG/DL — SIGNIFICANT CHANGE UP
HCT VFR BLD CALC: 36.1 % — SIGNIFICANT CHANGE UP (ref 34.5–45)
HGB BLD-MCNC: 12.2 G/DL — SIGNIFICANT CHANGE UP (ref 11.5–15.5)
IMM GRANULOCYTES NFR BLD AUTO: 0.2 % — SIGNIFICANT CHANGE UP (ref 0–0.9)
KETONES UR-MCNC: NEGATIVE — SIGNIFICANT CHANGE UP
LEUKOCYTE ESTERASE UR-ACNC: ABNORMAL
LYMPHOCYTES # BLD AUTO: 1.1 K/UL — SIGNIFICANT CHANGE UP (ref 1–3.3)
LYMPHOCYTES # BLD AUTO: 17.1 % — SIGNIFICANT CHANGE UP (ref 13–44)
MCHC RBC-ENTMCNC: 30.4 PG — SIGNIFICANT CHANGE UP (ref 27–34)
MCHC RBC-ENTMCNC: 33.8 GM/DL — SIGNIFICANT CHANGE UP (ref 32–36)
MCV RBC AUTO: 90 FL — SIGNIFICANT CHANGE UP (ref 80–100)
MONOCYTES # BLD AUTO: 0.73 K/UL — SIGNIFICANT CHANGE UP (ref 0–0.9)
MONOCYTES NFR BLD AUTO: 11.3 % — SIGNIFICANT CHANGE UP (ref 2–14)
NEUTROPHILS # BLD AUTO: 4.42 K/UL — SIGNIFICANT CHANGE UP (ref 1.8–7.4)
NEUTROPHILS NFR BLD AUTO: 68.6 % — SIGNIFICANT CHANGE UP (ref 43–77)
NITRITE UR-MCNC: NEGATIVE — SIGNIFICANT CHANGE UP
PH UR: 8 — SIGNIFICANT CHANGE UP (ref 5–8)
PLATELET # BLD AUTO: 193 K/UL — SIGNIFICANT CHANGE UP (ref 150–400)
POTASSIUM SERPL-MCNC: 4.8 MMOL/L — SIGNIFICANT CHANGE UP (ref 3.5–5.3)
POTASSIUM SERPL-SCNC: 4.8 MMOL/L — SIGNIFICANT CHANGE UP (ref 3.5–5.3)
PROT SERPL-MCNC: 6.1 G/DL — LOW (ref 6.6–8.7)
PROT UR-MCNC: NEGATIVE — SIGNIFICANT CHANGE UP
RBC # BLD: 4.01 M/UL — SIGNIFICANT CHANGE UP (ref 3.8–5.2)
RBC # FLD: 12 % — SIGNIFICANT CHANGE UP (ref 10.3–14.5)
RBC CASTS # UR COMP ASSIST: SIGNIFICANT CHANGE UP /HPF (ref 0–4)
SODIUM SERPL-SCNC: 134 MMOL/L — LOW (ref 135–145)
SP GR SPEC: 1.01 — SIGNIFICANT CHANGE UP (ref 1.01–1.02)
UROBILINOGEN FLD QL: NEGATIVE MG/DL — SIGNIFICANT CHANGE UP
WBC # BLD: 6.44 K/UL — SIGNIFICANT CHANGE UP (ref 3.8–10.5)
WBC # FLD AUTO: 6.44 K/UL — SIGNIFICANT CHANGE UP (ref 3.8–10.5)
WBC UR QL: SIGNIFICANT CHANGE UP /HPF (ref 0–5)

## 2022-12-05 PROCEDURE — 85025 COMPLETE CBC W/AUTO DIFF WBC: CPT

## 2022-12-05 PROCEDURE — 99283 EMERGENCY DEPT VISIT LOW MDM: CPT

## 2022-12-05 PROCEDURE — 80053 COMPREHEN METABOLIC PANEL: CPT

## 2022-12-05 PROCEDURE — 81001 URINALYSIS AUTO W/SCOPE: CPT

## 2022-12-05 PROCEDURE — 99284 EMERGENCY DEPT VISIT MOD MDM: CPT

## 2022-12-05 PROCEDURE — 36415 COLL VENOUS BLD VENIPUNCTURE: CPT

## 2022-12-05 PROCEDURE — 87086 URINE CULTURE/COLONY COUNT: CPT

## 2022-12-05 NOTE — ED PROVIDER NOTE - ATTENDING CONTRIBUTION TO CARE
I personally saw the patient with the resident, and completed the key components of the history and physical exam. I then discussed the management plan with the resident.    81 y/o F with PMH HTN, seizure d/o on Fycompa presents for seizure and tremor - no LOC during seizure, no tongue biting, no urinary incontinence. Patient was here 3 weeks ago for the same, had UTI at the time as well, had VEEG which was negative. She feels safe at home and has lots of close by help.    PE - NAD, well appearing, CN II-XII symmetrically intact, no pronator drift, no LE drift, sensation symmetrically intact in all extremities, no tongue lesions, mild suprapubic TTP without guarding.    Will check labs, patient back to neuro baseline - can follow up with her neurologist.

## 2022-12-05 NOTE — ED ADULT NURSE NOTE - AGENT'S NAME
Eugene returned my call. We discussed Dr. Morton would be unable to comment on the most recent paperwork as he is on leave. He was understanding of this, but is not sure what he is to do from here as his hearing is this month. He has asked we hold on to his paperwork and he will contact us if he needs anything else. He was told by the [facility] they did believe his appeal would go through at this hearing.  
Eugene was seen on 1/4/17 by Dr. Morton for a second opinion on cause of hearing loss due to a claim that was denied by the [facility].   Dr. Morton had no access to audiograms done during Eugene's time of service, and requested any additional records be sent to him for further information. Eugene dropped these off on two separate occasions, the most recent being 2/20. These most recent documents state his claim was again denied due to insufficient records and speculation. Additional questions were added to this packet for Dr. Morton to answer.  A call was placed to Eugene by this writer to discuss this information. Unfortunately, due to Dr. Morton being on unexpected leave, he is unable to see any patients or work on any past patient records. A prior discussion with Dr. Morton did emphasize he thought it very unlikely Eugene would be able to get approval for this claim.    Writer left a message with his wife, Inés, asking he call back. I plan to discuss the above information in detail with him so he has a full understanding of the current situation.  
Jonas Medina

## 2022-12-05 NOTE — ED ADULT TRIAGE NOTE - CHIEF COMPLAINT QUOTE
uncontrolled tremors on and off since august. pt does not lose consciousness during these episodes. sees neurologist MD [place]. had 2 EEGS was negative,. a&ox4

## 2022-12-05 NOTE — ED PROVIDER NOTE - PHYSICAL EXAMINATION
General: well appearing, NAD  Head: NC, AT  EENT: PERRLA, EOMI, no scleral icterus  Cardiac: RRR, no apparent murmurs, no lower extremity edema  Respiratory: CTABL, no respiratory distress   Abdomen: soft, ND, NT, nonperitonitic  MSK/Vascular: full ROM, soft compartments, warm extremities  Neuro: AAOx3, CNII-XII grossly intact, occasionally tremulous, +left pronator drift, R>L  strength and LE ROM, no aphasia, sensation to light touch intact  Psych: calm, cooperative

## 2022-12-05 NOTE — ED PROVIDER NOTE - PATIENT PORTAL LINK FT
You can access the FollowMyHealth Patient Portal offered by [facility] by registering at the following website: [email or web address]. By joining [employer]’s FollowMyHealth portal, you will also be able to view your health information using other applications (apps) compatible with our system.

## 2022-12-05 NOTE — ED PROVIDER NOTE - OBJECTIVE STATEMENT
83 y/o female w/PMHx of HTN, TIA, Seizure Disorder, and CVA w/residual left sided weakness BIBEMS with tremors and shakes today. Pt says she has been seen in the ED for similar complaints multiple times over the last month for similar reasons, and had a video EEG 3 wks ago here that was unremarkable. Pt denies confusion, tongue bites, or incontinence. Saw Dr. Clinton about a month ago with an upcoming appt in February. Denies CP, sob, abd pain, leg pain/swelling.

## 2022-12-05 NOTE — ED PROVIDER NOTE - CLINICAL SUMMARY MEDICAL DECISION MAKING FREE TEXT BOX
83 y/o female w/seizure disorder w/joe's paralysis presenting after another episode of seizure. Basic labs + UA to r/o other source. 81 y/o female w/seizure disorder w/joe's paralysis presenting after another episode of seizure. Given multiple EEGs and workup that is negative with close f/u w/ Dr. Clinton, would avoid repeat EEG. Basic labs + UA to r/o other source. Likely to D/C advising close f/u with Dr Clinton.

## 2022-12-05 NOTE — ED ADULT NURSE NOTE - OBJECTIVE STATEMENT
82y F states while sitting in her chair at home, she had an episode of "body shaking all over" 82y F states while sitting in her chair at home, she had an episode of "body shaking all over", hx seizure currently compliant with medication Fycompa, pt is awake and alert orientedx3, states at baseline ambulates with walker, pt is one person assist to bathroom, pt denies any complaints at this time

## 2022-12-06 LAB
CULTURE RESULTS: SIGNIFICANT CHANGE UP
SPECIMEN SOURCE: SIGNIFICANT CHANGE UP

## 2022-12-07 ENCOUNTER — EMERGENCY (EMERGENCY)
Facility: HOSPITAL | Age: 82
LOS: 1 days | Discharge: HOME | End: 2022-12-07
Attending: EMERGENCY MEDICINE
Payer: MEDICARE

## 2022-12-07 VITALS
HEART RATE: 73 BPM | SYSTOLIC BLOOD PRESSURE: 115 MMHG | WEIGHT: 138.01 LBS | RESPIRATION RATE: 16 BRPM | HEIGHT: 62 IN | TEMPERATURE: 98 F | DIASTOLIC BLOOD PRESSURE: 66 MMHG | OXYGEN SATURATION: 100 %

## 2022-12-07 VITALS
SYSTOLIC BLOOD PRESSURE: 134 MMHG | TEMPERATURE: 98 F | RESPIRATION RATE: 20 BRPM | HEART RATE: 74 BPM | OXYGEN SATURATION: 98 % | DIASTOLIC BLOOD PRESSURE: 75 MMHG

## 2022-12-07 DIAGNOSIS — Z90.5 ACQUIRED ABSENCE OF KIDNEY: Chronic | ICD-10-CM

## 2022-12-07 DIAGNOSIS — C64.9 MALIGNANT NEOPLASM OF UNSPECIFIED KIDNEY, EXCEPT RENAL PELVIS: Chronic | ICD-10-CM

## 2022-12-07 DIAGNOSIS — Z90.89 ACQUIRED ABSENCE OF OTHER ORGANS: Chronic | ICD-10-CM

## 2022-12-07 LAB
ALBUMIN SERPL ELPH-MCNC: 3.5 G/DL — SIGNIFICANT CHANGE UP (ref 3.3–5.2)
ALP SERPL-CCNC: 49 U/L — SIGNIFICANT CHANGE UP (ref 40–120)
ALT FLD-CCNC: 13 U/L — SIGNIFICANT CHANGE UP
ANION GAP SERPL CALC-SCNC: 12 MMOL/L — SIGNIFICANT CHANGE UP (ref 5–17)
APPEARANCE UR: CLEAR — SIGNIFICANT CHANGE UP
AST SERPL-CCNC: 26 U/L — SIGNIFICANT CHANGE UP
BACTERIA # UR AUTO: ABNORMAL
BASOPHILS # BLD AUTO: 0.09 K/UL — SIGNIFICANT CHANGE UP (ref 0–0.2)
BASOPHILS NFR BLD AUTO: 1.4 % — SIGNIFICANT CHANGE UP (ref 0–2)
BILIRUB SERPL-MCNC: 0.4 MG/DL — SIGNIFICANT CHANGE UP (ref 0.4–2)
BILIRUB UR-MCNC: NEGATIVE — SIGNIFICANT CHANGE UP
BUN SERPL-MCNC: 22.7 MG/DL — HIGH (ref 8–20)
CALCIUM SERPL-MCNC: 8.7 MG/DL — SIGNIFICANT CHANGE UP (ref 8.4–10.5)
CHLORIDE SERPL-SCNC: 99 MMOL/L — SIGNIFICANT CHANGE UP (ref 96–108)
CO2 SERPL-SCNC: 25 MMOL/L — SIGNIFICANT CHANGE UP (ref 22–29)
COLOR SPEC: YELLOW — SIGNIFICANT CHANGE UP
CREAT SERPL-MCNC: 1 MG/DL — SIGNIFICANT CHANGE UP (ref 0.5–1.3)
DIFF PNL FLD: NEGATIVE — SIGNIFICANT CHANGE UP
EGFR: 56 ML/MIN/1.73M2 — LOW
EOSINOPHIL # BLD AUTO: 0.12 K/UL — SIGNIFICANT CHANGE UP (ref 0–0.5)
EOSINOPHIL NFR BLD AUTO: 1.9 % — SIGNIFICANT CHANGE UP (ref 0–6)
EPI CELLS # UR: SIGNIFICANT CHANGE UP
GLUCOSE SERPL-MCNC: 87 MG/DL — SIGNIFICANT CHANGE UP (ref 70–99)
GLUCOSE UR QL: NEGATIVE MG/DL — SIGNIFICANT CHANGE UP
HCT VFR BLD CALC: 34.4 % — LOW (ref 34.5–45)
HGB BLD-MCNC: 11.8 G/DL — SIGNIFICANT CHANGE UP (ref 11.5–15.5)
IMM GRANULOCYTES NFR BLD AUTO: 0.2 % — SIGNIFICANT CHANGE UP (ref 0–0.9)
KETONES UR-MCNC: NEGATIVE — SIGNIFICANT CHANGE UP
LEUKOCYTE ESTERASE UR-ACNC: ABNORMAL
LYMPHOCYTES # BLD AUTO: 1.14 K/UL — SIGNIFICANT CHANGE UP (ref 1–3.3)
LYMPHOCYTES # BLD AUTO: 17.8 % — SIGNIFICANT CHANGE UP (ref 13–44)
MAGNESIUM SERPL-MCNC: 1.9 MG/DL — SIGNIFICANT CHANGE UP (ref 1.6–2.6)
MCHC RBC-ENTMCNC: 30.6 PG — SIGNIFICANT CHANGE UP (ref 27–34)
MCHC RBC-ENTMCNC: 34.3 GM/DL — SIGNIFICANT CHANGE UP (ref 32–36)
MCV RBC AUTO: 89.1 FL — SIGNIFICANT CHANGE UP (ref 80–100)
MONOCYTES # BLD AUTO: 0.71 K/UL — SIGNIFICANT CHANGE UP (ref 0–0.9)
MONOCYTES NFR BLD AUTO: 11.1 % — SIGNIFICANT CHANGE UP (ref 2–14)
NEUTROPHILS # BLD AUTO: 4.32 K/UL — SIGNIFICANT CHANGE UP (ref 1.8–7.4)
NEUTROPHILS NFR BLD AUTO: 67.6 % — SIGNIFICANT CHANGE UP (ref 43–77)
NITRITE UR-MCNC: NEGATIVE — SIGNIFICANT CHANGE UP
PH UR: 7 — SIGNIFICANT CHANGE UP (ref 5–8)
PLATELET # BLD AUTO: 178 K/UL — SIGNIFICANT CHANGE UP (ref 150–400)
POTASSIUM SERPL-MCNC: 4.5 MMOL/L — SIGNIFICANT CHANGE UP (ref 3.5–5.3)
POTASSIUM SERPL-SCNC: 4.5 MMOL/L — SIGNIFICANT CHANGE UP (ref 3.5–5.3)
PROT SERPL-MCNC: 5.9 G/DL — LOW (ref 6.6–8.7)
PROT UR-MCNC: NEGATIVE — SIGNIFICANT CHANGE UP
RBC # BLD: 3.86 M/UL — SIGNIFICANT CHANGE UP (ref 3.8–5.2)
RBC # FLD: 12.1 % — SIGNIFICANT CHANGE UP (ref 10.3–14.5)
RBC CASTS # UR COMP ASSIST: NEGATIVE /HPF — SIGNIFICANT CHANGE UP (ref 0–4)
SODIUM SERPL-SCNC: 136 MMOL/L — SIGNIFICANT CHANGE UP (ref 135–145)
SP GR SPEC: 1.01 — SIGNIFICANT CHANGE UP (ref 1.01–1.02)
TROPONIN T SERPL-MCNC: <0.01 NG/ML — SIGNIFICANT CHANGE UP (ref 0–0.06)
UROBILINOGEN FLD QL: NEGATIVE MG/DL — SIGNIFICANT CHANGE UP
WBC # BLD: 6.39 K/UL — SIGNIFICANT CHANGE UP (ref 3.8–10.5)
WBC # FLD AUTO: 6.39 K/UL — SIGNIFICANT CHANGE UP (ref 3.8–10.5)
WBC UR QL: SIGNIFICANT CHANGE UP /HPF (ref 0–5)

## 2022-12-07 PROCEDURE — 36415 COLL VENOUS BLD VENIPUNCTURE: CPT

## 2022-12-07 PROCEDURE — 71045 X-RAY EXAM CHEST 1 VIEW: CPT

## 2022-12-07 PROCEDURE — 81001 URINALYSIS AUTO W/SCOPE: CPT

## 2022-12-07 PROCEDURE — 71045 X-RAY EXAM CHEST 1 VIEW: CPT | Mod: 26

## 2022-12-07 PROCEDURE — 84484 ASSAY OF TROPONIN QUANT: CPT

## 2022-12-07 PROCEDURE — 83735 ASSAY OF MAGNESIUM: CPT

## 2022-12-07 PROCEDURE — 85025 COMPLETE CBC W/AUTO DIFF WBC: CPT

## 2022-12-07 PROCEDURE — 99284 EMERGENCY DEPT VISIT MOD MDM: CPT

## 2022-12-07 PROCEDURE — 99283 EMERGENCY DEPT VISIT LOW MDM: CPT | Mod: 25

## 2022-12-07 PROCEDURE — 87086 URINE CULTURE/COLONY COUNT: CPT

## 2022-12-07 PROCEDURE — 80053 COMPREHEN METABOLIC PANEL: CPT

## 2022-12-07 NOTE — ED PROVIDER NOTE - NSFOLLOWUPINSTRUCTIONS_ED_ALL_ED_FT
-- Please follow-up with your doctor(s) within the next 3 days, but see medical sooner if your symptoms persist or worsen.  Please call tomorrow for an appointment.  If you cannot follow-up with your primary doctor please return to the ED for any urgent issues.  -- You were given a copy of your labs and imaging.  Please go-over these with your doctor(s).   -- If you have any worsening of symptoms or any other concerns please see your doctor or return to the ED immediately.

## 2022-12-07 NOTE — ED PROVIDER NOTE - PROGRESS NOTE DETAILS
Lab-work unremarkable. Patient remains comfortable and in no acute distress. VSS. Stable for d/c with PMD/Epilepsy/Psych f/u and return precautions. Patient agreeable with plan. - Riky

## 2022-12-07 NOTE — ED PROVIDER NOTE - NS ED ROS FT
Constitutional: no fever, no chills  Head: NC, AT  Eyes: no redness  ENMT: no nasal congestion/drainage, no sore throat  CV: + chest pain, no edema  Resp: no cough, no dyspnea  GI: no abdominal pain, no nausea, no vomiting, no diarrhea  : no dysuria, no hematuria  Skin: no lesions, no rashes  Neuro: no LOC, no headache, no sensory deficits, no weakness

## 2022-12-07 NOTE — ED ADULT NURSE NOTE - OBJECTIVE STATEMENT
Pt presents via ems sp "I had a seizure and pushed my life alert while my body was jerking" Pt reports having more frequent seizures lately. Reports compliance with her medications. PT is alert and oriented. Lives alone denies any other medical complaints. reports she was sitting during seizure did not hit head.

## 2022-12-07 NOTE — ED ADULT TRIAGE NOTE - CHIEF COMPLAINT QUOTE
pt BIBA from home for seizure like activity. pt states body jerks for a second. a&ox3, no s/s post-ictal. does not take seizure meds. per daughter, patient cannot return home to live by herself.

## 2022-12-07 NOTE — ED PROVIDER NOTE - PATIENT PORTAL LINK FT
You can access the FollowMyHealth Patient Portal offered by [facility] by registering at the following website: [email or web address]. By joining [facility]’s FollowMyHealth portal, you will also be able to view your health information using other applications (apps) compatible with our system.

## 2022-12-07 NOTE — ED PROVIDER NOTE - CLINICAL SUMMARY MEDICAL DECISION MAKING FREE TEXT BOX
83 y/o F with PMHx HTN, TIA, CVA, seizure disorder on Perampanel who presents to the ED for seizure-like activity. Will obtain basic labs, ekg, cxr, trop, reassess.

## 2022-12-07 NOTE — ED PROVIDER NOTE - NSFOLLOWUPCLINICS_GEN_ALL_ED_FT
[facility] Psychiat[facility]  Psychiatry  [address] [place] [address]  Phone: [phone]  Fax:

## 2022-12-07 NOTE — ED PROVIDER NOTE - ATTENDING CONTRIBUTION TO CARE
83 y/o F with PMHx HTN, TIA, CVA, seizure disorder on Perampanel who presents to the ED for seizure-like activity. Patient states that she was at home sitting in the kitchen when she started to have upper and lower extremity jerking. pt with recent work up for seizure with unremarkable eeg;  pe awake alert heent neck supple cor s1s2 lungs clear abd soft nontender neuro nonfocal dx seizure;

## 2022-12-07 NOTE — ED PROVIDER NOTE - PHYSICAL EXAMINATION
General: well appearing, NAD  Head: NC, AT  EENT: EOMI, no scleral icterus  Cardiac: RRR, no apparent murmurs, no lower extremity edema  Respiratory: CTABL, no respiratory distress  Abdomen: soft, ND, NT, nonperitonitic  MSK/Vascular: soft compartments, warm extremities  Neuro: AAOx3, cranial nerves intact, strength and sensation intact  Psych: cooperative

## 2022-12-07 NOTE — ED PROVIDER NOTE - OBJECTIVE STATEMENT
81 y/o F with PMHx HTN, TIA, CVA, seizure disorder on Perampanel who presents to the ED for seizure-like activity. Patient states that she was at home sitting in the kitchen when she started to have upper and lower extremity jerking. Denies any urinary/bowel incontinence, tongue laceration, and denies feeling tired. Denies LOC. Endorses some chest pressure and states that she still feels a bit shaky. States that the event was unwitnessed. Denies fevers, chills, headache, changes in vision, lightheadedness, shortness of breath, NVD, abdominal pain, or dysuria. Per EMR, patient seen here frequently for similar episodes, most recently two days ago. Lab work was unremarkable at that time. Also had 24 hr video EEG done here last month which did not show any epileptiform activity in additional to a CT head with no acute findings. Shaking events were attributed to being likely stress related at that time. Patient's son, Ladarius, was contacted for collateral information. States that she lives at home by herself and has no issues taking care of herself, however states that she has been dealing with a lot of financial stressors. Otherwise denies any other issues or complaints. Patient states that she has a follow-up with Dr. Clinton in February.

## 2022-12-10 LAB
CULTURE RESULTS: SIGNIFICANT CHANGE UP
SPECIMEN SOURCE: SIGNIFICANT CHANGE UP

## 2022-12-13 RX ORDER — PERAMPANEL 10 MG/1
10 TABLET ORAL AT BEDTIME
Qty: 30 | Refills: 3 | Status: ACTIVE | COMMUNITY
Start: 2022-09-21 | End: 1900-01-01

## 2022-12-16 ENCOUNTER — INPATIENT (INPATIENT)
Facility: HOSPITAL | Age: 82
LOS: 0 days | Discharge: HOME | DRG: 101 | End: 2022-12-17
Attending: HOSPITALIST | Admitting: STUDENT IN AN ORGANIZED HEALTH CARE EDUCATION/TRAINING PROGRAM
Payer: MEDICARE

## 2022-12-16 VITALS
DIASTOLIC BLOOD PRESSURE: 72 MMHG | WEIGHT: 136.91 LBS | RESPIRATION RATE: 20 BRPM | HEIGHT: 62 IN | OXYGEN SATURATION: 99 % | HEART RATE: 78 BPM | TEMPERATURE: 97 F | SYSTOLIC BLOOD PRESSURE: 116 MMHG

## 2022-12-16 DIAGNOSIS — Z90.89 ACQUIRED ABSENCE OF OTHER ORGANS: Chronic | ICD-10-CM

## 2022-12-16 DIAGNOSIS — C64.9 MALIGNANT NEOPLASM OF UNSPECIFIED KIDNEY, EXCEPT RENAL PELVIS: Chronic | ICD-10-CM

## 2022-12-16 DIAGNOSIS — R56.9 UNSPECIFIED CONVULSIONS: ICD-10-CM

## 2022-12-16 DIAGNOSIS — Z90.5 ACQUIRED ABSENCE OF KIDNEY: Chronic | ICD-10-CM

## 2022-12-16 LAB
ALBUMIN SERPL ELPH-MCNC: 3.5 G/DL — SIGNIFICANT CHANGE UP (ref 3.3–5.2)
ALP SERPL-CCNC: 52 U/L — SIGNIFICANT CHANGE UP (ref 40–120)
ALT FLD-CCNC: 13 U/L — SIGNIFICANT CHANGE UP
ANION GAP SERPL CALC-SCNC: 10 MMOL/L — SIGNIFICANT CHANGE UP (ref 5–17)
APTT BLD: 29.7 SEC — SIGNIFICANT CHANGE UP (ref 27.5–35.5)
AST SERPL-CCNC: 30 U/L — SIGNIFICANT CHANGE UP
BASOPHILS # BLD AUTO: 0.09 K/UL — SIGNIFICANT CHANGE UP (ref 0–0.2)
BASOPHILS NFR BLD AUTO: 1.4 % — SIGNIFICANT CHANGE UP (ref 0–2)
BILIRUB SERPL-MCNC: 0.4 MG/DL — SIGNIFICANT CHANGE UP (ref 0.4–2)
BUN SERPL-MCNC: 21.7 MG/DL — HIGH (ref 8–20)
CALCIUM SERPL-MCNC: 8.5 MG/DL — SIGNIFICANT CHANGE UP (ref 8.4–10.5)
CHLORIDE SERPL-SCNC: 103 MMOL/L — SIGNIFICANT CHANGE UP (ref 96–108)
CO2 SERPL-SCNC: 26 MMOL/L — SIGNIFICANT CHANGE UP (ref 22–29)
CREAT SERPL-MCNC: 0.97 MG/DL — SIGNIFICANT CHANGE UP (ref 0.5–1.3)
EGFR: 58 ML/MIN/1.73M2 — LOW
EOSINOPHIL # BLD AUTO: 0.15 K/UL — SIGNIFICANT CHANGE UP (ref 0–0.5)
EOSINOPHIL NFR BLD AUTO: 2.3 % — SIGNIFICANT CHANGE UP (ref 0–6)
GLUCOSE SERPL-MCNC: 81 MG/DL — SIGNIFICANT CHANGE UP (ref 70–99)
HCT VFR BLD CALC: 35.4 % — SIGNIFICANT CHANGE UP (ref 34.5–45)
HGB BLD-MCNC: 11.7 G/DL — SIGNIFICANT CHANGE UP (ref 11.5–15.5)
IMM GRANULOCYTES NFR BLD AUTO: 0.2 % — SIGNIFICANT CHANGE UP (ref 0–0.9)
INR BLD: 1.22 RATIO — HIGH (ref 0.88–1.16)
LYMPHOCYTES # BLD AUTO: 1.25 K/UL — SIGNIFICANT CHANGE UP (ref 1–3.3)
LYMPHOCYTES # BLD AUTO: 19.1 % — SIGNIFICANT CHANGE UP (ref 13–44)
MCHC RBC-ENTMCNC: 29.6 PG — SIGNIFICANT CHANGE UP (ref 27–34)
MCHC RBC-ENTMCNC: 33.1 GM/DL — SIGNIFICANT CHANGE UP (ref 32–36)
MCV RBC AUTO: 89.6 FL — SIGNIFICANT CHANGE UP (ref 80–100)
MONOCYTES # BLD AUTO: 0.63 K/UL — SIGNIFICANT CHANGE UP (ref 0–0.9)
MONOCYTES NFR BLD AUTO: 9.6 % — SIGNIFICANT CHANGE UP (ref 2–14)
NEUTROPHILS # BLD AUTO: 4.43 K/UL — SIGNIFICANT CHANGE UP (ref 1.8–7.4)
NEUTROPHILS NFR BLD AUTO: 67.4 % — SIGNIFICANT CHANGE UP (ref 43–77)
PLATELET # BLD AUTO: 200 K/UL — SIGNIFICANT CHANGE UP (ref 150–400)
POTASSIUM SERPL-MCNC: 4.4 MMOL/L — SIGNIFICANT CHANGE UP (ref 3.5–5.3)
POTASSIUM SERPL-SCNC: 4.4 MMOL/L — SIGNIFICANT CHANGE UP (ref 3.5–5.3)
PROT SERPL-MCNC: 5.8 G/DL — LOW (ref 6.6–8.7)
PROTHROM AB SERPL-ACNC: 14.2 SEC — HIGH (ref 10.5–13.4)
RBC # BLD: 3.95 M/UL — SIGNIFICANT CHANGE UP (ref 3.8–5.2)
RBC # FLD: 11.9 % — SIGNIFICANT CHANGE UP (ref 10.3–14.5)
SARS-COV-2 RNA SPEC QL NAA+PROBE: SIGNIFICANT CHANGE UP
SODIUM SERPL-SCNC: 139 MMOL/L — SIGNIFICANT CHANGE UP (ref 135–145)
WBC # BLD: 6.56 K/UL — SIGNIFICANT CHANGE UP (ref 3.8–10.5)
WBC # FLD AUTO: 6.56 K/UL — SIGNIFICANT CHANGE UP (ref 3.8–10.5)

## 2022-12-16 PROCEDURE — 99223 1ST HOSP IP/OBS HIGH 75: CPT

## 2022-12-16 PROCEDURE — 95718 EEG PHYS/QHP 2-12 HR W/VEEG: CPT

## 2022-12-16 PROCEDURE — 93010 ELECTROCARDIOGRAM REPORT: CPT

## 2022-12-16 PROCEDURE — 99285 EMERGENCY DEPT VISIT HI MDM: CPT

## 2022-12-16 PROCEDURE — 70450 CT HEAD/BRAIN W/O DYE: CPT | Mod: 26

## 2022-12-16 RX ORDER — ESCITALOPRAM OXALATE 10 MG/1
5 TABLET, FILM COATED ORAL DAILY
Refills: 0 | Status: DISCONTINUED | OUTPATIENT
Start: 2022-12-16 | End: 2022-12-17

## 2022-12-16 RX ORDER — ALENDRONATE SODIUM 70 MG/1
1 TABLET ORAL
Qty: 0 | Refills: 0 | DISCHARGE

## 2022-12-16 RX ORDER — ASPIRIN/CALCIUM CARB/MAGNESIUM 324 MG
81 TABLET ORAL DAILY
Refills: 0 | Status: DISCONTINUED | OUTPATIENT
Start: 2022-12-16 | End: 2022-12-17

## 2022-12-16 RX ORDER — PERAMPANEL 2 MG/1
8 TABLET ORAL AT BEDTIME
Refills: 0 | Status: DISCONTINUED | OUTPATIENT
Start: 2022-12-16 | End: 2022-12-17

## 2022-12-16 RX ORDER — ATORVASTATIN CALCIUM 80 MG/1
80 TABLET, FILM COATED ORAL AT BEDTIME
Refills: 0 | Status: DISCONTINUED | OUTPATIENT
Start: 2022-12-16 | End: 2022-12-17

## 2022-12-16 RX ORDER — DILTIAZEM HCL 120 MG
120 CAPSULE, EXT RELEASE 24 HR ORAL DAILY
Refills: 0 | Status: DISCONTINUED | OUTPATIENT
Start: 2022-12-16 | End: 2022-12-17

## 2022-12-16 RX ADMIN — ATORVASTATIN CALCIUM 80 MILLIGRAM(S): 80 TABLET, FILM COATED ORAL at 22:10

## 2022-12-16 RX ADMIN — PERAMPANEL 8 MILLIGRAM(S): 2 TABLET ORAL at 22:10

## 2022-12-16 NOTE — ED ADULT NURSE NOTE - NSIMPLEMENTINTERV_GEN_ALL_ED
Implemented All Universal Safety Interventions:  [place] to call system. Call bell, personal items and telephone within reach. Instruct patient to call for assistance. Room bathroom lighting operational. Non-slip footwear when patient is off stretcher. Physically safe environment: no spills, clutter or unnecessary equipment. Stretcher in lowest position, wheels locked, appropriate side rails in place.

## 2022-12-16 NOTE — ED ADULT NURSE REASSESSMENT NOTE - NS ED NURSE REASSESS COMMENT FT1
Assumed care of pt at 19:15 as stated in report from ASHLEY Carrasquillo. Charting as noted. Patient A&O x4, denies pain/discomfort, denies CP/SOB. Updated on the plan of care. Call bell within reach, bed locked in lowest position. IV site flushed w/ NS. No redness, swelling or pain noted to site. No signs of acute distress noted, safety maintained. Pt remains on CM in NSR.

## 2022-12-16 NOTE — CONSULT NOTE ADULT - ASSESSMENT
This is an 81yo RH female with a history of HTN, HLD, CAD s/p stent, renal cancer s/p nephrectomy, anxiety and recurrent episodes of self-limiting LLE weakness and myoclonus. Personally reviewed all imagines, labs, EEG and other studies.    Impression:  Prior to September of this year, patient c/o episodic LLE weakness, occasionally accompanied by myoclonus. History and exam during these episodes were convincing for organic etiology. Suspected possible scalp-negative seizures, so perampanel was started and up-titrated to 8mg QHS. However, patient has made multiple ER visits in the last 2 months, now primarily reporting "shaking" and "tremor", and no longer endorsing LLE weakness unless inquired by the physician. During interview today, the witnessed jerking behavior appeared functional and different from the myoclonic twitching that was captured on EEG in September. On exam, left sided weakness also appeared functional.    Writer suspects that patient had an organic disorder that is now improved/controlled on perampanel. Most recent symptoms/complaints are suspicious for a functional disorder.      Recommendation:  - cvEEG  - decrease perampanel 10mg QHS to 8mg QHS (ordered)   - continue all other home meds  - consider psych consult      Thank you for allowing Epilepsy to participate in the care of this patient.   ______________________  Jon Clinton MD   Director, Epilepsy/EMU - [facility]   Epilepsy Consult #: [phone] ([phone])  This is an 81yo RH female with a history of HTN, HLD, CAD s/p stent, renal cancer s/p nephrectomy, anxiety and recurrent episodes of self-limiting LLE weakness and myoclonus. Personally reviewed all imagines, labs, EEG and other studies.    Impression:  Prior to September of this year, patient c/o episodic LLE weakness, occasionally accompanied by myoclonus. History and exam during these episodes were convincing for organic etiology. Suspected possible scalp-negative seizures, so perampanel was started and up-titrated to 8mg QHS. However, patient has made multiple ER visits in the last 2 months, now primarily reporting "shaking" and "tremor", and no longer endorsing LLE weakness unless inquired by the physician. During interview today, the witnessed jerking behavior appeared functional and different from the myoclonic twitching that was captured on EEG in September. On exam, left sided weakness also appeared functional.    Writer suspects that patient has an organic disorder that is improved/controlled on perampanel, and what she is now presenting with is suspicious for functional disorder.      Recommendation:  - cvEEG  - decrease perampanel 10mg QHS to 8mg QHS (ordered)   - continue all other home meds  - consider psych consult      Thank you for allowing Epilepsy to participate in the care of this patient.   ______________________  Jon Clinton MD   Director, Epilepsy/EMU - [facility]   Epilepsy Consult #: [phone] ([phone])  This is an 83yo RH female with a history of HTN, HLD, CAD s/p stent, renal cancer s/p nephrectomy, anxiety and recurrent episodes of self-limiting LLE weakness and myoclonus. Personally reviewed all imagines, labs, EEG and other studies.    Impression:  Prior to September of this year, patient c/o episodic LLE weakness, occasionally accompanied by myoclonus. History and exam during these episodes were convincing for organic etiology. Suspected possible scalp-negative seizures, so perampanel was started and up-titrated to 8mg QHS. However, patient has made multiple ER visits in the last 2 months, now primarily reporting "shaking" and "tremor", and no longer endorsing LLE weakness unless inquired by the physician. During interview today, the witnessed jerking behavior appeared functional and different from the myoclonic twitching that was captured on EEG in September. On exam, left sided weakness also appeared functional.    Writer suspects that patient has an organic disorder that is improved/controlled on perampanel, and what she is now presenting with may be functional.      Recommendation:  - cvEEG  - decrease perampanel 10mg QHS to 8mg QHS (ordered)   - send for Encephalopathy, Autoimmune Evaluation, Serum (ENS) tomorrow   - continue all other home meds      Thank you for allowing Epilepsy to participate in the care of this patient.   ______________________  Jon Clinton MD   Director, Epilepsy/EMU - [facility]   Epilepsy Consult #: [phone] ([phone])  This is an 81yo RH female with a history of HTN, HLD, CAD s/p stent, renal cancer s/p nephrectomy, anxiety and recurrent episodes of self-limiting LLE weakness and myoclonus. Personally reviewed all imagines, labs, EEG and other studies.    Impression:  Prior to September of this year, patient c/o episodic LLE weakness, occasionally accompanied by myoclonus. History and exam during these episodes were convincing for organic etiology. Suspected possible scalp-negative seizures, so perampanel was started and up-titrated to 8mg QHS. However, patient has made multiple ER visits in the last 2 months, now primarily reporting "shaking" and "tremor", and no longer endorsing LLE weakness unless inquired by the physician. During interview today, the witnessed jerking behavior appeared functional and different from the myoclonic twitching that was captured on EEG in September. On exam, left sided weakness also appeared functional.    Writer suspects that patient has an organic disorder that is improved/controlled on perampanel, and what she is now presenting with may be functional.      Recommendation:  - cvEEG  - decrease perampanel 10mg QHS to 8mg QHS (ordered)   - send for Encephalopathy, Autoimmune Evaluation, Serum tomorrow (ordered)   - continue all other home meds      Thank you for allowing Epilepsy to participate in the care of this patient.   ______________________  Jon Clinton MD   Director, Epilepsy/EMU - [facility]   Epilepsy Consult #: [phone] ([phone])

## 2022-12-16 NOTE — ED CLERICAL - NS ED CLERK UNITS
On walk over from ED to [facility], pt reported feeling dizzy and numbness to feet. Pt sat down and wheelchair provided to pt. Pt's blood sugar 73. Pt has type II DM. Pt given box lunch. Pt states \"I will feel better once I eat something.\" Pt reports has been having poor appetite the last couple weeks.    ANY PUI [id number] ANY

## 2022-12-16 NOTE — ED PROVIDER NOTE - OBJECTIVE STATEMENT
81 yo female with hx tia, cva, htn, bibems due to jerking movements. patient reports she takes Fycompa and follows with Dr. Clinton  patient reports one episode earlier in week, and one episode this am  no fall  denies fever or chills  denies chest pain or sob

## 2022-12-16 NOTE — ED ADULT TRIAGE NOTE - CHIEF COMPLAINT QUOTE
" I was sitting in my chair with a blanket and I got and felt these jittery and jerking movements started to happen and I sat back down in the chair violently" pt has hx of seizers, pt denies any LOC, falling or having a seizure today. pt complainant with medications. pt seen in hospital for same symptoms on 12/9

## 2022-12-16 NOTE — H&P ADULT - HISTORY OF PRESENT ILLNESS
Assessment and Plan:    Problem List Items Addressed This Visit     None        Health Maintenance Due   Topic Date Due    Depression Screening PHQ  1936   [facility] Medicare Annual Wellness Visit (AWV)  1936    SLP PLAN OF CARE  1936    Urinary Incontinence Screening  08/05/2001    Pneumococcal PPSV23/PCV13 65+ Years / High and Highest Risk (2 of 2 - PPSV23) 10/21/2015         HPI:  Tyree Mcdowell is a 80 y o  female here for her Subsequent Wellness Visit      Patient Active Problem List   Diagnosis    Anxiety    Peripheral neuropathy    Hyperlipidemia    Essential hypertension    Arthritis    Atrial fibrillation (HCC)    Difficulty breathing    Diverticulosis    Dizziness    Fatigue    Hyperglycemia    Pain in extremity    Osteopenia    Shortness of breath    Supraventricular tachycardia (HCC)    Vitamin D deficiency    Pain of left hand    Left leg pain    Malignant neoplasm of upper-outer quadrant of right breast in female, estrogen receptor positive ([facility] [address] [facility]    Impingement syndrome of left shoulder    Myocardial infarction type 2 ([facility] [address] [facility]    Ambulatory dysfunction    Contusion of left knee    Elevated troponin    Palpitations    Fall from ground level    Fall    Morbid obesity with body mass index (BMI) of 40 0 to 44 9 in adult [facility])     Past Medical History:   Diagnosis Date    Anxiety     Arthritis     last assessed 3/24/15     Atherosclerosis     Bell's palsy     Breast carcinoma ([facility] [address] [facility]     Broken femur ([facility] [address] [facility]     Cardiac disorder     DCIS (ductal carcinoma in situ) of breast     last assessed 4/4/17     Depression     Endometrial polyp     Hypercholesterolemia     resolved 10/22/14     Hyperlipidemia     Hypertension     Long term use of drug     last assessed 5/23/14     Malignant neoplasm without specification of site [facility])     Peripheral neuropathy     PONV (postoperative nausea and vomiting)     Tachycardia     Uterine fibroid      Past Surgical History:   Procedure Laterality Date    BREAST BIOPSY Right     BREAST LUMPECTOMY      CARPAL TUNNEL RELEASE Bilateral     CATARACT EXTRACTION      DILATION AND CURETTAGE OF UTERUS      ENDOMETRIAL BIOPSY      without cervical dilation     HEMORROIDECTOMY      JOINT REPLACEMENT      KNEE SURGERY Bilateral     OVARIAN CYST REMOVAL Left     RI RECONSTR TOTAL SHOULDER IMPLANT Right 7/18/2017    Procedure: TOTAL SHOULDER REVERSE ARTHROPLASTY;  Surgeon: Donald Mendieta MD;  [facility]: [facility];  Service: Orthopedics    SENTINEL LYMPH NODE BIOPSY      TONSILLECTOMY      TUBAL LIGATION       Family History   Problem Relation Age of Onset    Heart disease Mother         artherosclerosis     Heart disease Father         artherosclerosis     Heart attack Father     Arthritis Family     Heart disease Family         artherosclerosis     Breast cancer Family     Osteoarthritis Family     Supraventricular tachycardia Family     Hypertension Daughter     Anemia Neg Hx     Arrhythmia Neg Hx     Asthma Neg Hx     Clotting disorder Neg Hx     Fainting Neg Hx     Hyperlipidemia Neg Hx     Aneurysm Neg Hx      History   Smoking Status    Never Smoker   Smokeless Tobacco    Never Used     History   Alcohol Use No     Comment: social drinker per allscript       History   Drug Use No       Current Outpatient Prescriptions   Medication Sig Dispense Refill    aspirin 81 mg chewable tablet Chew 1 tablet (81 mg total) daily  0    betamethasone dipropionate (DIPROSONE) 0 05 % cream Apply 1 application topically daily      clorazepate (TRANXENE) 3 75 mg tablet Take 3 75 mg by mouth 2 (two) times a day        CRANBERRY PO Take 1,700 mg by mouth daily      FLUAD 0 5 ML ERIC inject 0 5 milliliter intramuscularly  0    lisinopril (ZESTRIL) 10 mg tablet Take 1 tablet (10 mg total) by mouth 2 (two) times a day 180 tablet 3    metoprolol succinate (TOPROL-XL) 50 mg 24 hr tablet Take 1 tablet (50 mg total) by mouth daily 30 tablet 3    Multiple Vitamins-Minerals (PRESERVISION AREDS 2 PO) Take 2 tablets by mouth daily      multivitamin (THERAGRAN) TABS Take 1 tablet by mouth daily      Omega-3 Fatty Acids (FISH OIL PO) Take 1 capsule by mouth daily      prednisoLONE acetate (PRED FORTE) 1 % ophthalmic suspension   0    ranitidine (ZANTAC) 150 mg tablet Take 1 tablet (150 mg total) by mouth daily 90 tablet 3    rosuvastatin (CRESTOR) 5 mg tablet Take 0 5 tablets by mouth 3 (three) times a week    0    SHINGRIX 50 MCG SUSR inject 0 5 milliliter intramuscularly  0     No current facility-administered medications for this visit  Allergies   Allergen Reactions    Amoxicillin-Pot Clavulanate GI Intolerance    Azithromycin GI Intolerance and Rash    Cephalexin GI Intolerance     Reaction Date: [id number];     Cortisone GI Intolerance     Reaction Date: [id number];     Doxycycline GI Intolerance    Penicillins GI Intolerance     Immunization History   Administered Date(s) Administered    Influenza 09/29/2015, 10/21/2016, 10/04/2017, 10/12/2018    Influenza Quadrivalent Preservative Free 3 years and older IM 10/15/2014, 10/21/2016    Influenza Split High Dose Preservative Free IM 09/29/2015, 10/04/2017    Influenza TIV (IM) 09/10/2013    Pneumococcal Conjugate 13-Valent 08/26/2015    Tdap 06/17/2015    Zoster 06/08/2015    Zoster Vaccine Recombinant 09/28/2018       Patient Care Team:  Lexy Johnson MD as PCP - General  Maya Current, MD Leslee Sheen, MD Mendel Rao, MD Layla Nobles, MD Darnell Laughter, MD Bynum Justice, MD    Medicare Screening Tests and Risk Assessments:  Brooke Cox is here for her Subsequent Wellness visit  Last Medicare Wellness visit information reviewed, patient interviewed and updates made to the record today  Health Risk Assessment:  Patient rates overall health as good  Patient feels that their physical health rating is Same  Eyesight was rated as Same   Hearing was rated 82F w/PMH HTN, TIA, CVA, seizure d/o presents for abnormal jerking like movements.  These jerking episodes have been occuring over last weeks to months. Now beocming more frequent.    Was planned for follow up with Dr. Clinton in Feb however symptoms are now worsening.      Epilepsy neurology consulted in ED. Ordered for Video EEG  Admitted to medicine for Seizure eval.          as Same  Patient feels that their emotional and mental health rating is Same  Pain experienced by patient in the last 7 days has been Some  Patient's pain rating has been 4/10  Patient states that she has experienced no weight loss or gain in last 6 months  Emotional/Mental Health:  Patient has been feeling nervous/anxious  PHQ-9 Depression Screening:    Frequency of the following problems over the past two weeks:      1  Little interest or pleasure in doing things: 0 - not at all      2  Feeling down, depressed, or hopeless: 0 - not at all  PHQ-2 Score: 0          Broken Bones/Falls: Fall Risk Assessment:    In the past year, patient has experienced: History of falling in past year     Number of falls: 1  Patient does not feel she is unsteady standing  Patient is not taking medication that can cause feelings of lightheadedness or tiredness  Patient often has no need to rush to the toilet  Chronic conditions that may contribute to falls arthritis  Bladder/Bowel:  Patient has leaked urine accidently in the last six months  Patient reports no loss of bowel control  Immunizations:  Patient has had a flu vaccination within the last year  Patient has received a pneumonia shot  Patient has received a shingles shot  Patient has received tetanus/diphtheria shot  Date of tetanus/diphtheria shot: 6/17/2015    Home Safety:  Patient has trouble with stairs inside or outside of their home  Patient currently reports that there are no safety hazards present in home, working smoke alarms, working carbon monoxide detectors  Preventative Screenings:   Breast cancer screening performed, colon cancer screen completed, cholesterol screen completed, glaucoma eye exam completed,     Nutrition:  Current diet: Regular and Frequent junk food with servings of the following:    Medications:  Patient is currently taking over-the-counter supplements  Patient is able to manage medications      Lifestyle Choices:  Patient reports no tobacco use  Patient has not smoked or used tobacco in the past   Patient reports no alcohol use  Patient drives a vehicle  Patient wears seat belt  Activities of Daily Living:  Can get out of bed by his or her self, able to dress self, able to make own meals, able to do own shopping, able to bathe self, can do own laundry/housekeeping, can manage own money, pay bills and track expenses    Previous Hospitalizations:  Hospitalization or ED visit in past 12 months        Advanced Directives:  Patient has decided on a power of   Patient has spoken to designated power of   Patient has completed advanced directive  Preventative Screening/Counseling:      Cardiovascular:      General: Risks and Benefits Discussed and Screening Current          Diabetes:      General: Risks and Benefits Discussed and Screening Current          Colorectal Cancer:      General: Risks and Benefits Discussed and Screening Current      Comments: Patient defers on further colonoscopy because of age        Breast Cancer:      General: Risks and Benefits Discussed and Screening Current      Comments: Known history of breast cancer and is on surveillance        Cervical Cancer:      General: Risks and Benefits Discussed and Screening Not Indicated          Osteoporosis:      General: Risks and Benefits Discussed and Screening Current          AAA:      General: Risks and Benefits Discussed and Screening Current          Glaucoma:      General: Risks and Benefits Discussed and Screening Current          HIV:      General: Risks and Benefits Discussed and Screening Not Indicated          Hepatitis C:      General: Risks and Benefits Discussed and Screening Not Indicated        Advanced Directives:   Patient has living will for healthcare, has durable POA for healthcare, patient has an advanced directive  Information on ACP and/or AD not provided  No 5 wishes given   End of life assessment reviewed with patient  Provider agrees with end of life decisions        Immunizations:      Influenza: Risks & Benefits Discussed and Influenza UTD This Year      Pneumococcal: Risks & Benefits Discussed and Lifetime Vaccine Completed      Shingrix: Risks & Benefits Discussed and Shingrix Vaccine UTD      Hepatitis B (Low risk patients): Series Not Indicated      Zostavax: Risks & Benefits Discussed and Zostavax Vaccine UTD      TD: Risks & Benefits Discussed      TDAP: Risks & Benefits Discussed 82F w/PMH HTN, TIA, CVA, seizure d/o presents for abnormal jerking like movements.  These jerking episodes have been occurring over last weeks to months. Now becoming more frequent. Patient has a difficult time describing the episodes.  During th encounter when initially approached about symptoms, the patient immediately had a side to side jerking movement of her entire body. Maintained consciousness and eye contact during this episode. This occurred a few times during symptomatology investigation and seemed to go away during additional questioning about home meds, family history, surgical history, etc. During physical exam, patient LE ext examined prompting patient to describe tingling sensation of feet. When upper extremities examined patient endorsed numbness of hands. When neurology arrived at bedside during encounter patient had another 3-4 episodes of these ''jerking movements''      Epilepsy neurology consulted in ED. Ordered for Video EEG  Admitted to medicine for Seizure eval.       Denies fevers, chills nausea, vomiting, diarrhea

## 2022-12-16 NOTE — H&P ADULT - NSHPPHYSICALEXAM_GEN_ALL_CORE
VITALS:   T(C): 36.4 (12-16-22 @ 15:35), Max: 36.4 (12-16-22 @ 15:35)  HR: 91 (12-16-22 @ 15:35) (78 - 91)  BP: 151/88 (12-16-22 @ 15:35) (116/72 - 151/88)  RR: 18 (12-16-22 @ 15:35) (18 - 20)  SpO2: 98% (12-16-22 @ 15:35) (98% - 99%)    GENERAL: NAD, lying in bed comfortably  HEAD:  Atraumatic, Normocephalic  EYES: EOMI, PERRLA, conjunctiva and sclera clear  ENT: Moist mucous membranes  NECK: Supple, No JVD  CHEST/LUNG: Clear to auscultation bilaterally; No rales, rhonchi, wheezing, or rubs. Unlabored respirations  HEART: Regular rate and rhythm; No murmurs, rubs, or gallops  ABDOMEN: BSx4; Soft, nontender, nondistended  EXTREMITIES:  2+ Peripheral Pulses, brisk capillary refill. No clubbing, cyanosis, or edema  NERVOUS SYSTEM:  A&Ox3, no focal deficits   SKIN: No rashes or lesions  PSYCH: Normal affect, euthymic mood

## 2022-12-16 NOTE — ED ADULT NURSE NOTE - OBJECTIVE STATEMENT
c/o abnormal jerking like movements. These jerking episodes have been occuring over last weeks to months. Now beocming more frequent.

## 2022-12-16 NOTE — ED PROVIDER NOTE - PHYSICAL EXAMINATION
aaox3  perrl, eomi  neck supple  cta b/l, [id number]  abd soft, non tender  5/5 UE and LE b/l  sensation intact      while examining patient, patient had brief episode of movement/jerk of head/ue and le

## 2022-12-16 NOTE — H&P ADULT - ASSESSMENT
82F w/PMH HTN, TIA, CVA, seizure d/o presents for abnormal jerking like movements.      Seizure Disorder  Unclear if these are refractory seizures. Suspect voluntary movements based on timeline, pattern during encounter  Neurology consulted  video eeg ordered  will hold fycompa per neuro  cbc, bmp, mag, phos, hepatic  cth    CVA  CAD  HLD  ASA/Plavix, Atorvastatin       DVT ppx: JEAN PIERRE  Dispo: Med tele

## 2022-12-16 NOTE — ED ADULT NURSE NOTE - NS ED NURSE REPORT GIVEN DT
Pt calling to get orders for his appt on 6/26.  He will be in to the clinic tomorrow and would like to do his blood work then. Please let him know when the orders are in.   16-Dec-2022 20:59

## 2022-12-16 NOTE — H&P ADULT - NSHPLABSRESULTS_GEN_ALL_CORE
11.7   6.56   )----------(  200       ( 16 Dec 2022 14:40 )               35.4      139    |  103    |  21.7   ----------------------------<  81         ( 16 Dec 2022 14:40 )  4.4     |  26.0   |  0.97     Ca    8.5        ( 16 Dec 2022 14:40 )    TPro  5.8    /  Alb  3.5    /  TBili  0.4    /  DBili  x      /  AST  30     /  ALT  13     /  AlkPhos  52     ( 16 Dec 2022 14:40 )    LIVER FUNCTIONS - ( 16 Dec 2022 14:40 )  Alb: 3.5 g/dL / Pro: 5.8 g/dL / ALK PHOS: 52 U/L / ALT: 13 U/L / AST: 30 U/L / GGT: x           PT/INR -  14.2 sec / 1.22 ratio   ( 16 Dec 2022 14:40 )       PTT -  29.7 sec   ( 16 Dec 2022 14:40 )  CAPILLARY BLOOD GLUCOSE

## 2022-12-16 NOTE — CONSULT NOTE ADULT - SUBJECTIVE AND OBJECTIVE BOX
[facility]                                                                           Jon Clinton MD                                                                                                          Epilepsy Consult #: [phone] ([phone])                                        Office: [address] [place] [address], [address]nd floor, [place], [place], [address]                                                 Phone: [phone]; Fax: [phone]                            ==============================================    EPILEPSY INITIAL CONSULT NOTE      ADMITTING DIAGNOSIS: Convulsions        HPI:  82F w/PMH HTN, TIA, CVA, seizure d/o presents for abnormal jerking like movements.  These jerking episodes have been occurring over last weeks to months. Now becoming more frequent. Patient has a difficult time describing the episodes.  During th encounter when initially approached about symptoms, the patient immediately had a side to side jerking movement of her entire body. Maintained consciousness and eye contact during this episode. This occurred a few times during symptomatology investigation and seemed to go away during additional questioning about home meds, family history, surgical history, etc. During physical exam, patient LE ext examined prompting patient to describe tingling sensation of feet. When upper extremities examined patient endorsed numbness of hands. When neurology arrived at bedside during encounter patient had another 3-4 episodes of these ''jerking movements''    Epilepsy neurology consulted in ED. Ordered for Video EEG  Admitted to medicine for Seizure eval.     Denies fevers, chills nausea, vomiting, diarrhea (16 Dec 2022 16:03)        Since May of 2020, patient has presented to [facility] numerous times with self-limiting episodes of LLE weakness and difficulty producing speech. Stroke workup negative. Continuous video EEG normal. 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 Although the exact etiology of these events remain unclear, the possibility of scalp-negative seizure was considered. Patient unable to tolerate low-dose levetiracetam or divalproex sodium DR, and perampanel 2mg QHS started on 9/20/22 and slowly increased to 8mg QHS. Prior to November of this year, patient has always complained of LLE weakness. Full-body myoclonus was only incidentally recorded during EEG monitoring in September. However, in the last 2 months, patient has presented to ER 6-7 times, primarily reporting myoclonus and no longer c/o LLE weakness, unless asked.     During the interview today, patient was initially conversing normally with the hospitalist without any twitching or jerky movements. Soon after writer arrived to bedside, patient started to have whole-body twitching that appeared functional and distinct from the myoclonic twitching that was recorded on EEG in September. Patient did not bring up any left sided weakness, until writer inquired about it, to which patient answered yes. During exam, left upper and lower extremity weakness also appeared functional, which was not the case during her prior admissions before September of this year. Patient lives alone. Chart review revealed that ER had reached out to patient's son in the past, who reported patient having financial stressors.    CURRENT ASM:  perampanel 10mg QHS     PREVIOUS ASM:  LEV – 6/2022 to 8/2022, dizzy and fatigue on 250mg BID  VPA DR 125mg BID – 2 weeks in 8/2022, dizziness, SOB, fatigue      PMH:  HTN (hypertension)    Rheumatic fever    TIA (transient ischemic attack)    CAD S/P percutaneous coronary angioplasty    [place]    Cerebrovascular accident (CVA)  November 2020, June 2020, May 2020        PSH:  Status post tonsillectomy    H/O left nephrectomy    Renal cancer        MEDICATION:  aspirin enteric coated 81 milliGRAM(s) Oral daily  atorvastatin 80 milliGRAM(s) Oral at bedtime  diltiazem    milliGRAM(s) Oral daily  escitalopram 5 milliGRAM(s) Oral daily    ALLERGIES:  Biaxin (Muscle Pain; Joint Pain)  codeine (Faint)  Isosorbide Mononitrate Extended Release (Faint)  Metoprolol Succinate ER (Unknown)  monoctanoin (Unknown)    FH:  FHx: breast cancer  FH: CHF (congestive heart failure)    SH:  No EtOH, tobacco, illicit drugs.    ROS:  Neurology as per HPI, otherwise negative for constitutional, skin, eyes, ENT, respiratory, cardiovascular, gastrointestinal, genitourinary, musculoskeletal, psychiatric, hematology/lymphatics, endocrine, allergic/immunologic.    VITALS:  T(C): 36.4 (12-16-22 @ 15:35), Max: 36.4 (12-16-22 @ 15:35)  HR: 91 (12-16-22 @ 15:35) (78 - 91)  BP: 151/88 (12-16-22 @ 15:35) (116/72 - 151/88)  ABP: --  RR: 18 (12-16-22 @ 15:35) (18 - 20)  SpO2: 98% (12-16-22 @ 15:35) (98% - 99%)  CVP(cm H2O): --    GENERAL PHYSICAL EXAM:  GEN: no distress  HEENT: NCAT, OP clear  EYES: sclera white, conjunctiva clear, no nystagmus  NECK: supple  CV: RRR, no murmur     		  PULM: CTAB, no wheezing  ABD: soft, +BS, NT  EXT: peripheral pulse intact, no cyanosis  MSK: muscle tone and strength normal  SKIN: warm, dry    NEUROLOGICAL EXAM:  Mental Status  Orientation: alert and oriented to person, place, time, and situation   Language: clear and fluent    Cranial Nerves  II: full visual fields intact   III, IV, VI: PERRL, EOMI  V, VII: facial sensation and movement intact and symmetric   VIII: hearing intact   IX, X: uvula midline, soft palate elevates normally   XI: BL shoulder shrug intact   XII: tongue midline    Motor  4+/5 LUE and LLE (weakness appeared functional)  5/5 RUE, RLE                  Sensation  Intact to light touch and pinprick in all 4 EXTs    Reflex  2+ in BL biceps and patella                                    Plantar responses downward bilaterally    Gait  Deferred      LABS:                          11.7   6.56  )-----------( 200      ( 16 Dec 2022 14:40 )             35.4     12-16    139  |  103  |  21.7<H>  ----------------------------<  81  4.4   |  26.0  |  0.97    Ca    8.5      16 Dec 2022 14:40    TPro  5.8<L>  /  Alb  3.5  /  TBili  0.4  /  DBili  x   /  AST  30  /  ALT  13  /  AlkPhos  52  12-16    CAPILLARY BLOOD GLUCOSE      POCT Blood Glucose.: 112 mg/dL (16 Dec 2022 13:23)  POCT Blood Glucose.: 116 mg/dL (16 Dec 2022 13:22)    LIVER FUNCTIONS - ( 16 Dec 2022 14:40 )  Alb: 3.5 g/dL / Pro: 5.8 g/dL / ALK PHOS: 52 U/L / ALT: 13 U/L / AST: 30 U/L / GGT: x           PT/INR - ( 16 Dec 2022 14:40 )   PT: 14.2 sec;   INR: 1.22 ratio         PTT - ( 16 Dec 2022 14:40 )  PTT:29.7 sec      OTHER IMAGING AND STUDIES:    non-elective EMU 9/19 - 9/22/22 ([facility]): 1) clusters of whole-body myoclonus without ictal pattern; 2) SREDA  Non-elective EMU 6/7 – 6/8/22 ([facility]): SREDA, otherwise normal  rEEG 6/7/22 ([facility]): normal, SREDA  rEEG 9/10/21 ([facility]): normal A/D  cvEEG 7/28 - 7/29/21 ([facility]): normal, SREDA

## 2022-12-17 ENCOUNTER — TRANSCRIPTION ENCOUNTER (OUTPATIENT)
Age: 82
End: 2022-12-17

## 2022-12-17 VITALS
SYSTOLIC BLOOD PRESSURE: 138 MMHG | HEART RATE: 94 BPM | DIASTOLIC BLOOD PRESSURE: 83 MMHG | RESPIRATION RATE: 18 BRPM | TEMPERATURE: 98 F | OXYGEN SATURATION: 96 %

## 2022-12-17 LAB
ALBUMIN SERPL ELPH-MCNC: 3.3 G/DL — SIGNIFICANT CHANGE UP (ref 3.3–5.2)
ALP SERPL-CCNC: 41 U/L — SIGNIFICANT CHANGE UP (ref 40–120)
ALT FLD-CCNC: 11 U/L — SIGNIFICANT CHANGE UP
ANION GAP SERPL CALC-SCNC: 12 MMOL/L — SIGNIFICANT CHANGE UP (ref 5–17)
AST SERPL-CCNC: 24 U/L — SIGNIFICANT CHANGE UP
BASOPHILS # BLD AUTO: 0.07 K/UL — SIGNIFICANT CHANGE UP (ref 0–0.2)
BASOPHILS NFR BLD AUTO: 1.4 % — SIGNIFICANT CHANGE UP (ref 0–2)
BILIRUB DIRECT SERPL-MCNC: 0.1 MG/DL — SIGNIFICANT CHANGE UP (ref 0–0.3)
BILIRUB INDIRECT FLD-MCNC: 0.4 MG/DL — SIGNIFICANT CHANGE UP (ref 0.2–1)
BILIRUB SERPL-MCNC: 0.6 MG/DL — SIGNIFICANT CHANGE UP (ref 0.4–2)
BUN SERPL-MCNC: 19 MG/DL — SIGNIFICANT CHANGE UP (ref 8–20)
CALCIUM SERPL-MCNC: 8.7 MG/DL — SIGNIFICANT CHANGE UP (ref 8.4–10.5)
CHLORIDE SERPL-SCNC: 105 MMOL/L — SIGNIFICANT CHANGE UP (ref 96–108)
CO2 SERPL-SCNC: 23 MMOL/L — SIGNIFICANT CHANGE UP (ref 22–29)
CREAT SERPL-MCNC: 1.03 MG/DL — SIGNIFICANT CHANGE UP (ref 0.5–1.3)
EGFR: 54 ML/MIN/1.73M2 — LOW
EOSINOPHIL # BLD AUTO: 0.16 K/UL — SIGNIFICANT CHANGE UP (ref 0–0.5)
EOSINOPHIL NFR BLD AUTO: 3.2 % — SIGNIFICANT CHANGE UP (ref 0–6)
GLUCOSE SERPL-MCNC: 74 MG/DL — SIGNIFICANT CHANGE UP (ref 70–99)
HCT VFR BLD CALC: 35.6 % — SIGNIFICANT CHANGE UP (ref 34.5–45)
HGB BLD-MCNC: 11.8 G/DL — SIGNIFICANT CHANGE UP (ref 11.5–15.5)
IMM GRANULOCYTES NFR BLD AUTO: 0.2 % — SIGNIFICANT CHANGE UP (ref 0–0.9)
LYMPHOCYTES # BLD AUTO: 1.24 K/UL — SIGNIFICANT CHANGE UP (ref 1–3.3)
LYMPHOCYTES # BLD AUTO: 24.6 % — SIGNIFICANT CHANGE UP (ref 13–44)
MAGNESIUM SERPL-MCNC: 1.9 MG/DL — SIGNIFICANT CHANGE UP (ref 1.8–2.6)
MCHC RBC-ENTMCNC: 29.8 PG — SIGNIFICANT CHANGE UP (ref 27–34)
MCHC RBC-ENTMCNC: 33.1 GM/DL — SIGNIFICANT CHANGE UP (ref 32–36)
MCV RBC AUTO: 89.9 FL — SIGNIFICANT CHANGE UP (ref 80–100)
MONOCYTES # BLD AUTO: 0.57 K/UL — SIGNIFICANT CHANGE UP (ref 0–0.9)
MONOCYTES NFR BLD AUTO: 11.3 % — SIGNIFICANT CHANGE UP (ref 2–14)
NEUTROPHILS # BLD AUTO: 3 K/UL — SIGNIFICANT CHANGE UP (ref 1.8–7.4)
NEUTROPHILS NFR BLD AUTO: 59.3 % — SIGNIFICANT CHANGE UP (ref 43–77)
PHOSPHATE SERPL-MCNC: 3.2 MG/DL — SIGNIFICANT CHANGE UP (ref 2.4–4.7)
PLATELET # BLD AUTO: 196 K/UL — SIGNIFICANT CHANGE UP (ref 150–400)
POTASSIUM SERPL-MCNC: 4.3 MMOL/L — SIGNIFICANT CHANGE UP (ref 3.5–5.3)
POTASSIUM SERPL-SCNC: 4.3 MMOL/L — SIGNIFICANT CHANGE UP (ref 3.5–5.3)
PROT SERPL-MCNC: 5.6 G/DL — LOW (ref 6.6–8.7)
RBC # BLD: 3.96 M/UL — SIGNIFICANT CHANGE UP (ref 3.8–5.2)
RBC # FLD: 12 % — SIGNIFICANT CHANGE UP (ref 10.3–14.5)
SODIUM SERPL-SCNC: 140 MMOL/L — SIGNIFICANT CHANGE UP (ref 135–145)
WBC # BLD: 5.05 K/UL — SIGNIFICANT CHANGE UP (ref 3.8–10.5)
WBC # FLD AUTO: 5.05 K/UL — SIGNIFICANT CHANGE UP (ref 3.8–10.5)

## 2022-12-17 PROCEDURE — 95714 VEEG EA 12-26 HR UNMNTR: CPT

## 2022-12-17 PROCEDURE — 95813 EEG EXTND MNTR 61-119 MIN: CPT | Mod: 26

## 2022-12-17 PROCEDURE — 70450 CT HEAD/BRAIN W/O DYE: CPT

## 2022-12-17 PROCEDURE — 85730 THROMBOPLASTIN TIME PARTIAL: CPT

## 2022-12-17 PROCEDURE — U0005: CPT

## 2022-12-17 PROCEDURE — 93005 ELECTROCARDIOGRAM TRACING: CPT

## 2022-12-17 PROCEDURE — U0003: CPT

## 2022-12-17 PROCEDURE — 95700 EEG CONT REC W/VID EEG TECH: CPT

## 2022-12-17 PROCEDURE — 99239 HOSP IP/OBS DSCHRG MGMT >30: CPT

## 2022-12-17 PROCEDURE — 36415 COLL VENOUS BLD VENIPUNCTURE: CPT

## 2022-12-17 PROCEDURE — 93010 ELECTROCARDIOGRAM REPORT: CPT

## 2022-12-17 PROCEDURE — 80048 BASIC METABOLIC PNL TOTAL CA: CPT

## 2022-12-17 PROCEDURE — 83735 ASSAY OF MAGNESIUM: CPT

## 2022-12-17 PROCEDURE — 84100 ASSAY OF PHOSPHORUS: CPT

## 2022-12-17 PROCEDURE — 99232 SBSQ HOSP IP/OBS MODERATE 35: CPT

## 2022-12-17 PROCEDURE — 85025 COMPLETE CBC W/AUTO DIFF WBC: CPT

## 2022-12-17 PROCEDURE — 85610 PROTHROMBIN TIME: CPT

## 2022-12-17 PROCEDURE — 95711 VEEG 2-12 HR UNMONITORED: CPT

## 2022-12-17 PROCEDURE — 82962 GLUCOSE BLOOD TEST: CPT

## 2022-12-17 PROCEDURE — 80076 HEPATIC FUNCTION PANEL: CPT

## 2022-12-17 PROCEDURE — 95813 EEG EXTND MNTR 61-119 MIN: CPT

## 2022-12-17 PROCEDURE — 99285 EMERGENCY DEPT VISIT HI MDM: CPT

## 2022-12-17 PROCEDURE — 80053 COMPREHEN METABOLIC PANEL: CPT

## 2022-12-17 RX ORDER — PERAMPANEL 2 MG/1
1 TABLET ORAL
Qty: 30 | Refills: 0
Start: 2022-12-17 | End: 2023-01-15

## 2022-12-17 RX ORDER — ACETAMINOPHEN 500 MG
1000 TABLET ORAL ONCE
Refills: 0 | Status: COMPLETED | OUTPATIENT
Start: 2022-12-17 | End: 2022-12-17

## 2022-12-17 RX ORDER — PERAMPANEL 2 MG/1
1 TABLET ORAL
Qty: 0 | Refills: 0 | DISCHARGE

## 2022-12-17 RX ADMIN — Medication 81 MILLIGRAM(S): at 12:50

## 2022-12-17 RX ADMIN — ESCITALOPRAM OXALATE 5 MILLIGRAM(S): 10 TABLET, FILM COATED ORAL at 12:50

## 2022-12-17 RX ADMIN — Medication 120 MILLIGRAM(S): at 05:02

## 2022-12-17 RX ADMIN — Medication 400 MILLIGRAM(S): at 14:09

## 2022-12-17 RX ADMIN — Medication 1000 MILLIGRAM(S): at 15:00

## 2022-12-17 NOTE — DISCHARGE NOTE NURSING/CASE MANAGEMENT/SOCIAL WORK - PATIENT PORTAL LINK FT
You can access the FollowMyHealth Patient Portal offered by [facility] by registering at the following website: [email or web address]. By joining [facility]’s FollowMyHealth portal, you will also be able to view your health information using other applications (apps) compatible with our system.

## 2022-12-17 NOTE — PROGRESS NOTE ADULT - SUBJECTIVE AND OBJECTIVE BOX
BREANA HELM  ----------------------------------------  The patient was seen at bedside. Patient with seizure like activity. Offered no complaints.    Vital Signs Last 24 Hrs  T(C): 36.3 (17 Dec 2022 07:54), Max: 36.9 (16 Dec 2022 23:15)  T(F): 97.3 (17 Dec 2022 07:54), Max: 98.5 (17 Dec 2022 04:24)  HR: 90 (17 Dec 2022 07:54) (66 - 91)  BP: 119/79 (17 Dec 2022 07:54) (101/63 - 151/88)  BP(mean): --  RR: 20 (17 Dec 2022 07:54) (18 - 20)  SpO2: 96% (17 Dec 2022 07:54) (96% - 99%)    Parameters below as of 17 Dec 2022 07:54  Patient On (Oxygen Delivery Method): room air    CAPILLARY BLOOD GLUCOSE  POCT Blood Glucose.: 112 mg/dL (16 Dec 2022 13:23)  POCT Blood Glucose.: 116 mg/dL (16 Dec 2022 13:22)    PHYSICAL EXAMINATION:  ----------------------------------------  General appearance: No acute distress, Awake, Alert  HEENT: Normocephalic, Atraumatic, Conjunctiva clear, EOMI  Neck: Supple, No JVD  Lungs: Clear to auscultation, Breath sound equal bilaterally  Cardiovascular: S1S2, Regular rhythm  Abdomen: Soft, Nontender, Positive bowel sounds  Extremities: No clubbing, No cyanosis, No edema    LABORATORY STUDIES:  ----------------------------------------             11.8   5.05  )-----------( 196      ( 17 Dec 2022 07:00 )             35.6     12-17    140  |  105  |  19.0  ----------------------------<  74  4.3   |  23.0  |  1.03    Ca    8.7      17 Dec 2022 07:00  Phos  3.2     12-17  Mg     1.9     12-17    TPro  5.6<L>  /  Alb  3.3  /  TBili  0.6  /  DBili  0.1  /  AST  24  /  ALT  11  /  AlkPhos  41  12-17    LIVER FUNCTIONS - ( 17 Dec 2022 07:00 )  Alb: 3.3 g/dL / Pro: 5.6 g/dL / ALK PHOS: 41 U/L / ALT: 11 U/L / AST: 24 U/L / GGT: x           PT/INR - ( 16 Dec 2022 14:40 )   PT: 14.2 sec;   INR: 1.22 ratio    PTT - ( 16 Dec 2022 14:40 )  PTT:29.7 sec    MEDICATIONS  (STANDING):  aspirin enteric coated 81 milliGRAM(s) Oral daily  atorvastatin 80 milliGRAM(s) Oral at bedtime  diltiazem    milliGRAM(s) Oral daily  escitalopram 5 milliGRAM(s) Oral daily  perampanel 8 milliGRAM(s) Oral at bedtime      ASSESSMENT / PLAN:  ----------------------------------------  82F with a history of stroke, hypertension, and seizures who presented with seizure-like activity over the past few months. She was seen by Epilepsy in consultation and EEG monitoring was initiated.    Seizure disorder - The patient was note to have myoclonus in the past for which she is on perampanel. Most recent symptoms appeared to be functional rather than an organic disorder. Communicated with Neurology. EEG monitoring has been uneventful. For continuation of perampanel and further follow up on an outpatient basis.    CVA - On aspirin and atorvastatin.    Coronary artery disease - On antiplatelet and statin therapy.

## 2022-12-17 NOTE — EEG REPORT - NS EEG TEXT BOX
[facility]  Epilepsy Monitoring Unit Report    [facility]: [address], [place] [address], Phone [phone]  [place] Office: [address], [address], [place] [address] Phone [phone]    [facility]: [address] [place] [address], Phone [phone]  [place] Office: [address] [place] [address], Phone [phone]    Patient Name: Alexandrea Medina    Age: 82 year, : 1940  Patient ID: -, MRN #: -, Callejas: -    Physician Ordering Inpatient EEG: Jon Clinton  Referral Source to EMU: non-elective admission – ER    EMU Study Started: 21:39 on 22    EMU Study Ended: pending discharge    Study Information:    EEG Recording Technique:  The patient underwent continuous Video-EEG monitoring, using Telemetry System hardware on the XLTek Digital System. EEG and video data were stored on a computer hard drive with important events saved in digital archive files. The material was reviewed by a physician (electroencephalographer / epileptologist) on a daily basis. Manjit and seizure detection algorithms were utilized and reviewed. An EEG Technician attended to the patient, and was available throughout daytime work hours.  The epilepsy center neurologist was available in person or on call 24-hours per day.    EEG Placement and Labeling of Electrodes:  The EEG was performed utilizing 20 channel referential EEG connections (coronal over temporal over parasagittal montage) using all standard 10-20 electrode placements with EKG, with additional electrodes placed in the inferior temporal region using the modified 10-10 montage electrode placements for elective admissions, or if deemed necessary. Recording was at a sampling rate of 256 samples per second per channel. Time synchronized digital video recording was done simultaneously with EEG recording. A low light infrared camera was used for low light recording.               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    Since May of 2020, patient has presented to [facility] numerous times with self-limiting episodes of LLE weakness and difficulty producing speech. Stroke workup negative. Continuous video EEG normal. 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 Although the exact etiology of these events remain unclear, the possibility of scalp-negative seizure was considered. Patient unable to tolerate low-dose levetiracetam or divalproex sodium DR, and perampanel 2mg QHS started on 22 and slowly increased to 8mg QHS. Prior to November of this year, patient has always complained of LLE weakness. Full-body myoclonus was only incidentally recorded during EEG monitoring in September. However, in the last 2 months, patient has presented to ER 6-7 times, primarily reporting myoclonus and no longer c/o LLE weakness, unless asked.     During the interview today, patient was initially conversing normally with the hospitalist without any twitching or jerky movements. Soon after writer arrived to bedside, patient started to have whole-body twitching that appeared functional and distinct from the myoclonic twitching that was recorded on EEG in September. Patient did not bring up any left sided weakness, until writer inquired about it, to which patient answered yes. During exam, left upper and lower extremity weakness also appeared functional, which was not the case during her prior admissions before September of this year. Patient lives alone. Chart review revealed that ER had reached out to patient's son in the past, who reported patient having financial stressors.    CURRENT ASM:  PER 10mg QHS - fatigue    PREVIOUS ASM:  LEV – 2022 to 2022, dizzy and fatigue on 250mg BID  VPA DR 125mg BID – 2 weeks in 2022, dizziness, SOB, fatigue    Home Antiseizure Medication and Device  PER 10mg QHS    Interpretation:    Start Date: 2022 – Day 1                                Start Time – 21:39       Duration – 9h 29m    Daily EEG Visual Analysis  Findings: The background was continuous and reactive. During wakefulness, the posterior dominant rhythm consisted of symmetric, well-modulated 9 Hz activity, with amplitude to 30 uV, that attenuated to eye opening.     Background Slowing:  No generalized background slowing was present.    Focal Slowing:   None were present.    Sleep Background:  Drowsiness was characterized by fragmentation, attenuation, and slowing of the background activity.    Sleep was characterized by the presence of vertex waves, symmetric sleep spindles and K-complexes.    Other Non-Epileptiform Findings:  Occasional 30-120s runs of diffuse, max right posterior-temporal, sharply contoured theta activities, with minimal evolution in frequency. No clinical correlate. 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    Interictal Epileptiform Activity:   None were present.    Events:  === Abnormal movements ===	  Seizure type: likely nonepileptic   Electrographic onset location: no ictal rhythm  Electrographic evolution: no ictal rhythm  State at onset: awake	    Clinical/EEG Findings  Intermittent abnormal movements characterized by a combination of axial twitch/jolt, left-right head shaking, body shaking, typically occurring when there is a hospital staff at bedside. EEG immediately before, during and after the event showed baseline awake background without ictal rhythm.     EKG Findings: baseline HR  Event of interest?: yes    Artifacts:  Intermittent myogenic and movement artifacts were noted.    ECG:  The heart rate on single channel ECG was predominantly between 70-80 BPM.    ASM:  PER 8mg QHS    EEG Summary:  Normal EEG in the awake, drowsy and asleep states.  - Intermittent abnormal movements characterized by a combination of axial twitch/jolt, left-right head shaking, body shaking, typically occurring when there is a hospital staff at bedside. EEG immediately before, during and after the event showed baseline awake background without ictal rhythm.   - Occasional 30-120s runs of diffuse, max right posterior-temporal, sharply contoured theta activities, with minimal evolution in frequency. No clinical correlate. 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    Impression/Clinical Correlate:  Habitual abnormal movements captured on video without ictal pattern on EEG. Movements are atypical of epileptic seizures. Correlate clinically for consideration of possible functional etiology. Normal EEG in awake, drowsy and asleep states.    ________________________________________    Jon Clinton MD  Director, Epilepsy/EMU - [facility]    [facility]  Epilepsy Monitoring Unit Report    [facility]: [address], [place] [address], Phone [phone]  [place] Office: [address], [address], [place] [address] Phone [phone]    [facility]: [address] [place] [address], Phone [phone]  [place] Office: [address] [place] [address], Phone [phone]    Patient Name: Alexandrea Medina    Age: 82 year, : 1940  Patient ID: -, MRN #: -, Callejas: -    Physician Ordering Inpatient EEG: Jon Clinton  Referral Source to EMU: non-elective admission – ER    EMU Study Started: 21:39 on 22    EMU Study Ended: 14:23 ON 22    Study Information:    EEG Recording Technique:  The patient underwent continuous Video-EEG monitoring, using Telemetry System hardware on the XLTek Digital System. EEG and video data were stored on a computer hard drive with important events saved in digital archive files. The material was reviewed by a physician (electroencephalographer / epileptologist) on a daily basis. Manjit and seizure detection algorithms were utilized and reviewed. An EEG Technician attended to the patient, and was available throughout daytime work hours.  The epilepsy center neurologist was available in person or on call 24-hours per day.    EEG Placement and Labeling of Electrodes:  The EEG was performed utilizing 20 channel referential EEG connections (coronal over temporal over parasagittal montage) using all standard 10-20 electrode placements with EKG, with additional electrodes placed in the inferior temporal region using the modified 10-10 montage electrode placements for elective admissions, or if deemed necessary. Recording was at a sampling rate of 256 samples per second per channel. Time synchronized digital video recording was done simultaneously with EEG recording. A low light infrared camera was used for low light recording.               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    Since May of 2020, patient has presented to [facility] numerous times with self-limiting episodes of LLE weakness and difficulty producing speech. Stroke workup negative. Continuous video EEG normal. 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 Although the exact etiology of these events remain unclear, the possibility of scalp-negative seizure was considered. Patient unable to tolerate low-dose levetiracetam or divalproex sodium DR, and perampanel 2mg QHS started on 22 and slowly increased to 8mg QHS. Prior to November of this year, patient has always complained of LLE weakness. Full-body myoclonus was only incidentally recorded during EEG monitoring in September. However, in the last 2 months, patient has presented to ER 6-7 times, primarily reporting myoclonus and no longer c/o LLE weakness, unless asked.     During the interview today, patient was initially conversing normally with the hospitalist without any twitching or jerky movements. Soon after writer arrived to bedside, patient started to have whole-body twitching that appeared functional and distinct from the myoclonic twitching that was recorded on EEG in September. Patient did not bring up any left sided weakness, until writer inquired about it, to which patient answered yes. During exam, left upper and lower extremity weakness also appeared functional, which was not the case during her prior admissions before September of this year. Patient lives alone. Chart review revealed that ER had reached out to patient's son in the past, who reported patient having financial stressors.    CURRENT ASM:  PER 10mg QHS - fatigue    PREVIOUS ASM:  LEV – 2022 to 2022, dizzy and fatigue on 250mg BID  VPA DR 125mg BID – 2 weeks in 2022, dizziness, SOB, fatigue    Home Antiseizure Medication and Device  PER 10mg QHS    Interpretation:    Start Date: 2022 – Day 1                                Start Time – 21:39       Duration – 9h 29m    Daily EEG Visual Analysis  Findings: The background was continuous and reactive. During wakefulness, the posterior dominant rhythm consisted of symmetric, well-modulated 9 Hz activity, with amplitude to 30 uV, that attenuated to eye opening.     Background Slowing:  No generalized background slowing was present.    Focal Slowing:   None were present.    Sleep Background:  Drowsiness was characterized by fragmentation, attenuation, and slowing of the background activity.    Sleep was characterized by the presence of vertex waves, symmetric sleep spindles and K-complexes.    Other Non-Epileptiform Findings:  Occasional 30-120s runs of diffuse, max right posterior-temporal, sharply contoured theta activities, with minimal evolution in frequency. No clinical correlate. 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    Interictal Epileptiform Activity:   None were present.    Events:  === Abnormal movements ===	  Seizure type: likely nonepileptic   Electrographic onset location: no ictal rhythm  Electrographic evolution: no ictal rhythm  State at onset: awake	    Clinical/EEG Findings  Intermittent abnormal movements characterized by a combination of axial twitch/jolt, left-right head shaking, body shaking, typically occurring when there is a hospital staff at bedside. EEG immediately before, during and after the event showed baseline awake background without ictal rhythm.     EKG Findings: baseline HR  Event of interest?: yes    Artifacts:  Intermittent myogenic and movement artifacts were noted.    ECG:  The heart rate on single channel ECG was predominantly between 70-80 BPM.    ASM:  PER 8mg QHS    Start Date: 2022 – Day 2                                       Start Time – 08:00      Duration – 6h 21m    Daily EEG Visual Analysis  FINDINGS:  The background, artifacts and HR on single channel ECG were similar as previous day.    Interictal Epileptiform Activity:   None were present    Events:  === Abnormal movements ===	  Seizure type: likely nonepileptic   Electrographic onset location: no ictal rhythm  Electrographic evolution: no ictal rhythm  State at onset: awake	    Clinical/EEG Findings  Intermittent abnormal movements characterized by a combination of axial twitch/jolt, left-right head shaking, head/shoulder twitching, body shaking/twitching. EEG immediately before, during and after the event showed baseline awake background without ictal rhythm.     EKG Findings: baseline HR  Event of interest?: yes    ASM:  PER 8mg QHS    EEG Summary:  Normal EEG in the awake, drowsy and asleep states.  - Intermittent abnormal movements characterized by a combination of axial twitch/jolt, left-right head shaking, head/shoulder lifts, body shaking/twitching. EEG immediately before, during and after the event showed baseline awake background without ictal rhythm.   - Occasional 30-120s runs of diffuse, max right posterior-temporal, sharply contoured theta activities, with minimal evolution in frequency. No clinical correlate. 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    Impression/Clinical Correlate:  Habitual abnormal movements captured on video without ictal pattern on EEG. Movements are atypical of epileptic seizures. Correlate clinically for consideration of possible functional etiology. Normal EEG in awake, drowsy and asleep states.    ________________________________________    Jon Clinton MD  Director, Epilepsy/EMU - [facility]

## 2022-12-17 NOTE — PROGRESS NOTE ADULT - SUBJECTIVE AND OBJECTIVE BOX
[facility]                                        Neurology at [place]                                 Leigh Prather, & Anjum                                  [address]                                        [place], [place], [address]                                             [phone]        CC: convulsions     HPI:   82F w/PMH HTN, TIA, CVA, seizure d/o presents for abnormal jerking like movements.  These jerking episodes have been occurring over last weeks to months. Now becoming more frequent. Patient has a difficult time describing the episodes.  During th encounter when initially approached about symptoms, the patient immediately had a side to side jerking movement of her entire body. Maintained consciousness and eye contact during this episode. This occurred a few times during symptomatology investigation and seemed to go away during additional questioning about home meds, family history, surgical history, etc. During physical exam, patient LE ext examined prompting patient to describe tingling sensation of feet. When upper extremities examined patient endorsed numbness of hands. When neurology arrived at bedside during encounter patient had another 3-4 episodes of these ''jerking movements''    Interim history:  Now on 5 [place].     ROS:   Denies headache or dizziness.  Denies chest pain.  Denies shortness of breath.    MEDICATIONS  (STANDING):  aspirin enteric coated 81 milliGRAM(s) Oral daily  atorvastatin 80 milliGRAM(s) Oral at bedtime  diltiazem    milliGRAM(s) Oral daily  escitalopram 5 milliGRAM(s) Oral daily  perampanel 8 milliGRAM(s) Oral at bedtime    Vital Signs Last 24 Hrs  T(C): 36.3 (17 Dec 2022 07:54), Max: 36.9 (16 Dec 2022 23:15)  T(F): 97.3 (17 Dec 2022 07:54), Max: 98.5 (17 Dec 2022 04:24)  HR: 90 (17 Dec 2022 07:54) (66 - 91)  BP: 119/79 (17 Dec 2022 07:54) (101/63 - 151/88)  RR: 20 (17 Dec 2022 07:54) (18 - 20)  SpO2: 96% (17 Dec 2022 07:54) (96% - 99%)    Parameters below as of 17 Dec 2022 07:54  Patient On (Oxygen Delivery Method): room air    Detailed Neurologic Exam:    Mental status: The patient is awake and alert. There is no aphasia. There is no dysarthria.     Cranial nerves: Pupils equal and react symmetrically to light. There is no visual field deficit to threat. Extraocular motion is full with no nystagmus. Facial sensation is intact. Facial musculature is symmetric. Palate elevates symmetrically. Tongue is midline.    Motor: There is normal bulk and tone.  There is no tremor.  Strength grossly 5/5 bilaterally.    Sensation: Grossly intact to light touch and pin.    Reflexes: 2+ throughout and plantar responses are flexor.    Cerebellar: No dysmetria on finger nose testing.    Labs:     12-17    140  |  105  |  19.0  ----------------------------<  74  4.3   |  23.0  |  1.03    Ca    8.7      17 Dec 2022 07:00  Phos  3.2     12-17  Mg     1.9     12-17    TPro  5.6<L>  /  Alb  3.3  /  TBili  0.6  /  DBili  0.1  /  AST  24  /  ALT  11  /  AlkPhos  41  12-17                            11.8   5.05  )-----------( 196      ( 17 Dec 2022 07:00 )             35.6       Rad:   EEG normal.   All captured episodes not epileptic.

## 2022-12-17 NOTE — DISCHARGE NOTE PROVIDER - NSDCCPCAREPLAN_GEN_ALL_CORE_FT
PRINCIPAL DISCHARGE DIAGNOSIS  Diagnosis: Seizure  Assessment and Plan of Treatment: Continue on perampanel. Follow up with Dr. Clinton for further management.      SECONDARY DISCHARGE DIAGNOSES  Diagnosis: CAD (coronary artery disease)  Assessment and Plan of Treatment: Continue on aspirin and atorvastatin.

## 2022-12-17 NOTE — DISCHARGE NOTE PROVIDER - HOSPITAL COURSE
82F with a prior hospitalization for seizure-like activity associated with left sided weakness sand slurred speech which was noted to be non-epileptiform on EEG  presented with abnormal jerking movements which had occurred over the past few months. On presentation, CT of the head noted no acute intracranial pathology or significant interval change, noted mild generalized cerebral volume loss, mild to moderate chronic microvascular ischemic disease. The patient was seen by Epileptology in consultation and noted to have myoclonus in the past and the current findings were thought to be functional rather than an organic disorder. Video EEG was initiated and perampanel was continued. EEG monitoring was without events and the patient was discharged with instructions to follow up with Epileptology on an outpatient basis.      35 minutes total time

## 2022-12-17 NOTE — DISCHARGE NOTE PROVIDER - NSDCMRMEDTOKEN_GEN_ALL_CORE_FT
aspirin 81 mg oral delayed release tablet: 1 tab(s) orally once a day  atorvastatin 80 mg oral tablet: 1 tab(s) orally once a day  dilTIAZem 120 mg/24 hours oral capsule, extended release: 1 cap(s) orally once a day  Lexapro 5 mg oral tablet: 5 milligram(s) orally once a day  Nexlizet 180 mg-10 mg oral tablet: 1 tab(s) orally once a day  perampanel 8 mg oral tablet: 1 tab(s) orally once a day (at bedtime) MDD:8mg

## 2022-12-17 NOTE — DISCHARGE NOTE NURSING/CASE MANAGEMENT/SOCIAL WORK - NSDCPEFALRISK_GEN_ALL_CORE
For information on Fall & Injury Prevention, visit: [email or web address] OR  [email or web address] OR  https://www.cdc.gov/steadi/patient.html

## 2022-12-17 NOTE — DISCHARGE NOTE PROVIDER - NSDCFUSCHEDAPPT_GEN_ALL_CORE_FT
Jon Clinton Physician [facility]  NEUROLOGY [address]  Scheduled Appointment: 02/07/2023

## 2022-12-17 NOTE — PROGRESS NOTE ADULT - ASSESSMENT
The patient is a 82y Female with episodes of convulsive activity.     Seizures.   Suspected possible scalp-negative seizures, so perampanel was started and up-titrated to 8mg QHS. However, patient has made multiple ER visits in the last 2 months, now primarily reporting "shaking" and "tremor", and no longer endorsing LLE weakness unless inquired by the physician.     EEG normal.     Plan for discharge on Fycompa 8 mg QHS.     Case discussed with Dr Yoon.

## 2022-12-17 NOTE — DISCHARGE NOTE PROVIDER - CARE PROVIDER_API CALL
Jon Clinton)  EEGEpilepsy; Neurology  [address] [place] [address]  Phone: [phone]  Fax: [phone]  Follow Up Time:

## 2022-12-18 ENCOUNTER — EMERGENCY (EMERGENCY)
Facility: HOSPITAL | Age: 82
LOS: 1 days | Discharge: HOME | End: 2022-12-18
Attending: EMERGENCY MEDICINE
Payer: MEDICARE

## 2022-12-18 VITALS
DIASTOLIC BLOOD PRESSURE: 68 MMHG | RESPIRATION RATE: 18 BRPM | HEIGHT: 62 IN | TEMPERATURE: 98 F | OXYGEN SATURATION: 99 % | SYSTOLIC BLOOD PRESSURE: 138 MMHG | HEART RATE: 76 BPM | WEIGHT: 164.91 LBS

## 2022-12-18 DIAGNOSIS — Z90.5 ACQUIRED ABSENCE OF KIDNEY: Chronic | ICD-10-CM

## 2022-12-18 DIAGNOSIS — C64.9 MALIGNANT NEOPLASM OF UNSPECIFIED KIDNEY, EXCEPT RENAL PELVIS: Chronic | ICD-10-CM

## 2022-12-18 DIAGNOSIS — Z90.89 ACQUIRED ABSENCE OF OTHER ORGANS: Chronic | ICD-10-CM

## 2022-12-18 LAB
ALBUMIN SERPL ELPH-MCNC: 3.9 G/DL — SIGNIFICANT CHANGE UP (ref 3.3–5.2)
ALP SERPL-CCNC: 57 U/L — SIGNIFICANT CHANGE UP (ref 40–120)
ALT FLD-CCNC: 15 U/L — SIGNIFICANT CHANGE UP
ANION GAP SERPL CALC-SCNC: 11 MMOL/L — SIGNIFICANT CHANGE UP (ref 5–17)
APTT BLD: 27.1 SEC — LOW (ref 27.5–35.5)
AST SERPL-CCNC: 39 U/L — HIGH
BILIRUB SERPL-MCNC: 0.5 MG/DL — SIGNIFICANT CHANGE UP (ref 0.4–2)
BLD GP AB SCN SERPL QL: SIGNIFICANT CHANGE UP
BUN SERPL-MCNC: 25 MG/DL — HIGH (ref 8–20)
CALCIUM SERPL-MCNC: 9.2 MG/DL — SIGNIFICANT CHANGE UP (ref 8.4–10.5)
CHLORIDE SERPL-SCNC: 99 MMOL/L — SIGNIFICANT CHANGE UP (ref 96–108)
CO2 SERPL-SCNC: 25 MMOL/L — SIGNIFICANT CHANGE UP (ref 22–29)
CREAT SERPL-MCNC: 1.08 MG/DL — SIGNIFICANT CHANGE UP (ref 0.5–1.3)
EGFR: 51 ML/MIN/1.73M2 — LOW
FLUAV AG NPH QL: SIGNIFICANT CHANGE UP
FLUBV AG NPH QL: SIGNIFICANT CHANGE UP
GLUCOSE SERPL-MCNC: 92 MG/DL — SIGNIFICANT CHANGE UP (ref 70–99)
HCT VFR BLD CALC: 38.7 % — SIGNIFICANT CHANGE UP (ref 34.5–45)
HGB BLD-MCNC: 13.1 G/DL — SIGNIFICANT CHANGE UP (ref 11.5–15.5)
INR BLD: 1.1 RATIO — SIGNIFICANT CHANGE UP (ref 0.88–1.16)
MCHC RBC-ENTMCNC: 30 PG — SIGNIFICANT CHANGE UP (ref 27–34)
MCHC RBC-ENTMCNC: 33.9 GM/DL — SIGNIFICANT CHANGE UP (ref 32–36)
MCV RBC AUTO: 88.6 FL — SIGNIFICANT CHANGE UP (ref 80–100)
PLATELET # BLD AUTO: 212 K/UL — SIGNIFICANT CHANGE UP (ref 150–400)
POTASSIUM SERPL-MCNC: 5.2 MMOL/L — SIGNIFICANT CHANGE UP (ref 3.5–5.3)
POTASSIUM SERPL-SCNC: 5.2 MMOL/L — SIGNIFICANT CHANGE UP (ref 3.5–5.3)
PROT SERPL-MCNC: 6.4 G/DL — LOW (ref 6.6–8.7)
PROTHROM AB SERPL-ACNC: 12.8 SEC — SIGNIFICANT CHANGE UP (ref 10.5–13.4)
RBC # BLD: 4.37 M/UL — SIGNIFICANT CHANGE UP (ref 3.8–5.2)
RBC # FLD: 12 % — SIGNIFICANT CHANGE UP (ref 10.3–14.5)
RSV RNA NPH QL NAA+NON-PROBE: SIGNIFICANT CHANGE UP
SARS-COV-2 RNA SPEC QL NAA+PROBE: SIGNIFICANT CHANGE UP
SODIUM SERPL-SCNC: 135 MMOL/L — SIGNIFICANT CHANGE UP (ref 135–145)
WBC # BLD: 7.1 K/UL — SIGNIFICANT CHANGE UP (ref 3.8–10.5)
WBC # FLD AUTO: 7.1 K/UL — SIGNIFICANT CHANGE UP (ref 3.8–10.5)

## 2022-12-18 PROCEDURE — 71260 CT THORAX DX C+: CPT | Mod: 26,MA

## 2022-12-18 PROCEDURE — 99285 EMERGENCY DEPT VISIT HI MDM: CPT | Mod: 25

## 2022-12-18 PROCEDURE — 99284 EMERGENCY DEPT VISIT MOD MDM: CPT

## 2022-12-18 PROCEDURE — 73060 X-RAY EXAM OF HUMERUS: CPT | Mod: 26,RT

## 2022-12-18 PROCEDURE — 73090 X-RAY EXAM OF FOREARM: CPT | Mod: 26,LT

## 2022-12-18 PROCEDURE — 72125 CT NECK SPINE W/O DYE: CPT | Mod: 26,MA

## 2022-12-18 PROCEDURE — 73110 X-RAY EXAM OF WRIST: CPT | Mod: 26,RT

## 2022-12-18 PROCEDURE — 73070 X-RAY EXAM OF ELBOW: CPT | Mod: 26,RT

## 2022-12-18 PROCEDURE — 71045 X-RAY EXAM CHEST 1 VIEW: CPT | Mod: 26

## 2022-12-18 PROCEDURE — 93010 ELECTROCARDIOGRAM REPORT: CPT

## 2022-12-18 PROCEDURE — 36000 PLACE NEEDLE IN VEIN: CPT

## 2022-12-18 PROCEDURE — 73030 X-RAY EXAM OF SHOULDER: CPT | Mod: 26,RT

## 2022-12-18 PROCEDURE — 70450 CT HEAD/BRAIN W/O DYE: CPT | Mod: 26,MA

## 2022-12-18 PROCEDURE — 74177 CT ABD & PELVIS W/CONTRAST: CPT | Mod: 26,MA

## 2022-12-18 RX ORDER — ACETAMINOPHEN 500 MG
1000 TABLET ORAL ONCE
Refills: 0 | Status: COMPLETED | OUTPATIENT
Start: 2022-12-18 | End: 2022-12-18

## 2022-12-18 RX ADMIN — Medication 400 MILLIGRAM(S): at 16:07

## 2022-12-18 NOTE — ED PROCEDURE NOTE - PROCEDURE ADDITIONAL DETAILS
Ortega: 18g IV placed in superficial vein of the RIGHT forearm under ultrasound guidance. Good blood draw back and flush. Pt tolerated the procedure well.

## 2022-12-18 NOTE — ED PROVIDER NOTE - CLINICAL SUMMARY MEDICAL DECISION MAKING FREE TEXT BOX
patient with fall at home 2/2 R UE muscle spasms, recently admitted to hospital for same with normal eeg, now returning for fall. ct with no acute injury. will place in obs for neuro consult and PT eval.

## 2022-12-18 NOTE — ED PROVIDER NOTE - CARE PLAN
Principal Discharge DX:	Closed head injury  Secondary Diagnosis:	Fall at home  Secondary Diagnosis:	Muscle spasticity   1

## 2022-12-18 NOTE — ED PROVIDER NOTE - PROGRESS NOTE DETAILS
Shannon: PT pending CT scan of the neck and xrays s/p fall. Upon reassessment pt states she is still in pain despite iv Tylenol. Describes non-specific pain.

## 2022-12-18 NOTE — ED PROVIDER NOTE - ATTENDING CONTRIBUTION TO CARE
82F w/PMH HTN, TIA, CVA, seizure d/o, Polio (with residual left sided deficits); now p/w head trauma s/p fall. reports having episodic spasm while answering the door today, fell onto right side. c/o right arm pain. headache with nausea. denies loc after head trauma. headache--right sided, associated with nausea. denies vomiting. denies numbness/tingling. denies focal weakness. c/o right chest wall pain.  denies sob. denies pain over LE.  Gen: Alert, NAD  Head: NC, AT, PERRL, EOMI, normal lids/conjunctiva  Neck: +supple, no tenderness/meningismus/JVD, +Trachea midline  Pulm: Bilateral BS, normal resp effort, no wheeze/stridor/retractions  CV: RRR, no M/R/G, +dist pulses  CHEST WALL: ttp over bilateral chest wall R>L,  no crepitus  Abd: soft, NT/ND, +BS, no hepatosplenomegaly  Mskel:    L UE: from/nt @shoulder/elbow/wrist/hand   R UE: from/nt @shoulder/elbow. ttp @ mid-forearm with ecchymoses.  ttp @ radial wrist. nt over hand.    L LE: from/nt @ hip/knee/ankle   R LE: from/nt @hip/knee/ankle   distal pulses intact  BACK: nt midline c/t/l/s spine.   Neuro: AAOx3, no sensory/motor deficit  A/P:  82yoF s/p fall with head trauma and chest wall pain s/p fall  -ct head, cervical spine, ct a/p, ct chest, pain control, re-eval 82F w/PMH HTN, TIA, CVA, seizure d/o, Polio (with residual left sided deficits); now p/w head trauma s/p fall. reports having episodic spasm while answering the door today, fell onto right side. c/o right arm pain. headache with nausea. denies loc after head trauma. headache--right sided, associated with nausea. denies vomiting. denies numbness/tingling. denies focal weakness. c/o right chest wall pain.  denies sob. denies pain over LE.  Gen: Alert, NAD  Head: NC, AT, PERRL, EOMI, normal lids/conjunctiva  Neck: +supple, no tenderness/meningismus/JVD, +Trachea midline  Pulm: Bilateral BS, normal resp effort, no wheeze/stridor/retractions  CV: RRR, no M/R/G, +dist pulses  CHEST WALL: ttp over bilateral chest wall R>L,  no crepitus  Abd: soft, NT/ND, +BS, no hepatosplenomegaly  Mskel:    L UE: from/nt @shoulder/elbow/wrist/hand   R UE: from/nt @shoulder/elbow. ttp @ mid-forearm with ecchymoses.  ttp @ radial wrist. nt over hand.    L LE: from/nt @ hip/knee/ankle   R LE: from/nt @hip/knee/ankle   distal pulses intact  BACK: nt midline c/t/l/s spine.   Neuro: AAOx3, baseline weakness  A/P:  82yoF s/p fall with head trauma and chest wall pain s/p fall  -ct head, cervical spine, ct a/p, ct chest, pain control, re-eval

## 2022-12-18 NOTE — ED PROVIDER NOTE - OBJECTIVE STATEMENT
CECE HELM is a 81yo F with PMH CAD, CVA, HTN, Polio who presents c/o FALL at [facility] today.  States she was sitting in a chair and slid out of the chair hitting the back of her head and her right shoulder. PT is alert, oriented x 3 w/ no FNDs on physical. C/o right shoulder pain. Chart review reveals antiplatelet therapy but no anti-coagulation. She states she thinks she has had the flu recently which is making her weak. She denies any fevers, chills, chest pain, sob.

## 2022-12-18 NOTE — ED ADULT TRIAGE NOTE - CHIEF COMPLAINT QUOTE
pt a+ox3, BIBA s/p fall at home. +hit head, neg LOC. c/o right shoulder pain, sling applied by EMS. pt with twitching and muscle spasm, states she was d/c'd this way. MD called for heraclio.

## 2022-12-18 NOTE — ED PROVIDER NOTE - WET READ LAUNCH FT
There are no Wet Read(s) to document. There is 1 Wet Read(s) to document. There are 4 Wet Read(s) to document.

## 2022-12-18 NOTE — ED ADULT NURSE NOTE - OBJECTIVE STATEMENT
Pt is an 82YOF who is here s/p fall at home, pt was seen in the hospital yesterday for tremors, pt states she was discharged home, had the tremor and fell on the floor. Pt states that this time she injured herself, while she was sitting in a chair she fell on the floor and hit the back of her head and right shoulders, pt denies any anticoagulations. Pt denies any illness, no fevers, chills, nausea vomiting.

## 2022-12-19 DIAGNOSIS — F41.9 ANXIETY DISORDER, UNSPECIFIED: ICD-10-CM

## 2022-12-19 LAB
CK SERPL-CCNC: 78 U/L — SIGNIFICANT CHANGE UP (ref 25–170)
MAGNESIUM SERPL-MCNC: 1.9 MG/DL — SIGNIFICANT CHANGE UP (ref 1.6–2.6)
TROPONIN T SERPL-MCNC: <0.01 NG/ML — SIGNIFICANT CHANGE UP (ref 0–0.06)

## 2022-12-19 PROCEDURE — 93306 TTE W/DOPPLER COMPLETE: CPT | Mod: 26

## 2022-12-19 PROCEDURE — 93010 ELECTROCARDIOGRAM REPORT: CPT

## 2022-12-19 PROCEDURE — 99218: CPT

## 2022-12-19 PROCEDURE — 90792 PSYCH DIAG EVAL W/MED SRVCS: CPT

## 2022-12-19 RX ORDER — DILTIAZEM HCL 120 MG
30 CAPSULE, EXT RELEASE 24 HR ORAL THREE TIMES A DAY
Refills: 0 | Status: DISCONTINUED | OUTPATIENT
Start: 2022-12-19 | End: 2022-12-26

## 2022-12-19 RX ORDER — ATORVASTATIN CALCIUM 80 MG/1
80 TABLET, FILM COATED ORAL AT BEDTIME
Refills: 0 | Status: DISCONTINUED | OUTPATIENT
Start: 2022-12-19 | End: 2022-12-26

## 2022-12-19 RX ORDER — RIVAROXABAN 15 MG-20MG
15 KIT ORAL DAILY
Refills: 0 | Status: DISCONTINUED | OUTPATIENT
Start: 2022-12-19 | End: 2022-12-26

## 2022-12-19 RX ORDER — ESCITALOPRAM OXALATE 10 MG/1
5 TABLET, FILM COATED ORAL DAILY
Refills: 0 | Status: DISCONTINUED | OUTPATIENT
Start: 2022-12-19 | End: 2022-12-26

## 2022-12-19 RX ORDER — ASPIRIN/CALCIUM CARB/MAGNESIUM 324 MG
81 TABLET ORAL DAILY
Refills: 0 | Status: DISCONTINUED | OUTPATIENT
Start: 2022-12-19 | End: 2022-12-26

## 2022-12-19 RX ORDER — PERAMPANEL 2 MG/1
8 TABLET ORAL AT BEDTIME
Refills: 0 | Status: DISCONTINUED | OUTPATIENT
Start: 2022-12-19 | End: 2022-12-26

## 2022-12-19 RX ADMIN — Medication 30 MILLIGRAM(S): at 22:44

## 2022-12-19 RX ADMIN — Medication 30 MILLIGRAM(S): at 04:21

## 2022-12-19 RX ADMIN — RIVAROXABAN 15 MILLIGRAM(S): KIT at 04:20

## 2022-12-19 RX ADMIN — ATORVASTATIN CALCIUM 80 MILLIGRAM(S): 80 TABLET, FILM COATED ORAL at 22:44

## 2022-12-19 RX ADMIN — ESCITALOPRAM OXALATE 5 MILLIGRAM(S): 10 TABLET, FILM COATED ORAL at 04:21

## 2022-12-19 RX ADMIN — Medication 30 MILLIGRAM(S): at 11:07

## 2022-12-19 RX ADMIN — Medication 30 MILLIGRAM(S): at 19:08

## 2022-12-19 RX ADMIN — Medication 81 MILLIGRAM(S): at 04:21

## 2022-12-19 RX ADMIN — PERAMPANEL 8 MILLIGRAM(S): 2 TABLET ORAL at 22:44

## 2022-12-19 RX ADMIN — ATORVASTATIN CALCIUM 80 MILLIGRAM(S): 80 TABLET, FILM COATED ORAL at 04:21

## 2022-12-19 NOTE — ED BEHAVIORAL HEALTH ASSESSMENT NOTE - HPI (INCLUDE ILLNESS QUALITY, SEVERITY, DURATION, TIMING, CONTEXT, MODIFYING FACTORS, ASSOCIATED SIGNS AND SYMPTOMS)
Patient is an 82 year old,  female; domiciled alone since being  3 years ago, has 3 children (2 sons who live locally and daugher out of state);  retired, with no formal past psychiatric history ; no psychiatric  hospitalizations; no known suicide attempts; no known history of violence or arrests; no active substance abuse or known history of complicated withdrawal; with PMH CAD, CVA, HTN, Polio who presents c/o FALL at [facility] today.      States she was sitting in a chair and slid out of the chair hitting the back of her head and her right shoulder. PT is alert, oriented x 3 w/ no FNDs on physical. Patient has had multiple presentations to ER secondary to full body twitching and "seizure" like symptoms.  Patient was evaluated by Epilepsy and cardiology. Notes appreciated.  As per Epilepsy consult  patient has had recurrent episodes of self-limiting LLE weakness +/- twitching. Extensive workup were unyielding, and patient was started on empirical treatment with perampanel for possible scalp-negative seizures, which appears to control these episodes. However, since November, has been coming to ER very frequently for whole-body twitching. Just discharged this past Saturday with similar abnormal movements that were thought to be functional in etiology w/ multiple negative EEGs including when habitual abnormal movements were captures on video without ictal pattern correlates.  Patient does not have h/o incontinence and reports maintaining awareness through episodes.       On interview, patient  was pleasant, she appeared to be forthcoming and reliable historian.  Patient reports she has 4-5 similar episodes since August.  She admits to feeling frustrated and distressed about her gradual loss of independence and the thought of having to move out of the house she had built with her .   She states that she is gradually losing strength in her legs and feels that she is unable to drive.  She has good relationships with her sons but she is unable to live with either of them for different reasons.  She knows that she needs to start considering moving into independent leaving but finds the thought difficult to deal with. She does feel that it is likely that episodes are related to an increase in anxiety.            Patient denies consistently depressed mood, but feels down at times.   She is more anxious than depressed.  She reports low appetite but has been sleeping well. She denies anhedonia.   She denies any S/H I/I/P. She does want to speak with a therapist.     Concerning other psychiatric symptoms, pt denies any aggressive or violent behavior towards others. Pt denies any episodes of bizarre happiness, unusual energy, unusual nightime excitation or other common symptoms of fady. Pt denies hearing voices or seeing things.  No delusions were elicited.  Patient has not typically been an anxious person throughout he life. She notes several distressing memories that she has carried with her since childhood and she has never talked about with therapist or anyone.  She denies any h/o substance use.

## 2022-12-19 NOTE — ED BEHAVIORAL HEALTH ASSESSMENT NOTE - SUMMARY
Patient is an 82 year old,  female; domiciled alone since being  3 years ago, has 3 children (2 sons who live locally and daugher out of state);  retired, with no formal past psychiatric history ; no psychiatric  hospitalizations; no known suicide attempts; no known history of violence or arrests; no active substance abuse or known history of complicated withdrawal; with PMH CAD, CVA, HTN, Polio who presents c/o FALL at [facility] today.  Patient with h/o seizure like episodes and multiple evaluations in hospital since August.  Seen by epilepsy and cardiology today.  Patient has h/o multiple EEGs and episodes are not consistent with ictal activity.  Patient admits to anxiety since August due to declining independence and prospect of having to leave home she built w/ her .  She admits to having episodes of anxiety.  She denies any S/H I/I/P, no psychotic  or manic sx's were elicited. She admits to mild depressed mood at times but concerns are focused on her needing to reconcile prospect of leaving her home.  She would like to speak with a therapist and is cleared for outpatient follow up

## 2022-12-19 NOTE — ED ADULT NURSE REASSESSMENT NOTE - NS ED NURSE REASSESS COMMENT FT1
Assumed care of patient at approx 19:30. Patient AxOx4. Patient currently denies any pain/discomfort, denies CP/SOB. Patient has been updated on the plan of care. Patient offers no complaints at this time. No visible signs of acute distress noted, safety maintained.

## 2022-12-19 NOTE — ED CDU PROVIDER INITIAL DAY NOTE - PROGRESS NOTE DETAILS
overnight telemetry reviewed pt with incident of Vtach Cards called to eval pt. PT signed out to J[facility] daugherty after lengthy discussion about case with [facility] kitchen Received during sign out. Pending psych consult. Requested call back from cardiology regarding TTE read. No call back from cardiology. Spoke with Dr. Narvaez at [facility]. Discussed TTE findings. Continue to monitor over night to ensure no further episodes of VTach. Patient updated.

## 2022-12-19 NOTE — ED BEHAVIORAL HEALTH ASSESSMENT NOTE - RISK ASSESSMENT
Low: Patient has good support, remains future oriented, denies any S/H I/I/P, no prior suicide attempts, denies global insomnia, does appear to be getting atypical panic like attacks, no substance use, willing to engage in treatment.

## 2022-12-19 NOTE — CONSULT NOTE ADULT - SUBJECTIVE AND OBJECTIVE BOX
[facility]                                                                           Jon Clinton MD                                                                                                          Epilepsy Consult #: [phone] ([phone])                                        Office: [address] [place] [address], 2nd floor, [place], [place], [address]                                                 Phone: [phone]; Fax: [phone]                            ==============================================    EPILEPSY INITIAL CONSULT NOTE      ADMITTING DIAGNOSIS:     HPI:      This is an 81yo RH female with a history of HTN, HLD, CAD s/p stent, renal cancer s/p nephrectomy, anxiety and recurrent episodes of self-limiting LLE weakness and twitching who presents with another episode of whole-body twitching.    Patient has had recurrent episodes of self-limiting LLE weakness +/- twitching. Extensive workup were unyielding, and patient was started on empirical treatment with perampanel for possible scalp-negative seizures, which appears to control these episodes. However, since November, has been coming to ER very frequently for whole-body twitching. Just discharged this past Saturday with similar abnormal movements that were thought to be functional in etiology. Had a long discussion with the patient, who acknowledges tremendous distress as she is realizing she can no longer live independently in the house she and her  built, and would need to transition to assisted living. 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 Would like to speak to SW about more options for assisted living. Also interested in seeing psychiatrist while here in ER.      PMH:  HTN (hypertension)  Rheumatic fever  TIA (transient ischemic attack)  CAD S/P percutaneous coronary angioplasty  Polio  Cerebrovascular accident (CVA)  November 2020, June 2020, May 2020      PSH:  Status post tonsillectomy    H/O left nephrectomy  Renal cancer        MEDICATION:  aspirin  chewable 81 milliGRAM(s) Oral daily  atorvastatin 80 milliGRAM(s) Oral at bedtime  diltiazem    Tablet 30 milliGRAM(s) Oral three times a day  escitalopram 5 milliGRAM(s) Oral daily  perampanel 8 milliGRAM(s) Oral at bedtime  rivaroxaban 15 milliGRAM(s) Oral daily    ALLERGIES:  Biaxin (Muscle Pain; Joint Pain)  codeine (Faint)  Isosorbide Mononitrate Extended Release (Faint)  Metoprolol Succinate ER (Unknown)  monoctanoin (Unknown)    FH:  FHx: breast cancer  FH: CHF (congestive heart failure)      SH:  No EtOH, tobacco, illicit drugs.    ROS:  Neurology as per HPI, otherwise negative for constitutional, skin, eyes, ENT, respiratory, cardiovascular, gastrointestinal, genitourinary, musculoskeletal, psychiatric, hematology/lymphatics, endocrine, allergic/immunologic.    VITALS:  T(C): 36.8 (12-19-22 @ 06:18), Max: 36.8 (12-19-22 @ 06:18)  HR: 83 (12-19-22 @ 06:18) (73 - 87)  BP: 122/65 (12-19-22 @ 06:18) (122/65 - 161/74)  ABP: --  RR: 18 (12-19-22 @ 06:18) (18 - 18)  SpO2: 98% (12-19-22 @ 06:18) (96% - 98%)  CVP(cm H2O): --    GENERAL PHYSICAL EXAM:  GEN: no distress  HEENT: NCAT, OP clear  EYES: sclera white, conjunctiva clear, no nystagmus  NECK: supple  CV: RRR, no murmur     		  PULM: CTAB, no wheezing  ABD: soft, +BS, NT  EXT: peripheral pulse intact, no cyanosis  MSK: muscle tone and strength normal  SKIN: warm, dry    NEUROLOGICAL EXAM:  Mental Status  Orientation: alert and oriented to person, place, time, and situation   Language: clear and fluent    Cranial Nerves  II: full visual fields intact   III, IV, VI: PERRL, EOMI  V, VII: facial sensation and movement intact and symmetric   VIII: hearing intact   IX, X: uvula midline, soft palate elevates normally   XI: BL shoulder shrug intact   XII: tongue midline    Motor  5/5 BUE and BLE                 Tone and bulk are normal in upper and lower limbs  No pronator drift    Sensation  Intact to light touch and pinprick in all 4 EXTs    Reflex  2+ in BL biceps and patella                                    Plantar responses downward bilaterally    Coordination  Normal FTN bilaterally    Gait  Deferred      LABS:                          13.1   7.10  )-----------( 212      ( 18 Dec 2022 14:07 )             38.7     12-18    135  |  99  |  25.0<H>  ----------------------------<  92  5.2   |  25.0  |  1.08    Ca    9.2      18 Dec 2022 14:07  Mg     1.9     12-19    TPro  6.4<L>  /  Alb  3.9  /  TBili  0.5  /  DBili  x   /  AST  39<H>  /  ALT  15  /  AlkPhos  57  12-18    CAPILLARY BLOOD GLUCOSE        LIVER FUNCTIONS - ( 18 Dec 2022 14:07 )  Alb: 3.9 g/dL / Pro: 6.4 g/dL / ALK PHOS: 57 U/L / ALT: 15 U/L / AST: 39 U/L / GGT: x           PT/INR - ( 18 Dec 2022 14:07 )   PT: 12.8 sec;   INR: 1.10 ratio         PTT - ( 18 Dec 2022 14:07 )  PTT:27.1 sec      OTHER IMAGING AND STUDIES:    non-elective EMU 12/16 - 12/17/22:  EEG Summary:  Normal EEG in the awake, drowsy and asleep states.  - Intermittent abnormal movements characterized by a combination of axial twitch/jolt, left-right head shaking, head/shoulder lifts, body shaking/twitching. EEG immediately before, during and after the event showed baseline awake background without ictal rhythm.   - Occasional 30-120s runs of diffuse, max right posterior-temporal, sharply contoured theta activities, with minimal evolution in frequency. No clinical correlate. 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    Impression/Clinical Correlate:  Habitual abnormal movements captured on video without ictal pattern on EEG. Movements are atypical of epileptic seizures. Correlate clinically for consideration of possible functional etiology. Normal EEG in awake, drowsy and asleep states.

## 2022-12-19 NOTE — ED CDU PROVIDER INITIAL DAY NOTE - OBJECTIVE STATEMENT
CECE HELM is a 83yo F with PMH CAD, CVA, HTN, Polio who presents c/o FALL at [facility] today.  States she was sitting in a chair and slid out of the chair hitting the back of her head and her right shoulder. PT is alert, oriented x 3 w/ no FNDs on physical. C/o right shoulder pain. Chart review reveals antiplatelet therapy but no anti-coagulation. She states she thinks she has had the flu recently which is making her weak. She denies any fevers, chills, chest pain, sob.

## 2022-12-19 NOTE — CHART NOTE - NSCHARTNOTEFT_GEN_A_CORE
SW Note: Recc per psych MD is T&R with an appt created for [facility] outpatient F/U. Worker met with pt at bedside. Worker educated pt on [facility] telephonic intake process. Pt understood and in agreement, providing her telephone number to leave for intake ([phone]). Pt signed off on HIPAA consent form, releasing information from [facility] to [facility]. Appt created for 12/29 at 2:30 PM. [facility] brochure with appt reminder attached. [facility] flyer and information regarding residential placements, including  handbook, left with pt at bedside. Psych MD made aware. No other SW needs present at this time.

## 2022-12-19 NOTE — ED BEHAVIORAL HEALTH ASSESSMENT NOTE - DESCRIPTION
see HPI Patient reports that mother was alcoholic, unstable mood.  She completed HS.  She had worked at [employer].  She is  with 3 children. Patient was mildly anxious, cooperative and appeared forthcoming.  Patient appeared reliable historian. She was not aggressive or agitated.     Vital Signs Last 24 Hrs  T(C): 36.7 (12-19-22 @ 19:26), Max: 36.8 (12-19-22 @ 06:18)  T(F): 98 (12-19-22 @ 19:26), Max: 98.2 (12-19-22 @ 06:18)  HR: 87 (12-19-22 @ 19:26) (73 - 94)  BP: 133/62 (12-19-22 @ 19:26) (122/65 - 161/74)  BP(mean): --  RR: 18 (12-19-22 @ 19:26) (18 - 18)  SpO2: 98% (12-19-22 @ 19:26) (96% - 100%)

## 2022-12-19 NOTE — CONSULT NOTE ADULT - ASSESSMENT
83yo RH female with a history of HTN, HLD, CAD s/p stent, renal cancer s/p nephrectomy, anxiety and recurrent episodes of self-limiting LLE weakness and twitching who presents with another episode of whole-body twitching. Personally reviewed all imagines, labs, EEG and other studies.    Impression:  Suspect patient has an organic disorder that is improved/controlled on perampanel, but is now presenting with functional disorder.      Recommendation:  - continue perampanel 8mg QHS  - SW to help transition to assisted living  - psych consult      Thank you for allowing Epilepsy to participate in the care of this patient.     No acute intervention from Epilepsy at this time.  Please reconsult if needed.   ______________________  Jon Clinton MD   Director, Epilepsy/EMU - [facility]   Epilepsy Consult #: [phone] ([phone])

## 2022-12-19 NOTE — ED ADULT NURSE REASSESSMENT NOTE - NS ED NURSE REASSESS COMMENT FT1
Pt received @ 0730, A&OX3, denies pain.  CM in place, NSR.  VSS.  Pt moving all ext well.  Pt assisted with bedpan.  Abd soft nondistended, nontender, moving all ext well.

## 2022-12-19 NOTE — ED CDU PROVIDER INITIAL DAY NOTE - ATTENDING APP SHARED VISIT CONTRIBUTION OF CARE
82F w/PMH HTN, TIA, CVA, seizure d/o, Polio (with residual left sided deficits); now p/w head trauma s/p fall. reports having episodic spasm while answering the door today, fell onto right side. c/o right arm pain. headache with nausea. denies loc after head trauma. headache--right sided, associated with nausea. denies vomiting. denies numbness/tingling. denies focal weakness. c/o right chest wall pain.  denies sob. denies pain over LE.  ED visit: ct negative for trauma, labs normal  Gen: Alert, NAD  Head: NC, AT, PERRL, EOMI, normal lids/conjunctiva  Neck: +supple, no tenderness/meningismus/JVD, +Trachea midline  Pulm: Bilateral BS, normal resp effort, no wheeze/stridor/retractions  CV: RRR, no M/R/G, +dist pulses  CHEST WALL: ttp over bilateral chest wall R>L,  no crepitus  Abd: soft, NT/ND, +BS, no hepatosplenomegaly  Mskel:    L UE: from/nt @shoulder/elbow/wrist/hand   R UE: from/nt @shoulder/elbow. ttp @ mid-forearm with ecchymoses.  ttp @ radial wrist. nt over hand.    L LE: from/nt @ hip/knee/ankle   R LE: from/nt @hip/knee/ankle   distal pulses intact  BACK: nt midline c/t/l/s spine.   Neuro: AAOx3, baseline weakness  A/P:  82yoF s/p fall with head trauma and chest wall pain s/p fall  -neuro consult, PT heraclio, SW/CM to coordinate care.

## 2022-12-19 NOTE — CONSULT NOTE ADULT - SUBJECTIVE AND OBJECTIVE BOX
[facility], [facility]                              [address] [place] [address]                                                 PHONE: [phone]                                                 FAX: [phone]  ------------------------------------------------------------------------------------------------    Chief complaint:     82y Female with past medical history as under  At the time of evaluation, pt     PAST MEDICAL & SURGICAL HISTORY:  HTN (hypertension)      Rheumatic fever      TIA (transient ischemic attack)      CAD S/P percutaneous coronary angioplasty      Polio      Cerebrovascular accident (CVA)  November 2020, June 2020, May 2020      Status post tonsillectomy      H/O left nephrectomy      Renal cancer          Biaxin (Muscle Pain; Joint Pain)  codeine (Faint)  Isosorbide Mononitrate Extended Release (Faint)  Metoprolol Succinate ER (Unknown)  monoctanoin (Unknown)      Review of Systems:  Positive for chest pain, shortness of breath, dyspnea on exertion, edema  Rest of the systems were reviewed and was negative.     Family history:   No pertinent family history in first degree relative of early CAD, sudden cardiac death or  congenital heart disease    Social History:  No smoking   No alcohol  No other drug use    Vital Signs Last 24 Hrs  T(C): 36.8 (19 Dec 2022 06:18), Max: 36.8 (19 Dec 2022 06:18)  T(F): 98.2 (19 Dec 2022 06:18), Max: 98.2 (19 Dec 2022 06:18)  HR: 83 (19 Dec 2022 06:18) (73 - 87)  BP: 122/65 (19 Dec 2022 06:18) (122/65 - 161/74)  BP(mean): --  RR: 18 (19 Dec 2022 06:18) (18 - 18)  SpO2: 98% (19 Dec 2022 06:18) (96% - 99%)    Parameters below as of 19 Dec 2022 06:18  Patient On (Oxygen Delivery Method): room air        PHYSICAL EXAM:  Constitutional: Appears well; alert and co-operative  HEENT:     Head: Normocephalic and atraumatic  Eyes: Conjunctiva normal, No scleral icterus  Neck: Supple, No JVD  Mouth/Throat: Mucous membranes are normal. Mucosa are not pale or dry.  Cardiovascular: regular S1, S2  Respiratory: Lungs clear to auscultation; no crepitations, no wheeze  Gastrointestinal:  Soft, Non-tender, + BS	  Musculoskeletal: Normal range of motion. No edema  Skin: Warm and dry. No cyanosis . No diaphoresis.  Neurologic: Alert oriented to time place and person. Normal strength and no tremor.  Psychiatric: Normal mood and affect, Speech is normal and behavior is normal.        LABS:                        13.1   7.10  )-----------( 212      ( 18 Dec 2022 14:07 )             38.7     12-18    135  |  99  |  25.0<H>  ----------------------------<  92  5.2   |  25.0  |  1.08    Ca    9.2      18 Dec 2022 14:07  Mg     1.9     12-19    TPro  6.4<L>  /  Alb  3.9  /  TBili  0.5  /  DBili  x   /  AST  39<H>  /  ALT  15  /  AlkPhos  57  12-18    CARDIAC MARKERS ( 19 Dec 2022 01:00 )  x     / x     / 78 U/L / x     / x          PT/INR - ( 18 Dec 2022 14:07 )   PT: 12.8 sec;   INR: 1.10 ratio         PTT - ( 18 Dec 2022 14:07 )  PTT:27.1 sec    RADIOLOGY & ADDITIONAL STUDIES: (reviewed)  CXR was independently visualized/reviewed and demonstrated:     CARDIOLOGY TESTING:(reviewed)     ECG (Independent visualization): NSR    Echocardiogram  6/14/22  1. Left ventricle: The cavity size is normal. There is a basal sigmoid septum without LVOT obstuction. Regional wall  motion abnormalities are present. The left ventricular ejection fraction is normal. The LVEF was determined by visual  estimate. Left ventricular ejection fraction is 55-60%.  2. Right ventricle: The cavity size is normal. Right ventricular systolic function is normal. Calculated right ventricular  systolic pressure is suggestive of mildly elevated pulmonary pressure.  3. Left atrium: The cavity size is mildly dilated.  4. Right atrium: The cavity size is normal in size.  5. Mitral valve: The leaflets are mildly thickened. There is mild (1+) mitral valve regurgitation.  6. Aortic valve: The valve is trileaflet. The leaflets are mildly thickened. There is moderate (2+) valvular aortic  regurgitation.  7. Tricuspid valve: The tricuspid leaflets are not thickened. There is moderate (2+) tricuspid valve regurgitation.  8. Pericardium, extracardiac: There is no pericardial effusion.     Nuclear PET scan   12/16/21  Interpretation summary  Normal perfusion without evidence for ischemia EF of 72%.     Carotid doppler   12/16/21  Interpretation summary  mild plaque bilaterally without evidence of stenosis.  Unchanged as of 3/8/18.     MEGHANN  3/29/21  Interpretation Summary  EF 60-65%, mild prolapse of P2 segment of the posterior mitral valve without obvious flail segment, mod MR, mild TR, mild AI       Cardiac Cath  11/30/20  Interpretation Summary  prior stent in LAD is patent with non-obstructive CAD.    MEDICATIONS:(reviewed)  Home Medications:    MEDICATIONS  (STANDING):  aspirin  chewable 81 milliGRAM(s) Oral daily  atorvastatin 80 milliGRAM(s) Oral at bedtime  diltiazem    Tablet 30 milliGRAM(s) Oral three times a day  escitalopram 5 milliGRAM(s) Oral daily  perampanel 8 milliGRAM(s) Oral at bedtime  rivaroxaban 15 milliGRAM(s) Oral daily    MEDICATIONS  (PRN):      ASSESSMENT AND PLAN:    82y Female with prior history of hypertension, hyperlipidemia, known coronary artery disease status post PCI of the LAD (2018), rheumatic fever as child and vasovagal syncope     CAD  - Continue ASA    HTN  - Continue    Dyslipidemia  - Continue statin    AF  - Rates controlled  - Anticoagulation    Shortness of breath  - Likely multifactorial    Likely Non-MI troponin elevation secondary to ***- Would trend for now    Edema    - Echo ordered to assess LV function    - Serial cardiac enzymes  - Telemetry monitoring    Plan discussed with ER physician/Medicine team                                                  [facility], [facility]                              [address] [address]                                                 PHONE: [phone]                                                 FAX: [phone]  ------------------------------------------------------------------------------------------------    Chief complaint: dizziness, ? syncope, ? seizure    82y Female with past medical history as under presents c/o FALL at [employer] today.  States she was sitting in a chair and slid out of the chair hitting the back of her head and her right shoulder. She reports that she was having movement in her arms and legs but no incontinence.   At the time of evaluation, pt reports feeling back to baseline.    PAST MEDICAL & SURGICAL HISTORY:  HTN (hypertension)      Rheumatic fever      TIA (transient ischemic attack)      CAD S/P percutaneous coronary angioplasty      Polio      Cerebrovascular accident (CVA)  November 2020, June 2020, May 2020      Status post tonsillectomy      H/O left nephrectomy      Renal cancer          Biaxin (Muscle Pain; Joint Pain)  codeine (Faint)  Isosorbide Mononitrate Extended Release (Faint)  Metoprolol Succinate ER (Unknown)  monoctanoin (Unknown)      Review of Systems:  Positive for syncope  Rest of the systems were reviewed and was negative.     Family history:   No pertinent family history in first degree relative of early CAD, sudden cardiac death or  congenital heart disease    Social History:  No smoking   No alcohol  No other drug use    Vital Signs Last 24 Hrs  T(C): 36.8 (19 Dec 2022 06:18), Max: 36.8 (19 Dec 2022 06:18)  T(F): 98.2 (19 Dec 2022 06:18), Max: 98.2 (19 Dec 2022 06:18)  HR: 83 (19 Dec 2022 06:18) (73 - 87)  BP: 122/65 (19 Dec 2022 06:18) (122/65 - 161/74)  BP(mean): --  RR: 18 (19 Dec 2022 06:18) (18 - 18)  SpO2: 98% (19 Dec 2022 06:18) (96% - 99%)    Parameters below as of 19 Dec 2022 06:18  Patient On (Oxygen Delivery Method): room air        PHYSICAL EXAM:  Constitutional: Appears well; alert and co-operative  HEENT:     Head: Normocephalic and atraumatic  Eyes: Conjunctiva normal, No scleral icterus  Neck: Supple, No JVD  Mouth/Throat: Mucous membranes are normal. Mucosa are not pale or dry.  Cardiovascular: regular S1, S2  Respiratory: Lungs clear to auscultation; no crepitations, no wheeze  Gastrointestinal:  Soft, Non-tender, + BS	  Musculoskeletal: Normal range of motion. No edema  Skin: Warm and dry. No cyanosis . No diaphoresis.  Neurologic: Alert oriented to time place and person. Normal strength and no tremor.  Psychiatric: Normal mood and affect, Speech is normal and behavior is normal.        LABS:                        13.1   7.10  )-----------( 212      ( 18 Dec 2022 14:07 )             38.7     12-18    135  |  99  |  25.0<H>  ----------------------------<  92  5.2   |  25.0  |  1.08    Ca    9.2      18 Dec 2022 14:07  Mg     1.9     12-19    TPro  6.4<L>  /  Alb  3.9  /  TBili  0.5  /  DBili  x   /  AST  39<H>  /  ALT  15  /  AlkPhos  57  12-18    CARDIAC MARKERS ( 19 Dec 2022 01:00 )  x     / x     / 78 U/L / x     / x          PT/INR - ( 18 Dec 2022 14:07 )   PT: 12.8 sec;   INR: 1.10 ratio         PTT - ( 18 Dec 2022 14:07 )  PTT:27.1 sec    RADIOLOGY & ADDITIONAL STUDIES: (reviewed)  CXR was independently visualized/reviewed and demonstrated: clear lungs    CARDIOLOGY TESTING:(reviewed)     ECG (Independent visualization): NSR    Echocardiogram  6/14/22  1. Left ventricle: The cavity size is normal. There is a basal sigmoid septum without LVOT obstuction. Regional wall  motion abnormalities are present. The left ventricular ejection fraction is normal. The LVEF was determined by visual  estimate. Left ventricular ejection fraction is 55-60%.  2. Right ventricle: The cavity size is normal. Right ventricular systolic function is normal. Calculated right ventricular  systolic pressure is suggestive of mildly elevated pulmonary pressure.  3. Left atrium: The cavity size is mildly dilated.  4. Right atrium: The cavity size is normal in size.  5. Mitral valve: The leaflets are mildly thickened. There is mild (1+) mitral valve regurgitation.  6. Aortic valve: The valve is trileaflet. The leaflets are mildly thickened. There is moderate (2+) valvular aortic  regurgitation.  7. Tricuspid valve: The tricuspid leaflets are not thickened. There is moderate (2+) tricuspid valve regurgitation.  8. Pericardium, extracardiac: There is no pericardial effusion.     Nuclear PET scan   12/16/21  Interpretation summary  Normal perfusion without evidence for ischemia EF of 72%.     Carotid doppler   12/16/21  Interpretation summary  mild plaque bilaterally without evidence of stenosis.  Unchanged as of 3/8/18.     MEGHANN  3/29/21  Interpretation Summary  EF 60-65%, mild prolapse of P2 segment of the posterior mitral valve without obvious flail segment, mod MR, mild TR, mild AI       Cardiac Cath  11/30/20  Interpretation Summary  prior stent in LAD is patent with non-obstructive CAD.    MEDICATIONS:(reviewed)  Home Medications:    MEDICATIONS  (STANDING):  aspirin  chewable 81 milliGRAM(s) Oral daily  atorvastatin 80 milliGRAM(s) Oral at bedtime  diltiazem    Tablet 30 milliGRAM(s) Oral three times a day  escitalopram 5 milliGRAM(s) Oral daily  perampanel 8 milliGRAM(s) Oral at bedtime  rivaroxaban 15 milliGRAM(s) Oral daily    MEDICATIONS  (PRN):    ASSESSMENT AND PLAN:    82y Female with prior history of hypertension, hyperlipidemia, known coronary artery disease status post PCI of the LAD (2018), rheumatic fever as child and vasovagal syncope presents with syncope/fall/seizure    Will check ILR to assess for arrhythmias  ECG unremarkable so far  Continue ASA and Xarelto  Continue statin                                                  [facility], [facility]                              [address] [address]                                                 PHONE: [phone]                                                 FAX: [phone]  ------------------------------------------------------------------------------------------------    Chief complaint: dizziness, ? syncope, ? seizure    82y Female with past medical history as under presents c/o FALL at [employer] today.  States she was sitting in a chair and slid out of the chair hitting the back of her head and her right shoulder. She reports that she was having movement in her arms and legs but no incontinence.   At the time of evaluation, pt reports feeling back to baseline.    PAST MEDICAL & SURGICAL HISTORY:  HTN (hypertension)      Rheumatic fever      TIA (transient ischemic attack)      CAD S/P percutaneous coronary angioplasty      Polio      Cerebrovascular accident (CVA)  November 2020, June 2020, May 2020      Status post tonsillectomy      H/O left nephrectomy      Renal cancer          Biaxin (Muscle Pain; Joint Pain)  codeine (Faint)  Isosorbide Mononitrate Extended Release (Faint)  Metoprolol Succinate ER (Unknown)  monoctanoin (Unknown)      Review of Systems:  Positive for syncope  Rest of the systems were reviewed and was negative.     Family history:   No pertinent family history in first degree relative of early CAD, sudden cardiac death or  congenital heart disease    Social History:  No smoking   No alcohol  No other drug use    Vital Signs Last 24 Hrs  T(C): 36.8 (19 Dec 2022 06:18), Max: 36.8 (19 Dec 2022 06:18)  T(F): 98.2 (19 Dec 2022 06:18), Max: 98.2 (19 Dec 2022 06:18)  HR: 83 (19 Dec 2022 06:18) (73 - 87)  BP: 122/65 (19 Dec 2022 06:18) (122/65 - 161/74)  BP(mean): --  RR: 18 (19 Dec 2022 06:18) (18 - 18)  SpO2: 98% (19 Dec 2022 06:18) (96% - 99%)    Parameters below as of 19 Dec 2022 06:18  Patient On (Oxygen Delivery Method): room air        PHYSICAL EXAM:  Constitutional: Appears well; alert and co-operative  HEENT:     Head: Normocephalic and atraumatic  Eyes: Conjunctiva normal, No scleral icterus  Neck: Supple, No JVD  Mouth/Throat: Mucous membranes are normal. Mucosa are not pale or dry.  Cardiovascular: regular S1, S2  Respiratory: Lungs clear to auscultation; no crepitations, no wheeze  Gastrointestinal:  Soft, Non-tender, + BS	  Musculoskeletal: Normal range of motion. No edema  Skin: Warm and dry. No cyanosis . No diaphoresis.  Neurologic: Alert oriented to time place and person. Normal strength and no tremor.  Psychiatric: Normal mood and affect, Speech is normal and behavior is normal.        LABS:                        13.1   7.10  )-----------( 212      ( 18 Dec 2022 14:07 )             38.7     12-18    135  |  99  |  25.0<H>  ----------------------------<  92  5.2   |  25.0  |  1.08    Ca    9.2      18 Dec 2022 14:07  Mg     1.9     12-19    TPro  6.4<L>  /  Alb  3.9  /  TBili  0.5  /  DBili  x   /  AST  39<H>  /  ALT  15  /  AlkPhos  57  12-18    CARDIAC MARKERS ( 19 Dec 2022 01:00 )  x     / x     / 78 U/L / x     / x          PT/INR - ( 18 Dec 2022 14:07 )   PT: 12.8 sec;   INR: 1.10 ratio         PTT - ( 18 Dec 2022 14:07 )  PTT:27.1 sec    RADIOLOGY & ADDITIONAL STUDIES: (reviewed)  CXR was independently visualized/reviewed and demonstrated: clear lungs    CARDIOLOGY TESTING:(reviewed)     ECG (Independent visualization): NSR    Echocardiogram  6/14/22  1. Left ventricle: The cavity size is normal. There is a basal sigmoid septum without LVOT obstuction. Regional wall  motion abnormalities are present. The left ventricular ejection fraction is normal. The LVEF was determined by visual  estimate. Left ventricular ejection fraction is 55-60%.  2. Right ventricle: The cavity size is normal. Right ventricular systolic function is normal. Calculated right ventricular  systolic pressure is suggestive of mildly elevated pulmonary pressure.  3. Left atrium: The cavity size is mildly dilated.  4. Right atrium: The cavity size is normal in size.  5. Mitral valve: The leaflets are mildly thickened. There is mild (1+) mitral valve regurgitation.  6. Aortic valve: The valve is trileaflet. The leaflets are mildly thickened. There is moderate (2+) valvular aortic  regurgitation.  7. Tricuspid valve: The tricuspid leaflets are not thickened. There is moderate (2+) tricuspid valve regurgitation.  8. Pericardium, extracardiac: There is no pericardial effusion.     Nuclear PET scan   12/16/21  Interpretation summary  Normal perfusion without evidence for ischemia EF of 72%.     Carotid doppler   12/16/21  Interpretation summary  mild plaque bilaterally without evidence of stenosis.  Unchanged as of 3/8/18.     MEGHANN  3/29/21  Interpretation Summary  EF 60-65%, mild prolapse of P2 segment of the posterior mitral valve without obvious flail segment, mod MR, mild TR, mild AI       Cardiac Cath  11/30/20  Interpretation Summary  prior stent in LAD is patent with non-obstructive CAD.    MEDICATIONS:(reviewed)  Home Medications:    MEDICATIONS  (STANDING):  aspirin  chewable 81 milliGRAM(s) Oral daily  atorvastatin 80 milliGRAM(s) Oral at bedtime  diltiazem    Tablet 30 milliGRAM(s) Oral three times a day  escitalopram 5 milliGRAM(s) Oral daily  perampanel 8 milliGRAM(s) Oral at bedtime  rivaroxaban 15 milliGRAM(s) Oral daily    MEDICATIONS  (PRN):    ASSESSMENT AND PLAN:    82y Female with prior history of hypertension, hyperlipidemia, known coronary artery disease status post PCI of the LAD (2018), rheumatic fever as child and vasovagal syncope presents with syncope/fall/seizure    Will check ILR to assess for arrhythmias  ECG unremarkable so far  Continue ASA and Xarelto  Continue statin  If ILR interrogation unremarkable, anticipate no further inpt cardiac work-up

## 2022-12-19 NOTE — PROVIDER CONTACT NOTE (OTHER) - ASSESSMENT
PT ordered. chart reviewed and noted. pt received in ED, semifowler position in stretcher, agreeable to PT, cardiac monitor(RN agreeable to ambulate off monitor). PA Milworn agreeable to PT at this time, WBAT yolande UE/LE. pt left as received, 0/10 pain throughout session, NAD, all lines intact, PA and RN aware of dizziness

## 2022-12-19 NOTE — ED CDU PROVIDER INITIAL DAY NOTE - WR ORDER STATUS 1
Problem: Falls - Risk of 
Goal: *Absence of Falls Document Libby Ortez Fall Risk and appropriate interventions in the flowsheet. Outcome: Progressing Towards Goal 
Fall Risk Interventions: 
Mobility Interventions: Patient to call before getting OOB, Utilize walker, cane, or other assistive device Medication Interventions: Assess postural VS orthostatic hypotension, Patient to call before getting OOB, Teach patient to arise slowly Elimination Interventions: Call light in reach, Patient to call for help with toileting needs, Urinal in reach Performed Resulted

## 2022-12-20 VITALS
TEMPERATURE: 97 F | SYSTOLIC BLOOD PRESSURE: 100 MMHG | RESPIRATION RATE: 20 BRPM | OXYGEN SATURATION: 95 % | HEART RATE: 88 BPM | DIASTOLIC BLOOD PRESSURE: 64 MMHG

## 2022-12-20 PROCEDURE — 73030 X-RAY EXAM OF SHOULDER: CPT

## 2022-12-20 PROCEDURE — 73090 X-RAY EXAM OF FOREARM: CPT

## 2022-12-20 PROCEDURE — 36415 COLL VENOUS BLD VENIPUNCTURE: CPT

## 2022-12-20 PROCEDURE — 86900 BLOOD TYPING SEROLOGIC ABO: CPT

## 2022-12-20 PROCEDURE — 73070 X-RAY EXAM OF ELBOW: CPT

## 2022-12-20 PROCEDURE — 70450 CT HEAD/BRAIN W/O DYE: CPT | Mod: MA

## 2022-12-20 PROCEDURE — 84484 ASSAY OF TROPONIN QUANT: CPT

## 2022-12-20 PROCEDURE — 85730 THROMBOPLASTIN TIME PARTIAL: CPT

## 2022-12-20 PROCEDURE — 85027 COMPLETE CBC AUTOMATED: CPT

## 2022-12-20 PROCEDURE — 82550 ASSAY OF CK (CPK): CPT

## 2022-12-20 PROCEDURE — 96374 THER/PROPH/DIAG INJ IV PUSH: CPT | Mod: XU

## 2022-12-20 PROCEDURE — 72125 CT NECK SPINE W/O DYE: CPT | Mod: MA

## 2022-12-20 PROCEDURE — 99285 EMERGENCY DEPT VISIT HI MDM: CPT | Mod: 25

## 2022-12-20 PROCEDURE — 86850 RBC ANTIBODY SCREEN: CPT

## 2022-12-20 PROCEDURE — 93306 TTE W/DOPPLER COMPLETE: CPT

## 2022-12-20 PROCEDURE — 82962 GLUCOSE BLOOD TEST: CPT

## 2022-12-20 PROCEDURE — 99217: CPT

## 2022-12-20 PROCEDURE — 73060 X-RAY EXAM OF HUMERUS: CPT

## 2022-12-20 PROCEDURE — 86901 BLOOD TYPING SEROLOGIC RH(D): CPT

## 2022-12-20 PROCEDURE — 73110 X-RAY EXAM OF WRIST: CPT

## 2022-12-20 PROCEDURE — 85610 PROTHROMBIN TIME: CPT

## 2022-12-20 PROCEDURE — 71045 X-RAY EXAM CHEST 1 VIEW: CPT

## 2022-12-20 PROCEDURE — 71260 CT THORAX DX C+: CPT | Mod: MA

## 2022-12-20 PROCEDURE — 83735 ASSAY OF MAGNESIUM: CPT

## 2022-12-20 PROCEDURE — 74177 CT ABD & PELVIS W/CONTRAST: CPT | Mod: MA

## 2022-12-20 PROCEDURE — 93005 ELECTROCARDIOGRAM TRACING: CPT

## 2022-12-20 PROCEDURE — G0378: CPT

## 2022-12-20 PROCEDURE — 87637 SARSCOV2&INF A&B&RSV AMP PRB: CPT

## 2022-12-20 PROCEDURE — 80053 COMPREHEN METABOLIC PANEL: CPT

## 2022-12-20 RX ADMIN — Medication 30 MILLIGRAM(S): at 06:08

## 2022-12-20 RX ADMIN — Medication 81 MILLIGRAM(S): at 12:41

## 2022-12-20 RX ADMIN — RIVAROXABAN 15 MILLIGRAM(S): KIT at 12:41

## 2022-12-20 RX ADMIN — ESCITALOPRAM OXALATE 5 MILLIGRAM(S): 10 TABLET, FILM COATED ORAL at 12:41

## 2022-12-20 NOTE — ED CDU PROVIDER DISPOSITION NOTE - CARE PROVIDER_API CALL
Mayco Cyr (DO)  Internal Medicine  [address] [place] [address]  Phone: [phone]  Fax: [phone]  Follow Up Time:     Jon Clinton)  EEGEpilepsy; Neurology  [address] [place] [address], [address] [place] [address]  Phone: [phone]  Fax: [phone]  Follow Up Time:

## 2022-12-20 NOTE — ED CDU PROVIDER SUBSEQUENT DAY NOTE - CLINICAL SUMMARY MEDICAL DECISION MAKING FREE TEXT BOX
81 yo female PMHx CAD, CVA, HTN, Polio with seizure like activity. Multiple previous visit to ED for same. Extensive work ups. Cleared by epilepsy and psych. Has outpatient [facility] appointment set up. Cardiology consulted for episodes Vtach, has TTE. Spoke with cardiology, aware of TTE results. Holding over night for monitoring.

## 2022-12-20 NOTE — ED CDU PROVIDER DISPOSITION NOTE - CLINICAL COURSE
This is a 81yo F with PMH CAD, CVA, HTN, Polio who presents c/o FALL at [employer] today.  States she was sitting in a chair and slid out of the chair hitting the back of her head and her right shoulder. PT is alert, oriented x 3 w/ no FNDs on physical. C/o right shoulder pain. Chart review reveals antiplatelet therapy but no anti-coagulation. She states she thinks she has had the flu recently which is making her weak. She denies any fevers, chills, chest pain, sob.  Patient seen by cardiology, Epilepsy and PT, ILR interrogated with no events.  PT cleared for home.  EEG shows no events.  Patient seen by , cleared.  Given copies of all results, understands and agrees to proceed.

## 2022-12-20 NOTE — ED CDU PROVIDER DISPOSITION NOTE - ATTENDING APP SHARED VISIT CONTRIBUTION OF CARE
Seda NGUYEN- 81 Y/O F who lives alone at home and has epilepsy and recently meds were increased and has started using walker 1 week ago as it was making her little unsteady p/w near syncope episode. No syncope, fall or trauma. Pt was placed in obs to be seen by cardio, epilepsy    Pt is alert, well appearing female, s1s2 normal reg, b/l clear breath sounds, abd soft, nt, nd, neuro exam aox3, cn 2-12 intact. no focal deficits, skin warm, dry, good turgor    plan to discharge home, pt has neg work up and cleared by cardio and epilepsy

## 2022-12-20 NOTE — ED CDU PROVIDER DISPOSITION NOTE - PATIENT PORTAL LINK FT
You can access the FollowMyHealth Patient Portal offered by [facility] by registering at the following website: [email or web address]. By joining [employer]’s FollowMyHealth portal, you will also be able to view your health information using other applications (apps) compatible with our system.

## 2022-12-20 NOTE — ED CDU PROVIDER DISPOSITION NOTE - NSFOLLOWUPINSTRUCTIONS_ED_ALL_ED_FT
Please follow up with your cardiologist    Please follow up with MD FU    Return if symptoms worsen or persist

## 2022-12-20 NOTE — ED ADULT NURSE REASSESSMENT NOTE - NS ED NURSE REASSESS COMMENT FT1
Assumed care at change of shift, pt in NAD, reports she is feeling better at this time, breathing is even and unlabored.

## 2022-12-20 NOTE — ED CDU PROVIDER SUBSEQUENT DAY NOTE - ATTENDING APP SHARED VISIT CONTRIBUTION OF CARE
Seda NGUYEN- 83 Y/O F who lives alone at home and has epilepsy and recently meds were increased and has started using walker 1 week ago as it was making her little unsteady p/w near syncope episode. No syncope, fall or trauma. Pt was placed in obs to be seen by cardio, epilepsy    Pt is alert, well appearing female, s1s2 normal reg, b/l clear breath sounds, abd soft, nt, nd, neuro exam aox3, cn 2-12 intact. no focal deficits, skin warm, dry, good turgor    plan to discharge home, pt has neg work up and cleared by cardio and epilepsy

## 2022-12-27 ENCOUNTER — EMERGENCY (EMERGENCY)
Facility: HOSPITAL | Age: 82
LOS: 1 days | Discharge: HOME | End: 2022-12-27
Attending: EMERGENCY MEDICINE
Payer: MEDICARE

## 2022-12-27 VITALS
OXYGEN SATURATION: 100 % | HEIGHT: 62 IN | DIASTOLIC BLOOD PRESSURE: 80 MMHG | WEIGHT: 138.01 LBS | TEMPERATURE: 97 F | RESPIRATION RATE: 18 BRPM | SYSTOLIC BLOOD PRESSURE: 137 MMHG | HEART RATE: 68 BPM

## 2022-12-27 DIAGNOSIS — C64.9 MALIGNANT NEOPLASM OF UNSPECIFIED KIDNEY, EXCEPT RENAL PELVIS: Chronic | ICD-10-CM

## 2022-12-27 DIAGNOSIS — Z90.5 ACQUIRED ABSENCE OF KIDNEY: Chronic | ICD-10-CM

## 2022-12-27 DIAGNOSIS — Z90.89 ACQUIRED ABSENCE OF OTHER ORGANS: Chronic | ICD-10-CM

## 2022-12-27 LAB
ALBUMIN SERPL ELPH-MCNC: 3.9 G/DL — SIGNIFICANT CHANGE UP (ref 3.3–5.2)
ALP SERPL-CCNC: 54 U/L — SIGNIFICANT CHANGE UP (ref 40–120)
ALT FLD-CCNC: 14 U/L — SIGNIFICANT CHANGE UP
ANION GAP SERPL CALC-SCNC: 14 MMOL/L — SIGNIFICANT CHANGE UP (ref 5–17)
AST SERPL-CCNC: 31 U/L — SIGNIFICANT CHANGE UP
BASOPHILS # BLD AUTO: 0.09 K/UL — SIGNIFICANT CHANGE UP (ref 0–0.2)
BASOPHILS NFR BLD AUTO: 1.3 % — SIGNIFICANT CHANGE UP (ref 0–2)
BILIRUB SERPL-MCNC: 0.5 MG/DL — SIGNIFICANT CHANGE UP (ref 0.4–2)
BUN SERPL-MCNC: 22.9 MG/DL — HIGH (ref 8–20)
CALCIUM SERPL-MCNC: 8.9 MG/DL — SIGNIFICANT CHANGE UP (ref 8.4–10.5)
CHLORIDE SERPL-SCNC: 100 MMOL/L — SIGNIFICANT CHANGE UP (ref 96–108)
CO2 SERPL-SCNC: 27 MMOL/L — SIGNIFICANT CHANGE UP (ref 22–29)
CREAT SERPL-MCNC: 1.13 MG/DL — SIGNIFICANT CHANGE UP (ref 0.5–1.3)
EGFR: 49 ML/MIN/1.73M2 — LOW
EOSINOPHIL # BLD AUTO: 0.17 K/UL — SIGNIFICANT CHANGE UP (ref 0–0.5)
EOSINOPHIL NFR BLD AUTO: 2.4 % — SIGNIFICANT CHANGE UP (ref 0–6)
GLUCOSE SERPL-MCNC: 88 MG/DL — SIGNIFICANT CHANGE UP (ref 70–99)
HCT VFR BLD CALC: 37.8 % — SIGNIFICANT CHANGE UP (ref 34.5–45)
HGB BLD-MCNC: 12.5 G/DL — SIGNIFICANT CHANGE UP (ref 11.5–15.5)
IMM GRANULOCYTES NFR BLD AUTO: 0.3 % — SIGNIFICANT CHANGE UP (ref 0–0.9)
LIDOCAIN IGE QN: 62 U/L — HIGH (ref 22–51)
LYMPHOCYTES # BLD AUTO: 1.39 K/UL — SIGNIFICANT CHANGE UP (ref 1–3.3)
LYMPHOCYTES # BLD AUTO: 19.9 % — SIGNIFICANT CHANGE UP (ref 13–44)
MAGNESIUM SERPL-MCNC: 2.2 MG/DL — SIGNIFICANT CHANGE UP (ref 1.6–2.6)
MCHC RBC-ENTMCNC: 29.8 PG — SIGNIFICANT CHANGE UP (ref 27–34)
MCHC RBC-ENTMCNC: 33.1 GM/DL — SIGNIFICANT CHANGE UP (ref 32–36)
MCV RBC AUTO: 90.2 FL — SIGNIFICANT CHANGE UP (ref 80–100)
MONOCYTES # BLD AUTO: 0.65 K/UL — SIGNIFICANT CHANGE UP (ref 0–0.9)
MONOCYTES NFR BLD AUTO: 9.3 % — SIGNIFICANT CHANGE UP (ref 2–14)
NEUTROPHILS # BLD AUTO: 4.67 K/UL — SIGNIFICANT CHANGE UP (ref 1.8–7.4)
NEUTROPHILS NFR BLD AUTO: 66.8 % — SIGNIFICANT CHANGE UP (ref 43–77)
NT-PROBNP SERPL-SCNC: 1144 PG/ML — HIGH (ref 0–300)
PLATELET # BLD AUTO: 231 K/UL — SIGNIFICANT CHANGE UP (ref 150–400)
POTASSIUM SERPL-MCNC: 4.4 MMOL/L — SIGNIFICANT CHANGE UP (ref 3.5–5.3)
POTASSIUM SERPL-SCNC: 4.4 MMOL/L — SIGNIFICANT CHANGE UP (ref 3.5–5.3)
PROT SERPL-MCNC: 6.3 G/DL — LOW (ref 6.6–8.7)
RAPID RVP RESULT: SIGNIFICANT CHANGE UP
RBC # BLD: 4.19 M/UL — SIGNIFICANT CHANGE UP (ref 3.8–5.2)
RBC # FLD: 12.1 % — SIGNIFICANT CHANGE UP (ref 10.3–14.5)
SARS-COV-2 RNA SPEC QL NAA+PROBE: SIGNIFICANT CHANGE UP
SODIUM SERPL-SCNC: 141 MMOL/L — SIGNIFICANT CHANGE UP (ref 135–145)
TROPONIN T SERPL-MCNC: <0.01 NG/ML — SIGNIFICANT CHANGE UP (ref 0–0.06)
TSH SERPL-MCNC: 1.71 UIU/ML — SIGNIFICANT CHANGE UP (ref 0.27–4.2)
WBC # BLD: 6.99 K/UL — SIGNIFICANT CHANGE UP (ref 3.8–10.5)
WBC # FLD AUTO: 6.99 K/UL — SIGNIFICANT CHANGE UP (ref 3.8–10.5)

## 2022-12-27 PROCEDURE — 96375 TX/PRO/DX INJ NEW DRUG ADDON: CPT

## 2022-12-27 PROCEDURE — 84443 ASSAY THYROID STIM HORMONE: CPT

## 2022-12-27 PROCEDURE — 83880 ASSAY OF NATRIURETIC PEPTIDE: CPT

## 2022-12-27 PROCEDURE — 93005 ELECTROCARDIOGRAM TRACING: CPT

## 2022-12-27 PROCEDURE — 71045 X-RAY EXAM CHEST 1 VIEW: CPT | Mod: 26

## 2022-12-27 PROCEDURE — 0225U NFCT DS DNA&RNA 21 SARSCOV2: CPT

## 2022-12-27 PROCEDURE — 84484 ASSAY OF TROPONIN QUANT: CPT

## 2022-12-27 PROCEDURE — 99285 EMERGENCY DEPT VISIT HI MDM: CPT | Mod: 25

## 2022-12-27 PROCEDURE — 36415 COLL VENOUS BLD VENIPUNCTURE: CPT

## 2022-12-27 PROCEDURE — 99220: CPT

## 2022-12-27 PROCEDURE — G0378: CPT

## 2022-12-27 PROCEDURE — 93010 ELECTROCARDIOGRAM REPORT: CPT

## 2022-12-27 PROCEDURE — 76604 US EXAM CHEST: CPT

## 2022-12-27 PROCEDURE — 83735 ASSAY OF MAGNESIUM: CPT

## 2022-12-27 PROCEDURE — 96365 THER/PROPH/DIAG IV INF INIT: CPT

## 2022-12-27 PROCEDURE — 80053 COMPREHEN METABOLIC PANEL: CPT

## 2022-12-27 PROCEDURE — 85025 COMPLETE CBC W/AUTO DIFF WBC: CPT

## 2022-12-27 PROCEDURE — 71045 X-RAY EXAM CHEST 1 VIEW: CPT

## 2022-12-27 PROCEDURE — 83690 ASSAY OF LIPASE: CPT

## 2022-12-27 RX ORDER — ACETAMINOPHEN 500 MG
650 TABLET ORAL EVERY 6 HOURS
Refills: 0 | Status: DISCONTINUED | OUTPATIENT
Start: 2022-12-27 | End: 2023-01-04

## 2022-12-27 RX ORDER — ATORVASTATIN CALCIUM 80 MG/1
80 TABLET, FILM COATED ORAL AT BEDTIME
Refills: 0 | Status: DISCONTINUED | OUTPATIENT
Start: 2022-12-27 | End: 2023-01-04

## 2022-12-27 RX ORDER — ASPIRIN/CALCIUM CARB/MAGNESIUM 324 MG
81 TABLET ORAL DAILY
Refills: 0 | Status: DISCONTINUED | OUTPATIENT
Start: 2022-12-28 | End: 2023-01-04

## 2022-12-27 RX ORDER — ASPIRIN/CALCIUM CARB/MAGNESIUM 324 MG
324 TABLET ORAL ONCE
Refills: 0 | Status: COMPLETED | OUTPATIENT
Start: 2022-12-27 | End: 2022-12-27

## 2022-12-27 RX ORDER — FUROSEMIDE 40 MG
20 TABLET ORAL ONCE
Refills: 0 | Status: COMPLETED | OUTPATIENT
Start: 2022-12-27 | End: 2022-12-27

## 2022-12-27 RX ORDER — ACETAMINOPHEN 500 MG
1000 TABLET ORAL ONCE
Refills: 0 | Status: COMPLETED | OUTPATIENT
Start: 2022-12-27 | End: 2022-12-27

## 2022-12-27 RX ORDER — LIDOCAINE 4 G/100G
1 CREAM TOPICAL DAILY
Refills: 0 | Status: DISCONTINUED | OUTPATIENT
Start: 2022-12-27 | End: 2023-01-04

## 2022-12-27 RX ORDER — CLOPIDOGREL BISULFATE 75 MG/1
75 TABLET, FILM COATED ORAL DAILY
Refills: 0 | Status: DISCONTINUED | OUTPATIENT
Start: 2022-12-28 | End: 2023-01-04

## 2022-12-27 RX ADMIN — Medication 20 MILLIGRAM(S): at 18:08

## 2022-12-27 RX ADMIN — Medication 1000 MILLIGRAM(S): at 14:50

## 2022-12-27 RX ADMIN — ATORVASTATIN CALCIUM 80 MILLIGRAM(S): 80 TABLET, FILM COATED ORAL at 21:32

## 2022-12-27 RX ADMIN — Medication 400 MILLIGRAM(S): at 14:34

## 2022-12-27 RX ADMIN — Medication 324 MILLIGRAM(S): at 15:04

## 2022-12-27 NOTE — ED CDU PROVIDER INITIAL DAY NOTE - PHYSICAL EXAMINATION
General: well appearing, NAD  Head: NC, AT  EENT: EOMI, no scleral icterus  Cardiac: RRR, no apparent murmurs, no lower extremity edema  Respiratory: CTABL, no respiratory distress   Abdomen: soft, ND, no abdominal tenderness  MSK/Vascular: full ROM, soft compartments, warm extremities  Neuro: AAOx3, sensation to light touch intact  Psych: calm, cooperative

## 2022-12-27 NOTE — ED ADULT TRIAGE NOTE - CHIEF COMPLAINT QUOTE
pt c/o chest discomfort and shortness of breath.  pt states she feels cold and could not warm up at home.

## 2022-12-27 NOTE — ED ADULT TRIAGE NOTE - AS HEIGHT TYPE
Ultrasound today shows normal anatomy.  All view seen and normal with placenta posterior.  Size equals dates.  Patient declined vaginal ultrasound for cervical length.  This is a girl!  CBC, glucose  And TSH  next visit.  
stated

## 2022-12-27 NOTE — ED PROVIDER NOTE - OBJECTIVE STATEMENT
82 y f with pmh of cva, polio, CAD sp PCI, HTN presenting for intermittent chest pain. Has been having intermittent chest pains that last less than a minute 2-3 times a day for months but today has had the same pains that last about a minute 3 times this morning. Asymptomatic at the time of interview but had the pains last 20 minutes before. Pt denies exertional symptoms, diaphoresis, n/v, ab pain, sob, cough, congestion, ha, body aches.

## 2022-12-27 NOTE — ED PROVIDER NOTE - ATTENDING CONTRIBUTION TO CARE
I, Delmy Altamirano, have personally seen and examined this patient. I have fully participated in the care of this patient. I have reviewed all pertinent clinical information, including history, physical exam, plan and the Resident's note and agree except as noted below.     Pt with h/o ACS, HTN, recent obs visit for CP today with SOB with CP that was more frequent/severe, exertional. nonradiating. exam- unremarkable, EKG no acute ischemic changes, labs notable for elevated BNP. cards rec serial trops. placed in obs

## 2022-12-27 NOTE — CONSULT NOTE ADULT - SUBJECTIVE AND OBJECTIVE BOX
[facility], THE HEART CENTER                                   [address] [place] [address]                                                      PHONE: [phone]                                                         FAX: [phone]  [email or web address]  ---------------------------------------------------------------------------------------------------------------------------------    Reason for Consult: Chest Pain    HPI: 82 year old female with a PMHx of CAD s/p PCI to the LAD (2018), TIA, rheumatic fever as a child, vasovagal syncope s/p ILR, HTN, HLD, polio who presents with chest pain. Patient states she has been getting chest pain for over a year now. She states she gets sharp pains usually on the left side of her chest (but rarely the right) that occurs randomly. Each episode lasts a few seconds and goes away on its own. Patient sometimes gets it weekly or daily to multiple times per day. Today the patient states that she was showering and eating and noticed the pain. It lasted a few seconds each time and went away however it occurred about 10-20 times today. She also felt a sense of coldness during the day which alerted her to come to the ER. Patient denies any palpitations, diaphoresis near syncope, syncope, or fever.    PAST MEDICAL & SURGICAL HISTORY:  HTN (hypertension)      Rheumatic fever      TIA (transient ischemic attack)      CAD S/P percutaneous coronary angioplasty      Polio      Cerebrovascular accident (CVA)  November 2020, June 2020, May 2020      Status post tonsillectomy      H/O left nephrectomy      Renal cancer          Biaxin (Muscle Pain; Joint Pain)  codeine (Faint)  Isosorbide Mononitrate Extended Release (Faint)  Metoprolol Succinate ER (Unknown)  monoctanoin (Unknown)      MEDICATIONS  (STANDING):    MEDICATIONS  (PRN):      Social History:  Cigarettes:  Denies current tobacco use                  Alcohol:  Denies daily etoh            Illicit Drug Abuse:  Denies    Family History:  denies CAD, MI, CVA, or sudden death.    ROS: Negative other than as mentioned in HPI.    Vital Signs Last 24 Hrs  T(C): 36.7 (27 Dec 2022 15:29), Max: 36.7 (27 Dec 2022 15:29)  T(F): 98.1 (27 Dec 2022 15:29), Max: 98.1 (27 Dec 2022 15:29)  HR: 68 (27 Dec 2022 15:29) (68 - 68)  BP: 119/68 (27 Dec 2022 15:29) (119/68 - 137/80)  BP(mean): --  RR: 18 (27 Dec 2022 15:29) (18 - 18)  SpO2: 96% (27 Dec 2022 15:29) (96% - 100%)    Parameters below as of 27 Dec 2022 15:29  Patient On (Oxygen Delivery Method): room air      ICU Vital Signs Last 24 Hrs  BREANA HELM  I&O's Detail    I&O's Summary    Drug Dosing Weight  BREANA HELM      PHYSICAL EXAM:  General: NAD  HEENT: Head; normocephalic, atraumatic.  Eyes: EOMI, conjunctiva normal  Neck: Supple, no JVD noted  CARDIOVASCULAR: RRR, S1 S2, No murmurs or gallops  LUNGS: Clear to auscultation b/l, No rales, rhonchi or wheeze, normal inspiratory effort  ABDOMEN: Soft, nontender, non-distended, +bowel sounds  EXTREMITIES: No edema b/l, no cyanosis   SKIN: warm and dry  NEURO: Alert/oriented x 3  PSYCH: normal affect.        LABS:                        12.5   6.99  )-----------( 231      ( 27 Dec 2022 14:50 )             37.8     12-27    141  |  100  |  22.9<H>  ----------------------------<  88  4.4   |  27.0  |  1.13    Ca    8.9      27 Dec 2022 14:50  Mg     2.2     12-27    TPro  6.3<L>  /  Alb  3.9  /  TBili  0.5  /  DBili  x   /  AST  31  /  ALT  14  /  AlkPhos  54  12-27    BREANA HELM  CARDIAC MARKERS ( 27 Dec 2022 14:50 )  x     / <0.01 ng/mL / x     / x     / x                RADIOLOGY & ADDITIONAL STUDIES:    INTERPRETATION OF TELEMETRY (personally reviewed): Sinus rhythm, no significant events.    ECG: Sinus rhythm, no acute ischemic events    ECHO: 12/19/22  Summary:   1. Left ventricular ejection fraction, by visual estimation, is 60 to   65%.   2.Normal global left ventricular systolic function.   3. Basal inferolateral segment, basal anterolateral segment, and basal   anterior segment are abnormal as described above.   4. The left ventricular diastolic function could not be assessed in this   study.   5. Normal right ventricular size and function.   6. Mildly enlarged left atrium.   7. Normal right atrial size.   8. Mild thickening of the anterior and posterior mitral valve leaflets.   9. Moderate mitral valve regurgitation.  10. Mild tricuspid regurgitation.  11. Moderate aortic regurgitation.  12. Sclerotic aortic valve with normal opening.  13. Mild pulmonic valve regurgitation.  14. Adequate TR velocity was not obtained to accurately assess RVSP.    Cath: 11/30/20  LM:   --  LM: Normal.  LAD:   --  Proximal LAD: There was a 20 % stenosis.  --  Mid LAD: There was a 0 % stenosis at the site of a prior stent.  CX:   --  Circumflex: Angiography showed minor luminal irregularities with  no flow limiting lesions.  RCA:   --  RCA: Angiography showed minor luminal irregularities with no  flow limiting lesions.  --  Ostial RCA: There was a 20 % stenosis.  COMPLICATIONS: There were no complications. No complications occurred  during the cathlab visit.  DIAGNOSTIC IMPRESSIONS: Prior stent in LAD is patent with non obstructive  CAD  DIAGNOSTIC RECOMMENDATIONS: The patient should continue with the present  medications.  INTERVENTIONAL IMPRESSIONS: Prior stent in LAD is patent with non  obstructive CAD      Assessment and Plan:  82 year old female with a PMHx of CAD s/p PCI to the LAD (2018), TIA, rheumatic fever as a child, vasovagal syncope s/p ILR, HTN, HLD, polio who presents with chest pain.    Chest Pain  - pain classified as non-anginal   - troponin negative x1. Continue to trend q3h to negative x3 or to peak  - EKG unremarkable  - recent echo as above  - If troponins and EKG remain unremarkable, no further inpatient cardiac workup required. Will plan for outpatient stress test if this is the case  - If troponins begin to rise, please call back cardiology for likely cath tomorrow    CAD  - c/w home aspirin 81mg daily  - c/w home plavix 75mg daily    HTN/HLD  - c/w home atorvastatin 80mg daily  - blood pressure controlled    Thank you for letting [facility] to assist in the management of this patient. Please call with any questions.

## 2022-12-27 NOTE — ED CDU PROVIDER INITIAL DAY NOTE - CLINICAL SUMMARY MEDICAL DECISION MAKING FREE TEXT BOX
PT with   CP      placed in obs for Diagnostic uncertainty. PT seen BY OBS PA found to be appropriate CDU PT care transferred to PA.

## 2022-12-27 NOTE — ED PROVIDER NOTE - CONSIDERATION OF ADMISSION OBSERVATION
seen by cards, serial trops, DC for outpt stress vs cath depending trend Consideration of Admission/Observation

## 2022-12-27 NOTE — ED PROVIDER NOTE - PHYSICAL EXAMINATION
General: well appearing, NAD  Head: NC, AT  EENT: EOMI, no scleral icterus  Cardiac: RRR, no apparent murmurs, no lower extremity edema  Respiratory: CTABL, no respiratory distress   Abdomen: soft, ND, Pt reported ab tenderness that started with palpitations with +rebound tenderness  MSK/Vascular: full ROM, soft compartments, warm extremities  Neuro: AAOx3, sensation to light touch intact  Psych: calm, cooperative General: well appearing, NAD  Head: NC, AT  EENT: EOMI, no scleral icterus  Cardiac: RRR, no apparent murmurs, no lower extremity edema  Respiratory: CTABL, no respiratory distress   Abdomen: soft, ND, no abdominal tenderness  MSK/Vascular: full ROM, soft compartments, warm extremities  Neuro: AAOx3, sensation to light touch intact  Psych: calm, cooperative

## 2022-12-27 NOTE — ED PROVIDER NOTE - CLINICAL SUMMARY MEDICAL DECISION MAKING FREE TEXT BOX
Pt presenting with atypical chest pain that has been going on for a long time but happened more frequently today. Pt also had abdominal pain with rebound tenderness on PE. Will get labs, cxr, and ct ab pelv. Pt presenting with atypical chest pain that has been going on for a long time but happened more frequently today. Will get labs, cxr. Likely cards consult.

## 2022-12-27 NOTE — PHARMACOTHERAPY INTERVENTION NOTE - COMMENTS
Left voice message to schedule appointment with Dr. Zuniga. Dr Ying referred for A-Fib. I was going to offer 01/24/2022 in [place].  
Called patient's pharmacy and spoke with patient to obtain complete medication list
Code Stroke  tpa given

## 2022-12-27 NOTE — ED CDU PROVIDER INITIAL DAY NOTE - DETAILS
Pt CP reminiscent of her chronic chest pain that has had multiple recent evaluations for, seen by cards who recommend multiple Trop, and dc home with out pt follow up,

## 2022-12-27 NOTE — ED PROVIDER NOTE - NS ED ROS FT
Constitutional: no fever, no chills  Head: NC, AT   Eyes: no redness   ENMT: no nasal congestion/drainage, no sore throat   CV: +chest pain, no edema  Resp: no cough, no dyspnea  GI: no abdominal pain, no nausea, no vomiting, no diarrhea  : no dysuria, no hematuria, no frequency, no hematochezia, No melena  Skin: no lesions, no rashes   Neuro: no LOC, no headache, no sensory deficits, no weakness

## 2022-12-28 VITALS
SYSTOLIC BLOOD PRESSURE: 107 MMHG | HEART RATE: 87 BPM | OXYGEN SATURATION: 98 % | DIASTOLIC BLOOD PRESSURE: 72 MMHG | TEMPERATURE: 99 F

## 2022-12-28 PROCEDURE — 99217: CPT

## 2022-12-28 NOTE — PROGRESS NOTE ADULT - SUBJECTIVE AND OBJECTIVE BOX
[facility] located within [facility], THE HEART CENTER                                   [address] [place] [address]                                                      PHONE: [phone]                                                         FAX: [phone]  [email or web address]  ---------------------------------------------------------------------------------------------------------------------------------    Overnight events/patient complaints: Patient had an episode of near syncope last night while going to the bathroom. Patient was given IV fluids as she was found to be orthostatic. Patient now improved and feels well without cardiac complaints.      Biaxin (Muscle Pain; Joint Pain)  codeine (Faint)  Isosorbide Mononitrate Extended Release (Faint)  Metoprolol Succinate ER (Unknown)  monoctanoin (Unknown)    MEDICATIONS  (STANDING):  acetaminophen     Tablet .. 650 milliGRAM(s) Oral every 6 hours  aspirin enteric coated 81 milliGRAM(s) Oral daily  atorvastatin 80 milliGRAM(s) Oral at bedtime  clopidogrel Tablet 75 milliGRAM(s) Oral daily  lidocaine   4% Patch 1 Patch Transdermal daily    MEDICATIONS  (PRN):      Vital Signs Last 24 Hrs  T(C): 36.9 (28 Dec 2022 04:00), Max: 36.9 (28 Dec 2022 04:00)  T(F): 98.5 (28 Dec 2022 04:00), Max: 98.5 (28 Dec 2022 04:00)  HR: 95 (28 Dec 2022 07:38) (64 - 95)  BP: 111/68 (28 Dec 2022 07:38) (111/68 - 137/80)  BP(mean): --  RR: 17 (28 Dec 2022 07:38) (17 - 18)  SpO2: 96% (28 Dec 2022 07:38) (96% - 100%)    Parameters below as of 28 Dec 2022 07:38  Patient On (Oxygen Delivery Method): room air      ICU Vital Signs Last 24 Hrs  BREANA HELM  I&O's Detail    Drug Dosing Weight  BREANA HELM    PHYSICAL EXAM:  General: NAD  HEENT: Head; normocephalic, atraumatic.  Eyes: EOMI, conjunctiva normal  Neck: Supple, no JVD noted  CARDIOVASCULAR: RRR, S1 S2, No murmurs or gallops  LUNGS: Clear to auscultation b/l, No rales, rhonchi or wheeze, normal inspiratory effort  ABDOMEN: Soft, nontender, non-distended, +bowel sounds  EXTREMITIES: No edema b/l, no cyanosis   SKIN: warm and dry  NEURO: Alert/oriented x 3  PSYCH: normal affect.        LABS:                        12.5   6.99  )-----------( 231      ( 27 Dec 2022 14:50 )             37.8     12-27    141  |  100  |  22.9<H>  ----------------------------<  88  4.4   |  27.0  |  1.13    Ca    8.9      27 Dec 2022 14:50  Mg     2.2     12-27    TPro  6.3<L>  /  Alb  3.9  /  TBili  0.5  /  DBili  x   /  AST  31  /  ALT  14  /  AlkPhos  54  12-27    BREANA HELM  CARDIAC MARKERS ( 27 Dec 2022 21:50 )  x     / <0.01 ng/mL / x     / x     / x      CARDIAC MARKERS ( 27 Dec 2022 19:20 )  x     / <0.01 ng/mL / x     / x     / x      CARDIAC MARKERS ( 27 Dec 2022 14:50 )  x     / <0.01 ng/mL / x     / x     / x              RADIOLOGY & ADDITIONAL STUDIES:    INTERPRETATION OF TELEMETRY (personally reviewed): Sinus rhythm, no significant events. Rare PVCs.    ECG: Sinus rhythm, no acute ischemic events    ECHO: 12/19/22  Summary:   1. Left ventricular ejection fraction, by visual estimation, is 60 to   65%.   2.Normal global left ventricular systolic function.   3. Basal inferolateral segment, basal anterolateral segment, and basal   anterior segment are abnormal as described above.   4. The left ventricular diastolic function could not be assessed in this   study.   5. Normal right ventricular size and function.   6. Mildly enlarged left atrium.   7. Normal right atrial size.   8. Mild thickening of the anterior and posterior mitral valve leaflets.   9. Moderate mitral valve regurgitation.  10. Mild tricuspid regurgitation.  11. Moderate aortic regurgitation.  12. Sclerotic aortic valve with normal opening.  13. Mild pulmonic valve regurgitation.  14. Adequate TR velocity was not obtained to accurately assess RVSP.    Cath: 11/30/20  LM:   --  LM: Normal.  LAD:   --  Proximal LAD: There was a 20 % stenosis.  --  Mid LAD: There was a 0 % stenosis at the site of a prior stent.  CX:   --  Circumflex: Angiography showed minor luminal irregularities with  no flow limiting lesions.  RCA:   --  RCA: Angiography showed minor luminal irregularities with no  flow limiting lesions.  --  Ostial RCA: There was a 20 % stenosis.  COMPLICATIONS: There were no complications. No complications occurred  during the cathlab visit.  DIAGNOSTIC IMPRESSIONS: Prior stent in LAD is patent with non obstructive  CAD  DIAGNOSTIC RECOMMENDATIONS: The patient should continue with the present  medications.  INTERVENTIONAL IMPRESSIONS: Prior stent in LAD is patent with non  obstructive CAD      Assessment and Plan:  82 year old female with a PMHx of CAD s/p PCI to the LAD (2018), TIA, rheumatic fever as a child, vasovagal syncope s/p ILR, HTN, HLD, polio who presents with chest pain.    Chest Pain  - pain classified as non-anginal   - troponin unremarkable  - EKG unremarkable  - recent echo as above  - Will schedule an outpatient nuclear stress test for the patient. No further inpatient cardiac workup required    Near Syncope  - orthostatic positive  - patient now improved after IV fluids    CAD  - c/w home aspirin 81mg daily  - c/w home plavix 75mg daily    HTN/HLD  - c/w home atorvastatin 80mg daily    Cardiology will sign off. Please call back with any questions.    Thank you for letting [facility] to assist in the management of this patient. Please call with any questions.

## 2022-12-28 NOTE — ED CDU PROVIDER DISPOSITION NOTE - ATTENDING APP SHARED VISIT CONTRIBUTION OF CARE
Seda NGUYEN- 83 Y/O F placed in obs for chest pain, seen by cardio and cleared. Pt feels fines    Pt is alert, well appearing,  no acute events overnight    will discharge the patient.

## 2022-12-28 NOTE — ED ADULT NURSE REASSESSMENT NOTE - NS ED NURSE REASSESS COMMENT FT1
pt ambulating back from bathroom when she suddenly felt lightheaded/ dizzy.  pt noted to be pale.  pt sat down and brought back to stretcher.  pt /70, HR 65.  feeling better once back in bed.  JAMISON Hook at bedside for further assessment.
pt ambulating well to bathroom.  when pt was finished and ready to get back to bed, pt states she feels very lightheaded after getting up.  pt diaphoretic, and lightheaded.  bp 87/60. pt placed in wheelchair and got back to bed safely.  pt states symptoms resolved after lying in bed.  JAMISON Valles notified and at bedside fro further assessment.
Alert/Awake

## 2022-12-28 NOTE — ED CDU PROVIDER SUBSEQUENT DAY NOTE - CLINICAL SUMMARY MEDICAL DECISION MAKING FREE TEXT BOX
chest pain x months:   -seen by cardiology   -3 neg trops   -serial EKGs non ischemic     episode of vasovagal syncope:   -+orthostatic BP. repeat stable   -now asymptomatic

## 2022-12-28 NOTE — ED CDU PROVIDER DISPOSITION NOTE - CLINICAL COURSE
81 yo female well known to the ED presenting to the ED with 4 months of intermittent chest pain, cardiology consulted, recs for serial trop and cardiac monitoring, recent TTE. + orthostatics initially but provided with fluid and BP stable with symptomatic improvement. advised on output fu and return precautions

## 2022-12-28 NOTE — ED CDU PROVIDER DISPOSITION NOTE - PATIENT PORTAL LINK FT
You can access the FollowMyHealth Patient Portal offered by [facility] by registering at the following website: [email or web address]. By joining [employer]’s FollowMyHealth portal, you will also be able to view your health information using other applications (apps) compatible with our system.

## 2022-12-28 NOTE — ED CDU PROVIDER SUBSEQUENT DAY NOTE - ATTENDING APP SHARED VISIT CONTRIBUTION OF CARE
Seda NGUYEN- 81 Y/O F placed in obs for chest pain, seen by cardio and cleared. Pt feels fines    Pt is alert, well appearing,  no acute events overnight    will discharge the patient

## 2022-12-28 NOTE — ED CDU PROVIDER SUBSEQUENT DAY NOTE - HISTORY
overnight, pt with presyncopal episode after bathroom use. NO LOC, no head strike. On BP check pt was orthostatic when standing. Symptoms resolved when pt laying down. Given 500cc bolus at that time. Now feeling improved.

## 2022-12-28 NOTE — ED CDU PROVIDER DISPOSITION NOTE - NSFOLLOWUPINSTRUCTIONS_ED_ALL_ED_FT
please follow with primary care doctor and with cardiologist   please continue all prescribed medications   new or worsening symptoms please return to the ED     hope you feel better       Chest Pain    Chest pain can be caused by many different conditions which may or may not be dangerous. Causes include heartburn, lung infections, heart attack, blood clot in lungs, skin infections, strain or damage to muscle, cartilage, or bones, etc. In addition to a history and physical examination, an electrocardiogram (ECG) or other lab tests may have been performed to determine the cause of your chest pain. Follow up with your primary care provider or with a cardiologist as instructed.     SEEK IMMEDIATE MEDICAL CARE IF YOU HAVE ANY OF THE FOLLOWING SYMPTOMS: worsening chest pain, coughing up blood, unexplained back/neck/jaw pain, severe abdominal pain, dizziness or lightheadedness, fainting, shortness of breath, sweaty or clammy skin, vomiting, or racing heart beat. These symptoms may represent a serious problem that is an emergency. Do not wait to see if the symptoms will go away. Get medical help right away. Call 911 and do not drive yourself to the hospital.

## 2023-01-03 LAB — MISCELLANEOUS TEST NAME: SIGNIFICANT CHANGE UP

## 2023-01-24 NOTE — PATIENT PROFILE ADULT - DO YOU LACK THE NECESSARY SUPPORT TO HELP YOU COPE WITH LIFE CHALLENGES?
Anesthesia Post Evaluation    Patient: Mike Aburto    Procedure(s) Performed: Procedure(s) (LRB):  RECONSTRUCTION, KNEE, ACL, USING GRAFT (Left)  ARTHROSCOPY,KNEE,WITH MENISCUS REPAIR (Left)    Final Anesthesia Type: general      Patient location during evaluation: PACU  Patient participation: Yes- Able to Participate  Level of consciousness: awake and alert and oriented  Post-procedure vital signs: reviewed and stable  Pain management: adequate  Airway patency: patent    PONV status at discharge: No PONV  Anesthetic complications: no      Cardiovascular status: blood pressure returned to baseline, stable and hemodynamically stable  Respiratory status: unassisted  Hydration status: euvolemic  Follow-up not needed.          Vitals Value Taken Time   /74 01/24/23 1413   Temp 36.9 °C (98.4 °F) 01/24/23 1256   Pulse 78 01/24/23 1417   Resp 20 01/24/23 1416   SpO2 100 % 01/24/23 1417   Vitals shown include unvalidated device data.      Event Time   Out of Recovery 13:56:00         Pain/Chalo Score: Pain Rating Prior to Med Admin: 10 (1/24/2023  1:29 PM)  Chalo Score: 10 (1/24/2023  2:11 PM)         no

## 2023-02-07 ENCOUNTER — APPOINTMENT (OUTPATIENT)
Dept: NEUROLOGY | Facility: CLINIC | Age: 83
End: 2023-02-07

## 2023-02-19 ENCOUNTER — EMERGENCY (EMERGENCY)
Facility: HOSPITAL | Age: 83
LOS: 1 days | Discharge: HOME | End: 2023-02-19
Attending: EMERGENCY MEDICINE
Payer: MEDICARE

## 2023-02-19 VITALS
RESPIRATION RATE: 18 BRPM | OXYGEN SATURATION: 98 % | DIASTOLIC BLOOD PRESSURE: 73 MMHG | HEART RATE: 72 BPM | SYSTOLIC BLOOD PRESSURE: 130 MMHG | TEMPERATURE: 98 F

## 2023-02-19 VITALS
HEART RATE: 96 BPM | RESPIRATION RATE: 18 BRPM | DIASTOLIC BLOOD PRESSURE: 57 MMHG | WEIGHT: 104.06 LBS | SYSTOLIC BLOOD PRESSURE: 145 MMHG | TEMPERATURE: 97 F | HEIGHT: 62 IN | OXYGEN SATURATION: 100 %

## 2023-02-19 DIAGNOSIS — Z90.5 ACQUIRED ABSENCE OF KIDNEY: Chronic | ICD-10-CM

## 2023-02-19 DIAGNOSIS — C64.9 MALIGNANT NEOPLASM OF UNSPECIFIED KIDNEY, EXCEPT RENAL PELVIS: Chronic | ICD-10-CM

## 2023-02-19 DIAGNOSIS — Z90.89 ACQUIRED ABSENCE OF OTHER ORGANS: Chronic | ICD-10-CM

## 2023-02-19 LAB
ALBUMIN SERPL ELPH-MCNC: 4 G/DL — SIGNIFICANT CHANGE UP (ref 3.3–5.2)
ALP SERPL-CCNC: 51 U/L — SIGNIFICANT CHANGE UP (ref 40–120)
ALT FLD-CCNC: 14 U/L — SIGNIFICANT CHANGE UP
ANION GAP SERPL CALC-SCNC: 10 MMOL/L — SIGNIFICANT CHANGE UP (ref 5–17)
AST SERPL-CCNC: 31 U/L — SIGNIFICANT CHANGE UP
BASOPHILS # BLD AUTO: 0.08 K/UL — SIGNIFICANT CHANGE UP (ref 0–0.2)
BASOPHILS NFR BLD AUTO: 1.4 % — SIGNIFICANT CHANGE UP (ref 0–2)
BILIRUB SERPL-MCNC: 0.5 MG/DL — SIGNIFICANT CHANGE UP (ref 0.4–2)
BUN SERPL-MCNC: 21.6 MG/DL — HIGH (ref 8–20)
CALCIUM SERPL-MCNC: 9 MG/DL — SIGNIFICANT CHANGE UP (ref 8.4–10.5)
CHLORIDE SERPL-SCNC: 99 MMOL/L — SIGNIFICANT CHANGE UP (ref 96–108)
CO2 SERPL-SCNC: 25 MMOL/L — SIGNIFICANT CHANGE UP (ref 22–29)
CREAT SERPL-MCNC: 0.9 MG/DL — SIGNIFICANT CHANGE UP (ref 0.5–1.3)
EGFR: 64 ML/MIN/1.73M2 — SIGNIFICANT CHANGE UP
EOSINOPHIL # BLD AUTO: 0.13 K/UL — SIGNIFICANT CHANGE UP (ref 0–0.5)
EOSINOPHIL NFR BLD AUTO: 2.2 % — SIGNIFICANT CHANGE UP (ref 0–6)
GLUCOSE SERPL-MCNC: 95 MG/DL — SIGNIFICANT CHANGE UP (ref 70–99)
HCT VFR BLD CALC: 36.5 % — SIGNIFICANT CHANGE UP (ref 34.5–45)
HGB BLD-MCNC: 12.7 G/DL — SIGNIFICANT CHANGE UP (ref 11.5–15.5)
IMM GRANULOCYTES NFR BLD AUTO: 0.3 % — SIGNIFICANT CHANGE UP (ref 0–0.9)
LYMPHOCYTES # BLD AUTO: 1.39 K/UL — SIGNIFICANT CHANGE UP (ref 1–3.3)
LYMPHOCYTES # BLD AUTO: 23.8 % — SIGNIFICANT CHANGE UP (ref 13–44)
MCHC RBC-ENTMCNC: 29.7 PG — SIGNIFICANT CHANGE UP (ref 27–34)
MCHC RBC-ENTMCNC: 34.8 GM/DL — SIGNIFICANT CHANGE UP (ref 32–36)
MCV RBC AUTO: 85.5 FL — SIGNIFICANT CHANGE UP (ref 80–100)
MONOCYTES # BLD AUTO: 0.65 K/UL — SIGNIFICANT CHANGE UP (ref 0–0.9)
MONOCYTES NFR BLD AUTO: 11.1 % — SIGNIFICANT CHANGE UP (ref 2–14)
NEUTROPHILS # BLD AUTO: 3.58 K/UL — SIGNIFICANT CHANGE UP (ref 1.8–7.4)
NEUTROPHILS NFR BLD AUTO: 61.2 % — SIGNIFICANT CHANGE UP (ref 43–77)
NT-PROBNP SERPL-SCNC: 944 PG/ML — HIGH (ref 0–300)
PLATELET # BLD AUTO: 177 K/UL — SIGNIFICANT CHANGE UP (ref 150–400)
POTASSIUM SERPL-MCNC: 4.2 MMOL/L — SIGNIFICANT CHANGE UP (ref 3.5–5.3)
POTASSIUM SERPL-SCNC: 4.2 MMOL/L — SIGNIFICANT CHANGE UP (ref 3.5–5.3)
PROT SERPL-MCNC: 6.2 G/DL — LOW (ref 6.6–8.7)
RAPID RVP RESULT: SIGNIFICANT CHANGE UP
RBC # BLD: 4.27 M/UL — SIGNIFICANT CHANGE UP (ref 3.8–5.2)
RBC # FLD: 12.1 % — SIGNIFICANT CHANGE UP (ref 10.3–14.5)
SARS-COV-2 RNA SPEC QL NAA+PROBE: SIGNIFICANT CHANGE UP
SODIUM SERPL-SCNC: 134 MMOL/L — LOW (ref 135–145)
TROPONIN T SERPL-MCNC: <0.01 NG/ML — SIGNIFICANT CHANGE UP (ref 0–0.06)
WBC # BLD: 5.85 K/UL — SIGNIFICANT CHANGE UP (ref 3.8–10.5)
WBC # FLD AUTO: 5.85 K/UL — SIGNIFICANT CHANGE UP (ref 3.8–10.5)

## 2023-02-19 PROCEDURE — 83880 ASSAY OF NATRIURETIC PEPTIDE: CPT

## 2023-02-19 PROCEDURE — 93010 ELECTROCARDIOGRAM REPORT: CPT

## 2023-02-19 PROCEDURE — 80053 COMPREHEN METABOLIC PANEL: CPT

## 2023-02-19 PROCEDURE — 99285 EMERGENCY DEPT VISIT HI MDM: CPT | Mod: 25

## 2023-02-19 PROCEDURE — 0225U NFCT DS DNA&RNA 21 SARSCOV2: CPT

## 2023-02-19 PROCEDURE — 36415 COLL VENOUS BLD VENIPUNCTURE: CPT

## 2023-02-19 PROCEDURE — 99285 EMERGENCY DEPT VISIT HI MDM: CPT

## 2023-02-19 PROCEDURE — 93005 ELECTROCARDIOGRAM TRACING: CPT

## 2023-02-19 PROCEDURE — 84484 ASSAY OF TROPONIN QUANT: CPT

## 2023-02-19 PROCEDURE — 71045 X-RAY EXAM CHEST 1 VIEW: CPT | Mod: 26

## 2023-02-19 PROCEDURE — 71045 X-RAY EXAM CHEST 1 VIEW: CPT

## 2023-02-19 PROCEDURE — 85025 COMPLETE CBC W/AUTO DIFF WBC: CPT

## 2023-02-19 NOTE — ED PROVIDER NOTE - PATIENT PORTAL LINK FT
You can access the FollowMyHealth Patient Portal offered by [facility] by registering at the following website: [email or web address]. By joining [employer]’s FollowMyHealth portal, you will also be able to view your health information using other applications (apps) compatible with our system.

## 2023-02-19 NOTE — ED PROVIDER NOTE - PHYSICAL EXAMINATION
General: NAD, well appearing  HEENT: Normocephalic, atraumatic  Neck: No apparent stiffness or JVD  Pulm: Chest wall symmetric and nontender, lungs clear to ascultation   Cardiac: Regular rate and regular rhythm, equal pulses  Abdomen: Tenderness ot right groin  Skin: Skin is warm, dry and intact without rashes or lesions.  Neuro: No motor or sensory deficits    MSK: No deformity or tenderness General: NAD, well appearing  HEENT: Normocephalic, atraumatic  Neck: No apparent stiffness or JVD  Pulm: Chest wall symmetric and nontender, lungs clear to ascultation   Cardiac: Regular rate and regular rhythm, equal pulses  Abdomen: minimal tenderness at right groin  Skin: Skin is warm, dry and intact without rashes or lesions.  Neuro: No motor or sensory deficits    MSK: No deformity or tenderness

## 2023-02-19 NOTE — ED ADULT NURSE NOTE - CHIEF COMPLAINT QUOTE
pt biba for sudden onset sob and trembling. pt has pmhx of anxiety, pt has intermittent sob and cp generally per pt. pt did have cardiac cath done @ [facility] which did n ot show any abnormalities, EKG obtained during triage

## 2023-02-19 NOTE — ED ADULT TRIAGE NOTE - CHIEF COMPLAINT QUOTE
pt biba for sudden onset sob and trembling. pt has pmhx of anxiety, pt has intermittent sob and cp generally per pt. pt did have cardiac cath done @ [facility] which did n ot show any abnormalities, EKG obtained during triage

## 2023-02-19 NOTE — ED PROVIDER NOTE - CLINICAL SUMMARY MEDICAL DECISION MAKING FREE TEXT BOX
EKG labs and CTA imaging performed to evaluate the patient. EKG labs and chest xray imaging performed to evaluate the patient. EKG labs and chest xray imaging performed to evaluate the patient.  Exam and EKG reassuring.  Workup is unremarkable for any emergent process.   Patient is now feeling improved and wishes to leave.  Patient / family members have capacity.    Patient/family was given full return precautions, counseled on red flag symptoms such as LOC, fever, severe pain, or focal deficits and advised to return to the ED for these reasons or any reason that was concerning to them. Patient/family was informed of all significant and incidental findings found on this workup today and all results were reviewed.   All questions were answered, advised to make close follow up with their primary care provider and specialty clinics (as applicable) to follow up with this visit and continue investigation/treatment.   Patient/family has shown adequate understanding and is agreeable to the plan.

## 2023-02-19 NOTE — ED PROVIDER NOTE - ATTENDING CONTRIBUTION TO CARE
Seda NGUYEN- 83 Y/O F with h/o CAD s/p PCI to the LAD (2018), TIA, rheumatic fever as a child, vasovagal syncope s/p ILR, HTN, HLD, polio and recent cardiac cath via rt groin which was normal p/w feeling lead waist like pressure on chest and had rt groin pain while sitting, No syncope, nausea, vomiting, diarrhea.    Pt is alert, well appearing female, s1s2 normal reg, b/l clear breath sounds, abd soft, nt, nd, rt gorin cath site- small  bruise, non tender, no expanding hematoma or bleeding, neuro exam aox3, cn 2-12 intact, no focal deficits, skin warm, dry, good turgor    plan to check ekg, labs, cxr, if all neg, will discharge for outpatient f/u

## 2023-02-19 NOTE — ED PROVIDER NOTE - OBJECTIVE STATEMENT
82 year old female with a PMHx of CAD s/p PCI to the LAD (2018), TIA, rheumatic fever as a child, vasovagal syncope s/p ILR, HTN, HLD, polio p/w surgical pain and SOB.  Patient reports having clean heart cath on Friday, states worsening pain up right groin since then, also sudden onset SOB and chest pain today. Otherwise denies  bleeding,  abdominal pain or fevers.  Denies other pertinent medical problems.  Denies any substance use.

## 2023-02-19 NOTE — ED ADULT NURSE NOTE - OBJECTIVE STATEMENT
Assumed care of pt at 1355 Pt A&Ox4 c/o periods of SOB accompanied with anxiety like symptoms. Pt presents in ED RR 18 SPo2 98% on RA. Pt appears comfortable without SOB. CM initiated NSR on tele. BP as charted. Pt has hx of anxiety .RR Even and unlabored,

## 2023-02-19 NOTE — ED PROVIDER NOTE - NS ED ROS FT
General: No fever, no massive bleeding  Head: No HA, no ongoing scalp bleed  ENT: no neck pain, no sore throat  Resp: +SOB, no hemoptysis  Cardiovascular: + CP, No LOC  GI: +groin pain, No blood in stool  : No dysuria, no hematuria   MSK: No back pain, no limb pain  Skin: No painful or bleeding lesions  Neuro: No paresthesias, No focal deficits

## 2023-04-02 NOTE — ED ADULT NURSE NOTE - CAS TRG GENERAL AIRWAY, MLM
PHYSICAL EXAM:  GENERAL: non-toxic appearing; in no respiratory distress; +photophobia  HEAD Atraumatic, Normocephalic  NECK: No JVD; trachea midline  EYES: PERRL, EOMs intact b/l w/out deficits; normal conjunctiva; no nystagmus  CHEST/LUNG: CTAB no wheezes/rhonchi/rales  HEART: RRR no murmur/gallops/rubs  ABDOMEN: soft, NT, ND  EXTREMITIES: No LE edema, +2 radial pulses b/l, +2 DP/PT pulses b/l  MUSCULOSKELETAL: FROM of all 4 extremities;  NERVOUS SYSTEM:  A&Ox3, No motor deficits or sensory deficits; CNII-XII intact; Speech is fluent and appropriate; no dysmetria; no dysdidadochokinesia; no nuchal rigidty   SKIN:  Warm and dry as visualized
Patent

## 2023-04-14 ENCOUNTER — OFFICE VISIT (OUTPATIENT)
Dept: URGENT CARE | Facility: URGENT CARE | Age: 83
End: 2023-04-14
Payer: COMMERCIAL

## 2023-04-14 VITALS
TEMPERATURE: 97.5 F | SYSTOLIC BLOOD PRESSURE: 122 MMHG | HEART RATE: 74 BPM | DIASTOLIC BLOOD PRESSURE: 65 MMHG | OXYGEN SATURATION: 97 %

## 2023-04-14 DIAGNOSIS — M10.9 ACUTE GOUT, UNSPECIFIED CAUSE, UNSPECIFIED SITE: Primary | ICD-10-CM

## 2023-04-14 LAB — URATE SERPL-MCNC: 7.1 MG/DL (ref 2.4–5.7)

## 2023-04-14 PROCEDURE — 36415 COLL VENOUS BLD VENIPUNCTURE: CPT | Performed by: PHYSICIAN ASSISTANT

## 2023-04-14 PROCEDURE — 84550 ASSAY OF BLOOD/URIC ACID: CPT | Performed by: PHYSICIAN ASSISTANT

## 2023-04-14 PROCEDURE — 99203 OFFICE O/P NEW LOW 30 MIN: CPT | Performed by: PHYSICIAN ASSISTANT

## 2023-04-14 RX ORDER — PREDNISONE 10 MG/1
TABLET ORAL
Qty: 30 TABLET | Refills: 0 | Status: SHIPPED | OUTPATIENT
Start: 2023-04-14

## 2023-04-14 NOTE — PROGRESS NOTES
Assessment & Plan     Acute gout, unspecified cause, unspecified site  Discussed with pt etiology and typical tx of gout.    urice acid did come back elevated.  She has had one previous episode over a year ago she thinks.  Short course of oral steroids (on xaralto), will have her follow-up with pcp, may need consideration of uricosuric vs discontinue hydrochlorothiazide if recurrent episodes.    - Uric acid  - predniSONE (DELTASONE) 10 MG tablet  Dispense: 30 tablet; Refill: 0  - Uric acid       [facility] Urgent Care Provider  [facility] JESSEBERT Gordon PA-C    J Carlos Valencia is a 82 year old female who presents to clinic today for the following health issues:  Chief Complaint   Patient presents with     Foot Pain     Left side, taking Tylenol as needed, some possible swelling and redness, no injury     HPI    2 day hx of acute onset of L foot pain at the great toe.  No fevers.  Has noted redness and swelling.  Wonders if she has an ingrown toenail.  Pain kept up last night.  Had similar occurrence a year or so ago and felt it may be gout but the uric acid was normal at that time.  Does not recall treatment.  Is on hydrochlorothiazide. Pain making difficult to ambulate.  Denies trauma.  No CKD.      Review of Systems  Constitutional, HEENT, cardiovascular, pulmonary, gi and gu systems are negative, except as otherwise noted.    Patient Active Problem List   Diagnosis     Diverticulitis of colon     BRBPR (bright red blood per rectum)     Suprapubic abdominal pain     Current Outpatient Medications   Medication     acetaminophen (TYLENOL) 325 MG tablet     amLODIPine (NORVASC) 2.5 MG tablet     desonide (DESOWEN) 0.05 % external cream     docusate sodium (COLACE) 100 MG capsule     fluocinonide (LIDEX) 0.05 % external solution     hydrochlorothiazide (HYDRODIURIL) 25 MG tablet     ketoconazole (NIZORAL) 2 % external shampoo     lisinopril (ZESTRIL) 20 MG tablet     metoprolol succinate  ER (TOPROL XL) 25 MG 24 hr tablet     polyethylene glycol (MIRALAX) 17 GM/Dose powder     predniSONE (DELTASONE) 10 MG tablet     rivaroxaban ANTICOAGULANT (XARELTO) 20 MG TABS tablet     albuterol (PROAIR HFA/PROVENTIL HFA/VENTOLIN HFA) 108 (90 Base) MCG/ACT inhaler     No current facility-administered medications for this visit.           Objective    /65 (BP Location: Right arm, Patient Position: Sitting, Cuff Size: Adult Large)   Pulse 74   Temp 97.5  F (36.4  C) (Tympanic)   SpO2 97%   Physical Exam         Exam of the L foot reveals swelling and TTP of the 1st MCP joint as well as pain with AROM in flexion and extension.  Mild TTP at the IP but there is no redness at the nail fold. No discharge present.    Results for orders placed or performed in visit on 04/14/23   Uric acid     Status: Abnormal   Result Value Ref Range    Uric Acid 7.1 (H) 2.4 - 5.7 mg/dL

## 2023-04-15 ENCOUNTER — NURSE TRIAGE (OUTPATIENT)
Dept: NURSING | Facility: CLINIC | Age: 83
End: 2023-04-15
Payer: COMMERCIAL

## 2023-04-15 NOTE — TELEPHONE ENCOUNTER
Situation: Lab Results    Assessment:   Pt was seen in the clinic on 4/14/2023. Pt is not a [facility] patient. Pt was very pleased with the care she received at the Urgent Care. Pt is looking for her lab result. Results given to the patient, and message from the  provider was relayed to th patient.     Protocol Recommended Disposition:   Pt was advised to schedule an appointment with her provider in the next week.     Recommendation:   Per lab note from Urgent Care:    Team - please call patient with results.  Uric acid level is elevation, consistent with gout.    Should follow up with primary care provider in the next 1 week to discuss.  Should be improving on the prednisone.      Pt verbalized understanding.    Ancelmo Uribe RN on 4/15/2023 at 10:21 AM      Reason for Disposition    Caller requesting lab results  (Exception: Routine or non-urgent lab result.)    Additional Information    Negative: Lab calling with strep throat test results and triager can call in prescription    Negative: Lab calling with urinalysis test results and triager can call in prescription    Negative: Medication questions    Negative: Medication renewal and refill questions    Negative: Pre-operative or pre-procedural questions    Negative: ED call to PCP (i.e., primary care provider; doctor, NP, or PA)    Negative: Doctor (or NP/PA) call to PCP    Negative: Call about patient who is currently hospitalized    Negative: Lab or radiology calling with CRITICAL test results    Negative: [1] Follow-up call from patient regarding patient's clinical status AND [2] information urgent    Negative: [1] Caller requests to speak ONLY to PCP AND [2] URGENT question    Negative: [1] Caller requests to speak to PCP now AND [2] won't tell us reason for call  (Exception: If 10 pm to 6 am, caller must first discuss reason for the call.)    Negative: Notification of hospital admission    Negative: Notification of death    Protocols used: PCP CALL - NO  TRIAGE-A-AH

## 2023-04-19 NOTE — TELEPHONE ENCOUNTER
Pt not a [facility] pt; normally pt is seen at [facility]. Discussed [facility] UC lab results. Pt verbalizes understanding and agrees to plan of care.

## 2023-05-08 ENCOUNTER — INPATIENT (INPATIENT)
Facility: HOSPITAL | Age: 83
LOS: 2 days | Discharge: HOME | DRG: 369 | End: 2023-05-11
Attending: INTERNAL MEDICINE | Admitting: STUDENT IN AN ORGANIZED HEALTH CARE EDUCATION/TRAINING PROGRAM
Payer: MEDICARE

## 2023-05-08 VITALS
OXYGEN SATURATION: 99 % | HEIGHT: 67 IN | SYSTOLIC BLOOD PRESSURE: 115 MMHG | DIASTOLIC BLOOD PRESSURE: 57 MMHG | WEIGHT: 160.06 LBS | HEART RATE: 76 BPM | RESPIRATION RATE: 18 BRPM | TEMPERATURE: 98 F

## 2023-05-08 DIAGNOSIS — K92.2 GASTROINTESTINAL HEMORRHAGE, UNSPECIFIED: ICD-10-CM

## 2023-05-08 DIAGNOSIS — Z90.5 ACQUIRED ABSENCE OF KIDNEY: Chronic | ICD-10-CM

## 2023-05-08 DIAGNOSIS — Z90.89 ACQUIRED ABSENCE OF OTHER ORGANS: Chronic | ICD-10-CM

## 2023-05-08 DIAGNOSIS — C64.9 MALIGNANT NEOPLASM OF UNSPECIFIED KIDNEY, EXCEPT RENAL PELVIS: Chronic | ICD-10-CM

## 2023-05-08 LAB
ALBUMIN SERPL ELPH-MCNC: 3.5 G/DL — SIGNIFICANT CHANGE UP (ref 3.3–5.2)
ALP SERPL-CCNC: 39 U/L — LOW (ref 40–120)
ALT FLD-CCNC: 13 U/L — SIGNIFICANT CHANGE UP
ANION GAP SERPL CALC-SCNC: 9 MMOL/L — SIGNIFICANT CHANGE UP (ref 5–17)
APTT BLD: 25 SEC — LOW (ref 27.5–35.5)
AST SERPL-CCNC: 30 U/L — SIGNIFICANT CHANGE UP
BASOPHILS # BLD AUTO: 0.08 K/UL — SIGNIFICANT CHANGE UP (ref 0–0.2)
BASOPHILS NFR BLD AUTO: 1.5 % — SIGNIFICANT CHANGE UP (ref 0–2)
BILIRUB SERPL-MCNC: 0.3 MG/DL — LOW (ref 0.4–2)
BLD GP AB SCN SERPL QL: SIGNIFICANT CHANGE UP
BUN SERPL-MCNC: 21.4 MG/DL — HIGH (ref 8–20)
CALCIUM SERPL-MCNC: 8.5 MG/DL — SIGNIFICANT CHANGE UP (ref 8.4–10.5)
CHLORIDE SERPL-SCNC: 103 MMOL/L — SIGNIFICANT CHANGE UP (ref 96–108)
CO2 SERPL-SCNC: 25 MMOL/L — SIGNIFICANT CHANGE UP (ref 22–29)
CREAT SERPL-MCNC: 1.09 MG/DL — SIGNIFICANT CHANGE UP (ref 0.5–1.3)
EGFR: 51 ML/MIN/1.73M2 — LOW
EOSINOPHIL # BLD AUTO: 0.21 K/UL — SIGNIFICANT CHANGE UP (ref 0–0.5)
EOSINOPHIL NFR BLD AUTO: 3.8 % — SIGNIFICANT CHANGE UP (ref 0–6)
GLUCOSE SERPL-MCNC: 86 MG/DL — SIGNIFICANT CHANGE UP (ref 70–99)
HCT VFR BLD CALC: 24.6 % — LOW (ref 34.5–45)
HGB BLD-MCNC: 7.9 G/DL — LOW (ref 11.5–15.5)
IMM GRANULOCYTES NFR BLD AUTO: 0.2 % — SIGNIFICANT CHANGE UP (ref 0–0.9)
INR BLD: 1.14 RATIO — SIGNIFICANT CHANGE UP (ref 0.88–1.16)
LYMPHOCYTES # BLD AUTO: 1.08 K/UL — SIGNIFICANT CHANGE UP (ref 1–3.3)
LYMPHOCYTES # BLD AUTO: 19.6 % — SIGNIFICANT CHANGE UP (ref 13–44)
MCHC RBC-ENTMCNC: 30.3 PG — SIGNIFICANT CHANGE UP (ref 27–34)
MCHC RBC-ENTMCNC: 32.1 GM/DL — SIGNIFICANT CHANGE UP (ref 32–36)
MCV RBC AUTO: 94.3 FL — SIGNIFICANT CHANGE UP (ref 80–100)
MONOCYTES # BLD AUTO: 0.62 K/UL — SIGNIFICANT CHANGE UP (ref 0–0.9)
MONOCYTES NFR BLD AUTO: 11.3 % — SIGNIFICANT CHANGE UP (ref 2–14)
NEUTROPHILS # BLD AUTO: 3.5 K/UL — SIGNIFICANT CHANGE UP (ref 1.8–7.4)
NEUTROPHILS NFR BLD AUTO: 63.6 % — SIGNIFICANT CHANGE UP (ref 43–77)
OB PNL STL: POSITIVE
PLATELET # BLD AUTO: 218 K/UL — SIGNIFICANT CHANGE UP (ref 150–400)
POTASSIUM SERPL-MCNC: 4.5 MMOL/L — SIGNIFICANT CHANGE UP (ref 3.5–5.3)
POTASSIUM SERPL-SCNC: 4.5 MMOL/L — SIGNIFICANT CHANGE UP (ref 3.5–5.3)
PROT SERPL-MCNC: 5.4 G/DL — LOW (ref 6.6–8.7)
PROTHROM AB SERPL-ACNC: 13.2 SEC — SIGNIFICANT CHANGE UP (ref 10.5–13.4)
RBC # BLD: 2.61 M/UL — LOW (ref 3.8–5.2)
RBC # FLD: 15.2 % — HIGH (ref 10.3–14.5)
SODIUM SERPL-SCNC: 137 MMOL/L — SIGNIFICANT CHANGE UP (ref 135–145)
TROPONIN T SERPL-MCNC: <0.01 NG/ML — SIGNIFICANT CHANGE UP (ref 0–0.06)
WBC # BLD: 5.5 K/UL — SIGNIFICANT CHANGE UP (ref 3.8–10.5)
WBC # FLD AUTO: 5.5 K/UL — SIGNIFICANT CHANGE UP (ref 3.8–10.5)

## 2023-05-08 PROCEDURE — 71045 X-RAY EXAM CHEST 1 VIEW: CPT | Mod: 26

## 2023-05-08 PROCEDURE — 99285 EMERGENCY DEPT VISIT HI MDM: CPT

## 2023-05-08 PROCEDURE — 93010 ELECTROCARDIOGRAM REPORT: CPT

## 2023-05-08 PROCEDURE — 74176 CT ABD & PELVIS W/O CONTRAST: CPT | Mod: 26

## 2023-05-08 RX ORDER — PANTOPRAZOLE SODIUM 20 MG/1
8 TABLET, DELAYED RELEASE ORAL
Qty: 80 | Refills: 0 | Status: DISCONTINUED | OUTPATIENT
Start: 2023-05-08 | End: 2023-05-10

## 2023-05-08 RX ORDER — PANTOPRAZOLE SODIUM 20 MG/1
40 TABLET, DELAYED RELEASE ORAL ONCE
Refills: 0 | Status: COMPLETED | OUTPATIENT
Start: 2023-05-08 | End: 2023-05-08

## 2023-05-08 RX ADMIN — PANTOPRAZOLE SODIUM 10 MG/HR: 20 TABLET, DELAYED RELEASE ORAL at 21:12

## 2023-05-08 RX ADMIN — PANTOPRAZOLE SODIUM 40 MILLIGRAM(S): 20 TABLET, DELAYED RELEASE ORAL at 21:12

## 2023-05-08 NOTE — ED STATDOCS - DR. NAME
Spoke with patient this afternoon to follow up on events on unit last night and how she is doing  Patient stated "I forgot really, I know I almost went into the observation room, there was a lot going on " Patient stated she is "okay" and feels much better now that she's on Ativan again, and feels good about having started Zyprexa  "I'm feeling myself again "     Patient also asked about group homes "Did you find me a place yet?" SW explained role and the process of getting into a [address]  Encouraged patient to ask county when we speak again during her treatment team meeting  Patient receptive to this and did not become irritable as she had done earlier this week when discussing discharge  Shazia

## 2023-05-08 NOTE — ED PROVIDER NOTE - PROGRESS NOTE DETAILS
Paulino: Pt received in signout from Dr. Monroe. Pt with dark brown stool on exam as per Dr. Monroe. H&H stable compared to 2 days ago as per pt. Given symptomatic anemia, with transfuse 1 unit PRBC. Blood consent completed. Protonix drip ordered. GI consult placed.

## 2023-05-08 NOTE — ED ADULT NURSE NOTE - OBJECTIVE STATEMENT
pt reports to the ED with c/o dizzy and lightheaded since this past weekend. Pt states she was visiting her sister in [place] this weekend when she was experiencing episodes of lightheadedness. Pt came home and saw PCP which had blood work done at and states her hbg was 7 and was sent to the ED. Pt reports in Febaury her hbg was 12. denies cp, n/v/d, rr even and unlabored.

## 2023-05-08 NOTE — ED PROVIDER NOTE - OBJECTIVE STATEMENT
82-year-old female comes in complaining of feeling dizzy and lightheaded. states that last Sunday was in [place] and was getting out of bed and she passed out and was found by her sister.  Was evaluated in the hospital with no findings.  Patient states she is still having intermittent episodes of lightheadedness her primary care doctor did a hemoglobin today and her hemoglobin was 7 .hemoglobin in the hospital last week was 10. no chest pain, no nausea ,no vomiting, just lightheadedness and some shortness of breath

## 2023-05-08 NOTE — ED PROVIDER NOTE - CLINICAL SUMMARY MEDICAL DECISION MAKING FREE TEXT BOX
82-year-old female comes in complaining of feeling dizzy and lightheaded. states that last Sunday was in [place] and was getting out of bed and she passed out and was found by her sister.  Was evaluated in the hospital with no findings.  Patient states she is still having intermittent episodes of lightheadedness her primary care doctor did a hemoglobin today and her hemoglobin was 7 .hemoglobin in the hospital last week was 10. no chest pain, no nausea ,no vomiting, just lightheadedness and some shortness of breath   on physical exam patient is in no distress. there is no diaphoresis .cardiac exam is within normal limits abdomen is soft nontender. there is no conjunctival pallor nor of palmar pallor.  Will check H&H and plan will depend on the H&H

## 2023-05-08 NOTE — ED STATDOCS - PROGRESS NOTE DETAILS
Jaclyn SANTOS for ED attending, Dr. Mccray: 88 y/o female with PMHx of CAD, HTN, TIA and renal CA presents to ED c/o medical eval. Pt reports hemoglobin dropping dramatically from 12 to 7 since February. Pt presenting weak and lightheaded. Pt unable to walk fully from car to emergency room. Pt on blood thinner Plavix. No pain to the abdomen, evaluate in main ED for possible transfusion given cardiac Hx.

## 2023-05-09 ENCOUNTER — TRANSCRIPTION ENCOUNTER (OUTPATIENT)
Age: 83
End: 2023-05-09

## 2023-05-09 DIAGNOSIS — D64.9 ANEMIA, UNSPECIFIED: ICD-10-CM

## 2023-05-09 DIAGNOSIS — R19.5 OTHER FECAL ABNORMALITIES: ICD-10-CM

## 2023-05-09 LAB
ANION GAP SERPL CALC-SCNC: 11 MMOL/L — SIGNIFICANT CHANGE UP (ref 5–17)
APPEARANCE UR: CLEAR — SIGNIFICANT CHANGE UP
BACTERIA # UR AUTO: ABNORMAL
BILIRUB UR-MCNC: NEGATIVE — SIGNIFICANT CHANGE UP
BUN SERPL-MCNC: 16.3 MG/DL — SIGNIFICANT CHANGE UP (ref 8–20)
CALCIUM SERPL-MCNC: 8 MG/DL — LOW (ref 8.4–10.5)
CHLORIDE SERPL-SCNC: 104 MMOL/L — SIGNIFICANT CHANGE UP (ref 96–108)
CO2 SERPL-SCNC: 23 MMOL/L — SIGNIFICANT CHANGE UP (ref 22–29)
COLOR SPEC: YELLOW — SIGNIFICANT CHANGE UP
CREAT SERPL-MCNC: 0.99 MG/DL — SIGNIFICANT CHANGE UP (ref 0.5–1.3)
DIFF PNL FLD: ABNORMAL
EGFR: 57 ML/MIN/1.73M2 — LOW
EPI CELLS # UR: SIGNIFICANT CHANGE UP
GLUCOSE SERPL-MCNC: 83 MG/DL — SIGNIFICANT CHANGE UP (ref 70–99)
GLUCOSE UR QL: NEGATIVE MG/DL — SIGNIFICANT CHANGE UP
HCT VFR BLD CALC: 27.2 % — LOW (ref 34.5–45)
HGB BLD-MCNC: 9.1 G/DL — LOW (ref 11.5–15.5)
KETONES UR-MCNC: NEGATIVE — SIGNIFICANT CHANGE UP
LEUKOCYTE ESTERASE UR-ACNC: NEGATIVE — SIGNIFICANT CHANGE UP
MCHC RBC-ENTMCNC: 30.4 PG — SIGNIFICANT CHANGE UP (ref 27–34)
MCHC RBC-ENTMCNC: 33.5 GM/DL — SIGNIFICANT CHANGE UP (ref 32–36)
MCV RBC AUTO: 91 FL — SIGNIFICANT CHANGE UP (ref 80–100)
NITRITE UR-MCNC: NEGATIVE — SIGNIFICANT CHANGE UP
PH UR: 7 — SIGNIFICANT CHANGE UP (ref 5–8)
PLATELET # BLD AUTO: 189 K/UL — SIGNIFICANT CHANGE UP (ref 150–400)
POTASSIUM SERPL-MCNC: 4 MMOL/L — SIGNIFICANT CHANGE UP (ref 3.5–5.3)
POTASSIUM SERPL-SCNC: 4 MMOL/L — SIGNIFICANT CHANGE UP (ref 3.5–5.3)
PROT UR-MCNC: NEGATIVE — SIGNIFICANT CHANGE UP
RBC # BLD: 2.99 M/UL — LOW (ref 3.8–5.2)
RBC # FLD: 17 % — HIGH (ref 10.3–14.5)
RBC CASTS # UR COMP ASSIST: SIGNIFICANT CHANGE UP /HPF (ref 0–4)
SODIUM SERPL-SCNC: 138 MMOL/L — SIGNIFICANT CHANGE UP (ref 135–145)
SP GR SPEC: 1.01 — SIGNIFICANT CHANGE UP (ref 1.01–1.02)
UROBILINOGEN FLD QL: NEGATIVE MG/DL — SIGNIFICANT CHANGE UP
WBC # BLD: 4.29 K/UL — SIGNIFICANT CHANGE UP (ref 3.8–10.5)
WBC # FLD AUTO: 4.29 K/UL — SIGNIFICANT CHANGE UP (ref 3.8–10.5)
WBC UR QL: SIGNIFICANT CHANGE UP /HPF (ref 0–5)

## 2023-05-09 PROCEDURE — 99223 1ST HOSP IP/OBS HIGH 75: CPT

## 2023-05-09 PROCEDURE — 12345: CPT | Mod: NC

## 2023-05-09 PROCEDURE — 99222 1ST HOSP IP/OBS MODERATE 55: CPT

## 2023-05-09 RX ORDER — ESCITALOPRAM OXALATE 10 MG/1
5 TABLET, FILM COATED ORAL
Qty: 0 | Refills: 0 | DISCHARGE

## 2023-05-09 RX ORDER — ASPIRIN/CALCIUM CARB/MAGNESIUM 324 MG
1 TABLET ORAL
Qty: 0 | Refills: 0 | DISCHARGE

## 2023-05-09 RX ORDER — ACETAMINOPHEN 500 MG
650 TABLET ORAL EVERY 6 HOURS
Refills: 0 | Status: DISCONTINUED | OUTPATIENT
Start: 2023-05-09 | End: 2023-05-11

## 2023-05-09 RX ORDER — BEMPEDOIC ACID AND EZETIMIBE 180; 10 MG/1; MG/1
1 TABLET, FILM COATED ORAL
Qty: 0 | Refills: 0 | DISCHARGE

## 2023-05-09 RX ORDER — ESCITALOPRAM OXALATE 10 MG/1
1 TABLET, FILM COATED ORAL
Refills: 0 | DISCHARGE

## 2023-05-09 RX ORDER — ONDANSETRON 8 MG/1
4 TABLET, FILM COATED ORAL EVERY 8 HOURS
Refills: 0 | Status: DISCONTINUED | OUTPATIENT
Start: 2023-05-09 | End: 2023-05-11

## 2023-05-09 RX ORDER — SOD SULF/SODIUM/NAHCO3/KCL/PEG
4000 SOLUTION, RECONSTITUTED, ORAL ORAL ONCE
Refills: 0 | Status: COMPLETED | OUTPATIENT
Start: 2023-05-09 | End: 2023-05-09

## 2023-05-09 RX ORDER — DILTIAZEM HCL 120 MG
120 CAPSULE, EXT RELEASE 24 HR ORAL DAILY
Refills: 0 | Status: DISCONTINUED | OUTPATIENT
Start: 2023-05-09 | End: 2023-05-09

## 2023-05-09 RX ORDER — SOD SULF/SODIUM/NAHCO3/KCL/PEG
4000 SOLUTION, RECONSTITUTED, ORAL ORAL ONCE
Refills: 0 | Status: DISCONTINUED | OUTPATIENT
Start: 2023-05-09 | End: 2023-05-09

## 2023-05-09 RX ORDER — DILTIAZEM HCL 120 MG
120 CAPSULE, EXT RELEASE 24 HR ORAL DAILY
Refills: 0 | Status: DISCONTINUED | OUTPATIENT
Start: 2023-05-09 | End: 2023-05-11

## 2023-05-09 RX ORDER — LANOLIN ALCOHOL/MO/W.PET/CERES
3 CREAM (GRAM) TOPICAL AT BEDTIME
Refills: 0 | Status: DISCONTINUED | OUTPATIENT
Start: 2023-05-09 | End: 2023-05-11

## 2023-05-09 RX ORDER — ATORVASTATIN CALCIUM 80 MG/1
80 TABLET, FILM COATED ORAL AT BEDTIME
Refills: 0 | Status: DISCONTINUED | OUTPATIENT
Start: 2023-05-09 | End: 2023-05-11

## 2023-05-09 RX ORDER — ESCITALOPRAM OXALATE 10 MG/1
10 TABLET, FILM COATED ORAL DAILY
Refills: 0 | Status: DISCONTINUED | OUTPATIENT
Start: 2023-05-09 | End: 2023-05-11

## 2023-05-09 RX ORDER — DILTIAZEM HCL 120 MG
1 CAPSULE, EXT RELEASE 24 HR ORAL
Qty: 0 | Refills: 0 | DISCHARGE

## 2023-05-09 RX ADMIN — ESCITALOPRAM OXALATE 10 MILLIGRAM(S): 10 TABLET, FILM COATED ORAL at 11:25

## 2023-05-09 RX ADMIN — Medication 4000 MILLILITER(S): at 17:31

## 2023-05-09 RX ADMIN — PANTOPRAZOLE SODIUM 10 MG/HR: 20 TABLET, DELAYED RELEASE ORAL at 06:27

## 2023-05-09 RX ADMIN — ATORVASTATIN CALCIUM 80 MILLIGRAM(S): 80 TABLET, FILM COATED ORAL at 21:26

## 2023-05-09 RX ADMIN — PANTOPRAZOLE SODIUM 10 MG/HR: 20 TABLET, DELAYED RELEASE ORAL at 21:23

## 2023-05-09 NOTE — CONSULT NOTE ADULT - ASSESSMENT
83yo F with PMHx of CAD s/p stents (most recent 2.5 years ago, on Aspirin 81 mg and Plavix, last dose 2 days ago), RCC s/p left nephrectomy (3 years ago), TIA/CVA, pAfib not on AC, HTN, HLD sent to ED by PMD after found to have low h/h on outpatient lab. Found to have hemoglobin 7.9 (baseline 12 gm).     Drop in hemoglobin, symptomatic anemia:  Normocytic anemia, responded appropriately to transfusion now 9.1 this morning.   Trend CBC, transfuse to hgb 8 or higher.   Protonix 40 mg BID  Avoid NSAIDS  Will plan for EGD/ colonoscopy tomorrow if cleared by cardiology. Please holds Plavix if clinically able, most recent stent 2.5 years ago, last dose of Plavix 2 days ago) Can continue Aspirin   Clear liquid diet today. Please keep NPO after midnight for colonoscopy/ EGD tomorrow  Colonoscopy prep ordered, goal bowel movements clear and yellow to ensure complete exam  Maintain T&S, INR <1.5, Hgb >7.0, Plt >50 prior to procedure.     83yo F with PMHx of CAD s/p stents (most recent 2.5 years ago, on Aspirin 81 mg and Plavix, last dose 2 days ago), RCC s/p left nephrectomy (3 years ago), TIA/CVA, pAfib not on AC, HTN, HLD sent to ED by PMD after found to have low h/h on outpatient lab. Found to have hemoglobin 7.9 (baseline 12 gm).     Drop in hemoglobin, symptomatic anemia:  Normocytic anemia, responded appropriately to transfusion now 9.1 this morning.   Trend CBC, transfuse to hgb 8 or higher.   Protonix 40 mg BID  Avoid NSAIDS  Will plan for EGD/ colonoscopy tomorrow if cleared by cardiology. Please holds Plavix if clinically able, most recent stent 2.5 years ago, last dose of Plavix 2 days ago) Can continue Aspirin   Clear liquid diet today. Please keep NPO after midnight for colonoscopy/ EGD tomorrow or Thursday (05/11) pending endo logistics ands aslo give an additional day from off Plavix for procedure)   Maintain T&S, INR <1.5, Hgb >7.0, Plt >50 prior to procedure.     81yo F with PMHx of CAD s/p stents (most recent 2.5 years ago, on Aspirin 81 mg and Plavix, last dose 2 days ago), RCC s/p left nephrectomy (3 years ago), TIA/CVA, pAfib not on AC, HTN, HLD sent to ED by PMD after found to have low h/h on outpatient lab. Found to have hemoglobin 7.9 (baseline 12 gm).     Drop in hemoglobin, symptomatic anemia:  Normocytic anemia, responded appropriately to transfusion now 9.1 this morning.   Trend CBC, transfuse to hgb 8 or higher.   Protonix 40 mg BID  Avoid NSAIDS  Will plan for EGD/ colonoscopy tomorrow or Thursday if cleared by cardiology. Please hold Plavix if clinically able, most recent stent 2.5 years ago, last dose of Plavix 2 days ago) Can continue Aspirin   Clear liquid diet today. Please keep NPO after midnight for colonoscopy/ EGD tomorrow or Thursday (05/11) pending endo logistics ands aslo give an additional day from off Plavix for procedure)   Maintain T&S, INR <1.5, Hgb >7.0, Plt >50 prior to procedure.

## 2023-05-09 NOTE — H&P ADULT - ASSESSMENT
81yo F with PMHx of CAD s/p stents, RCC s/p left nephrectomy, TIA/CVA, pAfib not on AC, HTN, HLD sent to ED by PMD after found to have low h/h on outpatient lab.    Acute blood loss anemia   -suspected GIB   -H/h 7.9/24.6, ordered for 1u prbc   -FOBT positive   -check post transfusion h/h, transfuse if symptomatic or less then 8  -cont with PPI gtt   -Pt without acute bleeding at this time   -GI consulted     CAD s/p stents   -hold ASA, plavix due to anemia   -cont Atorvastatin 80mg po daily     pAfib   -not on AC, rated controlled   -cont with diltiazem 120mg daily     HTN/HLD   -cont meds as above     Depression   -cont Escitalopram    DVT ppx  -no AC, SCDs for now

## 2023-05-09 NOTE — H&P ADULT - HISTORY OF PRESENT ILLNESS
83yo F with PMHx of CAD s/p stents, RCC s/p left nephrectomy, TIA/CVA, pAfib not on AC, HTN, HLD sent to ED by PMD after found to have low h/h on outpatient lab. Pt reports to feeling lightheadedness and sob on exertion. She state that a week ago she was visiting her sister in [place], had a syncope episode while she was trying to get out of bed. She was evaluated at local hospital at the time. Denies cp, palpitations, fever or chills, rectal bleeding. In the ED Hgb 7.9(baseline 12 in 1/23), +FOBT,  CT a/p noncon unremarkable. Pt ordered for 1U PRBC in the ED.

## 2023-05-09 NOTE — CONSULT NOTE ADULT - NS ATTEND AMEND GEN_ALL_CORE FT
I evaluated and examined this pt. with my ACP and also formulated and discussed the above management plan with my ACP.

## 2023-05-09 NOTE — CONSULT NOTE ADULT - PROBLEM SELECTOR PROBLEM 1
HPI:    Patient ID: Yanni Amador is a 52year Symptomatic anemia

## 2023-05-09 NOTE — CONSULT NOTE ADULT - PROBLEM SELECTOR RECOMMENDATION 2
With symptomatic anemia r/o ulcer disease +/or gastritis +/or esophagitis +/or colonic neoplasm. See above management plan recommendations.

## 2023-05-09 NOTE — H&P ADULT - NSICDXPASTMEDICALHX_GEN_ALL_CORE_FT
PAST MEDICAL HISTORY:  CAD S/P percutaneous coronary angioplasty     Cerebrovascular accident (CVA) November 2020, June 2020, May 2020    HTN (hypertension)     Polio     Rheumatic fever     TIA (transient ischemic attack)     
[facility]

## 2023-05-09 NOTE — H&P ADULT - NSHPPHYSICALEXAM_GEN_ALL_CORE
T(C): 36.8 (05-08-23 @ 15:54), Max: 36.8 (05-08-23 @ 15:54)  HR: 76 (05-08-23 @ 15:54) (76 - 76)  BP: 115/57 (05-08-23 @ 15:54) (115/57 - 115/57)  RR: 18 (05-08-23 @ 15:54) (18 - 18)  SpO2: 99% (05-08-23 @ 15:54) (99% - 99%)    GENERAL: patient appears well, no acute distress, appropriate, pleasant  EYES: sclera clear, no exudates  ENMT: oropharynx clear without erythema, no exudates, moist mucous membranes  NECK: supple, soft, no thyromegaly noted  LUNGS: good air entry bilaterally, clear to auscultation, symmetric breath sounds, no wheezing or rhonchi appreciated  HEART: soft S1/S2, regular rate and rhythm, no murmurs noted, no lower extremity edema  GASTROINTESTINAL: abdomen is soft, nontender, nondistended, normoactive bowel sounds, no palpable masses  INTEGUMENT: good skin turgor, warm skin, appears well perfused  MUSCULOSKELETAL: no clubbing or cyanosis, no obvious deformity  NEUROLOGIC: awake, alert, oriented x3, good muscle tone in 4 extremities, no obvious sensory deficits  PSYCHIATRIC: mood is good, affect is congruent, linear and logical thought process  HEME/LYMPH: no palpable supraclavicular nodules, no obvious ecchymosis or petechiae

## 2023-05-09 NOTE — PATIENT PROFILE ADULT - FALL HARM RISK - HARM RISK INTERVENTIONS

## 2023-05-09 NOTE — CONSULT NOTE ADULT - PROBLEM SELECTOR RECOMMENDATION 9
Pt. has had no previous EGD's or colonoscopies. Await cardiac clearance for EGD / Colonoscopy which could either be done tomorrow or Thursday depending upon GI lab logistics. Transfuse to Hb of 8 grams or higher. Pantoprazole for GI mucosal cytoprotection. Repeat labs ordered for the AM.

## 2023-05-09 NOTE — CONSULT NOTE ADULT - SUBJECTIVE AND OBJECTIVE BOX
[facility]                                      [facility]                              [address] [address]                                                 PHONE: [phone]                                                 FAX: [phone]  ------------------------------------------------------------------------------------------------    Chief complaint: fatigue, anemia    82y Female with past medical history as under sent to ED by PMD after found to have low h/h on outpatient lab. Pt reports to feeling lightheadedness and sob on exertion. In the ED Hgb 7.9(baseline 12 in 1/23), +FOBT,  CT a/p noncon unremarkable. Pt ordered for 1U PRBC in the ED.  Planned for GI work-up.  At the time of evaluation, pt reports feeling fatigued and tired.    PAST MEDICAL & SURGICAL HISTORY:  HTN (hypertension)      Rheumatic fever      TIA (transient ischemic attack)      CAD S/P percutaneous coronary angioplasty      Polio      Cerebrovascular accident (CVA)  November 2020, June 2020, May 2020      Status post tonsillectomy      H/O left nephrectomy      Renal cancer          monoctanoin (Unknown)  Isosorbide Mononitrate Extended Release (Faint)  Biaxin (Muscle Pain; Joint Pain)  codeine (Faint)  Metoprolol Succinate ER (Unknown)      Vital Signs Last 24 Hrs  T(C): 36.7 (09 May 2023 15:38), Max: 37 (09 May 2023 00:00)  T(F): 98.1 (09 May 2023 15:38), Max: 98.6 (09 May 2023 00:00)  HR: 75 (09 May 2023 15:38) (68 - 89)  BP: 123/67 (09 May 2023 15:38) (106/59 - 157/64)  BP(mean): --  RR: 18 (09 May 2023 15:38) (18 - 18)  SpO2: 97% (09 May 2023 15:38) (96% - 99%)    Parameters below as of 09 May 2023 15:38  Patient On (Oxygen Delivery Method): room air        PHYSICAL EXAM:  Cardiovascular: regular S1, S2  Respiratory: Lungs clear to auscultation; no crepitations, no wheeze  Musculoskeletal: No edema    LABS:                        9.1    4.29  )-----------( 189      ( 09 May 2023 06:40 )             27.2     05-09    138  |  104  |  16.3  ----------------------------<  83  4.0   |  23.0  |  0.99    Ca    8.0<L>      09 May 2023 06:40    TPro  5.4<L>  /  Alb  3.5  /  TBili  0.3<L>  /  DBili  x   /  AST  30  /  ALT  13  /  AlkPhos  39<L>  05-08    CARDIAC MARKERS ( 08 May 2023 18:50 )  x     / <0.01 ng/mL / x     / x     / x          PT/INR - ( 08 May 2023 18:50 )   PT: 13.2 sec;   INR: 1.14 ratio         PTT - ( 08 May 2023 18:50 )  PTT:25.0 sec    RADIOLOGY & ADDITIONAL STUDIES: (reviewed)  CXR was independently visualized/reviewed and demonstrated:     CARDIOLOGY TESTING:(reviewed)     ECG (Independent visualization): NSR    Cardiac catheterization 2/17/23  Prior stents in LAD and LCX are patent with non obstructive CAD     Nuclear stress test   1/24/23  EF 65%. Can not rule out small area of mild ischemia involving the apical inferior, inferolateral segment.     LLE Venous Duplex  9/16/22  No evidence of DVT     Echocardiogram  6/14/22  1. Left ventricle: The cavity size is normal. There is a basal sigmoid septum without LVOT obstuction. Regional wall  motion abnormalities are present. The left ventricular ejection fraction is normal. The LVEF was determined by visual  estimate. Left ventricular ejection fraction is 55-60%.  2. Right ventricle: The cavity size is normal. Right ventricular systolic function is normal. Calculated right ventricular  systolic pressure is suggestive of mildly elevated pulmonary pressure.  3. Left atrium: The cavity size is mildly dilated.  4. Right atrium: The cavity size is normal in size.  5. Mitral valve: The leaflets are mildly thickened. There is mild (1+) mitral valve regurgitation.  6. Aortic valve: The valve is trileaflet. The leaflets are mildly thickened. There is moderate (2+) valvular aortic  regurgitation.  7. Tricuspid valve: The tricuspid leaflets are not thickened. There is moderate (2+) tricuspid valve regurgitation.  8. Pericardium, extracardiac: There is no pericardial effusion.    TELEMETRY independently visualized/reviewed and demonstrated : NSR    MEDICATIONS:(reviewed)  Home Medications:  Home Medications:  aspirin 81 mg oral delayed release tablet: 1 tab(s) orally once a day (09 May 2023 01:05)  dilTIAZem 120 mg/24 hours oral capsule, extended release: 1 cap(s) orally once a day (09 May 2023 01:05)  escitalopram 10 mg oral tablet: 1 tab(s) orally once a day (09 May 2023 01:05)  Nexlizet 180 mg-10 mg oral tablet: 1 tab(s) orally once a day (09 May 2023 01:05)  Plavix 75 mg oral tablet: 1 tab(s) orally once a day (09 May 2023 01:07)      MEDICATIONS  (STANDING):  atorvastatin 80 milliGRAM(s) Oral at bedtime  diltiazem    milliGRAM(s) Oral daily  escitalopram 10 milliGRAM(s) Oral daily  pantoprazole Infusion 8 mG/Hr (10 mL/Hr) IV Continuous <Continuous>  polyethylene glycol/electrolyte Solution. 4000 milliLiter(s) Oral once    MEDICATIONS  (PRN):  acetaminophen     Tablet .. 650 milliGRAM(s) Oral every 6 hours PRN Temp greater or equal to 38C (100.4F), Mild Pain (1 - 3)  aluminum hydroxide/magnesium hydroxide/simethicone Suspension 30 milliLiter(s) Oral every 4 hours PRN Dyspepsia  melatonin 3 milliGRAM(s) Oral at bedtime PRN Insomnia  ondansetron Injectable 4 milliGRAM(s) IV Push every 8 hours PRN Nausea and/or Vomiting      ASSESSMENT AND PLAN:    82 y.o. female with a past medical history of hypertension, hyperlipidemia, known coronary artery disease status post PCI of the LAD (2018), rheumatic fever as child and vasovagal syncope s/p recent catheterization which revealed prior stents in LAD and LCX are patent with non obstructive CAD  sent to ED by PMD after found to have low h/h on outpatient lab. Planned for GI work-up.    Pre-op cardiovascular evaluation  - Can proceed for planned endoscopy with moderate perioperative risk without cardiac contraindications  - Plavix may be held prior to the procedure and restarted post procedure when safe from GI standpoint  - Close monitoring of BP and volume status    CAD  - Continue ASA    HTN  - Continue cardizem    Dyslipidemia  - Continue statin      
HISTORY OF PRESENT ILLNESS: 81yo F with PMHx of CAD s/p stents (most recent 2.5 years ago, on Aspirin 81 mg and Plavix, last dose 2 days ago), RCC s/p left nephrectomy (3 years ago), TIA/CVA, pAfib not on AC, HTN, HLD sent to ED by PMD after found to have low h/h on outpatient lab. Patient reports associated lightheadedness and sob on exertion. Reports an episode of syncope last week while getting out of bed. Denies hematemesis hematochezia, melena, abdominal pian, nausea, vomiting, chest pain, palpitation, diarrhea, constipation. In ED her HGb 7.9 gm baseline 12.on February this year). Responded appropriately to 1 units of PRBCs and hemoglobin 9 this morning. CT abdomen with no evidence of active GI bleed.       Review of Systems:  . Constitutional: No fever, chills  . HEENT: Negative  · Respiratory and Thorax: No shortness of breath, no cough  · Cardiovascular: No chest pain, palpitation, no dizziness  · Gastrointestinal: see above  · Genitourinary: No hematuria  · Musculoskeletal: Negative  · Neurological: negative  · Psychiatric: no agitation, no anxiety    PAST MEDICAL/SURGICAL HISTORY:  HTN (hypertension)    Rheumatic fever    TIA (transient ischemic attack)    CAD S/P percutaneous coronary angioplasty    Polio    Cerebrovascular accident (CVA)  November 2020, June 2020, May 2020    Status post tonsillectomy    H/O left nephrectomy    Renal cancer      SOCIAL HISTORY:  - TOBACCO: Denies   - ALCOHOL: Denies  - ILLICIT DRUG USE: Denies    FAMILY HISTORY:  No known history of gastrointestinal or liver disease;  FHx: breast cancer    FH: CHF (congestive heart failure)        HOME MEDICATIONS:  aspirin 81 mg oral delayed release tablet: 1 tab(s) orally once a day (09 May 2023 01:05)  dilTIAZem 120 mg/24 hours oral capsule, extended release: 1 cap(s) orally once a day (09 May 2023 01:05)  escitalopram 10 mg oral tablet: 1 tab(s) orally once a day (09 May 2023 01:05)  Nexlizet 180 mg-10 mg oral tablet: 1 tab(s) orally once a day (09 May 2023 01:05)  Plavix 75 mg oral tablet: 1 tab(s) orally once a day (09 May 2023 01:07)    INPATIENT MEDICATIONS:  MEDICATIONS  (STANDING):  atorvastatin 80 milliGRAM(s) Oral at bedtime  diltiazem    milliGRAM(s) Oral daily  escitalopram 10 milliGRAM(s) Oral daily  pantoprazole Infusion 8 mG/Hr (10 mL/Hr) IV Continuous <Continuous>    MEDICATIONS  (PRN):  acetaminophen     Tablet .. 650 milliGRAM(s) Oral every 6 hours PRN Temp greater or equal to 38C (100.4F), Mild Pain (1 - 3)  aluminum hydroxide/magnesium hydroxide/simethicone Suspension 30 milliLiter(s) Oral every 4 hours PRN Dyspepsia  melatonin 3 milliGRAM(s) Oral at bedtime PRN Insomnia  ondansetron Injectable 4 milliGRAM(s) IV Push every 8 hours PRN Nausea and/or Vomiting    ALLERGIES:  monoctanoin (Unknown)  Biaxin (Muscle Pain; Joint Pain)  codeine (Faint)  Metoprolol Succinate ER (Unknown)    T(C): 36.6 (05-09-23 @ 04:37), Max: 37 (05-09-23 @ 00:00)  HR: 88 (05-09-23 @ 06:28) (76 - 89)  BP: 106/59 (05-09-23 @ 06:28) (106/59 - 157/64)  RR: 18 (05-09-23 @ 04:37) (18 - 18)  SpO2: 96% (05-09-23 @ 04:37) (96% - 99%)      PHYSICAL EXAM:    Constitutional: No acute distress  Neuro: Awake alert, oriented to person, place and situation, speech clear and intact  HEENT: anicteric sclerae  Neck: supple, no JVD  CV: regular rate, regular rhythm  Pulm/chest: lung sounds CTA and equal bilaterally, no accessory muscle use noted  Abd: soft, nontender, nondistended +bowel sounds. No rigidity, rebound tenderness, or guarding  Ext: no Cyanosis, clubbing or edema  Skin: warm, no jaundice   Psych: calm, cooperative      LABS:             9.1    4.29  )-----------( 189      ( 05-09 @ 06:40 )             27.2                7.9    5.50  )-----------( 218      ( 05-08 @ 18:50 )             24.6       PT/INR - ( 08 May 2023 18:50 )   PT: 13.2 sec;   INR: 1.14 ratio         PTT - ( 08 May 2023 18:50 )  PTT:25.0 sec  05-09    138  |  104  |  16.3  ----------------------------<  83  4.0   |  23.0  |  0.99    Ca    8.0<L>      09 May 2023 06:40    TPro  5.4<L>  /  Alb  3.5  /  TBili  0.3<L>  /  DBili  x   /  AST  30  /  ALT  13  /  AlkPhos  39<L>  05-08    LIVER FUNCTIONS - ( 08 May 2023 18:50 )  Alb: 3.5 g/dL / Pro: 5.4 g/dL / ALK PHOS: 39 U/L / ALT: 13 U/L / AST: 30 U/L / GGT: x             < from: CT Abdomen and Pelvis No Cont (05.08.23 @ 23:11) >    PROCEDURE:  CT of the Abdomen and Pelvis was performed.  Sagittal and coronal reformats were performed.    FINDINGS: Please note that evaluation for source of active GI bleed is   markedly limited without IV contrast.      LOWER CHEST: The heart size is borderline. Coronary valvular and aortic   calcifications are noted. There is a small hiatal hernia. Dependent   atelectatic changes are appreciated..    LIVER: Subcentimeter hypodensities within the medial hepatic dome are too   small to characterize. Coarse calcifications also seen within the segment   8 adjacent to the IVC may be related to old granulomatous disease..  BILE DUCTS: Normal caliber.  GALLBLADDER: Cholelithiasis.  SPLEEN: Within normal limits.  PANCREAS: Within normal limits.  ADRENALS: Within normal limits.  KIDNEYS/URETERS: Left nephrectomy changes. No right-sided urinary tract   obstruction or stone..    BLADDER: Within normal limits.  REPRODUCTIVE ORGANS: Uterus and adnexa within normal limits.    BOWEL: No bowel obstruction. Appendix is normal. There is a large amount   of stool seen throughout the colon compatible with constipation. There is   a small hiatal hernia.  PERITONEUM: No ascites.  VESSELS: Atherosclerotic changes.  RETROPERITONEUM/LYMPH NODES: No lymphadenopathy. No retroperitoneal bleed.  ABDOMINAL WALL: There is diastasis recti. Postsurgical changes are   appreciated..  BONES: There is generalized osteopeniaand degenerative changes..    IMPRESSION:  No intra-abdominal or retroperitoneal bleed identified.    Probable constipation.    Cholelithiasis.    Additional incidental findings as above.    Please note that identification of source of active GI bleed at the time   scanning is not possible without IV contrast.    < end of copied text >

## 2023-05-09 NOTE — CHART NOTE - NSCHARTNOTEFT_GEN_A_CORE
patient seen and eval.   aw gib   c/o marysol   monitor vs/ h/h   gi plan for egd in am   cardio consulted for pre op clearance as per gi request

## 2023-05-10 ENCOUNTER — RESULT REVIEW (OUTPATIENT)
Age: 83
End: 2023-05-10

## 2023-05-10 LAB
ANION GAP SERPL CALC-SCNC: 11 MMOL/L — SIGNIFICANT CHANGE UP (ref 5–17)
BUN SERPL-MCNC: 13.5 MG/DL — SIGNIFICANT CHANGE UP (ref 8–20)
CALCIUM SERPL-MCNC: 8.2 MG/DL — LOW (ref 8.4–10.5)
CHLORIDE SERPL-SCNC: 103 MMOL/L — SIGNIFICANT CHANGE UP (ref 96–108)
CO2 SERPL-SCNC: 26 MMOL/L — SIGNIFICANT CHANGE UP (ref 22–29)
CREAT SERPL-MCNC: 1.09 MG/DL — SIGNIFICANT CHANGE UP (ref 0.5–1.3)
EGFR: 51 ML/MIN/1.73M2 — LOW
GLUCOSE SERPL-MCNC: 83 MG/DL — SIGNIFICANT CHANGE UP (ref 70–99)
HCT VFR BLD CALC: 32 % — LOW (ref 34.5–45)
HGB BLD-MCNC: 10.4 G/DL — LOW (ref 11.5–15.5)
INR BLD: 1.12 RATIO — SIGNIFICANT CHANGE UP (ref 0.88–1.16)
MCHC RBC-ENTMCNC: 30 PG — SIGNIFICANT CHANGE UP (ref 27–34)
MCHC RBC-ENTMCNC: 32.5 GM/DL — SIGNIFICANT CHANGE UP (ref 32–36)
MCV RBC AUTO: 92.2 FL — SIGNIFICANT CHANGE UP (ref 80–100)
PLATELET # BLD AUTO: 224 K/UL — SIGNIFICANT CHANGE UP (ref 150–400)
POTASSIUM SERPL-MCNC: 3.8 MMOL/L — SIGNIFICANT CHANGE UP (ref 3.5–5.3)
POTASSIUM SERPL-SCNC: 3.8 MMOL/L — SIGNIFICANT CHANGE UP (ref 3.5–5.3)
PROTHROM AB SERPL-ACNC: 13 SEC — SIGNIFICANT CHANGE UP (ref 10.5–13.4)
RBC # BLD: 3.47 M/UL — LOW (ref 3.8–5.2)
RBC # FLD: 17.2 % — HIGH (ref 10.3–14.5)
SODIUM SERPL-SCNC: 140 MMOL/L — SIGNIFICANT CHANGE UP (ref 135–145)
WBC # BLD: 6.33 K/UL — SIGNIFICANT CHANGE UP (ref 3.8–10.5)
WBC # FLD AUTO: 6.33 K/UL — SIGNIFICANT CHANGE UP (ref 3.8–10.5)

## 2023-05-10 PROCEDURE — 43239 EGD BIOPSY SINGLE/MULTIPLE: CPT

## 2023-05-10 PROCEDURE — 45378 DIAGNOSTIC COLONOSCOPY: CPT | Mod: 59

## 2023-05-10 PROCEDURE — 88342 IMHCHEM/IMCYTCHM 1ST ANTB: CPT | Mod: 26

## 2023-05-10 PROCEDURE — 88305 TISSUE EXAM BY PATHOLOGIST: CPT | Mod: 26

## 2023-05-10 PROCEDURE — 99232 SBSQ HOSP IP/OBS MODERATE 35: CPT

## 2023-05-10 DEVICE — NAIL OSTEO 1.5X16MM STRL: Type: IMPLANTABLE DEVICE | Status: FUNCTIONAL

## 2023-05-10 RX ORDER — ASPIRIN/CALCIUM CARB/MAGNESIUM 324 MG
81 TABLET ORAL DAILY
Refills: 0 | Status: DISCONTINUED | OUTPATIENT
Start: 2023-05-10 | End: 2023-05-11

## 2023-05-10 RX ORDER — PANTOPRAZOLE SODIUM 20 MG/1
40 TABLET, DELAYED RELEASE ORAL EVERY 12 HOURS
Refills: 0 | Status: DISCONTINUED | OUTPATIENT
Start: 2023-05-10 | End: 2023-05-11

## 2023-05-10 RX ADMIN — ESCITALOPRAM OXALATE 10 MILLIGRAM(S): 10 TABLET, FILM COATED ORAL at 14:58

## 2023-05-10 RX ADMIN — PANTOPRAZOLE SODIUM 10 MG/HR: 20 TABLET, DELAYED RELEASE ORAL at 10:38

## 2023-05-10 RX ADMIN — Medication 120 MILLIGRAM(S): at 05:01

## 2023-05-10 RX ADMIN — PANTOPRAZOLE SODIUM 40 MILLIGRAM(S): 20 TABLET, DELAYED RELEASE ORAL at 17:11

## 2023-05-10 RX ADMIN — ATORVASTATIN CALCIUM 80 MILLIGRAM(S): 80 TABLET, FILM COATED ORAL at 21:12

## 2023-05-10 RX ADMIN — Medication 81 MILLIGRAM(S): at 17:11

## 2023-05-11 ENCOUNTER — TRANSCRIPTION ENCOUNTER (OUTPATIENT)
Age: 83
End: 2023-05-11

## 2023-05-11 VITALS
OXYGEN SATURATION: 99 % | DIASTOLIC BLOOD PRESSURE: 69 MMHG | SYSTOLIC BLOOD PRESSURE: 107 MMHG | RESPIRATION RATE: 18 BRPM | TEMPERATURE: 98 F | HEART RATE: 64 BPM

## 2023-05-11 LAB
HCT VFR BLD CALC: 31 % — LOW (ref 34.5–45)
HGB BLD-MCNC: 10.2 G/DL — LOW (ref 11.5–15.5)
MCHC RBC-ENTMCNC: 30.2 PG — SIGNIFICANT CHANGE UP (ref 27–34)
MCHC RBC-ENTMCNC: 32.9 GM/DL — SIGNIFICANT CHANGE UP (ref 32–36)
MCV RBC AUTO: 91.7 FL — SIGNIFICANT CHANGE UP (ref 80–100)
PLATELET # BLD AUTO: 183 K/UL — SIGNIFICANT CHANGE UP (ref 150–400)
RBC # BLD: 3.38 M/UL — LOW (ref 3.8–5.2)
RBC # FLD: 15.8 % — HIGH (ref 10.3–14.5)
WBC # BLD: 4.25 K/UL — SIGNIFICANT CHANGE UP (ref 3.8–10.5)
WBC # FLD AUTO: 4.25 K/UL — SIGNIFICANT CHANGE UP (ref 3.8–10.5)

## 2023-05-11 PROCEDURE — 85027 COMPLETE CBC AUTOMATED: CPT

## 2023-05-11 PROCEDURE — 74176 CT ABD & PELVIS W/O CONTRAST: CPT | Mod: MG

## 2023-05-11 PROCEDURE — 71045 X-RAY EXAM CHEST 1 VIEW: CPT

## 2023-05-11 PROCEDURE — 88305 TISSUE EXAM BY PATHOLOGIST: CPT

## 2023-05-11 PROCEDURE — 88342 IMHCHEM/IMCYTCHM 1ST ANTB: CPT

## 2023-05-11 PROCEDURE — 99232 SBSQ HOSP IP/OBS MODERATE 35: CPT

## 2023-05-11 PROCEDURE — 86900 BLOOD TYPING SEROLOGIC ABO: CPT

## 2023-05-11 PROCEDURE — 86850 RBC ANTIBODY SCREEN: CPT

## 2023-05-11 PROCEDURE — P9040: CPT

## 2023-05-11 PROCEDURE — 81001 URINALYSIS AUTO W/SCOPE: CPT

## 2023-05-11 PROCEDURE — 36415 COLL VENOUS BLD VENIPUNCTURE: CPT

## 2023-05-11 PROCEDURE — 80053 COMPREHEN METABOLIC PANEL: CPT

## 2023-05-11 PROCEDURE — 86901 BLOOD TYPING SEROLOGIC RH(D): CPT

## 2023-05-11 PROCEDURE — 82272 OCCULT BLD FECES 1-3 TESTS: CPT

## 2023-05-11 PROCEDURE — 85730 THROMBOPLASTIN TIME PARTIAL: CPT

## 2023-05-11 PROCEDURE — 36430 TRANSFUSION BLD/BLD COMPNT: CPT

## 2023-05-11 PROCEDURE — 85610 PROTHROMBIN TIME: CPT

## 2023-05-11 PROCEDURE — 99239 HOSP IP/OBS DSCHRG MGMT >30: CPT

## 2023-05-11 PROCEDURE — 86923 COMPATIBILITY TEST ELECTRIC: CPT

## 2023-05-11 PROCEDURE — 85025 COMPLETE CBC W/AUTO DIFF WBC: CPT

## 2023-05-11 PROCEDURE — 93005 ELECTROCARDIOGRAM TRACING: CPT

## 2023-05-11 PROCEDURE — 80048 BASIC METABOLIC PNL TOTAL CA: CPT

## 2023-05-11 PROCEDURE — 84484 ASSAY OF TROPONIN QUANT: CPT

## 2023-05-11 PROCEDURE — 99285 EMERGENCY DEPT VISIT HI MDM: CPT

## 2023-05-11 PROCEDURE — G1004: CPT

## 2023-05-11 RX ORDER — CLOPIDOGREL BISULFATE 75 MG/1
1 TABLET, FILM COATED ORAL
Refills: 0 | DISCHARGE

## 2023-05-11 RX ORDER — PANTOPRAZOLE SODIUM 20 MG/1
1 TABLET, DELAYED RELEASE ORAL
Qty: 60 | Refills: 0
Start: 2023-05-11

## 2023-05-11 RX ADMIN — Medication 120 MILLIGRAM(S): at 05:14

## 2023-05-11 RX ADMIN — PANTOPRAZOLE SODIUM 40 MILLIGRAM(S): 20 TABLET, DELAYED RELEASE ORAL at 05:12

## 2023-05-11 RX ADMIN — Medication 81 MILLIGRAM(S): at 12:26

## 2023-05-11 NOTE — PROGRESS NOTE ADULT - ASSESSMENT
83yo F with PMHx of CAD s/p stents in 2018, RCC s/p left nephrectomy, TIA/CVA, pAfib not on Ac/ currently in sinus,  HTN, HLD sent to ED by PMD after found to have low h/h on outpatient lab., known coronary artery disease status post PCI of the LAD (2018), rheumatic fever as child and vasovagal syncope s/p recent catheterization which revealed prior stents in LAD and LCX are patent with non obstructive CAD  sent to ED by PMD after found to have low h/h on outpatient lab. Planned for GI work-up.      >Acute blood loss anemia   -suspected GIB   -H/h 7.9/24.6, ordered for 1u prbc   -FOBT positive   - monitor h/h , tx for hb < 8   -cont with PPI gtt   -Pt without acute bleeding at this time   -GI consulted   - plan for egd this am     >CAD s/p stents   -cont Atorvastatin 80mg po daily   - cardio consulted , recommend Plavix may be held prior to the procedure and restarted post procedure when safe from GI standpoint and recommend to Continue ASA      >? pAfib / in sinus rhythm   -not on AC, rated controlled   -cont with diltiazem 120mg daily     >HTN/HLD   -cont meds as above     >Depression   -cont Escitalopram    >DVT ppx  -no AC, SCDs for now         
81yo F with PMHx of CAD s/p stents (most recent 2.5 years ago, on Aspirin 81 mg and Plavix, last dose 2 days ago), RCC s/p left nephrectomy (3 years ago), TIA/CVA, pAfib not on AC, HTN, HLD sent to ED by PMD after found to have low h/h on outpatient lab. Found to have hemoglobin 7.9 (baseline 12 gm).

## 2023-05-11 NOTE — DISCHARGE NOTE PROVIDER - NSDCCPCAREPLAN_GEN_ALL_CORE_FT
PRINCIPAL DISCHARGE DIAGNOSIS  Diagnosis: GI bleed  Assessment and Plan of Treatment: Colonoscopsy and Endoscopy performed and showed Espophagitis, hiatal hernia and internal hemorrhoids. Please take medications as prescribed and follow up with Gastroenterologist.      SECONDARY DISCHARGE DIAGNOSES  Diagnosis: Symptomatic anemia  Assessment and Plan of Treatment: Transfused 1 unit packed red cells. Please follow up with Primary Medical Provider to monitor blood work.    Diagnosis: CAD (coronary artery disease)  Assessment and Plan of Treatment: Please continue medications as prescribed and follow up with Cardiologist     PRINCIPAL DISCHARGE DIAGNOSIS  Diagnosis: GI bleed  Assessment and Plan of Treatment: Colonoscopsy and Endoscopy performed and showed Espophagitis, hiatal hernia and internal hemorrhoids. Please take medications as prescribed and follow up with Gastroenterologist. do not take advil or aleve or motrin.      SECONDARY DISCHARGE DIAGNOSES  Diagnosis: Symptomatic anemia  Assessment and Plan of Treatment: Transfused 1 unit packed red cells. Please follow up with Primary Medical Provider to monitor blood work.    Diagnosis: CAD (coronary artery disease)  Assessment and Plan of Treatment: Please continue medications as prescribed and follow up with Cardiologist  do not take aspirin empty stomach

## 2023-05-11 NOTE — DISCHARGE NOTE PROVIDER - ATTENDING DISCHARGE PHYSICAL EXAMINATION:
Vital Signs Last 24 Hrs  T(C): 37 (11 May 2023 05:19), Max: 37 (11 May 2023 05:19)  T(F): 98.6 (11 May 2023 05:19), Max: 98.6 (11 May 2023 05:19)  HR: 71 (11 May 2023 05:19) (64 - 93)  BP: 113/65 (11 May 2023 05:19) (113/65 - 140/69)  BP(mean): --  RR: 18 (11 May 2023 05:19) (17 - 18)  SpO2: 98% (11 May 2023 05:19) (97% - 99%)    Parameters below as of 11 May 2023 05:19  Patient On (Oxygen Delivery Method): room air     Vital Signs Last 24 Hrs  T(C): 37 (11 May 2023 05:19), Max: 37 (11 May 2023 05:19)  T(F): 98.6 (11 May 2023 05:19), Max: 98.6 (11 May 2023 05:19)  HR: 71 (11 May 2023 05:19) (64 - 93)  BP: 113/65 (11 May 2023 05:19) (113/65 - 140/69)  BP(mean): --  RR: 18 (11 May 2023 05:19) (17 - 18)  SpO2: 98% (11 May 2023 05:19) (97% - 99%)    Parameters below as of 11 May 2023 05:19  Patient On (Oxygen Delivery Method): room air    GENERAL: patient appears well, no acute distress, appropriate, pleasant  EYES: sclera clear, no exudates  ENMT: oropharynx clear without erythema, no exudates, moist mucous membranes  NECK: supple, soft, no thyromegaly noted  LUNGS: good air entry bilaterally, clear to auscultation,   HEART: soft S1/S2, regular rate and rhythm, no murmurs   GASTROINTESTINAL: abdomen is soft, nontender, nondistended, normoactive bowel sounds, no palpable masses  MUSCULOSKELETAL: no clubbing or cyanosis, no obvious deformity  NEUROLOGIC: awake, alert, oriented x3, good muscle tone in 4 extremities,   PSYCHIATRIC: mood is good, affect is congruent, linear and logical thought process

## 2023-05-11 NOTE — DISCHARGE NOTE NURSING/CASE MANAGEMENT/SOCIAL WORK - PATIENT PORTAL LINK FT
You can access the FollowMyHealth Patient Portal offered by [facility] by registering at the following website: [email or web address]. By joining [employer]’s FollowMyHealth portal, you will also be able to view your health information using other applications (apps) compatible with our system.

## 2023-05-11 NOTE — DISCHARGE NOTE PROVIDER - CARE PROVIDER_API CALL
Gina Franklin (DO)  Gastroenterology  [address], [address]  [place] [address]  Phone: [phone]  Fax: [phone]  Follow Up Time: 1 month   Gina Franklin (DO)  Gastroenterology  [address], [address]  [place] [address]  Phone: [phone]  Fax: [phone]  Follow Up Time: 1 month    Anoop Sanchez; MPH)  Cardiology; Internal Medicine  [address] [place] [address]  Phone: [phone]  Fax: [phone]  Follow Up Time: 1 week

## 2023-05-11 NOTE — DISCHARGE NOTE PROVIDER - CARE PROVIDERS DIRECT ADDRESSES
[email or web address]

## 2023-05-11 NOTE — DISCHARGE NOTE PROVIDER - HOSPITAL COURSE
83yo F with PMHx of CAD s/p stents in 2018, RCC s/p left nephrectomy, TIA/CVA, pAfib not on AC/ currently in sinus,  HTN, HLD sent to ED by PMD after found to have low h/h on outpatient lab. Known coronary artery disease status post PCI of the LAD (2018), rheumatic fever as child and vasovagal syncope s/p recent catheterization which revealed prior stents in LAD and LCX are patent with non obstructive CAD. Sent to ED by PMD after found to have low h/h on outpatient lab. Hg 7.9. 1 unit PRBC transfused and PPI ggt drip started. GI consulted. Cardiology consulted and recommend Plavix may be held prior to procedure when safre from GI standpoint and recommend to continue ASA. EGD/Colonoscopy performed on 5/10, EGD: Grade D esophagitis, esophageal hiatal hernia. Colon: Grade/Stage I internal hemorrhoids. PT did well post procedure. Diet tolerated and Hg stable. Stable for discharge.

## 2023-05-11 NOTE — DISCHARGE NOTE PROVIDER - PROVIDER TOKENS
PROVIDER:[TOKEN:[[id number]],FOLLOWUP:[1 month]] PROVIDER:[TOKEN:[[id number]],FOLLOWUP:[1 month]],PROVIDER:[TOKEN:[[id number]],FOLLOWUP:[1 week]]

## 2023-05-11 NOTE — PROGRESS NOTE ADULT - SUBJECTIVE AND OBJECTIVE BOX
Chief Complaint:  Patient is a 82y old  Female who presents with a chief complaint of Acute blood loss anemia   GIB (10 May 2023 09:17)      HPI/ 24 hr events: 81yo F with PMHx of CAD s/p stents (most recent 2.5 years ago, on Aspirin 81 mg and Plavix, last dose 2 days ago), RCC s/p left nephrectomy (3 years ago), TIA/CVA, pAfib not on AC, HTN, HLD sent to ED by PMD after found to have low h/h on outpatient lab.  Patient seen and examined at bedside, no overnight events. S/p EGD/Colonoscopy yesterday, EGD: Grade D esophagitis, esophageal hiatal hernia. Colon: Grade/Stage I internal hemorrhoids. Tolerating clear liquid diet. Vitals are stable. Denies nausea, vomiting, abdominal pain.      REVIEW OF SYSTEMS:   General: Negative  HEENT: Negative  CV: Negative  Respiratory: Negative  GI: See HPI  : Negative  MSK: Negative  Hematologic: Negative  Skin: Negative    MEDICATIONS:   MEDICATIONS  (STANDING):  aspirin  chewable 81 milliGRAM(s) Oral daily  atorvastatin 80 milliGRAM(s) Oral at bedtime  diltiazem    milliGRAM(s) Oral daily  escitalopram 10 milliGRAM(s) Oral daily  pantoprazole  Injectable 40 milliGRAM(s) IV Push every 12 hours    MEDICATIONS  (PRN):  acetaminophen     Tablet .. 650 milliGRAM(s) Oral every 6 hours PRN Temp greater or equal to 38C (100.4F), Mild Pain (1 - 3)  aluminum hydroxide/magnesium hydroxide/simethicone Suspension 30 milliLiter(s) Oral every 4 hours PRN Dyspepsia  melatonin 3 milliGRAM(s) Oral at bedtime PRN Insomnia  ondansetron Injectable 4 milliGRAM(s) IV Push every 8 hours PRN Nausea and/or Vomiting      ALLERGIES:   Allergies    monoctanoin (Unknown)  Biaxin (Muscle Pain; Joint Pain)  codeine (Faint)  Metoprolol Succinate ER (Unknown)    Intolerances    Isosorbide Mononitrate Extended Release (Faint)      VITAL SIGNS:   Vital Signs Last 24 Hrs  T(C): 37 (11 May 2023 05:19), Max: 37 (11 May 2023 05:19)  T(F): 98.6 (11 May 2023 05:19), Max: 98.6 (11 May 2023 05:19)  HR: 71 (11 May 2023 05:19) (64 - 93)  BP: 113/65 (11 May 2023 05:19) (113/65 - 140/69)  BP(mean): --  RR: 18 (11 May 2023 05:19) (17 - 18)  SpO2: 98% (11 May 2023 05:19) (97% - 99%)    Parameters below as of 11 May 2023 05:19  Patient On (Oxygen Delivery Method): room air      I&O's Summary    10 May 2023 07:01  -  11 May 2023 07:00  --------------------------------------------------------  IN: 600 mL / OUT: 0 mL / NET: 600 mL        PHYSICAL EXAM:   GENERAL:  No acute distress  HEENT:  NC/AT, conjunctiva clear, sclera anicteric  CHEST:  No increased effort  HEART:  Regular rhythm  ABDOMEN:  Soft, non-tender, non-distended, normoactive bowel sounds  EXTREMITIES: No edema  SKIN:  Warm, dry  NEURO:  Calm, cooperative      LABS:  CBC Full  -  ( 10 May 2023 02:40 )  WBC Count : 6.33 K/uL  RBC Count : 3.47 M/uL  Hemoglobin : 10.4 g/dL  Hematocrit : 32.0 %  Platelet Count - Automated : 224 K/uL  Mean Cell Volume : 92.2 fl  Mean Cell Hemoglobin : 30.0 pg  Mean Cell Hemoglobin Concentration : 32.5 gm/dL  Auto Neutrophil # : x  Auto Lymphocyte # : x  Auto Monocyte # : x  Auto Eosinophil # : x  Auto Basophil # : x  Auto Neutrophil % : x  Auto Lymphocyte % : x  Auto Monocyte % : x  Auto Eosinophil % : x  Auto Basophil % : x    -10    140  |  103  |  13.5  ----------------------------<  83  3.8   |  26.0  |  1.09    Ca    8.2<L>      10 May 2023 02:40        PT/INR - ( 10 May 2023 02:40 )   PT: 13.0 sec;   INR: 1.12 ratio           Urinalysis Basic - ( 09 May 2023 14:00 )    Color: Yellow / Appearance: Clear / S.010 / pH: x  Gluc: x / Ketone: Negative  / Bili: Negative / Urobili: Negative mg/dL   Blood: x / Protein: Negative / Nitrite: Negative   Leuk Esterase: Negative / RBC: 0-2 /HPF / WBC 0-2 /HPF   Sq Epi: x / Non Sq Epi: x / Bacteria: Occasional                RADIOLOGY & ADDITIONAL STUDIES:        < from: EGD-Colonoscopy (05.10.23 @ 00:00) >    EGD-Colonoscopy Report    Date: 5/10/2023    Patient Name: BREANA HELM      MRN: [id number]      EGD Findings:        Esophagus Lumen A hiatal hernia was seen, the esophagogastric junction(EGJ) was    noted at 30 cm, with hiatal narrowing at 38 cm from the incisors. Retroflexion    view in the stomach confirmed the size and morphology of the hernia.        Mucosa Grade D esophagitis with no bleeding was seen starting at 28 cm from the    incisors in the esophagus, compatible with nonspecific erosive esophagitis.        Stomach Mucosa Normal mucosa was noted in the stomach. Multiple cold forceps    biopsies were performed for histology.        Duodenum Mucosa Normal mucosa was noted in the whole examined duodenum. Random    mucosal biopsies were obtained to evaluate for histologic features of celiac    disease. Multiple cold forceps biopsies were performed for histology in the    second part of the duodenum.        EGD Impressions:    Grade D esophagitis compatible with nonspecific erosive esophagitis.    Esophageal hiatal hernia.    Normal mucosa in the stomach. (Biopsy).    Normal mucosa in the whole examined duodenum. (Biopsy).        Colonoscopy Findings:    Protruding lesions Small grade/stage I internal hemorrhoids were noted.    Additional findings The colon was otherwise unremarkable.        Colonoscopy Impressions:    Grade/Stage I internal hemorrhoids.      The colon was otherwise unremarkable.            Plan:    Await pathology results.    Return to floor for further management    Advance diet as tolerated    GERD/Ulcer Diet    Pantoprazole 40mg BID for 12 weeks, then daily    Avoid NSAIDs    If anemia persists, outpatient GI follow up for consideration for capsule        Gloria Burgess        Version 1, Electronically signed on 5/10/2023 12:49:15 PM by Gloria Burgess    < end of copied text >      ACC: [id number] EXAM:  CT ABDOMEN AND PELVIS   ORDERED BY: KYRA MARIE     PROCEDURE DATE:  2023        FINDINGS: Please note that evaluation for source of active GI bleed is   markedly limited without IV contrast.      LOWER CHEST: The heart size is borderline. Coronary valvular and aortic   calcifications are noted. There is a small hiatal hernia. Dependent   atelectatic changes are appreciated..    LIVER: Subcentimeter hypodensities within the medial hepatic dome are too   small to characterize. Coarse calcifications also seen within the segment   8 adjacent to the IVC may be related to old granulomatous disease..  BILE DUCTS: Normal caliber.  GALLBLADDER: Cholelithiasis.  SPLEEN: Within normal limits.  PANCREAS: Within normal limits.  ADRENALS: Within normal limits.  KIDNEYS/URETERS: Left nephrectomy changes. No right-sided urinary tract   obstruction or stone..    BLADDER: Within normal limits.  REPRODUCTIVE ORGANS: Uterus and adnexa within normal limits.    BOWEL: No bowel obstruction. Appendix is normal. There is a large amount   of stool seen throughout the colon compatible with constipation. There is   a small hiatal hernia.  PERITONEUM: No ascites.  VESSELS: Atherosclerotic changes.  RETROPERITONEUM/LYMPH NODES: No lymphadenopathy. No retroperitoneal bleed.  ABDOMINAL WALL: There is diastasis recti. Postsurgical changes are   appreciated..  BONES: There is generalized osteopeniaand degenerative changes..    IMPRESSION:  No intra-abdominal or retroperitoneal bleed identified.    Probable constipation.    Cholelithiasis.    Additional incidental findings as above.    Please note that identification of source of active GI bleed at the time   scanning is not possible without IV contrast.        --- End of Report ---    
cc; gib       interval hx ; patient seen and eval. comfortable. in no acute distress.  denies any n/v.    Vital Signs Last 24 Hrs  T(C): 36.6 (10 May 2023 15:25), Max: 36.8 (09 May 2023 19:07)  T(F): 97.9 (10 May 2023 15:25), Max: 98.3 (09 May 2023 19:07)  HR: 79 (10 May 2023 15:25) (68 - 84)  BP: 117/71 (10 May 2023 15:25) (117/71 - 131/72)  BP(mean): --  RR: 18 (10 May 2023 15:25) (18 - 18)  SpO2: 97% (10 May 2023 15:25) (97% - 99%)    Parameters below as of 10 May 2023 15:25  Patient On (Oxygen Delivery Method): room air    GENERAL: patient appears well, no acute distress, appropriate, pleasant  EYES: sclera clear, no exudates  ENMT: oropharynx clear without erythema, no exudates, moist mucous membranes  NECK: supple, soft, no thyromegaly noted  LUNGS: good air entry bilaterally, clear to auscultation,   HEART: soft S1/S2, regular rate and rhythm, no murmurs   GASTROINTESTINAL: abdomen is soft, nontender, nondistended, normoactive bowel sounds, no palpable masses  MUSCULOSKELETAL: no clubbing or cyanosis, no obvious deformity  NEUROLOGIC: awake, alert, oriented x3, good muscle tone in 4 extremities,   PSYCHIATRIC: mood is good, affect is congruent, linear and logical thought process                          10.4   6.33  )-----------( 224      ( 10 May 2023 02:40 )             32.0   05-10    140  |  103  |  13.5  ----------------------------<  83  3.8   |  26.0  |  1.09    Ca    8.2<L>      10 May 2023 02:40    TPro  5.4<L>  /  Alb  3.5  /  TBili  0.3<L>  /  DBili  x   /  AST  30  /  ALT  13  /  AlkPhos  39<L>  05-08      MEDICATIONS  (STANDING):  atorvastatin 80 milliGRAM(s) Oral at bedtime  diltiazem    milliGRAM(s) Oral daily  escitalopram 10 milliGRAM(s) Oral daily  pantoprazole Infusion 8 mG/Hr (10 mL/Hr) IV Continuous <Continuous>    MEDICATIONS  (PRN):  acetaminophen     Tablet .. 650 milliGRAM(s) Oral every 6 hours PRN Temp greater or equal to 38C (100.4F), Mild Pain (1 - 3)  aluminum hydroxide/magnesium hydroxide/simethicone Suspension 30 milliLiter(s) Oral every 4 hours PRN Dyspepsia  melatonin 3 milliGRAM(s) Oral at bedtime PRN Insomnia  ondansetron Injectable 4 milliGRAM(s) IV Push every 8 hours PRN Nausea and/or Vomiting

## 2023-05-11 NOTE — DISCHARGE NOTE PROVIDER - NSDCMRMEDTOKEN_GEN_ALL_CORE_FT
aspirin 81 mg oral delayed release tablet: 1 tab(s) orally once a day  atorvastatin 80 mg oral tablet: 1 tab(s) orally once a day  dilTIAZem 120 mg/24 hours oral capsule, extended release: 1 cap(s) orally once a day  escitalopram 10 mg oral tablet: 1 tab(s) orally once a day  Nexlizet 180 mg-10 mg oral tablet: 1 tab(s) orally once a day  pantoprazole 40 mg oral delayed release tablet: 1 tab(s) orally 2 times a day  Plavix 75 mg oral tablet: 1 tab(s) orally once a day   aspirin 81 mg oral delayed release tablet: 1 tab(s) orally once a day  atorvastatin 80 mg oral tablet: 1 tab(s) orally once a day  dilTIAZem 120 mg/24 hours oral capsule, extended release: 1 cap(s) orally once a day  escitalopram 10 mg oral tablet: 1 tab(s) orally once a day  Nexlizet 180 mg-10 mg oral tablet: 1 tab(s) orally once a day  pantoprazole 40 mg oral delayed release tablet: 1 tab(s) orally 2 times a day

## 2023-05-11 NOTE — PROGRESS NOTE ADULT - PROBLEM SELECTOR PLAN 1
Normocytic anemia. S/p EGD/Colonoscopy yesterday, EGD: Grade D esophagitis, esophageal hiatal hernia. Colon: Grade/Stage I internal hemorrhoids. Awaiting AM hemoglobin.   Pending CBC  Will advance diet to regular diet   Can be d/c on Protonix 40 mg BID for 12 weeks, then daily  Avoid NSAIDs  If Hgb stable and tolerating diet, Pt can be D/c home from GI standpoint with F/U appointment in 4-6 weeks.

## 2023-05-11 NOTE — PROGRESS NOTE ADULT - NS ATTEND AMEND GEN_ALL_CORE FT
I saw and examined pt with NP   No nausea, no vomiting  Diet now advanced, Hgb stable. EGD showed severe grade D esophagitis. Colonoscopy essentially normal .   NO overt GI bleeding  A +BS, soft, non tender    A/P  severe esophagitis  BID PPI  May D/C home   GI F/U in 4-6 weeks  Dr Livia bee

## 2023-05-11 NOTE — DISCHARGE NOTE NURSING/CASE MANAGEMENT/SOCIAL WORK - NSDCPEFALRISK_GEN_ALL_CORE
For information on Fall & Injury Prevention, visit: [email or web address] OR  [email or web address] OR  https://www.cdc.gov/steadi/patient.html

## 2023-05-16 LAB — SURGICAL PATHOLOGY STUDY: SIGNIFICANT CHANGE UP

## 2023-06-05 ENCOUNTER — OFFICE VISIT (OUTPATIENT)
Dept: URGENT CARE | Facility: URGENT CARE | Age: 83
End: 2023-06-05
Payer: COMMERCIAL

## 2023-06-05 VITALS
SYSTOLIC BLOOD PRESSURE: 122 MMHG | TEMPERATURE: 97.5 F | HEART RATE: 64 BPM | DIASTOLIC BLOOD PRESSURE: 66 MMHG | OXYGEN SATURATION: 95 %

## 2023-06-05 DIAGNOSIS — H00.012 HORDEOLUM EXTERNUM OF RIGHT LOWER EYELID: Primary | ICD-10-CM

## 2023-06-05 PROCEDURE — 99213 OFFICE O/P EST LOW 20 MIN: CPT

## 2023-06-05 RX ORDER — ERYTHROMYCIN 5 MG/G
0.5 OINTMENT OPHTHALMIC 3 TIMES DAILY
Qty: 3.5 G | Refills: 0 | Status: SHIPPED | OUTPATIENT
Start: 2023-06-05 | End: 2023-06-07

## 2023-06-05 NOTE — PROGRESS NOTES
Assessment & Plan     Hordeolum externum of right lower eyelid    - erythromycin (ROMYCIN) 5 MG/GM ophthalmic ointment; Place 0.5 inches into the right eye 3 times daily for 10 days    Recommend HEAT pack to stye only-- EES ointment to treat stye.  Advised can be slow to resolve-- close Follow-up if no change or new or worsening sx prn.    April Leeb45g. MD   Urgent Care Provider  [facility] JESSE Valencia is a 82 year old, presenting for the following health issues:  Eye Lid Swelling (Right side/under eye x 2 days ) and Eye Drainage (Right side with redness )    HPI   Presents with RIGHT lower eyelid swelling and eye redness x 2 days.  No injury or trauma.  No vision changes.    Review of Systems   Constitutional, HEENT, cardiovascular, pulmonary, GI, , musculoskeletal, neuro, skin, endocrine and psych systems are negative, except as otherwise noted.      Objective    /66 (BP Location: Left arm, Patient Position: Sitting, Cuff Size: Adult Large)   Pulse 64   Temp 97.5  F (36.4  C) (Tympanic)   SpO2 95%   There is no height or weight on file to calculate BMI.  Physical Exam   GENERAL: healthy, alert and no distress  EYES: RIGHT lower lid- visible yellow stye on medial third of lidline- very visible to naked eye with associated swelling of RIGHT lower eyelid and mild injection of conjunctivae, clear drainage  MS: no gross musculoskeletal defects noted, no edema  SKIN: no suspicious lesions or rashes  PSYCH: mentation appears normal, affect normal/bright                  Right eye 10/20  Left eye 10/15    Harika Cornejo MA   6/5/2023

## 2023-06-06 ENCOUNTER — NURSE TRIAGE (OUTPATIENT)
Dept: NURSING | Facility: CLINIC | Age: 83
End: 2023-06-06
Payer: COMMERCIAL

## 2023-06-06 NOTE — TELEPHONE ENCOUNTER
Patient is calling with questions about how to administer the eye ointment, Erythromycin, prescribed to her yesterday for sty in her right lower lid. Directions are to place 0.5in into right eye 3 times daily for 10 days. Patient states the that the amount she was given (3.5g) is only a 7 day supply. Patient is concerned that she will run out before the 10 day treatment period.  Routing to provider to ask if patient should have 1 refill available in case she runs out. Patient uses [facility] in [facility] in [place].    Nguyen Garza RN  06/06/23 12:04 PM  [facility] Nurse Advisor    Reason for Disposition    Caller has NON-URGENT medicine question about med that PCP or specialist prescribed and triager unable to answer question    Protocols used: MEDICATION QUESTION CALL-A-[place]

## 2023-06-07 DIAGNOSIS — H00.012 HORDEOLUM EXTERNUM OF RIGHT LOWER EYELID: ICD-10-CM

## 2023-06-07 RX ORDER — ERYTHROMYCIN 5 MG/G
0.5 OINTMENT OPHTHALMIC 3 TIMES DAILY
Qty: 3.5 G | Refills: 1 | Status: SHIPPED | OUTPATIENT
Start: 2023-06-07

## 2023-07-03 ENCOUNTER — EMERGENCY (EMERGENCY)
Facility: HOSPITAL | Age: 83
LOS: 1 days | Discharge: HOME | End: 2023-07-03
Attending: EMERGENCY MEDICINE
Payer: MEDICARE

## 2023-07-03 VITALS
TEMPERATURE: 99 F | SYSTOLIC BLOOD PRESSURE: 134 MMHG | HEART RATE: 75 BPM | OXYGEN SATURATION: 100 % | DIASTOLIC BLOOD PRESSURE: 60 MMHG | RESPIRATION RATE: 18 BRPM

## 2023-07-03 VITALS
HEART RATE: 73 BPM | SYSTOLIC BLOOD PRESSURE: 120 MMHG | WEIGHT: 134.92 LBS | DIASTOLIC BLOOD PRESSURE: 61 MMHG | OXYGEN SATURATION: 100 % | TEMPERATURE: 98 F | HEIGHT: 67 IN | RESPIRATION RATE: 18 BRPM

## 2023-07-03 DIAGNOSIS — Z90.5 ACQUIRED ABSENCE OF KIDNEY: Chronic | ICD-10-CM

## 2023-07-03 DIAGNOSIS — C64.9 MALIGNANT NEOPLASM OF UNSPECIFIED KIDNEY, EXCEPT RENAL PELVIS: Chronic | ICD-10-CM

## 2023-07-03 DIAGNOSIS — Z90.89 ACQUIRED ABSENCE OF OTHER ORGANS: Chronic | ICD-10-CM

## 2023-07-03 LAB
ALBUMIN SERPL ELPH-MCNC: 3.9 G/DL — SIGNIFICANT CHANGE UP (ref 3.3–5.2)
ALP SERPL-CCNC: 40 U/L — SIGNIFICANT CHANGE UP (ref 40–120)
ALT FLD-CCNC: 14 U/L — SIGNIFICANT CHANGE UP
ANION GAP SERPL CALC-SCNC: 12 MMOL/L — SIGNIFICANT CHANGE UP (ref 5–17)
AST SERPL-CCNC: 29 U/L — SIGNIFICANT CHANGE UP
BASOPHILS # BLD AUTO: 0.07 K/UL — SIGNIFICANT CHANGE UP (ref 0–0.2)
BASOPHILS NFR BLD AUTO: 1.1 % — SIGNIFICANT CHANGE UP (ref 0–2)
BILIRUB SERPL-MCNC: 0.4 MG/DL — SIGNIFICANT CHANGE UP (ref 0.4–2)
BUN SERPL-MCNC: 36.6 MG/DL — HIGH (ref 8–20)
CALCIUM SERPL-MCNC: 9.4 MG/DL — SIGNIFICANT CHANGE UP (ref 8.4–10.5)
CHLORIDE SERPL-SCNC: 99 MMOL/L — SIGNIFICANT CHANGE UP (ref 96–108)
CO2 SERPL-SCNC: 23 MMOL/L — SIGNIFICANT CHANGE UP (ref 22–29)
CREAT SERPL-MCNC: 1.06 MG/DL — SIGNIFICANT CHANGE UP (ref 0.5–1.3)
EGFR: 52 ML/MIN/1.73M2 — LOW
EOSINOPHIL # BLD AUTO: 0.06 K/UL — SIGNIFICANT CHANGE UP (ref 0–0.5)
EOSINOPHIL NFR BLD AUTO: 0.9 % — SIGNIFICANT CHANGE UP (ref 0–6)
GLUCOSE SERPL-MCNC: 88 MG/DL — SIGNIFICANT CHANGE UP (ref 70–99)
HCT VFR BLD CALC: 33.9 % — LOW (ref 34.5–45)
HGB BLD-MCNC: 11.1 G/DL — LOW (ref 11.5–15.5)
IMM GRANULOCYTES NFR BLD AUTO: 0.3 % — SIGNIFICANT CHANGE UP (ref 0–0.9)
LIDOCAIN IGE QN: 58 U/L — HIGH (ref 22–51)
LYMPHOCYTES # BLD AUTO: 1.17 K/UL — SIGNIFICANT CHANGE UP (ref 1–3.3)
LYMPHOCYTES # BLD AUTO: 18.4 % — SIGNIFICANT CHANGE UP (ref 13–44)
MCHC RBC-ENTMCNC: 29.7 PG — SIGNIFICANT CHANGE UP (ref 27–34)
MCHC RBC-ENTMCNC: 32.7 GM/DL — SIGNIFICANT CHANGE UP (ref 32–36)
MCV RBC AUTO: 90.6 FL — SIGNIFICANT CHANGE UP (ref 80–100)
MONOCYTES # BLD AUTO: 0.7 K/UL — SIGNIFICANT CHANGE UP (ref 0–0.9)
MONOCYTES NFR BLD AUTO: 11 % — SIGNIFICANT CHANGE UP (ref 2–14)
NEUTROPHILS # BLD AUTO: 4.33 K/UL — SIGNIFICANT CHANGE UP (ref 1.8–7.4)
NEUTROPHILS NFR BLD AUTO: 68.3 % — SIGNIFICANT CHANGE UP (ref 43–77)
NT-PROBNP SERPL-SCNC: 875 PG/ML — HIGH (ref 0–300)
PLATELET # BLD AUTO: 157 K/UL — SIGNIFICANT CHANGE UP (ref 150–400)
POTASSIUM SERPL-MCNC: 4.7 MMOL/L — SIGNIFICANT CHANGE UP (ref 3.5–5.3)
POTASSIUM SERPL-SCNC: 4.7 MMOL/L — SIGNIFICANT CHANGE UP (ref 3.5–5.3)
PROT SERPL-MCNC: 6.1 G/DL — LOW (ref 6.6–8.7)
RBC # BLD: 3.74 M/UL — LOW (ref 3.8–5.2)
RBC # FLD: 13.6 % — SIGNIFICANT CHANGE UP (ref 10.3–14.5)
SODIUM SERPL-SCNC: 134 MMOL/L — LOW (ref 135–145)
TROPONIN T SERPL-MCNC: <0.01 NG/ML — SIGNIFICANT CHANGE UP (ref 0–0.06)
TROPONIN T SERPL-MCNC: <0.01 NG/ML — SIGNIFICANT CHANGE UP (ref 0–0.06)
WBC # BLD: 6.35 K/UL — SIGNIFICANT CHANGE UP (ref 3.8–10.5)
WBC # FLD AUTO: 6.35 K/UL — SIGNIFICANT CHANGE UP (ref 3.8–10.5)

## 2023-07-03 PROCEDURE — 74177 CT ABD & PELVIS W/CONTRAST: CPT | Mod: 26,MA

## 2023-07-03 PROCEDURE — 99285 EMERGENCY DEPT VISIT HI MDM: CPT

## 2023-07-03 PROCEDURE — 71045 X-RAY EXAM CHEST 1 VIEW: CPT

## 2023-07-03 PROCEDURE — 99285 EMERGENCY DEPT VISIT HI MDM: CPT | Mod: 25

## 2023-07-03 PROCEDURE — 36415 COLL VENOUS BLD VENIPUNCTURE: CPT

## 2023-07-03 PROCEDURE — 85025 COMPLETE CBC W/AUTO DIFF WBC: CPT

## 2023-07-03 PROCEDURE — 80053 COMPREHEN METABOLIC PANEL: CPT

## 2023-07-03 PROCEDURE — 84484 ASSAY OF TROPONIN QUANT: CPT

## 2023-07-03 PROCEDURE — 71275 CT ANGIOGRAPHY CHEST: CPT | Mod: 26,MA

## 2023-07-03 PROCEDURE — 71275 CT ANGIOGRAPHY CHEST: CPT | Mod: MA

## 2023-07-03 PROCEDURE — 83690 ASSAY OF LIPASE: CPT

## 2023-07-03 PROCEDURE — 93010 ELECTROCARDIOGRAM REPORT: CPT

## 2023-07-03 PROCEDURE — 71045 X-RAY EXAM CHEST 1 VIEW: CPT | Mod: 26

## 2023-07-03 PROCEDURE — 74177 CT ABD & PELVIS W/CONTRAST: CPT | Mod: MA

## 2023-07-03 PROCEDURE — 83880 ASSAY OF NATRIURETIC PEPTIDE: CPT

## 2023-07-03 PROCEDURE — 93005 ELECTROCARDIOGRAM TRACING: CPT

## 2023-07-03 RX ORDER — ALPRAZOLAM 0.25 MG
0.25 TABLET ORAL ONCE
Refills: 0 | Status: DISCONTINUED | OUTPATIENT
Start: 2023-07-03 | End: 2023-07-03

## 2023-07-03 RX ADMIN — Medication 0.25 MILLIGRAM(S): at 15:07

## 2023-07-03 NOTE — ED PROVIDER NOTE - CLINICAL SUMMARY MEDICAL DECISION MAKING FREE TEXT BOX
Patient with past medical history of CAD status post stents, paroxysmal A-fib, recent suspected GI bleed presents to ED with shortness of breath since yesterday.  Also reported an episode of dark stool yesterday.  Epigastric tenderness on exam.  Labs, EKG, chest x-ray, CT GI Bleed protocol ordered. Patient with past medical history of CAD status post stents, paroxysmal A-fib, recent suspected GI bleed presents to ED with shortness of breath since yesterday.  Also reported an episode of dark stool yesterday.  Epigastric tenderness on exam.  Labs, EKG, chest x-ray, CT GI Bleed protocol ordered. Workup is unremarkable for any emergent process.   Patient is now feeling improved and wishes to leave.  Patient / family members have capacity.    Patient/family was given full return precautions, counseled on red flag symptoms such as LOC, fever, severe pain, or focal deficits and advised to return to the ED for these reasons or any reason that was concerning to them. Patient/family was informed of all significant and incidental findings found on this workup today and all results were reviewed.   All questions were answered, advised to make close follow up with their primary care provider and specialty clinics (as applicable) to follow up with this visit and continue investigation/treatment.   Patient/family has shown adequate understanding and is agreeable to the plan.

## 2023-07-03 NOTE — ED PROVIDER NOTE - NSFOLLOWUPINSTRUCTIONS_ED_ALL_ED_FT
-Please follow-up with your primary care doctor in the next 2 days.  Please call tomorrow for an appointment.  If you cannot follow-up with your primary care doctor please return to the ED for any urgent issues.  - You were given a copy of the tests performed today.  Please bring the results with you and review them with your primary care doctor.  - If you have any worsening of symptoms or any other concerns please return to the ED immediately.  - Please continue taking your home medications as directed.     Shortness of breath    Shortness of breath (dyspnea) means you have trouble breathing and could indicate a medical problem. Causes include lung disease, heart disease, low amount of red blood cells (anemia), poor physical fitness, being overweight, smoking, etc. Your health care provider today may not be able to find a cause for your shortness of breath after your exam. In this case, it is important to have a follow-up exam with your primary care physician as instructed. If medicines were prescribed, take them as directed for the full length of time directed. Refrain from tobacco products.    SEEK IMMEDIATE MEDICAL CARE IF YOU HAVE ANY OF THE FOLLOWING SYMPTOMS: worsening shortness of breath, chest pain, back pain, abdominal pain, fever, coughing up blood, lightheadedness/dizziness.

## 2023-07-03 NOTE — ED PROVIDER NOTE - PHYSICAL EXAMINATION
Gen: Well appearing in NAD  Head: NC/AT  Neck: trachea midline  Card: regular rate and rhythm  Resp:  tachypneic, O2 sat 100% on room air, breath sounds CTA b/l  Abd: soft, +epigastric TTP  Ext: no deformities above reported baseline  Neuro:  A&O, no motor or sensory deficits above reported baseline  Skin:  Warm and dry as visualized  Psych:  Normal affect and mood

## 2023-07-03 NOTE — ED ADULT NURSE NOTE - OBJECTIVE STATEMENT
Received pt from [facility], labs sent.  pt is here with c/o SOB for the past 2 days.  Pt has a cardiac hx and hx of anemia. Pt is speaking in full sentences, states the humid weather is causing her SOB.  Pt denies CP.

## 2023-07-03 NOTE — ED PROVIDER NOTE - PATIENT PORTAL LINK FT
You can access the FollowMyHealth Patient Portal offered by [facility] by registering at the following website: [email or web address]. By joining [facility]’s FollowMyHealth portal, you will also be able to view your health information using other applications (apps) compatible with our system.

## 2023-07-03 NOTE — ED PROVIDER NOTE - ATTENDING CONTRIBUTION TO CARE
82yoF; with PMH signif for CAD s/p stents(on ASA/Plavix), RCC s/p left nephrectomy, TIA/CVA, pAfib on AC, recent GI Bleed (found to have gastritis); now p/w sob x24 hours. c/o lightheadedness and HOLDEN.  c/o dark stool yesterday. reports some chest pain--sscp, pressure, with sob, pleuritic.  denies f/c/s. denies n/v. denies cough.  General:     NAD  Head:     NC/AT, EOMI, oral mucosa moist  Neck:     trachea midline  Lungs:     CTA b/l, no w/r/r  CVS:     S1S2, RRR, no m/g/r  Abd:     +BS, s/nt/nd, no organomegaly  Ext:    2+ radial and pedal pulses, no c/c/e  Neuro: AAOx3, no sensory/motor deficits  A/P:  82yoF p/w sob [id number]  -labs, xray, cta, ct a/p, re-eval

## 2023-07-03 NOTE — ED PROVIDER NOTE - OBJECTIVE STATEMENT
83 yo F with PMHx of CAD s/p stents (most recent 2.5 years ago, on Aspirin 81 mg and Plavix, last dose 2 days ago), RCC s/p left nephrectomy (3 years ago), TIA/CVA, pAfib not on AC, HTN, HLD, suspected recent GI bleed sent to ED c/o SOB since yesterday. She endorses lightheadedness and worsening of SOB w/ exertion. Pt also reports an episode of very dark stool yesterday. She also reports a pressure sensation in her chest/upper abd. She denies numbness, tingling, focal weakness, back pain, N/V/D, leg swelling, or any other complaints. Not currently on any blood thinners or diuretics.

## 2023-07-03 NOTE — ED ADULT TRIAGE NOTE - CHIEF COMPLAINT QUOTE
pt c/o shortness of breath since yesterday, that patient attributed to the humid weather. patient has extensive cardiac hx including anemia with blood transfusion

## 2023-07-07 NOTE — CONSULT NOTE ADULT - PROVIDER SPECIALTY LIST ADULT
Vital signs stable. Postpartum assessment WDL.Pain well controlled. Incision assessment WDL and intact with adhesive strips. Up ad/matt. Breastfeeding well and pumping. Pt received complete discharge paperwork. Discharge outcomes on care plan met. Pt states understanding and comfort with self care and follow up care. Pt had no further questions at the time of discharge and no unmet needs were identified. ID bands double checked and verified with parents and . Electronic security band removed from . Pt discharged on 2023.  
Cardiology
Epilepsy

## 2023-07-21 NOTE — ED ADULT TRIAGE NOTE - DIRECT TO ROOM CARE INITIATED:
Patient referred to [facility] and they have contacted patient several times and no response to referral.
03-Jul-2023 10:44

## 2023-07-27 NOTE — ED ADULT NURSE NOTE - AS SC BRADEN SENSORY
(4) no impairment Hpi Title: Evaluation of Skin Lesions How Severe Are Your Spot(S)?: moderate Additional History: Est pt \\nDryness on hands\\nBeen there for over 10 years\\nPainful, cracks, bleeds, dry, irritated, red, stings\\nTried steroid cream- helps but not long term\\nSometimes goes away for months

## 2023-07-28 NOTE — PHYSICAL THERAPY INITIAL EVALUATION ADULT - IMPAIRMENTS FOUND, PT EVAL
gait, locomotion, and balance/muscle strength Complex Repair And Flap Additional Text (Will Appearing After The Standard Complex Repair Text): The complex repair was not sufficient to completely close the primary defect. The remaining additional defect was repaired with the flap mentioned below.

## 2023-08-04 ENCOUNTER — HOSPITAL ENCOUNTER (EMERGENCY)
Facility: CLINIC | Age: 83
Discharge: HOME | End: 2023-08-04
Attending: EMERGENCY MEDICINE | Admitting: EMERGENCY MEDICINE
Payer: COMMERCIAL

## 2023-08-04 VITALS
SYSTOLIC BLOOD PRESSURE: 152 MMHG | DIASTOLIC BLOOD PRESSURE: 70 MMHG | HEIGHT: 66 IN | OXYGEN SATURATION: 94 % | BODY MASS INDEX: 35.36 KG/M2 | HEART RATE: 82 BPM | WEIGHT: 220 LBS | RESPIRATION RATE: 18 BRPM | TEMPERATURE: 98.3 F

## 2023-08-04 DIAGNOSIS — H57.89 RED EYE: ICD-10-CM

## 2023-08-04 PROCEDURE — 99284 EMERGENCY DEPT VISIT MOD MDM: CPT

## 2023-08-04 PROCEDURE — 250N000009 HC RX 250: Performed by: EMERGENCY MEDICINE

## 2023-08-04 RX ORDER — CIPROFLOXACIN HYDROCHLORIDE 3.5 MG/ML
1-2 SOLUTION/ DROPS TOPICAL EVERY 4 HOURS
Qty: 10 ML | Refills: 0 | Status: SHIPPED | OUTPATIENT
Start: 2023-08-04

## 2023-08-04 RX ORDER — KETOROLAC TROMETHAMINE 5 MG/ML
1 SOLUTION OPHTHALMIC 4 TIMES DAILY
Qty: 10 ML | Refills: 0 | Status: SHIPPED | OUTPATIENT
Start: 2023-08-04

## 2023-08-04 RX ORDER — PROPARACAINE HYDROCHLORIDE 5 MG/ML
1 SOLUTION/ DROPS OPHTHALMIC ONCE
Status: COMPLETED | OUTPATIENT
Start: 2023-08-04 | End: 2023-08-04

## 2023-08-04 RX ADMIN — FLUORESCEIN SODIUM 1 STRIP: 1 STRIP OPHTHALMIC at 08:25

## 2023-08-04 RX ADMIN — PROPARACAINE HYDROCHLORIDE 1 DROP: 5 SOLUTION/ DROPS OPHTHALMIC at 08:15

## 2023-08-04 ASSESSMENT — VISUAL ACUITY
OD: 20/70;WITHOUT CORRECTIVE LENSES
OS: 20/200;WITHOUT CORRECTIVE LENSES

## 2023-08-04 ASSESSMENT — ACTIVITIES OF DAILY LIVING (ADL): ADLS_ACUITY_SCORE: 35

## 2023-08-04 NOTE — ED PROVIDER NOTES
"  History     Chief Complaint:  Eye Pain       HPI   Annie Archer is a 82 year old female   Who complains of left eye pain for the past 2 days she denies any trauma to the eye any drainage but some slight tearing this morning she denies fevers neck pain head pain or previous eye problems she wears glasses for Distance vision.  No history of glaucoma or cataracts      Independent Historian:   None     Review of External Notes:   None      Medications:    acetaminophen (TYLENOL) 325 MG tablet  albuterol (PROAIR HFA/PROVENTIL HFA/VENTOLIN HFA) 108 (90 Base) MCG/ACT inhaler  amLODIPine (NORVASC) 2.5 MG tablet  desonide (DESOWEN) 0.05 % external cream  docusate sodium (COLACE) 100 MG capsule  erythromycin (ROMYCIN) 5 MG/GM ophthalmic ointment  fluocinonide (LIDEX) 0.05 % external solution  hydrochlorothiazide (HYDRODIURIL) 25 MG tablet  ketoconazole (NIZORAL) 2 % external shampoo  lisinopril (ZESTRIL) 20 MG tablet  metoprolol succinate ER (TOPROL XL) 25 MG 24 hr tablet  polyethylene glycol (MIRALAX) 17 GM/Dose powder  predniSONE (DELTASONE) 10 MG tablet  rivaroxaban ANTICOAGULANT (XARELTO) 20 MG TABS tablet        Past Medical History:    History reviewed. No pertinent past medical history.    Past Surgical History:    History reviewed. No pertinent surgical history.     Physical Exam   Patient Vitals for the past 24 hrs:   BP Temp Temp src Pulse Resp SpO2 Height Weight   08/04/23 0715 (!) 152/70 98.3  F (36.8  C) Oral 82 18 94 % 1.676 m (5' 6\") 99.8 kg (220 lb)        Physical Exam  Elderly white female supine no respiratory distress  HEENT: Pupils 1 mm equal minimally reactive extraocular movements intact left eye moderate bulbar conjunctival inflammation no ciliary spasm no foreign body seen anterior chamber appears clear.  Minimal tearing.  Neck nontender no adenopathy face nontender.  Neuro: Awake alert appropriate no facial asymmetry motor sensation and coordination intact    Emergency Department Course          "              Procedures   Slit-lamp exam with fluorescein.  2 drops Alcaine placed in left eye.  Exam shows no corneal abrasion or foreign body no flare or cells. pressure 21.  Visual acuity left eye 20/200 right eye 20/70    Emergency Department Course & Assessments:             Interventions:  Medications   fluorescein (FUL-DEONNA) ophthalmic strip 1 strip (1 strip Left Eye $Given 8/4/23 0800)   proparacaine (ALCAINE) 0.5 % ophthalmic solution 1 drop (1 drop Left Eye $Given 8/4/23 0815)              Independent Interpretation (X-rays, CTs, rhythm strip):  None    Consultations/Discussion of Management or Tests:  None         Social Determinants of Health affecting care:   None  Home  Disposition:  Home    Impression & Plan              Medical Decision Making:  Patient's exam shows no signs of corneal abrasion or foreign body acute glaucoma or obvious bacterial infection.  Visual acuity of the left eye is down due to patient's discomfort and inability to do a good visual acuity exam.  I have started patient on antibiotics and anti-inflammatories and recommended close follow-up with her ophthalmologist.  I have advised her to call today and make an appointment as soon as possible        Diagnosis:    ICD-10-CM    1. Red eye  H57.89            Discharge Medications:  Discharge Medication List as of 8/4/2023  9:03 AM        START taking these medications    Details   ciprofloxacin (CILOXAN) 0.3 % ophthalmic solution Place 1-2 drops Into the left eye every 4 hours, Disp-10 mL, R-0, Local Print      ketorolac (ACULAR) 0.5 % ophthalmic solution Place 1 drop Into the left eye 4 times daily, Disp-10 mL, R-0, Local Print                  8/4/2023   Luca Kendall MD Steinman, Randall Ira, MD  08/04/23 [id number]

## 2023-08-04 NOTE — ED TRIAGE NOTES
Left eye pain - 2 days of left eye pain with redness - watery this morning   Denies any trauma        Triage Assessment       Row Name 08/04/23 0725       Triage Assessment (Adult)    Airway WDL WDL       Respiratory WDL    Respiratory WDL WDL       Cardiac WDL    Cardiac WDL WDL       Cognitive/Neuro/Behavioral WDL    Cognitive/Neuro/Behavioral WDL WDL

## 2023-08-09 ENCOUNTER — APPOINTMENT (OUTPATIENT)
Dept: GASTROENTEROLOGY | Facility: CLINIC | Age: 83
End: 2023-08-09

## 2023-08-28 NOTE — ED CDU PROVIDER DISPOSITION NOTE - NS_BEDUNITTYPES_ED_ALL_ED
MONITORED TELEMETRY [place] Pregnancy And Lactation Text: This medication is not safe during pregnancy. It is unknown if this medication is excreted in breast milk.

## 2023-08-29 NOTE — CONSULT NOTE ADULT - ASSESSMENT
[facility]  Advanced Heart Failure Inpatient Progress Note    Cayden Quiroz Patient Status:  Inpatient    1950 MRN [id number]   Location [facility] UN[address] 43 Attending Tavares Murray MD   Hosp Day # 7 PCP Jassi Sawant MD     Telemetry: Personally reviewed, no events overnight    Assessment:  72 year old male admitted with active lower GIB.     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  -resume entresto, jardiance, torsemide  -Okay to resume plavix per GI.  - currently scheduled for CardioMems implant in 1 month, will need to reconsider timing given bleed  - daily standing weights  - replete lytes per protocol    Cayden is ready for discharge from my viewpoint: yes  Workup needed prior to discharge: done  Medications changes at discharge:  Restart entresto, torsemide, jardiance, plavix  Follow up appointments needed after discharge:  HF clinic  Estimated Date of Discharge documented: 23    Plan discussed with nursing staff      Teresa VALENTINE  Heart Failure Service   [facility] cardiology          Subjective:  Feels well ; no SOB    OBJECTIVE:  Vitals:  Vitals:    23 1930 23 2348 23 0337 23 0736   BP: 126/68 114/62 105/53 114/66   BP Location: RUE - Right upper extremity  RUE - Right upper extremity RUE - Right upper extremity   Patient Position: Supine  Left side lying Supine   Pulse: 73 70 71 69   Resp: 16  16 15   Temp: 98.1 °F (36.7 °C) 98.2 °F (36.8 °C) 98.4 °F (36.9 °C) 98.2 °F (36.8 °C)   TempSrc: Oral Oral Oral Oral   SpO2: 97% 98% 97% 99%   Weight:       Height:            Weight:  Weight    08/22/23 1919 08/23/23 0045   Weight: 93.3 kg (205 lb 11 oz) 90.2 kg (198 lb 13.7 oz)       Intake/Output:     Intake/Output Summary (Last 24 hours) at 8/29/2023 1038  Last data filed at 8/29/2023 0600  Gross per 24 hour   Intake 1100 ml   Output 1651 ml   Net -551 ml       Labs:     Recent Labs   Lab 08/29/23  0357 08/23/23  1417 08/23/23  0507   SODIUM 140  --  140   POTASSIUM 3.7  --  4.4   CHLORIDE 111*  --  109   CO2 25  --  25   BUN 13  --  45*   CREATININE 1.01  --  1.43*   GLUCOSE 178*  --  157*   CALCIUM 9.2  --  9.4   WBC  --   --  9.1   RBC  --   --  2.41*   HGB 8.3*   < > 7.4*   HCT 25.6*   < > 23.6*   PLT  --   --  155    < > = values in this interval not displayed.       No results for input(s): \"RAPDTR\", \"LACTA\", \"PT\", \"INR\", \"BNP\" in the last 72 hours.    Medications:  Current Facility-Administered Medications   Medication   • torsemide (DEMADEX) tablet 10 mg   • sacubitril-valsartan (ENTRESTO) 24-26 MG per tablet 1 tablet   • empagliflozin (JARDIANCE) tablet 10 mg   • clopidogrel (PLAVIX) tablet 75 mg   • atorvastatin (LIPITOR) tablet 40 mg   • sodium chloride 0.9% infusion   • pantoprazole (PROTONIX INJECT) injection 40 mg   • dextrose 50 % injection 25 g   • dextrose 50 % injection 12.5 g   • glucagon (GLUCAGEN) injection 1 mg   • dextrose (GLUTOSE) 40 % gel 15 g   • dextrose (GLUTOSE) 40 % gel 30 g   • sodium chloride 0.9 % flush bag 25 mL   • sodium chloride (PF) 0.9 % injection 2 mL   • sodium chloride 0.9 % flush bag 25 mL   • insulin lispro (ADMELOG,HumaLOG) - Correction Dose   • ondansetron (ZOFRAN) injection 4 mg   • prochlorperazine (COMPAZINE) tablet 5 mg   • acetaminophen (TYLENOL) tablet 650 mg   • metoPROLOL succinate (TOPROL-XL) ER tablet 25 mg   • sodium chloride 0.9% infusion        Physical Exam:  Gen: alert, comfortable  HEENT: MMM  Neck: no JVD  Heart: RRR S1S2  Lungs: CTAb  Abd: soft, NT  Ext: no edema  Neuro: a,ox3  Psych: pleasant  Skin: warm,  dry   IMPRESSION:  -Recurrent episodes of dysarthria and left leg weakness  Etiology and mechanism is unclear. Dr Clinton in the past has raised the possibility of seizure phenomenon.    ASSESSMENT/ PLAN:         - Admit to ED observation unit or inpatient medical service for further evaluation with MRI Brain.  - Needs psychiatry evaluation to R/O conversion disorder.  - Neuro checks and vital signs Q 2 hours.  - SBP goal permissive [id number] for 24 hours and then normotensive.  - Continue home antiplatelets/ anticoagulation and AEDS  - Lipitor for LDL goal of < 70 .  - Telemetry monitoring.  - CT/CTA/CTP -images and reports were reviewed.   - MRI Brain stroke protocol.  - PT OT SLP evaluation.  -  DVT prophylaxis.

## 2023-08-30 NOTE — OCCUPATIONAL THERAPY INITIAL EVALUATION ADULT - LEVEL OF INDEPENDENCE: EATING, OT EVAL
Patient's Dad called to report that warfarin has been stopped per referring MD.  Will close [facility] and referral.
independent

## 2023-10-20 ENCOUNTER — ANCILLARY PROCEDURE (OUTPATIENT)
Dept: GENERAL RADIOLOGY | Facility: CLINIC | Age: 83
End: 2023-10-20
Attending: NURSE PRACTITIONER
Payer: COMMERCIAL

## 2023-10-20 ENCOUNTER — OFFICE VISIT (OUTPATIENT)
Dept: URGENT CARE | Facility: URGENT CARE | Age: 83
End: 2023-10-20
Payer: COMMERCIAL

## 2023-10-20 VITALS
HEART RATE: 63 BPM | OXYGEN SATURATION: 97 % | TEMPERATURE: 97.1 F | SYSTOLIC BLOOD PRESSURE: 121 MMHG | DIASTOLIC BLOOD PRESSURE: 64 MMHG

## 2023-10-20 DIAGNOSIS — M79.671 RIGHT FOOT PAIN: ICD-10-CM

## 2023-10-20 DIAGNOSIS — M79.671 RIGHT FOOT PAIN: Primary | ICD-10-CM

## 2023-10-20 PROCEDURE — 99213 OFFICE O/P EST LOW 20 MIN: CPT | Performed by: NURSE PRACTITIONER

## 2023-10-20 PROCEDURE — 73630 X-RAY EXAM OF FOOT: CPT | Mod: TC | Performed by: RADIOLOGY

## 2023-10-20 RX ORDER — AZITHROMYCIN 250 MG/1
TABLET, FILM COATED ORAL
Qty: 6 TABLET | Refills: 0 | Status: CANCELLED | OUTPATIENT
Start: 2023-10-20 | End: 2023-10-25

## 2023-10-20 NOTE — PROGRESS NOTES
Assessment & Plan   Right foot pain    - XR Foot Right G/E 3 Views     Patient Instructions   X-ray ordered and interpreted in the office today. X-ray shows: no acute fracture  OK to use tylenol and ice as needed for pain.      Results for orders placed or performed in visit on 10/20/23   XR Foot Right G/E 3 Views     Status: None    Narrative    FOOT RIGHT THREE OR MORE VIEWS   DATE/TIME: 10/20/2023 11:10 AM     INDICATION: Right foot pain.     COMPARISON: 4/8/2021.      Impression    IMPRESSION:  1.  Moderate/advanced naviculocuneiform degenerative arthrosis,  stable.  2.  Mild first and second TMT degenerative arthrosis, stable.  3.  No fracture or joint malalignment.  4.  Moderate-sized plantar calcaneal spur, unchanged.    AKHIL MORAN MD         SYSTEM ID:  [id number]               Return in about 1 week (around 10/27/2023) for with regular provider if symptoms persist.    [facility] Urgent Care Provider  [facility] JESSE Valencia is a 83 year old female who presents to clinic today for the following health issues:  Chief Complaint   Patient presents with    Pain     Pt reports last night she had her socks on lifting up right foot 'yanking' on her her big toe. Pain to right big toe.     HPI    MS Injury/Pain    Onset of symptoms was 1 day(s) ago.  Location: right toe(s) great  Context:       The injury happened while at home      Mechanism: twisting      Patient experienced immediate pain, was able to bear weight directly after injury, no deformity was noted by the patient  Course of symptoms is same.    Severity moderate  Current and Associated symptoms: Pain and Decreased range of motion  Denies  Swelling, Bruising, Warmth, and Redness  Aggravating Factors: walking, weight-bearing, and stairs  Therapies to improve symptoms include: ice and Tylenol  This is the first time this type of problem has occurred for this patient.     Review of Systems  Constitutional, HEENT,  cardiovascular, pulmonary, GI, , musculoskeletal, neuro, skin, endocrine and psych systems are negative, except as otherwise noted.      Objective    /64   Pulse 63   Temp 97.1  F (36.2  C) (Tympanic)   SpO2 97%   Physical Exam   GENERAL: healthy, alert and no distress  RESP: lungs clear to auscultation - no rales, rhonchi or wheezes  CV: regular rate and rhythm, normal S1 S2, no S3 or S4, no murmur, click or rub, no peripheral edema and peripheral pulses strong  MS: RLE exam shows normal strength and muscle mass, no deformities, no erythema, induration, or nodules, and no evidence of joint effusion  SKIN: no suspicious lesions or rashes

## 2023-10-20 NOTE — PATIENT INSTRUCTIONS
X-ray ordered and interpreted in the office today. X-ray shows: no acute fracture  OK to use tylenol and ice as needed for pain.      Results for orders placed or performed in visit on 10/20/23   XR Foot Right G/E 3 Views     Status: None    Narrative    FOOT RIGHT THREE OR MORE VIEWS   DATE/TIME: 10/20/2023 11:10 AM     INDICATION: Right foot pain.     COMPARISON: 4/8/2021.      Impression    IMPRESSION:  1.  Moderate/advanced naviculocuneiform degenerative arthrosis,  stable.  2.  Mild first and second TMT degenerative arthrosis, stable.  3.  No fracture or joint malalignment.  4.  Moderate-sized plantar calcaneal spur, unchanged.    AKHIL MORAN MD         SYSTEM ID:  [id number]

## 2023-11-03 ENCOUNTER — OFFICE VISIT (OUTPATIENT)
Dept: URGENT CARE | Facility: URGENT CARE | Age: 83
End: 2023-11-03
Payer: COMMERCIAL

## 2023-11-03 ENCOUNTER — ANCILLARY PROCEDURE (OUTPATIENT)
Dept: GENERAL RADIOLOGY | Facility: CLINIC | Age: 83
End: 2023-11-03
Attending: EMERGENCY MEDICINE
Payer: COMMERCIAL

## 2023-11-03 VITALS
OXYGEN SATURATION: 97 % | SYSTOLIC BLOOD PRESSURE: 114 MMHG | HEART RATE: 66 BPM | DIASTOLIC BLOOD PRESSURE: 59 MMHG | TEMPERATURE: 98.2 F

## 2023-11-03 DIAGNOSIS — S60.221A CONTUSION OF RIGHT HAND, INITIAL ENCOUNTER: ICD-10-CM

## 2023-11-03 DIAGNOSIS — S60.221A CONTUSION OF RIGHT HAND, INITIAL ENCOUNTER: Primary | ICD-10-CM

## 2023-11-03 PROCEDURE — 99214 OFFICE O/P EST MOD 30 MIN: CPT

## 2023-11-03 PROCEDURE — 73130 X-RAY EXAM OF HAND: CPT | Mod: TC | Performed by: RADIOLOGY

## 2023-11-03 NOTE — PROGRESS NOTES
Assessment & Plan     Diagnosis:    ICD-10-CM    1. Contusion of right hand, initial encounter  S60.221A XR Hand Right G/E 3 Views          Medical Decision Making:  Annie Archer is a 83 year old female presents for evaluation after banging her right hand on her trunk lid 2 days ago.  A broad differential was considered including sprain, strain, fracture, tendon rupture, nerve impingement/compromise, referred pain. Supportive outpatient management is indicated.  X-ray of the hand shows no evidence of fractures or dislocation on my read, radiology notes as per below.  Signs and symptoms are consistent with a contusion.  Rest, ice, and elevation treatment was discussed with the patient.  Close follow-up with patient's primary care physician per discharge precautions. Contusion discharge instructions given for home.       Harvinder Beckett PA-C  [facility]    Subjective     Annie Archer is a 83 year old female who presents to clinic today for the following health issues:  Chief Complaint   Patient presents with    Pain     Pt reports right hand hitting trunk lid X2 days ago. Swelling, painful to move middle finger.       HPI  Annie Archer is a 83 year old female presents for evaluation after banging her right hand on her trunk lid 2 days ago. Has had swelling and pain since; swelling has come down. No bruising but has pain moving her right middle finger around. No numbness or weakness.    Review of Systems    See HPI    Objective      Vitals: /59   Pulse 66   Temp 98.2  F (36.8  C) (Tympanic)   SpO2 97%       Patient Vitals for the past 24 hrs:   BP Temp Temp src Pulse SpO2   11/03/23 1136 114/59 98.2  F (36.8  C) Tympanic 66 97 %       Vital signs reviewed by: Harvinder Beckett PA-C    Physical Exam   Constitutional: Patient is alert and cooperative. No acute distress.  Neurological: Alert and oriented x3. Strength and sensation are intact and symmetric in the bilateral upper extremities.    MSK/Skin: Swelling, tenderness noted over the dorsum of the hand, primarily near the second and third MTP joints.  No range of motion at the fingers.  Distal CMS intact.  Skin: No rash noted on visualized skin.  Psychiatric:The patient has a normal mood and affect.     Labs/Imaging:  Results for orders placed or performed in visit on 11/03/23   XR Hand Right G/E 3 Views     Status: None    Narrative    EXAM: XR HAND RIGHT G/E 3 VIEWS  DATE/TIME: 11/3/2023 12:19 PM    INDICATION: Contusion of right hand, initial encounter  COMPARISON: 2/17/2006      Impression    IMPRESSION: Mild degenerative arthritis involving multiple joints of  the right hand and wrist, most pronounced at the first CMC and STT  joints. Chondrocalcinosis of the TFCC. No acute fracture.       AWILDA BARAJAS DO         SYSTEM ID:  [id number]     Reading per radiology        Harvinder Beckett PA-C, November 3, 2023

## 2023-12-05 ENCOUNTER — OFFICE VISIT (OUTPATIENT)
Dept: URGENT CARE | Facility: URGENT CARE | Age: 83
End: 2023-12-05
Payer: COMMERCIAL

## 2023-12-05 ENCOUNTER — ANCILLARY PROCEDURE (OUTPATIENT)
Dept: GENERAL RADIOLOGY | Facility: CLINIC | Age: 83
End: 2023-12-05
Attending: EMERGENCY MEDICINE
Payer: COMMERCIAL

## 2023-12-05 VITALS
DIASTOLIC BLOOD PRESSURE: 75 MMHG | OXYGEN SATURATION: 98 % | SYSTOLIC BLOOD PRESSURE: 117 MMHG | TEMPERATURE: 98 F | HEART RATE: 54 BPM

## 2023-12-05 DIAGNOSIS — S49.91XA SHOULDER INJURY, RIGHT, INITIAL ENCOUNTER: Primary | ICD-10-CM

## 2023-12-05 DIAGNOSIS — S49.91XA SHOULDER INJURY, RIGHT, INITIAL ENCOUNTER: ICD-10-CM

## 2023-12-05 DIAGNOSIS — S46.001A INJURY OF RIGHT ROTATOR CUFF, INITIAL ENCOUNTER: ICD-10-CM

## 2023-12-05 PROCEDURE — 73030 X-RAY EXAM OF SHOULDER: CPT | Mod: TC | Performed by: RADIOLOGY

## 2023-12-05 PROCEDURE — 99214 OFFICE O/P EST MOD 30 MIN: CPT | Performed by: EMERGENCY MEDICINE

## 2023-12-05 RX ORDER — ALLOPURINOL 100 MG/1
1 TABLET ORAL
COMMUNITY
Start: 2023-10-24

## 2023-12-05 NOTE — PROGRESS NOTES
Assessment & Plan     Diagnosis:    ICD-10-CM    1. Shoulder injury, right, initial encounter  S49.91XA XR Shoulder Right G/E 3 Views     Orthopedic  Referral      2. Injury of right rotator cuff, initial encounter  S46.001A Orthopedic  Referral          Medical Decision Making:  Patient presents with right shoulder pain after mechanical fall last night. Differential diagnosis includes clavicular or scapular fracture, AC joint or sternoclavicular joint pathology, rotator cuff injury, shoulder dislocation, humoral fracture.    There is no sign of vascular or neurologic compromise and range of motion is grossly intact; some difficulties with fully abducting and internally rotating at the shoulder -- suspect rotator cuff injury. There are no other findings concerning for the above listed differential, including no chest pain, shortness of breath, nausea, vomiting, diaphoresis or other concerning cardiac symptoms.    Given the negative workup and x-ray showing no fracture or dislocation on my read. Radiology notes as per below. Pain is most likely muscular or soft tissue in origin and patient was provided with a sling and recommended anti-inflammatories and pain medication for a short time. Follow up with orthopedics per the referral. Questions answered.    Harvinder Beckett PA-C  [facility]    Subjective     Annie Archer is a 83 year old female who presents to clinic today for the following health issues:  Chief Complaint   Patient presents with    Urgent Care     Pt presents with right shoulder pain from a fall onset yesterday.       HPI  Patient reports mechanical slip and fall yesterday putting up [employer] decorations. Landed on her right arm and hip. Called 911 and was assisted up, declined ER last night. Continued pain and difficulty moving right arm above her head now due to pain. Thinks she injured her rotator cuff.  She denies any head or neck injury, numbness or weakness,  chest pain, shortness of breath, hip pain, difficulties walking or other concerns    Review of Systems    See HPI    Objective      Vitals: /75   Pulse 54   Temp 98  F (36.7  C) (Tympanic)   SpO2 98%   Resp: 16    Patient Vitals for the past 24 hrs:   BP Temp Temp src Pulse SpO2   12/05/23 1458 117/75 98  F (36.7  C) Tympanic 54 98 %       Vital signs reviewed by: Harvinder Beckett PA-C    Physical Exam   Constitutional: Patient is alert and cooperative. No acute distress.  Head: Atraumatic.  No raccoon eyes or morrison signs.  Neck: Normal ROM. No midline C-spine tenderness to palpation.  Cardiovascular: Regular rate and rhythm  Pulmonary/Chest: Lungs are clear to auscultation throughout. Effort normal. No respiratory distress. No wheezes, rales or rhonchi.  GI: Abdomen is soft and non-tender throughout. No CVA tenderness bilaterally.  Neurological: Alert and oriented x3. Sensation intact and symmetric in the bilateral upper extremities. Right shoulder abduction/internal rotation limited secondary to pain  MSK: No gross deformity. No AC joint or bony tenderness to the scapula or lateral humerus. ROM limited with abduction and internal rotation. No tenderness at the elbow.   Skin: No rash noted on visualized skin.  Psychiatric:The patient has a normal mood and affect.     Labs/Imaging:  Results for orders placed or performed in visit on 12/05/23   XR Shoulder Right G/E 3 Views     Status: None    Narrative    XR SHOULDER RIGHT G/E 3 VIEWS   12/5/2023 3:37 PM     HISTORY: Shoulder injury, right, initial encounter  COMPARISON: None.       Impression    IMPRESSION: No acute right shoulder fracture or dislocation. There is  mild glenohumeral and acromioclavicular degenerative arthrosis. There  are small loose bodies in the region of the subscapularis recess  measuring up to 9 mm.    NAYANA LUNA MD         SYSTEM ID:  [id number]     Reading per radiology      Harvinder Beckett PA-C, December 5, 2023

## 2023-12-20 NOTE — PROGRESS NOTES
CHIEF COMPLAINT:  Pain of the Right Shoulder     HISTORY OF PRESENT ILLNESS  Ms. Archer is a pleasant 83 year old year old left hand dominant female who presents to clinic today with an acute R shoulder injury following a fall on 12/4/23.  Annie explains that she went to  the next day for imaging, which was negative for fracture; she also was given a sling. She shares that most  movement hurts; many ADL's are hard right now. She was given a sling but it makes ambulating hard as she has a wheeled walker. Her bathroom handicap bar is on the right side.       Onset: rapid  Location: right shoulder  Quality:  aching, dull, and sharp  Duration: 2.5 weeks   Severity: 6/10 at worst  Timing:constant and intermittent episodes   Modifying factors:  resting and non-use makes it better, movement and use makes it worse  Associated signs & symptoms: pain and tenderness  Previous similar pain: No  Treatments to date:     Additional history: as documented    Review of Systems:  A 10-point review of systems was obtained and is negative except for as noted in the HPI.       MEDICAL HISTORY  Patient Active Problem List   Diagnosis    Diverticulitis of colon    BRBPR (bright red blood per rectum)    Suprapubic abdominal pain       Current Outpatient Medications   Medication Sig Dispense Refill    acetaminophen (TYLENOL) 325 MG tablet Take 2 tablets (650 mg) by mouth every 4 hours as needed for mild pain      allopurinol (ZYLOPRIM) 100 MG tablet Take 1 tablet by mouth daily at 2 pm      amLODIPine (NORVASC) 2.5 MG tablet Take 2.5 mg by mouth daily      ciprofloxacin (CILOXAN) 0.3 % ophthalmic solution Place 1-2 drops Into the left eye every 4 hours 10 mL 0    desonide (DESOWEN) 0.05 % external cream Apply topically as needed      docusate sodium (COLACE) 100 MG capsule Take 1 capsule (100 mg) by mouth 2 times daily as needed for constipation  0    erythromycin (ROMYCIN) 5 MG/GM ophthalmic ointment Place 0.5 inches into the right eye 3  times daily Refill order placed 3.5 g 1    fluocinonide (LIDEX) 0.05 % external solution Apply topically 2 times daily      hydrochlorothiazide (HYDRODIURIL) 25 MG tablet Take 25 mg by mouth daily      ketoconazole (NIZORAL) 2 % external shampoo Apply topically three times a week      ketorolac (ACULAR) 0.5 % ophthalmic solution Place 1 drop Into the left eye 4 times daily 10 mL 0    lisinopril (ZESTRIL) 20 MG tablet Take 20 mg by mouth daily      metoprolol succinate ER (TOPROL XL) 25 MG 24 hr tablet Take 12.5 mg by mouth daily      polyethylene glycol (MIRALAX) 17 GM/Dose powder Take 17 g (1 capful) by mouth daily as needed for constipation      predniSONE (DELTASONE) 10 MG tablet 4 po every day x 3 days, then 3 po every day x 3 days, then 2 po every day x 3 days, then 1 po every day x 3 days. 30 tablet 0    rivaroxaban ANTICOAGULANT (XARELTO) 20 MG TABS tablet Take 20 mg by mouth daily (with dinner)      albuterol (PROAIR HFA/PROVENTIL HFA/VENTOLIN HFA) 108 (90 Base) MCG/ACT inhaler Inhale 2 puffs into the lungs every 4 hours as needed for shortness of breath / dyspnea or wheezing (cough) (Patient not taking: Reported on 10/2/2022) 1 Inhaler 1       Allergies   Allergen Reactions    Amoxicillin-Pot Clavulanate Other (See Comments)     Severe nausea. 10/16/2019    Ergotamine Other (See Comments)     chest tightness    Sulfa Antibiotics Hives    Cephalexin Rash     Tolerated Ancef        No family history on file.    Additional medical/Social/Surgical histories reviewed in [facility] and updated as appropriate.       PHYSICAL EXAM  There were no vitals taken for this visit.    General  - normal appearance, in no obvious distress  Musculoskeletal -right shoulder  - inspection: normal bone and joint alignment, no obvious deformity, no scapular winging, no AC step-off  - palpation: mildly tender RC insertion, normal clavicle, non-tender AC  - ROM:  painful and limited flexion and ER at end range, limited IR  - strength: 5/5   strength, 5/5 in all shoulder planes  - special tests:  (-) Speed's  (+) Neer  (+) Hawkin's  (+) Franco's  (-) [place]'s  (-) apprehension  (-) subscap lift-off  Neuro  - no sensory or motor deficit, grossly normal coordination, normal muscle tone  Skin  - no ecchymosis, erythema, warmth, or induration, no obvious rash  Psych  - interactive, appropriate, normal mood and affect     IMAGING : XR SHOULDER RIGHT G/E 3 VIEWS   12/5/2023 3:37 PM      HISTORY: Shoulder injury, right, initial encounter  COMPARISON: None.                                                                       IMPRESSION: No acute right shoulder fracture or dislocation. There is  mild glenohumeral and acromioclavicular degenerative arthrosis. There  are small loose bodies in the region of the subscapularis recess  measuring up to 9 mm.     NAYANA LUNA MD      ASSESSMENT & PLAN  Ms. Archer is a 83 year old year old female who presents to clinic today with acute right shoulder pain after a fall onto her right shoulder which required EMS assistance 2 weeks ago.    She is unable to move her arm overhead and there is significant pain.  We discussed likely possibility of rotator cuff tear or exacerbation of osteoarthritic changes noted on x-ray.  She has had a history of a cuff tear in her left shoulder which rehabbed well without surgical intervention.    Diagnosis: Acute pain of right shoulder due to trauma, primary arthritis of right shoulder    Further diagnostic and treatment measures reviewed.  I did encourage her to start physical therapy exercises, consider anti-inflammatory means and hold off on MRI.  She does have concern and wishes for a definitive diagnosis and would like to proceed with MRI.  I did in the end agreed to pursue an MRI at patient's urging.    We discussed that we may still consider injections, HEP, and less likely surgical intervention after MRI.    Start pendulum exercises and gentle wand HEP for now follow-up after  MRI.    It was a pleasure seeing Annie today.    Thomas Trevizo DO, CAQSM  Primary Care Sports Medicine

## 2023-12-21 ENCOUNTER — OFFICE VISIT (OUTPATIENT)
Dept: ORTHOPEDICS | Facility: CLINIC | Age: 83
End: 2023-12-21
Attending: EMERGENCY MEDICINE
Payer: COMMERCIAL

## 2023-12-21 DIAGNOSIS — M19.011 PRIMARY OSTEOARTHRITIS OF RIGHT SHOULDER: ICD-10-CM

## 2023-12-21 DIAGNOSIS — S46.001A INJURY OF RIGHT ROTATOR CUFF, INITIAL ENCOUNTER: Primary | ICD-10-CM

## 2023-12-21 PROCEDURE — 99203 OFFICE O/P NEW LOW 30 MIN: CPT | Performed by: FAMILY MEDICINE

## 2023-12-21 NOTE — LETTER
12/21/2023         RE: Annie Archer  [address]  [place] [address]        Dear Colleague,    Thank you for referring your patient, Annie Archer, to the [facility] [place]. Please see a copy of my visit note below.    CHIEF COMPLAINT:  Pain of the Right Shoulder     HISTORY OF PRESENT ILLNESS  Ms. Archer is a pleasant 83 year old year old left hand dominant female who presents to clinic today with an acute R shoulder injury following a fall on 12/4/23.  Annie explains that she went to  the next day for imaging, which was negative for fracture; she also was given a sling. She shares that most  movement hurts; many ADL's are hard right now. She was given a sling but it makes ambulating hard as she has a wheeled walker. Her bathroom handicap bar is on the right side.       Onset: rapid  Location: right shoulder  Quality:  aching, dull, and sharp  Duration: 2.5 weeks   Severity: 6/10 at worst  Timing:constant and intermittent episodes   Modifying factors:  resting and non-use makes it better, movement and use makes it worse  Associated signs & symptoms: pain and tenderness  Previous similar pain: No  Treatments to date:     Additional history: as documented    Review of Systems:  A 10-point review of systems was obtained and is negative except for as noted in the HPI.       MEDICAL HISTORY  Patient Active Problem List   Diagnosis     Diverticulitis of colon     BRBPR (bright red blood per rectum)     Suprapubic abdominal pain       Current Outpatient Medications   Medication Sig Dispense Refill     acetaminophen (TYLENOL) 325 MG tablet Take 2 tablets (650 mg) by mouth every 4 hours as needed for mild pain       allopurinol (ZYLOPRIM) 100 MG tablet Take 1 tablet by mouth daily at 2 pm       amLODIPine (NORVASC) 2.5 MG tablet Take 2.5 mg by mouth daily       ciprofloxacin (CILOXAN) 0.3 % ophthalmic solution Place 1-2 drops Into the left eye every 4 hours 10 mL 0      desonide (DESOWEN) 0.05 % external cream Apply topically as needed       docusate sodium (COLACE) 100 MG capsule Take 1 capsule (100 mg) by mouth 2 times daily as needed for constipation  0     erythromycin (ROMYCIN) 5 MG/GM ophthalmic ointment Place 0.5 inches into the right eye 3 times daily Refill order placed 3.5 g 1     fluocinonide (LIDEX) 0.05 % external solution Apply topically 2 times daily       hydrochlorothiazide (HYDRODIURIL) 25 MG tablet Take 25 mg by mouth daily       ketoconazole (NIZORAL) 2 % external shampoo Apply topically three times a week       ketorolac (ACULAR) 0.5 % ophthalmic solution Place 1 drop Into the left eye 4 times daily 10 mL 0     lisinopril (ZESTRIL) 20 MG tablet Take 20 mg by mouth daily       metoprolol succinate ER (TOPROL XL) 25 MG 24 hr tablet Take 12.5 mg by mouth daily       polyethylene glycol (MIRALAX) 17 GM/Dose powder Take 17 g (1 capful) by mouth daily as needed for constipation       predniSONE (DELTASONE) 10 MG tablet 4 po every day x 3 days, then 3 po every day x 3 days, then 2 po every day x 3 days, then 1 po every day x 3 days. 30 tablet 0     rivaroxaban ANTICOAGULANT (XARELTO) 20 MG TABS tablet Take 20 mg by mouth daily (with dinner)       albuterol (PROAIR HFA/PROVENTIL HFA/VENTOLIN HFA) 108 (90 Base) MCG/ACT inhaler Inhale 2 puffs into the lungs every 4 hours as needed for shortness of breath / dyspnea or wheezing (cough) (Patient not taking: Reported on 10/2/2022) 1 Inhaler 1       Allergies   Allergen Reactions     Amoxicillin-Pot Clavulanate Other (See Comments)     Severe nausea. 10/16/2019     Ergotamine Other (See Comments)     chest tightness     Sulfa Antibiotics Hives     Cephalexin Rash     Tolerated Ancef        No family history on file.    Additional medical/Social/Surgical histories reviewed in [facility] and updated as appropriate.       PHYSICAL EXAM  There were no vitals taken for this visit.    General  - normal appearance, in no obvious  distress  Musculoskeletal -right shoulder  - inspection: normal bone and joint alignment, no obvious deformity, no scapular winging, no AC step-off  - palpation: mildly tender RC insertion, normal clavicle, non-tender AC  - ROM:  painful and limited flexion and ER at end range, limited IR  - strength: 5/5  strength, 5/5 in all shoulder planes  - special tests:  (-) Speed's  (+) Neer  (+) Hawkin's  (+) Franco's  (-) [place]'s  (-) apprehension  (-) subscap lift-off  Neuro  - no sensory or motor deficit, grossly normal coordination, normal muscle tone  Skin  - no ecchymosis, erythema, warmth, or induration, no obvious rash  Psych  - interactive, appropriate, normal mood and affect     IMAGING : XR SHOULDER RIGHT G/E 3 VIEWS   12/5/2023 3:37 PM      HISTORY: Shoulder injury, right, initial encounter  COMPARISON: None.                                                                       IMPRESSION: No acute right shoulder fracture or dislocation. There is  mild glenohumeral and acromioclavicular degenerative arthrosis. There  are small loose bodies in the region of the subscapularis recess  measuring up to 9 mm.     NAYANA LUNA MD      ASSESSMENT & PLAN  Ms. Archer is a 83 year old year old female who presents to clinic today with acute right shoulder pain after a fall onto her right shoulder which required EMS assistance 2 weeks ago.    She is unable to move her arm overhead and there is significant pain.  We discussed likely possibility of rotator cuff tear or exacerbation of osteoarthritic changes noted on x-ray.  She has had a history of a cuff tear in her left shoulder which rehabbed well without surgical intervention.    Diagnosis: Acute pain of right shoulder due to trauma, primary arthritis of right shoulder    Further diagnostic and treatment measures reviewed.  I did encourage her to start physical therapy exercises, consider anti-inflammatory means and hold off on MRI.  She does have concern and wishes  for a definitive diagnosis and would like to proceed with MRI.  I did in the end agreed to pursue an MRI at patient's urging.    We discussed that we may still consider injections, HEP, and less likely surgical intervention after MRI.    Start pendulum exercises and gentle wand HEP for now follow-up after MRI.    It was a pleasure seeing Annie today.    Thomas Trevizo DO, [facility]  Primary Care Sports Medicine      Again, thank you for allowing me to participate in the care of your patient.        Sincerely,        Thomas Trevizo DO

## 2023-12-21 NOTE — PATIENT INSTRUCTIONS
DR LANGE'S CLINIC LOCATIONS  [facility] ([facility])   [address], [address]th [place]   [place], [place], [address] [place], [place] [id number] [phone]       APPOINTMENTS: [phone] PHYSICAL THERAPY: [phone]   CARE QUESTIONS: [phone], RADIOLOGY: [phone]   BILLING QUESTIONS: [phone]    FAX NUMBER: [phone]        1. Shoulder injury, right, initial encounter    2. Injury of right rotator cuff, initial encounter      SHOULDER EXERCISES  MRI ORDERED - OBTAIN  HEAT PACKS  Start tylenol take 2 500mg tablets every 8 hrs - max 3000  HOME EXERCISES  FOLLOW UP AFTER MRI          1. Pendulum stretch    Do this exercise first. Relax your shoulders. Stand and lean over slightly, allowing the affected arm to hang down. Swing the arm in a small [place] -- about a foot in diameter. Perform 10 revolutions in each direction, once a day. As your symptoms improve, increase the diameter of your swing, but never force it. When you're ready for more, increase the stretch by holding a light weight (three to five pounds) in the swinging arm.

## 2023-12-28 ENCOUNTER — HOSPITAL ENCOUNTER (OUTPATIENT)
Dept: MRI IMAGING | Facility: CLINIC | Age: 83
Discharge: HOME | End: 2023-12-28
Attending: FAMILY MEDICINE | Admitting: FAMILY MEDICINE
Payer: COMMERCIAL

## 2023-12-28 DIAGNOSIS — S46.001A INJURY OF RIGHT ROTATOR CUFF, INITIAL ENCOUNTER: ICD-10-CM

## 2023-12-28 PROCEDURE — 73221 MRI JOINT UPR EXTREM W/O DYE: CPT | Mod: 26 | Performed by: RADIOLOGY

## 2023-12-28 PROCEDURE — 73221 MRI JOINT UPR EXTREM W/O DYE: CPT | Mod: RT

## 2024-01-05 NOTE — ED ADULT NURSE NOTE - NSFALLRSKINDICATORS_ED_ALL_ED
Consult - Podiatry   Aurora Anderson 89 y.o. female MRN: [id number]  Unit/Bed#: ICU 05 Encounter: [id number]    Assessment/Plan     Assessment:  Left heel pressure ulcer, stage 2 (POA)  Right heel pressure ulcer, stage 2 (POA)  Severe lymphedema    Plan:  Neither heel wounds appear to be infected.  The depth is just breakdown of the skin secondary to severe lymphedema and pressure injury from laying in bed.  No need for any surgical intervention or advanced imaging regarding her lower extremity.  She is on ceftriaxone which in this clinical setting seems appropriate.  Is always difficult to differentiate cellulitis from lymphedema stasis changes.  Consider other sources for patient's sepsis.  Wound care instructions placed in chart for nursing to change.  I also ordered Prevalon boots.  Patient either needs strict offloading with Green foam wedges or the Prevalon boots when in bed.  She may weight-bear as tolerated in heel offloading shoes which I ordered.  She is fine to work with physical therapy from a podiatric perspective  Bilateral x-ray ordered of the feet, my suspicion for any acute findings is low    History of Present Illness     HPI:  Aurora Anderson is a 89 y.o. female who presents with medical history significant for heart failure with lower extremity lymphedema and swelling.  She was admitted over the weekend with loss of appetite and feeling generally sick.  She was admitted for sepsis and possible UTI.  Podiatry was consulted for wounds on the bottom of the heels.    Patient states she is unsure how long the wounds have been there but they are sore especially when laying in bed with them resting on the mattress.  She also gets pain when trying to walk on them but walking has been difficult due to the severe swelling..    Inpatient consult to Podiatry  Consult performed by: Long Amin DPM  Consult ordered by: Rafi Shields MD        Review of Systems   Constitutional: Weakness,  "tired.    Respiratory: Negative.    Cardiovascular: Negative.    Gastrointestinal: Negative.    Musculoskeletal: Severe swelling in legs and feet  Skin: Wounds on heels  Neurological: Negative.          Historical Information   Past Medical History:   Diagnosis Date    Acute kidney injury (HCC)     Arthritis     Atrial flutter (HCC)     Rapid heart rate      Past Surgical History:   Procedure Laterality Date    CATARACT EXTRACTION      EGD AND COLONOSCOPY N/A 11/14/2017    Procedure: EGD AND COLONOSCOPY;  Surgeon: Radha Salas MD;  Location: [facility];  Service: Gastroenterology    HIP FRACTURE SURGERY       Social History   Social History     Substance and Sexual Activity   Alcohol Use No     Social History     Substance and Sexual Activity   Drug Use No     Social History     Tobacco Use   Smoking Status Never   Smokeless Tobacco Never   Tobacco Comments    former smoker, per [employer];  started smoking at 15 yo, stopped at 31 yo     Family History:   Family History   Problem Relation Age of Onset    Diabetes Father     Cancer Family        Meds/Allergies   Medications Prior to Admission   Medication    apixaban (Eliquis) 2.5 mg    diltiazem (CARDIZEM CD) 180 mg 24 hr capsule    furosemide (LASIX) 20 mg tablet    metoprolol succinate (TOPROL-XL) 25 mg 24 hr tablet    potassium chloride (MICRO-K) 10 MEQ CR capsule     Allergies   Allergen Reactions    Penicillins Rash       Objective   First Vitals:   Blood Pressure: (!) 86/50 (01/04/24 0945)  Pulse: 59 (01/04/24 0942)  Temperature: 97.9 °F (36.6 °C) (01/04/24 0942)  Temp Source: Oral (01/04/24 0942)  Respirations: 18 (01/04/24 0942)  Height: 5' 5\" (165.1 cm) (01/04/24 0946)  Weight - Scale: 76.2 kg (167 lb 15.9 oz) (01/04/24 0946)  SpO2: 95 % (01/04/24 0942)    Current Vitals:   Blood Pressure: 108/62 (01/05/24 0730)  Pulse: 101 (01/05/24 0730)  Temperature: 98.4 °F (36.9 °C) (01/05/24 0730)  Temp Source: Rectal (01/05/24 0400)  Respirations: (!) 34 (01/05/24 " "0730)  Height: 5' 5\" (165.1 cm) (01/04/24 1543)  Weight - Scale: 78 kg (171 lb 15.3 oz) (01/04/24 1543)  SpO2: 94 % (01/05/24 0730)        /62   Pulse 101   Temp 98.4 °F (36.9 °C)   Resp (!) 34   Ht 5' 5\" (1.651 m)   Wt 78 kg (171 lb 15.3 oz)   LMP  (LMP Unknown)   SpO2 94%   BMI 28.62 kg/m²   Physical Exam:  General:    Alert, cooperative, no distress   Head:     Normocephalic, without obvious abnormality, atraumatic                   Skin:   Regarding the left heel the wound has a small central area through the dermis but no deep probe.  No cellulitis or bone exposed.  No crepitus.  There is some periwound ecchymosis.    Regarding the right heel partial-thickness wound with ecchymosis.  No bogginess or crepitus.    Severe stasis changes to both lower extremities.  There are superficial eschar across both legs with lipodermatosclerosis of the skin.       Lungs:     Respirations unlabored       Vascular exam:    Regular rate and rhythm.  Palpable pedal pulses, no cellulitis   Abdomen:     Soft, non-tender, no distention           Lower MSK: Severe lymphedema bilateral lower extremity.               Neurologic: Gross sensation intact bilateral lower extremity     Lab Results:   Admission on 01/04/2024   Component Date Value    Ventricular Rate 01/04/2024 48     Atrial Rate 01/04/2024 56     QRSD Interval 01/04/2024 122     QT Interval 01/04/2024 540     QTC Interval 01/04/2024 482     QRS Axis 01/04/2024 -12     T Wave [place] 01/04/2024 172     WBC 01/04/2024 5.21     RBC 01/04/2024 3.99     Hemoglobin 01/04/2024 9.2 (L)     Hematocrit 01/04/2024 32.4 (L)     MCV 01/04/2024 81 (L)     MCH 01/04/2024 23.1 (L)     MCHC 01/04/2024 28.4 (L)     RDW 01/04/2024 20.5 (H)     MPV 01/04/2024 9.1     Platelets 01/04/2024 223     nRBC 01/04/2024 0     Neutrophils Relative 01/04/2024 80 (H)     Immat GRANS % 01/04/2024 0     Lymphocytes Relative 01/04/2024 9 (L)     Monocytes Relative 01/04/2024 11     Eosinophils " Relative 01/04/2024 0     Basophils Relative 01/04/2024 0     Neutrophils Absolute 01/04/2024 4.12     Immature Grans Absolute 01/04/2024 0.01     Lymphocytes Absolute 01/04/2024 0.49 (L)     Monocytes Absolute 01/04/2024 0.56     Eosinophils Absolute 01/04/2024 0.02     Basophils Absolute 01/04/2024 0.01     Sodium 01/04/2024 140     Potassium 01/04/2024 3.4 (L)     Chloride 01/04/2024 102     CO2 01/04/2024 29     ANION GAP 01/04/2024 9     BUN 01/04/2024 37 (H)     Creatinine 01/04/2024 1.95 (H)     Glucose 01/04/2024 112     Calcium 01/04/2024 9.1     AST 01/04/2024 21     ALT 01/04/2024 14     Alkaline Phosphatase 01/04/2024 106 (H)     Total Protein 01/04/2024 7.6     Albumin 01/04/2024 3.5     Total Bilirubin 01/04/2024 0.67     eGFR 01/04/2024 21     SARS-CoV-2 01/04/2024 Negative     INFLUENZA A PCR 01/04/2024 Negative     INFLUENZA B PCR 01/04/2024 Negative     RSV PCR 01/04/2024 Negative     Color, UA 01/04/2024 Yellow     Clarity, UA 01/04/2024 Turbid     Specific Gravity, UA 01/04/2024 1.016     pH, UA 01/04/2024 7.5     Leukocytes, UA 01/04/2024 Large (A)     Nitrite, UA 01/04/2024 Negative     Protein, UA 01/04/2024 70 (1+) (A)     Glucose, UA 01/04/2024 Negative     Ketones, UA 01/04/2024 Negative     Urobilinogen, UA 01/04/2024 <2.0     Bilirubin, UA 01/04/2024 Negative     Occult Blood, UA 01/04/2024 Negative     RBC, UA 01/04/2024 2-4 (A)     WBC, UA 01/04/2024 Innumerable (A)     Epithelial Cells 01/04/2024 Occasional     Bacteria, UA 01/04/2024 Occasional     Hyaline Casts, UA 01/04/2024 0-3 (A)     Blood Culture 01/04/2024 Received in Microbiology Lab. Culture in Progress.     Blood Culture 01/04/2024 Received in Microbiology Lab. Culture in Progress.     LACTIC ACID 01/04/2024 2.3 (HH)     Procalcitonin 01/04/2024 <0.05     LACTIC ACID 01/04/2024 2.7 (HH)     Platelets 01/04/2024 220     MPV 01/04/2024 9.0     Sed Rate 01/04/2024 54 (H)     CRP 01/04/2024 35.3 (H)     WBC 01/05/2024 11.39 (H)   "   RBC 01/05/2024 3.61 (L)     Hemoglobin 01/05/2024 8.5 (L)     Hematocrit 01/05/2024 28.9 (L)     MCV 01/05/2024 80 (L)     MCH 01/05/2024 23.5 (L)     MCHC 01/05/2024 29.4 (L)     RDW 01/05/2024 20.6 (H)     MPV 01/05/2024 9.1     Platelets 01/05/2024 238     nRBC 01/05/2024 1     Neutrophils Relative 01/05/2024 88 (H)     Immat GRANS % 01/05/2024 1     Lymphocytes Relative 01/05/2024 2 (L)     Monocytes Relative 01/05/2024 9     Eosinophils Relative 01/05/2024 0     Basophils Relative 01/05/2024 0     Neutrophils Absolute 01/05/2024 10.12 (H)     Immature Grans Absolute 01/05/2024 0.07     Lymphocytes Absolute 01/05/2024 0.19 (L)     Monocytes Absolute 01/05/2024 0.98     Eosinophils Absolute 01/05/2024 0.00     Basophils Absolute 01/05/2024 0.03     Sodium 01/05/2024 142     Potassium 01/05/2024 3.2 (L)     Chloride 01/05/2024 106     CO2 01/05/2024 25     ANION GAP 01/05/2024 11     BUN 01/05/2024 36 (H)     Creatinine 01/05/2024 1.87 (H)     Glucose 01/05/2024 136     Calcium 01/05/2024 8.4     eGFR 01/05/2024 22     Magnesium 01/05/2024 2.2     Phosphorus 01/05/2024 3.5            Results from last 7 days   Lab Units 01/04/24  1338   BLOOD CULTURE  Received in Microbiology Lab. Culture in Progress.  Received in Microbiology Lab. Culture in Progress.                   Imaging: I have personally reviewed pertinent films in PACS      Code Status: Level 1 - Full Code        Portions of the record may have been created with voice recognition software. Occasional wrong word or \"sound a like\" substitutions may have occurred due to the inherent limitations of voice recognition software. Read the chart carefully and recognize, using context, where substitutions have occurred.    " no

## 2024-01-10 ENCOUNTER — OFFICE VISIT (OUTPATIENT)
Dept: ORTHOPEDICS | Facility: CLINIC | Age: 84
End: 2024-01-10
Payer: COMMERCIAL

## 2024-01-10 DIAGNOSIS — M19.011 PRIMARY OSTEOARTHRITIS OF RIGHT SHOULDER: ICD-10-CM

## 2024-01-10 DIAGNOSIS — S46.011D TRAUMATIC TEAR OF RIGHT ROTATOR CUFF, UNSPECIFIED TEAR EXTENT, SUBSEQUENT ENCOUNTER: Primary | ICD-10-CM

## 2024-01-10 PROCEDURE — 99213 OFFICE O/P EST LOW 20 MIN: CPT | Performed by: FAMILY MEDICINE

## 2024-01-10 NOTE — PATIENT INSTRUCTIONS
Rotator Cuff Injury     WHAT IS A ROTATOR CUFF INJURY?    A rotator cuff injury is irritation of or damage to the group of tendons and muscles that hold your shoulder joint together. Tendons are strong bands of tissue that attach muscle to bone. You use the muscles and tendons in your shoulder joint to move your shoulder and raise your arm over your head.        WHAT IS THE CAUSE?    A rotator cuff injury can be caused by:    Overuse of your shoulder in a sport or work activity that involves repetitive overhead movement of your shoulder, like swimming, baseball (mainly pitching), football, tennis, painting, plastering, or housework.  A sudden activity that twists your shoulder or tears your tendon, such as using your arm to break a fall, falling onto your arm, or lifting a heavy object  You may be at higher risk for a rotator cuff injury if you have poor head and shoulder posture, especially if you are older.    WHAT ARE THE SYMPTOMS?    Symptoms may include:    Pain and weakness in your arm and shoulder  Loss of shoulder movement, especially when you try to raise your arm overhead    HOW IS IT DIAGNOSED?    Your healthcare provider will ask about your symptoms, activities, and medical history and examine you. You may have X-rays or other scans or procedures, such as:    An ultrasound, which uses sound waves to show pictures of your shoulder joint  An MRI, which uses a strong magnetic field and radio waves to show detailed pictures of your shoulder joint  An arthrogram, which is an X-ray or MRI taken after a dye is injected into your joint to outline its shape  Arthroscopy, which is a type of surgery done with a small scope inserted into your joint so your provider can look directly at your joint    HOW IS IT TREATED?    You will need to change or stop doing the activities that cause pain until your injury has healed. For example, avoid strenuous activity or any overhead motion that causes pain. Also, try to make  sure that you are practicing good posture and are not slouching forward.    Your healthcare provider may recommend stretching and strengthening exercises to help you heal.    If you have a bad tear, you may need to have it repaired with surgery. After surgery, your treatment plan will include physical therapy to strengthen your shoulder as it heals.    The pain often gets better within a few weeks with self-care, but some injuries may take several months or longer to heal. It s important to follow all of your healthcare provider s instructions.    HOW CAN I TAKE CARE OF MYSELF?    To keep swelling down and help relieve pain:    Put an ice pack, gel pack, or package of frozen vegetables wrapped in a cloth on the area every 3 to 4 hours for up to 20 minutes at a time.  Take nonprescription pain medicine, such as acetaminophen, ibuprofen, or naproxen. Nonsteroidal anti-inflammatory medicines (NSAIDs), such as ibuprofen and naproxen, may cause stomach bleeding and other problems. These risks increase with age. Read the label and take as directed. Unless recommended by your healthcare provider, you should not take this medicine for more than 10 days.  Moist heat may help relieve pain, relax your muscles, and make it easier to move your arm and shoulder. Put moist heat on the injured area for 10 to 15 minutes before you do warm-up and stretching exercises. Moist heat includes heat patches or moist heating pads that you can buy at most drugstores, a warm wet washcloth, or a hot shower. To prevent burns to your skin, follow directions on the package and do not lie on any type of hot pad. Don t use heat if you have swelling.    Follow your healthcare provider's instructions, including any exercises recommended by your provider. Ask your provider:    How and when you will hear your test results  How long it will take to recover  What activities you should avoid, including how much you can lift, and when you can return to your  normal activities  How to take care of yourself at home  What symptoms or problems you should watch for and what to do if you have them  Make sure you know when you should come back for a checkup.    HOW CAN I HELP PREVENT A ROTATOR CUFF INJURY?    Warm-up exercises and stretching before activities can help prevent injuries. For example, do exercises that strengthen your shoulder muscles. If your arm or shoulder hurts after exercise, putting ice on it may help keep it from getting injured.    Follow safety rules and use any protective equipment recommended for your work or sport.    Avoid activities that cause pain. For example, avoid lifting heavy objects over your head.    Developed by [employer].  Published by [employer].  Copyright  2014 [employer] and/or one of its subsidiaries. All rights reserved.    Rotator Cuff Exercises    Isometric shoulder external rotation:  a doorway with your elbow bent 90 degrees and the back of the wrist on your injured side pressed against the door frame. Try to press your hand outward into the door frame. Hold for 5 seconds. Do 2 sets of 15.    Isometric shoulder internal rotation:  a doorway with your elbow bent 90 degrees and the front of the wrist on your injured side pressed against the door frame. Try to press your palm into the door frame. Hold for 5 seconds. Do 2 sets of 15.  Wand exercise, flexion: Stand upright and hold a stick in both hands, palms down. Stretch your arms by lifting them over your head, keeping your arms straight. Hold for 5 seconds and return to the starting position. Repeat 10 times.    Wand exercise, extension: Stand upright and hold a stick in both hands behind your back. Move the stick away from your back. Hold this position for 5 seconds. Relax and return to the starting position. Repeat 10 times.  Wand exercise, external rotation: Lie on your back and hold a stick in both hands, palms up. Your upper arms should be  resting on the floor with your elbows at your sides and bent 90 degrees. Use your uninjured arm to push your injured arm out away from your body. Keep the elbow of your injured arm at your side while it is being pushed. Hold the stretch for 5 seconds. Repeat 10 times.    Wand exercise, shoulder abduction and adduction: Stand and hold a stick with both hands, palms facing away from your body. Rest the stick against the front of your thighs. Use your uninjured arm to push your injured arm out to the side and up as high as possible. Keep your arms straight. Hold for 5 seconds. Repeat 10 times.    Resisted shoulder external rotation: Stand sideways next to a door with your injured arm farther from the door. Tie a knot in the end of the tubing and shut the knot in the door at waist level (or use cable weight machine at gym). Hold the other end of the tubing with the hand of your injured arm. Rest the hand of your injured arm across your stomach. Keeping your elbow in at your side, rotate your arm outward and away from your waist. Slowly return your arm to the starting position. Make sure you keep your elbow bent 90 degrees and your forearm parallel to the floor. Repeat 10 times. Build up to 2 sets of 15.    Resisted shoulder internal rotation: Stand sideways next to a door with your injured arm closest to the door. Tie a knot in the end of the tubing and shut the knot in the door at waist level (or use cable weight machine at gym). Hold the other end of the tubing with the hand of your injured arm. Bend the elbow of your injured arm 90 degrees. Keeping your elbow in at your side, rotate your forearm across your body and then slowly back to the starting position. Make sure you keep your forearm parallel to the floor. Do 2 sets of 8 to 12.    Scaption: Stand with your arms at your sides and with your elbows straight. Slowly raise your arms to eye level. As you raise your arms, spread them apart so that they are only  "slightly in front of your body (at about a 30-degree angle to the front of your body). Point your thumbs toward the ceiling. Hold for 2 seconds and lower your arms slowly. Do 2 sets of 15. Progress to holding a soup can or light weight when you are doing the exercise and increase the weight as the exercise gets easier.    Side-lying external rotation: Lie on your uninjured side with your injured arm at your side and your elbow bent 90 degrees. Keeping your elbow against your side, raise your forearm toward the ceiling and hold for 2 seconds. Slowly lower your arm. Do 2 sets of 15. You can start doing this exercise holding a soup can or light weight and gradually increase the weight as long as there is no pain.    Horizontal abduction: Lie on your stomach on a table or the edge of a bed with the arm on your injured side hanging down over the edge. Raise your arm out to the side, with your thumb pointed toward the ceiling, until your arm is parallel to the floor. Hold for 2 seconds and then lower it slowly. Start this exercise with no weight. As you get stronger, add a light weight or hold a soup can. Do 2 sets of 15.    Push-up with a plus: Begin on the floor on your hands and knees. Keep your hands a shoulder width apart and lift your feet off the floor. Arch your back as high as possible and round your shoulders (this is the \"plus\" part or the exercise). Bend your elbows and lower your body to the floor. Return to the starting position and arch your back again. Do 2 sets of 15.          "

## 2024-01-10 NOTE — PROGRESS NOTES
ESTABLISHED PATIENT FOLLOW-UP:  Follow Up of the Right Shoulder     HISTORY OF PRESENT ILLNESS  Ms. Archer is a pleasant 83 year old year old female who presents to clinic today for follow-up of right shoulder    Date of injury: 12/04/23   Date last seen: 12/21/23  Following Therapeutic Plan: tylenol  Pain: slightly improvement  Function: limited range of motion  Interval History: right shoulder MRI on 12/28/23. Improving shoulder pain and range.  Has been performing pendulum exercises daily.  Tylenol controlling the pain and has not taken this AM.   She would like to avoid surgical intervention.    History of cuff tear of left shoulder successfully treated with PT. She would be interested in a similar pathway if appropriate.    Additional medical/Social/Surgical histories reviewed in [facility] and updated as appropriate.    REVIEW OF SYSTEMS (1/10/2024)  CONSTITUTIONAL: Denies fever and weight loss  GASTROINTESTINAL: Denies abdominal pain, nausea, vomiting  MUSCULOSKELETAL: See HPI  SKIN: Denies any recent rash or lesion  NEUROLOGICAL: Denies numbness or focal weakness     PHYSICAL EXAM  There were no vitals taken for this visit.    General  - normal appearance, in no obvious distress  Musculoskeletal -right shoulder  - inspection: normal bone and joint alignment, no obvious deformity, no scapular winging, no AC step-off  - palpation: mildly tender RC insertion, normal clavicle, non-tender AC  - ROM: Full ER greater than left, 60  . IR to low lumbar region with discomfort.  Abduction to 120 with pain and FE 90 when pain begins.  - strength: 5/5  strength, 4-/5 supraspinatus, 4/5 infraspinatus/4+Subscapularis  - special tests:  (-) Speed's  (+) Neer  (+) Hawkin's  (+) Franco's  (-) [place]'s  (-) apprehension  (-) subscap lift-off  Neuro  - no sensory or motor deficit, grossly normal coordination, normal muscle tone  Skin  - no ecchymosis, erythema, warmth, or induration, no obvious rash  Psych  - interactive,  appropriate, normal mood and affect     IMAGING : XR SHOULDER RIGHT G/E 3 VIEWS   12/5/2023 3:37 PM      HISTORY: Shoulder injury, right, initial encounter  COMPARISON: None.                                                                       IMPRESSION: No acute right shoulder fracture or dislocation. There is  mild glenohumeral and acromioclavicular degenerative arthrosis. There  are small loose bodies in the region of the subscapularis recess  measuring up to 9 mm.     NAYANA LUNA MD     MRI SHOULDER RIGHT W/O CONTRAST    Impression:     1. Rotator cuff:  *  High-grade, near full-thickness tearing of the anterior and middle  supraspinatus at the footprint.  *  Infraspinatus tendinosis with low to moderate grade intrasubstance  tearing of the upper fibers at the footprint.  *  Moderate-grade tearing of the upper and middle subscapularis at the  footprint.  *  Mild supraspinatus atrophy and fatty infiltration of the  subscapularis.     2. Complete or near-complete tear of the long head biceps tendon at  the proximal attachment.     3. Moderate fluid in the glenohumeral joint as well as  subacromial/subdeltoid and subcoracoid bursae.     ASSESSMENT & PLAN  Ms. Archer is a 83 year old year old female who presents to clinic today for follow up regarding right shoulder injury that occurred 6 weeks ago.    MRI revealing high gread tearing of supraspinatus, moderate at subscapularis and infraspinatus.  We discussed surgical/nonsurgical means for addressing rotator cuff tears. She was pleased with rehabilitation with left rotator cuff tear and wishes to avoid surgical measures.  Overall reassuring improvement since last visit on 12/21/23.    Diagnosis: Acute traumatic rotator cuff tear right shoulder, Tear of long head of biceps tendon right shoulder, mild osteoarthritis right shoulder    - Start formal PT - given her concern about timing urgent referral placed  -Start isometric and AAROM at home prior to  PT  -Discussed risks benefits and alternatives to subacromial/GH injection and we both agreed to hold off given appreciable improvement since injury  -Tylenol up to 1,000mg TID  -Follow up in 6 weeks to assess progress    It was a pleasure seeing Annie.    Thomas Trevizo DO, CAQSM  Primary Care Sports Medicine

## 2024-01-10 NOTE — LETTER
1/10/2024         RE: Annie Archer  [address]  [place] [address]        Dear Colleague,    Thank you for referring your patient, Annie Archer, to the [facility] [place]. Please see a copy of my visit note below.    ESTABLISHED PATIENT FOLLOW-UP:  Follow Up of the Right Shoulder     HISTORY OF PRESENT ILLNESS  Ms. Archer is a pleasant 83 year old year old female who presents to clinic today for follow-up of right shoulder    Date of injury: 12/04/23   Date last seen: 12/21/23  Following Therapeutic Plan: tylenol  Pain: slightly improvement  Function: limited range of motion  Interval History: right shoulder MRI on 12/28/23. Improving shoulder pain and range.  Has been performing pendulum exercises daily.  Tylenol controlling the pain and has not taken this AM.   She would like to avoid surgical intervention.    History of cuff tear of left shoulder successfully treated with PT. She would be interested in a similar pathway if appropriate.    Additional medical/Social/Surgical histories reviewed in [facility] and updated as appropriate.    REVIEW OF SYSTEMS (1/10/2024)  CONSTITUTIONAL: Denies fever and weight loss  GASTROINTESTINAL: Denies abdominal pain, nausea, vomiting  MUSCULOSKELETAL: See HPI  SKIN: Denies any recent rash or lesion  NEUROLOGICAL: Denies numbness or focal weakness     PHYSICAL EXAM  There were no vitals taken for this visit.    General  - normal appearance, in no obvious distress  Musculoskeletal -right shoulder  - inspection: normal bone and joint alignment, no obvious deformity, no scapular winging, no AC step-off  - palpation: mildly tender RC insertion, normal clavicle, non-tender AC  - ROM: Full ER greater than left, 60  . IR to low lumbar region with discomfort.  Abduction to 120 with pain and FE 90 when pain begins.  - strength: 5/5  strength, 4-/5 supraspinatus, 4/5 infraspinatus/4+Subscapularis  - special tests:  (-) Speed's  (+)  Neer  (+) Hawkin's  (+) Franco's  (-) [place]'s  (-) apprehension  (-) subscap lift-off  Neuro  - no sensory or motor deficit, grossly normal coordination, normal muscle tone  Skin  - no ecchymosis, erythema, warmth, or induration, no obvious rash  Psych  - interactive, appropriate, normal mood and affect     IMAGING : XR SHOULDER RIGHT G/E 3 VIEWS   12/5/2023 3:37 PM      HISTORY: Shoulder injury, right, initial encounter  COMPARISON: None.                                                                       IMPRESSION: No acute right shoulder fracture or dislocation. There is  mild glenohumeral and acromioclavicular degenerative arthrosis. There  are small loose bodies in the region of the subscapularis recess  measuring up to 9 mm.     NAYANA LUNA MD     MRI SHOULDER RIGHT W/O CONTRAST    Impression:     1. Rotator cuff:  *  High-grade, near full-thickness tearing of the anterior and middle  supraspinatus at the footprint.  *  Infraspinatus tendinosis with low to moderate grade intrasubstance  tearing of the upper fibers at the footprint.  *  Moderate-grade tearing of the upper and middle subscapularis at the  footprint.  *  Mild supraspinatus atrophy and fatty infiltration of the  subscapularis.     2. Complete or near-complete tear of the long head biceps tendon at  the proximal attachment.     3. Moderate fluid in the glenohumeral joint as well as  subacromial/subdeltoid and subcoracoid bursae.     ASSESSMENT & PLAN  Ms. Archer is a 83 year old year old female who presents to clinic today for follow up regarding right shoulder injury that occurred 6 weeks ago.    MRI revealing high gread tearing of supraspinatus, moderate at subscapularis and infraspinatus.  We discussed surgical/nonsurgical means for addressing rotator cuff tears. She was pleased with rehabilitation with left rotator cuff tear and wishes to avoid surgical measures.  Overall reassuring improvement since last visit on 12/21/23.    Diagnosis:  Acute traumatic rotator cuff tear right shoulder, Tear of long head of biceps tendon right shoulder, mild osteoarthritis right shoulder    - Start formal PT - given her concern about timing urgent referral placed  -Start isometric and AAROM at home prior to PT  -Discussed risks benefits and alternatives to subacromial/GH injection and we both agreed to hold off given appreciable improvement since injury  -Tylenol up to 1,000mg TID  -Follow up in 6 weeks to assess progress    It was a pleasure seeing Lorne Trevizo DO, [facility]  Primary Care Sports Medicine         Again, thank you for allowing me to participate in the care of your patient.        Sincerely,        Thomas Trevizo DO

## 2024-01-22 ENCOUNTER — THERAPY VISIT (OUTPATIENT)
Dept: PHYSICAL THERAPY | Facility: CLINIC | Age: 84
End: 2024-01-22
Attending: FAMILY MEDICINE
Payer: COMMERCIAL

## 2024-01-22 DIAGNOSIS — M25.511 ACUTE PAIN OF RIGHT SHOULDER: Primary | ICD-10-CM

## 2024-01-22 DIAGNOSIS — S46.011D TRAUMATIC TEAR OF RIGHT ROTATOR CUFF, UNSPECIFIED TEAR EXTENT, SUBSEQUENT ENCOUNTER: ICD-10-CM

## 2024-01-22 PROCEDURE — 97110 THERAPEUTIC EXERCISES: CPT | Mod: GP | Performed by: PHYSICAL THERAPIST

## 2024-01-22 PROCEDURE — 97530 THERAPEUTIC ACTIVITIES: CPT | Mod: GP | Performed by: PHYSICAL THERAPIST

## 2024-01-22 PROCEDURE — 97161 PT EVAL LOW COMPLEX 20 MIN: CPT | Mod: GP | Performed by: PHYSICAL THERAPIST

## 2024-01-23 PROBLEM — M25.511 ACUTE PAIN OF RIGHT SHOULDER: Status: ACTIVE | Noted: 2024-01-23

## 2024-01-23 NOTE — PROGRESS NOTES
PHYSICAL THERAPY EVALUATION  Type of Visit: Evaluation    See electronic medical record for Abuse and Falls Screening details.    Subjective       Presenting condition or subjective complaint: Patient has had increased R shoulder pain since falling at home landing on her R shoulder 12-4. An MRI revealed a near full thickness RC tear. Patient continues to have pain with any reaching or lifting with R UE making ADL's such as dressing and grooming difficult. Patient is in the process of moving which has aggravated the shoulder pain as well. Patient has a hx of a L RC tear several years ago that improved moderately over time with PT.  Date of onset: 23    Relevant medical history: High blood pressure; Hearing problems; Migraines or headaches; Overweight; Thyroid problems     Dates & types of surgery:        Prior diagnostic imaging/testing results: MRI     Prior therapy history for the same diagnosis, illness or injury: No      Prior Level of Function  Transfers: Independent  Ambulation: Independent  ADL: Independent      Living Environment  Social support: Alone   Type of home: Apartment/condo   Stairs to enter the home: No       Ramp: No   Stairs inside the home: No       Help at home: None  Equipment owned: Grab bars; Walker with wheels; Raised toilet seat     Employment: No    Hobbies/Interests:      Patient goals for therapy: get rid of the pain    Pain assessment: Location: R upper arm/Ratin/10     Objective   SHOULDER EVALUATION  PAIN: Pain Level at Rest: 2/10  Pain Level with Use: 8/10  Pain is Exacerbated By: reaching in any direction with R shoulder    POSTURE: Sitting Posture: Rounded shoulders, Forward head, Thoracic kyphosis increased    ROM:  AROM R sh flex 92; abd 75; IR 50; ER 70. L sh flex 120; abd 115; IR 60; ER 72    STRENGTH:  R sh flex 4-/5; abd 4-/5; ER 4-/5    SPECIAL TESTS:  RC tear (+); empty can (+) R  PALPATION:  tender at R supraspinatus, infraspinatus  JOINT MOBILITY: WNL  CERVICAL  SCREEN: WNL    Assessment & Plan   CLINICAL IMPRESSIONS  Medical Diagnosis: R shoulder pain/RC tear    Treatment Diagnosis: R shoulder pain/RC tear   Impression/Assessment: Patient is a 83 year old female with R shoulder complaints.  The following significant findings have been identified: Pain, Decreased ROM/flexibility, Decreased strength, Impaired muscle performance, and Decreased activity tolerance. These impairments interfere with their ability to perform self care tasks, recreational activities, and household chores as compared to previous level of function.     Clinical Decision Making (Complexity):  Clinical Presentation: Stable/Uncomplicated  Clinical Presentation Rationale: based on medical and personal factors listed in PT evaluation  Clinical Decision Making (Complexity): Low complexity    PLAN OF CARE  Treatment Interventions:  Modalities: Ultrasound  Interventions: Manual Therapy, Neuromuscular Re-education, Therapeutic Activity, Therapeutic Exercise, Self-Care/Home Management    Long Term Goals     PT Goal 1  Goal Identifier: reaching  Goal Description: patient will be able to reach overhead with pain 1/10  Rationale: to maximize safety and independence with performance of ADLs and functional tasks;to maximize safety and independence with self cares  Target Date: 04/14/24      Frequency of Treatment: 2 x month  Duration of Treatment: 12 weeks      Education Assessment:   Learner/Method: Patient;Listening;Demonstration    Risks and benefits of evaluation/treatment have been explained.   Patient/Family/caregiver agrees with Plan of Care.     Evaluation Time:     PT Eval, Low Complexity Minutes (76431): 15       Signing Clinician: Nova Patel PT      [facility]                                                                                   OUTPATIENT PHYSICAL THERAPY      PLAN OF TREATMENT FOR OUTPATIENT REHABILITATION   Patient's Last Name, First Name,  MANUEL ArcherAnnie YOB: 1940   Provider's Name   [facility]   Medical Record No.  [id number]     Onset Date: 12/04/23  Start of Care Date: 01/22/24     Medical Diagnosis:  R shoulder pain/RC tear      PT Treatment Diagnosis:  R shoulder pain/RC tear Plan of Treatment  Frequency/Duration: 2 x month/ 12 weeks    Certification date from 01/22/24 to 04/14/24         See note for plan of treatment details and functional goals     Nova Patel, PT                         I CERTIFY THE NEED FOR THESE SERVICES FURNISHED UNDER        THIS PLAN OF TREATMENT AND WHILE UNDER MY CARE     (Physician attestation of this document indicates review and certification of the therapy plan).              Referring Provider:  Thomas Trevizo    Initial Assessment  See Epic Evaluation- Start of Care Date: 01/22/24

## 2024-02-05 ENCOUNTER — THERAPY VISIT (OUTPATIENT)
Dept: PHYSICAL THERAPY | Facility: CLINIC | Age: 84
End: 2024-02-05
Payer: COMMERCIAL

## 2024-02-05 DIAGNOSIS — M25.511 ACUTE PAIN OF RIGHT SHOULDER: Primary | ICD-10-CM

## 2024-02-05 PROCEDURE — 97035 APP MDLTY 1+ULTRASOUND EA 15: CPT | Mod: GP | Performed by: PHYSICAL THERAPIST

## 2024-02-05 PROCEDURE — 97110 THERAPEUTIC EXERCISES: CPT | Mod: GP | Performed by: PHYSICAL THERAPIST

## 2024-02-10 ENCOUNTER — OFFICE VISIT (OUTPATIENT)
Dept: URGENT CARE | Facility: URGENT CARE | Age: 84
End: 2024-02-10
Payer: COMMERCIAL

## 2024-02-10 VITALS
HEART RATE: 59 BPM | SYSTOLIC BLOOD PRESSURE: 123 MMHG | OXYGEN SATURATION: 96 % | TEMPERATURE: 97.7 F | RESPIRATION RATE: 18 BRPM | DIASTOLIC BLOOD PRESSURE: 77 MMHG

## 2024-02-10 DIAGNOSIS — N39.0 URINARY TRACT INFECTION WITHOUT HEMATURIA, SITE UNSPECIFIED: ICD-10-CM

## 2024-02-10 LAB
ALBUMIN UR-MCNC: NEGATIVE MG/DL
APPEARANCE UR: CLEAR
BACTERIA #/AREA URNS HPF: ABNORMAL /HPF
BILIRUB UR QL STRIP: NEGATIVE
COLOR UR AUTO: YELLOW
GLUCOSE UR STRIP-MCNC: NEGATIVE MG/DL
HGB UR QL STRIP: ABNORMAL
KETONES UR STRIP-MCNC: NEGATIVE MG/DL
LEUKOCYTE ESTERASE UR QL STRIP: ABNORMAL
NITRATE UR QL: NEGATIVE
PH UR STRIP: 6.5 [PH] (ref 5–7)
RBC #/AREA URNS AUTO: ABNORMAL /HPF
SP GR UR STRIP: 1.01 (ref 1–1.03)
SQUAMOUS #/AREA URNS AUTO: ABNORMAL /LPF
UROBILINOGEN UR STRIP-ACNC: 0.2 E.U./DL
WBC #/AREA URNS AUTO: ABNORMAL /HPF
YEAST #/AREA URNS HPF: ABNORMAL /HPF

## 2024-02-10 PROCEDURE — 99214 OFFICE O/P EST MOD 30 MIN: CPT | Performed by: FAMILY MEDICINE

## 2024-02-10 PROCEDURE — 81001 URINALYSIS AUTO W/SCOPE: CPT | Performed by: FAMILY MEDICINE

## 2024-02-10 PROCEDURE — 87186 SC STD MICRODIL/AGAR DIL: CPT | Performed by: FAMILY MEDICINE

## 2024-02-10 PROCEDURE — 87088 URINE BACTERIA CULTURE: CPT | Performed by: FAMILY MEDICINE

## 2024-02-10 PROCEDURE — 87086 URINE CULTURE/COLONY COUNT: CPT | Performed by: FAMILY MEDICINE

## 2024-02-10 RX ORDER — CEFDINIR 300 MG/1
300 CAPSULE ORAL 2 TIMES DAILY
Qty: 20 CAPSULE | Refills: 0 | Status: SHIPPED | OUTPATIENT
Start: 2024-02-10 | End: 2024-02-20

## 2024-02-10 NOTE — PROGRESS NOTES
ASSESSMENT/PLAN:      ICD-10-CM    1. Urinary tract infection without hematuria, site unspecified  N39.0 UA Macroscopic with reflex to Microscopic and Culture - Clinic Collect     UA Microscopic with Reflex to Culture     Urine Culture     cefdinir (OMNICEF) 300 MG capsule          Patient Instructions       Start  cefdinir  2 times a day for 10 days always take with food to prevent nausea vomiting     If fever, nausea, vomiting, worsening abdominal pain, pain in back-not typical back pain to ER         Return to clinic or to ER if symptoms worsen     Follow up with your primary care provider or clinic in about 2-3 days  if your symptoms do not improve          Reviewed medication instructions and side effects. Follow up if experiences side effects.     I reviewed supportive care, otc meds to use if needed, expected course, and signs of concern.  Follow up as needed or if she does not improve within  1-2 days or if worsens in any way.  Reviewed red flag symptoms and is to go to the ER if experiences any of these.     The use of Dragon/6connectation services may have been used to construct the content in this note; any grammatical or spelling errors are non-intentional. Please contact the author of this note directly if you are in need of any clarification.                  Patient presents with:  UTI: Urgency,Polyuria,Hesitancy, and some Dysuria         Subjective     Annie Archer is a 83 year old female who presents to clinic today for the following health issues:    HPI     URINARY TRACT SYMPTOMS    Duration: 2 days  Description  dysuria, frequency, urgency, hesitancy, and incontinence  Intensity:  moderate  Accompanying signs and symptoms:  Fever/chills: no   Flank pain no   Nausea and vomiting: no   Vaginal symptoms: none  Abdominal/Pelvic Pain: mild suprapubic pain   History  History of frequent UTI's: no about 1-2 times a year,no hx UTIs until she got older -70's-80s  History of kidney stones: no    Precipitating or alleviating factors: None  Therapies tried and outcome: none     She has azo at home-did not use, will use if she has pain with this current UTI    No past medical history on file.  Social History     Tobacco Use    Smoking status: Never    Smokeless tobacco: Never   Substance Use Topics    Alcohol use: Not on file       Current Outpatient Medications   Medication Sig Dispense Refill    acetaminophen (TYLENOL) 325 MG tablet Take 2 tablets (650 mg) by mouth every 4 hours as needed for mild pain      allopurinol (ZYLOPRIM) 100 MG tablet Take 1 tablet by mouth daily at 2 pm      amLODIPine (NORVASC) 2.5 MG tablet Take 2.5 mg by mouth daily      cefdinir (OMNICEF) 300 MG capsule Take 1 capsule (300 mg) by mouth 2 times daily for 10 days 20 capsule 0    desonide (DESOWEN) 0.05 % external cream Apply topically as needed      docusate sodium (COLACE) 100 MG capsule Take 1 capsule (100 mg) by mouth 2 times daily as needed for constipation  0    fluocinonide (LIDEX) 0.05 % external solution Apply topically 2 times daily      hydrochlorothiazide (HYDRODIURIL) 25 MG tablet Take 25 mg by mouth daily      ketoconazole (NIZORAL) 2 % external shampoo Apply topically three times a week      ketorolac (ACULAR) 0.5 % ophthalmic solution Place 1 drop Into the left eye 4 times daily 10 mL 0    lisinopril (ZESTRIL) 20 MG tablet Take 20 mg by mouth daily      metoprolol succinate ER (TOPROL XL) 25 MG 24 hr tablet Take 12.5 mg by mouth daily      polyethylene glycol (MIRALAX) 17 GM/Dose powder Take 17 g (1 capful) by mouth daily as needed for constipation      rivaroxaban ANTICOAGULANT (XARELTO) 20 MG TABS tablet Take 20 mg by mouth daily (with dinner)      albuterol (PROAIR HFA/PROVENTIL HFA/VENTOLIN HFA) 108 (90 Base) MCG/ACT inhaler Inhale 2 puffs into the lungs every 4 hours as needed for shortness of breath / dyspnea or wheezing (cough) (Patient not taking: Reported on 10/2/2022) 1 Inhaler 1    ciprofloxacin  (CILOXAN) 0.3 % ophthalmic solution Place 1-2 drops Into the left eye every 4 hours (Patient not taking: Reported on 2/10/2024) 10 mL 0    erythromycin (ROMYCIN) 5 MG/GM ophthalmic ointment Place 0.5 inches into the right eye 3 times daily Refill order placed (Patient not taking: Reported on 2/10/2024) 3.5 g 1    predniSONE (DELTASONE) 10 MG tablet 4 po every day x 3 days, then 3 po every day x 3 days, then 2 po every day x 3 days, then 1 po every day x 3 days. (Patient not taking: Reported on 2/10/2024) 30 tablet 0     Allergies   Allergen Reactions    Amoxicillin-Pot Clavulanate Other (See Comments)     Severe nausea. 10/16/2019    Ergotamine Other (See Comments)     chest tightness    Sulfa Antibiotics Hives    Cephalexin Rash     Tolerated Ancef              ROS are negative, except as otherwise noted HPI      Objective    /77   Pulse 59   Temp 97.7  F (36.5  C)   Resp 18   SpO2 96%   There is no height or weight on file to calculate BMI.  Physical Exam   GENERAL: alert and no distress  ABDOMEN: soft, mild suprapubic tenderness,  no CVAT bilateral  and bowel sounds normal  NEURO: Baseline strength and tone, mentation intact and speech normal, baseline gait with rolling walker       Diagnostic Test Results:  Labs reviewed in Epic  Results for orders placed or performed in visit on 02/10/24   UA Macroscopic with reflex to Microscopic and Culture - Clinic Collect     Status: Abnormal    Specimen: Urine, Midstream   Result Value Ref Range    Color Urine Yellow Colorless, Straw, Light Yellow, Yellow    Appearance Urine Clear Clear    Glucose Urine Negative Negative mg/dL    Bilirubin Urine Negative Negative    Ketones Urine Negative Negative mg/dL    Specific Gravity Urine 1.010 1.003 - 1.035    Blood Urine Trace (A) Negative    pH Urine 6.5 5.0 - 7.0    Protein Albumin Urine Negative Negative mg/dL    Urobilinogen Urine 0.2 0.2, 1.0 E.U./dL    Nitrite Urine Negative Negative    Leukocyte Esterase Urine  Small (A) Negative   UA Microscopic with Reflex to Culture     Status: Abnormal   Result Value Ref Range    Bacteria Urine Moderate (A) None Seen /HPF    RBC Urine 0-2 0-2 /HPF /HPF    WBC Urine 10-25 (A) 0-5 /HPF /HPF    Squamous Epithelials Urine Moderate (A) None Seen /LPF    Budding Yeast Urine Few (A) None Seen /HPF

## 2024-02-10 NOTE — PATIENT INSTRUCTIONS
Start  cefdinir  2 times a day for 10 days always take with food to prevent nausea vomiting     If fever, nausea, vomiting, worsening abdominal pain, pain in back-not typical back pain to ER         Return to clinic or to ER if symptoms worsen     Follow up with your primary care provider or clinic in about 2-3 days  if your symptoms do not improve

## 2024-02-12 LAB — BACTERIA UR CULT: ABNORMAL

## 2024-02-20 NOTE — ED ADULT NURSE NOTE - CHIEF COMPLAINT QUOTE
Sent  Notify patient    pt c/o chest discomfort and shortness of breath.  pt states she feels cold and could not warm up at home.

## 2024-03-12 NOTE — CHART NOTE - NSCHARTNOTEFT_GEN_A_CORE
Notified by RN at ~ 12:27 patient is endorsing chest pain, seizures and left sided paresis.  Per RN patient is in NAD, VSS, resting comfortably in bed and NAIR AG without issue, no focal deficits appreciated on assessment. STAT EKG ordered.  Provider immediately assessed patient bedside, upon arrival provider appreciates patient NAIR AG without issue. The second provider palpates patient's chest, patient is appreciated to jump up while in bed and grabs her chest with her left.  When provider starts completing the neuro exam, the patient at first states "I can't move my left side" after it was witnessed by myself and the RN patient is able to move all extremities without issue.  Patient initially uncooperative with neuro exam and then began shaking while stating "look I'm having a seizure."  The shaking is atypical of any seizure activity, no tonic-clonic or rhythmic jerking appreciated, no post ictal state appreciated.  During the entire episode the patient was completely coherent without any focal deficits. Full neuro exam then completed and is without any focal deficits appreciated.     Vital Signs Last 24 Hrs  T(C): 36.7 (17 Dec 2022 12:40), Max: 36.9 (16 Dec 2022 23:15)  T(F): 98.1 (17 Dec 2022 12:40), Max: 98.5 (17 Dec 2022 04:24)  HR: 80 (17 Dec 2022 12:40) (66 - 91)  BP: 133/87 (17 Dec 2022 12:40) (101/63 - 151/88)  RR: 18 (17 Dec 2022 12:40) (18 - 20)  SpO2: 96% (17 Dec 2022 12:40) (96% - 99%)    Parameters below as of 17 Dec 2022 12:40  Patient On (Oxygen Delivery Method): room air    Physical Exam:  General: NAD, NCAT, resting comfortably in bed  Cardio: normal S1, S2, no MRG appreciated, chest diffusely tender to palpation   Lungs: CTAB, no abnormal breath sounds appreciated, respirations unlabored on RA  Abdomen: soft, non-tender, non-distended, +BS  Extremities: no pedal edema appreciated b/l, negative Homans sign b/l  Neuro: AOX4, speech is clear & fluent, PERRL, EOMI , NAIR AG 5/5, sensation & strength is intact distally, normal tone, no pronator drift appreciated, no ataxia appreciated, no neglect appreciated, no focal deficits appreciated     Plan:  -Epilepsy consulted, vEEG on and reports reviewed - as per Dr. Clinton no seizure activity has been recorded, episodes are likely psychogenic in nature.  Patient with multiple admissions for the same presentation without any seizure activity appreciated on vEEG from prior admissions.   -Given that chest pain is induced on palpation this is likely musculoskeletal in nature  -STAT EKG ordered  -Labs reviewed and are WNL with negative troponin. As per hospitalist no repeat BW is necessary at this time  -CTH completed on 12/16/2022 is without any acute changes or pathologies when compared with prior studies  -RN to continue to monitor  -RN to call provider with any acute changes / questions / concerns  [phone]    The above patient and plan was discussed with Dr. Yoon, patient is planned for discharge today. Notified by RN at ~ 12:27 patient is endorsing chest pain, seizures and left sided paresis.  Per RN patient is in NAD, VSS, resting comfortably in bed and NAIR AG without issue, no focal deficits appreciated on assessment. STAT EKG ordered.  Provider immediately assessed patient bedside, upon arrival provider appreciates patient NAIR AG without issue with equal and full strength. The second provider palpates patient's chest, patient is appreciated to jump up while in bed and grabs her chest with her left hand.  When provider starts completing the neuro exam, the patient at first states "I can't move my left side" after it was witnessed by myself and the RN patient is able to move all extremities without issue.  Patient initially uncooperative with neuro exam and then began shaking while stating "look I'm having a seizure." The shaking is atypical and uncharacteristic of any seizure activity and appears self-induced, no tonic-clonic or rhythmic jerking appreciated, no post ictal state or LOC appreciated.  During the entire episode the patient was completely coherent, speaking full clear sentences without any focal deficits. Full neuro exam then completed and is without any focal deficits appreciated.     Vital Signs Last 24 Hrs  T(C): 36.7 (17 Dec 2022 12:40), Max: 36.9 (16 Dec 2022 23:15)  T(F): 98.1 (17 Dec 2022 12:40), Max: 98.5 (17 Dec 2022 04:24)  HR: 80 (17 Dec 2022 12:40) (66 - 91)  BP: 133/87 (17 Dec 2022 12:40) (101/63 - 151/88)  RR: 18 (17 Dec 2022 12:40) (18 - 20)  SpO2: 96% (17 Dec 2022 12:40) (96% - 99%)    Parameters below as of 17 Dec 2022 12:40  Patient On (Oxygen Delivery Method): room air    Physical Exam:  General: NAD, NCAT, resting comfortably in bed  Cardio: normal S1, S2, no MRG appreciated, chest diffusely tender to palpation   Lungs: CTAB, no abnormal breath sounds appreciated, respirations unlabored on RA  Abdomen: soft, non-tender, non-distended, +BS  Extremities: no pedal edema appreciated b/l, negative Homans sign b/l  Neuro: AOX4, speech is clear & fluent, PERRL, EOMI , NAIR AG 5/5, sensation & strength is intact distally, normal tone, no pronator drift appreciated, no ataxia appreciated, no neglect appreciated, no focal deficits appreciated     Plan:  -Epilepsy consulted, vEEG on and reports reviewed - as per Dr. Clinton no seizure activity has been recorded, episodes are likely psychogenic in nature.  Patient with multiple admissions for the same presentation without any seizure activity appreciated on vEEG from prior admissions.   -Given that chest pain is induced on palpation this is likely musculoskeletal in nature  -STAT EKG ordered  -Ofirmev 1g x STAT  -Labs reviewed and are WNL with negative troponin. As per hospitalist no repeat BW is necessary at this time  -CTH completed on 12/16/2022 is without any acute changes or pathologies when compared with prior studies  -RN to continue to monitor  -RN to call provider with any acute changes / questions / concerns  [phone]    The above patient and plan was discussed with Dr. Yoon -repeat CTH is not-indicated at this time, patient will follow-up with Dr. Clinton outpatient for hx of psychogenic seizures, patient is still planned for discharge today as per MD. When plan of care was discussed with patient she is again appreciated to move all extremities without issue, with equal and full strength and states "then get someone to get this crap (pointing to her EEG leads) out of my hair.  ASHLEY Maria updated bedside. 132.2

## 2024-03-29 ENCOUNTER — EMERGENCY (EMERGENCY)
Facility: HOSPITAL | Age: 84
LOS: 1 days | Discharge: HOME | End: 2024-03-29
Attending: EMERGENCY MEDICINE
Payer: MEDICARE

## 2024-03-29 VITALS
RESPIRATION RATE: 16 BRPM | OXYGEN SATURATION: 99 % | TEMPERATURE: 98 F | DIASTOLIC BLOOD PRESSURE: 76 MMHG | SYSTOLIC BLOOD PRESSURE: 128 MMHG | HEART RATE: 76 BPM

## 2024-03-29 VITALS
OXYGEN SATURATION: 100 % | HEART RATE: 77 BPM | WEIGHT: 130.07 LBS | SYSTOLIC BLOOD PRESSURE: 136 MMHG | DIASTOLIC BLOOD PRESSURE: 83 MMHG | RESPIRATION RATE: 18 BRPM | TEMPERATURE: 97 F

## 2024-03-29 DIAGNOSIS — Z90.89 ACQUIRED ABSENCE OF OTHER ORGANS: Chronic | ICD-10-CM

## 2024-03-29 DIAGNOSIS — C64.9 MALIGNANT NEOPLASM OF UNSPECIFIED KIDNEY, EXCEPT RENAL PELVIS: Chronic | ICD-10-CM

## 2024-03-29 DIAGNOSIS — Z90.5 ACQUIRED ABSENCE OF KIDNEY: Chronic | ICD-10-CM

## 2024-03-29 LAB
ALBUMIN SERPL ELPH-MCNC: 4.1 G/DL — SIGNIFICANT CHANGE UP (ref 3.3–5.2)
ALP SERPL-CCNC: 87 U/L — SIGNIFICANT CHANGE UP (ref 40–120)
ALT FLD-CCNC: 19 U/L — SIGNIFICANT CHANGE UP
ANION GAP SERPL CALC-SCNC: 15 MMOL/L — SIGNIFICANT CHANGE UP (ref 5–17)
APPEARANCE UR: CLEAR — SIGNIFICANT CHANGE UP
APTT BLD: 28.7 SEC — SIGNIFICANT CHANGE UP (ref 24.5–35.6)
AST SERPL-CCNC: 30 U/L — SIGNIFICANT CHANGE UP
BACTERIA # UR AUTO: ABNORMAL /HPF
BASOPHILS # BLD AUTO: 0.07 K/UL — SIGNIFICANT CHANGE UP (ref 0–0.2)
BASOPHILS NFR BLD AUTO: 1.1 % — SIGNIFICANT CHANGE UP (ref 0–2)
BILIRUB SERPL-MCNC: 1.1 MG/DL — SIGNIFICANT CHANGE UP (ref 0.4–2)
BILIRUB UR-MCNC: NEGATIVE — SIGNIFICANT CHANGE UP
BUN SERPL-MCNC: 22.6 MG/DL — HIGH (ref 8–20)
CALCIUM SERPL-MCNC: 9.2 MG/DL — SIGNIFICANT CHANGE UP (ref 8.4–10.5)
CAST: 0 /LPF — SIGNIFICANT CHANGE UP (ref 0–4)
CHLORIDE SERPL-SCNC: 97 MMOL/L — SIGNIFICANT CHANGE UP (ref 96–108)
CO2 SERPL-SCNC: 23 MMOL/L — SIGNIFICANT CHANGE UP (ref 22–29)
COLOR SPEC: YELLOW — SIGNIFICANT CHANGE UP
CREAT SERPL-MCNC: 0.8 MG/DL — SIGNIFICANT CHANGE UP (ref 0.5–1.3)
DIFF PNL FLD: NEGATIVE — SIGNIFICANT CHANGE UP
EGFR: 73 ML/MIN/1.73M2 — SIGNIFICANT CHANGE UP
EOSINOPHIL # BLD AUTO: 0.12 K/UL — SIGNIFICANT CHANGE UP (ref 0–0.5)
EOSINOPHIL NFR BLD AUTO: 1.9 % — SIGNIFICANT CHANGE UP (ref 0–6)
GLUCOSE SERPL-MCNC: 73 MG/DL — SIGNIFICANT CHANGE UP (ref 70–99)
GLUCOSE UR QL: NEGATIVE MG/DL — SIGNIFICANT CHANGE UP
HCT VFR BLD CALC: 40.9 % — SIGNIFICANT CHANGE UP (ref 34.5–45)
HGB BLD-MCNC: 14.1 G/DL — SIGNIFICANT CHANGE UP (ref 11.5–15.5)
IMM GRANULOCYTES NFR BLD AUTO: 0.3 % — SIGNIFICANT CHANGE UP (ref 0–0.9)
INR BLD: 1.02 RATIO — SIGNIFICANT CHANGE UP (ref 0.85–1.18)
KETONES UR-MCNC: NEGATIVE MG/DL — SIGNIFICANT CHANGE UP
LEUKOCYTE ESTERASE UR-ACNC: ABNORMAL
LYMPHOCYTES # BLD AUTO: 1.45 K/UL — SIGNIFICANT CHANGE UP (ref 1–3.3)
LYMPHOCYTES # BLD AUTO: 22.6 % — SIGNIFICANT CHANGE UP (ref 13–44)
MCHC RBC-ENTMCNC: 30.8 PG — SIGNIFICANT CHANGE UP (ref 27–34)
MCHC RBC-ENTMCNC: 34.5 GM/DL — SIGNIFICANT CHANGE UP (ref 32–36)
MCV RBC AUTO: 89.3 FL — SIGNIFICANT CHANGE UP (ref 80–100)
MONOCYTES # BLD AUTO: 0.68 K/UL — SIGNIFICANT CHANGE UP (ref 0–0.9)
MONOCYTES NFR BLD AUTO: 10.6 % — SIGNIFICANT CHANGE UP (ref 2–14)
NEUTROPHILS # BLD AUTO: 4.08 K/UL — SIGNIFICANT CHANGE UP (ref 1.8–7.4)
NEUTROPHILS NFR BLD AUTO: 63.5 % — SIGNIFICANT CHANGE UP (ref 43–77)
NITRITE UR-MCNC: NEGATIVE — SIGNIFICANT CHANGE UP
NT-PROBNP SERPL-SCNC: 1522 PG/ML — HIGH (ref 0–300)
PH UR: 8.5 (ref 5–8)
PLATELET # BLD AUTO: 189 K/UL — SIGNIFICANT CHANGE UP (ref 150–400)
POTASSIUM SERPL-MCNC: 4.4 MMOL/L — SIGNIFICANT CHANGE UP (ref 3.5–5.3)
POTASSIUM SERPL-SCNC: 4.4 MMOL/L — SIGNIFICANT CHANGE UP (ref 3.5–5.3)
PROT SERPL-MCNC: 6.7 G/DL — SIGNIFICANT CHANGE UP (ref 6.6–8.7)
PROT UR-MCNC: NEGATIVE MG/DL — SIGNIFICANT CHANGE UP
PROTHROM AB SERPL-ACNC: 11.3 SEC — SIGNIFICANT CHANGE UP (ref 9.5–13)
RBC # BLD: 4.58 M/UL — SIGNIFICANT CHANGE UP (ref 3.8–5.2)
RBC # FLD: 12.4 % — SIGNIFICANT CHANGE UP (ref 10.3–14.5)
RBC CASTS # UR COMP ASSIST: 1 /HPF — SIGNIFICANT CHANGE UP (ref 0–4)
SODIUM SERPL-SCNC: 135 MMOL/L — SIGNIFICANT CHANGE UP (ref 135–145)
SP GR SPEC: 1.01 — SIGNIFICANT CHANGE UP (ref 1–1.03)
SQUAMOUS # UR AUTO: 2 /HPF — SIGNIFICANT CHANGE UP (ref 0–5)
TROPONIN T, HIGH SENSITIVITY RESULT: 12 NG/L — SIGNIFICANT CHANGE UP (ref 0–51)
TROPONIN T, HIGH SENSITIVITY RESULT: 13 NG/L — SIGNIFICANT CHANGE UP (ref 0–51)
UROBILINOGEN FLD QL: 0.2 MG/DL — SIGNIFICANT CHANGE UP (ref 0.2–1)
WBC # BLD: 6.42 K/UL — SIGNIFICANT CHANGE UP (ref 3.8–10.5)
WBC # FLD AUTO: 6.42 K/UL — SIGNIFICANT CHANGE UP (ref 3.8–10.5)
WBC UR QL: 21 /HPF — HIGH (ref 0–5)

## 2024-03-29 PROCEDURE — 93010 ELECTROCARDIOGRAM REPORT: CPT

## 2024-03-29 PROCEDURE — 71045 X-RAY EXAM CHEST 1 VIEW: CPT | Mod: 26

## 2024-03-29 PROCEDURE — 81001 URINALYSIS AUTO W/SCOPE: CPT

## 2024-03-29 PROCEDURE — 99285 EMERGENCY DEPT VISIT HI MDM: CPT | Mod: 25

## 2024-03-29 PROCEDURE — 84146 ASSAY OF PROLACTIN: CPT

## 2024-03-29 PROCEDURE — 85025 COMPLETE CBC W/AUTO DIFF WBC: CPT

## 2024-03-29 PROCEDURE — 71045 X-RAY EXAM CHEST 1 VIEW: CPT

## 2024-03-29 PROCEDURE — 85610 PROTHROMBIN TIME: CPT

## 2024-03-29 PROCEDURE — 93005 ELECTROCARDIOGRAM TRACING: CPT

## 2024-03-29 PROCEDURE — 87077 CULTURE AEROBIC IDENTIFY: CPT

## 2024-03-29 PROCEDURE — 84484 ASSAY OF TROPONIN QUANT: CPT

## 2024-03-29 PROCEDURE — 85730 THROMBOPLASTIN TIME PARTIAL: CPT

## 2024-03-29 PROCEDURE — 80053 COMPREHEN METABOLIC PANEL: CPT

## 2024-03-29 PROCEDURE — 87086 URINE CULTURE/COLONY COUNT: CPT

## 2024-03-29 PROCEDURE — 83880 ASSAY OF NATRIURETIC PEPTIDE: CPT

## 2024-03-29 PROCEDURE — 36415 COLL VENOUS BLD VENIPUNCTURE: CPT

## 2024-03-29 PROCEDURE — 99285 EMERGENCY DEPT VISIT HI MDM: CPT

## 2024-03-29 RX ORDER — BRIVARACETAM 25 MG/1
25 TABLET, FILM COATED ORAL ONCE
Refills: 0 | Status: DISCONTINUED | OUTPATIENT
Start: 2024-03-29 | End: 2024-03-29

## 2024-03-29 RX ORDER — BRIVARACETAM 25 MG/1
1 TABLET, FILM COATED ORAL
Qty: 28 | Refills: 0
Start: 2024-03-29 | End: 2024-04-11

## 2024-03-29 RX ADMIN — BRIVARACETAM 25 MILLIGRAM(S): 25 TABLET, FILM COATED ORAL at 19:46

## 2024-03-29 NOTE — ED PROVIDER NOTE - WR INTERPRETATION 1
Partially impaired: cannot see medication labels or newsprint, but can see obstacles in path, and the surrounding layout; can count fingers at arm's length
CXR negative - No infiltrates, No consolidation, No atelectasis seen

## 2024-03-29 NOTE — ED PROVIDER NOTE - OBJECTIVE STATEMENT
83 year old female 83 year old female with PMHx CAD s/p stents in 2018, RCC s/p left nephrectomy, TIA/CVA, pAfib not on AC/ currently in sinus, HTN, HLD, presenting for evaluation of tremors that began today. Patient states was sitting when she felt her entire body shake, lasting for about 1 minute, then subsided without post-ictal state. Had about 12 more episodes at home prior to EMS arrival. She was conscious throughout entire episodes, no urinary incontinence/tongue bite. States she was diagnosed with seizures about 1-2 years ago at [facility], started on a medication for seizures in Dec 2023, however stopped it 3 weeks ago as it was making her sleepy. She does not recall the medication nor her neurologist. Patient also c/o intermittent chest pain x 1 month without exacerbating/alleviating factors. Also notes long standing shortness of breath, not significantly worse.     Cardiologist: Dr. Cyr

## 2024-03-29 NOTE — ED ADULT NURSE NOTE - NSFALLHARMRISKINTERV_ED_ALL_ED

## 2024-03-29 NOTE — ED PROVIDER NOTE - PHYSICAL EXAMINATION
Gen: well appearing, no acute distress  Head: normocephalic, atraumatic  EENT: EOMI, moist mucous membranes  Lung: no increased work of breathing, clear to auscultation bilaterally, no wheezing, rales, rhonchi, speaking in full sentences  CV: regular rate, regular rhythm  Abd: soft, non-tender, non-distended  MSK: No edema, no visible deformities, full range of motion in all 4 extremities  Neuro: Awake, alert, no focal neurologic deficits

## 2024-03-29 NOTE — ED ADULT NURSE NOTE - PAIN: PRESENCE, MLM
Addended by: LIONEL CONRAD on: 6/3/2022 11:13 AM     Modules accepted: Orders, Level of Service     has on and off chest pain/complains of pain/discomfort

## 2024-03-29 NOTE — ED ADULT NURSE NOTE - NURSING NEURO ORIENTATION
oriented to person, place and time -c/w oxybutynin  -c/w tamsulosin  -c/w finasteride   -of note patient reports taking desipramine and prednisone for "urinary problems" and that they are prescribed by a neurologist, desipramine likely for overactive bladder but unclear the role of the prednisone, would clarify these medications in AM -c/w oxybutynin  -c/w tamsulosin  -c/w finasteride   -of note patient reports taking desipramine and prednisone for "urinary problems" and that they are prescribed by a neurologist, desipramine likely for overactive bladder but unclear the role of the prednisone, would clarify these medications in AM, would otherwise hold prednisone regardless in setting of dexamethasone

## 2024-03-29 NOTE — ED PROVIDER NOTE - PATIENT PORTAL LINK FT
You can access the FollowMyHealth Patient Portal offered by [facility] by registering at the following website: [email or web address]. By joining [facility]’s FollowMyHealth portal, you will also be able to view your health information using other applications (apps) compatible with our system.

## 2024-03-29 NOTE — ED PROVIDER NOTE - NSFOLLOWUPINSTRUCTIONS_ED_ALL_ED_FT
1. Follow up with your neurologist as planned for reevaluation.  2.  Return to the Emergency Department immediately for any worsening, progressive or concerning symptoms.   3. Follow up with a cardiologist this week for further evaluation.    Tremor  A tremor is trembling or shaking that you cannot control. Most tremors affect the hands or arms. Tremors can also affect the head, vocal cords, face, and other parts of the body. There are many types of tremors. Common types include:  Essential tremor. These usually occur in people older than 40. This type of tremor may run in families and can happen in otherwise healthy people.  Resting tremor. These occur when the muscles are at rest, such as when your hands are resting in your lap. People with Parkinson's disease often have resting tremors.  Postural tremor. These occur when you try to hold a pose, such as keeping your hands outstretched.  Kinetic tremor. These occur during purposeful movement, such as trying to touch a finger to your nose.  Task-specific tremor. These may occur when you do certain tasks such as writing, speaking, or standing.  Psychogenic tremor. These are greatly reduced or go away when you are distracted. These tremors happen due to underlying stress or psychiatric disease. They can happen in people of all ages.  Some types of tremors have no known cause. Tremors can also be a symptom of nervous system problems (neurological disorders) that may occur with aging. Some tremors go away with treatment, while others do not.    Follow these instructions at home:  Lifestyle    A bottle of beer, a glass of wine, and a glass of hard liquor.  A sign showing that a person should not smoke.  If you drink alcohol:  Limit how much you have to:  0–1 drink a day for women who are not pregnant.  0–2 drinks a day for men.  Know how much alcohol is in a drink. In the [place] one drink equals one 12 oz bottle of beer (355 mL), one 5 oz glass of wine (148 mL), or one 1½ oz glass of hard liquor (44 mL).  Do not use any products that contain nicotine or tobacco. These products include cigarettes, chewing tobacco, and vaping devices, such as e-cigarettes. If you need help quitting, ask your health care provider.  Avoid extreme heat and extreme cold.  Limit your caffeine intake, as told by your health care provider.  Try to get 8 hours of sleep each night.  Find ways to manage your stress, such as meditation or yoga.  General instructions    Take over-the-counter and prescription medicines only as told by your health care provider.  Keep all follow-up visits. This is important.  Contact a health care provider if:  You develop a tremor after starting a new medicine.  You have a tremor along with other symptoms such as:  Numbness.  Tingling.  Pain.  Weakness.  Your tremor gets worse.  Your tremor interferes with your day-to-day life.  Summary  A tremor is trembling or shaking that you cannot control.  Most tremors affect the hands or arms.  Some types of tremors have no known cause. Others may be a symptom of nervous system problems (neurological disorders).  Make sure you discuss any tremors you have with your health care provider.  This information is not intended to replace advice given to you by your health care provider. Make sure you discuss any questions you have with your health care provider.    Nonspecific Chest Pain, Adult  Chest pain is an uncomfortable, tight, or painful feeling in the chest. The pain can feel like a crushing, aching, or squeezing pressure. A person can feel a burning or tingling sensation. Chest pain can also be felt in your back, neck, jaw, shoulder, or arm. This pain can be worse when you move, sneeze, or take a deep breath.    Chest pain can be caused by a condition that is life-threatening. This must be treated right away. It can also be caused by something that is not life-threatening. If you have chest pain, it can be hard to know the difference, so it is important to get help right away to make sure that you do not have a serious condition.    Some life-threatening causes of chest pain include:  Heart attack.  A tear in the body's main blood vessel (aortic dissection).  Inflammation around your heart (pericarditis).  A problem in the lungs, such as a blood clot (pulmonary embolism) or a collapsed lung (pneumothorax).  Some non life-threatening causes of chest pain include:  Heartburn.  Anxiety or stress.  Damage to the bones, muscles, and cartilage that make up your chest wall.  Pneumonia or bronchitis.  Shingles infection (varicella-zoster virus).  Your chest pain may come and go. It may also be constant. Your health care provider will do tests and other studies to find the cause of your pain. Treatment will depend on the cause of your chest pain.    Follow these instructions at home:  Medicines    Take over-the-counter and prescription medicines only as told by your health care provider.  If you were prescribed an antibiotic medicine, take it as told by your health care provider. Do not stop taking the antibiotic even if you start to feel better.  Activity    Avoid any activities that cause chest pain.  Do not lift anything that is heavier than 10 lb (4.5 kg), or the limit that you are told, until your health care provider says that it is safe.  Rest as directed by your health care provider.  Return to your normal activities only as told by your health care provider. Ask your health care provider what activities are safe for you.  Lifestyle    A plate along with examples of foods in a healthy diet.  Silhouette of a person sitting on the floor doing yoga.  Do not use any products that contain nicotine or tobacco, such as cigarettes, e-cigarettes, and chewing tobacco. If you need help quitting, ask your health care provider.  Do not drink alcohol.  Make healthy lifestyle changes as recommended. These may include:  Getting regular exercise. Ask your health care provider to suggest some exercises that are safe for you.  Eating a heart-healthy diet. This includes plenty of fresh fruits and vegetables, whole grains, low-fat (lean) protein, and low-fat dairy products. A dietitian can help you find healthy eating options.  Maintaining a healthy weight.  Managing any other health conditions you may have, such as high blood pressure (hypertension) or diabetes.  Reducing stress, such as with yoga or relaxation techniques.  General instructions    Pay attention to any changes in your symptoms.  It is up to you to get the results of any tests that were done. Ask your health care provider, or the department that is doing the tests, when your results will be ready.  Keep all follow-up visits as told by your health care provider. This is important.  You may be asked to go for further testing if your chest pain does not go away.  Contact a health care provider if:  Your chest pain does not go away.  You feel depressed.  You have a fever.  You notice changes in your symptoms or develop new symptoms.  Get help right away if:  Your chest pain gets worse.  You have a cough that gets worse, or you cough up blood.  You have severe pain in your abdomen.  You faint.  You have sudden, unexplained chest discomfort.  You have sudden, unexplained discomfort in your arms, back, neck, or jaw.  You have shortness of breath at any time.  You suddenly start to sweat, or your skin gets clammy.  You feel nausea or you vomit.  You suddenly feel lightheaded or dizzy.  You have severe weakness, or unexplained weakness or fatigue.  Your heart begins to beat quickly, or it feels like it is skipping beats.  These symptoms may represent a serious problem that is an emergency. Do not wait to see if the symptoms will go away. Get medical help right away. Call your local emergency services (911 in the [place] Do not drive yourself to the hospital.    Summary  Chest pain can be caused by a condition that is serious and requires urgent treatment. It may also be caused by something that is not life-threatening.  Your health care provider may do lab tests and other studies to find the cause of your pain.  Follow your health care provider's instructions on taking medicines, making lifestyle changes, and getting emergency treatment if symptoms become worse.  Keep all follow-up visits as told by your health care provider. This includes visits for any further testing if your chest pain does not go away.  This information is not intended to replace advice given to you by your health care provider. Make sure you discuss any questions you have with your health care provider.

## 2024-03-29 NOTE — ED PROVIDER NOTE - ATTENDING CONTRIBUTION TO CARE
pt states garry she has a hx of partial seizures and was on briviact for it. pt states that she stopped it as it was making her too drowsy.  Pt states that over the past couple of days has had shaking episodes typical of her partial seizures.  no other complaints.    physical - rrr. ctab. abd - soft, nt. no rash. no edema. neuro - strength 5/5 x4. sensation intact x4. CN II-XII intact. normal gait.    plan - labs and imaging reviewed.  Pt did reports some recent chest pain.  pt restarted on briviact and has an appt with her neurologist next week.  pt signed out to dr. clifford. pt's chest pain atypical and ongoing for months. if delta trop neg to d/c with continued neuro and cards f/up.

## 2024-03-29 NOTE — ED ADULT TRIAGE NOTE - CHIEF COMPLAINT QUOTE
PT C/O of new onset of tremors that started today. Episodes last a couple of second and then stop. PT fully alert during episodes. Hx of seizures and stopped taking one seizure med 3 weeks ago, unsure of name.

## 2024-03-29 NOTE — ED PROVIDER NOTE - PROGRESS NOTE DETAILS
Tianna Shay DO: Patient's pharmacy [facility] in [place] contacted, patient is prescribed Briviact 25mg BID. Tianna Shay, DO: patient states will start taking her Briviact again and has appointment with her neurologist next week. Will give dose here and Rx to her pharmacy. Initial trop 13, pending repeat. If stable patient can be dc'd home. Soo Graham DO: patient signed to me by Dr. Small. No significant change in troponin, cici madrigal.

## 2024-03-29 NOTE — ED PROVIDER NOTE - NSFOLLOWUPCLINICS_GEN_ALL_ED_FT
Cardiology at [place] ([facility])  Cardiology  [address], [address]  [place] [address]  Phone: [phone]  Fax:   Follow Up Time: Urgent

## 2024-03-29 NOTE — ED ADULT NURSE NOTE - OBJECTIVE STATEMENT
82 y/o female presents in ED with tremors since this afternoon. State she has history of seizures and prescribed seizure med that shes unsure the name and stopped it three weeks ago.  PT fully alert during episodes. Unsure how long the episode has lasted today. Also states she had Chest pain in ED that is "sharp" and comes and goes that started several weeks ago. State will get short of breath with chest pain.  Denies radiating pain, has loop implant, no dizziness. Also has h/o stroke years ago with slight left leg weakness.

## 2024-03-30 LAB — PROLACTIN SERPL-MCNC: 11.3 NG/ML — SIGNIFICANT CHANGE UP (ref 3.4–24.1)

## 2024-03-31 LAB
CULTURE RESULTS: ABNORMAL
SPECIMEN SOURCE: SIGNIFICANT CHANGE UP

## 2024-04-15 ENCOUNTER — OFFICE VISIT (OUTPATIENT)
Dept: URGENT CARE | Facility: URGENT CARE | Age: 84
End: 2024-04-15
Payer: COMMERCIAL

## 2024-04-15 VITALS
DIASTOLIC BLOOD PRESSURE: 62 MMHG | TEMPERATURE: 98.3 F | SYSTOLIC BLOOD PRESSURE: 136 MMHG | OXYGEN SATURATION: 97 % | HEART RATE: 79 BPM

## 2024-04-15 DIAGNOSIS — S01.512A: Primary | ICD-10-CM

## 2024-04-15 PROCEDURE — 99212 OFFICE O/P EST SF 10 MIN: CPT

## 2024-04-15 NOTE — PROGRESS NOTES
Patient presents with:  Urgent Care: Bit inside of lip around noon, still bleeding, is on blood thinners      Clinical Decision Makin-year-old female, well, nontoxic appearing, Hx A-fib-on Xarelto, presenting with a small laceration and bleeding in mouth since noon-reports she was chewing celery and carrots and bit her left inner lower lip region.  On exam, I can visualize a very small laceration on the inner lip, it is very slowly oozing a small amount of blood-in clinic, we wet 4 x 4 piece of gauze, and placed it in lower lip/buccal area, against teeth and cheek.  She sat with this in place for approximately 20-30 minutes, after removing gauze, I observed bleeding has stopped.  Discussed instructions to reapply pressure again, if bleeding is to recur.  I suspect bleeding has not stopped earlier, only because she is on Xarelto, but she is using dried tissues, and repeatedly placing new tissues, not holding constant pressure, dislodging clot every time she used a new dry tissue.    At the end of the encounter, I discussed results, diagnosis, medications. Discussed red flags for immediate return to clinic/ER, as well as indications for follow up if no improvement. Patient understood and agreed to plan. Patient was stable for discharge.    ICD-10-CM    1. Laceration of intraoral region without complication, initial encounter  S01.512A           Patient Instructions   The small wound in your mouth has appeared to stop bleeding.     I do not suspect it will start bleeding again, but if it does - do what we did in clinic today - wet gauze with cold water and apply to inner lower cheek on the left side which is where the small wound is.     Be very careful with eating tonight - eat soft foods such as soup or maybe a smoothie. Do not eat anything chewy or hard tonight.     Be very careful with oral hygiene tonight - maybe just use a mouth wash instead of flossing/brushing tonight.     In any difficulty breathing,  profuse bleeding tonight, go to the ER/     HPI:  Annie Archer is a 83 year old female who presents today with concern for oral bleeding, after biting the inside of her lip around noon today.  Has been slowly oozing/bleeding since.  She otherwise feels well.  Was eating carrots/celery at the time.  She was trying to stop it with some dried tissues, not working.    She is on Xarelto for A-fib.    History obtained from the patient.    Problem List:  2024-01: Acute pain of right shoulder  2022-10: Diverticulitis of colon  2022-10: BRBPR (bright red blood per rectum)  2022-10: Suprapubic abdominal pain      No past medical history on file.    Social History     Tobacco Use    Smoking status: Never    Smokeless tobacco: Never   Substance Use Topics    Alcohol use: Not on file       Review of Systems   HENT:  Positive for mouth sores.    All other systems reviewed and are negative.      Vitals:    04/15/24 1459   BP: 136/62   Pulse: 79   Temp: 98.3  F (36.8  C)   TempSrc: Tympanic   SpO2: 97%       Physical Exam  Constitutional:       General: She is not in acute distress.     Appearance: Normal appearance.   HENT:      Head: Normocephalic and atraumatic.      Right Ear: External ear normal.      Left Ear: External ear normal.      Nose: Nose normal.      Mouth/Throat:      Mouth: Mucous membranes are moist. Lacerations present.      Dentition: Normal dentition. Does not have dentures. No dental tenderness, gingival swelling, dental caries, dental abscesses or gum lesions.      Tongue: No lesions. Tongue does not deviate from midline.      Palate: No mass and lesions.      Pharynx: Oropharynx is clear. Uvula midline. No pharyngeal swelling, oropharyngeal exudate, posterior oropharyngeal erythema or uvula swelling.      Tonsils: No tonsillar exudate or tonsillar abscesses. 0 on the right. 0 on the left.      Comments: She has a very small laceration on the inner left lower lip region.  It is very slowly oozing a small  amount of blood.  Airway is intact.  Gums are not bleeding.  Eyes:      Conjunctiva/sclera: Conjunctivae normal.   Cardiovascular:      Rate and Rhythm: Normal rate. Rhythm irregular.      Heart sounds: Normal heart sounds. No murmur heard.     No friction rub. No gallop.   Pulmonary:      Effort: Pulmonary effort is normal. No respiratory distress.      Breath sounds: Normal breath sounds. No stridor. No wheezing, rhonchi or rales.   Chest:      Chest wall: No tenderness.   Musculoskeletal:      Cervical back: Neck supple.   Skin:     General: Skin is warm.      Capillary Refill: Capillary refill takes less than 2 seconds.   Neurological:      General: No focal deficit present.      Mental Status: She is alert and oriented to person, place, and time.   Psychiatric:         Mood and Affect: Mood normal.         Behavior: Behavior normal.         Thought Content: Thought content normal.         Judgment: Judgment normal.

## 2024-04-15 NOTE — PATIENT INSTRUCTIONS
The small wound in your mouth has appeared to stop bleeding.     I do not suspect it will start bleeding again, but if it does - do what we did in clinic today - wet gauze with cold water and apply to inner lower cheek on the left side which is where the small wound is.     Be very careful with eating tonight - eat soft foods such as soup or maybe a smoothie. Do not eat anything chewy or hard tonight.     Be very careful with oral hygiene tonight - maybe just use a mouth wash instead of flossing/brushing tonight.     In any difficulty breathing, profuse bleeding tonight, go to the ER/

## 2024-06-16 NOTE — ED BEHAVIORAL HEALTH ASSESSMENT NOTE - DEPENDENTS
Problem: Adult Inpatient Plan of Care  Goal: Plan of Care Review  Outcome: Progressing  Goal: Patient-Specific Goal (Individualized)  Outcome: Progressing  Goal: Absence of Hospital-Acquired Illness or Injury  Outcome: Progressing  Goal: Optimal Comfort and Wellbeing  Outcome: Progressing  Goal: Readiness for Transition of Care  Outcome: Progressing     Problem: Infection  Goal: Absence of Infection Signs and Symptoms  Outcome: Progressing     Problem: Diabetes Comorbidity  Goal: Blood Glucose Level Within Targeted Range  Outcome: Progressing     Problem: Acute Kidney Injury/Impairment  Goal: Fluid and Electrolyte Balance  Outcome: Progressing  Goal: Improved Oral Intake  Outcome: Progressing  Goal: Effective Renal Function  Outcome: Progressing     Problem: Fall Injury Risk  Goal: Absence of Fall and Fall-Related Injury  Outcome: Progressing     Problem: Pain Acute  Goal: Optimal Pain Control and Function  Outcome: Progressing     Problem: Anemia  Goal: Anemia Symptom Improvement  Outcome: Progressing      None known

## 2024-06-19 NOTE — ED ADULT TRIAGE NOTE - CHIEF COMPLAINT QUOTE
pt A&OX 4 BIBA from home c/o left side weakness, aphasia, chest pressure & difficulty breathing. LKW 10am. Code stroke called upon ED arrival. Dr Dey at bedside
5 (moderate pain)

## 2024-07-09 NOTE — ED ADULT NURSE NOTE - AS PAIN REST
Caller: PATIENT    Relationship: SELF    Best call back number: [phone]    Requested Prescriptions     Pending Prescriptions Disp Refills    oxyCODONE (ROXICODONE) 10 MG tablet 50 tablet 0     Sig: Take 1 tablet by mouth Every 12 (Twelve) Hours As Needed for Severe Pain. - take Acetaminophen 325 mg with each dose        Pharmacy where request should be sent:  [facility] AT [address] [place] [address]    Last office visit with prescribing clinician: 6/3/2024   Last telemedicine visit with prescribing clinician: Visit date not found   Next office visit with prescribing clinician: 10/14/2024     Additional details provided by patient: PATIENT HAS A 5 DAY SUPPLY LEFT.     Does the patient have less than a 3 day supply:  [] Yes  [x] No    Would you like a call back once the refill request has been completed: [] Yes [x] No    If the office needs to give you a call back, can they leave a voicemail: [] Yes [x] No      
Refill  Last OV: 6/26/2024  Next OV: 10/14/2024 
1 (mild pain)

## 2024-07-29 NOTE — DISCHARGE NOTE NURSING/CASE MANAGEMENT/SOCIAL WORK - NSSCCONTNUM_GEN_ALL_CORE
AFTER VISIT PLAN AND HEALTH GOALS:  Decrease Amiodarone to 100 mg daily  Decrease Eliquis to 2.5 mg daily   3 month remote device check  Follow up in 6 months  Check TSH and LFTs today    Follow heart healthy diet.  Stay active. We recommend 150 minutes of exercise per week.  Avoid caffeine and alcohol.    If questions should arise after you leave, please do not hesitate to call the office.  Our office hours are:  Monday through Friday, 8:00 AM - 4:30 PM  Office phone number is [phone]    If your question is device related e.g. Loop recorder, pacemaker or defibrillator specific call the REMOTE TEAM!  Office hours are:  Monday through Friday, 8:00 AM - 4:30 PM  Our remote teams phone number is [phone]    If you receive a survey in your e-mail or by text, we ask that you please fill it out. By providing us with answers that are  good or bad it helps us know how we are doing and what we can improve on.    Thank you for choosing [facility] for your health care needs!  The Electrophysiology Team for Dr. Alesia Mccain and Dr. Jose C Ramon    
[phone]

## 2024-08-14 NOTE — H&P ADULT - NSHPPHYSICALEXAM_GEN_ALL_CORE
First OB  Pre-charting complete. Patient has multiple care gaps that need to be addressed in different appointment.    Last PAP: pap is too young    Baby number: first pregnancy  LMP-      Health Maintenance Due   Topic Date Due    Hepatitis B Vaccine (1 of 3 - 3-dose series) Never done    MMR Vaccine (1 of 2 - Standard series) Never done    Hepatitis A Vaccine (1 of 2 - 2-dose series) Never done    Annual Physical (ages 3-18)  Never done    DTaP/Tdap/Td Vaccine (1 - Tdap) Never done    Depression Screening  Never done    Meningococcal Vaccine (1 - 2-dose series) Never done    COVID-19 Vaccine (1 - 2023-24 season) Never done    TDaP Pregnancy Vaccine  Never done    Chlamydia and Gonorrhea Screening (if sexually active)  08/03/2024       Open Order has been reviewed: yes      PHYSICAL EXAM:  GENERAL: NAD, well-groomed, well-developed  HEAD:  Atraumatic, normocephalic  ENMT: Moist mucous membranes, good dentition  JUANA COMA SCORE: E-4 V-5 M-6 = 15  MENTAL STATUS: AAO x3; Awake; Opens eyes spontaneously; Appropriately conversant with mild expressive aphasia; following simple commands  CRANIAL NERVES: Visual acuity normal for age, visual fields full to confrontation, PERRL. EOMI without nystagmus. Facial sensation intact V1-3 distribution b/l. Face symmetric w/ normal eye closure and smile, tongue midline. Hearing grossly intact. Expressive aphasia difficulty   MOTOR: strength LUE/LLE 4-/5. RUE/RLE 5/5. LUE/LLE DRIFT.   SENSATION: grossly intact to light touch all extremities besides slightly decreased LLE  CHEST/LUNG: Nonlabored breaths  HEART: +S1/+S2  SKIN: Warm, dry

## 2024-10-17 ENCOUNTER — OFFICE VISIT (OUTPATIENT)
Dept: URGENT CARE | Facility: URGENT CARE | Age: 84
End: 2024-10-17
Payer: COMMERCIAL

## 2024-10-17 VITALS
DIASTOLIC BLOOD PRESSURE: 76 MMHG | TEMPERATURE: 97.4 F | HEART RATE: 70 BPM | WEIGHT: 205 LBS | BODY MASS INDEX: 33.09 KG/M2 | SYSTOLIC BLOOD PRESSURE: 113 MMHG | OXYGEN SATURATION: 99 %

## 2024-10-17 DIAGNOSIS — M10.9 PODAGRA: Primary | ICD-10-CM

## 2024-10-17 PROCEDURE — 99214 OFFICE O/P EST MOD 30 MIN: CPT | Performed by: PHYSICIAN ASSISTANT

## 2024-10-17 RX ORDER — PREDNISONE 20 MG/1
40 TABLET ORAL DAILY
Qty: 10 TABLET | Refills: 0 | Status: SHIPPED | OUTPATIENT
Start: 2024-10-17 | End: 2024-10-22

## 2024-10-17 NOTE — PROGRESS NOTES
SUBJECTIVE:   Annie Archer is a 84 year old female presenting with a chief complaint of   Chief Complaint   Patient presents with    Urgent Care     Pain in left big toe,redness  poss gout x woke up 3 AM this morning with pain        She is an established patient of [facility].  Patient presents with a history of gout and left toe pain and erythema that started today.  No trauma.  States ate chicken and green beans 2 days ago with a glass of wine.          Review of Systems    No past medical history on file.  No family history on file.  Current Outpatient Medications   Medication Sig Dispense Refill    acetaminophen (TYLENOL) 325 MG tablet Take 2 tablets (650 mg) by mouth every 4 hours as needed for mild pain (Patient not taking: Reported on 4/15/2024)      albuterol (PROAIR HFA/PROVENTIL HFA/VENTOLIN HFA) 108 (90 Base) MCG/ACT inhaler Inhale 2 puffs into the lungs every 4 hours as needed for shortness of breath / dyspnea or wheezing (cough) (Patient not taking: Reported on 10/2/2022) 1 Inhaler 1    allopurinol (ZYLOPRIM) 100 MG tablet Take 1 tablet by mouth daily at 2 pm      amLODIPine (NORVASC) 2.5 MG tablet Take 2.5 mg by mouth daily      ciprofloxacin (CILOXAN) 0.3 % ophthalmic solution Place 1-2 drops Into the left eye every 4 hours (Patient not taking: Reported on 2/10/2024) 10 mL 0    desonide (DESOWEN) 0.05 % external cream Apply topically as needed      docusate sodium (COLACE) 100 MG capsule Take 1 capsule (100 mg) by mouth 2 times daily as needed for constipation  0    erythromycin (ROMYCIN) 5 MG/GM ophthalmic ointment Place 0.5 inches into the right eye 3 times daily Refill order placed (Patient not taking: Reported on 2/10/2024) 3.5 g 1    fluocinonide (LIDEX) 0.05 % external solution Apply topically 2 times daily      hydrochlorothiazide (HYDRODIURIL) 25 MG tablet Take 25 mg by mouth daily      ketoconazole (NIZORAL) 2 % external shampoo Apply topically three times a week      ketorolac (ACULAR) 0.5  % ophthalmic solution Place 1 drop Into the left eye 4 times daily 10 mL 0    lisinopril (ZESTRIL) 20 MG tablet Take 20 mg by mouth daily      metoprolol succinate ER (TOPROL XL) 25 MG 24 hr tablet Take 12.5 mg by mouth daily      polyethylene glycol (MIRALAX) 17 GM/Dose powder Take 17 g (1 capful) by mouth daily as needed for constipation      predniSONE (DELTASONE) 10 MG tablet 4 po every day x 3 days, then 3 po every day x 3 days, then 2 po every day x 3 days, then 1 po every day x 3 days. (Patient not taking: Reported on 2/10/2024) 30 tablet 0    rivaroxaban ANTICOAGULANT (XARELTO) 20 MG TABS tablet Take 20 mg by mouth daily (with dinner)       Social History     Tobacco Use    Smoking status: Never    Smokeless tobacco: Never   Substance Use Topics    Alcohol use: Not on file       OBJECTIVE  /76   Pulse 70   Temp 97.4  F (36.3  C) (Tympanic)   Wt 93 kg (205 lb)   SpO2 99%   BMI 33.09 kg/m      Physical Exam  Vitals reviewed.   Constitutional:       Appearance: Normal appearance. She is obese.   Cardiovascular:      Rate and Rhythm: Normal rate.   Musculoskeletal:      Comments: Left foot, great toe base with erythema and tenderness.  No digit ROM due to nerve damage previously.  DP pulse present.     Neurological:      General: No focal deficit present.      Mental Status: She is alert.         Labs:  No results found for this or any previous visit (from the past 24 hour(s)).    ASSESSMENT:    No diagnosis found.     Medical Decision Making:    Differential Diagnosis:  Podagra, cellulitis    Serious Comorbid Conditions:  Adult:   reviewed    PLAN:    Rx for prednisone.  Discussed reasons to seek immediate medical attention.  Additionally if no improvement or worsening in one week, may follow up with PCP and/or UC.        Followup:    If not improving or if condition worsens, follow up with your Primary Care Provider, If not improving or if conditions worsens over the next 12-24 hours, go to the  Emergency Department    There are no Patient Instructions on file for this visit.

## 2024-10-29 ENCOUNTER — OFFICE VISIT (OUTPATIENT)
Dept: URGENT CARE | Facility: URGENT CARE | Age: 84
End: 2024-10-29
Payer: COMMERCIAL

## 2024-10-29 VITALS
TEMPERATURE: 98.7 F | SYSTOLIC BLOOD PRESSURE: 111 MMHG | WEIGHT: 203 LBS | OXYGEN SATURATION: 94 % | HEART RATE: 66 BPM | DIASTOLIC BLOOD PRESSURE: 57 MMHG | BODY MASS INDEX: 32.77 KG/M2

## 2024-10-29 DIAGNOSIS — R39.89 URINARY PROBLEM: ICD-10-CM

## 2024-10-29 DIAGNOSIS — N30.01 ACUTE CYSTITIS WITH HEMATURIA: Primary | ICD-10-CM

## 2024-10-29 LAB
ALBUMIN UR-MCNC: 100 MG/DL
APPEARANCE UR: CLEAR
BACTERIA #/AREA URNS HPF: ABNORMAL /HPF
BILIRUB UR QL STRIP: NEGATIVE
COLOR UR AUTO: YELLOW
GLUCOSE UR STRIP-MCNC: NEGATIVE MG/DL
HGB UR QL STRIP: ABNORMAL
KETONES UR STRIP-MCNC: NEGATIVE MG/DL
LEUKOCYTE ESTERASE UR QL STRIP: ABNORMAL
NITRATE UR QL: NEGATIVE
PH UR STRIP: 7 [PH] (ref 5–7)
RBC #/AREA URNS AUTO: ABNORMAL /HPF
SP GR UR STRIP: 1.01 (ref 1–1.03)
SQUAMOUS #/AREA URNS AUTO: ABNORMAL /LPF
UROBILINOGEN UR STRIP-ACNC: 0.2 E.U./DL
WBC #/AREA URNS AUTO: ABNORMAL /HPF

## 2024-10-29 PROCEDURE — 87088 URINE BACTERIA CULTURE: CPT | Performed by: NURSE PRACTITIONER

## 2024-10-29 PROCEDURE — 99213 OFFICE O/P EST LOW 20 MIN: CPT | Performed by: NURSE PRACTITIONER

## 2024-10-29 PROCEDURE — 81001 URINALYSIS AUTO W/SCOPE: CPT

## 2024-10-29 PROCEDURE — 87186 SC STD MICRODIL/AGAR DIL: CPT | Performed by: NURSE PRACTITIONER

## 2024-10-29 PROCEDURE — 87086 URINE CULTURE/COLONY COUNT: CPT | Performed by: NURSE PRACTITIONER

## 2024-10-29 RX ORDER — CEFDINIR 300 MG/1
300 CAPSULE ORAL 2 TIMES DAILY
Qty: 14 CAPSULE | Refills: 0 | Status: SHIPPED | OUTPATIENT
Start: 2024-10-29 | End: 2024-11-05

## 2024-10-29 ASSESSMENT — ENCOUNTER SYMPTOMS
FLANK PAIN: 0
FEVER: 0
FREQUENCY: 1

## 2024-10-29 NOTE — PROGRESS NOTES
Assessment & Plan       ICD-10-CM    1. Acute cystitis with hematuria  N30.01 cefdinir (OMNICEF) 300 MG capsule     Ob/Gyn  Referral      2. Urinary problem  R39.89 UA Macroscopic with reflex to Microscopic and Culture - Clinic Collect     Urine Microscopic Exam     Urine Culture           Patient instructions: Take antibiotic as prescribed with food. Drink plenty of water. If you develop sever abdominal or lower back pain or fever please go to ED.     Medical decision making:  Pt presents with urgency, frequency and dysuria. Ddx include but not limited to UTI, pyelonephritis, STI, sepsis and others. Symptoms today consistent with UTI. No signs of more systemic disease of pyelonephritis or sepsis. Will treat patient with cefdinir here today. Does have rash with keflex but states she did well with cefdinir in the past. Culture pending.    Results for orders placed or performed in visit on 10/29/24   UA Macroscopic with reflex to Microscopic and Culture - Clinic Collect     Status: Abnormal    Specimen: Urine, Clean Catch   Result Value Ref Range    Color Urine Yellow Colorless, Straw, Light Yellow, Yellow    Appearance Urine Clear Clear    Glucose Urine Negative Negative mg/dL    Bilirubin Urine Negative Negative    Ketones Urine Negative Negative mg/dL    Specific Gravity Urine 1.010 1.003 - 1.035    Blood Urine Large (A) Negative    pH Urine 7.0 5.0 - 7.0    Protein Albumin Urine 100 (A) Negative mg/dL    Urobilinogen Urine 0.2 0.2, 1.0 E.U./dL    Nitrite Urine Negative Negative    Leukocyte Esterase Urine Large (A) Negative   Urine Microscopic Exam     Status: Abnormal   Result Value Ref Range    Bacteria Urine Many (A) None Seen /HPF    RBC Urine 2-5 (A) 0-2 /HPF /HPF    WBC Urine 25-50 (A) 0-5 /HPF /HPF    Squamous Epithelials Urine Moderate (A) None Seen /LPF             No follow-ups on file.    At the end of the encounter, I discussed results, diagnosis, medications. Discussed red flags for immediate  return to clinic/ER, as well as indications for follow up if no improvement. Patient understood and agreed to plan. Patient was stable for discharge.    J Carlos Valencia is a 84 year old female who presents to clinic today the following health issues:  Chief Complaint   Patient presents with    Urinary Problem     Taking AZO, Dysuria and frequency      Pt reports urinary urgency, frequency and dysuria that started yesterday. Denies any fever or lower back pain.             Review of Systems   Constitutional:  Negative for fever.   Genitourinary:  Positive for frequency and urgency. Negative for flank pain.       Problem List:  2024-01: Acute pain of right shoulder  2022-10: Diverticulitis of colon  2022-10: BRBPR (bright red blood per rectum)  2022-10: Suprapubic abdominal pain      No past medical history on file.    Social History     Tobacco Use    Smoking status: Never    Smokeless tobacco: Never   Substance Use Topics    Alcohol use: Not on file           Objective    /57 (BP Location: Right arm, Patient Position: Sitting, Cuff Size: Adult Large)   Pulse 66   Temp 98.7  F (37.1  C) (Tympanic)   Wt 92.1 kg (203 lb)   SpO2 94%   BMI 32.77 kg/m    Physical Exam  Constitutional:       Appearance: Normal appearance. She is not toxic-appearing or diaphoretic.   Cardiovascular:      Rate and Rhythm: Normal rate.   Pulmonary:      Effort: Pulmonary effort is normal.   Abdominal:      General: Abdomen is flat.      Tenderness: There is no right CVA tenderness or left CVA tenderness.   Neurological:      Mental Status: She is alert.              CAL PORTILLO CNP

## 2024-10-31 LAB
BACTERIA UR CULT: ABNORMAL
BACTERIA UR CULT: ABNORMAL

## 2024-12-12 NOTE — PROGRESS NOTES
It was a pleasure meeting with you today and discussing your healthcare plan. Below is a summary of what we covered:    - Stop famotidine (pepcid)  - Continue protonix 40 mg twice daily, 30-60 minutes before breakfast and supper daily. If you forget the second dose, that is okay, but let me know if you are having any worsening of heartburn, acid reflux, trouble swallowing, abdominal pain, nighttime awakenings   - Follow up in 3 months       Please see below for any additional questions and scheduling guidelines.    Sign up for [employer]: [employer] patient portal serves as a secure platform for accessing your medical records from the [facility]. Additionally, [employer] facilitates easy, timely, and secure messaging with your care team. If you have not signed up, you may do so by using the provided code or calling [phone].    Coordinating your care after your visit:  There are multiple options for scheduling your follow-up care based on your provider's recommendation.    How do I schedule a follow-up clinic appointment:   After your appointment, you may receive scheduling assistance with the Clinic Coordinators by having a seat in the waiting room and a Clinic Coordinator will call you up to schedule.  Virtual visits or after you leave the clinic:  Your provider has placed a follow-up order in the [employer] portal for scheduling your return appointment. A member of the scheduling team will contact you to schedule.  [employer] Scheduling: Timely scheduling through [employer] is advised to ensure appointment availability.   Call to schedule: You may schedule your follow-up appointment(s) by calling [phone], option 1.    How do I schedule my endoscopy or colonoscopy procedure:  If a procedure, such as a colonoscopy or upper endoscopy was ordered by your provider, the scheduling team will contact you to schedule this procedure. Or you may choose to call to schedule at   [phone], option 2.  Please allow 20-30  RECEIVING UNIT ED HANDOFF REVIEW    ED Nurse Handoff Report was reviewed by: Teena Bess RN on October 3, 2022 at 5:18 AM        minutes when scheduling a procedure.    How do I get my blood work done? To get your blood work done, you need to schedule a lab appointment at an [facility]. There are multiple ways to schedule:   At the clinic: The Clinic Coordinator you meet after your visit can help you schedule a lab appointment.   [employer] scheduling: [employer] offers online lab scheduling at all [facility] laboratory locations.   Call to schedule: You can call [phone] to schedule your lab appointment.    How do I schedule my imaging study: To schedule imaging studies, such as CT scans, ultrasounds, MRIs, or X-rays, contact Imaging Services at [phone].    How do I schedule a referral to another doctor: If your provider recommended a referral to another specialist(s), the referral order was placed by your provider. You will receive a phone call to schedule this referral, or you may choose to call the number attached to the referral to self-schedule.    For Post-Visit Question(s):  For any inquiries following today's visit:  Please utilize [employer] messaging and allow 48 hours for reply or contact the Call Center during normal business hours at [phone], option 3.  For Emergent After-hours questions, contact the On-Call GI Fellow through the [facility]  at [phone].  In addition, you may contact your Nurse directly using the provided contact information.    Test Results:  Test results will be accessible via [employer] in compliance with the 21st Century Cures Act. This means that your results will be available to you at the same time as your provider. Often you may see your results before your provider does. Results are reviewed by staff within two weeks with communication follow-up. Results may be released in the patient portal prior to your care team review.    Prescription Refill(s):  Medication prescribed by your provider will be addressed during your visit. For future refills, please  coordinate with your pharmacy. If you have not had a recent clinic visit or routine labs, for your safety, your provider may not be able to refill your prescription.

## 2024-12-16 PROBLEM — M25.511 ACUTE PAIN OF RIGHT SHOULDER: Status: RESOLVED | Noted: 2024-01-23 | Resolved: 2024-12-16

## 2025-01-16 ENCOUNTER — OFFICE VISIT (OUTPATIENT)
Dept: URGENT CARE | Facility: URGENT CARE | Age: 85
End: 2025-01-16
Payer: COMMERCIAL

## 2025-01-16 VITALS
RESPIRATION RATE: 15 BRPM | TEMPERATURE: 97.1 F | SYSTOLIC BLOOD PRESSURE: 117 MMHG | WEIGHT: 203 LBS | BODY MASS INDEX: 32.77 KG/M2 | DIASTOLIC BLOOD PRESSURE: 73 MMHG | HEART RATE: 59 BPM | OXYGEN SATURATION: 96 %

## 2025-01-16 DIAGNOSIS — L03.90 CELLULITIS, UNSPECIFIED CELLULITIS SITE: Primary | ICD-10-CM

## 2025-01-16 RX ORDER — CEFDINIR 300 MG/1
300 CAPSULE ORAL 2 TIMES DAILY
Qty: 14 CAPSULE | Refills: 0 | Status: SHIPPED | OUTPATIENT
Start: 2025-01-16 | End: 2025-01-23

## 2025-01-16 NOTE — PROGRESS NOTES
Assessment & Plan     Cellulitis, unspecified cellulitis site  Very anxious about infection  Will cover for infection  - cefdinir (OMNICEF) 300 MG capsule  Dispense: 14 capsule; Refill: 0             No follow-ups on file.    George Lugo MD  [facility] JESSE Valencia is a 84 year old female who presents to clinic today for the following health issues:  Chief Complaint   Patient presents with    Urgent Care    Leg Pain     Patient is coming in with right leg pain on the shin area has scab painful to touch and warm onset 2 days ago.       HPI    Right leg pain  Small scab  Worried about infection  Has been in hospital for cellulitis    Has tolerated omnicef in past        Review of Systems        Objective    /73   Pulse 59   Temp 97.1  F (36.2  C) (Tympanic)   Resp 15   Wt 92.1 kg (203 lb)   SpO2 96%   BMI 32.77 kg/m    Physical Exam  Vitals and nursing note reviewed.   Constitutional:       Appearance: Normal appearance.   Skin:     General: Skin is warm.      Findings: No erythema.      Comments: Small scab right shin  No reddness/swelling   Neurological:      Mental Status: She is alert.

## 2025-01-18 NOTE — ED PROVIDER NOTE - NS ED MD DISPO SPECIAL CONSIDERATION1
[FreeTextEntry1] : Assessment and Plan:   - **[Irritant Contact Dermatitis]:**      - Therapeutic Interventions: To soothe the irritation and alleviate itching, a cortisone cream will be prescribed.      - Diagnostics Tests: None recommended at this time.      - Patient Education: Advise parents to monitor the rash and avoid any further possible irritants such as the scented detergent.      - Follow Up: Regular check-up advised to monitor progress. 
None

## 2025-01-23 NOTE — ED BEHAVIORAL HEALTH ASSESSMENT NOTE - NSBHTIMEBASEDBILLING_PSY_A_CORE
PAST MEDICAL HISTORY:  Anal condyloma     Anal squamous cell carcinoma     Atrophy of right kidney     DLBCL (diffuse large B cell lymphoma)     Gall bladder stones     H/O: gout     Heart block AV second degree     History of pacemaker     Hodgkins lymphoma     Hydronephrosis of right kidney     Hypothyroidism     Inguinal lymphadenopathy     Mild tricuspid regurgitation     Moderate mitral regurgitation     Nephrolithiasis     Occupational exposure to pesticides     Paroxysmal atrial fibrillation     Renal failure, acute     Stage 3 chronic kidney disease     Tracheoesophageal fistula     
no

## 2025-02-18 NOTE — ED PROVIDER NOTE - NIH STROKE SCALE: 10. DYSARTHRIA, QM
<-- Click to add NO pertinent Past Medical History
(1) Mild-to-moderate dysarthria; patient slurs at least some words and, at worst, can be understood with some difficulty

## 2025-02-24 ENCOUNTER — OFFICE (OUTPATIENT)
Dept: URBAN - METROPOLITAN AREA CLINIC 113 | Facility: CLINIC | Age: 85
Setting detail: OPHTHALMOLOGY
End: 2025-02-24
Payer: COMMERCIAL

## 2025-02-24 ENCOUNTER — RX ONLY (RX ONLY)
Age: 85
End: 2025-02-24

## 2025-02-24 DIAGNOSIS — H43.813: ICD-10-CM

## 2025-02-24 DIAGNOSIS — H35.40: ICD-10-CM

## 2025-02-24 DIAGNOSIS — H16.222: ICD-10-CM

## 2025-02-24 DIAGNOSIS — H25.13: ICD-10-CM

## 2025-02-24 PROBLEM — H50.332 EXOTROPIA, INTERMITTENT MONOCULAR, LEFT EYE: Status: ACTIVE | Noted: 2025-02-24

## 2025-02-24 PROBLEM — H53.002 AMBLYOPIA UNSPECIFIED; LEFT EYE: Status: ACTIVE | Noted: 2025-02-24

## 2025-02-24 PROCEDURE — 99204 OFFICE O/P NEW MOD 45 MIN: CPT | Performed by: STUDENT IN AN ORGANIZED HEALTH CARE EDUCATION/TRAINING PROGRAM

## 2025-02-24 ASSESSMENT — TONOMETRY: OD_IOP_MMHG: 14

## 2025-02-24 ASSESSMENT — REFRACTION_MANIFEST
OD_CYLINDER: -1.50
OD_AXIS: 100
OS_AXIS: 095
OS_ADD: +2.75
OD_SPHERE: +0.50
OD_ADD: +2.75
OS_CYLINDER: -1.75
OS_SPHERE: +0.50
OD_VA1: 20/25
OS_VA1: 20/30

## 2025-02-24 ASSESSMENT — SUPERFICIAL PUNCTATE KERATITIS (SPK)
OS_SPK: T 1+
OD_SPK: ABSENT

## 2025-02-24 ASSESSMENT — REFRACTION_CURRENTRX
OS_SPHERE: +1.25
OD_CYLINDER: -1.25
OS_OVR_VA: 20/
OS_CYLINDER: -1.00
OD_ADD: +3.00
OD_ADD: +2.25
OS_ADD: +2.25
OS_VPRISM_DIRECTION: PROGS
OD_OVR_VA: 20/
OD_CYLINDER: -2.00
OD_AXIS: 110
OS_AXIS: 105
OS_OVR_VA: 20/
OS_ADD: +3.00
OS_AXIS: 086
OS_CYLINDER: -2.00
OD_AXIS: 097
OD_SPHERE: +0.75
OD_SPHERE: +0.75
OS_SPHERE: +0.50
OD_VPRISM_DIRECTION: PROGS
OD_OVR_VA: 20/

## 2025-02-24 ASSESSMENT — REFRACTION_AUTOREFRACTION
OD_AXIS: 110
OD_SPHERE: +1.50
OS_CYLINDER: -2.25
OS_AXIS: 097
OS_SPHERE: +1.50
OD_CYLINDER: -2.25

## 2025-02-24 ASSESSMENT — VISUAL ACUITY
OS_BCVA: 20/30-2
OD_BCVA: 20/40

## 2025-02-24 ASSESSMENT — DRY EYES - PHYSICIAN NOTES: OS_GENERALCOMMENTS: INF

## 2025-02-24 ASSESSMENT — CONFRONTATIONAL VISUAL FIELD TEST (CVF)
OS_FINDINGS: FULL
OD_FINDINGS: FULL

## 2025-02-24 ASSESSMENT — KERATOMETRY
OD_AXISANGLE_DEGREES: 026
OS_AXISANGLE_DEGREES: 014
OS_K2POWER_DIOPTERS: 44.50
OD_K2POWER_DIOPTERS: 44.75
OD_K1POWER_DIOPTERS: 43.75
OS_K1POWER_DIOPTERS: 44.00

## 2025-02-24 ASSESSMENT — TEAR BREAK UP TIME (TBUT)
OD_TBUT: 4SEC
OS_TBUT: 4SEC

## 2025-03-24 ENCOUNTER — OFFICE (OUTPATIENT)
Dept: URBAN - METROPOLITAN AREA CLINIC 113 | Facility: CLINIC | Age: 85
Setting detail: OPHTHALMOLOGY
End: 2025-03-24
Payer: COMMERCIAL

## 2025-03-24 DIAGNOSIS — H25.12: ICD-10-CM

## 2025-03-24 DIAGNOSIS — H25.13: ICD-10-CM

## 2025-03-24 PROCEDURE — 99213 OFFICE O/P EST LOW 20 MIN: CPT | Performed by: STUDENT IN AN ORGANIZED HEALTH CARE EDUCATION/TRAINING PROGRAM

## 2025-03-24 PROCEDURE — 92136 OPHTHALMIC BIOMETRY: CPT | Performed by: STUDENT IN AN ORGANIZED HEALTH CARE EDUCATION/TRAINING PROGRAM

## 2025-03-24 ASSESSMENT — REFRACTION_CURRENTRX
OS_SPHERE: +1.25
OS_VPRISM_DIRECTION: PROGS
OD_AXIS: 110
OD_VPRISM_DIRECTION: PROGS
OD_OVR_VA: 20/
OD_SPHERE: +0.75
OD_CYLINDER: -1.25
OD_ADD: +3.00
OS_ADD: +2.25
OD_CYLINDER: -2.00
OD_AXIS: 097
OS_AXIS: 086
OS_CYLINDER: -1.00
OD_SPHERE: +0.75
OD_ADD: +2.25
OS_OVR_VA: 20/
OS_SPHERE: +0.50
OS_ADD: +3.00
OS_AXIS: 105
OS_OVR_VA: 20/
OD_OVR_VA: 20/
OS_CYLINDER: -2.00

## 2025-03-24 ASSESSMENT — REFRACTION_MANIFEST
OD_CYLINDER: -1.50
OD_AXIS: 100
OS_SPHERE: +0.50
OD_VA1: 20/25
OS_CYLINDER: -1.75
OS_AXIS: 095
OS_ADD: +2.75
OD_SPHERE: +0.50
OS_VA1: 20/30
OD_ADD: +2.75

## 2025-03-24 ASSESSMENT — KERATOMETRY
OS_K2POWER_DIOPTERS: 44.50
OS_AXISANGLE_DEGREES: 59
OD_AXISANGLE2_DEGREES: 027
OS_K1K2_AVERAGE: 44.25
OD_CYLAXISANGLE_DEGREES: 027
OD_K1POWER_DIOPTERS: 43.75
OD_AXISANGLE_DEGREES: 027
OS_AXISANGLE_DEGREES: 149
OD_AXISANGLE_DEGREES: 117
OS_K1POWER_DIOPTERS: 44.00
OS_AXISANGLE2_DEGREES: 149
OD_K2POWER_DIOPTERS: 44.75
OS_K1POWER_DIOPTERS: 44.00
OD_K1POWER_DIOPTERS: 43.75
OD_CYLPOWER_DEGREES: 1
OD_K1K2_AVERAGE: 44.25
OD_K2POWER_DIOPTERS: 44.75
OS_CYLAXISANGLE_DEGREES: 149
OS_CYLPOWER_DEGREES: 0.5
OS_K2POWER_DIOPTERS: 44.50

## 2025-03-24 ASSESSMENT — REFRACTION_AUTOREFRACTION
OS_CYLINDER: -2.25
OD_CYLINDER: -2.50
OD_SPHERE: +1.25
OD_AXIS: 101
OS_AXIS: 095
OS_SPHERE: +2.00

## 2025-03-24 ASSESSMENT — TONOMETRY
OD_IOP_MMHG: 17
OS_IOP_MMHG: 14

## 2025-03-24 ASSESSMENT — VISUAL ACUITY
OS_BCVA: 20/30-2
OD_BCVA: 20/40

## 2025-03-24 ASSESSMENT — DRY EYES - PHYSICIAN NOTES: OS_GENERALCOMMENTS: INF

## 2025-03-24 ASSESSMENT — CONFRONTATIONAL VISUAL FIELD TEST (CVF)
OD_FINDINGS: FULL
OS_FINDINGS: FULL

## 2025-03-24 ASSESSMENT — TEAR BREAK UP TIME (TBUT)
OS_TBUT: 4SEC
OD_TBUT: 4SEC

## 2025-03-24 ASSESSMENT — SUPERFICIAL PUNCTATE KERATITIS (SPK)
OD_SPK: ABSENT
OS_SPK: T 1+

## 2025-04-01 ENCOUNTER — AMBULATORY SURGERY CENTER (OUTPATIENT)
Dept: URBAN - METROPOLITAN AREA SURGERY 4 | Facility: SURGERY | Age: 85
Setting detail: OPHTHALMOLOGY
End: 2025-04-01
Payer: COMMERCIAL

## 2025-04-01 DIAGNOSIS — H25.12: ICD-10-CM

## 2025-04-01 PROCEDURE — 66984 XCAPSL CTRC RMVL W/O ECP: CPT | Mod: LT | Performed by: STUDENT IN AN ORGANIZED HEALTH CARE EDUCATION/TRAINING PROGRAM

## 2025-04-02 ENCOUNTER — OFFICE (OUTPATIENT)
Dept: URBAN - METROPOLITAN AREA CLINIC 105 | Facility: CLINIC | Age: 85
Setting detail: OPHTHALMOLOGY
End: 2025-04-02
Payer: COMMERCIAL

## 2025-04-02 ENCOUNTER — RX ONLY (RX ONLY)
Age: 85
End: 2025-04-02

## 2025-04-02 DIAGNOSIS — Z96.1: ICD-10-CM

## 2025-04-02 PROCEDURE — 99024 POSTOP FOLLOW-UP VISIT: CPT | Performed by: STUDENT IN AN ORGANIZED HEALTH CARE EDUCATION/TRAINING PROGRAM

## 2025-04-02 ASSESSMENT — REFRACTION_CURRENTRX
OD_CYLINDER: -2.00
OS_CYLINDER: -1.00
OS_OVR_VA: 20/
OD_AXIS: 110
OS_SPHERE: +0.50
OD_SPHERE: +0.75
OS_SPHERE: +1.25
OD_VPRISM_DIRECTION: PROGS
OD_SPHERE: +0.75
OS_AXIS: 105
OS_ADD: +3.00
OS_CYLINDER: -2.00
OS_AXIS: 086
OS_VPRISM_DIRECTION: PROGS
OD_CYLINDER: -1.25
OS_OVR_VA: 20/
OD_OVR_VA: 20/
OD_ADD: +3.00
OD_AXIS: 097
OD_OVR_VA: 20/
OS_ADD: +2.25
OD_ADD: +2.25

## 2025-04-02 ASSESSMENT — REFRACTION_MANIFEST
OD_AXIS: 100
OD_VA1: 20/25
OD_ADD: +2.75
OD_CYLINDER: -1.50
OS_CYLINDER: -1.75
OS_VA1: 20/30
OS_AXIS: 095
OS_ADD: +2.75
OD_SPHERE: +0.50
OS_SPHERE: +0.50

## 2025-04-02 ASSESSMENT — SUPERFICIAL PUNCTATE KERATITIS (SPK)
OD_SPK: ABSENT
OS_SPK: T 1+

## 2025-04-02 ASSESSMENT — TEAR BREAK UP TIME (TBUT)
OS_TBUT: 4SEC
OD_TBUT: 4SEC

## 2025-04-02 ASSESSMENT — CORNEAL EDEMA CLINICAL DESCRIPTION: OS_CORNEALEDEMA: 1+ 2+

## 2025-04-02 ASSESSMENT — TONOMETRY: OS_IOP_MMHG: 16

## 2025-04-02 ASSESSMENT — VISUAL ACUITY
OD_BCVA: 20/40-1
OS_BCVA: 20/30-2

## 2025-04-02 ASSESSMENT — KERATOMETRY
OD_K1POWER_DIOPTERS: 43.75
OS_K1POWER_DIOPTERS: 44.00
OD_K2POWER_DIOPTERS: 45.00
OS_AXISANGLE_DEGREES: 156
OD_AXISANGLE_DEGREES: 026
OS_K2POWER_DIOPTERS: 44.75

## 2025-04-02 ASSESSMENT — REFRACTION_AUTOREFRACTION
OD_CYLINDER: -1.25
OD_AXIS: 153
OS_CYLINDER: -1.25
OS_SPHERE: +0.25
OD_SPHERE: +1.25
OS_AXIS: 0711

## 2025-04-02 ASSESSMENT — CONFRONTATIONAL VISUAL FIELD TEST (CVF)
OS_FINDINGS: FULL
OD_FINDINGS: FULL

## 2025-04-02 ASSESSMENT — DRY EYES - PHYSICIAN NOTES: OS_GENERALCOMMENTS: INF

## 2025-04-09 ENCOUNTER — OFFICE (OUTPATIENT)
Dept: URBAN - METROPOLITAN AREA CLINIC 113 | Facility: CLINIC | Age: 85
Setting detail: OPHTHALMOLOGY
End: 2025-04-09
Payer: COMMERCIAL

## 2025-04-09 DIAGNOSIS — H25.11: ICD-10-CM

## 2025-04-09 DIAGNOSIS — Z96.1: ICD-10-CM

## 2025-04-09 PROBLEM — H16.223 DRY EYE SYNDROME K SICCA;  ,,, LEFT EYE: Status: ACTIVE | Noted: 2025-04-02

## 2025-04-09 PROCEDURE — 99024 POSTOP FOLLOW-UP VISIT: CPT | Performed by: STUDENT IN AN ORGANIZED HEALTH CARE EDUCATION/TRAINING PROGRAM

## 2025-04-09 PROCEDURE — 92136 OPHTHALMIC BIOMETRY: CPT | Performed by: STUDENT IN AN ORGANIZED HEALTH CARE EDUCATION/TRAINING PROGRAM

## 2025-04-09 ASSESSMENT — VISUAL ACUITY
OS_BCVA: 20/25-1
OD_BCVA: 20/30-1

## 2025-04-09 ASSESSMENT — REFRACTION_CURRENTRX
OS_VPRISM_DIRECTION: PROGS
OD_AXIS: 097
OS_OVR_VA: 20/
OD_CYLINDER: -2.00
OS_AXIS: 105
OS_SPHERE: +0.50
OD_AXIS: 110
OD_CYLINDER: -1.25
OD_ADD: +3.00
OS_ADD: +3.00
OS_OVR_VA: 20/
OD_ADD: +2.25
OS_CYLINDER: -2.00
OD_OVR_VA: 20/
OD_SPHERE: +0.75
OS_SPHERE: +1.25
OD_OVR_VA: 20/
OS_AXIS: 086
OD_SPHERE: +0.75
OD_VPRISM_DIRECTION: PROGS
OS_CYLINDER: -1.00
OS_ADD: +2.25

## 2025-04-09 ASSESSMENT — SUPERFICIAL PUNCTATE KERATITIS (SPK)
OD_SPK: ABSENT
OS_SPK: ABSENT

## 2025-04-09 ASSESSMENT — REFRACTION_MANIFEST
OD_ADD: +2.75
OD_AXIS: 100
OD_SPHERE: +0.50
OS_CYLINDER: -1.75
OS_ADD: +2.75
OS_SPHERE: +0.50
OS_VA1: 20/30
OD_CYLINDER: -1.50
OS_AXIS: 095
OD_VA1: 20/25

## 2025-04-09 ASSESSMENT — REFRACTION_AUTOREFRACTION
OD_AXIS: 104
OS_SPHERE: 0.00
OD_CYLINDER: -2.25
OD_SPHERE: +1.25
OS_AXIS: 068
OS_CYLINDER: -1.00

## 2025-04-09 ASSESSMENT — TONOMETRY: OS_IOP_MMHG: 14

## 2025-04-09 ASSESSMENT — KERATOMETRY
OD_AXISANGLE_DEGREES: 027
OD_K1POWER_DIOPTERS: 43.75
OS_K2POWER_DIOPTERS: 44.50
OD_K2POWER_DIOPTERS: 44.75
OS_AXISANGLE_DEGREES: 134
OS_K1POWER_DIOPTERS: 44.00

## 2025-04-09 ASSESSMENT — CORNEAL EDEMA CLINICAL DESCRIPTION: OS_CORNEALEDEMA: 1+

## 2025-04-09 ASSESSMENT — CONFRONTATIONAL VISUAL FIELD TEST (CVF)
OS_FINDINGS: FULL
OD_FINDINGS: FULL

## 2025-04-13 ENCOUNTER — INPATIENT (INPATIENT)
Facility: HOSPITAL | Age: 85
LOS: 1 days | Discharge: HOME HEALTH | DRG: 66 | End: 2025-04-15
Attending: STUDENT IN AN ORGANIZED HEALTH CARE EDUCATION/TRAINING PROGRAM | Admitting: STUDENT IN AN ORGANIZED HEALTH CARE EDUCATION/TRAINING PROGRAM
Payer: MEDICARE

## 2025-04-13 VITALS
HEART RATE: 74 BPM | OXYGEN SATURATION: 99 % | TEMPERATURE: 98 F | RESPIRATION RATE: 18 BRPM | DIASTOLIC BLOOD PRESSURE: 80 MMHG | SYSTOLIC BLOOD PRESSURE: 148 MMHG

## 2025-04-13 DIAGNOSIS — I63.9 CEREBRAL INFARCTION, UNSPECIFIED: ICD-10-CM

## 2025-04-13 DIAGNOSIS — Z90.89 ACQUIRED ABSENCE OF OTHER ORGANS: Chronic | ICD-10-CM

## 2025-04-13 DIAGNOSIS — C64.9 MALIGNANT NEOPLASM OF UNSPECIFIED KIDNEY, EXCEPT RENAL PELVIS: Chronic | ICD-10-CM

## 2025-04-13 DIAGNOSIS — Z90.5 ACQUIRED ABSENCE OF KIDNEY: Chronic | ICD-10-CM

## 2025-04-13 DIAGNOSIS — G45.9 TRANSIENT CEREBRAL ISCHEMIC ATTACK, UNSPECIFIED: ICD-10-CM

## 2025-04-13 LAB
ALBUMIN SERPL ELPH-MCNC: 4.2 G/DL — SIGNIFICANT CHANGE UP (ref 3.3–5.2)
ALP SERPL-CCNC: 72 U/L — SIGNIFICANT CHANGE UP (ref 40–120)
ALT FLD-CCNC: 16 U/L — SIGNIFICANT CHANGE UP
ANION GAP SERPL CALC-SCNC: 15 MMOL/L — SIGNIFICANT CHANGE UP (ref 5–17)
APTT BLD: 27.2 SEC — SIGNIFICANT CHANGE UP (ref 24.5–35.6)
AST SERPL-CCNC: 29 U/L — SIGNIFICANT CHANGE UP
BASOPHILS # BLD AUTO: 0.09 K/UL — SIGNIFICANT CHANGE UP (ref 0–0.2)
BASOPHILS NFR BLD AUTO: 1.6 % — SIGNIFICANT CHANGE UP (ref 0–2)
BILIRUB SERPL-MCNC: 1.1 MG/DL — SIGNIFICANT CHANGE UP (ref 0.4–2)
BLD GP AB SCN SERPL QL: SIGNIFICANT CHANGE UP
BUN SERPL-MCNC: 14.9 MG/DL — SIGNIFICANT CHANGE UP (ref 8–20)
CALCIUM SERPL-MCNC: 9.2 MG/DL — SIGNIFICANT CHANGE UP (ref 8.4–10.5)
CHLORIDE SERPL-SCNC: 94 MMOL/L — LOW (ref 96–108)
CO2 SERPL-SCNC: 24 MMOL/L — SIGNIFICANT CHANGE UP (ref 22–29)
CREAT SERPL-MCNC: 0.86 MG/DL — SIGNIFICANT CHANGE UP (ref 0.5–1.3)
EGFR: 67 ML/MIN/1.73M2 — SIGNIFICANT CHANGE UP
EGFR: 67 ML/MIN/1.73M2 — SIGNIFICANT CHANGE UP
EOSINOPHIL # BLD AUTO: 0.13 K/UL — SIGNIFICANT CHANGE UP (ref 0–0.5)
EOSINOPHIL NFR BLD AUTO: 2.3 % — SIGNIFICANT CHANGE UP (ref 0–6)
GLUCOSE SERPL-MCNC: 89 MG/DL — SIGNIFICANT CHANGE UP (ref 70–99)
HCT VFR BLD CALC: 42.9 % — SIGNIFICANT CHANGE UP (ref 34.5–45)
HGB BLD-MCNC: 14.8 G/DL — SIGNIFICANT CHANGE UP (ref 11.5–15.5)
IMM GRANULOCYTES # BLD AUTO: 0.01 K/UL — SIGNIFICANT CHANGE UP (ref 0–0.07)
IMM GRANULOCYTES NFR BLD AUTO: 0.2 % — SIGNIFICANT CHANGE UP (ref 0–0.9)
INR BLD: 1.02 RATIO — SIGNIFICANT CHANGE UP (ref 0.85–1.16)
LYMPHOCYTES # BLD AUTO: 1.34 K/UL — SIGNIFICANT CHANGE UP (ref 1–3.3)
LYMPHOCYTES NFR BLD AUTO: 23.9 % — SIGNIFICANT CHANGE UP (ref 13–44)
MCHC RBC-ENTMCNC: 30.6 PG — SIGNIFICANT CHANGE UP (ref 27–34)
MCHC RBC-ENTMCNC: 34.5 G/DL — SIGNIFICANT CHANGE UP (ref 32–36)
MCV RBC AUTO: 88.6 FL — SIGNIFICANT CHANGE UP (ref 80–100)
MONOCYTES # BLD AUTO: 0.57 K/UL — SIGNIFICANT CHANGE UP (ref 0–0.9)
MONOCYTES NFR BLD AUTO: 10.2 % — SIGNIFICANT CHANGE UP (ref 2–14)
MRSA PCR RESULT.: SIGNIFICANT CHANGE UP
NEUTROPHILS # BLD AUTO: 3.46 K/UL — SIGNIFICANT CHANGE UP (ref 1.8–7.4)
NEUTROPHILS NFR BLD AUTO: 61.8 % — SIGNIFICANT CHANGE UP (ref 43–77)
NRBC # BLD AUTO: 0 K/UL — SIGNIFICANT CHANGE UP (ref 0–0)
NRBC # FLD: 0 K/UL — SIGNIFICANT CHANGE UP (ref 0–0)
NRBC BLD AUTO-RTO: 0 /100 WBCS — SIGNIFICANT CHANGE UP (ref 0–0)
PLATELET # BLD AUTO: 178 K/UL — SIGNIFICANT CHANGE UP (ref 150–400)
PMV BLD: 10.7 FL — SIGNIFICANT CHANGE UP (ref 7–13)
POTASSIUM SERPL-MCNC: 4.6 MMOL/L — SIGNIFICANT CHANGE UP (ref 3.5–5.3)
POTASSIUM SERPL-SCNC: 4.6 MMOL/L — SIGNIFICANT CHANGE UP (ref 3.5–5.3)
PROT SERPL-MCNC: 6.7 G/DL — SIGNIFICANT CHANGE UP (ref 6.6–8.7)
PROTHROM AB SERPL-ACNC: 11.8 SEC — SIGNIFICANT CHANGE UP (ref 9.9–13.4)
RBC # BLD: 4.84 M/UL — SIGNIFICANT CHANGE UP (ref 3.8–5.2)
RBC # FLD: 12 % — SIGNIFICANT CHANGE UP (ref 10.3–14.5)
S AUREUS DNA NOSE QL NAA+PROBE: SIGNIFICANT CHANGE UP
SODIUM SERPL-SCNC: 133 MMOL/L — LOW (ref 135–145)
TROPONIN T, HIGH SENSITIVITY RESULT: 10 NG/L — SIGNIFICANT CHANGE UP (ref 0–51)
WBC # BLD: 5.6 K/UL — SIGNIFICANT CHANGE UP (ref 3.8–10.5)
WBC # FLD AUTO: 5.6 K/UL — SIGNIFICANT CHANGE UP (ref 3.8–10.5)

## 2025-04-13 PROCEDURE — 70496 CT ANGIOGRAPHY HEAD: CPT | Mod: 26

## 2025-04-13 PROCEDURE — 70498 CT ANGIOGRAPHY NECK: CPT | Mod: 26

## 2025-04-13 PROCEDURE — 99291 CRITICAL CARE FIRST HOUR: CPT

## 2025-04-13 PROCEDURE — 70450 CT HEAD/BRAIN W/O DYE: CPT | Mod: 26,XU

## 2025-04-13 PROCEDURE — 93010 ELECTROCARDIOGRAM REPORT: CPT

## 2025-04-13 PROCEDURE — 0042T: CPT

## 2025-04-13 PROCEDURE — 99223 1ST HOSP IP/OBS HIGH 75: CPT

## 2025-04-13 PROCEDURE — 93970 EXTREMITY STUDY: CPT | Mod: 26

## 2025-04-13 RX ORDER — ATORVASTATIN CALCIUM 80 MG/1
80 TABLET, FILM COATED ORAL AT BEDTIME
Refills: 0 | Status: DISCONTINUED | OUTPATIENT
Start: 2025-04-13 | End: 2025-04-15

## 2025-04-13 RX ORDER — ESCITALOPRAM OXALATE 20 MG/1
10 TABLET ORAL DAILY
Refills: 0 | Status: DISCONTINUED | OUTPATIENT
Start: 2025-04-13 | End: 2025-04-15

## 2025-04-13 RX ORDER — TENECTEPLASE 50 MG
16 KIT INTRAVENOUS ONCE
Refills: 0 | Status: COMPLETED | OUTPATIENT
Start: 2025-04-13 | End: 2025-04-13

## 2025-04-13 RX ADMIN — TENECTEPLASE 2304 MILLIGRAM(S): KIT at 17:13

## 2025-04-13 RX ADMIN — Medication 60 MILLILITER(S): at 21:25

## 2025-04-13 RX ADMIN — Medication 10 MILLILITER(S): at 17:13

## 2025-04-13 NOTE — H&P ADULT - ASSESSMENT
84-year-old female w/ past medical history of anxiety/panic attacks, CAD (hx of stents on aspirin), CVA with no residual deficits, renal cell carcinoma not currently undergoing any treatment, seizures not currently on AED's, A-fib not on anticoagulation presents with aphasia and left-sided weakness now s/p TNK @17:13 on 4/13/25.

## 2025-04-13 NOTE — ED ADULT NURSE NOTE - NSFALLRISKINTERV_ED_ALL_ED

## 2025-04-13 NOTE — CONSULT NOTE ADULT - ASSESSMENT
ASSESSMENT:   The patient is a 84y Female with PMHx prior CVAs/TIAs (reports no residual deficits), seizure disorder (not currently on AEDs), HTN, HLD, renal cancer s/p nephrectomy,  CAD s/p PCI, who presented to [facility] ED 4/13/25 with symptoms of speech difficulty and left leg weakness. Patient reports that at approximately 1:30PM the afternoon of presentation, she felt a sudden onset of dizziness and palpitations, and could not move her left leg. She attempted to call her daughter for help, but states she was having difficulty getting her words out. Code stroke activated on ED arrival, CT head showed no evidence of hemorrhage, CTA head/neck with no large vessel occlusion or critical stenosis. NIH=3, after discussion w/ stroke attending the decision was made to administer tenecteplase.     Review of patient's prior records describes multiple similar presentations of symptoms (LLE weakness and difficulty producing speech); First episode occurred on 5/20/20, and then on 6/1/20, 11/28/20, 3/27/21, 7/25/21, 9/8/21, 4/23/22, 6/6/22. All of which had been worked up as acute CVA, but CTH/CTA/CTP/MRI have always been unremarkable. 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 Patient states to have self-discontinued AEDS ~2yrs ago due to side effects; while hospitalized at [facility] in October 2024 for chest pain, she was seen by neurology who recommended discontinuation of AEDs due to low clinical suspicion for seizure.     Differential diagnoses include acute ischemic stroke versus seizure versus conversion disorder given prior hx.     NEURO:   -Neurologically w/ NIH=3 for left leg weakness, mild aphasia   -Continue close monitoring for neurologic deterioration    - Stroke neuro checks per post TNK protocol   -SBP goal <180/105 per post TNK protocol    -ANTITHROMBOTIC THERAPY: hold for 24 hours s/p tenecteplase.   -Obtain lipid panel, initiate statin once no longer NPO; titrate statin to LDL goal less than 70  -MRI Brain w/o recommended to evaluate for acute infarct   -Dysphagia screen: pending   -Physical therapy/OT/Speech eval/treatment.     CARDIOVASCULAR:   -check TTE  -Review of prior cardiology records:   -ILR placed in 6/2020 for stroke work up after multiple recurrent episodes of symptoms. Then while she was hospitalized in   4/2022 for another episode, she was deemed as a nonresponder to ASA/Plavix given recurrent stroke-like events. Cardiology replaced Plavix with Xarelto. ILR was interrogated and no AF detected. Never had clear documentation of pAF. Cardiology recommended discontinuing Xarelto.    -Patient's most recent visit with her outpatient cardiologist, Dr. Cyr in November 2024 confirms no documented history of atrial fibrillation.   -cardiac monitoring w/ telemetry                 HEMATOLOGY:   -H/H 14.8/42.9, Platelets 178  -Obtain LE duplex to r/o DVT   -patient should have all age and risk appropriate malignancy screenings with PCP or sooner if clinically suspected   -DVT ppx: Heparin s.c [] LMWH [] SCDs [x]    PULMONARY:   -protecting airway, saturating well     RENAL:   -BUN/Cr 14.9/0.86, monitor urine output, maintain adequate hydration    -Na Goal:  135-145    ID:   -afebrile, no leukocytosis-  -monitor for si/sx of infection     OTHER:    -condition and plan of care d/w patient, questions and concerns addressed.     DISPOSITION:   -Rehab or home depending on PT eval once stable and workup is complete    CORE MEASURES:       Admission NIHSS: 2     Tenecteplase : [x] YES 4/13/25  [] NO     LDL/HDL/A1C: Pending      Depression Screen- if depression hx and/or present     Statin Therapy: Pending      Dysphagia Screen: [] PASS [] FAIL [x] pending      Smoking in the past 12 months [] YES [x] NO     Afib [] YES [x] NO     Stroke Education [] YES [] NO [x] pending      Diabetes [] YES [x] NO    Obtain screening lower extremity venous ultrasound in patients who meet 1 or more of the following criteria as patient is high risk for DVT/PE on admission:   [] History of DVT/PE  []Hypercoagulable states (Factor V Leiden, Cancer, OCP, etc. )  []Prolonged immobility (hemiplegia/hemiparesis/post operative or any other extended immobilization)  [] Transferred from outside facility (Rehab or Long term care)  [] Age </= to 50  [x]Stroke ASSESSMENT:   The patient is a 84y Female with PMHx prior CVAs/TIAs (reports no residual deficits), seizure disorder (not currently on AEDs), HTN, HLD, renal cancer s/p nephrectomy,  CAD s/p PCI, who presented to [facility] ED 4/13/25 with symptoms of speech difficulty and left leg weakness. Patient reports that at approximately 1:30PM the afternoon of presentation, she felt a sudden onset of dizziness and palpitations, and could not move her left leg. She attempted to call her daughter for help, but states she was having difficulty getting her words out. Code stroke activated on ED arrival, CT head showed no evidence of hemorrhage, CTA head/neck with no large vessel occlusion or critical stenosis. NIH=3, after discussion w/ stroke attending the decision was made to administer tenecteplase at ~17:13hrs.     Review of patient's prior records describes multiple similar presentations of symptoms (LLE weakness and difficulty producing speech); First episode occurred on 5/20/20, and then on 6/1/20, 11/28/20, 3/27/21, 7/25/21, 9/8/21, 4/23/22, 6/6/22. All of which had been worked up as acute CVA, but CTH/CTA/CTP/MRI have always been unremarkable. 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 Patient states to have self-discontinued AEDS ~2yrs ago due to side effects; while hospitalized at [facility] in October 2024 for chest pain, she was seen by neurology who recommended discontinuation of AEDs due to low clinical suspicion for seizure.     Differential diagnoses include acute ischemic stroke versus seizure versus conversion disorder given prior hx.     NEURO:   -Neurologically w/ NIH=3 for left leg weakness, mild aphasia   -Continue close monitoring for neurologic deterioration    - Stroke neuro checks per post TNK protocol   -SBP goal <180/105 per post TNK protocol    -ANTITHROMBOTIC THERAPY: hold for 24 hours s/p tenecteplase.   -Obtain lipid panel, initiate statin once no longer NPO; titrate statin to LDL goal less than 70  -MRI Brain w/o recommended to evaluate for acute infarct   -Dysphagia screen: pending   -Physical therapy/OT/Speech eval/treatment.     CARDIOVASCULAR:   -check TTE  -Review of prior cardiology records:   -ILR placed in 6/2020 for stroke work up after multiple recurrent episodes of symptoms. Then while she was hospitalized in   4/2022 for another episode, she was deemed as a nonresponder to ASA/Plavix given recurrent stroke-like events. Cardiology replaced Plavix with Xarelto. ILR was interrogated and no AF detected. Never had clear documentation of pAF. Cardiology recommended discontinuing Xarelto.    -Patient's most recent visit with her outpatient cardiologist, Dr. Cyr in November 2024 confirms no documented history of atrial fibrillation.   -cardiac monitoring w/ telemetry                 HEMATOLOGY:   -H/H 14.8/42.9, Platelets 178  -Obtain LE duplex to r/o DVT   -patient should have all age and risk appropriate malignancy screenings with PCP or sooner if clinically suspected   -DVT ppx: Heparin s.c [] LMWH [] SCDs [x]    PULMONARY:   -protecting airway, saturating well     RENAL:   -BUN/Cr 14.9/0.86, monitor urine output, maintain adequate hydration    -Na Goal:  135-145    ID:   -afebrile, no leukocytosis-  -monitor for si/sx of infection     OTHER:    -condition and plan of care d/w patient, questions and concerns addressed.     DISPOSITION:   -Rehab or home depending on PT eval once stable and workup is complete    CORE MEASURES:       Admission NIHSS: 2     Tenecteplase : [x] YES 4/13/25  [] NO     LDL/HDL/A1C: Pending      Depression Screen- if depression hx and/or present     Statin Therapy: Pending      Dysphagia Screen: [] PASS [] FAIL [x] pending      Smoking in the past 12 months [] YES [x] NO     Afib [] YES [x] NO     Stroke Education [] YES [] NO [x] pending      Diabetes [] YES [x] NO    Obtain screening lower extremity venous ultrasound in patients who meet 1 or more of the following criteria as patient is high risk for DVT/PE on admission:   [] History of DVT/PE  []Hypercoagulable states (Factor V Leiden, Cancer, OCP, etc. )  []Prolonged immobility (hemiplegia/hemiparesis/post operative or any other extended immobilization)  [] Transferred from outside facility (Rehab or Long term care)  [] Age </= to 50  [x]Stroke

## 2025-04-13 NOTE — PATIENT PROFILE ADULT - NSFALLSECTIONLABEL_GEN_A_CORE
Follow Up Visit    Amaury Kaur is a 37 y.o. male. he presents for Diabetes and Hypertension    BP is stable, he continues medications without side effect. Pressure today is well controlled. He denies chest pain or shortness of breath. He exercises daily. Endocrine Review  He is seen for diabetes. Since last visit: he has noted some higher sugar values. Home glucose monitoring: is performed regularly, fasting values range in the 130's to 150's. He reports medication compliance: compliant all of the time. He reports the following medication side effects: none. Diabetic diet compliance: noncompliant some of the time. Further diabetic ROS: no numbness, tingling or pain in extremities, no unusual visual symptoms. Lab review: labs reviewed and discussed with patient, A1c today is 8.4. Allergies  He has Allergic Rhinitis that only occur during specific times of the year. Current symptoms: sneezing and rhinorrhea. Triggers: that exposure to pollen, mold spores, dust.  Allergy ROS: taking medications as instructed, no medication side effects noted. Patient Active Problem List   Diagnosis Code    Diabetes mellitus type 2, uncontrolled, without complications [facility] V74.53    Hypertension, essential, benign I10    Hypercholesteremia E78.00    Allergic rhinitis J30.9    GERD (gastroesophageal reflux disease) K21.9         Prior to Admission medications    Medication Sig Start Date End Date Taking? Authorizing Provider   insulin glargine [facility]) 100 unit/mL (3 mL) inpn 52 Units subcutaneously daily 8/31/17  Yes Natalie Baird MD   benazepril (LOTENSIN) 10 mg tablet Take 1 Tab by mouth daily.  8/31/17  Yes Natalie Baird MD   atorvastatin (LIPITOR) 40 mg tablet TAKE 1 TABLET BY MOUTH EVERY DAY 7/26/17  Yes Isael Richmond MD   montelukast (SINGULAIR) 10 mg tablet TAKE 1 TABLET BY MOUTH EVERY DAY 7/26/17  Yes Isael Richmond MD   NOVOLOG FLEXPEN 100 unit/mL inpn INJECT UP TO 8 UNITS 3 TIMES A DAY AS DIRECTED 5/25/17  Yes Bronwyn Valentino MD   multivitamin (ONE A DAY) tablet Take 1 Tab by mouth daily. Yes Historical Provider   NOVOFINE 30 30 gauge x 1/3\" USE TO INJECT INSULIN 4 TIMES DAILY. 3/10/17  Yes Bronwyn Valentino MD   [facility] ULTRA TEST strip TEST 3-5 TIMES DAILY AS DIRECTED 2/27/17  Yes Bronwyn Valentino MD   FAMOTIDINE (PEPCID PO) Take  by mouth. Yes Historical Provider   LORATADINE/PSEUDOEPHEDRINE (CLARITIN-D 24 HOUR PO) Take  by mouth. Yes Historical Provider   cholecalciferol, vitamin d3, (VITAMIN D) 1,000 unit tablet Take  by mouth daily. Yes Historical Provider         Health Maintenance   Topic Date Due    INFLUENZA AGE 9 TO ADULT  08/01/2017    LIPID PANEL Q1  09/22/2017    FOOT EXAM Q1  12/09/2017    EYE EXAM RETINAL OR DILATED Q1  01/20/2018    HEMOGLOBIN A1C Q6M  04/10/2018    DTaP/Tdap/Td series (2 - Td) 05/05/2018    MICROALBUMIN Q1  10/10/2018    Pneumococcal 19-64 Medium Risk  Completed       Review of Systems   Constitutional: Negative. HENT: Positive for congestion. Respiratory: Negative. Cardiovascular: Negative. Visit Vitals    /72 (BP 1 Location: Right arm, BP Patient Position: Sitting)    Pulse 87    Temp 98.5 °F (36.9 °C) (Oral)    Resp 20    Ht 5' 6.5\" (1.689 m)    Wt 248 lb 6.4 oz (112.7 kg)    SpO2 98%    BMI 39.49 kg/m2       Physical Exam   Constitutional: He appears well-developed and well-nourished. Cardiovascular: Normal rate and regular rhythm. Pulmonary/Chest: Effort normal and breath sounds normal.         ASSESSMENT/PLAN    Diagnoses and all orders for this visit:    1. Uncontrolled type 2 diabetes mellitus without complication, with long-term current use of insulin ([address]- We will need to increase his Basalglar from 52 to 55 units at this time.   He will follow his diet control closely and recheck an A1c in 3 months.    -     insulin glargine (Joanna Dame) 100 unit/mL (3 mL) inpn; 55 Units by SubCUTAneous route daily.  -     HEMOGLOBIN A1C WITH EAG    2. Hypertension, essential, benign- Stable, continue meds. 3. Hypercholesteremia- Follow lipid panel in 3 months with A1c  -     LIPID PANEL      Follow-up Disposition:  Return in about 3 months (around 1/13/2018) for Follow up Diabetes. .

## 2025-04-13 NOTE — H&P ADULT - PROBLEM SELECTOR PLAN 1
- Neuro q1h   - CTH 24h post TNK   - STAT CTH for any neurologic change or decline  - Pain control prn, avoid oversedation   - LED   - TTE   - stroke core measures   - continue high intensity statin  - Hold home aspirin s/p TNK  - continue home lexapro

## 2025-04-13 NOTE — ED ADULT NURSE NOTE - PUPIL SIZE RIGHT
[de-identified] : Ms. Castro is a 32-year-old female who previously underwent a sleeve gastrectomy 2 years ago and was able to lose a significant amount of weight.  She was doing very well and then underwent a devastating loss in her family which caused her to go back to words more emotional eating and she gained most of her weight back.  She has been trying countless diets and exercise plans to get back to her weight she was able to obtain with her sleeve gastrectomy but has struggled to do so in a sustainable fashion.  She is now status post the conversion of her sleeve gastrectomy to Claudette-en-Y gastric bypass, robotic, 8/24.  Wound healing well after in-office drainage. Finished abx. Minimal purulent drainage and no cellulitis around wounds. Tolerating liquids and pureed foods. Did have some nausea after having a smoothie at [employer] but better the next day. 
5 mm

## 2025-04-13 NOTE — ED ADULT NURSE NOTE - OBJECTIVE STATEMENT
c/o left sided weakness and aphasia that started around 1530 today. As per pts daughter, pts granddaughter was visiting pt today and left around 1530, and patient was at her baseline. Pts daughter then arrived and found patient to have difficulty speaking and left sided weakness.  Pt denies HA, dizziness, SOB, N/V/D, CP, palpitations, fevers, chills, pain, recent falls. PMH anxiety/panic attacks, CVA with no residual weakness, renal cell carcinoma not undergoing any treatment, afib not on anticoagulants, CAD (hx of stents on ASA), seizures, not on any medications, and HTN. Pt awake and alert, NIH 8 as per documentation below, respirations even and unlabored on RA, skin warm and dry.

## 2025-04-13 NOTE — H&P ADULT - NSHPPHYSICALEXAM_GEN_ALL_CORE
General: patient seen laying supine in bed in NAD on RA  Neuro: AAOx3, FC, OE spontaneously, aphasic, perseverates, CNII-XI grossly intact, face symmetric, no pronator drift, finger to nose intact, strength 5/5 b/l UE and LE, sensation intact to light touch throughout  HEENT: PERRL, EOMI  Neck: supple  Cardiac: RRR  Pulmonary: chest rise symmetric  Abdomen: soft, nontender, nondistended  Ext: perfusing well  Skin: warm, dry General: patient seen laying supine in bed in NAD on RA  Neuro: AAOx3, FC, OE spontaneously, aphasic, perseverates, CNII-XI grossly intact, face symmetric, no pronator drift, finger to nose intact, strength 5/5 b/l UE and RLE, LLE HF 3/5 otherwise 5/5 and sensation intact to light touch throughout  HEENT: PERRL, EOMI  Neck: supple  Cardiac: RRR  Pulmonary: chest rise symmetric  Abdomen: soft, nontender, nondistended  Ext: perfusing well  Skin: warm, dry General: patient seen laying supine in bed in NAD on RA  Neuro: AAOx3, FC, OE spontaneously, aphasic, perseverates, CNII-XI grossly intact, face symmetric, no pronator drift, finger to nose intact, strength 5/5 b/l UE and RLE, LLE HF 3/5 otherwise 5/5 and sensation intact to light touch throughout  HEENT:  R pupil 5mm nonreactive (baseline)  EOMI.  Neck: supple  Cardiac: RRR  Pulmonary: chest rise symmetric  Abdomen: soft, nontender, nondistended  Ext: perfusing well  Skin: warm, dry

## 2025-04-13 NOTE — CONSULT NOTE ADULT - SUBJECTIVE AND OBJECTIVE BOX
Preliminary note, official note pending attending review/signature.  Seen and examined by Stroke team attending/team, assessment/ plan as discussed with stroke team attending/team as noted.                                [facility] Stroke Team  CC: L sided weakness, difficulty w/ speech   HPI:  The patient is a 84y Female with PMHx prior CVAs/TIAs (reports no residual deficits), seizure disorder (not currently on AEDs), HTN, HLD, renal cancer s/p nephrectomy, who presented to [facility] ED 4/13/25 with symptoms of speech difficulty and left leg weakness. Patient reports that at approximately 1:30PM the afternoon of presentation, she felt a sudden onset of dizziness and palpitations, and could not move her left leg. She attempted to call her daughter for help, but states she was having difficulty getting her words out. Code stroke activated on ED arrival, CT head showed no evidence of hemorrhage, CTA head/neck with no large vessel occlusion or critical stenosis. NIH=3, after discussion w/ stroke attending the decision was made to administer tenecteplase.     Of note, patient reports history of seizure disorder, for which she was previously on AEDs but self-discontinued these medications ~2 years ago due to side effects. She does not currently follow with a neurologist. She reports that she experienced a seizure 3 days prior to ED presentation, which she describes as an episode of "full body shaking" that lasted for a few minutes before resolving; denies loss of consciousness during this event.     Patient admitted to NSICU for post tenecteplase monitoring, stroke team asked to consult.     PAST MEDICAL & SURGICAL HISTORY:  HTN (hypertension)  Rheumatic fever  TIA (transient ischemic attack)  CAD S/P percutaneous coronary angioplasty  [place]  Cerebrovascular accident (CVA)  November 2020, June 2020, May 2020  Status post tonsillectomy  H/O left nephrectomy  Renal cancer    MEDICATIONS  (STANDING):  sodium chloride 0.9% lock flush 10 milliLiter(s) IV Push once  sodium chloride 0.9% lock flush 10 milliLiter(s) IV Push once  tenecteplase Injectable. 16 milliGRAM(s) IV Push. Once    MEDICATIONS  (PRN):      Allergies    Metoprolol Succinate ER (Unknown)  Biaxin (Muscle Pain; Joint Pain)  monoctanoin (Unknown)  codeine (Faint)    Intolerances    Isosorbide Mononitrate Extended Release (Faint)      SOCIAL HISTORY:  no tob,   no alcohol   no drugs    FAMILY HISTORY:  FHx: breast cancer  FH: CHF (congestive heart failure)    ROS: 14 point ROS negative other than what is present in HPI or below    Vital Signs Last 24 Hrs  T(C): 36.6 (13 Apr 2025 16:43), Max: 36.6 (13 Apr 2025 16:43)  T(F): 97.9 (13 Apr 2025 16:43), Max: 97.9 (13 Apr 2025 16:43)  HR: 74 (13 Apr 2025 16:43) (74 - 74)  BP: 148/80 (13 Apr 2025 16:43) (148/80 - 148/80)  BP(mean): --  RR: 18 (13 Apr 2025 16:43) (18 - 18)  SpO2: 99% (13 Apr 2025 16:43) (99% - 99%)    Parameters below as of 13 Apr 2025 16:43  Patient On (Oxygen Delivery Method): room air      Physical Exam:  General: No acute distress.     Detailed Neurologic Exam:    Mental status: The patient is awake and alert and has normal attention span.  The patient is oriented to person, place, month, and year. Can recall events leading up to hospitalization.  The patient is able to name objects, follow commands, repeat sentences. Stuttering speech w/ some word finding hesitancy noted.     Cranial nerves: Pupils equal and react symmetrically to light. There is no visual field deficit to confrontation. Extraocular motion is full with no nystagmus. There is no ptosis. Facial sensation is intact. Facial musculature is symmetric. Palate elevates symmetrically. Tongue is midline.    Motor: There is normal bulk and tone.  Bilateral upper extremity tremor present  Strength is 5/5 in the right arm and leg.   Strength is 5/5 in the left arm; at least 3/5 in the left leg proximally, able to dorsi/plantar flex left foot; unable to lift left leg off the bed for >5 seconds     Sensation: Intact to light touch in 4 extremities    Cerebellar: There is no dysmetria on finger to nose testing.    Gait : deferred    NIH SS:  DATE: 4/13/25   TIME: 5:30 pm   1A: Level of consciousness (0-3): 0  1B: Questions (0-2): 0  1C: Commands (0-2): 0  2: Gaze (0-2): 0  3: Visual fields (0-3): 0  4: Facial palsy (0-3): 0  MOTOR:  5A: Left arm motor drift (0-4): 0  5B: Right arm motor drift (0-4): 0  6A: Left leg motor drift (0-4): 2  6B: Right leg motor drift (0-4): 0  7: Limb ataxia (0-2): 0  SENSORY:  8: Sensation (0-2): 0  SPEECH:  9: Language (0-3): 1  10: Dysarthria (0-2): 0  EXTINCTION:  11: Extinction/inattention (0-2): 0    TOTAL SCORE: 3    prehospital mRS=2      LABS:                         14.8   5.60  )-----------( 178      ( 13 Apr 2025 17:00 )             42.9       PT/INR - ( 13 Apr 2025 17:00 )   PT: 11.8 sec;   INR: 1.02 ratio         PTT - ( 13 Apr 2025 17:00 )  PTT:27.2 sec    LDL: Pending   A1C: Pending     RADIOLOGY & ADDITIONAL STUDIES (independently reviewed unless otherwise noted):  CT Brain Stroke Protocol (04.13.25 @ 16:52)   IMPRESSION:    No acute transcortical infarct or intracranial hemorrhage. Findings   discussed with Dr. Mccray at 5:05 PM 4/13/2025.    CT Angio Brain and Neck, CT Perfusion Stroke Protocol  w/ IV Cont (04.13.25 @ 17:09)   IMPRESSION:  CT angiogram neck:  -No vaso-occlusive disease.    CT angiogram head:  -No vaso-occlusive disease.  -0.2 cm inferiorly oriented outpouching at the left supraclinoid ICA,   likely an infundibulum versus less likely aneurysm.    CT perfusion:  -No core infarct or at-risk ischemic tissue identified.                                [facility] Stroke Team  CC: L sided weakness, difficulty w/ speech   HPI:  The patient is a 84y Female with PMHx prior CVAs/TIAs (reports no residual deficits), seizure disorder (not currently on AEDs), HTN, HLD, renal cancer s/p nephrectomy, who presented to [facility] ED 4/13/25 with symptoms of speech difficulty and left leg weakness. Patient reports that at approximately 1:30PM the afternoon of presentation, she felt a sudden onset of dizziness and palpitations, and could not move her left leg. She attempted to call her daughter for help, but states she was having difficulty getting her words out. Code stroke activated on ED arrival, CT head showed no evidence of hemorrhage, CTA head/neck with no large vessel occlusion or critical stenosis. NIH=3, after discussion w/ stroke attending the decision was made to administer tenecteplase at ~17:13hrs      Of note, patient reports history of seizure disorder, for which she was previously on AEDs but self-discontinued these medications ~2 years ago due to side effects. She does not currently follow with a neurologist. She reports that she experienced a seizure 3 days prior to ED presentation, which she describes as an episode of "full body shaking" that lasted for a few minutes before resolving; denies loss of consciousness during this event.     Patient admitted to NSICU for post tenecteplase monitoring, stroke team asked to consult.     PAST MEDICAL & SURGICAL HISTORY:  HTN (hypertension)  Rheumatic fever  TIA (transient ischemic attack)  CAD S/P percutaneous coronary angioplasty  [place]  Cerebrovascular accident (CVA)  November 2020, June 2020, May 2020  Status post tonsillectomy  H/O left nephrectomy  Renal cancer    MEDICATIONS  (STANDING):  sodium chloride 0.9% lock flush 10 milliLiter(s) IV Push once  sodium chloride 0.9% lock flush 10 milliLiter(s) IV Push once  tenecteplase Injectable. 16 milliGRAM(s) IV Push. Once    MEDICATIONS  (PRN):      Allergies    Metoprolol Succinate ER (Unknown)  Biaxin (Muscle Pain; Joint Pain)  monoctanoin (Unknown)  codeine (Faint)    Intolerances    Isosorbide Mononitrate Extended Release (Faint)      SOCIAL HISTORY:  no tob,   no alcohol   no drugs    FAMILY HISTORY:  FHx: breast cancer  FH: CHF (congestive heart failure)    ROS: 14 point ROS negative other than what is present in HPI or below    Vital Signs Last 24 Hrs  T(C): 36.6 (13 Apr 2025 16:43), Max: 36.6 (13 Apr 2025 16:43)  T(F): 97.9 (13 Apr 2025 16:43), Max: 97.9 (13 Apr 2025 16:43)  HR: 74 (13 Apr 2025 16:43) (74 - 74)  BP: 148/80 (13 Apr 2025 16:43) (148/80 - 148/80)  BP(mean): --  RR: 18 (13 Apr 2025 16:43) (18 - 18)  SpO2: 99% (13 Apr 2025 16:43) (99% - 99%)    Parameters below as of 13 Apr 2025 16:43  Patient On (Oxygen Delivery Method): room air      Physical Exam:  General: No acute distress.     Detailed Neurologic Exam:    Mental status: The patient is awake and alert and has normal attention span.  The patient is oriented to person, place, month, and year. Can recall events leading up to hospitalization.  The patient is able to name objects, follow commands, repeat sentences. Stuttering speech w/ some word finding hesitancy noted.     Cranial nerves: Pupils 5mm on the right and nonreactive; 2mm on the left and reactive to light (per patient this is chronic since prior cataract surgery). There is no visual field deficit to confrontation. Extraocular motion is full with no nystagmus. There is no ptosis. Facial sensation is intact. Facial musculature is symmetric. Palate elevates symmetrically. Tongue is midline.    Motor: There is normal bulk and tone.  Bilateral upper extremity tremor present  Strength is 5/5 in the right arm and leg.   Strength is 5/5 in the left arm; at least 3/5 in the left leg proximally, able to dorsi/plantar flex left foot; unable to lift left leg off the bed for >5 seconds     Sensation: Intact to light touch in 4 extremities    Cerebellar: There is no dysmetria on finger to nose testing.    Gait : deferred    NIH SS:  DATE: 4/13/25   TIME: 5:30 pm   1A: Level of consciousness (0-3): 0  1B: Questions (0-2): 0  1C: Commands (0-2): 0  2: Gaze (0-2): 0  3: Visual fields (0-3): 0  4: Facial palsy (0-3): 0  MOTOR:  5A: Left arm motor drift (0-4): 0  5B: Right arm motor drift (0-4): 0  6A: Left leg motor drift (0-4): 2  6B: Right leg motor drift (0-4): 0  7: Limb ataxia (0-2): 0  SENSORY:  8: Sensation (0-2): 0  SPEECH:  9: Language (0-3): 1  10: Dysarthria (0-2): 0  EXTINCTION:  11: Extinction/inattention (0-2): 0    TOTAL SCORE: 3    prehospital mRS=2      LABS:                         14.8   5.60  )-----------( 178      ( 13 Apr 2025 17:00 )             42.9       PT/INR - ( 13 Apr 2025 17:00 )   PT: 11.8 sec;   INR: 1.02 ratio         PTT - ( 13 Apr 2025 17:00 )  PTT:27.2 sec    LDL: Pending   A1C: Pending     RADIOLOGY & ADDITIONAL STUDIES (independently reviewed unless otherwise noted):  CT Brain Stroke Protocol (04.13.25 @ 16:52)   IMPRESSION:    No acute transcortical infarct or intracranial hemorrhage. Findings   discussed with Dr. Mccray at 5:05 PM 4/13/2025.    CT Angio Brain and Neck, CT Perfusion Stroke Protocol  w/ IV Cont (04.13.25 @ 17:09)   IMPRESSION:  CT angiogram neck:  -No vaso-occlusive disease.    CT angiogram head:  -No vaso-occlusive disease.  -0.2 cm inferiorly oriented outpouching at the left supraclinoid ICA,   likely an infundibulum versus less likely aneurysm.    CT perfusion:  -No core infarct or at-risk ischemic tissue identified.

## 2025-04-13 NOTE — ED PROVIDER NOTE - CLINICAL SUMMARY MEDICAL DECISION MAKING FREE TEXT BOX
pt with L sided weakness and expressive aphasia, tnk administered. Pt with hx of afib, delay in getting he rhome medications as daughter had to go to the pt's house for list to ensure no AC

## 2025-04-13 NOTE — ED ADULT TRIAGE NOTE - CHIEF COMPLAINT QUOTE
PT BIBA from home for stroke like symptoms. PT LKW possibly 1530 per EMS. LLE weakness and aphasia. Code stroke called

## 2025-04-13 NOTE — H&P ADULT - NSHPLABSRESULTS_GEN_ALL_CORE
< from: CT Brain Stroke Protocol (04.13.25 @ 16:52) >      IMPRESSION:    No acute transcortical infarct or intracranial hemorrhage. Findings   discussed with Dr. Mccray at 5:05 PM 4/13/2025.    --- End of Report ---    < end of copied text >    < from: CT Brain Perfusion Maps Stroke (04.13.25 @ 17:08) >    IMPRESSION:  CT angiogram neck:  -No vaso-occlusive disease.    CT angiogram head:  -No vaso-occlusive disease.  -0.2 cm inferiorly oriented outpouching at the left supraclinoid ICA,   likely an infundibulum versus less likely aneurysm.    CT perfusion:  -No core infarct or at-risk ischemic tissue identified.    _____________  NASCET criteria for internal carotid artery stenosis:  Mild: 0% to 49%  Moderate: 50% to 69%  Severe: 70% to 99%  Complete Occlusion    --- End of Report ---    < end of copied text >

## 2025-04-13 NOTE — ED PROVIDER NOTE - CPE EDP GASTRO NORM
Pt ambulatory to triage with cane, with reports of dizziness and generalized body aches. Pt states most of the pain comes from abd and head. No reports of cough, fever, SOB, or CP.             
normal...

## 2025-04-13 NOTE — ED ADULT NURSE REASSESSMENT NOTE - NS ED NURSE REASSESS COMMENT FT1
Pt with slight bleeding to right upper gum. Bleeding controlled. Dr. Mccray made aware, and is coming to bedside for eval. Pt Aox4, speaking coherently, respirations even and unlabored on RA, skin warm and dry, NIH documented in flow sheet. Pt denies HA, dizziness, SOB, N/V/D, CP, palpitations, fevers, chills, pain at this time.

## 2025-04-13 NOTE — H&P ADULT - HISTORY OF PRESENT ILLNESS
84-year-old female past medical history of anxiety/panic attacks, CAD (hx of stents on aspirin), CVA with no residual deficits, renal cell carcinoma not currently undergoing any treatment, seizures not currently on AED's, A-fib not on anticoagulation presents with aphasia and left-sided weakness.  The patient's granddaughter was visiting the patient today, she left at 3:30 PM the patient was in her normal state of health, able to move all her extremities and speaking easily.  Shortly thereafter pt noticed something "was bad" and that she didnt feel well and she called her daughter-in-law to come over because her medical alert she pressed had . She was found to have difficulty speaking and with left-sided weakness.  No headache, chest pain, shortness of breath. Brought to ED and stroke code was initiated, TNK given @17:13 and admitted to NSICU. NIHSS 3. mRS 0.  84-year-old female past medical history of anxiety/panic attacks, CAD (hx of stents on aspirin), CVA with no residual deficits, renal cell carcinoma not currently undergoing any treatment, seizures not currently on AED's, A-fib not on anticoagulation presents with aphasia and left-sided weakness.  The patient's granddaughter was visiting the patient today, she left at 3:30 PM the patient was in her normal state of health, able to move all her extremities and speaking easily.  Shortly thereafter pt noticed something "was bad" and that she didnt feel well and she called her daughter-in-law to come over because her medical alert she pressed had no battery. She was found to have difficulty speaking and with left-sided weakness.  No headache, chest pain, shortness of breath. Brought to ED and stroke code was initiated, TNK given @17:13 and admitted to NSICU. NIHSS 3. mRS 0.

## 2025-04-14 ENCOUNTER — RESULT REVIEW (OUTPATIENT)
Age: 85
End: 2025-04-14

## 2025-04-14 ENCOUNTER — TRANSCRIPTION ENCOUNTER (OUTPATIENT)
Age: 85
End: 2025-04-14

## 2025-04-14 LAB
ANION GAP SERPL CALC-SCNC: 13 MMOL/L — SIGNIFICANT CHANGE UP (ref 5–17)
BUN SERPL-MCNC: 13.1 MG/DL — SIGNIFICANT CHANGE UP (ref 8–20)
CALCIUM SERPL-MCNC: 8.7 MG/DL — SIGNIFICANT CHANGE UP (ref 8.4–10.5)
CHLORIDE SERPL-SCNC: 101 MMOL/L — SIGNIFICANT CHANGE UP (ref 96–108)
CO2 SERPL-SCNC: 24 MMOL/L — SIGNIFICANT CHANGE UP (ref 22–29)
CREAT SERPL-MCNC: 0.74 MG/DL — SIGNIFICANT CHANGE UP (ref 0.5–1.3)
EGFR: 80 ML/MIN/1.73M2 — SIGNIFICANT CHANGE UP
EGFR: 80 ML/MIN/1.73M2 — SIGNIFICANT CHANGE UP
GLUCOSE SERPL-MCNC: 82 MG/DL — SIGNIFICANT CHANGE UP (ref 70–99)
HCT VFR BLD CALC: 42.1 % — SIGNIFICANT CHANGE UP (ref 34.5–45)
HGB BLD-MCNC: 14.3 G/DL — SIGNIFICANT CHANGE UP (ref 11.5–15.5)
MCHC RBC-ENTMCNC: 30.8 PG — SIGNIFICANT CHANGE UP (ref 27–34)
MCHC RBC-ENTMCNC: 34 G/DL — SIGNIFICANT CHANGE UP (ref 32–36)
MCV RBC AUTO: 90.5 FL — SIGNIFICANT CHANGE UP (ref 80–100)
NRBC # BLD AUTO: 0 K/UL — SIGNIFICANT CHANGE UP (ref 0–0)
NRBC # FLD: 0 K/UL — SIGNIFICANT CHANGE UP (ref 0–0)
NRBC BLD AUTO-RTO: 0 /100 WBCS — SIGNIFICANT CHANGE UP (ref 0–0)
PLATELET # BLD AUTO: 171 K/UL — SIGNIFICANT CHANGE UP (ref 150–400)
PMV BLD: 11.1 FL — SIGNIFICANT CHANGE UP (ref 7–13)
POTASSIUM SERPL-MCNC: 4.6 MMOL/L — SIGNIFICANT CHANGE UP (ref 3.5–5.3)
POTASSIUM SERPL-SCNC: 4.6 MMOL/L — SIGNIFICANT CHANGE UP (ref 3.5–5.3)
RBC # BLD: 4.65 M/UL — SIGNIFICANT CHANGE UP (ref 3.8–5.2)
RBC # FLD: 12.2 % — SIGNIFICANT CHANGE UP (ref 10.3–14.5)
SODIUM SERPL-SCNC: 137 MMOL/L — SIGNIFICANT CHANGE UP (ref 135–145)
WBC # BLD: 6.22 K/UL — SIGNIFICANT CHANGE UP (ref 3.8–10.5)
WBC # FLD AUTO: 6.22 K/UL — SIGNIFICANT CHANGE UP (ref 3.8–10.5)

## 2025-04-14 PROCEDURE — 99232 SBSQ HOSP IP/OBS MODERATE 35: CPT

## 2025-04-14 PROCEDURE — 95816 EEG AWAKE AND DROWSY: CPT | Mod: 26

## 2025-04-14 PROCEDURE — 70551 MRI BRAIN STEM W/O DYE: CPT | Mod: 26

## 2025-04-14 PROCEDURE — 99223 1ST HOSP IP/OBS HIGH 75: CPT

## 2025-04-14 PROCEDURE — 93306 TTE W/DOPPLER COMPLETE: CPT | Mod: 26

## 2025-04-14 RX ORDER — ENOXAPARIN SODIUM 100 MG/ML
40 INJECTION SUBCUTANEOUS EVERY 24 HOURS
Refills: 0 | Status: DISCONTINUED | OUTPATIENT
Start: 2025-04-14 | End: 2025-04-15

## 2025-04-14 RX ORDER — ASPIRIN 325 MG
81 TABLET ORAL DAILY
Refills: 0 | Status: DISCONTINUED | OUTPATIENT
Start: 2025-04-14 | End: 2025-04-15

## 2025-04-14 RX ORDER — ASPIRIN 325 MG
81 TABLET ORAL DAILY
Refills: 0 | Status: DISCONTINUED | OUTPATIENT
Start: 2025-04-14 | End: 2025-04-14

## 2025-04-14 RX ORDER — SIMETHICONE 80 MG
80 TABLET,CHEWABLE ORAL AT BEDTIME
Refills: 0 | Status: DISCONTINUED | OUTPATIENT
Start: 2025-04-14 | End: 2025-04-15

## 2025-04-14 RX ORDER — ENOXAPARIN SODIUM 100 MG/ML
40 INJECTION SUBCUTANEOUS
Refills: 0 | Status: DISCONTINUED | OUTPATIENT
Start: 2025-04-14 | End: 2025-04-14

## 2025-04-14 RX ORDER — POLYETHYLENE GLYCOL 3350 17 G/17G
17 POWDER, FOR SOLUTION ORAL DAILY
Refills: 0 | Status: DISCONTINUED | OUTPATIENT
Start: 2025-04-14 | End: 2025-04-15

## 2025-04-14 RX ORDER — SENNA 187 MG
2 TABLET ORAL AT BEDTIME
Refills: 0 | Status: DISCONTINUED | OUTPATIENT
Start: 2025-04-14 | End: 2025-04-15

## 2025-04-14 RX ADMIN — Medication 1 APPLICATION(S): at 12:19

## 2025-04-14 RX ADMIN — Medication 2 TABLET(S): at 20:28

## 2025-04-14 RX ADMIN — ESCITALOPRAM OXALATE 10 MILLIGRAM(S): 20 TABLET ORAL at 12:19

## 2025-04-14 RX ADMIN — POLYETHYLENE GLYCOL 3350 17 GRAM(S): 17 POWDER, FOR SOLUTION ORAL at 12:19

## 2025-04-14 RX ADMIN — ENOXAPARIN SODIUM 40 MILLIGRAM(S): 100 INJECTION SUBCUTANEOUS at 20:33

## 2025-04-14 RX ADMIN — ATORVASTATIN CALCIUM 80 MILLIGRAM(S): 80 TABLET, FILM COATED ORAL at 20:28

## 2025-04-14 NOTE — PHYSICAL THERAPY INITIAL EVALUATION ADULT - PERTINENT HX OF CURRENT PROBLEM, REHAB EVAL
84y Female with PMHx ?prior CVAs/TIAs (reports no residual deficits, per chart review multiple episodes similar presentation and imaging all negative), seizure disorder (not currently on AEDs), HTN, HLD, renal cancer s/p nephrectomy, who presented to [facility] ED 4/13/25 with symptoms of speech difficulty, left leg weakness, sudden onset of dizziness and palpitations. Code stroke activated on ED arrival, CT head showed no evidence of hemorrhage, CTA head/neck with no large vessel occlusion or critical stenosis. Tenecteplase administered at ~17:13, Patient admitted to NSICU for post tenecteplase monitoring.

## 2025-04-14 NOTE — DISCHARGE NOTE NURSING/CASE MANAGEMENT/SOCIAL WORK - NSDCPEFALRISK_GEN_ALL_CORE
For information on Fall & Injury Prevention, visit: [email or web address] OR  [email or web address] OR  https://www.cdc.gov/steadi/patient.html none

## 2025-04-14 NOTE — DISCHARGE NOTE PROVIDER - NSDCHHASSISTDEVIC_GEN_ALL_CORE_FT
impaired muscle strength; gait, locomotion, and balance; posture Methotrexate Counseling:  Patient counseled regarding adverse effects of methotrexate including but not limited to nausea, vomiting, abnormalities in liver function tests. Patients may develop mouth sores, rash, diarrhea, and abnormalities in blood counts. The patient understands that monitoring is required including LFT's and blood counts.  There is a rare possibility of scarring of the liver and lung problems that can occur when taking methotrexate. Persistent nausea, loss of appetite, pale stools, dark urine, cough, and shortness of breath should be reported immediately. Patient advised to discontinue methotrexate treatment at least three months before attempting to become pregnant.  I discussed the need for folate supplements while taking methotrexate.  These supplements can decrease side effects during methotrexate treatment. The patient verbalized understanding of the proper use and possible adverse effects of methotrexate.  All of the patient's questions and concerns were addressed.

## 2025-04-14 NOTE — PROGRESS NOTE ADULT - SUBJECTIVE AND OBJECTIVE BOX
Preliminary note, official recommendations pending attending review/signature   Seen and examined by Stroke team attending/team, assessment/ plan as discussed with stroke team attending/team as noted.     [facility] Stroke Team  Progress Note     HPI:  he patient is a 84y Female with PMHx prior CVAs/TIAs (reports no residual deficits), seizure disorder (not currently on AEDs), HTN, HLD, renal cancer s/p nephrectomy, who presented to [facility] ED 4/13/25 with symptoms of speech difficulty and left leg weakness. Patient reports that at approximately 1:30PM the afternoon of presentation, she felt a sudden onset of dizziness and palpitations, and could not move her left leg. She attempted to call her daughter for help, but states she was having difficulty getting her words out. Code stroke activated on ED arrival, CT head showed no evidence of hemorrhage, CTA head/neck with no large vessel occlusion or critical stenosis. NIH=3, after discussion w/ stroke attending the decision was made to administer tenecteplase at ~17:13hrs      Of note, patient reports history of seizure disorder, for which she was previously on AEDs but self-discontinued these medications ~2 years ago due to side effects. She does not currently follow with a neurologist. She reports that she experienced a seizure 3 days prior to ED presentation, which she describes as an episode of "full body shaking" that lasted for a few minutes before resolving; denies loss of consciousness during this event.     Patient admitted to NSICU for post tenecteplase monitoring, stroke team asked to consult.     SUBJECTIVE: No events overnight.  No new neurologic complaints.  ROS reported negative unless otherwise noted.    atorvastatin 80 milliGRAM(s) Oral at bedtime  chlorhexidine 2% Cloths 1 Application(s) Topical daily  escitalopram 10 milliGRAM(s) Oral daily  hydrALAZINE Injectable 10 milliGRAM(s) IV Push every 2 hours PRN  sodium chloride 0.9%. 1000 milliLiter(s) IV Continuous <Continuous>      PHYSICAL EXAM:   Vital Signs Last 24 Hrs  T(C): 36.7 (14 Apr 2025 04:00), Max: 36.8 (13 Apr 2025 20:00)  T(F): 98 (14 Apr 2025 04:00), Max: 98.3 (13 Apr 2025 20:00)  HR: 70 (14 Apr 2025 06:00) (66 - 88)  BP: 118/61 (14 Apr 2025 06:00) (118/61 - 173/69)  BP(mean): 80 (14 Apr 2025 06:00) (78 - 115)  RR: 15 (14 Apr 2025 06:00) (14 - 26)  SpO2: 94% (14 Apr 2025 06:00) (94% - 100%)    Parameters below as of 14 Apr 2025 04:00  Patient On (Oxygen Delivery Method): room air    EXAM PENDING     Physical Exam:  General: No acute distress.     Detailed Neurologic Exam:    Mental status: The patient is awake and alert and has normal attention span.  The patient is oriented to person, place, month, and year. Can recall events leading up to hospitalization.  The patient is able to name objects, follow commands, repeat sentences. Stuttering speech w/ some word finding hesitancy noted.     Cranial nerves: Pupils 5mm on the right and nonreactive; 2mm on the left and reactive to light (per patient this is chronic since prior cataract surgery). There is no visual field deficit to confrontation. Extraocular motion is full with no nystagmus. There is no ptosis. Facial sensation is intact. Facial musculature is symmetric. Palate elevates symmetrically. Tongue is midline.    Motor: There is normal bulk and tone.  Bilateral upper extremity tremor present  Strength is 5/5 in the right arm and leg.   Strength is 5/5 in the left arm; at least 3/5 in the left leg proximally, able to dorsi/plantar flex left foot; unable to lift left leg off the bed for >5 seconds     Sensation: Intact to light touch in 4 extremities    Cerebellar: There is no dysmetria on finger to nose testing.    Gait : deferred      LABS:                        14.3   6.22  )-----------( 171      ( 14 Apr 2025 02:36 )             42.1    04-14    137  |  101  |  13.1  ----------------------------<  82  4.6   |  24.0  |  0.74    Ca    8.7      14 Apr 2025 02:36    TPro  6.7  /  Alb  4.2  /  TBili  1.1  /  DBili  x   /  AST  29  /  ALT  16  /  AlkPhos  72  04-13  PT/INR - ( 13 Apr 2025 17:00 )   PT: 11.8 sec;   INR: 1.02 ratio         PTT - ( 13 Apr 2025 17:00 )  PTT:27.2 sec        LDL: Pending   A1C: Pending     RADIOLOGY & ADDITIONAL STUDIES (independently reviewed unless otherwise noted):  CT Brain Stroke Protocol (04.13.25 @ 16:52)   IMPRESSION:    No acute transcortical infarct or intracranial hemorrhage. Findings   discussed with Dr. Mccray at 5:05 PM 4/13/2025.    CT Angio Brain and Neck, CT Perfusion Stroke Protocol  w/ IV Cont (04.13.25 @ 17:09)   IMPRESSION:  CT angiogram neck:  -No vaso-occlusive disease.    CT angiogram head:  -No vaso-occlusive disease.  -0.2 cm inferiorly oriented outpouching at the left supraclinoid ICA,   likely an infundibulum versus less likely aneurysm.    CT perfusion:  -No core infarct or at-risk ischemic tissue identified.    --  ULTRASOUND   US Duplex Venous Lower Ext Complete, Bilateral (04.13.25 @ 20:43)   No evidence of deep venous thrombosis in either lower extremity.       Preliminary note, official recommendations pending attending review/signature   Seen and examined by Stroke team attending/team, assessment/ plan as discussed with stroke team attending/team as noted.     [facility] Stroke Team  Progress Note     HPI:  The patient is a 84y Female with PMHx ?prior CVAs/TIAs (reports no residual deficits, per chart review multiple episodes similar presentation and imaging all negative), seizure disorder (not currently on AEDs), HTN, HLD, renal cancer s/p nephrectomy, who presented to [facility] ED 4/13/25 with symptoms of speech difficulty and left leg weakness. Patient reports that at approximately 1:30PM the afternoon of presentation, she felt a sudden onset of dizziness and palpitations, and could not move her left leg. She attempted to call her daughter for help, but states she was having difficulty getting her words out. Code stroke activated on ED arrival, CT head showed no evidence of hemorrhage, CTA head/neck with no large vessel occlusion or critical stenosis. NIH=3, after discussion w/ stroke attending the decision was made to administer tenecteplase at ~17:13hrs      Of note, patient reports history of seizure disorder, for which she was previously on AEDs but self-discontinued these medications ~2 years ago due to side effects. She does not currently follow with a neurologist. She reports that she experienced a seizure 3 days prior to ED presentation, which she describes as an episode of "full body shaking" that lasted for a few minutes before resolving; denies loss of consciousness during this event.     Patient admitted to NSICU for post tenecteplase monitoring, stroke team asked to consult.     SUBJECTIVE: No events overnight.  No new neurologic complaints.  ROS reported negative unless otherwise noted.    MEDICATIONS  (STANDING):  atorvastatin 80 milliGRAM(s) Oral at bedtime  chlorhexidine 2% Cloths 1 Application(s) Topical daily  escitalopram 10 milliGRAM(s) Oral daily  hydrALAZINE Injectable 10 milliGRAM(s) IV Push every 2 hours PRN  sodium chloride 0.9%. 1000 milliLiter(s) IV Continuous <Continuous>      PHYSICAL EXAM:   Vital Signs Last 24 Hrs  T(C): 36.7 (14 Apr 2025 04:00), Max: 36.8 (13 Apr 2025 20:00)  T(F): 98 (14 Apr 2025 04:00), Max: 98.3 (13 Apr 2025 20:00)  HR: 70 (14 Apr 2025 06:00) (66 - 88)  BP: 118/61 (14 Apr 2025 06:00) (118/61 - 173/69)  BP(mean): 80 (14 Apr 2025 06:00) (78 - 115)  RR: 15 (14 Apr 2025 06:00) (14 - 26)  SpO2: 94% (14 Apr 2025 06:00) (94% - 100%)    Parameters below as of 14 Apr 2025 04:00  Patient On (Oxygen Delivery Method): room air    EXAM PENDING VISIT    Physical Exam:  General: No acute distress.     Detailed Neurologic Exam:    Mental status: The patient is awake and alert and has normal attention span.  The patient is oriented to person, place, month, and year. Can recall events leading up to hospitalization.  The patient is able to name objects, follow commands, repeat sentences. Stuttering speech w/ some word finding hesitancy noted.     Cranial nerves: Pupils 5mm on the right and nonreactive; 2mm on the left and reactive to light (per patient this is chronic since prior cataract surgery). There is no visual field deficit to confrontation. Extraocular motion is full with no nystagmus. There is no ptosis. Facial sensation is intact. Facial musculature is symmetric. Palate elevates symmetrically. Tongue is midline.    Motor: There is normal bulk and tone.  Bilateral upper extremity tremor present  Strength is 5/5 in the right arm and leg.   Strength is 5/5 in the left arm; at least 3/5 in the left leg proximally, able to dorsi/plantar flex left foot; unable to lift left leg off the bed for >5 seconds     Sensation: Intact to light touch in 4 extremities    Cerebellar: There is no dysmetria on finger to nose testing.    Gait : deferred      LABS:                        14.3   6.22  )-----------( 171      ( 14 Apr 2025 02:36 )             42.1    04-14    137  |  101  |  13.1  ----------------------------<  82  4.6   |  24.0  |  0.74    Ca    8.7      14 Apr 2025 02:36    TPro  6.7  /  Alb  4.2  /  TBili  1.1  /  DBili  x   /  AST  29  /  ALT  16  /  AlkPhos  72  04-13  PT/INR - ( 13 Apr 2025 17:00 )   PT: 11.8 sec;   INR: 1.02 ratio         PTT - ( 13 Apr 2025 17:00 )  PTT:27.2 sec    LDL: Pending   A1C: Pending     RADIOLOGY & ADDITIONAL STUDIES (independently reviewed unless otherwise noted):  CT Brain Stroke Protocol (04.13.25 @ 16:52)   IMPRESSION: No acute transcortical infarct or intracranial hemorrhage. Findings discussed with Dr. Mccray at 5:05 PM 4/13/2025.    CT Angio Brain and Neck, CT Perfusion Stroke Protocol  w/ IV Cont (04.13.25 @ 17:09)   IMPRESSION:  CT angiogram neck:  -No vaso-occlusive disease.    CT angiogram head:  -No vaso-occlusive disease.  -0.2 cm inferiorly oriented outpouching at the left supraclinoid ICA, likely an infundibulum versus less likely aneurysm.    CT perfusion:  -No core infarct or at-risk ischemic tissue identified.    --  ULTRASOUND   US Duplex Venous Lower Ext Complete, Bilateral (04.13.25 @ 20:43)   No evidence of deep venous thrombosis in either lower extremity. Preliminary note, official recommendations pending attending review/signature   Seen and examined by Stroke team attending/team, assessment/ plan as discussed with stroke team attending/team as noted.     [facility] Stroke Team  Progress Note     HPI:  The patient is a 84y Female with PMHx ?prior CVAs/TIAs (reports no residual deficits, per chart review multiple episodes similar presentation and imaging all negative), seizure disorder (not currently on AEDs), CAD s/p cardiac stents on ASA 81mg, HTN, HLD, renal cancer s/p nephrectomy, who presented to [facility] ED 4/13/25 with symptoms of speech difficulty and left leg weakness. Patient reports that at approximately 1:30PM the afternoon of presentation, she felt a sudden onset of dizziness and palpitations, and could not move her left leg. She attempted to call her daughter for help, but states she was having difficulty getting her words out. Code stroke activated on ED arrival, CT head showed no evidence of hemorrhage, CTA head/neck with no large vessel occlusion or critical stenosis. NIH=3, after discussion w/ stroke attending the decision was made to administer tenecteplase at ~17:13hrs      Of note, patient reports history of seizure disorder, for which she was previously on AEDs but self-discontinued these medications ~2 years ago due to side effects. She does not currently follow with a neurologist. She reports that she experienced a seizure 3 days prior to ED presentation, which she describes as an episode of "full body shaking" that lasted for a few minutes before resolving; denies loss of consciousness during this event.     Patient admitted to NSICU for post tenecteplase monitoring, stroke team asked to consult.     SUBJECTIVE: No events overnight.  No new neurologic complaints.  ROS reported negative unless otherwise noted.    MEDICATIONS  (STANDING):  atorvastatin 80 milliGRAM(s) Oral at bedtime  chlorhexidine 2% Cloths 1 Application(s) Topical daily  escitalopram 10 milliGRAM(s) Oral daily  hydrALAZINE Injectable 10 milliGRAM(s) IV Push every 2 hours PRN  sodium chloride 0.9%. 1000 milliLiter(s) IV Continuous <Continuous>      PHYSICAL EXAM:   Vital Signs Last 24 Hrs  T(C): 36.7 (14 Apr 2025 04:00), Max: 36.8 (13 Apr 2025 20:00)  T(F): 98 (14 Apr 2025 04:00), Max: 98.3 (13 Apr 2025 20:00)  HR: 70 (14 Apr 2025 06:00) (66 - 88)  BP: 118/61 (14 Apr 2025 06:00) (118/61 - 173/69)  BP(mean): 80 (14 Apr 2025 06:00) (78 - 115)  RR: 15 (14 Apr 2025 06:00) (14 - 26)  SpO2: 94% (14 Apr 2025 06:00) (94% - 100%)    Parameters below as of 14 Apr 2025 04:00  Patient On (Oxygen Delivery Method): room air      Physical Exam:  General: No acute distress.     Detailed Neurologic Exam:    Mental status: The patient is awake and alert and has normal attention span.  The patient is oriented to person, place, month, and year. Can recall events leading up to hospitalization.  The patient is able to name objects, follow commands, repeat sentences. No aphasia or dysarthria.    Cranial nerves: Pupils 5mm on the right and nonreactive; 2mm on the left and reactive to light (per patient this is chronic since prior cataract surgery). There is no visual field deficit to confrontation. Extraocular motion is full with no nystagmus. There is no ptosis. Facial sensation is intact. Facial musculature is symmetric. Palate elevates symmetrically. Tongue is midline.    Motor: There is normal bulk and tone.  Bilateral upper extremity tremor present  Strength is 5/5 in the right arm and leg.   Strength is 5/5 in the left arm; at least 3/5 in the left leg proximally (baseline per patient), able to dorsi/plantar flex left foot, slight drift but does not hit bed  Sensation: Intact to light touch in 4 extremities    Cerebellar: There is no dysmetria on finger to nose testing.    Gait : deferred      LABS:                        14.3   6.22  )-----------( 171      ( 14 Apr 2025 02:36 )             42.1    04-14    137  |  101  |  13.1  ----------------------------<  82  4.6   |  24.0  |  0.74    Ca    8.7      14 Apr 2025 02:36    TPro  6.7  /  Alb  4.2  /  TBili  1.1  /  DBili  x   /  AST  29  /  ALT  16  /  AlkPhos  72  04-13  PT/INR - ( 13 Apr 2025 17:00 )   PT: 11.8 sec;   INR: 1.02 ratio         PTT - ( 13 Apr 2025 17:00 )  PTT:27.2 sec    LDL: Pending   A1C: Pending     RADIOLOGY & ADDITIONAL STUDIES (independently reviewed unless otherwise noted):  CT Brain Stroke Protocol (04.13.25 @ 16:52)   IMPRESSION: No acute transcortical infarct or intracranial hemorrhage. Findings discussed with Dr. Mccray at 5:05 PM 4/13/2025.    CT Angio Brain and Neck, CT Perfusion Stroke Protocol  w/ IV Cont (04.13.25 @ 17:09)   IMPRESSION:  CT angiogram neck:  -No vaso-occlusive disease.    CT angiogram head:  -No vaso-occlusive disease.  -0.2 cm inferiorly oriented outpouching at the left supraclinoid ICA, likely an infundibulum versus less likely aneurysm.    CT perfusion:  -No core infarct or at-risk ischemic tissue identified.    --  ULTRASOUND   US Duplex Venous Lower Ext Complete, Bilateral (04.13.25 @ 20:43)   No evidence of deep venous thrombosis in either lower extremity.

## 2025-04-14 NOTE — DISCHARGE NOTE NURSING/CASE MANAGEMENT/SOCIAL WORK - PATIENT PORTAL LINK FT
You can access the FollowMyHealth Patient Portal offered by [facility] by registering at the following website: [email or web address]. By joining [employer]’s FollowMyHealth portal, you will also be able to view your health information using other applications (apps) compatible with our system.

## 2025-04-14 NOTE — DISCHARGE NOTE PROVIDER - NSDCCPCAREPLAN_GEN_ALL_CORE_FT
PRINCIPAL DISCHARGE DIAGNOSIS  Diagnosis: Left leg weakness  Assessment and Plan of Treatment: Differential diagnoses include occult seizure vs conversion disorder, however cannot entirely exclude TNK aborted stroke. Recommend further outpatient follow up with general neurology.      SECONDARY DISCHARGE DIAGNOSES  Diagnosis: Hypertension  Assessment and Plan of Treatment:     Diagnosis: Hyperlipidemia  Assessment and Plan of Treatment:     Diagnosis: Personal history of renal cancer  Assessment and Plan of Treatment: In remission.

## 2025-04-14 NOTE — DISCHARGE NOTE PROVIDER - CARE PROVIDER_API CALL
Elias Arellano  Neurology  [address]  [place] [address]  Phone: [phone]  Fax: [phone]  Follow Up Time: 1 month    Mayco Cyr  Cardiology  [address] [place] [id number]  Phone: [phone]  Fax: [phone]  Follow Up Time: 2 weeks    Your Primary Care Provider,   Phone: (   )    -  Fax: (   )    -  Follow Up Time: 1 week

## 2025-04-14 NOTE — PHYSICAL THERAPY INITIAL EVALUATION ADULT - GENERAL OBSERVATIONS, REHAB EVAL
Pt received supine in bed + telemetry//BP + IV + Venous Compression Boots. Pt c/o 0/10 pain, pt agreeable to PT

## 2025-04-14 NOTE — SWALLOW BEDSIDE ASSESSMENT ADULT - SLP GENERAL OBSERVATIONS
Recd awake/upright OOB to chair, A&A Ox4, pleasant/cooperative, 0/10 pain pre/post, tolerating RA NAD

## 2025-04-14 NOTE — OCCUPATIONAL THERAPY INITIAL EVALUATION ADULT - GENERAL OBSERVATIONS, REHAB EVAL
Received pt supine in bed, NAD, +IV, +Tele//BP, +b/l VCB on RA A&Ox4. Pre/post pain 0/10. Pt agreeable to OT evaluation

## 2025-04-14 NOTE — OCCUPATIONAL THERAPY INITIAL EVALUATION ADULT - LIVES WITH, PROFILE
Pt lives alone in a private home, has family nearby that is supportive. No steps to enter, Ramp is available in the garage, No steps inside./alone

## 2025-04-14 NOTE — PROGRESS NOTE ADULT - ASSESSMENT
The patient is a 84y Female with PMHx prior CVAs/TIAs (reports no residual deficits), seizure disorder (not currently on AEDs), HTN, HLD, renal cancer s/p nephrectomy,  CAD s/p PCI, who presented to [facility] ED 4/13/25 with symptoms of speech difficulty and left leg weakness. Patient reports that at approximately 1:30PM the afternoon of presentation, she felt a sudden onset of dizziness and palpitations, and could not move her left leg. She attempted to call her daughter for help, but states she was having difficulty getting her words out. Code stroke activated on ED arrival, CT head showed no evidence of hemorrhage, CTA head/neck with no large vessel occlusion or critical stenosis. NIH=3, after discussion w/ stroke attending the decision was made to administer tenecteplase at ~17:13hrs.     Review of patient's prior records describes multiple similar presentations of symptoms (LLE weakness and difficulty producing speech); First episode occurred on 5/20/20, and then on 6/1/20, 11/28/20, 3/27/21, 7/25/21, 9/8/21, 4/23/22, 6/6/22. All of which had been worked up as acute CVA, but CTH/CTA/CTP/MRI have always been unremarkable. 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 Patient states to have self-discontinued AEDS ~2yrs ago due to side effects; while hospitalized at [facility] in October 2024 for chest pain, she was seen by neurology who recommended discontinuation of AEDs due to low clinical suspicion for seizure.     Differential diagnoses include acute ischemic stroke versus seizure versus conversion disorder given prior hx.     PLAN:    NEURO:   -Neurologically ---   -Continue close monitoring for neurologic deterioration    -Stroke neuro checks per post tenecteplase protocol   -SBP goal <180/105 per post tenecteplase protocol   -CTH/CTA head/neck as above  -Pending 24hr post tenecteplase CTH today 4/14 at 1713 - if no hemorrhage would resume home Aspirin 81mg daily  -MRI brain without contrast recommended to evaluate for acute infarct  -TTE pending  -Obtain LE venous duplex to r/o DVT  -Dysphagia screen: pass  -Physical therapy/OT/Speech eval/treatment.   #Stroke prevention:  -SBP goal <180/105 per post tenecteplase protocol   -ANTITHROMBOTIC THERAPY: hold for 24hrs post tenecteplase, if 24hr post CTH negative for hemorrhage would resume home Aspirin 81mg daily  -LDL pending; titrate statin to LDL goal less than 70  -HA1C pending; tight glucose control    CARDIOVASCULAR:  -check TTE  -cardiac monitoring w/ telemetry for now, further evaluation pending findings of noted workup         -Review of prior cardiology records:       -ILR placed in 6/2020 for stroke work up after multiple recurrent episodes of symptoms.        -hospitalized in 4/2022 for another episode, she was deemed as a nonresponder to ASA/Plavix given recurrent stroke-like events. Cardiology replaced Plavix with Xarelto. ILR was interrogated and no AF detected. Never had clear documentation of pAF. Cardiology recommended discontinuing Xarelto.        -Patient's most recent visit with her outpatient cardiologist, Dr. Cyr in November 2024 confirms no documented history of atrial fibrillation.                        HEMATOLOGY:  H/H 14.3/42.1, Platelets 171  -patient should have all age and risk appropriate malignancy screenings with PCP or sooner if clinically suspected      DVT ppx: Heparin s.c [] LMWH [] SCDs [X], medical ppx contraindicated due to tenecteplase administration    PULMONARY:   -protecting airway, saturating well     RENAL:   BUN/Cr 13.1/0.74  -monitor urine output  -maintain adequate hydration       Na Goal:  135-145     Reed: N/A    ID:   -afebrile  -no leukocytosis  -monitor for si/sx of infection     OTHER:  condition and plan of care d/w patient, questions and concerns addressed.     DISPOSITION: Rehab or home depending on PT eval once stable and workup is complete        CORE MEASURES:       Admission NIHSS: 2     Tenecteplase : [x] YES 4/13/25  [] NO     LDL/HDL/A1C: Pending      Depression Screen- if depression hx and/or present     Statin Therapy: Pending      Dysphagia Screen: [x] PASS [] FAIL      Smoking in the past 12 months [] YES [x] NO     Afib [] YES [x] NO     Stroke Education [] YES [] NO [x] pending      Diabetes [] YES [x] NO    Obtain screening lower extremity venous ultrasound in patients who meet 1 or more of the following criteria as patient is high risk for DVT/PE on admission:   [] History of DVT/PE  []Hypercoagulable states (Factor V Leiden, Cancer, OCP, etc. )  []Prolonged immobility (hemiplegia/hemiparesis/post operative or any other extended immobilization)  [] Transferred from outside facility (Rehab or Long term care)  [] Age </= to 50  [x]Stroke     The patient is a 84y Female with PMHx prior CVAs/TIAs (reports no residual deficits), seizure disorder (not currently on AEDs), HTN, HLD, renal cancer s/p nephrectomy,  CAD s/p PCI, who presented to [facility] ED 4/13/25 with symptoms of speech difficulty and left leg weakness. Patient reports that at approximately 1:30PM the afternoon of presentation, she felt a sudden onset of dizziness and palpitations, and could not move her left leg. She attempted to call her daughter for help, but states she was having difficulty getting her words out. Code stroke activated on ED arrival, CT head showed no evidence of hemorrhage, CTA head/neck with no large vessel occlusion or critical stenosis. NIH=3, after discussion w/ stroke attending the decision was made to administer tenecteplase at ~17:13hrs.     Review of patient's prior records describes multiple similar presentations of symptoms (LLE weakness and difficulty producing speech); First episode occurred on 5/20/20, and then on 6/1/20, 11/28/20, 3/27/21, 7/25/21, 9/8/21, 4/23/22, 6/6/22. All of which had been worked up as acute CVA, but CTH/CTA/CTP/MRI have always been unremarkable. 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 Patient states to have self-discontinued AEDS ~2yrs ago due to side effects; while hospitalized at [facility] in October 2024 for chest pain, she was seen by neurology who recommended discontinuation of AEDs due to low clinical suspicion for seizure.     Differential diagnoses include acute ischemic stroke versus seizure versus conversion disorder given prior hx.     PLAN: PRELIM PENDING VISIT    NEURO:   -Neurologically ---   -Continue close monitoring for neurologic deterioration    -Stroke neuro checks per post tenecteplase protocol   -SBP goal <180/105 per post tenecteplase protocol   -CTH/CTA head/neck as above  -Pending 24hr post tenecteplase CTH today 4/14 at 1713 - if no hemorrhage would resume home Aspirin 81mg daily  -MRI brain without contrast recommended to evaluate for acute infarct  -TTE pending  -Obtain LE venous duplex to r/o DVT  -Dysphagia screen: pass  -Physical therapy/OT/Speech eval/treatment.   #Stroke prevention:  -SBP goal <180/105 per post tenecteplase protocol   -ANTITHROMBOTIC THERAPY: hold for 24hrs post tenecteplase, if 24hr post CTH negative for hemorrhage would resume home Aspirin 81mg daily  -LDL pending; titrate statin to LDL goal less than 70  -HA1C pending; tight glucose control  Seizure history  -Obtain VEEG    CARDIOVASCULAR:  -check TTE  -cardiac monitoring w/ telemetry for now, further evaluation pending findings of noted workup         -Review of prior cardiology records:       -ILR placed in 6/2020 for stroke work up after multiple recurrent episodes of symptoms.        -hospitalized in 4/2022 for another episode, she was deemed as a nonresponder to ASA/Plavix given recurrent stroke-like events. Cardiology replaced Plavix with Xarelto. ILR was interrogated and no AF detected. Never had clear documentation of pAF. Cardiology recommended discontinuing Xarelto.        -Patient's most recent visit with her outpatient cardiologist, Dr. Cyr in November 2024 confirms no documented history of atrial fibrillation.                        HEMATOLOGY:  H/H 14.3/42.1, Platelets 171  -patient should have all age and risk appropriate malignancy screenings with PCP or sooner if clinically suspected      DVT ppx: Heparin s.c [] LMWH [] SCDs [X], medical ppx contraindicated due to tenecteplase administration    PULMONARY:   -protecting airway, saturating well     RENAL:   BUN/Cr 13.1/0.74  -monitor urine output  -maintain adequate hydration       Na Goal:  135-145     Reed: N/A    ID:   -afebrile  -no leukocytosis  -monitor for si/sx of infection     OTHER:  condition and plan of care d/w patient, questions and concerns addressed.     DISPOSITION: Rehab or home depending on PT eval once stable and workup is complete        CORE MEASURES:       Admission NIHSS: 2     Tenecteplase : [x] YES 4/13/25  [] NO     LDL/HDL/A1C: Pending      Depression Screen- if depression hx and/or present     Statin Therapy: Pending      Dysphagia Screen: [x] PASS [] FAIL      Smoking in the past 12 months [] YES [x] NO     Afib [] YES [x] NO     Stroke Education [] YES [] NO [x] pending      Diabetes [] YES [x] NO    Obtain screening lower extremity venous ultrasound in patients who meet 1 or more of the following criteria as patient is high risk for DVT/PE on admission:   [] History of DVT/PE  []Hypercoagulable states (Factor V Leiden, Cancer, OCP, etc. )  []Prolonged immobility (hemiplegia/hemiparesis/post operative or any other extended immobilization)  [] Transferred from outside facility (Rehab or Long term care)  [] Age </= to 50  [x]Stroke     The patient is a 84y Female with PMHx prior CVAs/TIAs (reports no residual deficits), seizure disorder (not currently on AEDs), HTN, HLD, renal cancer s/p nephrectomy,  CAD s/p PCI, who presented to [facility] ED 4/13/25 with symptoms of speech difficulty and left leg weakness. Patient reports that at approximately 1:30PM the afternoon of presentation, she felt a sudden onset of dizziness and palpitations, and could not move her left leg. She attempted to call her daughter for help, but states she was having difficulty getting her words out. Code stroke activated on ED arrival, CT head showed no evidence of hemorrhage, CTA head/neck with no large vessel occlusion or critical stenosis. NIH=3, after discussion w/ stroke attending the decision was made to administer tenecteplase at ~17:13hrs.     Review of patient's prior records describes multiple similar presentations of symptoms (LLE weakness and difficulty producing speech); First episode occurred on 5/20/20, and then on 6/1/20, 11/28/20, 3/27/21, 7/25/21, 9/8/21, 4/23/22, 6/6/22. All of which had been worked up as acute CVA, but CTH/CTA/CTP/MRI have always been unremarkable. 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 Patient states to have self-discontinued AEDS ~2yrs ago due to side effects; while hospitalized at [facility] in October 2024 for chest pain, she was seen by neurology who recommended discontinuation of AEDs due to low clinical suspicion for seizure.     Differential diagnoses include acute ischemic stroke versus seizure versus conversion disorder given prior hx.     PLAN: PRELIM PENDING VISIT    NEURO:   -Neurologically ---   -Continue close monitoring for neurologic deterioration    -Stroke neuro checks per post tenecteplase protocol   -SBP goal <180/105 per post tenecteplase protocol   -CTH/CTA head/neck as above  -Pending 24hr post tenecteplase CTH today 4/14 at 1713 - if no hemorrhage would resume home Aspirin 81mg daily  -MRI brain without contrast recommended to evaluate for acute infarct  -TTE pending  -Obtain LE venous duplex to r/o DVT  -Dysphagia screen: pass  -Physical therapy/OT/Speech eval/treatment.   #Stroke prevention:  -SBP goal <180/105 per post tenecteplase protocol   -ANTITHROMBOTIC THERAPY: hold for 24hrs post tenecteplase, if 24hr post CTH negative for hemorrhage would resume home Aspirin 81mg daily  -LDL pending; titrate statin to LDL goal less than 70  -HA1C pending; tight glucose control  Seizure history  -Obtain Routine EEG    CARDIOVASCULAR:  -check TTE  -cardiac monitoring w/ telemetry for now, further evaluation pending findings of noted workup         -Review of prior cardiology records:       -ILR placed in 6/2020 for stroke work up after multiple recurrent episodes of symptoms.        -hospitalized in 4/2022 for another episode, she was deemed as a nonresponder to ASA/Plavix given recurrent stroke-like events. Cardiology replaced Plavix with Xarelto. ILR was interrogated and no AF detected. Never had clear documentation of pAF. Cardiology recommended discontinuing Xarelto.        -Patient's most recent visit with her outpatient cardiologist, Dr. Cyr in November 2024 confirms no documented history of atrial fibrillation.                        HEMATOLOGY:  H/H 14.3/42.1, Platelets 171  -patient should have all age and risk appropriate malignancy screenings with PCP or sooner if clinically suspected      DVT ppx: Heparin s.c [] LMWH [] SCDs [X], medical ppx contraindicated due to tenecteplase administration    PULMONARY:   -protecting airway, saturating well     RENAL:   BUN/Cr 13.1/0.74  -monitor urine output  -maintain adequate hydration       Na Goal:  135-145     Reed: N/A    ID:   -afebrile  -no leukocytosis  -monitor for si/sx of infection     OTHER:  condition and plan of care d/w patient, questions and concerns addressed.     DISPOSITION: Rehab or home depending on PT eval once stable and workup is complete        CORE MEASURES:       Admission NIHSS: 2     Tenecteplase : [x] YES 4/13/25  [] NO     LDL/HDL/A1C: Pending      Depression Screen- if depression hx and/or present     Statin Therapy: Pending      Dysphagia Screen: [x] PASS [] FAIL      Smoking in the past 12 months [] YES [x] NO     Afib [] YES [x] NO     Stroke Education [] YES [] NO [x] pending      Diabetes [] YES [x] NO    Obtain screening lower extremity venous ultrasound in patients who meet 1 or more of the following criteria as patient is high risk for DVT/PE on admission:   [] History of DVT/PE  []Hypercoagulable states (Factor V Leiden, Cancer, OCP, etc. )  []Prolonged immobility (hemiplegia/hemiparesis/post operative or any other extended immobilization)  [] Transferred from outside facility (Rehab or Long term care)  [] Age </= to 50  [x]Stroke     The patient is a 84y Female with PMHx prior CVAs/TIAs (reports no residual deficits), seizure disorder (not currently on AEDs), HTN, HLD, renal cancer s/p nephrectomy,  CAD s/p PCI, who presented to [facility] ED 4/13/25 with symptoms of speech difficulty and left leg weakness. Patient reports that at approximately 1:30PM the afternoon of presentation, she felt a sudden onset of dizziness and palpitations, and could not move her left leg. She attempted to call her daughter for help, but states she was having difficulty getting her words out. Code stroke activated on ED arrival, CT head showed no evidence of hemorrhage, CTA head/neck with no large vessel occlusion or critical stenosis. NIH=3, after discussion w/ stroke attending the decision was made to administer tenecteplase at ~17:13hrs.     Review of patient's prior records describes multiple similar presentations of symptoms (LLE weakness and difficulty producing speech); First episode occurred on 5/20/20, and then on 6/1/20, 11/28/20, 3/27/21, 7/25/21, 9/8/21, 4/23/22, 6/6/22. All of which had been worked up as acute CVA, but CTH/CTA/CTP/MRI have always been unremarkable. 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 Patient states to have self-discontinued AEDS ~2yrs ago due to side effects; while hospitalized at [facility] in October 2024 for chest pain, she was seen by neurology who recommended discontinuation of AEDs due to low clinical suspicion for seizure.     Differential diagnoses include acute ischemic stroke versus seizure versus conversion disorder given prior hx.     PLAN:     NEURO:   -Neurologically at baseline with LLE weakness  -Continue close monitoring for neurologic deterioration    -Stroke neuro checks per post tenecteplase protocol   -SBP goal <180/105 per post tenecteplase protocol   -CTH/CTA head/neck as above  -Pending 24hr post tenecteplase CTH today 4/14 at 1713 - if no hemorrhage would resume home Aspirin 81mg daily  -MRI brain without contrast recommended to evaluate for acute infarct  -TTE pending  -Obtain LE venous duplex to r/o DVT  -Dysphagia screen: pass  -Physical therapy/OT/Speech eval/treatment.   #Stroke prevention:  -SBP goal <180/105 per post tenecteplase protocol   -ANTITHROMBOTIC THERAPY: hold for 24hrs post tenecteplase, if 24hr post CTH negative for hemorrhage would resume home Aspirin 81mg daily  -LDL pending; lipitor 80mg; titrate statin to LDL goal less than 70  -HA1C pending; tight glucose control  Seizure history  -Obtain Routine EEG    CARDIOVASCULAR:  -check TTE  -cardiac monitoring w/ telemetry for now, further evaluation pending findings of noted workup         -Review of prior cardiology records:       -ILR placed in 6/2020 for stroke work up after multiple recurrent episodes of symptoms.        -hospitalized in 4/2022 for another episode, she was deemed as a nonresponder to ASA/Plavix given recurrent stroke-like events. Cardiology replaced Plavix with Xarelto. ILR was interrogated and no AF detected. Never had clear documentation of pAF. Cardiology recommended discontinuing Xarelto.        -Patient's most recent visit with her outpatient cardiologist, Dr. Cyr in November 2024 confirms no documented history of atrial fibrillation.                        HEMATOLOGY:  H/H 14.3/42.1, Platelets 171  -patient should have all age and risk appropriate malignancy screenings with PCP or sooner if clinically suspected      DVT ppx: Heparin s.c [] LMWH [] SCDs [X], medical ppx contraindicated due to tenecteplase administration    PULMONARY:   -protecting airway, saturating well     RENAL:   BUN/Cr 13.1/0.74  -monitor urine output  -maintain adequate hydration       Na Goal:  135-145     Reed: N/A    ID:   -afebrile  -no leukocytosis  -monitor for si/sx of infection     OTHER:  condition and plan of care d/w patient, questions and concerns addressed.     DISPOSITION: once stable and workup is complete, PT/OT recommending Home PT/OT        CORE MEASURES:       Admission NIHSS: 2     Tenecteplase : [x] YES 4/13/25  [] NO     LDL/HDL/A1C: Pending      Depression Screen- if depression hx and/or present     Statin Therapy: lipitor 80mg     Dysphagia Screen: [x] PASS [] FAIL      Smoking in the past 12 months [] YES [x] NO     Afib [] YES [x] NO     Stroke Education [] YES [] NO [x] pending      Diabetes [] YES [x] NO    Obtain screening lower extremity venous ultrasound in patients who meet 1 or more of the following criteria as patient is high risk for DVT/PE on admission:   [] History of DVT/PE  []Hypercoagulable states (Factor V Leiden, Cancer, OCP, etc. )  []Prolonged immobility (hemiplegia/hemiparesis/post operative or any other extended immobilization)  [] Transferred from outside facility (Rehab or Long term care)  [] Age </= to 50  [x]Stroke

## 2025-04-14 NOTE — OCCUPATIONAL THERAPY INITIAL EVALUATION ADULT - PERTINENT HX OF CURRENT PROBLEM, REHAB EVAL
PMHx ?prior CVAs/TIAs (reports no residual deficits, per chart review multiple episodes similar presentation and imaging all negative), seizure disorder (not currently on AEDs), HTN, HLD, renal cancer s/p nephrectomy.

## 2025-04-14 NOTE — PHYSICAL THERAPY INITIAL EVALUATION ADULT - SENSORY TESTS
(+) eye tracking bilaterally in all directions (+) nystagmus noted in right eye with right sided gaze; (+) acuity in all fields, (+) right pupil dilation noted; (+) finger to thumb coordination BUEs with mild impairment

## 2025-04-14 NOTE — DISCHARGE NOTE NURSING/CASE MANAGEMENT/SOCIAL WORK - FINANCIAL ASSISTANCE
[facility] provides services at a reduced cost to those who are determined to be eligible through [facility]’s financial assistance program. Information regarding [facility]’s financial assistance program can be found by going to [email or web address] or by calling [phone].

## 2025-04-14 NOTE — SWALLOW BEDSIDE ASSESSMENT ADULT - COMMENTS
The patient is a 84y Female with PMHx prior CVAs/TIAs (reports no residual deficits), seizure disorder (not currently on AEDs), HTN, HLD, renal cancer s/p nephrectomy,  CAD s/p PCI, who presented to [facility] ED 4/13/25 with symptoms of speech difficulty and left leg weakness. Patient reports that at approximately 1:30PM the afternoon of presentation, she felt a sudden onset of dizziness and palpitations, and could not move her left leg. She attempted to call her daughter for help, but states she was having difficulty getting her words out. Code stroke activated on ED arrival, CT head showed no evidence of hemorrhage, CTA head/neck with no large vessel occlusion or critical stenosis. NIH=3, after discussion w/ stroke attending the decision was made to administer tenecteplase at ~17:13hrs.     Review of patient's prior records describes multiple similar presentations of symptoms (LLE weakness and difficulty producing speech); First episode occurred on 5/20/20, and then on 6/1/20, 11/28/20, 3/27/21, 7/25/21, 9/8/21, 4/23/22, 6/6/22. All of which had been worked up as acute CVA, but CTH/CTA/CTP/MRI have always been unremarkable. 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 Patient states to have self-discontinued AEDS ~2yrs ago due to side effects; while hospitalized at [facility] in October 2024 for chest pain, she was seen by neurology who recommended discontinuation of AEDs due to low clinical suspicion for seizure.     Differential diagnoses include acute ischemic stroke versus seizure versus conversion disorder given prior hx.

## 2025-04-14 NOTE — DISCHARGE NOTE PROVIDER - NSDCMRMEDTOKEN_GEN_ALL_CORE_FT
aspirin 81 mg oral delayed release tablet: 1 tab(s) orally once a day  atorvastatin 80 mg oral tablet: 1 tab(s) orally once a day  dilTIAZem 120 mg/24 hours oral capsule, extended release: 1 cap(s) orally once a day  escitalopram 10 mg oral tablet: 1 tab(s) orally once a day

## 2025-04-14 NOTE — CONSULT NOTE ADULT - SUBJECTIVE AND OBJECTIVE BOX
84yF was admitted on 04-13 with history of left leg weakness and CVAs, had worsened left leg weakness. Patient is s/p TNK.     Imaging Reviewed Today:  CT Brain 4/13 - No acute transcortical infarct or intracranial hemorrhage.     CT angiogram neck 4/13 - No vaso-occlusive disease.    CT angiogram head 4/13 - -No vaso-occlusive disease. -0.2 cm inferiorly oriented outpouching at the left supraclinoid ICA,   likely an infundibulum versus less likely aneurysm.     CT perfusion 4/13 - -No core infarct or at-risk ischemic tissue identified.    ----------------------------  Patient reports he left leg is better, but remains weak.      VITALS  T(C): 36.6 (04-14-25 @ 07:22), Max: 36.8 (04-13-25 @ 20:00)  HR: 70 (04-14-25 @ 06:00) (66 - 88)  BP: 118/61 (04-14-25 @ 06:00) (118/61 - 173/69)  RR: 15 (04-14-25 @ 06:00) (14 - 26)  SpO2: 94% (04-14-25 @ 06:00) (94% - 100%)  Wt(kg): --    PAST MEDICAL & SURGICAL HISTORY  HTN (hypertension)    Rheumatic fever    TIA (transient ischemic attack)    CAD S/P percutaneous coronary angioplasty    [place]    Cerebrovascular accident (CVA)    Status post tonsillectomy    H/O left nephrectomy    Renal cancer         RECENT LABS REVIEWED    CBC Full  -  ( 14 Apr 2025 02:36 )  WBC Count : 6.22 K/uL  RBC Count : 4.65 M/uL  Hemoglobin : 14.3 g/dL  Hematocrit : 42.1 %  Platelet Count - Automated : 171 K/uL  Mean Cell Volume : 90.5 fl  Mean Cell Hemoglobin : 30.8 pg  Mean Cell Hemoglobin Concentration : 34.0 g/dL  Auto Neutrophil # : x  Auto Lymphocyte # : x  Auto Monocyte # : x  Auto Eosinophil # : x  Auto Basophil # : x  Auto Neutrophil % : x  Auto Lymphocyte % : x  Auto Monocyte % : x  Auto Eosinophil % : x  Auto Basophil % : x    04-14    137  |  101  |  13.1  ----------------------------<  82  4.6   |  24.0  |  0.74    Ca    8.7      14 Apr 2025 02:36    TPro  6.7  /  Alb  4.2  /  TBili  1.1  /  DBili  x   /  AST  29  /  ALT  16  /  AlkPhos  72  04-13    Urinalysis Basic - ( 14 Apr 2025 02:36 )    Color: x / Appearance: x / SG: x / pH: x  Gluc: 82 mg/dL / Ketone: x  / Bili: x / Urobili: x   Blood: x / Protein: x / Nitrite: x   Leuk Esterase: x / RBC: x / WBC x   Sq Epi: x / Non Sq Epi: x / Bacteria: x        ALLERGIES  Metoprolol Succinate ER (Unknown)  Biaxin (Muscle Pain; Joint Pain)  monoctanoin (Unknown)  Isosorbide Mononitrate Extended Release (Faint)  codeine (Faint)      MEDICATIONS   atorvastatin 80 milliGRAM(s) Oral at bedtime  chlorhexidine 2% Cloths 1 Application(s) Topical daily  escitalopram 10 milliGRAM(s) Oral daily  hydrALAZINE Injectable 10 milliGRAM(s) IV Push every 2 hours PRN  sodium chloride 0.9%. 1000 milliLiter(s) IV Continuous <Continuous>      ----------------------------------------------------------------------------------------  FUNCTIONAL HISTORY  Lives alone, Ramp to enter  Independent with PRN use of SC outside    FUNCTIONAL STATUS/PROGRESS  Pending evals    ----------------------------------------------------------------------------------------  PHYSICAL EXAM  Constitutional - NAD, Appears Comfortable  HEENT - NCAT, EOMI  Neck - Supple, No limited ROM  Chest - Breathing comfortably, No wheezing  Cardiovascular - S1S2   Abdomen - Soft   Extremities - No C/C/E, No calf tenderness   Neurologic Exam -                    Cognitive - AAO to self, place, date, year, situation     Communication - Fluent, No dysarthria     Cranial Nerves - CN 2-12 intact     FUNCTIONAL MOTOR EXAM -                      LEFT    UE - ShAB 5/5, EF 5/5, EE 5/5, WE 5/5,  5/5                    RIGHT UE - ShAB 5/5, EF 5/5, EE 5/5, WE 5/5,  5/5                    LEFT    LE - HF 4/5, KE 5/5, DF 5/5, PF 5/5 - at times limited effort                    RIGHT LE - HF 5/5, KE 5/5, DF 5/5, PF 5/5        Sensory - Intact to LT   Psychiatric - Mood stable, Affect WNL  ----------------------------------------------------------------------------------------  ASSESSMENT/PLAN  84yFemale with functional deficits after developing left leg weakness  H/O CVAs, Left leg weakness - Lipitor  HTN - Hydralizine  Anxiety D/O - Lexapro   DVT PPX - SCDs  Rehab/Impaired mobility and function - Patient continues to require hospitalization for the above diagnoses and ongoing active management of comorbid complications that are substantially posing a threat to bodily function, functional ability and quality of life.     RECOMMEND - OOB daily    Patient is pending TTE, HEAD CT, therapy evals.    Left LE appears fairly strong.   Will follow functional assessments and plan for needs.

## 2025-04-14 NOTE — DISCHARGE NOTE PROVIDER - HOSPITAL COURSE
The patient is a 84y Female with PMHx prior CVAs/TIAs (reports no residual deficits), seizure disorder (not currently on AEDs), HTN, HLD, renal cancer s/p nephrectomy,  CAD s/p PCI, who presented to [facility] ED 4/13/25 with symptoms of speech difficulty and left leg weakness. Patient reports that at approximately 1:30PM the afternoon of presentation, she felt a sudden onset of dizziness and palpitations, and could not move her left leg. She attempted to call her daughter for help, but states she was having difficulty getting her words out. Code stroke activated on ED arrival, CT head showed no evidence of hemorrhage, CTA head/neck with no large vessel occlusion or critical stenosis. NIH=3, after discussion w/ stroke attending the decision was made to administer tenecteplase at ~17:13hrs. MRI head negative for acute ischemia.     Review of patient's prior records describes multiple similar presentations of symptoms (LLE weakness and difficulty producing speech); First episode occurred on 5/20/20, and then on 6/1/20, 11/28/20, 3/27/21, 7/25/21, 9/8/21, 4/23/22, 6/6/22. All of which had been worked up as acute CVA, but CTH/CTA/CTP/MRI have always been unremarkable. 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 Patient states to have self-discontinued AEDS ~2yrs ago due to side effects; while hospitalized at [facility] in October 2024 for chest pain, she was seen by neurology who recommended discontinuation of AEDs due to low clinical suspicion for seizure.     Differential diagnoses include seizure versus conversion disorder given prior hx.     Hospital course notable for:  -TTE ++++++  -EEG +++++++++            CORE MEASURES:       Admission NIHSS: 2     Tenecteplase : [x] YES 4/13/25  [] NO     LDL/HDL/A1C: ++++     Depression Screen - if depression hx and/or present     Statin Therapy: lipitor 80mg     Dysphagia Screen: [x] PASS [] FAIL      Smoking in the past 12 months [] YES [x] NO     Afib [] YES [x] NO     Stroke Education [x] YES [] NO      Diabetes [] YES [x] NO    On _____, the patient and/or their family member/caretaker were provided with a brief summary and overview of the hospital course including admission date, diagnosis, surgery / procedure, complications if applicable, discharge instructions including but not limited to: incision care, signs/symptoms to look out for after discharge, home medications, new medications, patient / family education/training and instructions for follow up. Any concerns were addressed, and all questions were answered.    The patient is a 84y Female with PMHx prior CVAs/TIAs (reports no residual deficits), seizure disorder (not currently on AEDs), HTN, HLD, renal cancer s/p nephrectomy,  CAD s/p PCI, who presented to [facility] ED 4/13/25 with symptoms of speech difficulty and left leg weakness. Patient reports that at approximately 1:30PM the afternoon of presentation, she felt a sudden onset of dizziness and palpitations, and could not move her left leg. She attempted to call her daughter for help, but states she was having difficulty getting her words out. Code stroke activated on ED arrival, CT head showed no evidence of hemorrhage, CTA head/neck with no large vessel occlusion or critical stenosis. NIH=3, after discussion w/ stroke attending the decision was made to administer tenecteplase at ~17:13hrs. MRI head negative for acute ischemia.     Review of patient's prior records describes multiple similar presentations of symptoms (LLE weakness and difficulty producing speech); First episode occurred on 5/20/20, and then on 6/1/20, 11/28/20, 3/27/21, 7/25/21, 9/8/21, 4/23/22, 6/6/22. All of which had been worked up as acute CVA, but CTH/CTA/CTP/MRI have always been unremarkable. 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 Patient states to have self-discontinued AEDS ~2yrs ago due to side effects; while hospitalized at [facility] in October 2024 for chest pain, she was seen by neurology who recommended discontinuation of AEDs due to low clinical suspicion for seizure.     Differential diagnoses include seizure versus conversion disorder given prior hx.     Hospital course notable for:  -TTE without acute findings   -EEG +++++++++    On _____, the patient and/or their family member/caretaker were provided with a brief summary and overview of the hospital course including admission date, diagnosis, surgery / procedure, complications if applicable, discharge instructions including but not limited to: incision care, signs/symptoms to look out for after discharge, home medications, new medications, patient / family education/training and instructions for follow up. Any concerns were addressed, and all questions were answered.    The patient is a 84y Female with PMHx prior CVAs/TIAs (reports no residual deficits), seizure disorder (not currently on AEDs), HTN, HLD, renal cancer s/p nephrectomy,  CAD s/p PCI, who presented to [facility] ED 4/13/25 with symptoms of speech difficulty and left leg weakness. Patient reports that at approximately 1:30PM the afternoon of presentation, she felt a sudden onset of dizziness and palpitations, and could not move her left leg. She attempted to call her daughter for help, but states she was having difficulty getting her words out. Code stroke activated on ED arrival, CT head showed no evidence of hemorrhage, CTA head/neck with no large vessel occlusion or critical stenosis. NIH=3, after discussion w/ stroke attending the decision was made to administer tenecteplase at ~17:13hrs. MRI head negative for acute ischemia.     Review of patient's prior records describes multiple similar presentations of symptoms (LLE weakness and difficulty producing speech); First episode occurred on 5/20/20, and then on 6/1/20, 11/28/20, 3/27/21, 7/25/21, 9/8/21, 4/23/22, 6/6/22. All of which had been worked up as acute CVA, but CTH/CTA/CTP/MRI have always been unremarkable. 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 Patient states to have self-discontinued AEDS ~2yrs ago due to side effects; while hospitalized at [facility] in October 2024 for chest pain, she was seen by neurology who recommended discontinuation of AEDs due to low clinical suspicion for seizure.     Differential diagnoses include occult seizure versus conversion disorder given prior hx, however cannot entirely exclude tenecteplase aborted stroke.     Hospital course notable for:  -TTE without acute findings   -EEG without evidence of seizure activity.     Evaluated by PT/OT, recommended discharge home with home PT/OT. Patient should follow up with general neurology, cardiology, and primary care provider after discharge.     On 4/15/25, the patient was provided with a brief summary and overview of the hospital course including admission date, diagnosis, surgery / procedure, complications if applicable, discharge instructions including but not limited to: incision care, signs/symptoms to look out for after discharge, home medications, new medications, patient / family education/training and instructions for follow up. Any concerns were addressed, and all questions were answered.

## 2025-04-14 NOTE — DISCHARGE NOTE PROVIDER - PROVIDER TOKENS
PROVIDER:[TOKEN:[[id number]],FOLLOWUP:[1 month]],PROVIDER:[TOKEN:[[id number]],FOLLOWUP:[2 weeks]],FREE:[LAST:[Your Primary Care Provider],PHONE:[(   )    -],FAX:[(   )    -],FOLLOWUP:[1 week]]

## 2025-04-14 NOTE — OCCUPATIONAL THERAPY INITIAL EVALUATION ADULT - ADDITIONAL COMMENTS
Pt has a shower with doors, grab bars and a shower chair. Pt was independent PTA with intermittent use of a RW in her home and SAC in the community. Pt owns RW, SAC. Pt does not Drive. Pt is R handed. Pt reports has supportive family to assist with needs.

## 2025-04-14 NOTE — OCCUPATIONAL THERAPY INITIAL EVALUATION ADULT - VISUAL ASSESSMENT: TRACKING
grossly with full ROM in bilateral eyes, pursuits seemingly smooth; (+) nystagmus noted in R eye with R sided gaze, (+) R pupil dilation noted

## 2025-04-14 NOTE — DISCHARGE NOTE PROVIDER - CARE PROVIDERS DIRECT ADDRESSES
,[email or web address]

## 2025-04-14 NOTE — OCCUPATIONAL THERAPY INITIAL EVALUATION ADULT - DIAGNOSIS, OT EVAL
84 year old Female presented to [facility] ED 4/13/25 with symptoms of speech difficulty, L leg weakness, sudden onset of dizziness and palpitations. Code stroke activated on ED arrival, CT head showed no evidence of hemorrhage, CTA head/neck with no large vessel occlusion or critical stenosis. Tenecteplase administered at ~17:13, Pt admitted to NSICU for post tenecteplase monitoring.

## 2025-04-14 NOTE — CHART NOTE - NSCHARTNOTEFT_GEN_A_CORE
NSICU DOWNGRADE/TRANSFER NOTE:     HPI:  84-year-old female past medical history of anxiety/panic attacks, CAD (hx of stents on aspirin), CVA with no residual deficits, renal cell carcinoma not currently undergoing any treatment, seizures not currently on AED's, questionable A-fib s/p ILR not on anticoagulation presents with aphasia and left-sided weakness.  The patient's granddaughter was visiting the patient today, she left at 3:30 PM the patient was in her normal state of health, able to move all her extremities and speaking easily.  Shortly thereafter pt noticed something "was bad" and that she didnt feel well and she called her daughter-in-law to come over because her medical alert she pressed had no battery. She was found to have difficulty speaking and with left-sided weakness.  No headache, chest pain, shortness of breath. Brought to ED and stroke code was initiated, TNK given @17:13 and admitted to NSICU. NIHSS 3. mRS 0.  (13 Apr 2025 17:48)      INTERVAL HPI/OVERNIGHT EVENTS:  84-year-old female past medical history of anxiety/panic attacks, CAD (hx of stents on aspirin), CVA with no residual deficits, renal cell carcinoma not currently undergoing any treatment, seizures not currently on AED's, questionable A-fib s/p ILR not on anticoagulation presents with aphasia and left-sided weakness.  LKW 3:30 PM the patient was in her normal state of health, able to move all her extremities and speaking easily. Brought to ED and stroke code was initiated, CT angiogram neck no vaso-occlusive disease, CT angiogram head no vaso-occlusive disease. 0.2 cm inferiorly oriented outpouching at the left supraclinoid ICA, likely an infundibulum versus less likely aneurysm, CT perfusion no core infarct or at-risk ischemic tissue identified. TNK given @17:13 and admitted to NSICU. NIHSS 3 upon admission. mRS 0. Neuro exam improved s/p TNK. MRB showed >>>. Patient deemed stable for downgrade to Stroke service.     Vital Signs Last 24 Hrs  T(C): 36.7 (14 Apr 2025 15:55), Max: 36.8 (13 Apr 2025 20:00)  T(F): 98 (14 Apr 2025 15:55), Max: 98.3 (13 Apr 2025 20:00)  HR: 80 (14 Apr 2025 15:00) (66 - 89)  BP: 136/76 (14 Apr 2025 15:00) (100/82 - 173/69)  BP(mean): 95 (14 Apr 2025 15:00) (74 - 115)  RR: 22 (14 Apr 2025 15:00) (14 - 43)  SpO2: 100% (14 Apr 2025 15:00) (94% - 100%)    Parameters below as of 14 Apr 2025 08:00  Patient On (Oxygen Delivery Method): room air    PHYSICAL EXAM:  GENERAL: NAD, well-groomed  HEAD:  Atraumatic, normocephalic  MENTAL STATUS: AAO x3; Awake, Opens eyes spontaneously; Appropriately conversant without aphasia; following simple commands  CRANIAL NERVES: Visual acuity normal for age, R pupil 5mm nonreactive (baseline), L pupil 3mm reactive. EOMI without nystagmus. Facial sensation intact V1-3 distribution b/l. Face symmetric w/ normal eye closure and smile, tongue midline. Hearing grossly intact. Speech clear.   MOTOR: strength 5/5 b/l upper and lower extremities except 4/5 Left HF  SENSATION: grossly intact to light touch all extremities  COORDINATION: no upper extremity dysmetria  PULM: non labored breathing, no use of accessory muscles  CVS:  Regular Rate and Rhythm   GI: Soft, Nontender, Nondistended       LABS:                        14.3   6.22  )-----------( 171      ( 14 Apr 2025 02:36 )             42.1     04-14    137  |  101  |  13.1  ----------------------------<  82  4.6   |  24.0  |  0.74    Ca    8.7      14 Apr 2025 02:36    TPro  6.7  /  Alb  4.2  /  TBili  1.1  /  DBili  x   /  AST  29  /  ALT  16  /  AlkPhos  72  04-13    PT/INR - ( 13 Apr 2025 17:00 )   PT: 11.8 sec;   INR: 1.02 ratio         PTT - ( 13 Apr 2025 17:00 )  PTT:27.2 sec  Urinalysis Basic - ( 14 Apr 2025 02:36 )    Color: x / Appearance: x / SG: x / pH: x  Gluc: 82 mg/dL / Ketone: x  / Bili: x / Urobili: x   Blood: x / Protein: x / Nitrite: x   Leuk Esterase: x / RBC: x / WBC x   Sq Epi: x / Non Sq Epi: x / Bacteria: x      04-13 @ 07:01  -  04-14 @ 07:00  --------------------------------------------------------  IN: 570 mL / OUT: 1150 mL / NET: -580 mL    04-14 @ 07:01  -  04-14 @ 17:03  --------------------------------------------------------  IN: 480 mL / OUT: 400 mL / NET: 80 mL      RADIOLOGY & ADDITIONAL TESTS:  < from: CT Brain Stroke Protocol (04.13.25 @ 16:52) >    IMPRESSION:    No acute transcortical infarct or intracranial hemorrhage.     < from: CT Brain Perfusion Maps Stroke (04.13.25 @ 17:08) >    IMPRESSION:  CT angiogram neck:  -No vaso-occlusive disease.    CT angiogram head:  -No vaso-occlusive disease.  -0.2 cm inferiorly oriented outpouching at the left supraclinoid ICA,   likely an infundibulum versus less likely aneurysm.    CT perfusion:  -No core infarct or at-risk ischemic tissue identified.    < from: US Duplex Venous Lower Ext Complete, Bilateral (04.13.25 @ 20:43) >    IMPRESSION:    No evidence of deep venous thrombosis in either lower extremity.    A/P:	  84-year-old female w/ past medical history of anxiety/panic attacks, CAD (hx of stents on aspirin), CVA with no residual deficits, renal cell carcinoma not currently undergoing any treatment, seizures not currently on AED's, A-fib not on anticoagulation presents with aphasia and left-sided weakness now s/p TNK @17:13 on 4/13/25.     PLAN:  - d/w attending    Neuro:  - HOB 30 degrees  - Neuro checks q 4 hours, vital checks q 4 hours  - MRB reviewed  - Received TNK on 4/13 @ 1713  - restarted home lexapro 10mg qd    Respiratory:  - Incentive spirometry  - on RA     CV:  - SBP goal <180  - PRN hydralazine   - f/u TTE and lipid panel  - c/w home atorvastatin 80mg qhs  Endocrine:  - Goal euglycemia  - A1c/TSH pending    Heme/Onc:  - DVT ppx: SCDs/SQL   - Resume asa 81mg     Renal:  - s/p NS@ 60ml/hour; now IV lock since tolerating diet     ID:  - Afebrile     GI:  - Regular diet (DASH)  - LBM OP, bowel regimen senna and miralax    Activity:  - Increase as tolerated  - PT following  - OT following    Other:  - PM&R eval  - SLP cleared for diet NSICU DOWNGRADE/TRANSFER NOTE:     HPI:  84-year-old female past medical history of anxiety/panic attacks, CAD (hx of stents on aspirin), CVA with no residual deficits, renal cell carcinoma not currently undergoing any treatment, seizures not currently on AED's, questionable A-fib s/p ILR not on anticoagulation presents with aphasia and left-sided weakness.  The patient's granddaughter was visiting the patient today, she left at 3:30 PM the patient was in her normal state of health, able to move all her extremities and speaking easily.  Shortly thereafter pt noticed something "was bad" and that she didnt feel well and she called her daughter-in-law to come over because her medical alert she pressed had no battery. She was found to have difficulty speaking and with left-sided weakness.  No headache, chest pain, shortness of breath. Brought to ED and stroke code was initiated, TNK given @17:13 and admitted to NSICU. NIHSS 3. mRS 0.  (13 Apr 2025 17:48)      INTERVAL HPI/OVERNIGHT EVENTS:  84-year-old female past medical history of anxiety/panic attacks, CAD (hx of stents on aspirin), CVA with no residual deficits, renal cell carcinoma not currently undergoing any treatment, seizures not currently on AED's, questionable A-fib s/p ILR not on anticoagulation presents with aphasia and left-sided weakness.  LKW 3:30 PM the patient was in her normal state of health, able to move all her extremities and speaking easily. Brought to ED and stroke code was initiated, CT angiogram neck no vaso-occlusive disease, CT angiogram head no vaso-occlusive disease. 0.2 cm inferiorly oriented outpouching at the left supraclinoid ICA, likely an infundibulum versus less likely aneurysm, CT perfusion no core infarct or at-risk ischemic tissue identified. TNK given @17:13 and admitted to NSICU. NIHSS 3 upon admission. mRS 0. Neuro exam improved s/p TNK. MRB showed no evidence for intracranial mass, acute territorial infarct, acute intracranial hemorrhage, or midline shift. Patient started on asa and SQL. Patient deemed stable for downgrade to Stroke service.     Vital Signs Last 24 Hrs  T(C): 36.7 (14 Apr 2025 15:55), Max: 36.8 (13 Apr 2025 20:00)  T(F): 98 (14 Apr 2025 15:55), Max: 98.3 (13 Apr 2025 20:00)  HR: 80 (14 Apr 2025 15:00) (66 - 89)  BP: 136/76 (14 Apr 2025 15:00) (100/82 - 173/69)  BP(mean): 95 (14 Apr 2025 15:00) (74 - 115)  RR: 22 (14 Apr 2025 15:00) (14 - 43)  SpO2: 100% (14 Apr 2025 15:00) (94% - 100%)    Parameters below as of 14 Apr 2025 08:00  Patient On (Oxygen Delivery Method): room air    PHYSICAL EXAM:  GENERAL: NAD, well-groomed  HEAD:  Atraumatic, normocephalic  MENTAL STATUS: AAO x3; Awake, Opens eyes spontaneously; Appropriately conversant without aphasia; following simple commands  CRANIAL NERVES: Visual acuity normal for age, R pupil 5mm nonreactive (baseline), L pupil 3mm reactive. EOMI without nystagmus. Facial sensation intact V1-3 distribution b/l. Face symmetric w/ normal eye closure and smile, tongue midline. Hearing grossly intact. Speech clear.   MOTOR: strength 5/5 b/l upper and lower extremities except 4/5 Left HF  SENSATION: grossly intact to light touch all extremities  COORDINATION: no upper extremity dysmetria  PULM: non labored breathing, no use of accessory muscles  CVS:  Regular Rate and Rhythm   GI: Soft, Nontender, Nondistended       LABS:                        14.3   6.22  )-----------( 171      ( 14 Apr 2025 02:36 )             42.1     04-14    137  |  101  |  13.1  ----------------------------<  82  4.6   |  24.0  |  0.74    Ca    8.7      14 Apr 2025 02:36    TPro  6.7  /  Alb  4.2  /  TBili  1.1  /  DBili  x   /  AST  29  /  ALT  16  /  AlkPhos  72  04-13    PT/INR - ( 13 Apr 2025 17:00 )   PT: 11.8 sec;   INR: 1.02 ratio         PTT - ( 13 Apr 2025 17:00 )  PTT:27.2 sec  Urinalysis Basic - ( 14 Apr 2025 02:36 )    Color: x / Appearance: x / SG: x / pH: x  Gluc: 82 mg/dL / Ketone: x  / Bili: x / Urobili: x   Blood: x / Protein: x / Nitrite: x   Leuk Esterase: x / RBC: x / WBC x   Sq Epi: x / Non Sq Epi: x / Bacteria: x      04-13 @ 07:01  -  04-14 @ 07:00  --------------------------------------------------------  IN: 570 mL / OUT: 1150 mL / NET: -580 mL    04-14 @ 07:01  -  04-14 @ 17:03  --------------------------------------------------------  IN: 480 mL / OUT: 400 mL / NET: 80 mL      RADIOLOGY & ADDITIONAL TESTS:  < from: CT Brain Stroke Protocol (04.13.25 @ 16:52) >    IMPRESSION:    No acute transcortical infarct or intracranial hemorrhage.     < from: CT Brain Perfusion Maps Stroke (04.13.25 @ 17:08) >    IMPRESSION:  CT angiogram neck:  -No vaso-occlusive disease.    CT angiogram head:  -No vaso-occlusive disease.  -0.2 cm inferiorly oriented outpouching at the left supraclinoid ICA,   likely an infundibulum versus less likely aneurysm.    CT perfusion:  -No core infarct or at-risk ischemic tissue identified.    < from: US Duplex Venous Lower Ext Complete, Bilateral (04.13.25 @ 20:43) >    IMPRESSION:    No evidence of deep venous thrombosis in either lower extremity.    < from: MR Head No Cont (04.14.25 @ 18:16) >    IMPRESSION:    No evidence for intracranial mass, acute territorial infarct, acute   intracranial hemorrhage, or midline shift.      A/P:	  84-year-old female w/ past medical history of anxiety/panic attacks, CAD (hx of stents on aspirin), CVA with no residual deficits, renal cell carcinoma not currently undergoing any treatment, seizures not currently on AED's, A-fib not on anticoagulation presents with aphasia and left-sided weakness now s/p TNK @17:13 on 4/13/25.     PLAN:  - d/w attending    Neuro:  - HOB 30 degrees  - Neuro checks q 4 hours, vital checks q 4 hours  - MRB reviewed  - Received TNK on 4/13 @ 1713  - restarted home lexapro 10mg qd    Respiratory:  - Incentive spirometry  - on RA     CV:  - SBP goal <180  - PRN hydralazine   - f/u TTE and lipid panel  - c/w home atorvastatin 80mg qhs    Endocrine:  - Goal euglycemia  - A1c/TSH pending    Heme/Onc:  - DVT ppx: SCDs/SQL   - Resume asa 81mg     Renal:  - s/p NS@ 60ml/hour; now IV lock since tolerating diet     ID:  - Afebrile     GI:  - Regular diet (DASH)  - LBM OP, bowel regimen senna and miralax    Activity:  - Increase as tolerated  - PT following  - OT following    Other:  - PM&R eval  - SLP cleared for diet

## 2025-04-14 NOTE — PHYSICAL THERAPY INITIAL EVALUATION ADULT - ADDITIONAL COMMENTS
Pt lives alone in a private home, as family nearby that is supportive. No steps to enter, Ramp is available in the garage, No steps inside. Pt was independent PTA with intermittent use of a RW in her home and SAC in the community. Pt owns RW, SAC, grab bars in her shower and a shower chair. (-) .

## 2025-04-15 VITALS
HEART RATE: 102 BPM | SYSTOLIC BLOOD PRESSURE: 103 MMHG | RESPIRATION RATE: 20 BRPM | DIASTOLIC BLOOD PRESSURE: 63 MMHG | OXYGEN SATURATION: 98 % | TEMPERATURE: 98 F

## 2025-04-15 LAB
A1C WITH ESTIMATED AVERAGE GLUCOSE RESULT: 5.4 % — SIGNIFICANT CHANGE UP (ref 4–5.6)
ANION GAP SERPL CALC-SCNC: 14 MMOL/L — SIGNIFICANT CHANGE UP (ref 5–17)
BUN SERPL-MCNC: 14.4 MG/DL — SIGNIFICANT CHANGE UP (ref 8–20)
CALCIUM SERPL-MCNC: 8.5 MG/DL — SIGNIFICANT CHANGE UP (ref 8.4–10.5)
CHLORIDE SERPL-SCNC: 100 MMOL/L — SIGNIFICANT CHANGE UP (ref 96–108)
CHOLEST SERPL-MCNC: 133 MG/DL — SIGNIFICANT CHANGE UP
CO2 SERPL-SCNC: 22 MMOL/L — SIGNIFICANT CHANGE UP (ref 22–29)
CREAT SERPL-MCNC: 0.8 MG/DL — SIGNIFICANT CHANGE UP (ref 0.5–1.3)
EGFR: 73 ML/MIN/1.73M2 — SIGNIFICANT CHANGE UP
EGFR: 73 ML/MIN/1.73M2 — SIGNIFICANT CHANGE UP
ESTIMATED AVERAGE GLUCOSE: 108 MG/DL — SIGNIFICANT CHANGE UP (ref 68–114)
GLUCOSE SERPL-MCNC: 126 MG/DL — HIGH (ref 70–99)
HCT VFR BLD CALC: 45.4 % — HIGH (ref 34.5–45)
HDLC SERPL-MCNC: 70 MG/DL — SIGNIFICANT CHANGE UP
HGB BLD-MCNC: 15.4 G/DL — SIGNIFICANT CHANGE UP (ref 11.5–15.5)
LDLC SERPL-MCNC: 52 MG/DL — SIGNIFICANT CHANGE UP
LIPID PNL WITH DIRECT LDL SERPL: 52 MG/DL — SIGNIFICANT CHANGE UP
MAGNESIUM SERPL-MCNC: 2.1 MG/DL — SIGNIFICANT CHANGE UP (ref 1.6–2.6)
MCHC RBC-ENTMCNC: 30.6 PG — SIGNIFICANT CHANGE UP (ref 27–34)
MCHC RBC-ENTMCNC: 33.9 G/DL — SIGNIFICANT CHANGE UP (ref 32–36)
MCV RBC AUTO: 90.3 FL — SIGNIFICANT CHANGE UP (ref 80–100)
NONHDLC SERPL-MCNC: 63 MG/DL — SIGNIFICANT CHANGE UP
NRBC # BLD AUTO: 0 K/UL — SIGNIFICANT CHANGE UP (ref 0–0)
NRBC # FLD: 0 K/UL — SIGNIFICANT CHANGE UP (ref 0–0)
NRBC BLD AUTO-RTO: 0 /100 WBCS — SIGNIFICANT CHANGE UP (ref 0–0)
PHOSPHATE SERPL-MCNC: 3.4 MG/DL — SIGNIFICANT CHANGE UP (ref 2.4–4.7)
PLATELET # BLD AUTO: 182 K/UL — SIGNIFICANT CHANGE UP (ref 150–400)
PMV BLD: 10.1 FL — SIGNIFICANT CHANGE UP (ref 7–13)
POTASSIUM SERPL-MCNC: 4.3 MMOL/L — SIGNIFICANT CHANGE UP (ref 3.5–5.3)
POTASSIUM SERPL-SCNC: 4.3 MMOL/L — SIGNIFICANT CHANGE UP (ref 3.5–5.3)
RBC # BLD: 5.03 M/UL — SIGNIFICANT CHANGE UP (ref 3.8–5.2)
RBC # FLD: 11.9 % — SIGNIFICANT CHANGE UP (ref 10.3–14.5)
SODIUM SERPL-SCNC: 136 MMOL/L — SIGNIFICANT CHANGE UP (ref 135–145)
TRIGL SERPL-MCNC: 47 MG/DL — SIGNIFICANT CHANGE UP
TSH SERPL-MCNC: 5.16 UIU/ML — HIGH (ref 0.27–4.2)
WBC # BLD: 8.84 K/UL — SIGNIFICANT CHANGE UP (ref 3.8–10.5)
WBC # FLD AUTO: 8.84 K/UL — SIGNIFICANT CHANGE UP (ref 3.8–10.5)

## 2025-04-15 PROCEDURE — C8929: CPT

## 2025-04-15 PROCEDURE — 84443 ASSAY THYROID STIM HORMONE: CPT

## 2025-04-15 PROCEDURE — 86850 RBC ANTIBODY SCREEN: CPT

## 2025-04-15 PROCEDURE — 85027 COMPLETE CBC AUTOMATED: CPT

## 2025-04-15 PROCEDURE — 86900 BLOOD TYPING SEROLOGIC ABO: CPT

## 2025-04-15 PROCEDURE — 95819 EEG AWAKE AND ASLEEP: CPT

## 2025-04-15 PROCEDURE — 85025 COMPLETE CBC W/AUTO DIFF WBC: CPT

## 2025-04-15 PROCEDURE — 70551 MRI BRAIN STEM W/O DYE: CPT | Mod: MC

## 2025-04-15 PROCEDURE — 93970 EXTREMITY STUDY: CPT

## 2025-04-15 PROCEDURE — 99239 HOSP IP/OBS DSCHRG MGMT >30: CPT

## 2025-04-15 PROCEDURE — 85730 THROMBOPLASTIN TIME PARTIAL: CPT

## 2025-04-15 PROCEDURE — 37195 THROMBOLYTIC THERAPY STROKE: CPT

## 2025-04-15 PROCEDURE — 0042T: CPT | Mod: MC

## 2025-04-15 PROCEDURE — 87641 MR-STAPH DNA AMP PROBE: CPT

## 2025-04-15 PROCEDURE — 86901 BLOOD TYPING SEROLOGIC RH(D): CPT

## 2025-04-15 PROCEDURE — 80048 BASIC METABOLIC PNL TOTAL CA: CPT

## 2025-04-15 PROCEDURE — 97163 PT EVAL HIGH COMPLEX 45 MIN: CPT

## 2025-04-15 PROCEDURE — 93010 ELECTROCARDIOGRAM REPORT: CPT

## 2025-04-15 PROCEDURE — 70498 CT ANGIOGRAPHY NECK: CPT | Mod: MC

## 2025-04-15 PROCEDURE — 93005 ELECTROCARDIOGRAM TRACING: CPT

## 2025-04-15 PROCEDURE — 82962 GLUCOSE BLOOD TEST: CPT

## 2025-04-15 PROCEDURE — 84100 ASSAY OF PHOSPHORUS: CPT

## 2025-04-15 PROCEDURE — 70450 CT HEAD/BRAIN W/O DYE: CPT | Mod: MC

## 2025-04-15 PROCEDURE — 87640 STAPH A DNA AMP PROBE: CPT

## 2025-04-15 PROCEDURE — ZZZZZ: CPT

## 2025-04-15 PROCEDURE — 70496 CT ANGIOGRAPHY HEAD: CPT | Mod: MC

## 2025-04-15 PROCEDURE — 99233 SBSQ HOSP IP/OBS HIGH 50: CPT

## 2025-04-15 PROCEDURE — 83735 ASSAY OF MAGNESIUM: CPT

## 2025-04-15 PROCEDURE — 85610 PROTHROMBIN TIME: CPT

## 2025-04-15 PROCEDURE — 97167 OT EVAL HIGH COMPLEX 60 MIN: CPT

## 2025-04-15 PROCEDURE — 83036 HEMOGLOBIN GLYCOSYLATED A1C: CPT

## 2025-04-15 PROCEDURE — 80053 COMPREHEN METABOLIC PANEL: CPT

## 2025-04-15 PROCEDURE — 99291 CRITICAL CARE FIRST HOUR: CPT | Mod: 25

## 2025-04-15 PROCEDURE — 84484 ASSAY OF TROPONIN QUANT: CPT

## 2025-04-15 PROCEDURE — 80061 LIPID PANEL: CPT

## 2025-04-15 PROCEDURE — 36415 COLL VENOUS BLD VENIPUNCTURE: CPT

## 2025-04-15 RX ORDER — METOCLOPRAMIDE HCL 10 MG
5 TABLET ORAL ONCE
Refills: 0 | Status: COMPLETED | OUTPATIENT
Start: 2025-04-15 | End: 2025-04-15

## 2025-04-15 RX ADMIN — Medication 5 MILLIGRAM(S): at 03:19

## 2025-04-15 RX ADMIN — Medication 1 APPLICATION(S): at 11:55

## 2025-04-15 RX ADMIN — ESCITALOPRAM OXALATE 10 MILLIGRAM(S): 20 TABLET ORAL at 11:55

## 2025-04-15 RX ADMIN — Medication 81 MILLIGRAM(S): at 11:55

## 2025-04-15 NOTE — PROGRESS NOTE ADULT - ASSESSMENT
COMPLETE A/P PENDING   ASSESSMENT: patient is a 84y Female with PMHx prior CVAs/TIAs (reports no residual deficits), seizure disorder (not currently on AEDs), HTN, HLD, renal cancer s/p nephrectomy,  CAD s/p PCI, who presented to [facility] ED 4/13/25 with symptoms of speech difficulty and left leg weakness. Patient reports that at approximately 1:30PM the afternoon of presentation, she felt a sudden onset of dizziness and palpitations, and could not move her left leg. She attempted to call her daughter for help, but states she was having difficulty getting her words out. Code stroke activated on ED arrival, CT head showed no evidence of hemorrhage, CTA head/neck with no large vessel occlusion or critical stenosis. NIH=3, after discussion w/ stroke attending the decision was made to administer tenecteplase at ~17:13hrs.     Review of patient's prior records describes multiple similar presentations of symptoms (LLE weakness and difficulty producing speech); First episode occurred on 5/20/20, and then on 6/1/20, 11/28/20, 3/27/21, 7/25/21, 9/8/21, 4/23/22, 6/6/22. All of which had been worked up as acute CVA, but CTH/CTA/CTP/MRI have always been unremarkable. 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 Patient states to have self-discontinued AEDS ~2yrs ago due to side effects; while hospitalized at [facility] in October 2024 for chest pain, she was seen by neurology who recommended discontinuation of AEDs due to low clinical suspicion for seizure.     MRI head obtained showing no acute infarct. EEG completed, pending official read. Differential diagnoses include seizure versus conversion disorder given prior history.      PLAN:     NEURO:   -Neurologically ***  -Continue close monitoring for neurologic deterioration    -Stroke neuro checks q4hrs   -SBP goal 100-160mmHg   -CTH/CTA head/neck as above  -ANTITHROMBOTIC THERAPY: resumed 81mg ASA as of 4/14/25   -MRI brain without contrast results as above; no acute infarct.   -LDL=52, resume home statin regimen of Atorvastatin 80mg daily   -Dysphagia screen: pass  -Physical therapy/OT/Speech eval/treatment.   -Routine EEG completed, pending official read     CARDIOVASCULAR:  -TTE results as above, no acute findings   -cardiac monitoring w/ telemetry       -Review of prior cardiology records:       -ILR placed in 6/2020 for stroke work up after multiple recurrent episodes of symptoms.        -hospitalized in 4/2022 for another episode, she was deemed as a nonresponder to ASA/Plavix given recurrent stroke-like events. Cardiology replaced Plavix with Xarelto. ILR was interrogated and no AF detected. Never had clear documentation of pAF. Cardiology recommended discontinuing Xarelto.        -Patient's most recent visit with her outpatient cardiologist, Dr. Cyr in November 2024 confirms no documented history of atrial fibrillation.                        HEMATOLOGY:  H/H 15.4/45.4 Platelets 182  -LE duplex negative for DVT   -patient should have all age and risk appropriate malignancy screenings with PCP or sooner if clinically suspected   -DVT ppx: Heparin s.c [] LMWH [x] SCDs [X]    PULMONARY:   -protecting airway, saturating well     RENAL:   BUN/Cr 14.4/0.80  -monitor urine output  -maintain adequate hydration       Na Goal:  135-145    ENDOCRINE:  -A1C=5.4  -TSH mildly elevated (5.16), recommend outpatient follow up with PCP for repeat thyroid panel     ID:   -afebrile  -no leukocytosis  -monitor for si/sx of infection     OTHER:    -condition and plan of care d/w patient, questions and concerns addressed.     DISPOSITION:   -once stable and workup is complete, PT/OT recommending Home PT/OT    CORE MEASURES:       Admission NIHSS: 2     Tenecteplase : [x] YES 4/13/25  [] NO     LDL/HDL/A1C: Pending      Depression Screen- if depression hx and/or present     Statin Therapy: lipitor 80mg     Dysphagia Screen: [x] PASS [] FAIL      Smoking in the past 12 months [] YES [x] NO     Afib [] YES [x] NO     Stroke Education [] YES [] NO [x] pending      Diabetes [] YES [x] NO    Obtain screening lower extremity venous ultrasound in patients who meet 1 or more of the following criteria as patient is high risk for DVT/PE on admission:   [] History of DVT/PE  []Hypercoagulable states (Factor V Leiden, Cancer, OCP, etc. )  []Prolonged immobility (hemiplegia/hemiparesis/post operative or any other extended immobilization)  [] Transferred from outside facility (Rehab or Long term care)  [] Age </= to 50  []Stroke     ASSESSMENT: patient is a 84y Female with PMHx prior CVAs/TIAs (reports no residual deficits), seizure disorder (not currently on AEDs), HTN, HLD, renal cancer s/p nephrectomy,  CAD s/p PCI, who presented to [facility] ED 4/13/25 with symptoms of speech difficulty and left leg weakness. Patient reports that at approximately 1:30PM the afternoon of presentation, she felt a sudden onset of dizziness and palpitations, and could not move her left leg. She attempted to call her daughter for help, but states she was having difficulty getting her words out. Code stroke activated on ED arrival, CT head showed no evidence of hemorrhage, CTA head/neck with no large vessel occlusion or critical stenosis. NIH=3, after discussion w/ stroke attending the decision was made to administer tenecteplase at ~17:13hrs.     Review of patient's prior records describes multiple similar presentations of symptoms (LLE weakness and difficulty producing speech); First episode occurred on 5/20/20, and then on 6/1/20, 11/28/20, 3/27/21, 7/25/21, 9/8/21, 4/23/22, 6/6/22. All of which had been worked up as acute CVA, but CTH/CTA/CTP/MRI have always been unremarkable. 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 Patient states to have self-discontinued AEDS ~2yrs ago due to side effects; while hospitalized at [facility] in October 2024 for chest pain, she was seen by neurology who recommended discontinuation of AEDs due to low clinical suspicion for seizure.     MRI head obtained showing no acute infarct. EEG completed, no epileptiform abnormalities or seizures recorded. Differential diagnoses include occult seizure versus conversion disorder, however cannot entirely exclude tenecteplase aborted stroke.     PLAN:     NEURO:   -Neurologically without acute change from prior exam.   -Continue close monitoring for neurologic deterioration    -Stroke neuro checks q4hrs   -SBP goal 100-160mmHg   -CTH/CTA head/neck as above  -ANTITHROMBOTIC THERAPY: resumed 81mg ASA as of 4/14/25   -MRI brain without contrast results as above; no acute infarct.   -LDL=52, resume home statin regimen of Atorvastatin 80mg daily   -Dysphagia screen: pass  -Physical therapy/OT/Speech eval/treatment.   -Routine EEG complete, no evidence of seizure activity; follow up outpatient with general neurology for further monitoring.     CARDIOVASCULAR:  -TTE results as above, no acute findings   -cardiac monitoring w/ telemetry       -Review of prior cardiology records:       -ILR placed in 6/2020 for stroke work up after multiple recurrent episodes of symptoms.        -hospitalized in 4/2022 for another episode, she was deemed as a nonresponder to ASA/Plavix given recurrent stroke-like events. Cardiology replaced Plavix with Xarelto. ILR was interrogated and no AF detected. Never had clear documentation of pAF. Cardiology recommended discontinuing Xarelto.        -Patient's most recent visit with her outpatient cardiologist, Dr. Cyr in November 2024 confirms no documented history of atrial fibrillation.                        HEMATOLOGY:  H/H 15.4/45.4 Platelets 182  -LE duplex negative for DVT   -patient should have all age and risk appropriate malignancy screenings with PCP or sooner if clinically suspected   -DVT ppx: Heparin s.c [] LMWH [x] SCDs [X]    PULMONARY:   -protecting airway, saturating well     RENAL:   BUN/Cr 14.4/0.80  -monitor urine output  -maintain adequate hydration       Na Goal:  135-145    ENDOCRINE:  -A1C=5.4  -TSH mildly elevated (5.16), recommend outpatient follow up with PCP for repeat thyroid panel     ID:   -afebrile  -no leukocytosis  -monitor for si/sx of infection     OTHER:    -condition and plan of care d/w patient, questions and concerns addressed.     DISPOSITION:   -once stable and workup is complete, PT/OT recommending Home PT/OT    CORE MEASURES:       Admission NIHSS: 2     Tenecteplase : [x] YES 4/13/25  [] NO     LDL/HDL/A1C: Pending      Depression Screen- if depression hx and/or present     Statin Therapy: lipitor 80mg     Dysphagia Screen: [x] PASS [] FAIL      Smoking in the past 12 months [] YES [x] NO     Afib [] YES [x] NO     Stroke Education [] YES [] NO [x] pending      Diabetes [] YES [x] NO    Obtain screening lower extremity venous ultrasound in patients who meet 1 or more of the following criteria as patient is high risk for DVT/PE on admission:   [] History of DVT/PE  []Hypercoagulable states (Factor V Leiden, Cancer, OCP, etc. )  []Prolonged immobility (hemiplegia/hemiparesis/post operative or any other extended immobilization)  [] Transferred from outside facility (Rehab or Long term care)  [] Age </= to 50  [x]Stroke

## 2025-04-15 NOTE — EEG REPORT - NS EEG TEXT BOX
BREANA HELM [id number]     Study Date: 04-14-25  Duration: 22 min  --------------------------------------------------------------------------------------------------  History:  CC/ HPI Patient is a 84y old  Female who presents with a chief complaint of S/p TNK (15 Apr 2025 07:58)    MEDICATIONS  (STANDING):  aspirin  chewable 81 milliGRAM(s) Oral daily  atorvastatin 80 milliGRAM(s) Oral at bedtime  chlorhexidine 2% Cloths 1 Application(s) Topical daily  enoxaparin Injectable 40 milliGRAM(s) SubCutaneous every 24 hours  escitalopram 10 milliGRAM(s) Oral daily  polyethylene glycol 3350 17 Gram(s) Oral daily  senna 2 Tablet(s) Oral at bedtime    --------------------------------------------------------------------------------------------------  Study Interpretation:    [[[Abbreviation Key:  PDR=alpha rhythm/posterior dominant rhythm. A-P=anterior posterior.  Amplitude: ‘very low’:<20; ‘low’:20-49; ‘medium’:; ‘high’:>150uV.  Persistence for periodic/rhythmic patterns (% of epoch) ‘rare’:<1%; ‘occasional’:1-10%; ‘frequent’:10-50%; ‘abundant’:50-90%; ‘continuous’:>90%.  Persistence for sporadic discharges: ‘rare’:<1/hr; ‘occasional’:1/min-1/hr; ‘frequent’:>1/min; ‘abundant’:>1/10 sec.  RPP=rhythmic and periodic patterns; GRDA=generalized rhythmic delta activity; FIRDA=frontal intermittent GRDA; LRDA=lateralized rhythmic delta activity; TIRDA=temporal intermittent rhythmic delta activity;  LPD=PLED=lateralized periodic discharges; GPD=generalized periodic discharges; BIPDs =bilateral independent periodic discharges; Mf=multifocal; SIRPDs=stimulus induced rhythmic, periodic, or ictal appearing discharges; BIRDs=brief potentially ictal rhythmic discharges >4 Hz, lasting .5-10s; PFA (paroxysmal bursts >13 Hz or =8 Hz <10s).  Modifiers: +F=with fast component; +S=with spike component; +R=with rhythmic component.  S-B=burst suppression pattern.  Max=maximal. N1-drowsy; N2-stage II sleep; N3-slow wave sleep. SSS/BETS=small sharp spikes/benign epileptiform transients of sleep. HV=hyperventilation; PS=photic stimulation]]]    Daily EEG Visual Analysis    FINDINGS:      Background:  Continuity: Continuous  Symmetry: Symmetric  Posterior dominant rhythm (PDR): 10 Hz, reactive to eye closure. Symmetric low-amplitude frontal beta in wakefulness.  Voltage: Normal  Anterior-Posterior Gradient: Present  Other background findings: None  Breach: Absent    Background Slowing:  Generalized slowing: None  Focal slowing: Frequent right hemispheric frontotemporally predominant focal polymorphic delta and theta slowing    State Changes:   Drowsiness is characterized by fragmentation, attenuation, and slowing of the background activity.  Stage 2 sleep is not captured.    Interictal Findings:  Rare right centrotemporal sharply contoured waveforms    Electrographic and Electroclinical seizures:  None    Other Clinical Events:  None    Activation Procedures:   Hyperventilation is not performed.    Photic stimulation is performed and does not elicit any abnormalities. There is symmetric photic driving.     Artifacts:  Intermittent myogenic and movement artifacts are present.    Single-lead EKG: Regular rhythm, occasional PVCs    EEG Classification / Summary:  Abnormal EEG in the awake, drowsy states.   -Frequent right hemispheric frontotemporally predominant focal slowing  -No definite epileptiform abnormalities  -No seizures    Clinical Impression:  -Frequent right hemispheric frontotemporally predominant focal cerebral dysfunction can be structural or functional in etiology.  -No definite epileptiform abnormalities  -No seizures          -------------------------------------------------------------------------------------------------------    Tianna Tamayo MD  Attending Physician, [facility]ile[facility] [place]    -------------------------------------------------------------------------------------------------------    To reach EEG technologist:  Please use the pager number for the appropriate hospital or contact the .  At [facility] - Pager #: [phone]    To reach EEG-reading physician:  EEG Reading Room Phone #: [phone]  Epilepsy Answering Service after 5PM and before 8:30AM: Phone #: [phone]     BREANA HELM [id number]     Study Date: 04-14-25  Duration: 22 min  --------------------------------------------------------------------------------------------------  History:  CC/ HPI Patient is a 84y old  Female who presents with a chief complaint of S/p TNK (15 Apr 2025 07:58)    MEDICATIONS  (STANDING):  aspirin  chewable 81 milliGRAM(s) Oral daily  atorvastatin 80 milliGRAM(s) Oral at bedtime  chlorhexidine 2% Cloths 1 Application(s) Topical daily  enoxaparin Injectable 40 milliGRAM(s) SubCutaneous every 24 hours  escitalopram 10 milliGRAM(s) Oral daily  polyethylene glycol 3350 17 Gram(s) Oral daily  senna 2 Tablet(s) Oral at bedtime    --------------------------------------------------------------------------------------------------  Study Interpretation:    [[[Abbreviation Key:  PDR=alpha rhythm/posterior dominant rhythm. A-P=anterior posterior.  Amplitude: ‘very low’:<20; ‘low’:20-49; ‘medium’:; ‘high’:>150uV.  Persistence for periodic/rhythmic patterns (% of epoch) ‘rare’:<1%; ‘occasional’:1-10%; ‘frequent’:10-50%; ‘abundant’:50-90%; ‘continuous’:>90%.  Persistence for sporadic discharges: ‘rare’:<1/hr; ‘occasional’:1/min-1/hr; ‘frequent’:>1/min; ‘abundant’:>1/10 sec.  RPP=rhythmic and periodic patterns; GRDA=generalized rhythmic delta activity; FIRDA=frontal intermittent GRDA; LRDA=lateralized rhythmic delta activity; TIRDA=temporal intermittent rhythmic delta activity;  LPD=PLED=lateralized periodic discharges; GPD=generalized periodic discharges; BIPDs =bilateral independent periodic discharges; Mf=multifocal; SIRPDs=stimulus induced rhythmic, periodic, or ictal appearing discharges; BIRDs=brief potentially ictal rhythmic discharges >4 Hz, lasting .5-10s; PFA (paroxysmal bursts >13 Hz or =8 Hz <10s).  Modifiers: +F=with fast component; +S=with spike component; +R=with rhythmic component.  S-B=burst suppression pattern.  Max=maximal. N1-drowsy; N2-stage II sleep; N3-slow wave sleep. SSS/BETS=small sharp spikes/benign epileptiform transients of sleep. HV=hyperventilation; PS=photic stimulation]]]    Daily EEG Visual Analysis    FINDINGS:      Background:  Continuity: Continuous  Symmetry: Symmetric  Posterior dominant rhythm (PDR): 10 Hz, reactive to eye closure. Symmetric low-amplitude frontal beta in wakefulness.  Voltage: Normal  Anterior-Posterior Gradient: Present  Other background findings: None  Breach: Absent    Background Slowing:  Generalized slowing: None  Focal slowing: Frequent right hemispheric frontotemporally predominant focal polymorphic delta and theta slowing    State Changes:   Drowsiness is characterized by fragmentation, attenuation, and slowing of the background activity.  Stage 2 sleep is not captured.    Interictal Findings:  Rare right centrotemporal sharply contoured waveforms    Electrographic and Electroclinical seizures:  None    Other Clinical Events:  None    Activation Procedures:   Hyperventilation is not performed.    Photic stimulation is performed and does not elicit any abnormalities. There is symmetric photic driving.     Artifacts:  Intermittent myogenic and movement artifacts are present.    Single-lead EKG: Regular rhythm, occasional PVCs    EEG Classification / Summary:  Abnormal EEG in the awake, drowsy states.   -Frequent right hemispheric frontotemporally predominant focal slowing  -No definite epileptiform abnormalities  -No seizures    Clinical Impression:  -Right hemispheric frontotemporally predominant focal cerebral dysfunction can be structural or functional in etiology.  -No definite epileptiform abnormalities  -No seizures          -------------------------------------------------------------------------------------------------------    Tianna Tamayo MD  Attending Physician, [facility] Epilepsy [place]    -------------------------------------------------------------------------------------------------------    To reach EEG technologist:  Please use the pager number for the appropriate hospital or contact the .  At [facility] - Pager #: [phone]    To reach EEG-reading physician:  EEG Reading Room Phone #: [phone]  Epilepsy Answering Service after 5PM and before 8:30AM: Phone #: [phone]

## 2025-04-15 NOTE — CHART NOTE - NSCHARTNOTEFT_GEN_A_CORE
Briefly in response to CDI Query:    Patient was found to have a Sodium level of 133 mmol/L on the evening of 4.13.2025 possibly consistent with an asymptomatic hyponatremia. Patient was treated with NS 1000ml @ 60cc/hr and her follow up Sodium was 137 mmol/L (04.14.25 @ 02:36) and stabilized. NS 1000ml @ 60cc/hr was subsequently discontinued at 12:05 on 4.14.2025.

## 2025-04-15 NOTE — CDI QUERY NOTE - NSCDIOTHERTXTBX_GEN_ALL_CORE_HH
Clinical documentation and/or evidence in the medical record indicates that this patient has a laboratory finding without an associated diagnosis.  In order to ensure accurate coding and accuracy of the clinical record, the documentation in this patient’s medical record requires additional clarification.      Please include documentation of a diagnosis associated with these findings in your progress note and/or discharge summary.    Please clarify if the patient was found to have one of the following:    •	Hyponatremia  •	Other (specify)      Supporting documentation and/or clinical evidence:   Lab result -Sodium: 133 mmol/L (04.13.25 @ 17:00)   Treatment: NS 1000 ml@60cc/hr  Follow up: Sodium: 137 mmol/L (04.14.25 @ 02:36)

## 2025-04-15 NOTE — PROGRESS NOTE ADULT - SUBJECTIVE AND OBJECTIVE BOX
CC: Patient being seen for rehabilitation follow up.  Patient feels well.  Wants to get home and sleep in her own bed.  Making progress with PT to go home. MRI negative for acute CVA.     FUNCTIONAL PROGRESS  4/14 PT  Bed Mobility: Rolling/Turning:     · Level of [place]	supervision    Bed Mobility: Supine to Sit:     · Level of [place]	supervision    Bed Mobility Analysis:     · Impairments Contributing to Impaired Bed Mobility	decreased strength; slight c/o dizziness with change in position    Transfer: Sit to Stand:     · Level of [place]	minimum assist (75% patients effort)  · Physical Assist/Nonphysical Assist	1 person assist  · Weight-Bearing Restrictions	weight-bearing as tolerated  · Assistive Device	rolling walker    Transfer: Stand to Sit:     · Level of [place]	minimum assist (75% patients effort)  · Physical Assist/Nonphysical Assist	1 person assist  · Weight-Bearing Restrictions	weight-bearing as tolerated  · Assistive Device	rolling walker    Sit/Stand Transfer Safety Analysis:     · Transfer Safety Concerns Noted	decreased weight-shifting ability  · Impairments Contributing to Impaired Transfers	impaired balance; decreased strength    Gait Skills:     · Level of [place]	minimum assist (75% patients effort)  · Physical Assist/Nonphysical Assist	1 person assist  · Weight-Bearing Restrictions	weight-bearing as tolerated  · Assistive Device	rolling walker  · Gait Distance	20 feet    Gait Analysis:     · Gait Pattern Used	2-point gait  · Gait Deviations Noted	decreased tomasa; decreased weight-shifting ability  · Impairments Contributing to Gait Deviations	impaired balance; decreased strength      VITALS  T(C): 36.4 (04-15-25 @ 08:14), Max: 36.9 (04-14-25 @ 23:00)  HR: 105 (04-15-25 @ 08:14) (78 - 109)  BP: 124/84 (04-15-25 @ 08:14) (100/82 - 173/97)  RR: 20 (04-15-25 @ 08:14) (13 - 43)  SpO2: 98% (04-15-25 @ 08:14) (94% - 100%)  Wt(kg): --    MEDICATIONS   aspirin  chewable 81 milliGRAM(s) daily  atorvastatin 80 milliGRAM(s) at bedtime  chlorhexidine 2% Cloths 1 Application(s) daily  enoxaparin Injectable 40 milliGRAM(s) every 24 hours  escitalopram 10 milliGRAM(s) daily  hydrALAZINE Injectable 10 milliGRAM(s) every 2 hours PRN  polyethylene glycol 3350 17 Gram(s) daily  senna 2 Tablet(s) at bedtime  simethicone 80 milliGRAM(s) at bedtime PRN      RECENT LABS/IMAGING  - Reviewed Today                        15.4   8.84  )-----------( 182      ( 15 Apr 2025 05:43 )             45.4     04-15    136  |  100  |  14.4  ----------------------------<  126[H]  4.3   |  22.0  |  0.80    Ca    8.5      15 Apr 2025 05:43  Phos  3.4     04-15  Mg     2.1     04-15    TPro  6.7  /  Alb  4.2  /  TBili  1.1  /  DBili  x   /  AST  29  /  ALT  16  /  AlkPhos  72  04-13    PT/INR - ( 13 Apr 2025 17:00 )   PT: 11.8 sec;   INR: 1.02 ratio         PTT - ( 13 Apr 2025 17:00 )  PTT:27.2 sec  Urinalysis Basic - ( 15 Apr 2025 05:43 )    Color: x / Appearance: x / SG: x / pH: x  Gluc: 126 mg/dL / Ketone: x  / Bili: x / Urobili: x   Blood: x / Protein: x / Nitrite: x   Leuk Esterase: x / RBC: x / WBC x   Sq Epi: x / Non Sq Epi: x / Bacteria: x            CT Brain 4/13 - No acute transcortical infarct or intracranial hemorrhage.     CT angiogram neck 4/13 - No vaso-occlusive disease.    CT angiogram head 4/13 - -No vaso-occlusive disease. -0.2 cm inferiorly oriented outpouching at the left supraclinoid ICA,   likely an infundibulum versus less likely aneurysm.     CT perfusion 4/13 - -No core infarct or at-risk ischemic tissue identified.    MRI HEAD - No evidence for intracranial mass, acute territorial infarct, acute   intracranial hemorrhage, or midline shift.  ----------------------------------------------------------------------------------------  PHYSICAL EXAM  Constitutional - NAD, Appears Comfortable   Extremities - No C/C/E, No calf tenderness   Neurologic Exam -                    Cognitive - AAO to self, place, date, year, situation      Sensory - Intact to LT   Psychiatric - Mood stable, Affect WNL  ----------------------------------------------------------------------------------------  ASSESSMENT/PLAN  84yFemale with functional deficits after developing left leg weakness  H/O CVAs, Left leg weakness - ASA, Lipitor  HTN - Hydralizine  Anxiety D/O - Lexapro   DVT PPX - SCDs  Rehab/Impaired mobility and function - Patient continues to require hospitalization for the above diagnoses and ongoing active management of comorbid complications that are substantially posing a threat to bodily function, functional ability and quality of life.     RECOMMEND - OOB daily     Recommend DC HOME.

## 2025-04-15 NOTE — PROGRESS NOTE ADULT - SUBJECTIVE AND OBJECTIVE BOX
Preliminary note, offical recommendations pending attending review/signature   [facility] Stroke Team  Progress Note     HPI:  The patient is a 84y Female with PMHx ?prior CVAs/TIAs (reports no residual deficits, per chart review multiple episodes similar presentation and imaging all negative), seizure disorder (not currently on AEDs), CAD s/p cardiac stents on ASA 81mg, HTN, HLD, renal cancer s/p nephrectomy, who presented to [facility] ED 4/13/25 with symptoms of speech difficulty and left leg weakness. Patient reports that at approximately 1:30PM the afternoon of presentation, she felt a sudden onset of dizziness and palpitations, and could not move her left leg. She attempted to call her daughter for help, but states she was having difficulty getting her words out. Code stroke activated on ED arrival, CT head showed no evidence of hemorrhage, CTA head/neck with no large vessel occlusion or critical stenosis. NIH=3, after discussion w/ stroke attending the decision was made to administer tenecteplase at ~17:13hrs      SUBJECTIVE: No events overnight.  No new neurologic complaints.  ROS reported negative unless otherwise noted.    aspirin  chewable 81 milliGRAM(s) Oral daily  atorvastatin 80 milliGRAM(s) Oral at bedtime  chlorhexidine 2% Cloths 1 Application(s) Topical daily  enoxaparin Injectable 40 milliGRAM(s) SubCutaneous every 24 hours  escitalopram 10 milliGRAM(s) Oral daily  hydrALAZINE Injectable 10 milliGRAM(s) IV Push every 2 hours PRN  polyethylene glycol 3350 17 Gram(s) Oral daily  senna 2 Tablet(s) Oral at bedtime  simethicone 80 milliGRAM(s) Chew at bedtime PRN      PHYSICAL EXAM:   Vital Signs Last 24 Hrs  T(C): 36.7 (15 Apr 2025 04:05), Max: 36.9 (14 Apr 2025 23:00)  T(F): 98.1 (15 Apr 2025 04:05), Max: 98.4 (14 Apr 2025 23:00)  HR: 103 (15 Apr 2025 04:05) (69 - 109)  BP: 124/85 (15 Apr 2025 04:05) (100/82 - 173/97)  BP(mean): 109 (14 Apr 2025 23:01) (74 - 120)  RR: 18 (15 Apr 2025 04:05) (13 - 43)  SpO2: 96% (15 Apr 2025 04:05) (94% - 100%)    Parameters below as of 15 Apr 2025 04:05  Patient On (Oxygen Delivery Method): room air    COMPLETE EXAM PENDING     Physical Exam:  General: No acute distress.     Detailed Neurologic Exam:    Mental status: The patient is awake and alert and has normal attention span.  The patient is oriented to person, place, month, and year. Can recall events leading up to hospitalization.  The patient is able to name objects, follow commands, repeat sentences. No aphasia or dysarthria.    Cranial nerves: Pupils 5mm on the right and nonreactive; 2mm on the left and reactive to light (per patient this is chronic since prior cataract surgery). There is no visual field deficit to confrontation. Extraocular motion is full with no nystagmus. There is no ptosis. Facial sensation is intact. Facial musculature is symmetric. Palate elevates symmetrically. Tongue is midline.    Motor: There is normal bulk and tone.  Bilateral upper extremity tremor present  Strength is 5/5 in the right arm and leg.   Strength is 5/5 in the left arm; at least 3/5 in the left leg proximally (baseline per patient), able to dorsi/plantar flex left foot, slight drift but does not hit bed  Sensation: Intact to light touch in 4 extremities    Cerebellar: There is no dysmetria on finger to nose testing.    Gait : deferred        LABS:                        15.4   8.84  )-----------( 182      ( 15 Apr 2025 05:43 )             45.4    04-15    136  |  100  |  14.4  ----------------------------<  126[H]  4.3   |  22.0  |  0.80    Ca    8.5      15 Apr 2025 05:43  Phos  3.4     04-15  Mg     2.1     04-15    TPro  6.7  /  Alb  4.2  /  TBili  1.1  /  DBili  x   /  AST  29  /  ALT  16  /  AlkPhos  72  04-13  PT/INR - ( 13 Apr 2025 17:00 )   PT: 11.8 sec;   INR: 1.02 ratio         PTT - ( 13 Apr 2025 17:00 )  PTT:27.2 sec    LDL: 52  A1C: 5.4    IMAGING: Reviewed by me.   MR Head No Cont (04.14.25 @ 18:16)   IMPRESSION:  No evidence for intracranial mass, acute territorial infarct, acute   intracranial hemorrhage, or midline shift.    CT Brain Stroke Protocol (04.13.25 @ 16:52)   IMPRESSION: No acute transcortical infarct or intracranial hemorrhage. Findings discussed with Dr. Mccray at 5:05 PM 4/13/2025.    CT Angio Brain and Neck, CT Perfusion Stroke Protocol  w/ IV Cont (04.13.25 @ 17:09)   IMPRESSION:  CT angiogram neck:  -No vaso-occlusive disease.    CT angiogram head:  -No vaso-occlusive disease.  -0.2 cm inferiorly oriented outpouching at the left supraclinoid ICA, likely an infundibulum versus less likely aneurysm.    CT perfusion:  -No core infarct or at-risk ischemic tissue identified.  --  ULTRASOUND   US Duplex Venous Lower Ext Complete, Bilateral (04.13.25 @ 20:43)   No evidence of deep venous thrombosis in either lower extremity.  --  Cardiac Studies:  TTE W or WO Ultrasound Enhancing Agent (04.14.25 @ 16:32)   CONCLUSIONS:   1. Left ventricular systolic function is mildly decreased with an ejection fraction of 47 % by 's method of disks.   2. Multiple segmental abnormalities exist. See findings.   3. There is mild (grade 1) left ventricular diastolic dysfunction.   4. Normal right ventricular cavity size and normal right ventricular systolic function.   5. Moderate mitral regurgitation.   6. Mild pulmonic regurgitation.   7. Mild to moderate aortic regurgitation.   8. Mild tricuspid regurgitation.   9. Small pericardial effusion noted adjacent to the anterior right ventricle.  10. Left atrium is moderately dilated.  11. The right atrium is mildly dilated.  12. No prior echocardiogram is availablefor comparison.   Preliminary note, offical recommendations pending attending review/signature   [facility] Stroke Team  Progress Note     HPI:  The patient is a 84y Female with PMHx ?prior CVAs/TIAs (reports no residual deficits, per chart review multiple episodes similar presentation and imaging all negative), seizure disorder (not currently on AEDs), CAD s/p cardiac stents on ASA 81mg, HTN, HLD, renal cancer s/p nephrectomy, who presented to [facility] ED 4/13/25 with symptoms of speech difficulty and left leg weakness. Patient reports that at approximately 1:30PM the afternoon of presentation, she felt a sudden onset of dizziness and palpitations, and could not move her left leg. She attempted to call her daughter for help, but states she was having difficulty getting her words out. Code stroke activated on ED arrival, CT head showed no evidence of hemorrhage, CTA head/neck with no large vessel occlusion or critical stenosis. NIH=3, after discussion w/ stroke attending the decision was made to administer tenecteplase at ~17:13hrs      SUBJECTIVE: No events overnight.  No new neurologic complaints.  ROS reported negative unless otherwise noted.    aspirin  chewable 81 milliGRAM(s) Oral daily  atorvastatin 80 milliGRAM(s) Oral at bedtime  chlorhexidine 2% Cloths 1 Application(s) Topical daily  enoxaparin Injectable 40 milliGRAM(s) SubCutaneous every 24 hours  escitalopram 10 milliGRAM(s) Oral daily  hydrALAZINE Injectable 10 milliGRAM(s) IV Push every 2 hours PRN  polyethylene glycol 3350 17 Gram(s) Oral daily  senna 2 Tablet(s) Oral at bedtime  simethicone 80 milliGRAM(s) Chew at bedtime PRN      PHYSICAL EXAM:   Vital Signs Last 24 Hrs  T(C): 36.7 (15 Apr 2025 04:05), Max: 36.9 (14 Apr 2025 23:00)  T(F): 98.1 (15 Apr 2025 04:05), Max: 98.4 (14 Apr 2025 23:00)  HR: 103 (15 Apr 2025 04:05) (69 - 109)  BP: 124/85 (15 Apr 2025 04:05) (100/82 - 173/97)  BP(mean): 109 (14 Apr 2025 23:01) (74 - 120)  RR: 18 (15 Apr 2025 04:05) (13 - 43)  SpO2: 96% (15 Apr 2025 04:05) (94% - 100%)    Parameters below as of 15 Apr 2025 04:05  Patient On (Oxygen Delivery Method): room air    Physical Exam:  General: No acute distress.     Detailed Neurologic Exam:    Mental status: The patient is awake and alert and has normal attention span.  The patient is oriented to person, place, month, and year. Can recall events leading up to hospitalization.  The patient is able to name objects, follow commands, repeat sentences. No aphasia or dysarthria.    Cranial nerves: Pupils 5mm on the right and nonreactive; 2mm on the left and reactive to light (per patient this is chronic since prior cataract surgery). There is no visual field deficit to confrontation. Extraocular motion is full with no nystagmus. There is no ptosis. Facial sensation is intact. Facial musculature is symmetric. Palate elevates symmetrically. Tongue is midline.    Motor: There is normal bulk and tone.  Bilateral upper extremity tremor present  Strength is 5/5 in the right arm and leg.   Strength is 5/5 in the left arm; at least 3/5 in the left leg proximally (baseline per patient), able to dorsi/plantar flex left foot, slight drift but does not hit bed  Sensation: Intact to light touch in 4 extremities    Cerebellar: There is no dysmetria on finger to nose testing.    Gait : deferred        LABS:                        15.4   8.84  )-----------( 182      ( 15 Apr 2025 05:43 )             45.4    04-15    136  |  100  |  14.4  ----------------------------<  126[H]  4.3   |  22.0  |  0.80    Ca    8.5      15 Apr 2025 05:43  Phos  3.4     04-15  Mg     2.1     04-15    TPro  6.7  /  Alb  4.2  /  TBili  1.1  /  DBili  x   /  AST  29  /  ALT  16  /  AlkPhos  72  04-13  PT/INR - ( 13 Apr 2025 17:00 )   PT: 11.8 sec;   INR: 1.02 ratio         PTT - ( 13 Apr 2025 17:00 )  PTT:27.2 sec    LDL: 52  A1C: 5.4    IMAGING: Reviewed by me.   MR Head No Cont (04.14.25 @ 18:16)   IMPRESSION:  No evidence for intracranial mass, acute territorial infarct, acute   intracranial hemorrhage, or midline shift.    CT Brain Stroke Protocol (04.13.25 @ 16:52)   IMPRESSION: No acute transcortical infarct or intracranial hemorrhage. Findings discussed with Dr. Mccray at 5:05 PM 4/13/2025.    CT Angio Brain and Neck, CT Perfusion Stroke Protocol  w/ IV Cont (04.13.25 @ 17:09)   IMPRESSION:  CT angiogram neck:  -No vaso-occlusive disease.    CT angiogram head:  -No vaso-occlusive disease.  -0.2 cm inferiorly oriented outpouching at the left supraclinoid ICA, likely an infundibulum versus less likely aneurysm.    CT perfusion:  -No core infarct or at-risk ischemic tissue identified.  --  ULTRASOUND   US Duplex Venous Lower Ext Complete, Bilateral (04.13.25 @ 20:43)   No evidence of deep venous thrombosis in either lower extremity.  --  Cardiac Studies:  TTE W or WO Ultrasound Enhancing Agent (04.14.25 @ 16:32)   CONCLUSIONS:   1. Left ventricular systolic function is mildly decreased with an ejection fraction of 47 % by 's method of disks.   2. Multiple segmental abnormalities exist. See findings.   3. There is mild (grade 1) left ventricular diastolic dysfunction.   4. Normal right ventricular cavity size and normal right ventricular systolic function.   5. Moderate mitral regurgitation.   6. Mild pulmonic regurgitation.   7. Mild to moderate aortic regurgitation.   8. Mild tricuspid regurgitation.   9. Small pericardial effusion noted adjacent to the anterior right ventricle.  10. Left atrium is moderately dilated.  11. The right atrium is mildly dilated.  12. No prior echocardiogram is available for comparison.  --  EEG 4/14/25:   Clinical Impression:  -Right hemispheric frontotemporally predominant focal cerebral dysfunction can be structural or functional in etiology.  -No definite epileptiform abnormalities  -No seizures

## 2025-04-17 ENCOUNTER — OFFICE VISIT (OUTPATIENT)
Dept: URGENT CARE | Facility: URGENT CARE | Age: 85
End: 2025-04-17
Payer: COMMERCIAL

## 2025-04-17 VITALS
HEART RATE: 65 BPM | BODY MASS INDEX: 33.09 KG/M2 | DIASTOLIC BLOOD PRESSURE: 65 MMHG | TEMPERATURE: 97.6 F | OXYGEN SATURATION: 97 % | WEIGHT: 205 LBS | RESPIRATION RATE: 14 BRPM | SYSTOLIC BLOOD PRESSURE: 138 MMHG

## 2025-04-17 DIAGNOSIS — N39.0 ACUTE UTI (URINARY TRACT INFECTION): Primary | ICD-10-CM

## 2025-04-17 DIAGNOSIS — R30.0 DYSURIA: ICD-10-CM

## 2025-04-17 LAB
ALBUMIN UR-MCNC: NEGATIVE MG/DL
APPEARANCE UR: ABNORMAL
BACTERIA #/AREA URNS HPF: ABNORMAL /HPF
BILIRUB UR QL STRIP: NEGATIVE
COLOR UR AUTO: YELLOW
GLUCOSE UR STRIP-MCNC: NEGATIVE MG/DL
HGB UR QL STRIP: ABNORMAL
HOLD SPECIMEN: NORMAL
KETONES UR STRIP-MCNC: NEGATIVE MG/DL
LEUKOCYTE ESTERASE UR QL STRIP: ABNORMAL
NITRATE UR QL: NEGATIVE
PH UR STRIP: 5.5 [PH] (ref 5–7)
RBC #/AREA URNS AUTO: ABNORMAL /HPF
SP GR UR STRIP: <=1.005 (ref 1–1.03)
UROBILINOGEN UR STRIP-ACNC: 0.2 E.U./DL
WBC #/AREA URNS AUTO: ABNORMAL /HPF

## 2025-04-17 RX ORDER — CEFPODOXIME PROXETIL 100 MG/1
100 TABLET, FILM COATED ORAL 2 TIMES DAILY
Qty: 14 TABLET | Refills: 0 | Status: SHIPPED | OUTPATIENT
Start: 2025-04-17 | End: 2025-04-24

## 2025-04-17 ASSESSMENT — ENCOUNTER SYMPTOMS
DYSURIA: 1
FREQUENCY: 1

## 2025-04-17 NOTE — PROGRESS NOTES
Assessment & Plan        1. Acute UTI (urinary tract infection) (Primary)    - cefpodoxime (VANTIN) 100 MG tablet; Take 1 tablet (100 mg) by mouth 2 times daily for 7 days.  Dispense: 14 tablet; Refill: 0    2. Dysuria    -UA shows pyuria consistent with UTI  - UA Macroscopic with reflex to Microscopic and Culture - Clinic Collect  - UA Microscopic with Reflex to Culture  - Urine Culture    Results for orders placed or performed in visit on 04/17/25   UA Macroscopic with reflex to Microscopic and Culture - Clinic Collect     Status: Abnormal    Specimen: Urine, Clean Catch   Result Value Ref Range    Color Urine Yellow Colorless, Straw, Light Yellow, Yellow    Appearance Urine Cloudy (A) Clear    Glucose Urine Negative Negative mg/dL    Bilirubin Urine Negative Negative    Ketones Urine Negative Negative mg/dL    Specific Gravity Urine <=1.005 1.003 - 1.035    Blood Urine Moderate (A) Negative    pH Urine 5.5 5.0 - 7.0    Protein Albumin Urine Negative Negative mg/dL    Urobilinogen Urine 0.2 0.2, 1.0 E.U./dL    Nitrite Urine Negative Negative    Leukocyte Esterase Urine Small (A) Negative   UA Microscopic with Reflex to Culture     Status: Abnormal   Result Value Ref Range    Bacteria Urine Moderate (A) None Seen /HPF    RBC Urine 2-5 (A) 0-2 /HPF /HPF    WBC Urine  (A) 0-5 /HPF /HPF         Patient Instructions   UA came back showing signs of bladder infection. You  will be treated with Vantin 100 mg PO BID x 7 days until cultures return and will adjust as necessary. I recommend cranberry supplements and D-mannose as well. Increase fluids.       Return if symptoms worsen or fail to improve, for Follow up.    At the end of the encounter, I discussed results, diagnosis, medications. Discussed red flags for immediate return to clinic/ER, as well as indications for follow up if no improvement. Patient understood and agreed to plan. Patient was stable for discharge.    J Carlos Valencia is a 84 year old  female who presents to clinic today for the following health issues:  Chief Complaint   Patient presents with    Urgent Care     UTI sx - burning with urination, frequency and urgency  x yesterday      HPI    Patient reports urinary symptoms which started yesterday. She has dysuria, frequency, urgency. She was last treated for a UTI 6 months ago with Cefdinir. She can tolerate Cefdinir but not Keflex. Urine culture from 6 months ago showed infection with Streptococcus anginosus and E. Coli and susceptibility to cephalosporins. Patient has allergies to penicillins, sulfa medications and Keflex. She denies fever    Review of Systems   Genitourinary:  Positive for dysuria, frequency and urgency.       Problem List:  2024-01: Acute pain of right shoulder  2022-10: Diverticulitis of colon  2022-10: BRBPR (bright red blood per rectum)  2022-10: Suprapubic abdominal pain      No past medical history on file.    Social History     Tobacco Use    Smoking status: Never    Smokeless tobacco: Never   Substance Use Topics    Alcohol use: Not on file           Objective    /65   Pulse 65   Temp 97.6  F (36.4  C) (Tympanic)   Resp 14   Wt 93 kg (205 lb)   SpO2 97%   BMI 33.09 kg/m    Physical Exam  Vitals and nursing note reviewed.   Cardiovascular:      Rate and Rhythm: Normal rate and regular rhythm.   Pulmonary:      Effort: Pulmonary effort is normal.      Breath sounds: Normal breath sounds.   Abdominal:      General: Abdomen is flat.      Palpations: Abdomen is soft.      Tenderness: There is no abdominal tenderness. There is no right CVA tenderness or left CVA tenderness.   Lymphadenopathy:      Cervical: No cervical adenopathy.   Skin:     General: Skin is warm and dry.      Findings: No rash.   Neurological:      General: No focal deficit present.      Mental Status: She is alert and oriented to person, place, and time.   Psychiatric:         Mood and Affect: Mood normal.         Behavior: Behavior normal.               Ana Lilia Marsh PA-C

## 2025-04-17 NOTE — PATIENT INSTRUCTIONS
UA came back showing signs of bladder infection. You  will be treated with Vantin 100 mg PO BID x 7 days until cultures return and will adjust as necessary. I recommend cranberry supplements and D-mannose as well. Increase fluids.

## 2025-04-17 NOTE — PROGRESS NOTES
Urgent Care Clinic Visit     Chief Complaint   Patient presents with    Urgent Care     UTI sx - burning with urination, frequency and urgency  x yesterday                4/17/2025    11:21 AM   Additional Questions   Roomed by Shani   Accompanied by alone         4/17/2025    11:21 AM   Patient Reported Additional Medications   Patient reports taking the following new medications Tylenol 100 0 mg     Pre-Provider Visit Orders- Urinalysis UA/UC  Patient reports the following symptoms:  discomfort, pain or burning with urination  and frequent urination   Does the patient report any of the following symptoms: vaginal discharge, vaginal itching, possible yeast infection, has a urinary catheter in place, or unable to void in a specimen cup?  No

## 2025-04-19 LAB — BACTERIA UR CULT: NORMAL

## 2025-04-26 ENCOUNTER — HOSPITAL ENCOUNTER (EMERGENCY)
Facility: CLINIC | Age: 85
Discharge: HOME | End: 2025-04-26
Attending: EMERGENCY MEDICINE | Admitting: EMERGENCY MEDICINE
Payer: COMMERCIAL

## 2025-04-26 ENCOUNTER — APPOINTMENT (OUTPATIENT)
Dept: GENERAL RADIOLOGY | Facility: CLINIC | Age: 85
End: 2025-04-26
Attending: EMERGENCY MEDICINE
Payer: COMMERCIAL

## 2025-04-26 VITALS
HEIGHT: 66 IN | WEIGHT: 203 LBS | TEMPERATURE: 97.2 F | SYSTOLIC BLOOD PRESSURE: 161 MMHG | RESPIRATION RATE: 18 BRPM | OXYGEN SATURATION: 99 % | DIASTOLIC BLOOD PRESSURE: 81 MMHG | BODY MASS INDEX: 32.62 KG/M2 | HEART RATE: 81 BPM

## 2025-04-26 DIAGNOSIS — L03.115 CELLULITIS OF RIGHT KNEE: ICD-10-CM

## 2025-04-26 PROCEDURE — 250N000013 HC RX MED GY IP 250 OP 250 PS 637: Performed by: EMERGENCY MEDICINE

## 2025-04-26 PROCEDURE — 99284 EMERGENCY DEPT VISIT MOD MDM: CPT | Mod: 25

## 2025-04-26 PROCEDURE — 73562 X-RAY EXAM OF KNEE 3: CPT | Mod: RT

## 2025-04-26 PROCEDURE — 76882 US LMTD JT/FCL EVL NVASC XTR: CPT | Mod: RT

## 2025-04-26 RX ORDER — CLINDAMYCIN HYDROCHLORIDE 300 MG/1
300 CAPSULE ORAL 3 TIMES DAILY
Qty: 21 CAPSULE | Refills: 0 | Status: SHIPPED | OUTPATIENT
Start: 2025-04-26 | End: 2025-05-03

## 2025-04-26 RX ORDER — ACETAMINOPHEN 500 MG
1000 TABLET ORAL ONCE
Status: COMPLETED | OUTPATIENT
Start: 2025-04-26 | End: 2025-04-26

## 2025-04-26 RX ORDER — CLINDAMYCIN HYDROCHLORIDE 300 MG/1
300 CAPSULE ORAL ONCE
Status: COMPLETED | OUTPATIENT
Start: 2025-04-26 | End: 2025-04-26

## 2025-04-26 RX ADMIN — ACETAMINOPHEN 1000 MG: 500 TABLET, FILM COATED ORAL at 18:29

## 2025-04-26 RX ADMIN — CLINDAMYCIN HYDROCHLORIDE 300 MG: 300 CAPSULE ORAL at 18:29

## 2025-04-26 ASSESSMENT — COLUMBIA-SUICIDE SEVERITY RATING SCALE - C-SSRS
6. HAVE YOU EVER DONE ANYTHING, STARTED TO DO ANYTHING, OR PREPARED TO DO ANYTHING TO END YOUR LIFE?: NO
1. IN THE PAST MONTH, HAVE YOU WISHED YOU WERE DEAD OR WISHED YOU COULD GO TO SLEEP AND NOT WAKE UP?: NO
2. HAVE YOU ACTUALLY HAD ANY THOUGHTS OF KILLING YOURSELF IN THE PAST MONTH?: NO

## 2025-04-26 ASSESSMENT — ACTIVITIES OF DAILY LIVING (ADL)
ADLS_ACUITY_SCORE: 50
ADLS_ACUITY_SCORE: 50

## 2025-04-26 NOTE — DISCHARGE INSTRUCTIONS
As we discussed, your workup today is most consistent with cellulitis.  We have elected to initiate outpatient antibiotics to see how your symptoms progress.  However, we discussed that this could worsen instead of improve.  Watch closely to see if there is any redness extending beyond the line that I drew on your leg.  You need to return to us immediately if you see this extension, increasing pain, any fever, or if you are unable to care for yourself at home due to the pain.  Otherwise, see your primary team early this week.    Discharge Instructions  Cellulitis    Cellulitis is an infection of the skin that occurs when bacteria enter the skin.   Symptoms are generally redness, swelling, warmth and pain.  Your infection appeared to be appropriate to treat at home with antibiotics.  However, sometimes your infection may be worse than it seemed at first, or may worsen with time. If you have new or worse symptoms, you may need to be seen again in the Emergency Department or by your primary provider.    Generally, every Emergency Department visit should have a follow-up clinic visit with either a primary or a specialty clinic/provider. Please follow-up as instructed by your emergency provider today.    Return to the Emergency Department if:  The redness, pain, or swelling gets a lot worse.  If the red area was marked, return if it is red significantly beyond the marked area.  You are unable to get your antibiotics, or are vomiting (throwing up) these pills, or you cannot take them.  You are feeling more ill, weak or lightheaded.  You start to run a new fever (temperature >101 F).  Anything else about the infection worries or concerns you.  Treatment:  Start your antibiotics right away, and take them as prescribed. Be sure to finish the whole prescription, even if you are better.  Apply a heating pad, warm packs, or warm water soaks to the infected area for 15 minutes at a time, at least 3 times a day. Do not use a  heating pad on your feet or legs if you have diabetes. Do not sleep with a heating pad on, since this can cause burns or skin injury.  Rest your injured area for at least 1-2 days. After that you may start using your extremity again as long as there is not too much pain.   Raise the injured area above the level of your heart as much as possible in the first 1-2 days.  Tylenol  (acetaminophen), Motrin  (ibuprofen), or Advil  (ibuprofen) may help may help reduce pain and fever and may help you feel more comfortable. Be sure to read and follow the package directions, and ask your provider if you have questions.    If you were given a prescription for medicine here today, be sure to read all of the information (including the package insert) that comes with your prescription.  This will include important information about the medicine, its side effects, and any warnings that you need to know about.  The pharmacist who fills the prescription can provide more information and answer questions you may have about the medicine.  If you have questions or concerns that the pharmacist cannot address, please call or return to the Emergency Department.     Remember that you can always come back to the Emergency Department if you are not able to see your regular provider in the amount of time listed above, if you get any new symptoms, or if there is anything that worries you.

## 2025-04-26 NOTE — ED TRIAGE NOTES
Pt states since yesterday has been having R knee pain.  Has noticed redness and swelling to it since this AM.  Ambulates on it, but with discomfort.      Triage Assessment (Adult)       Row Name 04/26/25 8428          Triage Assessment    Airway WDL WDL        Respiratory WDL    Respiratory WDL WDL        Skin Circulation/Temperature WDL    Skin Circulation/Temperature WDL WDL        Cardiac WDL    Cardiac WDL WDL        Peripheral/Neurovascular WDL    Peripheral Neurovascular WDL WDL        Cognitive/Neuro/Behavioral WDL    Cognitive/Neuro/Behavioral WDL WDL

## 2025-04-26 NOTE — ED PROVIDER NOTES
"  Emergency Department Note      History of Present Illness     Chief Complaint   Knee Pain      HPI   Annie Archer is a 84 year old female anticoagulated on Xarelto with a history of atrial fibrilation, hypertension, gouts and osteoarthritis who presents to the Emergency Department for evaluation of a right knee pain which started yesterday morning. Since then her pain has been gradually worsening. She reports that she noticed a redness and swelling to her knee which prompted this visit. She took around 2000 mg of Tylenol since yesterday which help take the edge of her pain. She did not fall or any trauma to her knee. She denies recent increased in activities such as walking.  She has a history of right lower extremity cellulitis which was managed with antibiotics. She is on Cefpodoxime to treat her UTI. She reports she believes her symptom    Independent Historian   None    Review of External Notes   None    Past Medical History     Medical History and Problem List   Atrial fibrilation  Essential hypertension   Diverticulitis   Obesity   Obstructive sleep apnea   Osteoarthritis     Medications   Albuterol inhaler   Allopurinol   Amlodipine   Hydrochlorothiazide   Lisinopril   Metoprolol succinate   Prednisone   Xarelto   Physical Exam     Patient Vitals for the past 24 hrs:   BP Temp Temp src Pulse Resp SpO2 Height Weight   04/26/25 1638 (!) 175/87 97.2  F (36.2  C) Temporal 86 16 98 % 1.676 m (5' 6\") 92.1 kg (203 lb)     Physical Exam  MSK:  Focused exam of the right knee shows no obvious effusion, redness, or lesion.  No bony tenderness over the fibular head or tibial plateau.  Normal tracking of the patella with quadricepts and patella tendons intact.  Negative anterior and posterior drawer test.  No laxity of the medial or lateral collateral ligaments.  Normal movement at the hip and ankle.    SKIN:  Warm and dry with strong DP pulse and normal capillary refill. There is a local area of tenderness and " redness with tactile warmth over the patella, 4X3 cm. No fluctuance or evidence of skin breakdown. No fullness over the prepatellar bursa.     NEURO:  Normal sensation throughout the foot and ankle.  Strength limited at knee secondary to pain.    PSYCH:  Normal affect     Diagnostics     Lab Results   Labs Ordered and Resulted from Time of ED Arrival to Time of ED Departure - No data to display    Imaging   XR Knee Right 3 Views   Final Result   IMPRESSION: No acute fracture. Small chronic heterotopic bone fragment adjacent to the medial femoral epicondyle compatible with a remote MCL sprain. Mild-to-moderate medial and mild lateral patellofemoral compartment degenerative arthritis. Minimal    effusion. Mild soft tissue swelling anteriorly.      POC US SOFT TISSUE   Final Result   Limited Soft Tissue Ultrasound, performed and interpreted by me.      Indication:  Skin redness warmth pain. Evaluate for cellulitis vs abscess.       Body location: right knee      Findings:  There is cobblestoning suggestive of cellulitis in the evaluated area. There is no fluid collection to suggest abscess. No foreign body identified      IMPRESSION: Cellulitis                    Independent Interpretation   X-ray Right Knee shows no evidence of fracture or effusion.      ED Course      Medications Administered   Medications - No data to display    Procedures   Procedures       Discussion of Management   None    ED Course   ED Course as of 04/26/25 1819   Sat Apr 26, 2025   1657 I obtained the history and examined the patient as above.        Additional Documentation  None    Medical Decision Making / Diagnosis     CMS Diagnoses: None    MIPS       None    MDM   Annie Archer is a 84 year old female presenting to us with a circular area of redness and tenderness over her right patella.  This began yesterday and it is uncomfortable to her, especially when she palpates the area or when she bends her knee to use the bathroom.  She denies  any significant pain when she is at rest.  She does have a history of cellulitis.    On my exam, she has a local area that is focally tender and warm but only over the patella.  There is no swelling or fluctuance in the prepatellar bursa or over the tibial tuberosity.  There is no tenderness along the quadriceps tendon or the patella tendon.  She is able to ambulate without difficulty.  There is no warmth to the posterior aspect of the knee or any evidence of effusion.  She allows me to flex and extend the knee without pain, only having discomfort when it gets is flex to beyond 90 degrees as it starts to put pressure on this frontal part of the patella.  There is no evidence of abrasion or evidence of abscess.  I performed a bedside ultrasound which shows classic cobblestoning only in this 3 cm diameter area without evidence of a fluid collection.  X-ray also shows no evidence of fracture, gas, or effusion.    With this, I do believe this is more related to a localized skin infection rather than anything to be related to the joint or a bursa sac.  I clearly marked the area of redness.  She has recently been on 2 rounds of cephalosporins for a urinary tract infection.  Therefore, we will change coverage to clindamycin (patient is allergic to sulfa).  I was clear with her that we are seeing her early in the course of this infection and that I expect that it should start to improve with antibiotics and time.  She was advised to put ice over the area to help with the discomfort.  She was advised to continue with Tylenol for pain.  I urged her to be seen by her primary team earlier this week for recheck unless the redness is substantially decreasing and her discomfort is going away.  She was advised to return to the ER immediately if she has any redness that is starting to extend up the leg, she develops a fever, feel that she is unable to ambulate/care for herself at home, or if there are any other emergent concerns.   At this juncture, I do not feel she requires hospitalization and I do not believe that there is any lab test that is going to greatly change our plan for outpatient antibiotics at this time.  The patient is comfortable with this and questions were answered.    Disposition   The patient was discharged.     Diagnosis     ICD-10-CM    1. Cellulitis of right knee  L03.115            Discharge Medications   New Prescriptions    No medications on file     Scribe Disclosure:  I, Yue Guevara, am serving as a scribe at 4:49 PM on 4/26/2025 to document services personally performed by Trierweiler, Chad A, MD based on my observations and the provider's statements to me.        Trierweiler, Chad A, MD  04/27/25 3108

## 2025-04-27 ENCOUNTER — HOSPITAL ENCOUNTER (EMERGENCY)
Facility: CLINIC | Age: 85
Discharge: HOME | End: 2025-04-27
Attending: EMERGENCY MEDICINE | Admitting: EMERGENCY MEDICINE
Payer: COMMERCIAL

## 2025-04-27 VITALS
RESPIRATION RATE: 19 BRPM | TEMPERATURE: 98.6 F | HEIGHT: 66 IN | HEART RATE: 71 BPM | WEIGHT: 203 LBS | DIASTOLIC BLOOD PRESSURE: 91 MMHG | BODY MASS INDEX: 32.62 KG/M2 | SYSTOLIC BLOOD PRESSURE: 157 MMHG | OXYGEN SATURATION: 94 %

## 2025-04-27 DIAGNOSIS — L53.9 ERYTHEMA: ICD-10-CM

## 2025-04-27 DIAGNOSIS — M25.561 ACUTE PAIN OF RIGHT KNEE: ICD-10-CM

## 2025-04-27 LAB
ANION GAP SERPL CALCULATED.3IONS-SCNC: 9 MMOL/L (ref 7–15)
BASE EXCESS BLDV CALC-SCNC: 4 MMOL/L (ref -3–3)
BASOPHILS # BLD AUTO: 0 10E3/UL (ref 0–0.2)
BASOPHILS NFR BLD AUTO: 0 %
BUN SERPL-MCNC: 23.4 MG/DL (ref 8–23)
CALCIUM SERPL-MCNC: 9.8 MG/DL (ref 8.8–10.4)
CHLORIDE SERPL-SCNC: 101 MMOL/L (ref 98–107)
CREAT SERPL-MCNC: 0.75 MG/DL (ref 0.51–0.95)
CRP SERPL-MCNC: 8.15 MG/L
EGFRCR SERPLBLD CKD-EPI 2021: 78 ML/MIN/1.73M2
EOSINOPHIL # BLD AUTO: 0.2 10E3/UL (ref 0–0.7)
EOSINOPHIL NFR BLD AUTO: 3 %
ERYTHROCYTE [DISTWIDTH] IN BLOOD BY AUTOMATED COUNT: 13 % (ref 10–15)
GLUCOSE SERPL-MCNC: 101 MG/DL (ref 70–99)
HCO3 BLDV-SCNC: 30 MMOL/L (ref 21–28)
HCO3 SERPL-SCNC: 29 MMOL/L (ref 22–29)
HCT VFR BLD AUTO: 40.7 % (ref 35–47)
HGB BLD-MCNC: 13.8 G/DL (ref 11.7–15.7)
HOLD SPECIMEN: NORMAL
IMM GRANULOCYTES # BLD: 0 10E3/UL
IMM GRANULOCYTES NFR BLD: 0 %
LACTATE BLD-SCNC: 0.6 MMOL/L (ref 0.7–2)
LYMPHOCYTES # BLD AUTO: 1.8 10E3/UL (ref 0.8–5.3)
LYMPHOCYTES NFR BLD AUTO: 21 %
MCH RBC QN AUTO: 30.8 PG (ref 26.5–33)
MCHC RBC AUTO-ENTMCNC: 33.9 G/DL (ref 31.5–36.5)
MCV RBC AUTO: 91 FL (ref 78–100)
MONOCYTES # BLD AUTO: 0.8 10E3/UL (ref 0–1.3)
MONOCYTES NFR BLD AUTO: 9 %
NEUTROPHILS # BLD AUTO: 6 10E3/UL (ref 1.6–8.3)
NEUTROPHILS NFR BLD AUTO: 67 %
NRBC # BLD AUTO: 0 10E3/UL
NRBC BLD AUTO-RTO: 0 /100
PCO2 BLDV: 50 MM HG (ref 40–50)
PH BLDV: 7.39 [PH] (ref 7.32–7.43)
PLATELET # BLD AUTO: 234 10E3/UL (ref 150–450)
PO2 BLDV: 31 MM HG (ref 25–47)
POTASSIUM SERPL-SCNC: 4.3 MMOL/L (ref 3.4–5.3)
RBC # BLD AUTO: 4.48 10E6/UL (ref 3.8–5.2)
SAO2 % BLDV: 57 % (ref 70–75)
SODIUM SERPL-SCNC: 139 MMOL/L (ref 135–145)
WBC # BLD AUTO: 8.9 10E3/UL (ref 4–11)

## 2025-04-27 PROCEDURE — 99283 EMERGENCY DEPT VISIT LOW MDM: CPT

## 2025-04-27 PROCEDURE — 82803 BLOOD GASES ANY COMBINATION: CPT

## 2025-04-27 PROCEDURE — 86140 C-REACTIVE PROTEIN: CPT | Performed by: EMERGENCY MEDICINE

## 2025-04-27 PROCEDURE — 80048 BASIC METABOLIC PNL TOTAL CA: CPT | Performed by: EMERGENCY MEDICINE

## 2025-04-27 PROCEDURE — 250N000013 HC RX MED GY IP 250 OP 250 PS 637: Performed by: EMERGENCY MEDICINE

## 2025-04-27 PROCEDURE — 250N000012 HC RX MED GY IP 250 OP 636 PS 637: Performed by: EMERGENCY MEDICINE

## 2025-04-27 PROCEDURE — 85025 COMPLETE CBC W/AUTO DIFF WBC: CPT | Performed by: EMERGENCY MEDICINE

## 2025-04-27 PROCEDURE — 36415 COLL VENOUS BLD VENIPUNCTURE: CPT | Performed by: EMERGENCY MEDICINE

## 2025-04-27 RX ORDER — CLINDAMYCIN HYDROCHLORIDE 300 MG/1
300 CAPSULE ORAL ONCE
Status: COMPLETED | OUTPATIENT
Start: 2025-04-27 | End: 2025-04-27

## 2025-04-27 RX ORDER — PREDNISONE 20 MG/1
40 TABLET ORAL ONCE
Status: COMPLETED | OUTPATIENT
Start: 2025-04-27 | End: 2025-04-27

## 2025-04-27 RX ORDER — PREDNISONE 20 MG/1
TABLET ORAL
Qty: 9 TABLET | Refills: 0 | Status: SHIPPED | OUTPATIENT
Start: 2025-04-27 | End: 2025-05-07

## 2025-04-27 RX ADMIN — PREDNISONE 40 MG: 20 TABLET ORAL at 23:42

## 2025-04-27 RX ADMIN — CLINDAMYCIN HYDROCHLORIDE 300 MG: 300 CAPSULE ORAL at 23:42

## 2025-04-27 ASSESSMENT — ACTIVITIES OF DAILY LIVING (ADL)
ADLS_ACUITY_SCORE: 50
ADLS_ACUITY_SCORE: 50

## 2025-04-27 ASSESSMENT — COLUMBIA-SUICIDE SEVERITY RATING SCALE - C-SSRS
1. IN THE PAST MONTH, HAVE YOU WISHED YOU WERE DEAD OR WISHED YOU COULD GO TO SLEEP AND NOT WAKE UP?: NO
6. HAVE YOU EVER DONE ANYTHING, STARTED TO DO ANYTHING, OR PREPARED TO DO ANYTHING TO END YOUR LIFE?: NO
2. HAVE YOU ACTUALLY HAD ANY THOUGHTS OF KILLING YOURSELF IN THE PAST MONTH?: NO

## 2025-04-28 NOTE — ED TRIAGE NOTES
Worsening cellulitis of the right knee, redness is spreading above the marked line.     Triage Assessment (Adult)       Row Name 04/27/25 2444          Triage Assessment    Airway WDL WDL        Respiratory WDL    Respiratory WDL WDL        Skin Circulation/Temperature WDL    Skin Circulation/Temperature WDL X  right lnee is red, tender and hot to touch        Cardiac WDL    Cardiac WDL X  hypertensive        Peripheral/Neurovascular WDL    Peripheral Neurovascular WDL WDL        Cognitive/Neuro/Behavioral WDL    Cognitive/Neuro/Behavioral WDL WDL        Cochran Coma Scale    Best Eye Response 4-->(E4) spontaneous     Best Motor Response 6-->(M6) obeys commands     Best Verbal Response 5-->(V5) oriented     Cochran Coma Scale Score 15

## 2025-04-28 NOTE — DISCHARGE INSTRUCTIONS
*You may resume diet and activities.  *Take medications as prescribed.  Continue clindamycin. Prednisone as directed. Continue your current medications  *Followup with your doctor for a recheck in the next 2-3 days.  *Return if you are unable to bend your leg, you develop more swelling or rapid or significant spread of the redness or warmth or become worse in any way.

## 2025-04-28 NOTE — ED PROVIDER NOTES
"  Emergency Department Note      History of Present Illness     Chief Complaint   Wound Infection      HPI   Annie Archer is a 84 year old female on Xarelto with a history of atrial fibrillation and gout presenting with a wound infection. The patient reports a worsening wound on her right knee, stating she has increased swelling and redness. She endorses mild pain at rest, but it is unable to bend it when she tries to get up. Annie was started on Clindamycin yesterday with no improvement.     Independent Historian   None    Review of External Notes   Reviewed emergency department visit note from the previous day.  Patient discharged on clindamycin.  Reviewed the x-ray of the previously showing a minimal effusion.    Past Medical History     Medical History and Problem List   Atrial fibrilation  Essential hypertension   Diverticulitis   Obesity   Obstructive sleep apnea   Osteoarthritis   Gout   Pancreatic lesion   Bilateral hearing loss      Medications   Albuterol inhaler   Allopurinol   Amlodipine   Hydrochlorothiazide   Lisinopril   Metoprolol succinate   Prednisone   Xarelto     Physical Exam     Patient Vitals for the past 24 hrs:   BP Temp Temp src Pulse Resp SpO2 Height Weight   04/27/25 2344 (!) 157/91 -- -- 71 -- 94 % -- --   04/27/25 2105 (!) 174/83 98.6  F (37  C) Oral 65 19 96 % 1.676 m (5' 6\") 92.1 kg (203 lb)     Physical Exam  General: Well-nourished  Eyes: PERRL, conjunctivae pink no scleral icterus or conjunctival injection  ENT:  Moist mucus membranes, posterior oropharynx clear without erythema or exudates  Respiratory:  Lungs clear to auscultation bilaterally, no crackles/rubs/wheezes.  Good air movement  CV: Normal rate and rhythm, no murmurs/rubs/gallops  GI:  Abdomen soft and non-distended.  Normoactive BS.  No tenderness, guarding or rebound  Skin: Erythema, warmth and tenderness over the right knee minimally extending from the marked area, approximately half a centimeter to 1 cm.  No " crepitus.  No bulla.  No necrosis.  No streaking.  Musculoskeletal: No peripheral edema or calf tenderness.  Tenderness over the area of erythema.  Patient does have pain with movement but is able to range at the knee.  Neuro: Alert and oriented to person/place/time  Psychiatric: Normal affect      Diagnostics     Lab Results   Labs Ordered and Resulted from Time of ED Arrival to Time of ED Departure   BASIC METABOLIC PANEL - Abnormal       Result Value    Sodium 139      Potassium 4.3      Chloride 101      Carbon Dioxide (CO2) 29      Anion Gap 9      Urea Nitrogen 23.4 (*)     Creatinine 0.75      GFR Estimate 78      Calcium 9.8      Glucose 101 (*)    CRP INFLAMMATION - Abnormal    CRP Inflammation 8.15 (*)    ISTAT GASES LACTATE VENOUS POCT - Abnormal    Lactic Acid POCT 0.6 (*)     Bicarbonate Venous POCT 30 (*)     O2 Sat, Venous POCT 57 (*)     pCO2 Venous POCT 50      pH Venous POCT 7.39      pO2 Venous POCT 31      Base Excess/Deficit (+/-) POCT 4.0 (*)    CBC WITH PLATELETS AND DIFFERENTIAL    WBC Count 8.9      RBC Count 4.48      Hemoglobin 13.8      Hematocrit 40.7      MCV 91      MCH 30.8      MCHC 33.9      RDW 13.0      Platelet Count 234      % Neutrophils 67      % Lymphocytes 21      % Monocytes 9      % Eosinophils 3      % Basophils 0      % Immature Granulocytes 0      NRBCs per 100 WBC 0      Absolute Neutrophils 6.0      Absolute Lymphocytes 1.8      Absolute Monocytes 0.8      Absolute Eosinophils 0.2      Absolute Basophils 0.0      Absolute Immature Granulocytes 0.0      Absolute NRBCs 0.0         Imaging   No orders to display       Independent Interpretation   None    ED Course      Medications Administered   Medications   predniSONE (DELTASONE) tablet 40 mg (40 mg Oral $Given 4/27/25 2342)   clindamycin (CLEOCIN) capsule 300 mg (300 mg Oral $Given 4/27/25 2342)       Procedures   Procedures     Discussion of Management   None    ED Course   ED Course as of 04/28/25 0128   Sun Apr 27,  2025 2135 I obtained the history and examined the patient as noted above.     2257 I rechecked the patient and explained findings.    2310 I discussed risk and benefits of the joint aspiration.  I reviewed laboratory studies.  Patient declines joint aspiration after discussion.  She believes this may be related to gout as she has eaten jaramillo and some other foods that she is not supposed to eat on her gout diet.  She states that it does feel like it could be gout.  She would like to try prednisone for this.  I explained that she will need to continue the antibiotics as there is some diagnostic uncertainty.  I explained that she will need to come back if she is unable to bend her knee develops fever or any significant worsening of swelling.  She was in agreement with this plan.       Additional Documentation  None    Medical Decision Making / Diagnosis     CMS Diagnoses: None    MIPS       None    MDM   Annie Archer is a 84 year old female who comes with persistent pain at her right knee with some slight spreading of the erythema.  She is being treated with antibiotics for presumed infection and cellulitis.  Lactic acid is normal here.  She is afebrile.  White blood cell count is normal.  CRP was very mildly elevated.  Remainder of laboratory studies were reassuring.  She does have a history of gout.  I suspect this may be gout versus cellulitis.  She does not appear toxic in nature.  She reports pain with movement but I am able to range her.  Discussed and recommended attempt at arthrocentesis but after discussion of risk versus benefits, patient declines.  We will go ahead and add prednisone on to treat for gout and continue on antibiotics.  She is instructed to have a low threshold to return if she has other worsening symptoms.  At this time, with reasonable clinical confidence, I do believe she is safer discharge home.    Disposition   The patient was discharged.     Diagnosis     ICD-10-CM    1. Acute pain of  right knee  M25.561       2. Erythema  L53.9            Discharge Medications   Discharge Medication List as of 4/27/2025 11:38 PM        START taking these medications    Details   !! predniSONE (DELTASONE) 20 MG tablet 2 tabs day 1-2, then 1 tab days 3-4, then 1/2 tab daily for 6 days, Disp-9 tablet, R-0, E-Prescribe       !! - Potential duplicate medications found. Please discuss with provider.            Scribe Disclosure:  I, Marysol Weeks, am serving as a scribe at 9:38 PM on 4/27/2025 to document services personally performed by Debbie Jones MD based on my observations and the provider's statements to me.        Debbie Jones MD  04/28/25 0128

## 2025-05-07 ENCOUNTER — APPOINTMENT (OUTPATIENT)
Dept: NEUROLOGY | Facility: CLINIC | Age: 85
End: 2025-05-07

## 2025-05-07 VITALS
BODY MASS INDEX: 24.29 KG/M2 | WEIGHT: 132 LBS | HEIGHT: 62 IN | DIASTOLIC BLOOD PRESSURE: 78 MMHG | HEART RATE: 62 BPM | SYSTOLIC BLOOD PRESSURE: 124 MMHG | OXYGEN SATURATION: 97 %

## 2025-05-07 DIAGNOSIS — R56.9 UNSPECIFIED CONVULSIONS: ICD-10-CM

## 2025-05-07 PROCEDURE — 99204 OFFICE O/P NEW MOD 45 MIN: CPT

## 2025-05-07 RX ORDER — ESCITALOPRAM OXALATE 10 MG/1
10 TABLET, FILM COATED ORAL
Refills: 0 | Status: ACTIVE | COMMUNITY

## 2025-05-08 NOTE — OCCUPATIONAL THERAPY INITIAL EVALUATION ADULT - WEIGHT-BEARING RESTRICTIONS: STAND/SIT, REHAB EVAL
4 Eyes Skin Assessment Completed by OMID Chavez and OMID Hurt.    Head WDL  Ears WDL  Nose WDL  Mouth WDL  Neck WDL  Breast/Chest WDL  Shoulder Blades Redness, rash  Spine Redness, rash  (R) Arm/Elbow/Hand WDL  (L) Arm/Elbow/Hand Redness and Rash  Abdomen WDL  Groin Redness and Excoriation, rash   Scrotum/Coccyx/Buttocks Redness, blanching, rash   (R) Leg Redness and Scab, discoloration, rash  (L) Leg Redness and Scab, discoloration, rash   (R) Heel/Foot/Toe dry, cracked  (L) Heel/Foot/Toe dry, cracked          Devices In Places Nasal Cannula      Interventions In Place Gray Ear Foams    Possible Skin Injury Yes    Pictures Uploaded Into Epic Yes  Wound Consult Placed Yes  RN Wound Prevention Protocol Ordered Yes                                                               weight-bearing as tolerated

## 2025-07-02 NOTE — PATIENT PROFILE ADULT - INTERNATIONAL TRAVEL
History & Physical - Short Stay  Gastroenterology      SUBJECTIVE:     Procedure: Colonoscopy    Chief Complaint/Indication for Procedure: Adenocarcinoma in polyp (descending colon)    (Not in a hospital admission)      Review of patient's allergies indicates:   Allergen Reactions    Azithromycin     Erythromycin Other (See Comments)    M-mycin         Past Medical History:   Diagnosis Date    ADHD     Adult attention deficit disorder 03/22/2021    Depression 12/22/2022    Essential hypertension 03/22/2021    Hyperlipidemia     Neuropathy 08/23/2023    AC (obstructive sleep apnea) 10/19/2023    PAD (peripheral artery disease) 02/17/2023    Swollen leg 01/20/2024    Type 2 diabetes mellitus      Past Surgical History:   Procedure Laterality Date    APPENDECTOMY      CARDIAC CATHETERIZATION      COLONOSCOPY  10/09/2024    DEBRIDEMENT OF LOWER EXTREMITY Bilateral 02/18/2023    Procedure: DEBRIDEMENT, LOWER EXTREMITY;  Surgeon: Yael Cohen MD;  Location: [facility] OR;  Service: General;  Laterality: Bilateral;  debride toes    FINGER AMPUTATION Right     1st digit    KIDNEY SURGERY Right     Patient states right kidney removed at age 5.    LEFT HEART CATHETERIZATION N/A 05/07/2021    Procedure: Left heart cath with possible intervention;  Surgeon: Federico James DO;  Location: [facility] CATH LAB;  Service: Cardiology;  Laterality: N/A;    TOE AMPUTATION Right 03/29/2023    Procedure: AMPUTATION, TOE;  Surgeon: Yael Cohen MD;  Location: [facility] OR;  Service: General;  Laterality: Right;  Right great toe amp dr cohen wednesday     Family History   Problem Relation Name Age of Onset    Hypertension Father      Stroke Father      Cancer Sister       Social History[1]      OBJECTIVE:     Vital Signs (Most Recent)  Temp: 97.8 °F (36.6 °C) (07/02/25 0952)  Pulse: (!) 57 (07/02/25 0952)  Resp: 12 (07/02/25 0952)  BP: (!) 153/106 (07/02/25 0952)  SpO2: 97 % (07/02/25 0952)    Physical Exam:                                                        General appearance: alert, cooperative, no distress, comfortable appearing  HENT: Normocephalic, atraumatic, neck symmetrical  Eyes: conjunctivae clear  Lungs: No labored breathing  Abd: Soft, non-tender    ASSESSMENT/PLAN:     Assessment: Adenocarcinoma in polyp (descending colon)    Plan: Colonoscopy         [1]   Social History  Tobacco Use    Smoking status: Former     Current packs/day: 0.00     Average packs/day: 5.0 packs/day for 4.0 years (20.0 ttl pk-yrs)     Types: Cigarettes     Start date:      Quit date:      Years since quittin.5     Passive exposure: Past    Smokeless tobacco: Current     Types: Chew, Snuff    Tobacco comments:     1 can/day   Substance Use Topics    Alcohol use: Not Currently     Comment: 2-3 drinks per/3 months    Drug use: Yes     Types: Amphetamines     Comment: prescibed adderal      No

## 2025-07-09 NOTE — DISCHARGE NOTE NURSING/CASE MANAGEMENT/SOCIAL WORK - NSDCPEPT PROEDMA_GEN_ALL_CORE
Left message for patient that it was okay to stop statin if he has questions he can give a call back   Yes

## 2025-07-10 ENCOUNTER — APPOINTMENT (OUTPATIENT)
Dept: NEUROLOGY | Facility: CLINIC | Age: 85
End: 2025-07-10
Payer: MEDICARE

## 2025-07-10 VITALS
BODY MASS INDEX: 24.29 KG/M2 | HEART RATE: 66 BPM | HEIGHT: 62 IN | SYSTOLIC BLOOD PRESSURE: 136 MMHG | WEIGHT: 132 LBS | DIASTOLIC BLOOD PRESSURE: 68 MMHG | OXYGEN SATURATION: 98 %

## 2025-07-10 PROCEDURE — G2211 COMPLEX E/M VISIT ADD ON: CPT

## 2025-07-10 PROCEDURE — 99213 OFFICE O/P EST LOW 20 MIN: CPT

## 2025-07-10 RX ORDER — CLOPIDOGREL 75 MG/1
75 TABLET, FILM COATED ORAL
Refills: 0 | Status: ACTIVE | COMMUNITY

## 2025-08-02 ENCOUNTER — EMERGENCY (EMERGENCY)
Facility: HOSPITAL | Age: 85
LOS: 1 days | End: 2025-08-02
Attending: EMERGENCY MEDICINE
Payer: MEDICARE

## 2025-08-02 VITALS
RESPIRATION RATE: 17 BRPM | HEART RATE: 80 BPM | WEIGHT: 169.98 LBS | OXYGEN SATURATION: 97 % | SYSTOLIC BLOOD PRESSURE: 172 MMHG | DIASTOLIC BLOOD PRESSURE: 77 MMHG | TEMPERATURE: 98 F

## 2025-08-02 DIAGNOSIS — C64.9 MALIGNANT NEOPLASM OF UNSPECIFIED KIDNEY, EXCEPT RENAL PELVIS: Chronic | ICD-10-CM

## 2025-08-02 DIAGNOSIS — Z90.89 ACQUIRED ABSENCE OF OTHER ORGANS: Chronic | ICD-10-CM

## 2025-08-02 DIAGNOSIS — Z90.5 ACQUIRED ABSENCE OF KIDNEY: Chronic | ICD-10-CM

## 2025-08-02 LAB
ALBUMIN SERPL ELPH-MCNC: 3.7 G/DL — SIGNIFICANT CHANGE UP (ref 3.3–5.2)
ALP SERPL-CCNC: 77 U/L — SIGNIFICANT CHANGE UP (ref 40–120)
ALT FLD-CCNC: 12 U/L — SIGNIFICANT CHANGE UP
ANION GAP SERPL CALC-SCNC: 13 MMOL/L — SIGNIFICANT CHANGE UP (ref 5–17)
AST SERPL-CCNC: 25 U/L — SIGNIFICANT CHANGE UP
BASOPHILS # BLD AUTO: 0.07 K/UL — SIGNIFICANT CHANGE UP (ref 0–0.2)
BASOPHILS NFR BLD AUTO: 1.3 % — SIGNIFICANT CHANGE UP (ref 0–2)
BILIRUB SERPL-MCNC: 0.7 MG/DL — SIGNIFICANT CHANGE UP (ref 0.4–2)
BUN SERPL-MCNC: 18.8 MG/DL — SIGNIFICANT CHANGE UP (ref 8–20)
CALCIUM SERPL-MCNC: 8.9 MG/DL — SIGNIFICANT CHANGE UP (ref 8.4–10.5)
CHLORIDE SERPL-SCNC: 98 MMOL/L — SIGNIFICANT CHANGE UP (ref 96–108)
CO2 SERPL-SCNC: 22 MMOL/L — SIGNIFICANT CHANGE UP (ref 22–29)
CREAT SERPL-MCNC: 0.71 MG/DL — SIGNIFICANT CHANGE UP (ref 0.5–1.3)
EGFR: 84 ML/MIN/1.73M2 — SIGNIFICANT CHANGE UP
EGFR: 84 ML/MIN/1.73M2 — SIGNIFICANT CHANGE UP
EOSINOPHIL # BLD AUTO: 0.17 K/UL — SIGNIFICANT CHANGE UP (ref 0–0.5)
EOSINOPHIL NFR BLD AUTO: 3.1 % — SIGNIFICANT CHANGE UP (ref 0–6)
GLUCOSE SERPL-MCNC: 65 MG/DL — LOW (ref 70–99)
HCT VFR BLD CALC: 39.6 % — SIGNIFICANT CHANGE UP (ref 34.5–45)
HGB BLD-MCNC: 13.8 G/DL — SIGNIFICANT CHANGE UP (ref 11.5–15.5)
IMM GRANULOCYTES # BLD AUTO: 0.02 K/UL — SIGNIFICANT CHANGE UP (ref 0–0.07)
IMM GRANULOCYTES NFR BLD AUTO: 0.4 % — SIGNIFICANT CHANGE UP (ref 0–0.9)
LYMPHOCYTES # BLD AUTO: 1.13 K/UL — SIGNIFICANT CHANGE UP (ref 1–3.3)
LYMPHOCYTES NFR BLD AUTO: 20.8 % — SIGNIFICANT CHANGE UP (ref 13–44)
MCHC RBC-ENTMCNC: 31 PG — SIGNIFICANT CHANGE UP (ref 27–34)
MCHC RBC-ENTMCNC: 34.8 G/DL — SIGNIFICANT CHANGE UP (ref 32–36)
MCV RBC AUTO: 89 FL — SIGNIFICANT CHANGE UP (ref 80–100)
MONOCYTES # BLD AUTO: 0.68 K/UL — SIGNIFICANT CHANGE UP (ref 0–0.9)
MONOCYTES NFR BLD AUTO: 12.5 % — SIGNIFICANT CHANGE UP (ref 2–14)
NEUTROPHILS # BLD AUTO: 3.36 K/UL — SIGNIFICANT CHANGE UP (ref 1.8–7.4)
NEUTROPHILS NFR BLD AUTO: 61.9 % — SIGNIFICANT CHANGE UP (ref 43–77)
NRBC # BLD AUTO: 0 K/UL — SIGNIFICANT CHANGE UP (ref 0–0)
NRBC # FLD: 0 K/UL — SIGNIFICANT CHANGE UP (ref 0–0)
NRBC BLD AUTO-RTO: 0 /100 WBCS — SIGNIFICANT CHANGE UP (ref 0–0)
PLATELET # BLD AUTO: 165 K/UL — SIGNIFICANT CHANGE UP (ref 150–400)
PMV BLD: 10.7 FL — SIGNIFICANT CHANGE UP (ref 7–13)
POTASSIUM SERPL-MCNC: 4.3 MMOL/L — SIGNIFICANT CHANGE UP (ref 3.5–5.3)
POTASSIUM SERPL-SCNC: 4.3 MMOL/L — SIGNIFICANT CHANGE UP (ref 3.5–5.3)
PROT SERPL-MCNC: 6.1 G/DL — LOW (ref 6.6–8.7)
RBC # BLD: 4.45 M/UL — SIGNIFICANT CHANGE UP (ref 3.8–5.2)
RBC # FLD: 11.9 % — SIGNIFICANT CHANGE UP (ref 10.3–14.5)
SODIUM SERPL-SCNC: 133 MMOL/L — LOW (ref 135–145)
WBC # BLD: 5.43 K/UL — SIGNIFICANT CHANGE UP (ref 3.8–10.5)
WBC # FLD AUTO: 5.43 K/UL — SIGNIFICANT CHANGE UP (ref 3.8–10.5)

## 2025-08-02 PROCEDURE — 93005 ELECTROCARDIOGRAM TRACING: CPT

## 2025-08-02 PROCEDURE — 84484 ASSAY OF TROPONIN QUANT: CPT

## 2025-08-02 PROCEDURE — 93010 ELECTROCARDIOGRAM REPORT: CPT

## 2025-08-02 PROCEDURE — 80053 COMPREHEN METABOLIC PANEL: CPT

## 2025-08-02 PROCEDURE — 99285 EMERGENCY DEPT VISIT HI MDM: CPT

## 2025-08-02 PROCEDURE — 85025 COMPLETE CBC W/AUTO DIFF WBC: CPT

## 2025-08-02 PROCEDURE — 99283 EMERGENCY DEPT VISIT LOW MDM: CPT | Mod: 25

## 2025-08-02 PROCEDURE — 36415 COLL VENOUS BLD VENIPUNCTURE: CPT

## 2025-08-26 ENCOUNTER — OFFICE (OUTPATIENT)
Dept: URBAN - METROPOLITAN AREA CLINIC 113 | Facility: CLINIC | Age: 85
Setting detail: OPHTHALMOLOGY
End: 2025-08-26
Payer: COMMERCIAL

## 2025-08-26 DIAGNOSIS — H25.11: ICD-10-CM

## 2025-08-26 PROCEDURE — 99212 OFFICE O/P EST SF 10 MIN: CPT | Performed by: STUDENT IN AN ORGANIZED HEALTH CARE EDUCATION/TRAINING PROGRAM

## 2025-08-26 ASSESSMENT — REFRACTION_CURRENTRX
OS_OVR_VA: 20/
OS_ADD: +2.25
OS_SPHERE: +1.25
OD_ADD: +2.25
OD_ADD: +3.00
OD_AXIS: 110
OD_OVR_VA: 20/
OD_AXIS: 097
OD_OVR_VA: 20/
OS_SPHERE: +0.50
OD_CYLINDER: -2.00
OS_AXIS: 105
OD_SPHERE: +0.75
OS_CYLINDER: -1.00
OD_VPRISM_DIRECTION: PROGS
OD_SPHERE: +0.75
OD_CYLINDER: -1.25
OS_AXIS: 086
OS_CYLINDER: -2.00
OS_OVR_VA: 20/
OS_VPRISM_DIRECTION: PROGS
OS_ADD: +3.00

## 2025-08-26 ASSESSMENT — KERATOMETRY
OD_AXISANGLE_DEGREES: 027
OD_K2POWER_DIOPTERS: 44.75
OD_K1POWER_DIOPTERS: 43.75
OS_AXISANGLE_DEGREES: 138
OS_K1POWER_DIOPTERS: 44.00
OS_K2POWER_DIOPTERS: 44.50

## 2025-08-26 ASSESSMENT — REFRACTION_AUTOREFRACTION
OS_AXIS: 074
OD_SPHERE: +1.50
OD_CYLINDER: -2.25
OD_AXIS: 108
OS_CYLINDER: -1.00
OS_SPHERE: +0.25

## 2025-08-26 ASSESSMENT — TONOMETRY
OD_IOP_MMHG: 17
OS_IOP_MMHG: 11

## 2025-08-26 ASSESSMENT — REFRACTION_MANIFEST
OS_AXIS: 095
OD_SPHERE: +0.50
OS_CYLINDER: -1.75
OD_AXIS: 100
OD_CYLINDER: -1.50
OS_ADD: +2.75
OS_SPHERE: +0.50
OD_VA1: 20/25
OS_VA1: 20/30
OD_ADD: +2.75

## 2025-08-26 ASSESSMENT — VISUAL ACUITY
OD_BCVA: 20/20-1
OS_BCVA: 20/40

## 2025-08-26 ASSESSMENT — SUPERFICIAL PUNCTATE KERATITIS (SPK)
OD_SPK: ABSENT
OS_SPK: ABSENT

## 2025-08-26 ASSESSMENT — CORNEAL EDEMA CLINICAL DESCRIPTION: OS_CORNEALEDEMA: 1+

## (undated) DEVICE — SOL IRR BAG NS 0.9% 1000ML

## (undated) DEVICE — DRSG 2X2

## (undated) DEVICE — TUBING ALARIS PUMP MODULE NON-DEHP

## (undated) DEVICE — FORCEP RADIAL JAW 4 W NDL 2.4MM 2.8MM 240CM ORANGE DISP

## (undated) DEVICE — DENTURE CUP PINK

## (undated) DEVICE — MASK PROC EAR LOOP

## (undated) DEVICE — SENSOR O2 FINGER ADULT

## (undated) DEVICE — SYR IV FLUSH SALINE 10ML 30/TY

## (undated) DEVICE — DRSG CURITY GAUZE SPONGE 4 X 4" 12-PLY NON-STERILE

## (undated) DEVICE — VENODYNE/SCD SLEEVE CALF MEDIUM

## (undated) DEVICE — STERIS DEFENDO 3-PIECE KIT (AIR/WATER, SUCTION & BIOPSY VALVES)

## (undated) DEVICE — SYR SLIP 10CC

## (undated) DEVICE — PACK IV START WITH CHG

## (undated) DEVICE — UNDERPAD LINEN SAVER 23 X 36"

## (undated) DEVICE — GOWN IMPERV XL

## (undated) DEVICE — SYR LUER SLIP TIP 50CC

## (undated) DEVICE — WARMING BLANKET FULL ADULT

## (undated) DEVICE — CATH IV SAFE BC 22G X 1" (BLUE)

## (undated) DEVICE — BITE BLOCK ADULT 20 X 27MM (GREEN)

## (undated) DEVICE — TUBING IV EXTENSION MACRO W CLAVE 7"

## (undated) DEVICE — SOL BAG NS 0.9% 1000ML

## (undated) DEVICE — SOL IRR BAG H2O 1000ML